# Patient Record
Sex: FEMALE | Race: WHITE | Employment: FULL TIME | URBAN - METROPOLITAN AREA
[De-identification: names, ages, dates, MRNs, and addresses within clinical notes are randomized per-mention and may not be internally consistent; named-entity substitution may affect disease eponyms.]

---

## 2017-01-19 RX ORDER — PROPRANOLOL HYDROCHLORIDE 40 MG/1
TABLET ORAL
Qty: 180 TAB | Refills: 1 | Status: SHIPPED | OUTPATIENT
Start: 2017-01-19 | End: 2017-02-06 | Stop reason: SDUPTHER

## 2017-02-06 RX ORDER — PROPRANOLOL HYDROCHLORIDE 40 MG/1
TABLET ORAL
Qty: 180 TAB | Refills: 1 | Status: SHIPPED | OUTPATIENT
Start: 2017-02-06 | End: 2017-07-18 | Stop reason: SDUPTHER

## 2017-02-13 DIAGNOSIS — E78.5 HYPERLIPIDEMIA, UNSPECIFIED HYPERLIPIDEMIA TYPE: ICD-10-CM

## 2017-02-13 DIAGNOSIS — I11.9 HYPERTENSIVE HEART DISEASE WITHOUT HEART FAILURE: ICD-10-CM

## 2017-02-13 DIAGNOSIS — G43.009 MIGRAINE WITHOUT AURA AND WITHOUT STATUS MIGRAINOSUS, NOT INTRACTABLE: ICD-10-CM

## 2017-02-13 DIAGNOSIS — M54.16 BILATERAL LUMBAR RADICULOPATHY: ICD-10-CM

## 2017-02-13 DIAGNOSIS — E11.9 TYPE 2 DIABETES MELLITUS WITHOUT COMPLICATION, WITHOUT LONG-TERM CURRENT USE OF INSULIN (HCC): ICD-10-CM

## 2017-02-13 DIAGNOSIS — Z11.59 NEED FOR HEPATITIS C SCREENING TEST: ICD-10-CM

## 2017-03-15 ENCOUNTER — OFFICE VISIT (OUTPATIENT)
Dept: INTERNAL MEDICINE CLINIC | Age: 62
End: 2017-03-15

## 2017-03-15 VITALS
TEMPERATURE: 98 F | OXYGEN SATURATION: 97 % | SYSTOLIC BLOOD PRESSURE: 152 MMHG | WEIGHT: 138 LBS | DIASTOLIC BLOOD PRESSURE: 84 MMHG | HEART RATE: 77 BPM | HEIGHT: 59 IN | BODY MASS INDEX: 27.82 KG/M2

## 2017-03-15 DIAGNOSIS — R39.15 URINARY URGENCY: ICD-10-CM

## 2017-03-15 DIAGNOSIS — R82.90 ABNORMAL URINE ODOR: ICD-10-CM

## 2017-03-15 DIAGNOSIS — R35.0 FREQUENCY OF URINATION: ICD-10-CM

## 2017-03-15 DIAGNOSIS — R30.0 DYSURIA: Primary | ICD-10-CM

## 2017-03-15 LAB
BILIRUB UR QL STRIP: NEGATIVE
GLUCOSE UR-MCNC: NEGATIVE MG/DL
KETONES P FAST UR STRIP-MCNC: NEGATIVE MG/DL
PH UR STRIP: 7.5 [PH] (ref 4.6–8)
PROT UR QL STRIP: NEGATIVE MG/DL
SP GR UR STRIP: 1.02 (ref 1–1.03)
UA UROBILINOGEN AMB POC: NORMAL (ref 0.2–1)
URINALYSIS CLARITY POC: NORMAL
URINALYSIS COLOR POC: YELLOW
URINE BLOOD POC: NORMAL
URINE LEUKOCYTES POC: NORMAL
URINE NITRITES POC: NEGATIVE

## 2017-03-15 RX ORDER — CIPROFLOXACIN 500 MG/1
500 TABLET ORAL 2 TIMES DAILY
Qty: 6 TAB | Refills: 0 | Status: SHIPPED | OUTPATIENT
Start: 2017-03-15 | End: 2017-03-18

## 2017-03-15 RX ORDER — OLANZAPINE 2.5 MG/1
TABLET ORAL
COMMUNITY
End: 2017-05-11 | Stop reason: ALTCHOICE

## 2017-03-15 NOTE — MR AVS SNAPSHOT
Visit Information Date & Time Provider Department Dept. Phone Encounter #  
 3/15/2017  4:30 PM Sharon Loyola [facility] Internist of [address] [place] [address] [phone] Your Appointments 5/4/2017  9:45 AM  
LAB with [facility] NURSE VISIT Internist of [facility] ([facility] Appt Note: labs 6mos. sarris [address], [address] [place] [id number] [place] [address] [address]  
  
   
 [address], [address]  
  
    
 5/11/2017 11:00 AM  
Office Visit with Timbo Locke MD  
Internist of [facility]) Appt Note: ov  
 [address], [address] [place] [address] [address]  
  
   
 [address], [address] Upcoming Health Maintenance Date Due Hepatitis C Screening 1955 MICROALBUMIN Q1 7/17/1967 Pneumococcal 19-64 Medium Risk (1 of 1 - PPSV23) 7/17/1976 LIPID PANEL Q1 6/11/2016 EYE EXAM RETINAL OR DILATED Q1 5/2/2017 HEMOGLOBIN A1C Q6M 5/7/2017 FOOT EXAM Q1 11/10/2017 PAP AKA CERVICAL CYTOLOGY 11/17/2017 BREAST CANCER SCRN MAMMOGRAM 4/5/2018 COLONOSCOPY 10/24/2024 DTaP/Tdap/Td series (2 - Td) 11/10/2026 Allergies as of 3/15/2017  Review Complete On: 3/15/2017 By: Clara Perez LPN Severity Noted Reaction Type Reactions Pcn [Penicillins]  10/25/2011    Rash  
 Sulfa (Sulfonamide Antibiotics)  10/25/2011    Rash Current Immunizations  Reviewed on 11/10/2016 Name Date Influenza Vaccine PF 10/13/2014 12:46 PM, 2/3/2014  3:30 PM  
 Influenza Vaccine Split 11/14/2011 Influenza Vaccine Whole 10/5/2010 TD Vaccine 1/23/2007 Tdap 11/10/2016  1:28 PM  
  
 Not reviewed this visit You Were Diagnosed With   
  
 Codes Comments Dysuria    -  Primary ICD-10-CM: R30.0 ICD-9-CM: 788.1 Frequency of urination     ICD-10-CM: R35.0 ICD-9-CM: 788.41 Urinary urgency     ICD-10-CM: R39.15 ICD-9-CM: 591.58   
  
 Vitals BP Pulse Temp Height(growth percentile) Weight(growth percentile) SpO2  
 152/84 77 98 °F (36.7 °C) (Oral) 4' 11\" (1.499 m) 138 lb (62.6 kg) 97% BMI OB Status Smoking Status 27.87 kg/m2 Postmenopausal Former Smoker Vitals History BMI and BSA Data Body Mass Index Body Surface Area  
 27.87 kg/m 2 1.61 m 2 Preferred Pharmacy Pharmacy Name Phone [address], [address] [place] [address] [phone] [facility] [phone] Your Updated Medication List  
  
   
This list is accurate as of: 3/15/17  4:49 PM.  Always use your most recent med list.  
  
  
  
  
 aspirin 81 mg tablet Take 81 mg by mouth. betamethasone valerate 0.1 % topical cream  
Commonly known as:  Mardetara Susana Apply  to affected area two (2) times daily as needed for Skin Irritation. ciprofloxacin HCl 500 mg tablet Commonly known as:  CIPRO Take 1 Tab by mouth two (2) times a day for 3 days. dextroamphetamine-amphetamine 10 mg tablet Commonly known as:  ADDERALL Take 1 Tab by mouth daily. Max Daily Amount: 10 mg. ELESTRIN 0.87 gram/actuation Glp  
Generic drug:  Estradiol  
by TransDERmal route. FISH OIL PO Take  by mouth. HYDROcodone-acetaminophen 5-325 mg per tablet Commonly known as:  Iven Hansen Take 1 Tab by mouth every four (4) hours as needed for Pain. Max Daily Amount: 6 Tabs. lisinopril 5 mg tablet Commonly known as:  PRINIVIL, ZESTRIL  
TAKE 1 TABLET BY MOUTH DAILY  
  
 metFORMIN 500 mg tablet Commonly known as:  GLUCOPHAGE Take 1 Tab by mouth two (2) times daily (with meals). NEXIUM PO Take 22.3 mg by mouth every other day. predniSONE 20 mg tablet Commonly known as:  Orlean Aas Take 60mg by mouth daily x 3 days, then 40mg daily x 3 days, then 20mg daily x 3 days. progesterone 200 mg capsule Commonly known as:  PROMETRIUM Take 200 mg by mouth daily. propranolol 40 mg tablet Commonly known as:  INDERAL  
TAKE 1 TABLET BY MOUTH TWICE DAILY  
  
 rosuvastatin 20 mg tablet Commonly known as:  CRESTOR  
TAKE 1 TABLET BY MOUTH DAILY  
  
 SUMAtriptan 50 mg tablet Commonly known as:  IMITREX  
TAKE 1 TABLET BY MOUTH EVERY DAY AS NEEDED  
  
 temazepam 30 mg capsule Commonly known as:  RESTORIL Take 1 Cap by mouth nightly as needed for Sleep. Max Daily Amount: 30 mg.  
  
 vortioxetine 10 mg tablet Commonly known as:  TRINTELLIX Take 1 and 1/2 tablets by mouth daily. Prescriptions Sent to Pharmacy Refills  
 ciprofloxacin HCl (CIPRO) 500 mg tablet 0 Sig: Take 1 Tab by mouth two (2) times a day for 3 days. Class: Normal  
 Pharmacy: [address] [place] [address] #: [phone] Route: Oral  
  
We Performed the Following AMB POC URINALYSIS DIP STICK MANUAL W/O MICRO [32364 CPT(R)] CULTURE, URINE [id number] CPT(R)] Introducing [facility]! Shana Ann introduces [employer] patient portal. Now you can access parts of your medical record, email your doctor's office, and request medication refills online. 1. In your internet browser, go to [email or web address] 2. Click on the First Time User? Click Here link in the Sign In box. You will see the New Member Sign Up page. 3. Enter your [employer] Access Code exactly as it appears below. You will not need to use this code after youve completed the sign-up process. If you do not sign up before the expiration date, you must request a new code. · [employer] Access Code: [email or web address] Expires: 6/13/2017  4:49 PM 
 
4. Enter the last four digits of your Social Security Number (xxxx) and Date of Birth (mm/dd/yyyy) as indicated and click Submit. You will be taken to the next sign-up page. 5. Create a [employer] ID.  This will be your [employer] login ID and cannot be changed, so think of one that is secure and easy to remember. 6. Create a [employer] password. You can change your password at any time. 7. Enter your Password Reset Question and Answer. This can be used at a later time if you forget your password. 8. Enter your e-mail address. You will receive e-mail notification when new information is available in [address] 9. Click Sign Up. You can now view and download portions of your medical record. 10. Click the Download Summary menu link to download a portable copy of your medical information. If you have questions, please visit the Frequently Asked Questions section of the [employer] website. Remember, [employer] is NOT to be used for urgent needs. For medical emergencies, dial 911. Now available from your iPhone and Android! Please provide this summary of care documentation to your next provider. Your primary care clinician is listed as Mohan Farias. If you have any questions after today's visit, please call [phone].

## 2017-03-15 NOTE — PROGRESS NOTES
HPI/History  Deneen Fisher is a 63 y.o.  female who presents for evaluation. Pt reports urinary sxs for about 2 wks. Positive for frequency, urgency, malodorous urine, bladder pressure, and dysuria not described as burning. No hematuria, CVAt, fevers, or vaginal sxs. No other sxs or complaints. Patient Active Problem List   Diagnosis Code    HCD (hypertensive cardiovascular disease) I11.9    Hyperlipidemia E78.5    Otosclerosis H80.90    Migraine G43.909    Depression F32.9    Bilateral lumbar radiculopathy M54.16    Type 2 diabetes mellitus without complication ([facility] [address] N10.2     Past Medical History:   Diagnosis Date    Anxiety     Bilateral lumbar radiculopathy     Carotid bruit     right    Depression     Diabetes mellitus ([facility] [address]     HCD (hypertensive cardiovascular disease)     Hyperlipidemia     Migraine headache     Plantar fasciitis      Past Surgical History:   Procedure Laterality Date    HX SEPTOPLASTY  6/2002    HX TONSILLECTOMY      HX TUBAL LIGATION       Social History     Social History    Marital status:      Spouse name: N/A    Number of children: N/A    Years of education: N/A     Occupational History    Not on file.      Social History Main Topics    Smoking status: Former Smoker     Quit date: 1/1/1989    Smokeless tobacco: Never Used    Alcohol use 1.0 oz/week     2 Glasses of wine per week      Comment: infrequently    Drug use: Not on file    Sexual activity: Not on file     Other Topics Concern    Not on file     Social History Narrative     Family History   Problem Relation Age of Onset    Hypertension Brother      x2    Elevated Lipids Brother     Stroke Mother     Heart Surgery Mother      CABG    Cancer Father      cancer of the mouth    Hypertension Father     Hypertension Brother     Elevated Lipids Brother      Current Outpatient Prescriptions   Medication Sig    ciprofloxacin HCl (CIPRO) 500 mg tablet Take 1 Tab by mouth two (2) times a day for 3 days.  OLANZapine (ZYPREXA) 2.5 mg tablet Take  by mouth nightly.  propranolol (INDERAL) 40 mg tablet TAKE 1 TABLET BY MOUTH TWICE DAILY    vortioxetine (TRINTELLIX) 10 mg tablet Take 1 and 1/2 tablets by mouth daily.  SUMAtriptan (IMITREX) 50 mg tablet TAKE 1 TABLET BY MOUTH EVERY DAY AS NEEDED    HYDROcodone-acetaminophen (NORCO) 5-325 mg per tablet Take 1 Tab by mouth every four (4) hours as needed for Pain. Max Daily Amount: 6 Tabs.  dextroamphetamine-amphetamine (ADDERALL) 10 mg tablet Take 1 Tab by mouth daily. Max Daily Amount: 10 mg.    metFORMIN (GLUCOPHAGE) 500 mg tablet Take 1 Tab by mouth two (2) times daily (with meals).  lisinopril (PRINIVIL, ZESTRIL) 5 mg tablet TAKE 1 TABLET BY MOUTH DAILY    rosuvastatin (CRESTOR) 20 mg tablet TAKE 1 TABLET BY MOUTH DAILY    temazepam (RESTORIL) 30 mg capsule Take 1 Cap by mouth nightly as needed for Sleep. Max Daily Amount: 30 mg.  progesterone (PROMETRIUM) 200 mg capsule Take 200 mg by mouth daily.  ESOMEPRAZOLE MAGNESIUM (NEXIUM PO) Take 22.3 mg by mouth every other day.  betamethasone valerate (VALISONE) 0.1 % topical cream Apply  to affected area two (2) times daily as needed for Skin Irritation.  Estradiol (ELESTRIN) 0.87 gram/Actuation GlPm by TransDERmal route.  DOCOSAHEXANOIC ACID/EPA (FISH OIL PO) Take  by mouth.  aspirin 81 mg tablet Take 81 mg by mouth. No current facility-administered medications for this visit. Allergies   Allergen Reactions    Pcn [Penicillins] Rash    Sulfa (Sulfonamide Antibiotics) Rash       Review of Systems  Rest of ROS negative. Significant findings included in HPI.     Physical Examination  Visit Vitals    /84    Pulse 77    Temp 98 °F (36.7 °C) (Oral)    Ht 4' 11\" (1.499 m)    Wt 138 lb (62.6 kg)    SpO2 97%    BMI 27.87 kg/m2       Results for orders placed or performed in visit on 03/15/17   AMB POC URINALYSIS DIP STICK MANUAL W/O MICRO Result Value Ref Range    Color (UA POC) Yellow     Clarity (UA POC) Cloudy     Glucose (UA POC) Negative Negative    Bilirubin (UA POC) Negative Negative    Ketones (UA POC) Negative Negative    Specific gravity (UA POC) 1.025 1.001 - 1.035    Blood (UA POC) Trace Negative    pH (UA POC) 7.5 4.6 - 8.0    Protein (UA POC) Negative Negative mg/dL    Urobilinogen (UA POC) 0.2 mg/dL 0.2 - 1    Nitrites (UA POC) Negative Negative    Leukocyte esterase (UA POC) 1+ Negative     General - Alert and in no acute distress. Pt appears well, comfortable, and in good spirits. Very pleasant and engaging. Nontoxic. Not anxious, non-diaphoretic. Mental status - Appropriate mood, behavior, speech content, dress, and thought processes. Pulm - No tachypnea, retractions, or cyanosis. Good respiratory effort. Cardiovascular - Normal rate. No CVA tenderness bilat. Assessment and Plan  1. Urinary frequency, urgency, odor, and dysuria for about 2 wks. UA noted above and likely UTI. Will treat with cipro and send for cx. Increase fluids. Further planning pending results and progression. Pt happily agrees with plan. PLEASE NOTE:   This document has been produced using voice recognition software. Unrecognized errors in transcription may be present.     [employer] of [address] [place] [address]  [phone]  3/15/2017

## 2017-03-17 LAB — CULTURE RESULT, SENTARA: ABNORMAL

## 2017-03-20 ENCOUNTER — TELEPHONE (OUTPATIENT)
Dept: INTERNAL MEDICINE CLINIC | Age: 62
End: 2017-03-20

## 2017-04-04 RX ORDER — SUMATRIPTAN 50 MG/1
TABLET, FILM COATED ORAL
Qty: 9 TAB | Refills: 1 | Status: SHIPPED | OUTPATIENT
Start: 2017-04-04 | End: 2017-07-07 | Stop reason: SDUPTHER

## 2017-04-19 ENCOUNTER — OFFICE VISIT (OUTPATIENT)
Dept: INTERNAL MEDICINE CLINIC | Age: 62
End: 2017-04-19

## 2017-04-19 VITALS
DIASTOLIC BLOOD PRESSURE: 72 MMHG | BODY MASS INDEX: 29.03 KG/M2 | SYSTOLIC BLOOD PRESSURE: 116 MMHG | HEART RATE: 60 BPM | OXYGEN SATURATION: 99 % | WEIGHT: 144 LBS | HEIGHT: 59 IN | TEMPERATURE: 98.3 F

## 2017-04-19 DIAGNOSIS — I10 ESSENTIAL HYPERTENSION: ICD-10-CM

## 2017-04-19 DIAGNOSIS — M54.16 BILATERAL LUMBAR RADICULOPATHY: ICD-10-CM

## 2017-04-19 DIAGNOSIS — R60.0 BILATERAL EDEMA OF LOWER EXTREMITY: ICD-10-CM

## 2017-04-19 DIAGNOSIS — G43.009 MIGRAINE WITHOUT AURA AND WITHOUT STATUS MIGRAINOSUS, NOT INTRACTABLE: ICD-10-CM

## 2017-04-19 DIAGNOSIS — E78.5 HYPERLIPIDEMIA, UNSPECIFIED HYPERLIPIDEMIA TYPE: ICD-10-CM

## 2017-04-19 DIAGNOSIS — F33.1 MODERATE EPISODE OF RECURRENT MAJOR DEPRESSIVE DISORDER (HCC): ICD-10-CM

## 2017-04-19 DIAGNOSIS — E11.9 TYPE 2 DIABETES MELLITUS WITHOUT COMPLICATION, WITHOUT LONG-TERM CURRENT USE OF INSULIN (HCC): Primary | ICD-10-CM

## 2017-04-19 DIAGNOSIS — E11.9 CONTROLLED TYPE 2 DIABETES MELLITUS WITHOUT COMPLICATION, WITHOUT LONG-TERM CURRENT USE OF INSULIN (HCC): ICD-10-CM

## 2017-04-19 NOTE — PROGRESS NOTES
1. Have you been to the ER, urgent care clinic or hospitalized since your last visit? NO.     2. Have you seen or consulted any other health care providers outside of the [address] [place] [address] since your last visit (Include any pap smears or colon screening)? NO      Pt complains of feet swelling. Onset Sunday April 16th. Pt states that a new medication that she is taking called olanzapine may be the cause of the sweling in her feet. Pt also complains of her appetite,  vision and that she can't focus.

## 2017-04-19 NOTE — MR AVS SNAPSHOT
Visit Information Date & Time Provider Department Dept. Phone Encounter #  
 4/19/2017 12:00 PM Siobhan Randall MD Internist of [address] [place] [address] [phone] Follow-up Instructions Return if symptoms worsen or fail to improve. Your Appointments 5/4/2017  9:45 AM  
LAB with C NURSE VISIT [facility] [address] [place] [address] Road) Appt Note: labs 6mos. sarris [address], [address] [place] [address] [place]  
  
   
 [address], [place]  
  
    
 5/11/2017 11:00 AM  
Office Visit with Siobhan Randall MD  
Internist of [address]) Appt Note: ov  
 [address], [address] [place] [address] [place]  
  
   
 [address], [facility] Upcoming Health Maintenance Date Due Hepatitis C Screening 1955 MICROALBUMIN Q1 7/17/1967 Pneumococcal 19-64 Medium Risk (1 of 1 - PPSV23) 7/17/1976 LIPID PANEL Q1 6/11/2016 EYE EXAM RETINAL OR DILATED Q1 5/2/2017 HEMOGLOBIN A1C Q6M 5/7/2017 FOOT EXAM Q1 11/10/2017 PAP AKA CERVICAL CYTOLOGY 11/17/2017 BREAST CANCER SCRN MAMMOGRAM 4/5/2018 COLONOSCOPY 10/24/2024 DTaP/Tdap/Td series (2 - Td) 11/10/2026 Allergies as of 4/19/2017  Review Complete On: 4/19/2017 By: Tiffany Vaca Severity Noted Reaction Type Reactions Pcn [Penicillins]  10/25/2011    Rash  
 Sulfa (Sulfonamide Antibiotics)  10/25/2011    Rash Current Immunizations  Reviewed on 11/10/2016 Name Date Influenza Vaccine PF 10/13/2014 12:46 PM, 2/3/2014  3:30 PM  
 Influenza Vaccine Split 11/14/2011 Influenza Vaccine Whole 10/5/2010 TD Vaccine 1/23/2007 Tdap 11/10/2016  1:28 PM  
  
 Not reviewed this visit You Were Diagnosed With   
  
 Codes Comments  Controlled type 2 diabetes mellitus without complication, without long-term current use of insulin ([facility].)    -  Primary ICD-10-CM: E11.9 ICD-9-CM: 250.00 Vitals BP Pulse Temp Height(growth percentile) Weight(growth percentile) SpO2  
 116/72 60 98.3 °F (36.8 °C) (Oral) 4' 11\" (1.499 m) 144 lb (65.3 kg) 99% BMI OB Status Smoking Status 29.08 kg/m2 Postmenopausal Former Smoker Vitals History BMI and BSA Data Body Mass Index Body Surface Area 29.08 kg/m 2 1.65 m 2 Preferred Pharmacy Pharmacy Name Phone [address], [place] [facility] 1700 [facility] [phone] Your Updated Medication List  
  
   
This list is accurate as of: 4/19/17  1:14 PM.  Always use your most recent med list.  
  
  
  
  
 aspirin 81 mg tablet Take 81 mg by mouth. betamethasone valerate 0.1 % topical cream  
Commonly known as:  [place] Apply  to affected area two (2) times daily as needed for Skin Irritation. dextroamphetamine-amphetamine 10 mg tablet Commonly known as:  ADDERALL Take 1 Tab by mouth daily. Max Daily Amount: 10 mg. ELESTRIN 0.87 gram/actuation [facility]  
Generic drug:  Estradiol  
by TransDERmal route. FISH OIL PO Take  by mouth. HYDROcodone-acetaminophen 5-325 mg per tablet Commonly known as:  Nga Leonardo Take 1 Tab by mouth every four (4) hours as needed for Pain. Max Daily Amount: 6 Tabs. lisinopril 5 mg tablet Commonly known as:  PRINIVIL, ZESTRIL  
TAKE 1 TABLET BY MOUTH DAILY  
  
 metFORMIN 500 mg tablet Commonly known as:  GLUCOPHAGE Take 1 Tab by mouth two (2) times daily (with meals). NEXIUM PO Take 22.3 mg by mouth every other day. OLANZapine 2.5 mg tablet Commonly known as:  ZyPREXA Take  by mouth nightly. progesterone 200 mg capsule Commonly known as:  PROMETRIUM Take 200 mg by mouth daily. propranolol 40 mg tablet Commonly known as:  INDERAL  
TAKE 1 TABLET BY MOUTH TWICE DAILY rosuvastatin 20 mg tablet Commonly known as:  CRESTOR  
TAKE 1 TABLET BY MOUTH DAILY  
  
 SUMAtriptan 50 mg tablet Commonly known as:  IMITREX  
TAKE 1 TABLET BY MOUTH EVERY DAY AS NEEDED  
  
 temazepam 30 mg capsule Commonly known as:  RESTORIL Take 1 Cap by mouth nightly as needed for Sleep. Max Daily Amount: 30 mg.  
  
 vortioxetine 10 mg tablet Commonly known as:  TRINTELLIX Take 1 and 1/2 tablets by mouth daily. Follow-up Instructions Return if symptoms worsen or fail to improve. To-Do List   
 04/19/2017 Lab:  HEMOGLOBIN A1C WITH EAG   
  
 04/19/2017 Lab:  METABOLIC PANEL, BASIC Patient Instructions Learning About Diabetes Food Guidelines Your Care Instructions Meal planning is important to manage diabetes. It helps keep your blood sugar at a target level (which you set with your doctor). You don't have to eat special foods. You can eat what your family eats, including sweets once in a while. But you do have to pay attention to how often you eat and how much you eat of certain foods. You may want to work with a dietitian or a certified diabetes educator (CDE) to help you plan meals and snacks. A dietitian or CDE can also help you lose weight if that is one of your goals. What should you know about eating carbs? Managing the amount of carbohydrate (carbs) you eat is an important part of healthy meals when you have diabetes. Carbohydrate is found in many foods. · Learn which foods have carbs. And learn the amounts of carbs in different foods. ¨ Bread, cereal, pasta, and rice have about 15 grams of carbs in a serving. A serving is 1 slice of bread (1 ounce), ½ cup of cooked cereal, or 1/3 cup of cooked pasta or rice. ¨ Fruits have 15 grams of carbs in a serving.  A serving is 1 small fresh fruit, such as an apple or orange; ½ of a banana; ½ cup of cooked or canned fruit; ½ cup of fruit juice; 1 cup of melon or raspberries; or 2 tablespoons of dried fruit. ¨ Milk and no-sugar-added yogurt have 15 grams of carbs in a serving. A serving is 1 cup of milk or 2/3 cup of no-sugar-added yogurt. ¨ Starchy vegetables have 15 grams of carbs in a serving. A serving is ½ cup of mashed potatoes or sweet potato; 1 cup winter squash; ½ of a small baked potato; ½ cup of cooked beans; or ½ cup cooked corn or green peas. · Learn how much carbs to eat each day and at each meal. A dietitian or CDE can teach you how to keep track of the amount of carbs you eat. This is called carbohydrate counting. · If you are not sure how to count carbohydrate grams, use the Plate Method to plan meals. It is a good, quick way to make sure that you have a balanced meal. It also helps you spread carbs throughout the day. ¨ Divide your plate by types of foods. Put non-starchy vegetables on half the plate, meat or other protein food on one-quarter of the plate, and a grain or starchy vegetable in the final quarter of the plate. To this you can add a small piece of fruit and 1 cup of milk or yogurt, depending on how many carbs you are supposed to eat at a meal. 
· Try to eat about the same amount of carbs at each meal. Do not \"save up\" your daily allowance of carbs to eat at one meal. 
· Proteins have very little or no carbs per serving. Examples of proteins are beef, chicken, turkey, fish, eggs, tofu, cheese, cottage cheese, and peanut butter. A serving size of meat is 3 ounces, which is about the size of a deck of cards. Examples of meat substitute serving sizes (equal to 1 ounce of meat) are 1/4 cup of cottage cheese, 1 egg, 1 tablespoon of peanut butter, and ½ cup of tofu. How can you eat out and still eat healthy? · Learn to estimate the serving sizes of foods that have carbohydrate. If you measure food at home, it will be easier to estimate the amount in a serving of restaurant food.  
· If the meal you order has too much carbohydrate (such as potatoes, corn, or baked beans), ask to have a low-carbohydrate food instead. Ask for a salad or green vegetables. · If you use insulin, check your blood sugar before and after eating out to help you plan how much to eat in the future. · If you eat more carbohydrate at a meal than you had planned, take a walk or do other exercise. This will help lower your blood sugar. What else should you know? · Limit saturated fat, such as the fat from meat and dairy products. This is a healthy choice because people who have diabetes are at higher risk of heart disease. So choose lean cuts of meat and nonfat or low-fat dairy products. Use olive or canola oil instead of butter or shortening when cooking. · Don't skip meals. Your blood sugar may drop too low if you skip meals and take insulin or certain medicines for diabetes. · Check with your doctor before you drink alcohol. Alcohol can cause your blood sugar to drop too low. Alcohol can also cause a bad reaction if you take certain diabetes medicines. Follow-up care is a key part of your treatment and safety. Be sure to make and go to all appointments, and call your doctor if you are having problems. It's also a good idea to know your test results and keep a list of the medicines you take. Where can you learn more? Go to http://jennifer-pavel.info/. Enter C221 in the search box to learn more about \"Learning About Diabetes Food Guidelines. \" Current as of: May 23, 2016 Content Version: 11.2 © 2290-4763 Lightspeed. Care instructions adapted under license by Hoverink (which disclaims liability or warranty for this information). If you have questions about a medical condition or this instruction, always ask your healthcare professional. [place] [address] any warranty or liability for your use of this information. Leg and Ankle Edema: Care Instructions Your Care Instructions Swelling in the legs, ankles, and feet is called edema. It is common after you sit or stand for a while. Long plane flights or car rides often cause swelling in the legs and feet. You may also have swelling if you have to stand for long periods of time at your job. Problems with the veins in the legs (varicose veins) and changes in hormones can also cause swelling. Sometimes the swelling in the ankles and feet is caused by a more serious problem, such as heart failure, infection, blood clots, or liver or kidney disease. Follow-up care is a key part of your treatment and safety. Be sure to make and go to all appointments, and call your doctor if you are having problems. Its also a good idea to know your test results and keep a list of the medicines you take. How can you care for yourself at home? · If your doctor gave you medicine, take it as prescribed. Call your doctor if you think you are having a problem with your medicine. · Whenever you are resting, raise your legs up. Try to keep the swollen area higher than the level of your heart. · Take breaks from standing or sitting in one position. ¨ Walk around to increase the blood flow in your lower legs. ¨ Move your feet and ankles often while you stand, or tighten and relax your leg muscles. · Wear support stockings. Put them on in the morning, before swelling gets worse. · Eat a balanced diet. Lose weight if you need to. · Limit the amount of salt (sodium) in your diet. Salt holds fluid in the body and may increase swelling. When should you call for help? Call 911 anytime you think you may need emergency care. For example, call if: 
· You have symptoms of a blood clot in your lung (called a pulmonary embolism). These may include: 
¨ Sudden chest pain. ¨ Trouble breathing. ¨ Coughing up blood. Call your doctor now or seek immediate medical care if: 
· You have signs of a blood clot, such as: 
¨ Pain in your calf, back of the knee, thigh, or groin. ¨ Redness and swelling in your leg or groin. · You have symptoms of infection, such as: 
¨ Increased pain, swelling, warmth, or redness. ¨ Red streaks or pus. ¨ A fever. Watch closely for changes in your health, and be sure to contact your doctor if: 
· Your swelling is getting worse. · You have new or worsening pain in your legs. · You do not get better as expected. Where can you learn more? Go to http://jennifer-pavel.info/. Enter N362 in the search box to learn more about \"Leg and Ankle Edema: Care Instructions. \" Current as of: May 27, 2016 Content Version: 11.2 © 2327-6799 DoApp. Care instructions adapted under license by Omnikles (which disclaims liability or warranty for this information). If you have questions about a medical condition or this instruction, always ask your healthcare professional. [place] [address] any warranty or liability for your use of this information. DASH Diet: Care Instructions Your Care Instructions The DASH diet is an eating plan that can help lower your blood pressure. DASH stands for Dietary Approaches to Stop Hypertension. Hypertension is high blood pressure. The DASH diet focuses on eating foods that are high in calcium, potassium, and magnesium. These nutrients can lower blood pressure. The foods that are highest in these nutrients are fruits, vegetables, low-fat dairy products, nuts, seeds, and legumes. But taking calcium, potassium, and magnesium supplements instead of eating foods that are high in those nutrients does not have the same effect. The DASH diet also includes whole grains, fish, and poultry. The DASH diet is one of several lifestyle changes your doctor may recommend to lower your high blood pressure. Your doctor may also want you to decrease the amount of sodium in your diet.  Lowering sodium while following the DASH diet can lower blood pressure even further than just the DASH diet alone. Follow-up care is a key part of your treatment and safety. Be sure to make and go to all appointments, and call your doctor if you are having problems. It's also a good idea to know your test results and keep a list of the medicines you take. How can you care for yourself at home? Following the DASH diet · Eat 4 to 5 servings of fruit each day. A serving is 1 medium-sized piece of fruit, ½ cup chopped or canned fruit, 1/4 cup dried fruit, or 4 ounces (½ cup) of fruit juice. Choose fruit more often than fruit juice. · Eat 4 to 5 servings of vegetables each day. A serving is 1 cup of lettuce or raw leafy vegetables, ½ cup of chopped or cooked vegetables, or 4 ounces (½ cup) of vegetable juice. Choose vegetables more often than vegetable juice. · Get 2 to 3 servings of low-fat and fat-free dairy each day. A serving is 8 ounces of milk, 1 cup of yogurt, or 1 ½ ounces of cheese. · Eat 6 to 8 servings of grains each day. A serving is 1 slice of bread, 1 ounce of dry cereal, or ½ cup of cooked rice, pasta, or cooked cereal. Try to choose whole-grain products as much as possible. · Limit lean meat, poultry, and fish to 2 servings each day. A serving is 3 ounces, about the size of a deck of cards. · Eat 4 to 5 servings of nuts, seeds, and legumes (cooked dried beans, lentils, and split peas) each week. A serving is 1/3 cup of nuts, 2 tablespoons of seeds, or ½ cup of cooked beans or peas. · Limit fats and oils to 2 to 3 servings each day. A serving is 1 teaspoon of vegetable oil or 2 tablespoons of salad dressing. · Limit sweets and added sugars to 5 servings or less a week. A serving is 1 tablespoon jelly or jam, ½ cup sorbet, or 1 cup of lemonade. · Eat less than 2,300 milligrams (mg) of sodium a day. If you limit your sodium to 1,500 mg a day, you can lower your blood pressure even more. Tips for success · Start small.  Do not try to make dramatic changes to your diet all at once. You might feel that you are missing out on your favorite foods and then be more likely to not follow the plan. Make small changes, and stick with them. Once those changes become habit, add a few more changes. · Try some of the following: ¨ Make it a goal to eat a fruit or vegetable at every meal and at snacks. This will make it easy to get the recommended amount of fruits and vegetables each day. ¨ Try yogurt topped with fruit and nuts for a snack or healthy dessert. ¨ Add lettuce, tomato, cucumber, and onion to sandwiches. ¨ Combine a ready-made pizza crust with low-fat mozzarella cheese and lots of vegetable toppings. Try using tomatoes, squash, spinach, broccoli, carrots, cauliflower, and onions. ¨ Have a variety of cut-up vegetables with a low-fat dip as an appetizer instead of chips and dip. ¨ Sprinkle sunflower seeds or chopped almonds over salads. Or try adding chopped walnuts or almonds to cooked vegetables. ¨ Try some vegetarian meals using beans and peas. Add garbanzo or kidney beans to salads. Make burritos and tacos with mashed nieto beans or black beans. Where can you learn more? Go to http://jennifer-pavel.info/. Enter V416 in the search box to learn more about \"DASH Diet: Care Instructions. \" Current as of: March 23, 2016 Content Version: 11.2 © 9792-9414 Fooooo. Care instructions adapted under license by SportsPursuit (which disclaims liability or warranty for this information). If you have questions about a medical condition or this instruction, always ask your healthcare professional. [place] [address] any warranty or liability for your use of this information. Introducing [facility]! Nadege Toribio introduces [employer] patient portal. Now you can access parts of your medical record, email your doctor's office, and request medication refills online.    
 
1. In your internet browser, go to [email or web address] 2. Click on the First Time User? Click Here link in the Sign In box. You will see the New Member Sign Up page. 3. Enter your [employer] Access Code exactly as it appears below. You will not need to use this code after youve completed the sign-up process. If you do not sign up before the expiration date, you must request a new code. · [employer] Access Code: [email or web address] Expires: 6/13/2017  4:49 PM 
 
4. Enter the last four digits of your Social Security Number (xxxx) and Date of Birth (mm/dd/yyyy) as indicated and click Submit. You will be taken to the next sign-up page. 5. Create a Open Home Prot ID. This will be your [employer] login ID and cannot be changed, so think of one that is secure and easy to remember. 6. Create a [employer] password. You can change your password at any time. 7. Enter your Password Reset Question and Answer. This can be used at a later time if you forget your password. 8. Enter your e-mail address. You will receive e-mail notification when new information is available in [address] 9. Click Sign Up. You can now view and download portions of your medical record. 10. Click the Download Summary menu link to download a portable copy of your medical information. If you have questions, please visit the Frequently Asked Questions section of the [employer] website. Remember, [employer] is NOT to be used for urgent needs. For medical emergencies, dial 911. Now available from your iPhone and Android! Please provide this summary of care documentation to your next provider. Your primary care clinician is listed as Darwin Singh. If you have any questions after today's visit, please call [phone].

## 2017-04-19 NOTE — PATIENT INSTRUCTIONS
Learning About Diabetes Food Guidelines  Your Care Instructions  Meal planning is important to manage diabetes. It helps keep your blood sugar at a target level (which you set with your doctor). You don't have to eat special foods. You can eat what your family eats, including sweets once in a while. But you do have to pay attention to how often you eat and how much you eat of certain foods. You may want to work with a dietitian or a certified diabetes educator (CDE) to help you plan meals and snacks. A dietitian or CDE can also help you lose weight if that is one of your goals. What should you know about eating carbs? Managing the amount of carbohydrate (carbs) you eat is an important part of healthy meals when you have diabetes. Carbohydrate is found in many foods. · Learn which foods have carbs. And learn the amounts of carbs in different foods. ¨ Bread, cereal, pasta, and rice have about 15 grams of carbs in a serving. A serving is 1 slice of bread (1 ounce), ½ cup of cooked cereal, or 1/3 cup of cooked pasta or rice. ¨ Fruits have 15 grams of carbs in a serving. A serving is 1 small fresh fruit, such as an apple or orange; ½ of a banana; ½ cup of cooked or canned fruit; ½ cup of fruit juice; 1 cup of melon or raspberries; or 2 tablespoons of dried fruit. ¨ Milk and no-sugar-added yogurt have 15 grams of carbs in a serving. A serving is 1 cup of milk or 2/3 cup of no-sugar-added yogurt. ¨ Starchy vegetables have 15 grams of carbs in a serving. A serving is ½ cup of mashed potatoes or sweet potato; 1 cup winter squash; ½ of a small baked potato; ½ cup of cooked beans; or ½ cup cooked corn or green peas. · Learn how much carbs to eat each day and at each meal. A dietitian or CDE can teach you how to keep track of the amount of carbs you eat. This is called carbohydrate counting. · If you are not sure how to count carbohydrate grams, use the Plate Method to plan meals.  It is a good, quick way to make sure that you have a balanced meal. It also helps you spread carbs throughout the day. ¨ Divide your plate by types of foods. Put non-starchy vegetables on half the plate, meat or other protein food on one-quarter of the plate, and a grain or starchy vegetable in the final quarter of the plate. To this you can add a small piece of fruit and 1 cup of milk or yogurt, depending on how many carbs you are supposed to eat at a meal.  · Try to eat about the same amount of carbs at each meal. Do not \"save up\" your daily allowance of carbs to eat at one meal.  · Proteins have very little or no carbs per serving. Examples of proteins are beef, chicken, turkey, fish, eggs, tofu, cheese, cottage cheese, and peanut butter. A serving size of meat is 3 ounces, which is about the size of a deck of cards. Examples of meat substitute serving sizes (equal to 1 ounce of meat) are 1/4 cup of cottage cheese, 1 egg, 1 tablespoon of peanut butter, and ½ cup of tofu. How can you eat out and still eat healthy? · Learn to estimate the serving sizes of foods that have carbohydrate. If you measure food at home, it will be easier to estimate the amount in a serving of restaurant food. · If the meal you order has too much carbohydrate (such as potatoes, corn, or baked beans), ask to have a low-carbohydrate food instead. Ask for a salad or green vegetables. · If you use insulin, check your blood sugar before and after eating out to help you plan how much to eat in the future. · If you eat more carbohydrate at a meal than you had planned, take a walk or do other exercise. This will help lower your blood sugar. What else should you know? · Limit saturated fat, such as the fat from meat and dairy products. This is a healthy choice because people who have diabetes are at higher risk of heart disease. So choose lean cuts of meat and nonfat or low-fat dairy products. Use olive or canola oil instead of butter or shortening when cooking.   · Don't skip meals. Your blood sugar may drop too low if you skip meals and take insulin or certain medicines for diabetes. · Check with your doctor before you drink alcohol. Alcohol can cause your blood sugar to drop too low. Alcohol can also cause a bad reaction if you take certain diabetes medicines. Follow-up care is a key part of your treatment and safety. Be sure to make and go to all appointments, and call your doctor if you are having problems. It's also a good idea to know your test results and keep a list of the medicines you take. Where can you learn more? Go to http://jenniferedopavel.info/. Enter Q716 in the search box to learn more about \"Learning About Diabetes Food Guidelines. \"  Current as of: May 23, 2016  Content Version: 11.2  © 4242-0782 [employer]. Care instructions adapted under license by [employer] (which disclaims liability or warranty for this information). If you have questions about a medical condition or this instruction, always ask your healthcare professional. [place] [address] any warranty or liability for your use of this information. Leg and Ankle Edema: Care Instructions  Your Care Instructions  Swelling in the legs, ankles, and feet is called edema. It is common after you sit or stand for a while. Long plane flights or car rides often cause swelling in the legs and feet. You may also have swelling if you have to stand for long periods of time at your job. Problems with the veins in the legs (varicose veins) and changes in hormones can also cause swelling. Sometimes the swelling in the ankles and feet is caused by a more serious problem, such as heart failure, infection, blood clots, or liver or kidney disease. Follow-up care is a key part of your treatment and safety. Be sure to make and go to all appointments, and call your doctor if you are having problems.  Its also a good idea to know your test results and keep a list of the medicines you take. How can you care for yourself at home? · If your doctor gave you medicine, take it as prescribed. Call your doctor if you think you are having a problem with your medicine. · Whenever you are resting, raise your legs up. Try to keep the swollen area higher than the level of your heart. · Take breaks from standing or sitting in one position. ¨ Walk around to increase the blood flow in your lower legs. ¨ Move your feet and ankles often while you stand, or tighten and relax your leg muscles. · Wear support stockings. Put them on in the morning, before swelling gets worse. · Eat a balanced diet. Lose weight if you need to. · Limit the amount of salt (sodium) in your diet. Salt holds fluid in the body and may increase swelling. When should you call for help? Call 911 anytime you think you may need emergency care. For example, call if:  · You have symptoms of a blood clot in your lung (called a pulmonary embolism). These may include:  ¨ Sudden chest pain. ¨ Trouble breathing. ¨ Coughing up blood. Call your doctor now or seek immediate medical care if:  · You have signs of a blood clot, such as:  ¨ Pain in your calf, back of the knee, thigh, or groin. ¨ Redness and swelling in your leg or groin. · You have symptoms of infection, such as:  ¨ Increased pain, swelling, warmth, or redness. ¨ Red streaks or pus. ¨ A fever. Watch closely for changes in your health, and be sure to contact your doctor if:  · Your swelling is getting worse. · You have new or worsening pain in your legs. · You do not get better as expected. Where can you learn more? Go to http://jennifer-pavel.info/. Enter I004 in the search box to learn more about \"Leg and Ankle Edema: Care Instructions. \"  Current as of: May 27, 2016  Content Version: 11.2  © 9454-0996 [employer].  Care instructions adapted under license by [employer] (which disclaims liability or warranty for this information). If you have questions about a medical condition or this instruction, always ask your healthcare professional. [address] any warranty or liability for your use of this information. DASH Diet: Care Instructions  Your Care Instructions  The DASH diet is an eating plan that can help lower your blood pressure. DASH stands for Dietary Approaches to Stop Hypertension. Hypertension is high blood pressure. The DASH diet focuses on eating foods that are high in calcium, potassium, and magnesium. These nutrients can lower blood pressure. The foods that are highest in these nutrients are fruits, vegetables, low-fat dairy products, nuts, seeds, and legumes. But taking calcium, potassium, and magnesium supplements instead of eating foods that are high in those nutrients does not have the same effect. The DASH diet also includes whole grains, fish, and poultry. The DASH diet is one of several lifestyle changes your doctor may recommend to lower your high blood pressure. Your doctor may also want you to decrease the amount of sodium in your diet. Lowering sodium while following the DASH diet can lower blood pressure even further than just the DASH diet alone. Follow-up care is a key part of your treatment and safety. Be sure to make and go to all appointments, and call your doctor if you are having problems. It's also a good idea to know your test results and keep a list of the medicines you take. How can you care for yourself at home? Following the DASH diet  · Eat 4 to 5 servings of fruit each day. A serving is 1 medium-sized piece of fruit, ½ cup chopped or canned fruit, 1/4 cup dried fruit, or 4 ounces (½ cup) of fruit juice. Choose fruit more often than fruit juice. · Eat 4 to 5 servings of vegetables each day. A serving is 1 cup of lettuce or raw leafy vegetables, ½ cup of chopped or cooked vegetables, or 4 ounces (½ cup) of vegetable juice.  Choose vegetables more often than vegetable juice. · Get 2 to 3 servings of low-fat and fat-free dairy each day. A serving is 8 ounces of milk, 1 cup of yogurt, or 1 ½ ounces of cheese. · Eat 6 to 8 servings of grains each day. A serving is 1 slice of bread, 1 ounce of dry cereal, or ½ cup of cooked rice, pasta, or cooked cereal. Try to choose whole-grain products as much as possible. · Limit lean meat, poultry, and fish to 2 servings each day. A serving is 3 ounces, about the size of a deck of cards. · Eat 4 to 5 servings of nuts, seeds, and legumes (cooked dried beans, lentils, and split peas) each week. A serving is 1/3 cup of nuts, 2 tablespoons of seeds, or ½ cup of cooked beans or peas. · Limit fats and oils to 2 to 3 servings each day. A serving is 1 teaspoon of vegetable oil or 2 tablespoons of salad dressing. · Limit sweets and added sugars to 5 servings or less a week. A serving is 1 tablespoon jelly or jam, ½ cup sorbet, or 1 cup of lemonade. · Eat less than 2,300 milligrams (mg) of sodium a day. If you limit your sodium to 1,500 mg a day, you can lower your blood pressure even more. Tips for success  · Start small. Do not try to make dramatic changes to your diet all at once. You might feel that you are missing out on your favorite foods and then be more likely to not follow the plan. Make small changes, and stick with them. Once those changes become habit, add a few more changes. · Try some of the following:  ¨ Make it a goal to eat a fruit or vegetable at every meal and at snacks. This will make it easy to get the recommended amount of fruits and vegetables each day. ¨ Try yogurt topped with fruit and nuts for a snack or healthy dessert. ¨ Add lettuce, tomato, cucumber, and onion to sandwiches. ¨ Combine a ready-made pizza crust with low-fat mozzarella cheese and lots of vegetable toppings. Try using tomatoes, squash, spinach, broccoli, carrots, cauliflower, and onions.   ¨ Have a variety of cut-up vegetables with a low-fat dip as an appetizer instead of chips and dip. ¨ Sprinkle sunflower seeds or chopped almonds over salads. Or try adding chopped walnuts or almonds to cooked vegetables. ¨ Try some vegetarian meals using beans and peas. Add garbanzo or kidney beans to salads. Make burritos and tacos with mashed nieto beans or black beans. Where can you learn more? Go to http://jennifer-pavel.info/. Enter I345 in the search box to learn more about \"DASH Diet: Care Instructions. \"  Current as of: March 23, 2016  Content Version: 11.2  © 4545-9746 [employer]. Care instructions adapted under license by [employer] (which disclaims liability or warranty for this information). If you have questions about a medical condition or this instruction, always ask your healthcare professional. [address] any warranty or liability for your use of this information.

## 2017-04-20 LAB
ANION GAP SERPL CALC-SCNC: 18 MMOL/L
AVG GLU, 10930: 179 MG/DL (ref 91–123)
BUN SERPL-MCNC: 16 MG/DL (ref 6–22)
CALCIUM SERPL-MCNC: 9.2 MG/DL (ref 8.4–10.5)
CHLORIDE SERPL-SCNC: 101 MMOL/L (ref 98–110)
CO2 SERPL-SCNC: 25 MMOL/L (ref 20–32)
CREAT SERPL-MCNC: 0.5 MG/DL (ref 0.5–1.2)
GFRAA, 66117: >60
GFRNA, 66118: >60
GLUCOSE SERPL-MCNC: 140 MG/DL (ref 65–99)
HBA1C MFR BLD HPLC: 7.9 % (ref 4.8–5.9)
POTASSIUM SERPL-SCNC: 4.6 MMOL/L (ref 3.5–5.5)
SODIUM SERPL-SCNC: 144 MMOL/L (ref 133–145)

## 2017-04-22 NOTE — PROGRESS NOTES
HPI:   Hood Garcia is a 63y.o. year old female who presents today for evaluation of lower extremity edema and eyelid swelling. She has a history of hypertension, hyperlipidemia, diabetes mellitus, migraine headaches, depression, anxiety, ADHD, and lumbar radiculopathy. She reports that she continues to have difficulty with depression, and is under the care of a therapist at [address] [place] [address]. She had been maintained on vortioxetine 15 mg, and one month ago, Zyprexa 2.5 mg was added. She states that since she began taking the Zyprexa, she has been experiencing the sensation of being \"numb\" without the ability to experience any emotion. She states that she does not have any suicidal ideas or plans. She states that she has become extremely hungry and cannot seem to stop eating since starting Zyprexa, and she has gained six pounds over the last month. Two days ago, she also noted the onset of mild lower extremity edema, which seems to worsen throughout the day and improve overnight. She also has noted some swelling of her eyelids, particularly when awakening in the morning. She denies any shortness of breath, chest pain, difficulty swallowing, lightheadedness, or palpitations. She has noted some increasing nasal congestion and sneezing. She states that she tried calling her therapist, but he did not return her call. Her daughter felt that she should be evaluated given symptoms. She is otherwise without complaints. She has a history of hypertension, treated with propranolol. She does not check her blood pressure at home, and she does not exercise regularly. She denies any chest pain, shortness of breath at rest or with exertion, palpitations, or lightheadedness. She also has a history of hyperlipidemia, treated with high intensity dose rosuvastatin. She has a history of diabetes mellitus, and was started on metformin in 2/2015 for a HbA1c of 7.5. She does not monitor her blood sugars at home.  She denies any polyuria, polydipsia, nocturia, or blurry vision, and has no history of retinopathy, neuropathy, or nephropathy. She has regular eye exams with Dr. Jose Rafael Anderson. She also has a history of migraines without aura, which usually respond to Imitrex. She has a history of lumbar radiculopathy (R>L), and was evaluated by Dr. César Mackay in 5/2015. Lumbar x-rays showed degenerative changes in L4-L5 and L5-S1. She was treated conservatively with physical therapy and ibuprofen and reports significant improvement. She was evaluated by MOJGAN Butler in 12/2016 for exacerbation of low back pain with right sciatica. She was treated with steroids and Norco with improvement. She had a screening colonoscopy in 10/2014 by Dr. Edelmira Choi which was normal. Follow-up due in 10 years. She denies any abdominal pain, nausea, vomiting, melena, hematochezia, or change in bowel movements. Past Medical History:   Diagnosis Date    Anxiety     Bilateral lumbar radiculopathy     Carotid bruit     right    Depression     Diabetes mellitus (HCC)     HCD (hypertensive cardiovascular disease)     Hyperlipidemia     Migraine headache     Plantar fasciitis      Past Surgical History:   Procedure Laterality Date    HX SEPTOPLASTY  6/2002    HX TONSILLECTOMY      HX TUBAL LIGATION       Current Outpatient Prescriptions   Medication Sig    SUMAtriptan (IMITREX) 50 mg tablet TAKE 1 TABLET BY MOUTH EVERY DAY AS NEEDED    OLANZapine (ZYPREXA) 2.5 mg tablet Take  by mouth nightly.  propranolol (INDERAL) 40 mg tablet TAKE 1 TABLET BY MOUTH TWICE DAILY    vortioxetine (TRINTELLIX) 10 mg tablet Take 1 and 1/2 tablets by mouth daily.  dextroamphetamine-amphetamine (ADDERALL) 10 mg tablet Take 1 Tab by mouth daily. Max Daily Amount: 10 mg.    metFORMIN (GLUCOPHAGE) 500 mg tablet Take 1 Tab by mouth two (2) times daily (with meals).     lisinopril (PRINIVIL, ZESTRIL) 5 mg tablet TAKE 1 TABLET BY MOUTH DAILY    rosuvastatin (CRESTOR) 20 mg tablet TAKE 1 TABLET BY MOUTH DAILY    progesterone (PROMETRIUM) 200 mg capsule Take 200 mg by mouth daily.  ESOMEPRAZOLE MAGNESIUM (NEXIUM PO) Take 22.3 mg by mouth every other day.  Estradiol (ELESTRIN) 0.87 gram/Actuation GlPm by TransDERmal route.  aspirin 81 mg tablet Take 81 mg by mouth.  HYDROcodone-acetaminophen (NORCO) 5-325 mg per tablet Take 1 Tab by mouth every four (4) hours as needed for Pain. Max Daily Amount: 6 Tabs.  temazepam (RESTORIL) 30 mg capsule Take 1 Cap by mouth nightly as needed for Sleep. Max Daily Amount: 30 mg.    betamethasone valerate (VALISONE) 0.1 % topical cream Apply  to affected area two (2) times daily as needed for Skin Irritation.  DOCOSAHEXANOIC ACID/EPA (FISH OIL PO) Take  by mouth. No current facility-administered medications for this visit. Allergies and Intolerances: Allergies   Allergen Reactions    Pcn [Penicillins] Rash    Sulfa (Sulfonamide Antibiotics) Rash     Family History: She has no family history of colon or breast cancer. Her mother  from a CVA. Her father  from throat cancer and cirrhosis. Family History   Problem Relation Age of Onset    Hypertension Brother      x2    Elevated Lipids Brother     Stroke Mother     Heart Surgery Mother      CABG    Cancer Father      cancer of the mouth    Hypertension Father     Hypertension Brother     Elevated Lipids Brother      Social History:   She  reports that she quit smoking about 28 years ago. She has never used smokeless tobacco. She smoked approximately 0.25 ppd for 2 years, stopping in . She is a  and has one daughter. She was employed as a . 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 She was previously working as the [employer] for her [employer].   History   Alcohol Use    1.0 oz/week    2 Glasses of wine per week     Comment: infrequently     Immunization History:  Immunization History   Administered Date(s) Administered    Influenza Vaccine PF 02/03/2014, 10/13/2014    Influenza Vaccine Split 11/14/2011    Influenza Vaccine Whole 10/05/2010    TD Vaccine 01/23/2007    Tdap 11/10/2016       Review of Systems:   As above included in HPI. Otherwise 11 point review of systems negative including constitutional, skin, HENT, eyes, respiratory, cardiovascular, gastrointestinal, genitourinary, musculoskeletal, endocrine, hematologic, allergy, and neurologic. Physical:   Vitals:   BP: 116/72  HR: 60  WT: 144 lb (65.3 kg)  BMI:  29.08 kg/m2    Exam:   Pt appears well; alert and oriented x 3; appropriate affect. HEENT: PERRLA, anicteric, oropharynx clear, mild swelling of upper eyelids and below eyes bilaterally; no JVD, adenopathy or thyromegaly. No tongue swelling. Normal gag reflex. No carotid bruits or radiated murmur. No cervical adenopathy. Lungs: clear to auscultation, no wheezes, rhonchi, or rales. Heart: regular rate and rhythm. No murmur, rubs, gallops  Abdomen: soft, nontender, nondistended, normal bowel sounds, no hepatosplenomegaly or masses. Extremities: trace bilateral edema. Pulses 1-2+ bilaterally. Review of Data:  Labs:  11/7/2016 Creatinine 0.6/ eGFR >60    HbA1c 7.4    3/15/2017 Urine dip: negative protein      Health Maintenance:  Screening:    Mammogram: negative (4/2016)   PAP smear: well women exams by Dr. Sushil King. Next appointment 1/2017. Colorectal: colonoscopy (10/2014) normal. Dr. Negra Rivers. Due 2024. Depression: on Trintellix, Zyprexa, Restoril, and Adderall.    DM (HbA1c/FPG): HbA1c 7.4 (11/2016)   Hepatitis C: unknown   Falls: none   DEXA: N/A   Glaucoma: regular eye exams with Dr. Soy Abrams (last 5/2016)   Smoking: none   Vitamin D: 33.2 (6/2015)   Medicare Wellness: N/A        Impression:  Patient Active Problem List   Diagnosis Code    HCD (hypertensive cardiovascular disease) I11.9    Hyperlipidemia E78.5    Otosclerosis H80.90    Migraine G43.909    Depression F32.9    Bilateral lumbar radiculopathy M54.16    Type 2 diabetes mellitus without complication ([facility]) F91.9       Plan:  1. Lower extremity swelling. No evidence of decompensated heart failure on exam. Urine dip negative for protein in 3/2017. Appears to be venous stasis related given improvement overnight. Most likely exacerbated by significant weight gain over last month, related to increase appetite due to Zyprexa. Will check BMP to confirm normal renal function. Discussed attempting to increase activity, monitor salt and caloric intake, and elevate feet. Will attempt to contact [address] [place] [address] psychiatry again regarding Zyprexa and whether she should continue. 2. Facial swelling. Appears allergic in origin, and patient reports increasing seasonal allergy symptoms. Discussed antihistamine use to help manage. 3. Hypertension. Blood pressure appears well controlled on lisinopril and propranolol. Lisinopril 5 mg added last visit for its colin-protective effect in the setting of diabetes mellitus. Renal function normal with creatinine 0.7/ eGFR >60. Will recheck today. Continue to follow. 2. Hyperlipidemia. On high intensity rosuvastatin with LDL 89 (6/2015). Will reassess at next visit. Emphasized importance of lifestyle modifications, including diet, exercise, and weight loss. 3. Diabetes mellitus. Was not well controlled with HbA1c of 7.4 at last visit. Metformin dose was increased, but patient did not follow-up with lab work in 3 months to reassess. Concern regarding control today given significant weight gain over last month with Zyprexa. Will recheck HbA1c today. No evidence of microvascular complications. On statin and began lisinopril for its colin-protective effect. Continue follow-up with Dr. Marcell Trent for annual eye exams. Foot exam normal in 11/2016. Will obtain urine microalbumin/ creatinine ratio at next visit. 4. Depression/ADHD.  Appears to be having significant difficulty with side effects since Zyprexa added one month ago. Instructed to contact [address] [place] [address] psychiatry to address. Continue current therapy for now. 5. Migraines. Relatively infrequent and responds to Imitrex. Follow. 6. Lumbar radiculopathy. Exacerbation in 12/2016 resolved with conservative measures. Follow. 7. Health maintenance. Already received influenza vaccine. Will give Tdap script at next visit. Other immunizations up to date. Will address well women exams and mammograms at next visit. Colonoscopy due 2024. Continue eye exams with Dr. Jennifer Shelby. Will assess Hepatitis C status at next visit. Vitamin D level normal in 6/2015. Will reassess at next visit. Patient understands recommendations and agrees with plan. Follow-up already scheduled for 5/2017.

## 2017-04-23 ENCOUNTER — TELEPHONE (OUTPATIENT)
Dept: INTERNAL MEDICINE CLINIC | Age: 62
End: 2017-04-23

## 2017-04-23 PROBLEM — I10 ESSENTIAL HYPERTENSION: Status: ACTIVE | Noted: 2017-04-23

## 2017-04-23 PROBLEM — R60.0 BILATERAL EDEMA OF LOWER EXTREMITY: Status: ACTIVE | Noted: 2017-04-23

## 2017-04-23 NOTE — TELEPHONE ENCOUNTER
Please let the patient know that the blood drawn at her visit showed that her HbA1c has increased significantly to 7.9. This is most likely related to her recent weight gain due to Zyprexa. Please stress importance of lifestyle modifications, including diet and weight loss. Will readdress at next visit in May.     Renal function and electrolytes were normal.

## 2017-04-27 ENCOUNTER — HOSPITAL ENCOUNTER (OUTPATIENT)
Dept: LAB | Age: 62
Discharge: HOME | End: 2017-04-27

## 2017-04-27 LAB — SENTARA SPECIMEN COL,SENBCF: NORMAL

## 2017-04-27 PROCEDURE — 99001 SPECIMEN HANDLING PT-LAB: CPT | Performed by: PHYSICIAN ASSISTANT

## 2017-05-04 ENCOUNTER — LAB ONLY (OUTPATIENT)
Dept: INTERNAL MEDICINE CLINIC | Age: 62
End: 2017-05-04

## 2017-05-04 DIAGNOSIS — E11.9 TYPE 2 DIABETES MELLITUS WITHOUT COMPLICATION, WITHOUT LONG-TERM CURRENT USE OF INSULIN (HCC): ICD-10-CM

## 2017-05-04 DIAGNOSIS — Z11.59 NEED FOR HEPATITIS C SCREENING TEST: ICD-10-CM

## 2017-05-04 DIAGNOSIS — M54.16 BILATERAL LUMBAR RADICULOPATHY: ICD-10-CM

## 2017-05-04 DIAGNOSIS — I11.9 HYPERTENSIVE HEART DISEASE WITHOUT HEART FAILURE: Primary | ICD-10-CM

## 2017-05-04 DIAGNOSIS — G43.009 MIGRAINE WITHOUT AURA AND WITHOUT STATUS MIGRAINOSUS, NOT INTRACTABLE: ICD-10-CM

## 2017-05-04 DIAGNOSIS — E78.5 HYPERLIPIDEMIA, UNSPECIFIED HYPERLIPIDEMIA TYPE: ICD-10-CM

## 2017-05-04 DIAGNOSIS — I11.9 HYPERTENSIVE HEART DISEASE WITHOUT HEART FAILURE: ICD-10-CM

## 2017-05-05 LAB
25(OH)D3 SERPL-MCNC: 15.7 NG/ML (ref 32–100)
A-G RATIO,AGRAT: 2.4 RATIO (ref 1.1–2.6)
ABSOLUTE LYMPHOCYTE COUNT, 10803: 2.7 K/UL (ref 1–4.8)
ALBUMIN SERPL-MCNC: 4.6 G/DL (ref 3.5–5)
ALP SERPL-CCNC: 90 U/L (ref 25–115)
ALT SERPL-CCNC: 23 U/L (ref 5–40)
ANION GAP SERPL CALC-SCNC: 18 MMOL/L
AST SERPL W P-5'-P-CCNC: 18 U/L (ref 10–37)
AVG GLU, 10930: 178 MG/DL (ref 91–123)
BACTERIA,BACTU: PRESENT
BASOPHILS # BLD: 0 K/UL (ref 0–0.2)
BASOPHILS NFR BLD: 1 % (ref 0–2)
BILIRUB SERPL-MCNC: 0.6 MG/DL (ref 0.2–1.2)
BILIRUB UR QL: NEGATIVE
BUN SERPL-MCNC: 17 MG/DL (ref 6–22)
CALCIUM SERPL-MCNC: 9.7 MG/DL (ref 8.4–10.5)
CHLORIDE SERPL-SCNC: 103 MMOL/L (ref 98–110)
CHOLEST SERPL-MCNC: 155 MG/DL (ref 110–200)
CO2 SERPL-SCNC: 25 MMOL/L (ref 20–32)
CREAT SERPL-MCNC: 0.6 MG/DL (ref 0.5–1.2)
CREATININE, URINE: 140 MG/DL
EOSINOPHIL # BLD: 0.2 K/UL (ref 0–0.5)
EOSINOPHIL NFR BLD: 3 % (ref 0–6)
EPITHELIAL,EPSU: ABNORMAL /HPF (ref 0–2)
ERYTHROCYTE [DISTWIDTH] IN BLOOD BY AUTOMATED COUNT: 13.7 % (ref 10–16)
GFRAA, 66117: >60
GFRNA, 66118: >60
GLOBULIN,GLOB: 1.9 G/DL (ref 2–4)
GLUCOSE SERPL-MCNC: 152 MG/DL (ref 65–99)
GLUCOSE UR QL: NEGATIVE MG/DL
GRANULOCYTES,GRANS: 51 % (ref 40–75)
HBA1C MFR BLD HPLC: 7.8 % (ref 4.8–5.9)
HCT VFR BLD AUTO: 42.9 % (ref 35.1–48)
HCV AB SER IA-ACNC: NORMAL
HDLC SERPL-MCNC: 44 MG/DL (ref 40–59)
HGB BLD-MCNC: 13.6 G/DL (ref 11.7–16)
HGB UR QL STRIP: NEGATIVE
KETONES UR QL STRIP.AUTO: NEGATIVE MG/DL
LDLC SERPL CALC-MCNC: 84 MG/DL (ref 50–99)
LEUKOCYTE ESTERASE: ABNORMAL
LYMPHOCYTES, LYMLT: 41 % (ref 27–45)
MCH RBC QN AUTO: 30 PG (ref 26–34)
MCHC RBC AUTO-ENTMCNC: 32 G/DL (ref 32–36)
MCV RBC AUTO: 93 FL (ref 80–95)
MICROALB/CREAT RATIO, 140286: NORMAL MCG/MG OF CREATININE (ref 0–30)
MICROALBUMIN,URINE RANDOM 140054: <12 UG/ML (ref 0.1–17)
MONOCYTES # BLD: 0.3 K/UL (ref 0.1–0.9)
MONOCYTES NFR BLD: 5 % (ref 3–9)
NEUTROPHILS # BLD AUTO: 3.3 K/UL (ref 1.8–7.7)
NITRITE UR QL STRIP.AUTO: NEGATIVE
PH UR STRIP: 5 PH (ref 5–8)
PLATELET # BLD AUTO: 302 K/UL (ref 140–440)
PMV BLD AUTO: 11.1 FL (ref 6–10.8)
POTASSIUM SERPL-SCNC: 5.2 MMOL/L (ref 3.5–5.5)
PROT SERPL-MCNC: 6.5 G/DL (ref 6.4–8.3)
PROT UR QL STRIP: NEGATIVE MG/DL
RBC # BLD AUTO: 4.6 M/UL (ref 3.8–5.2)
RBC #/AREA URNS HPF: ABNORMAL /HPF
SODIUM SERPL-SCNC: 146 MMOL/L (ref 133–145)
SP GR UR: 1.02 (ref 1–1.03)
TRIGL SERPL-MCNC: 139 MG/DL (ref 40–149)
TSH SERPL DL<=0.005 MIU/L-ACNC: 1.23 MCU/ML (ref 0.27–4.2)
UROBILINOGEN UR STRIP-MCNC: <2 MG/DL
VLDLC SERPL CALC-MCNC: 28 MG/DL (ref 8–30)
WBC # BLD AUTO: 6.5 K/UL (ref 4–11)
WBC URNS QL MICRO: ABNORMAL /HPF (ref 0–2)

## 2017-05-11 ENCOUNTER — OFFICE VISIT (OUTPATIENT)
Dept: INTERNAL MEDICINE CLINIC | Age: 62
End: 2017-05-11

## 2017-05-11 VITALS
TEMPERATURE: 98.4 F | OXYGEN SATURATION: 99 % | HEART RATE: 59 BPM | SYSTOLIC BLOOD PRESSURE: 120 MMHG | WEIGHT: 139 LBS | HEIGHT: 59 IN | DIASTOLIC BLOOD PRESSURE: 62 MMHG | BODY MASS INDEX: 28.02 KG/M2

## 2017-05-11 DIAGNOSIS — F33.1 MODERATE EPISODE OF RECURRENT MAJOR DEPRESSIVE DISORDER (HCC): ICD-10-CM

## 2017-05-11 DIAGNOSIS — Z23 ENCOUNTER FOR IMMUNIZATION: ICD-10-CM

## 2017-05-11 DIAGNOSIS — E55.9 VITAMIN D DEFICIENCY: ICD-10-CM

## 2017-05-11 DIAGNOSIS — Z12.31 SCREENING MAMMOGRAM, ENCOUNTER FOR: ICD-10-CM

## 2017-05-11 DIAGNOSIS — Z00.01 ENCOUNTER FOR ROUTINE ADULT PHYSICAL EXAM WITH ABNORMAL FINDINGS: Primary | ICD-10-CM

## 2017-05-11 DIAGNOSIS — R82.90 ABNORMAL URINALYSIS: ICD-10-CM

## 2017-05-11 DIAGNOSIS — G43.009 MIGRAINE WITHOUT AURA AND WITHOUT STATUS MIGRAINOSUS, NOT INTRACTABLE: ICD-10-CM

## 2017-05-11 DIAGNOSIS — E78.5 HYPERLIPIDEMIA, UNSPECIFIED HYPERLIPIDEMIA TYPE: ICD-10-CM

## 2017-05-11 DIAGNOSIS — M54.16 BILATERAL LUMBAR RADICULOPATHY: ICD-10-CM

## 2017-05-11 DIAGNOSIS — I10 ESSENTIAL HYPERTENSION: ICD-10-CM

## 2017-05-11 DIAGNOSIS — E11.9 TYPE 2 DIABETES MELLITUS WITHOUT COMPLICATION, WITHOUT LONG-TERM CURRENT USE OF INSULIN (HCC): ICD-10-CM

## 2017-05-11 RX ORDER — ERGOCALCIFEROL 1.25 MG/1
50000 CAPSULE ORAL
Qty: 12 CAP | Refills: 0 | Status: SHIPPED | OUTPATIENT
Start: 2017-05-11 | End: 2018-04-17

## 2017-05-11 RX ORDER — METFORMIN HYDROCHLORIDE 1000 MG/1
1000 TABLET ORAL 2 TIMES DAILY WITH MEALS
Qty: 60 TAB | Refills: 5 | Status: SHIPPED | OUTPATIENT
Start: 2017-05-11 | End: 2017-05-11 | Stop reason: SDUPTHER

## 2017-05-11 RX ORDER — METFORMIN HYDROCHLORIDE 1000 MG/1
TABLET ORAL
Qty: 180 TAB | Refills: 5 | Status: SHIPPED | OUTPATIENT
Start: 2017-05-11 | End: 2017-12-29 | Stop reason: SDUPTHER

## 2017-05-11 NOTE — MR AVS SNAPSHOT
Visit Information Date & Time Provider Department Dept. Phone Encounter #  
 5/11/2017 11:00 AM Brian Mackenzie MD [phone] Follow-up Instructions Return in about 3 months (around 8/11/2017), or if symptoms worsen or fail to improve. Your Appointments 8/10/2017  8:00 AM  
LAB with IOC NURSE VISIT Internist of [facility] ([address]) Appt Note: lab  
 [address], [address] [place] [address] [place]  
  
   
 [address], [phone] [phone]  
  
    
 8/17/2017  1:30 PM  
Office Visit with Brian Mackenzie MD  
[facility] [address]) Appt Note: 3 month follow up  
 [address], [address] [place] [address] [place]  
  
   
 [address], [address] Upcoming Health Maintenance Date Due Pneumococcal 19-64 Medium Risk (1 of 1 - PPSV23) 7/17/1974 EYE EXAM RETINAL OR DILATED Q1 5/2/2017 INFLUENZA AGE 9 TO ADULT 8/1/2017 HEMOGLOBIN A1C Q6M 11/4/2017 FOOT EXAM Q1 11/10/2017 PAP AKA CERVICAL CYTOLOGY 11/17/2017 BREAST CANCER SCRN MAMMOGRAM 4/5/2018 MICROALBUMIN Q1 5/4/2018 LIPID PANEL Q1 5/4/2018 COLONOSCOPY 10/24/2024 DTaP/Tdap/Td series (2 - Td) 11/10/2026 Allergies as of 5/11/2017  Review Complete On: 5/11/2017 By: Gloria Cope Severity Noted Reaction Type Reactions Pcn [Penicillins]  10/25/2011    Rash  
 Sulfa (Sulfonamide Antibiotics)  10/25/2011    Rash Current Immunizations  Reviewed on 5/11/2017 Name Date Influenza Vaccine PF 10/13/2014 12:46 PM, 2/3/2014  3:30 PM  
 Influenza Vaccine Split 11/14/2011 Influenza Vaccine Whole 10/5/2010 Pneumococcal Polysaccharide (PPSV-23) 5/11/2017 11:41 AM  
 TD Vaccine 1/23/2007  Tdap 11/10/2016  1:28 PM  
  
 Reviewed by Gloria Cope on 5/11/2017 at 11:45 AM  
 Reviewed by Star Turner on 5/11/2017 at 11:45 AM  
You Were Diagnosed With   
  
 Codes Comments Encounter for immunization    -  Primary ICD-10-CM: U33 ICD-9-CM: V03.89 Abnormal urinalysis     ICD-10-CM: R82.90 ICD-9-CM: 791.9 Screening mammogram, encounter for     ICD-10-CM: Z12.31 
ICD-9-CM: V76.12 Vitals BP Pulse Temp Height(growth percentile) Weight(growth percentile) SpO2  
 120/62 (!) 59 98.4 °F (36.9 °C) (Oral) 4' 11\" (1.499 m) 139 lb (63 kg) 99% BMI OB Status Smoking Status 28.07 kg/m2 Postmenopausal Former Smoker Vitals History BMI and BSA Data Body Mass Index Body Surface Area 28.07 kg/m 2 1.62 m 2 Preferred Pharmacy Pharmacy Name Phone [address], [address] [facility] 22 1700 [facility] [phone] Your Updated Medication List  
  
   
This list is accurate as of: 5/11/17 11:50 AM.  Always use your most recent med list.  
  
  
  
  
 aspirin 81 mg tablet Take 81 mg by mouth. betamethasone valerate 0.1 % topical cream  
Commonly known as:  Honey [place] Apply  to affected area two (2) times daily as needed for Skin Irritation. dextroamphetamine-amphetamine 10 mg tablet Commonly known as:  ADDERALL Take 1 Tab by mouth daily. Max Daily Amount: 10 mg. ELESTRIN 0.87 gram/actuation [facility]  
Generic drug:  Estradiol  
by TransDERmal route.  
  
 ergocalciferol 50,000 unit capsule Commonly known as:  ERGOCALCIFEROL Take 1 Cap by mouth every seven (7) days. HYDROcodone-acetaminophen 5-325 mg per tablet Commonly known as:  Joyice Breeze Take 1 Tab by mouth every four (4) hours as needed for Pain. Max Daily Amount: 6 Tabs. lisinopril 5 mg tablet Commonly known as:  PRINIVIL, ZESTRIL  
TAKE 1 TABLET BY MOUTH DAILY  
  
 metFORMIN 1,000 mg tablet Commonly known as:  GLUCOPHAGE Take 1 Tab by mouth two (2) times daily (with meals).   
  
 NEXIUM PO  
 Take 22.3 mg by mouth every other day. progesterone 200 mg capsule Commonly known as:  PROMETRIUM Take 200 mg by mouth daily. propranolol 40 mg tablet Commonly known as:  INDERAL  
TAKE 1 TABLET BY MOUTH TWICE DAILY  
  
 rosuvastatin 20 mg tablet Commonly known as:  CRESTOR  
TAKE 1 TABLET BY MOUTH DAILY  
  
 SUMAtriptan 50 mg tablet Commonly known as:  IMITREX  
TAKE 1 TABLET BY MOUTH EVERY DAY AS NEEDED  
  
 vortioxetine 10 mg tablet Commonly known as:  TRINTELLIX Take 1 and 1/2 tablets by mouth daily. Prescriptions Sent to Pharmacy Refills  
 metFORMIN (GLUCOPHAGE) 1,000 mg tablet 5 Sig: Take 1 Tab by mouth two (2) times daily (with meals). Class: Normal  
 Pharmacy: [address] [place] [address] Ph #: [phone] Route: Oral  
 ergocalciferol (ERGOCALCIFEROL) 50,000 unit capsule 0 Sig: Take 1 Cap by mouth every seven (7) days. Class: Normal  
 Pharmacy: [address] [place] [address] Ph #: [phone] Route: Oral  
  
We Performed the Following PNEUMOCOCCAL POLYSACCHARIDE VACCINE, 23-VALENT, ADULT OR IMMUNOSUPPRESSED PT DOSE, [43171 CPT(R)] Follow-up Instructions Return in about 3 months (around 8/11/2017), or if symptoms worsen or fail to improve. To-Do List   
 05/11/2017 Imaging:  [facility]MO BI SCREENING INCL CAD   
  
 05/11/2017 Lab:  URINALYSIS W/MICROSCOPIC Patient Instructions Learning About Diabetes Food Guidelines Your Care Instructions Meal planning is important to manage diabetes. It helps keep your blood sugar at a target level (which you set with your doctor). You don't have to eat special foods. You can eat what your family eats, including sweets once in a while. But you do have to pay attention to how often you eat and how much you eat of certain foods. You may want to work with a dietitian or a certified diabetes educator (CDE) to help you plan meals and snacks. A dietitian or CDE can also help you lose weight if that is one of your goals. What should you know about eating carbs? Managing the amount of carbohydrate (carbs) you eat is an important part of healthy meals when you have diabetes. Carbohydrate is found in many foods. · Learn which foods have carbs. And learn the amounts of carbs in different foods. ¨ Bread, cereal, pasta, and rice have about 15 grams of carbs in a serving. A serving is 1 slice of bread (1 ounce), ½ cup of cooked cereal, or 1/3 cup of cooked pasta or rice. ¨ Fruits have 15 grams of carbs in a serving. A serving is 1 small fresh fruit, such as an apple or orange; ½ of a banana; ½ cup of cooked or canned fruit; ½ cup of fruit juice; 1 cup of melon or raspberries; or 2 tablespoons of dried fruit. ¨ Milk and no-sugar-added yogurt have 15 grams of carbs in a serving. A serving is 1 cup of milk or 2/3 cup of no-sugar-added yogurt. ¨ Starchy vegetables have 15 grams of carbs in a serving. A serving is ½ cup of mashed potatoes or sweet potato; 1 cup winter squash; ½ of a small baked potato; ½ cup of cooked beans; or ½ cup cooked corn or green peas. · Learn how much carbs to eat each day and at each meal. A dietitian or CDE can teach you how to keep track of the amount of carbs you eat. This is called carbohydrate counting. · If you are not sure how to count carbohydrate grams, use the Plate Method to plan meals. It is a good, quick way to make sure that you have a balanced meal. It also helps you spread carbs throughout the day. ¨ Divide your plate by types of foods. Put non-starchy vegetables on half the plate, meat or other protein food on one-quarter of the plate, and a grain or starchy vegetable in the final quarter of the plate.  To this you can add a small piece of fruit and 1 cup of milk or yogurt, depending on how many carbs you are supposed to eat at a meal. 
· Try to eat about the same amount of carbs at each meal. Do not \"save up\" your daily allowance of carbs to eat at one meal. 
· Proteins have very little or no carbs per serving. Examples of proteins are beef, chicken, turkey, fish, eggs, tofu, cheese, cottage cheese, and peanut butter. A serving size of meat is 3 ounces, which is about the size of a deck of cards. Examples of meat substitute serving sizes (equal to 1 ounce of meat) are 1/4 cup of cottage cheese, 1 egg, 1 tablespoon of peanut butter, and ½ cup of tofu. How can you eat out and still eat healthy? · Learn to estimate the serving sizes of foods that have carbohydrate. If you measure food at home, it will be easier to estimate the amount in a serving of restaurant food. · If the meal you order has too much carbohydrate (such as potatoes, corn, or baked beans), ask to have a low-carbohydrate food instead. Ask for a salad or green vegetables. · If you use insulin, check your blood sugar before and after eating out to help you plan how much to eat in the future. · If you eat more carbohydrate at a meal than you had planned, take a walk or do other exercise. This will help lower your blood sugar. What else should you know? · Limit saturated fat, such as the fat from meat and dairy products. This is a healthy choice because people who have diabetes are at higher risk of heart disease. So choose lean cuts of meat and nonfat or low-fat dairy products. Use olive or canola oil instead of butter or shortening when cooking. · Don't skip meals. Your blood sugar may drop too low if you skip meals and take insulin or certain medicines for diabetes. · Check with your doctor before you drink alcohol. Alcohol can cause your blood sugar to drop too low. Alcohol can also cause a bad reaction if you take certain diabetes medicines. Follow-up care is a key part of your treatment and safety.  Be sure to make and go to all appointments, and call your doctor if you are having problems. It's also a good idea to know your test results and keep a list of the medicines you take. Where can you learn more? Go to http://jennifer-pavel.info/. Enter X414 in the search box to learn more about \"Learning About Diabetes Food Guidelines. \" Current as of: May 23, 2016 Content Version: 11.2 © 5447-3026 [employer]. Care instructions adapted under license by [employer] (which disclaims liability or warranty for this information). If you have questions about a medical condition or this instruction, always ask your healthcare professional. [address] any warranty or liability for your use of this information. Introducing [facility]! [place] introduces [employer] patient portal. Now you can access parts of your medical record, email your doctor's office, and request medication refills online. 1. In your internet browser, go to [email or web address] to u. [email or web address] to u 2. Click on the First Time User? Click Here link in the Sign In box. You will see the New Member Sign Up page. 3. Enter your [employer] Access Code exactly as it appears below. You will not need to use this code after youve completed the sign-up process. If you do not sign up before the expiration date, you must request a new code. · [employer] Access Code: [email or web address] Expires: 6/13/2017  4:49 PM 
 
4. Enter the last four digits of your Social Security Number (xxxx) and Date of Birth (mm/dd/yyyy) as indicated and click Submit. You will be taken to the next sign-up page. 5. Create a [employer] ID. This will be your [employer] login ID and cannot be changed, so think of one that is secure and easy to remember. 6. Create a [employer] password. You can change your password at any time. 7. Enter your Password Reset Question and Answer.  This can be used at a later time if you forget your password. 8. Enter your e-mail address. You will receive e-mail notification when new information is available in [address] 9. Click Sign Up. You can now view and download portions of your medical record. 10. Click the Download Summary menu link to download a portable copy of your medical information. If you have questions, please visit the Frequently Asked Questions section of the [employer] website. Remember, [employer] is NOT to be used for urgent needs. For medical emergencies, dial 911. Now available from your iPhone and Android! Please provide this summary of care documentation to your next provider. Your primary care clinician is listed as Tasneem Sprague. If you have any questions after today's visit, please call [phone].

## 2017-05-11 NOTE — PATIENT INSTRUCTIONS

## 2017-05-11 NOTE — PROGRESS NOTES
1. Have you been to the ER, urgent care clinic or hospitalized since your last visit? NO.     2. Have you seen or consulted any other health care providers outside of the [address] [place] [address] since your last visit (Include any pap smears or colon screening)? NO    Do you have an Advanced Directive? YES  Pt will bring it in at her next visit. Would you like information on Advanced Directives?  NO

## 2017-05-12 ENCOUNTER — TELEPHONE (OUTPATIENT)
Dept: INTERNAL MEDICINE CLINIC | Age: 62
End: 2017-05-12

## 2017-05-12 LAB
BACTERIA,BACTU: PRESENT
BILIRUB UR QL: NEGATIVE
EPITHELIAL,EPSU: ABNORMAL /HPF (ref 0–2)
GLUCOSE UR QL: NEGATIVE MG/DL
HGB UR QL STRIP: NEGATIVE
KETONES UR QL STRIP.AUTO: NEGATIVE MG/DL
LEUKOCYTE ESTERASE: ABNORMAL
NITRITE UR QL STRIP.AUTO: NEGATIVE
PH UR STRIP: 5 PH (ref 5–8)
PROT UR QL STRIP: NEGATIVE MG/DL
RBC #/AREA URNS HPF: ABNORMAL /HPF
SP GR UR: 1.02 (ref 1–1.03)
UROBILINOGEN UR STRIP-MCNC: <2 MG/DL
WBC URNS QL MICRO: ABNORMAL /HPF (ref 0–2)

## 2017-05-12 NOTE — TELEPHONE ENCOUNTER
Cierra U/A (collected yesterday) result faxed in, hasn't interfaced in CC yet, but abnormal results show Moderate Leukocyte Esterase, RBC 3-5, WBC 10-20, Bacteria Present, and Squamous Epithelial Cells 5-10.   All other components negative/normal.

## 2017-05-14 PROBLEM — E55.9 VITAMIN D DEFICIENCY: Status: ACTIVE | Noted: 2017-05-14

## 2017-05-14 NOTE — PROGRESS NOTES
HPI:   Gregory Chaidez is a 63y.o. year old female who presents today for a physical exam. She has a history of hypertension, hyperlipidemia, diabetes mellitus, migraine headaches, depression, anxiety, ADHD, and lumbar radiculopathy. She was last seen on 4/19/2017 with complaints of weight gain and lower extremity edema after starting Zyprexa for treatment of her depression as prescribed by her psychiatrist. She had been maintained on vortioxetine 15 mg, and  Zyprexa 2.5 mg was added in 3/2017. She reported experiencing the sensation of being \"numb\" without the ability to experience any emotion since starting the medication. She also stated that she had increased appetite and her eating had increased since starting Zyprexa with an associated six pound weight gain. Two days ago, she also noted the onset of mild lower extremity edema, which seems to worsen throughout the day and improve overnight. She had attempted to contact her psychiatrist, and since that visit, had decided to discontinue the medication on her own. She reports that since stopping it, her symptoms have resolved. She also does not notice a worsening of her depressive symptoms, and states that these appear stable. She is otherwise without complaints. She has a history of hypertension, treated with propranolol and lisinopril. She does not check her blood pressure at home, and she does not exercise regularly. She denies any chest pain, shortness of breath at rest or with exertion, palpitations, or lightheadedness. She also has a history of hyperlipidemia, treated with high intensity dose rosuvastatin. She has a history of diabetes mellitus, and was started on metformin in 2/2015 for a HbA1c of 7.5. She does not monitor her blood sugars at home. She denies any polyuria, polydipsia, nocturia, or blurry vision, and has no history of retinopathy, neuropathy, or nephropathy. She has regular eye exams with Dr. Michelle Andrews.  She also has a history of migraines without aura, which usually respond to Imitrex. She has a history of lumbar radiculopathy (R>L), and was evaluated by Dr. Lilian Larsen in 5/2015. Lumbar x-rays showed degenerative changes in L4-L5 and L5-S1. She was treated conservatively with physical therapy and ibuprofen and reports significant improvement. She was evaluated by MOJGAN Liu in 12/2016 for exacerbation of low back pain with right sciatica. She was treated with steroids and Norco with improvement. She had a screening colonoscopy in 10/2014 by Dr. Robb Foley which was normal. Follow-up due in 10 years. She denies any abdominal pain, nausea, vomiting, melena, hematochezia, or change in bowel movements. Past Medical History:   Diagnosis Date    Anxiety     Bilateral lumbar radiculopathy     Carotid bruit     right    Depression     Diabetes mellitus (HCC)     HCD (hypertensive cardiovascular disease)     Hyperlipidemia     Migraine headache     Plantar fasciitis      Past Surgical History:   Procedure Laterality Date    HX SEPTOPLASTY  6/2002    HX TONSILLECTOMY      HX TUBAL LIGATION       Current Outpatient Prescriptions   Medication Sig    ergocalciferol (ERGOCALCIFEROL) 50,000 unit capsule Take 1 Cap by mouth every seven (7) days.  SUMAtriptan (IMITREX) 50 mg tablet TAKE 1 TABLET BY MOUTH EVERY DAY AS NEEDED    propranolol (INDERAL) 40 mg tablet TAKE 1 TABLET BY MOUTH TWICE DAILY    vortioxetine (TRINTELLIX) 10 mg tablet Take 1 and 1/2 tablets by mouth daily.  dextroamphetamine-amphetamine (ADDERALL) 10 mg tablet Take 1 Tab by mouth daily. Max Daily Amount: 10 mg.    lisinopril (PRINIVIL, ZESTRIL) 5 mg tablet TAKE 1 TABLET BY MOUTH DAILY    rosuvastatin (CRESTOR) 20 mg tablet TAKE 1 TABLET BY MOUTH DAILY    progesterone (PROMETRIUM) 200 mg capsule Take 200 mg by mouth daily.  ESOMEPRAZOLE MAGNESIUM (NEXIUM PO) Take 22.3 mg by mouth every other day.     betamethasone valerate (VALISONE) 0.1 % topical cream Apply  to affected area two (2) times daily as needed for Skin Irritation.  Estradiol (ELESTRIN) 0.87 gram/Actuation GlPm by TransDERmal route.  aspirin 81 mg tablet Take 81 mg by mouth.  metFORMIN (GLUCOPHAGE) 1,000 mg tablet TAKE 1 TABLET BY MOUTH TWICE DAILY WITH MEALS    HYDROcodone-acetaminophen (NORCO) 5-325 mg per tablet Take 1 Tab by mouth every four (4) hours as needed for Pain. Max Daily Amount: 6 Tabs. No current facility-administered medications for this visit. Allergies and Intolerances: Allergies   Allergen Reactions    Pcn [Penicillins] Rash    Sulfa (Sulfonamide Antibiotics) Rash     Family History: She has no family history of colon or breast cancer. Her mother  from a CVA. Her father  from throat cancer and cirrhosis. Family History   Problem Relation Age of Onset    Hypertension Brother      x2    Elevated Lipids Brother     Stroke Mother     Heart Surgery Mother      CABG    Cancer Father      cancer of the mouth    Hypertension Father     Hypertension Brother     Elevated Lipids Brother      Social History:   She  reports that she quit smoking about 28 years ago. She has never used smokeless tobacco. She smoked approximately 0.25 ppd for 2 years, stopping in . She is a  and has one daughter. She was employed as a . 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 She was previously working as the [employer] for her [employer].   History   Alcohol Use    1.0 oz/week    2 Glasses of wine per week     Comment: infrequently     Immunization History:  Immunization History   Administered Date(s) Administered    Influenza Vaccine PF 2014, 10/13/2014    Influenza Vaccine Split 2011    Influenza Vaccine Whole 10/05/2010    Pneumococcal Polysaccharide (PPSV-23) 2017    TD Vaccine 2007    Tdap 11/10/2016       Review of Systems:   As above included in HPI. Otherwise 11 point review of systems negative including constitutional, skin, HENT, eyes, respiratory, cardiovascular, gastrointestinal, genitourinary, musculoskeletal, endocrine, hematologic, allergy, and neurologic. Physical:   Vitals:   BP: 120/62  HR: (!) 59  WT: 139 lb (63 kg)  BMI:  28.07 kg/m2    Exam:   Patient appears in no apparent distress. Affect is appropriate. HEENT --Anicteric sclerae, tympanic membranes normal,  ear canals normal.  PERRL, EOMI, conjunctiva and lids normal.   Sinuses were nontender, turbinates normal, hearing normal.  Oropharynx without  erythema, normal tongue, oral mucosa and tonsils. No cervical lymphadenopathy. No thyromegaly, JVD, or bruits. Carotid pulses 2+ with normal upstroke. Lungs --Clear to auscultation. No wheezing or rales. Heart --Regular rate and rhythm, no murmurs, rubs, gallops, or clicks. Breasts -- as per Dr. Verena Gibson  Chest wall --Nontender to palpation. PMI normal.  Abdomen -- Soft and nontender, no hepatosplenomegaly or masses. Pelvis -- as per Dr. Sarahy Grande -- Without cyanosis, clubbing, edema. 2+ pulses equally and bilaterally.   Normal looking digits, ROM intact  Derm - no obvious abnormalities noted, no rash    Review of Data:  Labs:  Orders Only on 05/11/2017   Component Date Value Ref Range Status    Specific Gravity 05/11/2017 1.021  1.005 - 1.03 Final    pH (UA) 05/11/2017 5.0  5.0 - 8.0 pH Final    Protein 05/11/2017 Negative  Negative, mg/dL Final    Glucose 05/11/2017 Negative  Negative mg/dL Final    Ketone 05/11/2017 Negative  Negative mg/dL Final    Bilirubin 05/11/2017 Negative  Negative Final    Blood 05/11/2017 Negative  Negative Final    Nitrites 05/11/2017 Negative  Negative Final    Leukocyte Esterase 05/11/2017 Moderate* Negative Final    Urobilinogen 05/11/2017 <2.0  <2.0 mg/dL Final    WBC 05/11/2017 10-20* 0 - 2 /hpf Final    RBC 05/11/2017 3-5* Negative, /hpf Final    Bacteria 05/11/2017 Present* Negative Final    Epithelial cells 05/11/2017 5-10* 0 - 2 /hpf Final   Orders Only on 05/04/2017   Component Date Value Ref Range Status    Triglyceride 05/04/2017 139  40 - 149 mg/dL Final    HDL Cholesterol 05/04/2017 44  40 - 59 mg/dL Final    Cholesterol, total 05/04/2017 155  110 - 200 mg/dL Final    LDL, calculated 05/04/2017 84  50 - 99 mg/dL Final    VLDL, calculated 05/04/2017 28  8 - 30 mg/dL Final    Glucose 05/04/2017 152* 65 - 99 mg/dL Final    BUN 05/04/2017 17  6 - 22 mg/dL Final    Creatinine 05/04/2017 0.6  0.5 - 1.2 mg/dL Final    Sodium 05/04/2017 146* 133 - 145 mmol/L Final    Potassium 05/04/2017 5.2  3.5 - 5.5 mmol/L Final    Chloride 05/04/2017 103  98 - 110 mmol/L Final    CO2 05/04/2017 25  20 - 32 mmol/L Final    AST (SGOT) 05/04/2017 18  10 - 37 U/L Final    ALT (SGPT) 05/04/2017 23  5 - 40 U/L Final    Alk.  phosphatase 05/04/2017 90  25 - 115 U/L Final    Bilirubin, total 05/04/2017 0.6  0.2 - 1.2 mg/dL Final    Calcium 05/04/2017 9.7  8.4 - 10.5 mg/dL Final    Protein, total 05/04/2017 6.5  6.4 - 8.3 g/dL Final    Albumin 05/04/2017 4.6  3.5 - 5.0 g/dL Final    A-G Ratio 05/04/2017 2.4  1.1 - 2.6 ratio Final    Globulin 05/04/2017 1.9* 2.0 - 4.0 g/dL Final    Anion gap 05/04/2017 18.0  mmol/L Final    GFRAA 05/04/2017 >60.0  >60.0 Final    GFRNA 05/04/2017 >60.0  >60.0 Final    Hep C Virus Ab 05/04/2017 None Detected  None Detec Final    VITAMIN D, 25-HYDROXY 05/04/2017 15.7* 32.0 - 100.0 ng/mL Final    TSH 05/04/2017 1.23  0.27 - 4.20 mcU/mL Final    WBC 05/04/2017 6.5  4.0 - 11.0 K/uL Final    RBC 05/04/2017 4.60  3.80 - 5.20 M/uL Final    HGB 05/04/2017 13.6  11.7 - 16.0 g/dL Final    HCT 05/04/2017 42.9  35.1 - 48.0 % Final    MCV 05/04/2017 93  80 - 95 fL Final    MCH 05/04/2017 30  26 - 34 pg Final    MCHC 05/04/2017 32  32 - 36 g/dL Final    RDW 05/04/2017 13.7  10.0 - 16.0 % Final    PLATELET 23/95/4181 979  140 - 440 K/uL Final    MPV 05/04/2017 11.1* 6.0 - 10.8 fL Final    NEUTROPHILS 05/04/2017 51  40 - 75 % Final    Lymphocytes 05/04/2017 41  27 - 45 % Final    MONOCYTES 05/04/2017 5  3 - 9 % Final    EOSINOPHILS 05/04/2017 3  0 - 6 % Final    BASOPHILS 05/04/2017 1  0 - 2 % Final    ABS. NEUTROPHILS 05/04/2017 3.3  1.8 - 7.7 K/uL Final    ABSOLUTE LYMPHOCYTE COUNT 05/04/2017 2.7  1.0 - 4.8 K/uL Final    ABS. MONOCYTES 05/04/2017 0.3  0.1 - 0.9 K/uL Final    ABS. EOSINOPHILS 05/04/2017 0.2  0.0 - 0.5 K/uL Final    ABS.  BASOPHILS 05/04/2017 0.0  0.0 - 0.2 K/uL Final    Hemoglobin A1c 05/04/2017 7.8* 4.8 - 5.9 % Final    AVG GLU 05/04/2017 178* 91 - 123 mg/dL Final    Specific Gravity 05/04/2017 1.019  1.005 - 1.03 Final    pH (UA) 05/04/2017 5.0  5.0 - 8.0 pH Final    Protein 05/04/2017 Negative  Negative, mg/dL Final    Glucose 05/04/2017 Negative  Negative mg/dL Final    Ketone 05/04/2017 Negative  Negative mg/dL Final    Bilirubin 05/04/2017 Negative  Negative Final    Blood 05/04/2017 Negative  Negative Final    Nitrites 05/04/2017 Negative  Negative Final    Leukocyte Esterase 05/04/2017 SMALL* Negative Final    Urobilinogen 05/04/2017 <2.0  <2.0 mg/dL Final    WBC 05/04/2017 5-10* 0 - 2 /hpf Final    RBC 05/04/2017 3-5* Negative, /hpf Final    Bacteria 05/04/2017 Present* Negative Final    Epithelial cells 05/04/2017 10-20* 0 - 2 /hpf Final    Creatinine, urine 05/04/2017 140  mg/dL Final    Microalbumin, urine 05/04/2017 <12.0  0.1 - 17.0 ug/mL Final    Microalb/Creat ratio (ug/mg creat.) 05/04/2017   0.0 - 30.0 mcg/mg of Creatinine Final   Hospital Outpatient Visit on 04/27/2017   Component Date Value Ref Range Status    [facility] SPECIM[facility] COL 04/27/2017 Specimens collected/[facility] to [facility]    Final   Orders Only on 04/19/2017   Component Date Value Ref Range Status    Glucose 04/19/2017 140* 65 - 99 mg/dL Final    BUN 04/19/2017 16  6 - 22 mg/dL Final    Creatinine 04/19/2017 0.5  0.5 - 1.2 mg/dL Final  Sodium 04/19/2017 144  133 - 145 mmol/L Final    Potassium 04/19/2017 4.6  3.5 - 5.5 mmol/L Final    Chloride 04/19/2017 101  98 - 110 mmol/L Final    CO2 04/19/2017 25  20 - 32 mmol/L Final    Calcium 04/19/2017 9.2  8.4 - 10.5 mg/dL Final    Anion gap 04/19/2017 18.0  mmol/L Final    GFRAA 04/19/2017 >60.0  >60.0 Final    GFRNA 04/19/2017 >60.0  >60.0 Final    Hemoglobin A1c 04/19/2017 7.9* 4.8 - 5.9 % Final    AVG GLU 04/19/2017 179* 91 - 123 mg/dL Final       11/7/2016 Creatinine 0.6/ eGFR >60    HbA1c 7.4    3/15/2017 Urine dip: negative protein      Health Maintenance:  Screening:    Mammogram: negative (4/2016)   PAP smear: well women exams by Dr. Myla Betancourt. (last 1/2017)   Colorectal: colonoscopy (10/2014) normal. Dr. Robb Foley. Due 2024. Depression: on Trintellix, Restoril, and Adderall. DM (HbA1c/FPG): HbA1c 7.8 (5/2017)   Hepatitis C: negative (5/2017)   Falls: none   DEXA: N/A   Glaucoma: regular eye exams with Dr. Jola Boas (scheduled 6/2017)   Smoking: none   Vitamin D: 15.7 (5/2017)   Medicare Wellness: N/A    Impression:  Patient Active Problem List   Diagnosis Code    HCD (hypertensive cardiovascular disease) I11.9    Hyperlipidemia E78.5    Otosclerosis H80.90    Migraine G43.909    Depression F32.9    Bilateral lumbar radiculopathy M54.16    Type 2 diabetes mellitus without complication ([facility] [address] E81.2    Essential hypertension I10    Bilateral edema of lower extremity R60.0    Vitamin D deficiency E55.9       Plan:  1. Diabetes mellitus. Not well controlled with HbA1c increased to 7.8. On metformin 500 mg bid. Instructed to increase to 1000 mg bid. Not clear if has completed diabetes education. Will address at next visit with training on blood sugar monitoring. No evidence of microvascular complications. On statin and lisinopril for its colin-protective effect. Continue follow-up with Dr. Jola Boas for annual eye exams. Foot exam normal in 11/2016.  Urine microalbumin/ creatinine ratio without evidence of microalbuminuria. Emphasized importance of lifestyle modifications, including diet, exercise, and weight loss. 2. Hypertension. Blood pressure appears well controlled on lisinopril and propranolol. Lisinopril 5 mg added last visit for its colin-protective effect in the setting of diabetes mellitus. Renal function normal with creatinine 0.6/ eGFR >60. Continue to follow. 3. Hyperlipidemia. On high intensity rosuvastatin with LDL 84, indicative of good control. Emphasized importance of lifestyle modifications, including diet, exercise, and weight loss. 4. Lower extremity swelling. Improved since stopping Zyprexa and losing five pounds. Only occasional now developing over course of the day consistent with venous insufficiency. Conservative measures discussed. Follow. 5. Depression/ADHD. Stable despite discontinuation of Zyprexa. Has follow-up appointment in 6/2017 at [facility]. Follow. 5. Migraines. Relatively infrequent and responds to Imitrex. Follow. 6. Lumbar radiculopathy. Exacerbation in 12/2016 resolved with conservative measures. Follow. 7. Abnormal urinalysis. Will repeat with culture. No symptoms reported. 8. Health maintenance. Will give Pneumovax today. Will give Tdap script at next visit. Other immunizations up to date. Well women exams up to date with Dr. Javy Ralph. Due for mammogram. Will order. Colonoscopy due 2024. Continue eye exams with Dr. Naveen Maxwell. Vitamin D level very low. Will begin ergocalciferol 50,000 U weekly for 12 weeks and reassess. Patient understands recommendations and agrees with plan. Follow-up in 3 months.

## 2017-07-07 RX ORDER — SUMATRIPTAN 50 MG/1
TABLET, FILM COATED ORAL
Qty: 9 TAB | Refills: 2 | Status: SHIPPED | OUTPATIENT
Start: 2017-07-07 | End: 2018-01-12 | Stop reason: SDUPTHER

## 2017-07-18 RX ORDER — PROPRANOLOL HYDROCHLORIDE 40 MG/1
TABLET ORAL
Qty: 180 TAB | Refills: 2 | Status: SHIPPED | OUTPATIENT
Start: 2017-07-18 | End: 2018-01-12 | Stop reason: SDUPTHER

## 2017-08-10 DIAGNOSIS — I10 ESSENTIAL HYPERTENSION: ICD-10-CM

## 2017-08-10 DIAGNOSIS — E55.9 VITAMIN D DEFICIENCY: ICD-10-CM

## 2017-08-10 DIAGNOSIS — E11.9 TYPE 2 DIABETES MELLITUS WITHOUT COMPLICATION, WITHOUT LONG-TERM CURRENT USE OF INSULIN (HCC): ICD-10-CM

## 2017-11-15 RX ORDER — ROSUVASTATIN CALCIUM 20 MG/1
20 TABLET, COATED ORAL DAILY
Qty: 30 TAB | Refills: 11 | Status: SHIPPED | OUTPATIENT
Start: 2017-11-15 | End: 2018-11-06 | Stop reason: SDUPTHER

## 2017-11-15 NOTE — TELEPHONE ENCOUNTER
Last Visit: 05/11/2017 with MD Rafiq Ba    Next Appointment: noted to f/u in 3 months; Pt canceled 08/11 & 08/17  Previous Refill Encounters: 09/27/2016 per MD [place] #[id number] with 11 refills     Requested Prescriptions     Pending Prescriptions Disp Refills    rosuvastatin (CRESTOR) 20 mg tablet 30 Tab 11     Sig: Take 1 Tab by mouth daily.

## 2017-12-27 ENCOUNTER — HOSPITAL ENCOUNTER (OUTPATIENT)
Dept: MAMMOGRAPHY | Age: 62
Discharge: HOME | End: 2017-12-27
Attending: INTERNAL MEDICINE
Payer: COMMERCIAL

## 2017-12-27 DIAGNOSIS — Z12.31 VISIT FOR SCREENING MAMMOGRAM: ICD-10-CM

## 2017-12-27 PROCEDURE — 77067 SCR MAMMO BI INCL CAD: CPT

## 2017-12-29 RX ORDER — METFORMIN HYDROCHLORIDE 1000 MG/1
TABLET ORAL
Qty: 60 TAB | Refills: 5 | Status: ON HOLD | OUTPATIENT
Start: 2017-12-29 | End: 2018-08-06 | Stop reason: SDUPTHER

## 2018-01-12 ENCOUNTER — OFFICE VISIT (OUTPATIENT)
Dept: INTERNAL MEDICINE CLINIC | Age: 63
End: 2018-01-12

## 2018-01-12 VITALS
TEMPERATURE: 98.7 F | RESPIRATION RATE: 14 BRPM | HEIGHT: 59 IN | HEART RATE: 63 BPM | SYSTOLIC BLOOD PRESSURE: 116 MMHG | DIASTOLIC BLOOD PRESSURE: 78 MMHG | BODY MASS INDEX: 30.08 KG/M2 | OXYGEN SATURATION: 98 % | WEIGHT: 149.2 LBS

## 2018-01-12 DIAGNOSIS — R30.0 DYSURIA: Primary | ICD-10-CM

## 2018-01-12 LAB
BILIRUB UR QL STRIP: NEGATIVE
GLUCOSE UR-MCNC: NEGATIVE MG/DL
KETONES P FAST UR STRIP-MCNC: NEGATIVE MG/DL
PH UR STRIP: 7 [PH] (ref 4.6–8)
PROT UR QL STRIP: NEGATIVE
SP GR UR STRIP: 1.01 (ref 1–1.03)
UA UROBILINOGEN AMB POC: NORMAL (ref 0.2–1)
URINALYSIS CLARITY POC: NORMAL
URINALYSIS COLOR POC: YELLOW
URINE BLOOD POC: NORMAL
URINE LEUKOCYTES POC: NORMAL
URINE NITRITES POC: NEGATIVE

## 2018-01-12 RX ORDER — SUMATRIPTAN 50 MG/1
TABLET, FILM COATED ORAL
Qty: 9 TAB | Refills: 2 | Status: SHIPPED | OUTPATIENT
Start: 2018-01-12 | End: 2018-08-15 | Stop reason: SDUPTHER

## 2018-01-12 RX ORDER — PROPRANOLOL HYDROCHLORIDE 40 MG/1
TABLET ORAL
Qty: 180 TAB | Refills: 2 | Status: SHIPPED | OUTPATIENT
Start: 2018-01-12 | End: 2018-08-15 | Stop reason: SDUPTHER

## 2018-01-12 RX ORDER — PHENAZOPYRIDINE HYDROCHLORIDE 100 MG/1
100 TABLET, FILM COATED ORAL
Qty: 9 TAB | Refills: 0 | Status: SHIPPED | OUTPATIENT
Start: 2018-01-12 | End: 2018-01-15

## 2018-01-12 RX ORDER — NITROFURANTOIN 25; 75 MG/1; MG/1
100 CAPSULE ORAL 2 TIMES DAILY
Qty: 10 CAP | Refills: 0 | Status: SHIPPED | OUTPATIENT
Start: 2018-01-12 | End: 2018-01-15 | Stop reason: ALTCHOICE

## 2018-01-12 NOTE — PROGRESS NOTES
1. Have you been to the ER, urgent care clinic or hospitalized since your last visit? NO.     2. Have you seen or consulted any other health care providers outside of the [address] [place] [address] since your last visit (Include any pap smears or colon screening)? NO      Do you have an Advanced Directive? NO    Would you like information on Advanced Directives?  NO

## 2018-01-12 NOTE — MR AVS SNAPSHOT
Visit Information Date & Time Provider Department Dept. Phone Encounter #  
 1/12/2018  9:00 AM Felecia Kaplan NP [facility] [phone] [id number] Upcoming Health Maintenance Date Due  
 EYE EXAM RETINAL OR DILATED Q1 5/2/2017 ZOSTER VACCINE AGE 60> 5/17/2017 Influenza Age 5 to Adult 8/1/2017 HEMOGLOBIN A1C Q6M 11/4/2017 FOOT EXAM Q1 11/10/2017 PAP AKA CERVICAL CYTOLOGY 11/17/2017 MICROALBUMIN Q1 5/4/2018 LIPID PANEL Q1 5/4/2018 BREAST CANCER SCRN MAMMOGRAM 12/27/2019 COLONOSCOPY 10/24/2024 DTaP/Tdap/Td series (2 - Td) 11/10/2026 Allergies as of 1/12/2018  Review Complete On: 1/12/2018 By: Felecia Kaplan NP Severity Noted Reaction Type Reactions Pcn [Penicillins]  10/25/2011    Rash  
 Sulfa (Sulfonamide Antibiotics)  10/25/2011    Rash Current Immunizations  Reviewed on 5/11/2017 Name Date Influenza Vaccine PF 10/13/2014 12:46 PM, 2/3/2014  3:30 PM  
 Influenza Vaccine Split 11/14/2011 Influenza Vaccine Whole 10/5/2010 Pneumococcal Polysaccharide (PPSV-23) 5/11/2017 11:41 AM  
 TD Vaccine 1/23/2007 Tdap 11/10/2016  1:28 PM  
  
 Not reviewed this visit You Were Diagnosed With   
  
 Codes Comments Dysuria    -  Primary ICD-10-CM: R30.0 ICD-9-CM: 853. 1 Vitals BP Pulse Temp Resp Height(growth percentile) Weight(growth percentile) 116/78 (BP 1 Location: Left arm, BP Patient Position: Sitting) 63 98.7 °F (37.1 °C) (Oral) 14 4' 11\" (1.499 m) 149 lb 3.2 oz (67.7 kg) SpO2 BMI OB Status Smoking Status 98% 30.13 kg/m2 Postmenopausal Former Smoker Vitals History BMI and BSA Data Body Mass Index Body Surface Area  
 30.13 kg/m 2 1.68 m 2 Preferred Pharmacy Pharmacy Name Phone [address] [facility] [address] [facility] [phone] Your Updated Medication List  
  
   
 This list is accurate as of: 1/12/18  9:28 AM.  Always use your most recent med list.  
  
  
  
  
 aspirin 81 mg tablet Take 81 mg by mouth. betamethasone valerate 0.1 % topical cream  
Commonly known as:  Rolinda Stalker Apply  to affected area two (2) times daily as needed for Skin Irritation. dextroamphetamine-amphetamine 10 mg tablet Commonly known as:  ADDERALL Take 1 Tab by mouth daily. Max Daily Amount: 10 mg. ELESTRIN 0.87 gram/actuation [facility]  
Generic drug:  Estradiol  
by TransDERmal route.  
  
 ergocalciferol 50,000 unit capsule Commonly known as:  ERGOCALCIFEROL Take 1 Cap by mouth every seven (7) days. HYDROcodone-acetaminophen 5-325 mg per tablet Commonly known as:  Aimee Rummage Take 1 Tab by mouth every four (4) hours as needed for Pain. Max Daily Amount: 6 Tabs. lisinopril 5 mg tablet Commonly known as:  PRINIVIL, ZESTRIL  
TAKE 1 TABLET BY MOUTH DAILY  
  
 metFORMIN 1,000 mg tablet Commonly known as:  GLUCOPHAGE  
TAKE 1 TABLET BY MOUTH TWICE DAILY WITH MEALS  
  
 NEXIUM PO Take 22.3 mg by mouth every other day. progesterone 200 mg capsule Commonly known as:  PROMETRIUM Take 200 mg by mouth daily. propranolol 40 mg tablet Commonly known as:  INDERAL  
TAKE 1 TABLET BY MOUTH TWICE DAILY  
  
 rosuvastatin 20 mg tablet Commonly known as:  CRESTOR Take 1 Tab by mouth daily. SUMAtriptan 50 mg tablet Commonly known as:  IMITREX  
TAKE 1 TABLET BY MOUTH EVERY DAY AS NEEDED  
  
 vortioxetine 10 mg tablet Commonly known as:  TRINTELLIX Take 1 and 1/2 tablets by mouth daily. We Performed the Following AMB POC URINALYSIS DIP STICK AUTO W/O MICRO [58763 CPT(R)] Introducing [facility]! Gene Martel introduces [employer] patient portal. Now you can access parts of your medical record, email your doctor's office, and request medication refills online.    
 
1. In your internet browser, go to [email or web address] 2. Click on the First Time User? Click Here link in the Sign In box. You will see the New Member Sign Up page. 3. Enter your [employer] Access Code exactly as it appears below. You will not need to use this code after youve completed the sign-up process. If you do not sign up before the expiration date, you must request a new code. · [employer] Access Code: [email or web address] Expires: 3/21/2018  9:55 AM 
 
4. Enter the last four digits of your Social Security Number (xxxx) and Date of Birth (mm/dd/yyyy) as indicated and click Submit. You will be taken to the next sign-up page. 5. Create a [employer] ID. This will be your [employer] login ID and cannot be changed, so think of one that is secure and easy to remember. 6. Create a [employer] password. You can change your password at any time. 7. Enter your Password Reset Question and Answer. This can be used at a later time if you forget your password. 8. Enter your e-mail address. You will receive e-mail notification when new information is available in [address] 9. Click Sign Up. You can now view and download portions of your medical record. 10. Click the Download Summary menu link to download a portable copy of your medical information. If you have questions, please visit the Frequently Asked Questions section of the [employer] website. Remember, [employer] is NOT to be used for urgent needs. For medical emergencies, dial 911. Now available from your iPhone and Android! Please provide this summary of care documentation to your next provider. Your primary care clinician is listed as Sangita Ritter. If you have any questions after today's visit, please call [phone].

## 2018-01-12 NOTE — PROGRESS NOTES
Jesus Gonzalez is a 63 y.o.  female and presents with    Chief Complaint   Patient presents with    Urinary Frequency     Patient here for urinary frequency, unable to completly void, pressure. Patient stated that she has a slight burn which prevents the full void. x 6 days        Subjective:  HPI   Mrs. Salina Bowles presents today with complaints of dysuria, frequency, urgency, and inability to completely empty the bladder with pain/pressure/burning of 2/10. These symptoms have been present x 6 days. She reports taking PO cranberry supplements without change in symptoms. She has been with increased life stressors and did start working out. She has a history of DM and bilateral lumbar radiculopathy. Denies fever, chills, n/v/d/c, flank pain, loss of bowel or bladder. Additional Concerns: Requesting refills for Propranolol and Imitrex. ROS   Review of Systems   Constitutional: Negative. HENT: Negative. Eyes: Negative. Respiratory: Negative. Cardiovascular: Negative. Gastrointestinal: Negative. Genitourinary: Positive for dysuria, frequency and urgency. Negative for flank pain and hematuria. Musculoskeletal: Negative. Skin: Negative. Neurological: Negative. Allergies   Allergen Reactions    Pcn [Penicillins] Rash    Sulfa (Sulfonamide Antibiotics) Rash       Current Outpatient Prescriptions   Medication Sig Dispense Refill    propranolol (INDERAL) 40 mg tablet TAKE 1 TABLET BY MOUTH TWICE DAILY 180 Tab 2    SUMAtriptan (IMITREX) 50 mg tablet TAKE 1 TABLET BY MOUTH EVERY DAY AS NEEDED 9 Tab 2    nitrofurantoin, macrocrystal-monohydrate, (MACROBID) 100 mg capsule Take 1 Cap by mouth two (2) times a day. 10 Cap 0    phenazopyridine (PYRIDIUM) 100 mg tablet Take 1 Tab by mouth three (3) times daily (after meals) for 3 days.  9 Tab 0    metFORMIN (GLUCOPHAGE) 1,000 mg tablet TAKE 1 TABLET BY MOUTH TWICE DAILY WITH MEALS 60 Tab 5    lisinopril (PRINIVIL, ZESTRIL) 5 mg tablet TAKE 1 TABLET BY MOUTH DAILY 90 Tab 2    rosuvastatin (CRESTOR) 20 mg tablet Take 1 Tab by mouth daily. 30 Tab 11    vortioxetine (TRINTELLIX) 10 mg tablet Take 1 and 1/2 tablets by mouth daily. (Patient taking differently: 20 mg. Take 1 and 1/2 tablets by mouth daily.) 60 Tab 2    dextroamphetamine-amphetamine (ADDERALL) 10 mg tablet Take 1 Tab by mouth daily. Max Daily Amount: 10 mg. 30 Tab 0    progesterone (PROMETRIUM) 200 mg capsule Take 200 mg by mouth daily.  ESOMEPRAZOLE MAGNESIUM (NEXIUM PO) Take 22.3 mg by mouth every other day.  betamethasone valerate (VALISONE) 0.1 % topical cream Apply  to affected area two (2) times daily as needed for Skin Irritation. 15 g 0    Estradiol (ELESTRIN) 0.87 gram/Actuation GlPm by TransDERmal route.  aspirin 81 mg tablet Take 81 mg by mouth.  ergocalciferol (ERGOCALCIFEROL) 50,000 unit capsule Take 1 Cap by mouth every seven (7) days. 12 Cap 0    HYDROcodone-acetaminophen (NORCO) 5-325 mg per tablet Take 1 Tab by mouth every four (4) hours as needed for Pain. Max Daily Amount: 6 Tabs. 27 Tab 0       Social History     Social History    Marital status:      Spouse name: N/A    Number of children: N/A    Years of education: N/A     Occupational History    Not on file.      Social History Main Topics    Smoking status: Former Smoker     Quit date: 1/1/1989    Smokeless tobacco: Never Used    Alcohol use 1.0 oz/week     2 Glasses of wine per week      Comment: infrequently    Drug use: Not on file    Sexual activity: Not on file     Other Topics Concern    Not on file     Social History Narrative       Past Medical History:   Diagnosis Date    Anxiety     Bilateral lumbar radiculopathy     Carotid bruit     right    Depression     Diabetes mellitus ([facility] [address]     HCD (hypertensive cardiovascular disease)     Hyperlipidemia     Migraine headache     Plantar fasciitis        Past Surgical History:   Procedure Laterality Date  HX SEPTOPLASTY  6/2002    HX TONSILLECTOMY      HX TUBAL LIGATION         Family History   Problem Relation Age of Onset    Hypertension Brother      x2    Elevated Lipids Brother     Stroke Mother     Heart Surgery Mother      CABG    Cancer Father      cancer of the mouth    Hypertension Father     Hypertension Brother     Elevated Lipids Brother        Objective:  Vitals:    01/12/18 0858   BP: 116/78   Pulse: 63   Resp: 14   Temp: 98.7 °F (37.1 °C)   TempSrc: Oral   SpO2: 98%   Weight: 149 lb 3.2 oz (67.7 kg)   Height: 4' 11\" (1.499 m)   PainSc:   2   PainLoc: Pelvic       LABS   Results for orders placed or performed in visit on 01/12/18   AMB POC URINALYSIS DIP STICK AUTO W/O MICRO   Result Value Ref Range    Color (UA POC) Yellow     Clarity (UA POC) Slightly Cloudy     Glucose (UA POC) Negative Negative    Bilirubin (UA POC) Negative Negative    Ketones (UA POC) Negative Negative    Specific gravity (UA POC) 1.010 1.001 - 1.035    Blood (UA POC) Trace Negative    pH (UA POC) 7.0 4.6 - 8.0    Protein (UA POC) Negative Negative    Urobilinogen (UA POC) 0.2 mg/dL 0.2 - 1    Nitrites (UA POC) Negative Negative    Leukocyte esterase (UA POC) 2+ Negative       TESTS  none    PE  Physical Exam   Constitutional: She is oriented to person, place, and time. She appears well-developed and well-nourished. No distress. HENT:   Head: Normocephalic and atraumatic. Neck: Normal range of motion. Cardiovascular: Regular rhythm. Pulmonary/Chest: Effort normal and breath sounds normal.   Abdominal: Soft. She exhibits no distension. There is no tenderness. Musculoskeletal: Normal range of motion. Neurological: She is alert and oriented to person, place, and time. Skin: Skin is warm and dry. She is not diaphoretic. Psychiatric: She has a normal mood and affect. Her behavior is normal. Judgment and thought content normal.   Vitals reviewed. Assessment/Plan:    1.  Urinary tract infection with symptoms- UA 2+ leukocytes and trace blood, no nitrites. Urine culture pending. Macrobid x 5 days 9 (Creatinine 0.6 on 5/4/17). Pyridium for symptom relief. Hydration. She is working on stress relief. Do not stay in damp/wet undergarments for extended time frames. Follow up if symptoms without resolution or worsening. Provided requested refills of Propranolol and Imitrex. Lab review: orders written for new lab studies as appropriate; see orders    Today's Visit:   Diagnoses and all orders for this visit:    1. Dysuria  -     AMB POC URINALYSIS DIP STICK AUTO W/O MICRO  -     CULTURE, URINE; Future    Other orders  -     propranolol (INDERAL) 40 mg tablet; TAKE 1 TABLET BY MOUTH TWICE DAILY  -     SUMAtriptan (IMITREX) 50 mg tablet; TAKE 1 TABLET BY MOUTH EVERY DAY AS NEEDED  -     nitrofurantoin, macrocrystal-monohydrate, (MACROBID) 100 mg capsule; Take 1 Cap by mouth two (2) times a day. -     phenazopyridine (PYRIDIUM) 100 mg tablet; Take 1 Tab by mouth three (3) times daily (after meals) for 3 days. Health Maintenance: Deferred to PCP. I have discussed the diagnosis with the patient and the intended plan as seen in the above orders. The patient has received an after-visit summary and questions were answered concerning future plans. I have discussed medication side effects and warnings with the patient as well. I have reviewed the plan of care with the patient, accepted their input and they are in agreement with the treatment goals. Follow-up Disposition: Not on File   More than 1/2 of this 15 minute visit was spent in counseling and coordination of care, as described above.     JOSE Mac-C  Internist of [address], [facility] [address]  Phone: [phone]  Fax: [phone]

## 2018-01-15 ENCOUNTER — TELEPHONE (OUTPATIENT)
Dept: INTERNAL MEDICINE CLINIC | Age: 63
End: 2018-01-15

## 2018-01-15 LAB — CULTURE RESULT, SENTARA: ABNORMAL

## 2018-01-15 RX ORDER — CIPROFLOXACIN 250 MG/1
250 TABLET, FILM COATED ORAL EVERY 12 HOURS
Qty: 6 TAB | Refills: 0 | Status: SHIPPED | OUTPATIENT
Start: 2018-01-15 | End: 2018-01-18

## 2018-01-15 NOTE — TELEPHONE ENCOUNTER
Reviewed urine culture from visit with NP Aurora Simons on 1/12/2018. Results show 40,000 colonies of Klebsiella pneumoniae. Sensitivity panel shows only intermediate sensitivity to nitrofurantoin. Sensitive to Cipro and Bactrim. Allergic to sulfa. Please let patient know that her urine culture grew an organism with only intermediate sensitivity to Macrodantin. Would recommend that she discontinue Macrodantin and change to Cipro 250 mg BID for 3 days to ensure complete resolution of infection. New script sent to [facility].

## 2018-01-16 ENCOUNTER — TELEPHONE (OUTPATIENT)
Dept: INTERNAL MEDICINE CLINIC | Age: 63
End: 2018-01-16

## 2018-04-17 ENCOUNTER — OFFICE VISIT (OUTPATIENT)
Dept: INTERNAL MEDICINE CLINIC | Age: 63
End: 2018-04-17

## 2018-04-17 VITALS
OXYGEN SATURATION: 97 % | DIASTOLIC BLOOD PRESSURE: 90 MMHG | HEIGHT: 59 IN | BODY MASS INDEX: 28.02 KG/M2 | SYSTOLIC BLOOD PRESSURE: 162 MMHG | WEIGHT: 139 LBS | TEMPERATURE: 98 F | RESPIRATION RATE: 14 BRPM | HEART RATE: 60 BPM

## 2018-04-17 DIAGNOSIS — M54.16 BILATERAL LUMBAR RADICULOPATHY: ICD-10-CM

## 2018-04-17 DIAGNOSIS — M54.41 CHRONIC LOW BACK PAIN WITH RIGHT-SIDED SCIATICA, UNSPECIFIED BACK PAIN LATERALITY: Primary | ICD-10-CM

## 2018-04-17 DIAGNOSIS — G89.29 CHRONIC LOW BACK PAIN WITH RIGHT-SIDED SCIATICA, UNSPECIFIED BACK PAIN LATERALITY: Primary | ICD-10-CM

## 2018-04-17 RX ORDER — PREDNISONE 20 MG/1
TABLET ORAL
Qty: 18 TAB | Refills: 0 | Status: SHIPPED | OUTPATIENT
Start: 2018-04-17 | End: 2018-05-06 | Stop reason: ALTCHOICE

## 2018-04-17 RX ORDER — ACETAMINOPHEN 500 MG
TABLET ORAL 2 TIMES DAILY
COMMUNITY
End: 2018-09-18 | Stop reason: ALTCHOICE

## 2018-04-17 NOTE — PROGRESS NOTES
HPI/History  Woody Rene is a 63 y.o.  female who presents for evaluation. Pt with hx of bilat lumbar radiculopathy and sciatica. Appears she has seen Dr. Jolley for these issues at some point in the past. Reports right sciatica fairly consistent/frequent since about Norma. Typical sxs without bowel/urinary incontinence or saddle paresthesias. Mild relief with ibuprofen and often using up to 3200mg a day. She has been to PT in the past but does not believe it provided much relief, not interested in going again. She has not contacted ortho. No other sxs or complaints. Patient Active Problem List   Diagnosis Code    HCD (hypertensive cardiovascular disease) I11.9    Hyperlipidemia E78.5    Otosclerosis H80.90    Migraine G43.909    Depression F32.9    Bilateral lumbar radiculopathy M54.16    [facility] A99.7    Essential hypertension I10    Bilateral edema of lower extremity R60.0    Vitamin D deficiency E55.9     Past Medical History:   Diagnosis Date    Anxiety     Bilateral lumbar radiculopathy     Carotid bruit     right    Depression     Diabetes mellitus (HCC)     HCD (hypertensive cardiovascular disease)     Hyperlipidemia     Migraine headache     Plantar fasciitis      Past Surgical History:   Procedure Laterality Date    HX SEPTOPLASTY  6/2002    HX TONSILLECTOMY      HX TUBAL LIGATION       Social History     Social History    Marital status:      Spouse name: N/A    Number of children: N/A    Years of education: N/A     Occupational History    Not on file.      Social History Main Topics    Smoking status: Former Smoker     Quit date: 1/1/1989    Smokeless tobacco: Never Used    Alcohol use 1.0 oz/week     2 Glasses of wine per week      Comment: infrequently    Drug use: Not on file    Sexual activity: Not on file     Other Topics Concern    Not on file     Social History Narrative     Family History Problem Relation Age of Onset    Hypertension Brother      x2    Elevated Lipids Brother     Stroke Mother     Heart Surgery Mother      CABG    Cancer Father      cancer of the mouth    Hypertension Father     Hypertension Brother     Elevated Lipids Brother      Current Outpatient Prescriptions   Medication Sig    Cholecalciferol, Vitamin D3, (VITAMIN D3) 2,000 unit cap capsule Take  by mouth two (2) times a day.  predniSONE (DELTASONE) 20 mg tablet Take 3 tabs by mouth daily x 3 days, then 2 tabs daily x 3 days, then 1 tab daily x 3 days.  propranolol (INDERAL) 40 mg tablet TAKE 1 TABLET BY MOUTH TWICE DAILY    SUMAtriptan (IMITREX) 50 mg tablet TAKE 1 TABLET BY MOUTH EVERY DAY AS NEEDED    metFORMIN (GLUCOPHAGE) 1,000 mg tablet TAKE 1 TABLET BY MOUTH TWICE DAILY WITH MEALS    lisinopril (PRINIVIL, ZESTRIL) 5 mg tablet TAKE 1 TABLET BY MOUTH DAILY    rosuvastatin (CRESTOR) 20 mg tablet Take 1 Tab by mouth daily.  vortioxetine (TRINTELLIX) 10 mg tablet Take 1 and 1/2 tablets by mouth daily. (Patient taking differently: 20 mg. Take 1 and 1/2 tablets by mouth daily.)    dextroamphetamine-amphetamine (ADDERALL) 10 mg tablet Take 1 Tab by mouth daily. Max Daily Amount: 10 mg.  progesterone (PROMETRIUM) 200 mg capsule Take 200 mg by mouth daily.  ESOMEPRAZOLE MAGNESIUM (NEXIUM PO) Take 22.3 mg by mouth every other day.  betamethasone valerate (VALISONE) 0.1 % topical cream Apply  to affected area two (2) times daily as needed for Skin Irritation.  Estradiol (ELESTRIN) 0.87 gram/Actuation GlPm by TransDERmal route.  aspirin 81 mg tablet Take 81 mg by mouth. No current facility-administered medications for this visit. Allergies   Allergen Reactions    Pcn [Penicillins] Rash    Sulfa (Sulfonamide Antibiotics) Rash       Review of Systems  Aside from those included in HPI, remainder of complete ROS negative.     Physical Examination  Visit Vitals    /90 (BP 1 Location: Left arm, BP Patient Position: Sitting)    Pulse 60    Temp 98 °F (36.7 °C) (Oral)    Resp 14    Ht 4' 11\" (1.499 m)    Wt 139 lb (63 kg)    SpO2 97%    BMI 28.07 kg/m2       General - Alert and in no acute distress. Pt appears well, comfortable, and in good spirits. Pleasant, engaging. Nontoxic. Not anxious, non-diaphoretic. Mental status - Appropriate mood, behavior, speech content, dress, and thought processes. Pulm - No tachypnea, retractions, or cyanosis. Good respiratory effort. Cardiovascular - Normal rate. Good back and RLE ROM today. No other significant findings. Assessment and Plan  1. Chronic/recurrent right sciatica with hx of bilat lumbar radiculopathy - Stop ibuprofen and start steroid taper along with other conservative measures. Pt refuses PT. She will contact ortho for further eval and tx options. Return/call here if needed. Further planning as warranted. Pt happily agrees with plan. PLEASE NOTE:   This document has been produced using voice recognition software. Unrecognized errors in transcription may be present.     Elmer Headley [employer]  Gregro[facility] Hale[facility]  [phone]  4/17/2018

## 2018-04-17 NOTE — MR AVS SNAPSHOT
Karyle Olp 
 
 
 [address], [address] [facility] 
[phone] Patient: Audi Trujillo MRN: [id number] Visit Information Date & Time Provider Department Dept. Phone Encounter #  
 4/17/2018 12:30 PM Ines [facility]ed [facility] [phone] [id number] Your Appointments 4/17/2018 12:30 PM  
Office Visit with MOJGAN Syed [facility] ([address] [place] [address]) Appt Note: Sciatica pain [address], Suite [facility] [address] [place]  
  
   
 [address], [address] [place] [address] Avenue  
  
    
 5/2/2018  9:30 AM  
PAP/PELVIC with Chris Ortiz MD  
[facility] [address] Appt Note: pap  
 [address] [place]  
  
   
 [address], [address] [place] [address] Upcoming Health Maintenance Date Due  
 EYE EXAM RETINAL OR DILATED Q1 5/2/2017 ZOSTER VACCINE AGE 60> 5/17/2017 Influenza Age 5 to Adult 8/1/2017 HEMOGLOBIN A1C Q6M 11/4/2017 FOOT EXAM Q1 11/10/2017 PAP AKA CERVICAL CYTOLOGY 11/17/2017 MICROALBUMIN Q1 5/4/2018 LIPID PANEL Q1 5/4/2018 BREAST CANCER SCRN MAMMOGRAM 12/27/2019 COLONOSCOPY 10/24/2024 DTaP/Tdap/Td series (2 - Td) 11/10/2026 Allergies as of 4/17/2018  Review Complete On: 4/17/2018 By: Kathy Purvis LPN Severity Noted Reaction Type Reactions Pcn [Penicillins]  10/25/2011    Rash  
 Sulfa (Sulfonamide Antibiotics)  10/25/2011    Rash Current Immunizations  Reviewed on 5/11/2017 Name Date Influenza Vaccine PF 10/13/2014 12:46 PM, 2/3/2014  3:30 PM  
 Influenza Vaccine Split 11/14/2011 Influenza Vaccine Whole 10/5/2010 Pneumococcal Polysaccharide (PPSV-23) 5/11/2017 11:41 AM  
 TD Vaccine 1/23/2007 Tdap 11/10/2016  1:28 PM  
  
 Not reviewed this visit Vitals BP Pulse Temp Resp Height(growth percentile) Weight(growth percentile) 162/90 (BP 1 Location: Left arm, BP Patient Position: Sitting) 60 98 °F (36.7 °C) (Oral) 14 4' 11\" (1.499 m) 139 lb (63 kg) SpO2 BMI OB Status Smoking Status 97% 28.07 kg/m2 Postmenopausal Former Smoker Vitals History BMI and BSA Data Body Mass Index Body Surface Area 28.07 kg/m 2 1.62 m 2 Preferred Pharmacy Pharmacy Name Phone [address], [address] [facility] [phone] Your Updated Medication List  
  
   
This list is accurate as of 4/17/18 12:19 PM.  Always use your most recent med list.  
  
  
  
  
 aspirin 81 mg tablet Take 81 mg by mouth. betamethasone valerate 0.1 % topical cream  
Commonly known as:  Charolet Parrot Apply  to affected area two (2) times daily as needed for Skin Irritation. dextroamphetamine-amphetamine 10 mg tablet Commonly known as:  ADDERALL Take 1 Tab by mouth daily. Max Daily Amount: 10 mg. ELESTRIN 0.87 gram/actuation [facility]  
Generic drug:  Estradiol  
by TransDERmal route.  
  
 ergocalciferol 50,000 unit capsule Commonly known as:  ERGOCALCIFEROL Take 1 Cap by mouth every seven (7) days. HYDROcodone-acetaminophen 5-325 mg per tablet Commonly known as:  Gleda Ridgel Take 1 Tab by mouth every four (4) hours as needed for Pain. Max Daily Amount: 6 Tabs. lisinopril 5 mg tablet Commonly known as:  PRINIVIL, ZESTRIL  
TAKE 1 TABLET BY MOUTH DAILY  
  
 metFORMIN 1,000 mg tablet Commonly known as:  GLUCOPHAGE  
TAKE 1 TABLET BY MOUTH TWICE DAILY WITH MEALS  
  
 NEXIUM PO Take 22.3 mg by mouth every other day. progesterone 200 mg capsule Commonly known as:  PROMETRIUM Take 200 mg by mouth daily. propranolol 40 mg tablet Commonly known as:  INDERAL  
TAKE 1 TABLET BY MOUTH TWICE DAILY  
  
 rosuvastatin 20 mg tablet Commonly known as:  CRESTOR  
 Take 1 Tab by mouth daily. SUMAtriptan 50 mg tablet Commonly known as:  IMITREX  
TAKE 1 TABLET BY MOUTH EVERY DAY AS NEEDED  
  
 VITAMIN D3 2,000 unit Cap capsule Generic drug:  Cholecalciferol (Vitamin D3) Take  by mouth two (2) times a day. vortioxetine 10 mg tablet Commonly known as:  TRINTELLIX Take 1 and 1/2 tablets by mouth daily. Introducing [facility]! [facility] introduces [employer] patient portal. Now you can access parts of your medical record, email your doctor's office, and request medication refills online. 1. In your internet browser, go to [email or web address] 2. Click on the First Time User? Click Here link in the Sign In box. You will see the New Member Sign Up page. 3. Enter your [employer] Access Code exactly as it appears below. You will not need to use this code after youve completed the sign-up process. If you do not sign up before the expiration date, you must request a new code. · [employer] Access Code: [email or web address] Expires: 7/16/2018 11:41 AM 
 
4. Enter the last four digits of your Social Security Number (xxxx) and Date of Birth (mm/dd/yyyy) as indicated and click Submit. You will be taken to the next sign-up page. 5. Create a [employer] ID. This will be your [employer] login ID and cannot be changed, so think of one that is secure and easy to remember. 6. Create a [employer] password. You can change your password at any time. 7. Enter your Password Reset Question and Answer. This can be used at a later time if you forget your password. 8. Enter your e-mail address. You will receive e-mail notification when new information is available in [address] 9. Click Sign Up. You can now view and download portions of your medical record. 10. Click the Download Summary menu link to download a portable copy of your medical information.  
 
If you have questions, please visit the Frequently Asked Questions section of the [employer]. Remember, [employer] is NOT to be used for urgent needs. For medical emergencies, dial 911. Now available from your iPhone and Android! Please provide this summary of care documentation to your next provider. Your primary care clinician is listed as Vinnie Gonzalez. If you have any questions after today's visit, please call [phone].

## 2018-04-17 NOTE — PROGRESS NOTES
1. Have you been to the ER, urgent care clinic or hospitalized since your last visit? NO.     2. Have you seen or consulted any other health care providers outside of the [address] [place] [address] since your last visit (Include any pap smears or colon screening)? NO      Do you have an Advanced Directive?  YES

## 2018-04-19 ENCOUNTER — TELEPHONE (OUTPATIENT)
Dept: INTERNAL MEDICINE CLINIC | Age: 63
End: 2018-04-19

## 2018-04-19 DIAGNOSIS — E55.9 VITAMIN D DEFICIENCY: ICD-10-CM

## 2018-04-19 DIAGNOSIS — I10 ESSENTIAL HYPERTENSION: Primary | ICD-10-CM

## 2018-04-19 DIAGNOSIS — E78.5 HYPERLIPIDEMIA, UNSPECIFIED HYPERLIPIDEMIA TYPE: ICD-10-CM

## 2018-04-19 DIAGNOSIS — E11.9 TYPE 2 DIABETES MELLITUS WITHOUT COMPLICATION, WITHOUT LONG-TERM CURRENT USE OF INSULIN (HCC): ICD-10-CM

## 2018-04-26 ENCOUNTER — APPOINTMENT (OUTPATIENT)
Dept: INTERNAL MEDICINE CLINIC | Age: 63
End: 2018-04-26

## 2018-04-27 LAB
25(OH)D3 SERPL-MCNC: 28.5 NG/ML (ref 32–100)
A-G RATIO,AGRAT: 2.3 RATIO (ref 1.1–2.6)
ALBUMIN SERPL-MCNC: 4.3 G/DL (ref 3.5–5)
ALP SERPL-CCNC: 49 U/L (ref 40–120)
ALT SERPL-CCNC: 15 U/L (ref 5–40)
ANION GAP SERPL CALC-SCNC: 15 MMOL/L
AST SERPL W P-5'-P-CCNC: 11 U/L (ref 10–37)
AVG GLU, 10930: 158 MG/DL (ref 91–123)
BILIRUB SERPL-MCNC: 0.5 MG/DL (ref 0.2–1.2)
BILIRUB UR QL: NEGATIVE
BUN SERPL-MCNC: 27 MG/DL (ref 6–22)
CALCIUM SERPL-MCNC: 9.5 MG/DL (ref 8.4–10.5)
CHLORIDE SERPL-SCNC: 100 MMOL/L (ref 98–110)
CHOLEST SERPL-MCNC: 158 MG/DL (ref 110–200)
CO2 SERPL-SCNC: 28 MMOL/L (ref 20–32)
CREAT SERPL-MCNC: 0.8 MG/DL (ref 0.8–1.4)
CREATININE, URINE: 162 MG/DL
EOS ABS-DIF,2069: 0.3 K/UL (ref 0–0.5)
EOSINOPHILS C MAN (DIFF), 1067: 3 % (ref 0–6)
EPITHELIAL,EPSU: ABNORMAL /HPF (ref 0–2)
ERYTHROCYTE [DISTWIDTH] IN BLOOD BY AUTOMATED COUNT: 13.6 % (ref 10–15.5)
GFRAA, 66117: >60
GFRNA, 66118: >60
GLOBULIN,GLOB: 1.9 G/DL (ref 2–4)
GLUCOSE SERPL-MCNC: 146 MG/DL (ref 70–99)
GLUCOSE UR QL: NEGATIVE MG/DL
HBA1C MFR BLD HPLC: 7.1 % (ref 4.8–5.9)
HCT VFR BLD AUTO: 40.8 % (ref 35.1–48)
HDLC SERPL-MCNC: 3.2 MG/DL (ref 0–5)
HDLC SERPL-MCNC: 49 MG/DL (ref 40–59)
HGB BLD-MCNC: 13.2 G/DL (ref 11.7–16)
HGB UR QL STRIP: NEGATIVE
HYLINE CAST, 6014: ABNORMAL /LPF
KETONES UR QL STRIP.AUTO: NEGATIVE MG/DL
LDLC SERPL CALC-MCNC: 61 MG/DL (ref 50–99)
LEUKOCYTE ESTERASE: ABNORMAL
LYMPHOCYTES C MAN (DIFF), 1065: 61 % (ref 20–45)
LYMPHS ABS-DIF,2105: 6 K/UL (ref 1–4.8)
Lab: ABNORMAL PER 100 WBCS
MCH RBC QN AUTO: 30 PG (ref 26–34)
MCHC RBC AUTO-ENTMCNC: 32 G/DL (ref 31–36)
MCV RBC AUTO: 93 FL (ref 80–95)
MICROALB/CREAT RATIO, 140286: 18.4 MCG/MG OF CREATININE (ref 0–30)
MICROALBUMIN,URINE RANDOM 140054: 29.8 UG/ML (ref 0.1–17)
MONOCYTES ABS-DIF,2141: 0.2 K/UL (ref 0.1–1)
MONOCYTES C MAN (DIFF), 1066: 2 % (ref 3–12)
NEUTROPHILS ABS,2156: 3.3 K/UL (ref 1.8–7.7)
NEUTROPHILS C MAN (DIFF), 1064: 34 % (ref 40–75)
NITRITE UR QL STRIP.AUTO: NEGATIVE
NORMOCHROMIC CELLAVISION, 1078: ABNORMAL
NORMOCYTIC CELLAVISION, 1079: ABNORMAL
PATHOLOGY REVIEW CELLAVISION, 1082: ABNORMAL
PH UR STRIP: 5 PH (ref 5–8)
PLATELET # BLD AUTO: 310 K/UL (ref 140–440)
PMV BLD AUTO: 11 FL (ref 9–13)
POTASSIUM SERPL-SCNC: 4.3 MMOL/L (ref 3.5–5.5)
PROT SERPL-MCNC: 6.2 G/DL (ref 6.2–8.1)
PROT UR QL STRIP: ABNORMAL MG/DL
RBC # BLD AUTO: 4.41 M/UL (ref 3.8–5.2)
RBC #/AREA URNS HPF: ABNORMAL /HPF
SMEAR EVAL, 1131: ABNORMAL
SODIUM SERPL-SCNC: 143 MMOL/L (ref 133–145)
SP GR UR: 1.02 (ref 1–1.03)
TRIGL SERPL-MCNC: 240 MG/DL (ref 40–149)
TSH SERPL DL<=0.005 MIU/L-ACNC: 4.11 MCU/ML (ref 0.27–4.2)
UROBILINOGEN UR STRIP-MCNC: <2 MG/DL
VLDLC SERPL CALC-MCNC: 48 MG/DL (ref 8–30)
WBC # BLD AUTO: 10 K/UL (ref 4–11)
WBC URNS QL MICRO: ABNORMAL /HPF (ref 0–2)

## 2018-05-01 LAB — PATH REVIEW OF SMEAR, 12050: NORMAL

## 2018-05-02 ENCOUNTER — OFFICE VISIT (OUTPATIENT)
Dept: INTERNAL MEDICINE CLINIC | Age: 63
End: 2018-05-02

## 2018-05-02 VITALS
TEMPERATURE: 98.6 F | HEIGHT: 59 IN | SYSTOLIC BLOOD PRESSURE: 124 MMHG | BODY MASS INDEX: 27.9 KG/M2 | HEART RATE: 63 BPM | WEIGHT: 138.4 LBS | RESPIRATION RATE: 18 BRPM | DIASTOLIC BLOOD PRESSURE: 84 MMHG | OXYGEN SATURATION: 97 %

## 2018-05-02 DIAGNOSIS — Z00.01 ENCOUNTER FOR ROUTINE ADULT PHYSICAL EXAM WITH ABNORMAL FINDINGS: Primary | ICD-10-CM

## 2018-05-02 DIAGNOSIS — E66.3 OVERWEIGHT (BMI 25.0-29.9): ICD-10-CM

## 2018-05-02 DIAGNOSIS — F33.9 RECURRENT DEPRESSION (HCC): ICD-10-CM

## 2018-05-02 DIAGNOSIS — G43.009 MIGRAINE WITHOUT AURA AND WITHOUT STATUS MIGRAINOSUS, NOT INTRACTABLE: ICD-10-CM

## 2018-05-02 DIAGNOSIS — Z01.419 WELL WOMAN EXAM WITH ROUTINE GYNECOLOGICAL EXAM: ICD-10-CM

## 2018-05-02 DIAGNOSIS — E55.9 VITAMIN D DEFICIENCY: ICD-10-CM

## 2018-05-02 DIAGNOSIS — D72.820 ATYPICAL LYMPHOCYTOSIS: ICD-10-CM

## 2018-05-02 DIAGNOSIS — E78.5 HYPERLIPIDEMIA, UNSPECIFIED HYPERLIPIDEMIA TYPE: ICD-10-CM

## 2018-05-02 DIAGNOSIS — M54.16 BILATERAL LUMBAR RADICULOPATHY: ICD-10-CM

## 2018-05-02 DIAGNOSIS — E11.9 TYPE 2 DIABETES MELLITUS WITHOUT COMPLICATION, WITHOUT LONG-TERM CURRENT USE OF INSULIN (HCC): ICD-10-CM

## 2018-05-02 DIAGNOSIS — I10 ESSENTIAL HYPERTENSION: ICD-10-CM

## 2018-05-02 DIAGNOSIS — G47.00 INSOMNIA, UNSPECIFIED TYPE: ICD-10-CM

## 2018-05-02 RX ORDER — PROGESTERONE 200 MG/1
200 CAPSULE ORAL DAILY
Qty: 30 CAP | Refills: 1 | Status: SHIPPED | OUTPATIENT
Start: 2018-05-02 | End: 2020-02-26 | Stop reason: SDUPTHER

## 2018-05-02 RX ORDER — ESTRADIOL 0.05 MG/D
1 FILM, EXTENDED RELEASE TRANSDERMAL 2 TIMES WEEKLY
Qty: 8 PATCH | Refills: 1 | Status: SHIPPED | OUTPATIENT
Start: 2018-05-04 | End: 2019-05-18

## 2018-05-02 NOTE — MR AVS SNAPSHOT
[facility], [place], operated by [facility] 
 
 
 [address], [address] [facility] 
[phone] Patient: Edita Young MRN: [id number] Visit Information Date & Time Provider Department Dept. Phone Encounter #  
 5/2/2018  9:30 AM Linda Springer MD [facility] [phone] [id number] Follow-up Instructions Return in about 6 months (around 11/2/2018), or if symptoms worsen or fail to improve. Your Appointments 11/1/2018  9:25 AM  
LAB with [facility] NURSE VISIT [facility] ([facility]) Appt Note: labs 6mos. sarris [address], [id number] [place] [address] [address]  
  
   
 [address], [address]  
  
    
 11/8/2018 10:30 AM  
Office Visit with Linda Springer MD  
[facility] Appt Note: ov 6mos sarris [address], [facility] [id number] [place] [address] [address]  
  
   
 [address], [address] Upcoming Health Maintenance Date Due  
 EYE EXAM RETINAL OR DILATED Q1 5/2/2017 ZOSTER VACCINE AGE 60> 5/17/2017 FOOT EXAM Q1 11/10/2017 PAP AKA CERVICAL CYTOLOGY 11/17/2017 Influenza Age 5 to Adult 8/1/2018 HEMOGLOBIN A1C Q6M 10/26/2018 MICROALBUMIN Q1 4/26/2019 LIPID PANEL Q1 4/26/2019 BREAST CANCER SCRN MAMMOGRAM 12/27/2019 COLONOSCOPY 10/24/2024 DTaP/Tdap/Td series (2 - Td) 11/10/2026 Allergies as of 5/2/2018  Review Complete On: 5/2/2018 By: Janak Machado Severity Noted Reaction Type Reactions Pcn [Penicillins]  10/25/2011    Rash  
 Sulfa (Sulfonamide Antibiotics)  10/25/2011    Rash Current Immunizations  Reviewed on 5/11/2017 Name Date Influenza Vaccine PF 10/13/2014 12:46 PM, 2/3/2014  3:30 PM  
 Influenza Vaccine Split 11/14/2011 Influenza Vaccine Whole 10/5/2010 Pneumococcal Polysaccharide (PPSV-23) 5/11/2017 11:41 AM  
 TD Vaccine 1/23/2007 Tdap 11/10/2016  1:28 PM  
  
 Not reviewed this visit Vitals BP Pulse Temp Resp Height(growth percentile) Weight(growth percentile) 124/84 (BP 1 Location: Left arm, BP Patient Position: Sitting) 63 98.6 °F (37 °C) (Oral) 18 4' 11\" (1.499 m) 138 lb 6.4 oz (62.8 kg) SpO2 BMI OB Status Smoking Status 97% 27.95 kg/m2 Postmenopausal Former Smoker Vitals History BMI and BSA Data Body Mass Index Body Surface Area  
 27.95 kg/m 2 1.62 m 2 Preferred Pharmacy Pharmacy Name Phone [address], [place] [address] [facility] 2435 [facility] [phone] Your Updated Medication List  
  
   
This list is accurate as of 5/2/18 10:31 AM.  Always use your most recent med list.  
  
  
  
  
 aspirin 81 mg tablet Take 81 mg by mouth. betamethasone valerate 0.1 % topical cream  
Commonly known as:  Rashid Papa Apply  to affected area two (2) times daily as needed for Skin Irritation. dextroamphetamine-amphetamine 10 mg tablet Commonly known as:  ADDERALL Take 1 Tab by mouth daily. Max Daily Amount: 10 mg.  
  
 estradiol 0.05 mg/24 hr  
Commonly known as:  VIVELLE  
1 Patch by TransDERmal route two (2) times a week. Start taking on:  5/4/2018  
  
 lisinopril 5 mg tablet Commonly known as:  PRINIVIL, ZESTRIL  
TAKE 1 TABLET BY MOUTH DAILY  
  
 metFORMIN 1,000 mg tablet Commonly known as:  GLUCOPHAGE  
TAKE 1 TABLET BY MOUTH TWICE DAILY WITH MEALS  
  
 NEXIUM PO Take 22.3 mg by mouth every other day. predniSONE 20 mg tablet Commonly known as:  Cecillia [place] Take 3 tabs by mouth daily x 3 days, then 2 tabs daily x 3 days, then 1 tab daily x 3 days. progesterone 200 mg capsule Commonly known as:  PROMETRIUM Take 1 Cap by mouth daily. propranolol 40 mg tablet Commonly known as:  INDERAL  
 TAKE 1 TABLET BY MOUTH TWICE DAILY  
  
 rosuvastatin 20 mg tablet Commonly known as:  CRESTOR Take 1 Tab by mouth daily. SUMAtriptan 50 mg tablet Commonly known as:  IMITREX  
TAKE 1 TABLET BY MOUTH EVERY DAY AS NEEDED  
  
 VITAMIN D3 2,000 unit Cap capsule Generic drug:  Cholecalciferol (Vitamin D3) Take  by mouth two (2) times a day. vortioxetine 10 mg tablet Commonly known as:  TRINTELLIX Take 1 and 1/2 tablets by mouth daily. Prescriptions Printed Refills  
 progesterone (PROMETRIUM) 200 mg capsule 1 Sig: Take 1 Cap by mouth daily. Class: Print Route: Oral  
  
Prescriptions Sent to Pharmacy Refills  
 estradiol (VIVELLE) 0.05 mg/24 hr 1 Starting on: 2018 Si Patch by TransDERmal route two (2) times a week. Class: Normal  
 Pharmacy: [address] [place] [address] #: [phone] Route: TransDERmal  
  
Follow-up Instructions Return in about 6 months (around 2018), or if symptoms worsen or fail to improve. Patient Instructions Learning About Diabetes Food Guidelines Your Care Instructions Meal planning is important to manage diabetes. It helps keep your blood sugar at a target level (which you set with your doctor). You don't have to eat special foods. You can eat what your family eats, including sweets once in a while. But you do have to pay attention to how often you eat and how much you eat of certain foods. You may want to work with a dietitian or a certified diabetes educator (CDE) to help you plan meals and snacks. A dietitian or CDE can also help you lose weight if that is one of your goals. What should you know about eating carbs? Managing the amount of carbohydrate (carbs) you eat is an important part of healthy meals when you have diabetes. Carbohydrate is found in many foods. · Learn which foods have carbs. And learn the amounts of carbs in different foods. ¨ Bread, cereal, pasta, and rice have about 15 grams of carbs in a serving. A serving is 1 slice of bread (1 ounce), ½ cup of cooked cereal, or 1/3 cup of cooked pasta or rice. ¨ Fruits have 15 grams of carbs in a serving. A serving is 1 small fresh fruit, such as an apple or orange; ½ of a banana; ½ cup of cooked or canned fruit; ½ cup of fruit juice; 1 cup of melon or raspberries; or 2 tablespoons of dried fruit. ¨ Milk and no-sugar-added yogurt have 15 grams of carbs in a serving. A serving is 1 cup of milk or 2/3 cup of no-sugar-added yogurt. ¨ Starchy vegetables have 15 grams of carbs in a serving. A serving is ½ cup of mashed potatoes or sweet potato; 1 cup winter squash; ½ of a small baked potato; ½ cup of cooked beans; or ½ cup cooked corn or green peas. · Learn how much carbs to eat each day and at each meal. A dietitian or CDE can teach you how to keep track of the amount of carbs you eat. This is called carbohydrate counting. · If you are not sure how to count carbohydrate grams, use the Plate Method to plan meals. It is a good, quick way to make sure that you have a balanced meal. It also helps you spread carbs throughout the day. ¨ Divide your plate by types of foods. Put non-starchy vegetables on half the plate, meat or other protein food on one-quarter of the plate, and a grain or starchy vegetable in the final quarter of the plate. To this you can add a small piece of fruit and 1 cup of milk or yogurt, depending on how many carbs you are supposed to eat at a meal. 
· Try to eat about the same amount of carbs at each meal. Do not \"save up\" your daily allowance of carbs to eat at one meal. 
· Proteins have very little or no carbs per serving. Examples of proteins are beef, chicken, turkey, fish, eggs, tofu, cheese, cottage cheese, and peanut butter.  A serving size of meat is 3 ounces, which is about the size of a deck of cards. Examples of meat substitute serving sizes (equal to 1 ounce of meat) are 1/4 cup of cottage cheese, 1 egg, 1 tablespoon of peanut butter, and ½ cup of tofu. How can you eat out and still eat healthy? · Learn to estimate the serving sizes of foods that have carbohydrate. If you measure food at home, it will be easier to estimate the amount in a serving of restaurant food. · If the meal you order has too much carbohydrate (such as potatoes, corn, or baked beans), ask to have a low-carbohydrate food instead. Ask for a salad or green vegetables. · If you use insulin, check your blood sugar before and after eating out to help you plan how much to eat in the future. · If you eat more carbohydrate at a meal than you had planned, take a walk or do other exercise. This will help lower your blood sugar. What else should you know? · Limit saturated fat, such as the fat from meat and dairy products. This is a healthy choice because people who have diabetes are at higher risk of heart disease. So choose lean cuts of meat and nonfat or low-fat dairy products. Use olive or canola oil instead of butter or shortening when cooking. · Don't skip meals. Your blood sugar may drop too low if you skip meals and take insulin or certain medicines for diabetes. · Check with your doctor before you drink alcohol. Alcohol can cause your blood sugar to drop too low. Alcohol can also cause a bad reaction if you take certain diabetes medicines. Follow-up care is a key part of your treatment and safety. Be sure to make and go to all appointments, and call your doctor if you are having problems. It's also a good idea to know your test results and keep a list of the medicines you take. Where can you learn more? Go to http://jennifer-paevl.info/. Enter E192 in the search box to learn more about \"Learning About Diabetes Food Guidelines. \" Current as of: March 13, 2017 Content Version: 11.4 © 5801-9667 [employer]wise[employer]. Care instructions adapted under license by [facility] (which disclaims liability or warranty for this information). If you have questions about a medical condition or this instruction, always ask your healthcare professional. [address] any warranty or liability for your use of this information. Learning About Meal Planning for Diabetes Why plan your meals? Meal planning can be a key part of managing diabetes. Planning meals and snacks with the right balance of carbohydrate, protein, and fat can help you keep your blood sugar at the target level you set with your doctor. You don't have to eat special foods. You can eat what your family eats, including sweets once in a while. But you do have to pay attention to how often you eat and how much you eat of certain foods. You may want to work with a dietitian or a certified diabetes educator. He or she can give you tips and meal ideas and can answer your questions about meal planning. This health professional can also help you reach a healthy weight if that is one of your goals. What plan is right for you? Your dietitian or diabetes educator may suggest that you start with the plate format or carbohydrate counting. The plate format The plate format is a simple way to help you manage how you eat. You plan meals by learning how much space each food should take on a plate. Using the plate format helps you spread carbohydrate throughout the day. It can make it easier to keep your blood sugar level within your target range. It also helps you see if you're eating healthy portion sizes. To use the plate format, you put non-starchy vegetables on half your plate. Add meat or meat substitutes on one-quarter of the plate. Put a grain or starchy vegetable (such as brown rice or a potato) on the final quarter of the plate.  You can add a small piece of fruit and some low-fat or fat-free milk or yogurt, depending on your carbohydrate goal for each meal. 
Here are some tips for using the plate format: · Make sure that you are not using an oversized plate. A 9-inch plate is best. Many restaurants use larger plates. · Get used to using the plate format at home. Then you can use it when you eat out. · Write down your questions about using the plate format. Talk to your doctor, a dietitian, or a diabetes educator about your concerns. Carbohydrate counting With carbohydrate counting, you plan meals based on the amount of carbohydrate in each food. Carbohydrate raises blood sugar higher and more quickly than any other nutrient. It is found in desserts, breads and cereals, and fruit. It's also found in starchy vegetables such as potatoes and corn, grains such as rice and pasta, and milk and yogurt. Spreading carbohydrate throughout the day helps keep your blood sugar levels within your target range. Your daily amount depends on several things, including your weight, how active you are, which diabetes medicines you take, and what your goals are for your blood sugar levels. A registered dietitian or diabetes educator can help you plan how much carbohydrate to include in each meal and snack. A guideline for your daily amount of carbohydrate is: · 45 to 60 grams at each meal. That's about the same as 3 to 4 carbohydrate servings. · 15 to 20 grams at each snack. That's about the same as 1 carbohydrate serving. The Nutrition Facts label on packaged foods tells you how much carbohydrate is in a serving of the food. First, look at the serving size on the food label. Is that the amount you eat in a serving? All of the nutrition information on a food label is based on that serving size. So if you eat more or less than that, you'll need to adjust the other numbers. Total carbohydrate is the next thing you need to look for on the label.  If you count carbohydrate servings, one serving of carbohydrate is 15 grams. For foods that don't come with labels, such as fresh fruits and vegetables, you'll need a guide that lists carbohydrate in these foods. Ask your doctor, dietitian, or diabetes educator about books or other nutrition guides you can use. If you take insulin, you need to know how many grams of carbohydrate are in a meal. This lets you know how much rapid-acting insulin to take before you eat. If you use an insulin pump, you get a constant rate of insulin during the day. So the pump must be programmed at meals to give you extra insulin to cover the rise in blood sugar after meals. When you know how much carbohydrate you will eat, you can take the right amount of insulin. Or, if you always use the same amount of insulin, you need to make sure that you eat the same amount of carbohydrate at meals. If you need more help to understand carbohydrate counting and food labels, ask your doctor, dietitian, or diabetes educator. How do you get started with meal planning? Here are some tips to get started: 
· Plan your meals a week at a time. Don't forget to include snacks too. · Use cookbooks or online recipes to plan several main meals. Plan some quick meals for busy nights. You also can double some recipes that freeze well. Then you can save half for other busy nights when you don't have time to cook. · Make sure you have the ingredients you need for your recipes. If you're running low on basic items, put these items on your shopping list too. · List foods that you use to make breakfasts, lunches, and snacks. List plenty of fruits and vegetables. · Post this list on the refrigerator. Add to it as you think of more things you need. · Take the list to the store to do your weekly shopping. Follow-up care is a key part of your treatment and safety.  Be sure to make and go to all appointments, and call your doctor if you are having problems. It's also a good idea to know your test results and keep a list of the medicines you take. Where can you learn more? Go to http://jennifer-pavel.info/. Zeyad Odilon in the search box to learn more about \"Learning About Meal Planning for Diabetes. \" Current as of: March 13, 2017 Content Version: 11.4 © 5053-4103 [employer]. Care instructions adapted under license by [employer] (which disclaims liability or warranty for this information). If you have questions about a medical condition or this instruction, always ask your healthcare professional. [place] [address] any warranty or liability for your use of this information. Introducing [facility]! Baljinder Garcia introduces [employer] patient portal. Now you can access parts of your medical record, email your doctor's office, and request medication refills online. 1. In your internet browser, go to [email or web address] 2. Click on the First Time User? Click Here link in the Sign In box. You will see the New Member Sign Up page. 3. Enter your [employer] Access Code exactly as it appears below. You will not need to use this code after youve completed the sign-up process. If you do not sign up before the expiration date, you must request a new code. · [employer] Access Code: [email or web address] Expires: 7/16/2018 11:41 AM 
 
4. Enter the last four digits of your Social Security Number (xxxx) and Date of Birth (mm/dd/yyyy) as indicated and click Submit. You will be taken to the next sign-up page. 5. Create a Big Framet ID. This will be your [employer] login ID and cannot be changed, so think of one that is secure and easy to remember. 6. Create a [employer] password. You can change your password at any time. 7. Enter your Password Reset Question and Answer. This can be used at a later time if you forget your password. 8. Enter your e-mail address. You will receive e-mail notification when new information is available in [address] 9. Click Sign Up. You can now view and download portions of your medical record. 10. Click the Download Summary menu link to download a portable copy of your medical information. If you have questions, please visit the Frequently Asked Questions section of the "[employer]" website. Remember, "[employer]" is NOT to be used for urgent needs. For medical emergencies, dial 911. Now available from your iPhone and Android! Please provide this summary of care documentation to your next provider. Your primary care clinician is listed as Christiano Cui. If you have any questions after today's visit, please call [phone].

## 2018-05-02 NOTE — PATIENT INSTRUCTIONS
Learning About Diabetes Food Guidelines  Your Care Instructions    Meal planning is important to manage diabetes. It helps keep your blood sugar at a target level (which you set with your doctor). You don't have to eat special foods. You can eat what your family eats, including sweets once in a while. But you do have to pay attention to how often you eat and how much you eat of certain foods. You may want to work with a dietitian or a certified diabetes educator (CDE) to help you plan meals and snacks. A dietitian or CDE can also help you lose weight if that is one of your goals. What should you know about eating carbs? Managing the amount of carbohydrate (carbs) you eat is an important part of healthy meals when you have diabetes. Carbohydrate is found in many foods. · Learn which foods have carbs. And learn the amounts of carbs in different foods. ¨ Bread, cereal, pasta, and rice have about 15 grams of carbs in a serving. A serving is 1 slice of bread (1 ounce), ½ cup of cooked cereal, or 1/3 cup of cooked pasta or rice. ¨ Fruits have 15 grams of carbs in a serving. A serving is 1 small fresh fruit, such as an apple or orange; ½ of a banana; ½ cup of cooked or canned fruit; ½ cup of fruit juice; 1 cup of melon or raspberries; or 2 tablespoons of dried fruit. ¨ Milk and no-sugar-added yogurt have 15 grams of carbs in a serving. A serving is 1 cup of milk or 2/3 cup of no-sugar-added yogurt. ¨ Starchy vegetables have 15 grams of carbs in a serving. A serving is ½ cup of mashed potatoes or sweet potato; 1 cup winter squash; ½ of a small baked potato; ½ cup of cooked beans; or ½ cup cooked corn or green peas. · Learn how much carbs to eat each day and at each meal. A dietitian or CDE can teach you how to keep track of the amount of carbs you eat. This is called carbohydrate counting. · If you are not sure how to count carbohydrate grams, use the Plate Method to plan meals.  It is a good, quick way to make sure that you have a balanced meal. It also helps you spread carbs throughout the day. ¨ Divide your plate by types of foods. Put non-starchy vegetables on half the plate, meat or other protein food on one-quarter of the plate, and a grain or starchy vegetable in the final quarter of the plate. To this you can add a small piece of fruit and 1 cup of milk or yogurt, depending on how many carbs you are supposed to eat at a meal.  · Try to eat about the same amount of carbs at each meal. Do not \"save up\" your daily allowance of carbs to eat at one meal.  · Proteins have very little or no carbs per serving. Examples of proteins are beef, chicken, turkey, fish, eggs, tofu, cheese, cottage cheese, and peanut butter. A serving size of meat is 3 ounces, which is about the size of a deck of cards. Examples of meat substitute serving sizes (equal to 1 ounce of meat) are 1/4 cup of cottage cheese, 1 egg, 1 tablespoon of peanut butter, and ½ cup of tofu. How can you eat out and still eat healthy? · Learn to estimate the serving sizes of foods that have carbohydrate. If you measure food at home, it will be easier to estimate the amount in a serving of restaurant food. · If the meal you order has too much carbohydrate (such as potatoes, corn, or baked beans), ask to have a low-carbohydrate food instead. Ask for a salad or green vegetables. · If you use insulin, check your blood sugar before and after eating out to help you plan how much to eat in the future. · If you eat more carbohydrate at a meal than you had planned, take a walk or do other exercise. This will help lower your blood sugar. What else should you know? · Limit saturated fat, such as the fat from meat and dairy products. This is a healthy choice because people who have diabetes are at higher risk of heart disease. So choose lean cuts of meat and nonfat or low-fat dairy products.  Use olive or canola oil instead of butter or shortening when cooking. · Don't skip meals. Your blood sugar may drop too low if you skip meals and take insulin or certain medicines for diabetes. · Check with your doctor before you drink alcohol. Alcohol can cause your blood sugar to drop too low. Alcohol can also cause a bad reaction if you take certain diabetes medicines. Follow-up care is a key part of your treatment and safety. Be sure to make and go to all appointments, and call your doctor if you are having problems. It's also a good idea to know your test results and keep a list of the medicines you take. Where can you learn more? Go to http://jennifer-pavel.info/. Enter V659 in the search box to learn more about \"Learning About Diabetes Food Guidelines. \"  Current as of: March 13, 2017  Content Version: 11.4  © 8868-6755 [employer]. Care instructions adapted under license by [employer] (which disclaims liability or warranty for this information). If you have questions about a medical condition or this instruction, always ask your healthcare professional. [address] any warranty or liability for your use of this information. Learning About Meal Planning for Diabetes  Why plan your meals? Meal planning can be a key part of managing diabetes. Planning meals and snacks with the right balance of carbohydrate, protein, and fat can help you keep your blood sugar at the target level you set with your doctor. You don't have to eat special foods. You can eat what your family eats, including sweets once in a while. But you do have to pay attention to how often you eat and how much you eat of certain foods. You may want to work with a dietitian or a certified diabetes educator. He or she can give you tips and meal ideas and can answer your questions about meal planning. This health professional can also help you reach a healthy weight if that is one of your goals. What plan is right for you?   Your dietitian or diabetes educator may suggest that you start with the plate format or carbohydrate counting. The plate format  The plate format is a simple way to help you manage how you eat. You plan meals by learning how much space each food should take on a plate. Using the plate format helps you spread carbohydrate throughout the day. It can make it easier to keep your blood sugar level within your target range. It also helps you see if you're eating healthy portion sizes. To use the plate format, you put non-starchy vegetables on half your plate. Add meat or meat substitutes on one-quarter of the plate. Put a grain or starchy vegetable (such as brown rice or a potato) on the final quarter of the plate. You can add a small piece of fruit and some low-fat or fat-free milk or yogurt, depending on your carbohydrate goal for each meal.  Here are some tips for using the plate format:  · Make sure that you are not using an oversized plate. A 9-inch plate is best. Many restaurants use larger plates. · Get used to using the plate format at home. Then you can use it when you eat out. · Write down your questions about using the plate format. Talk to your doctor, a dietitian, or a diabetes educator about your concerns. Carbohydrate counting  With carbohydrate counting, you plan meals based on the amount of carbohydrate in each food. Carbohydrate raises blood sugar higher and more quickly than any other nutrient. It is found in desserts, breads and cereals, and fruit. It's also found in starchy vegetables such as potatoes and corn, grains such as rice and pasta, and milk and yogurt. Spreading carbohydrate throughout the day helps keep your blood sugar levels within your target range. Your daily amount depends on several things, including your weight, how active you are, which diabetes medicines you take, and what your goals are for your blood sugar levels.  A registered dietitian or diabetes educator can help you plan how much carbohydrate to include in each meal and snack. A guideline for your daily amount of carbohydrate is:  · 45 to 60 grams at each meal. That's about the same as 3 to 4 carbohydrate servings. · 15 to 20 grams at each snack. That's about the same as 1 carbohydrate serving. The Nutrition Facts label on packaged foods tells you how much carbohydrate is in a serving of the food. First, look at the serving size on the food label. Is that the amount you eat in a serving? All of the nutrition information on a food label is based on that serving size. So if you eat more or less than that, you'll need to adjust the other numbers. Total carbohydrate is the next thing you need to look for on the label. If you count carbohydrate servings, one serving of carbohydrate is 15 grams. For foods that don't come with labels, such as fresh fruits and vegetables, you'll need a guide that lists carbohydrate in these foods. Ask your doctor, dietitian, or diabetes educator about books or other nutrition guides you can use. If you take insulin, you need to know how many grams of carbohydrate are in a meal. This lets you know how much rapid-acting insulin to take before you eat. If you use an insulin pump, you get a constant rate of insulin during the day. So the pump must be programmed at meals to give you extra insulin to cover the rise in blood sugar after meals. When you know how much carbohydrate you will eat, you can take the right amount of insulin. Or, if you always use the same amount of insulin, you need to make sure that you eat the same amount of carbohydrate at meals. If you need more help to understand carbohydrate counting and food labels, ask your doctor, dietitian, or diabetes educator. How do you get started with meal planning? Here are some tips to get started:  · Plan your meals a week at a time. Don't forget to include snacks too. · Use cookbooks or online recipes to plan several main meals.  Plan some quick meals for busy nights. You also can double some recipes that freeze well. Then you can save half for other busy nights when you don't have time to cook. · Make sure you have the ingredients you need for your recipes. If you're running low on basic items, put these items on your shopping list too. · List foods that you use to make breakfasts, lunches, and snacks. List plenty of fruits and vegetables. · Post this list on the refrigerator. Add to it as you think of more things you need. · Take the list to the store to do your weekly shopping. Follow-up care is a key part of your treatment and safety. Be sure to make and go to all appointments, and call your doctor if you are having problems. It's also a good idea to know your test results and keep a list of the medicines you take. Where can you learn more? Go to http://jennifer-pavel.info/. Karely Varma in the search box to learn more about \"Learning About Meal Planning for Diabetes. \"  Current as of: March 13, 2017  Content Version: 11.4  © 9342-5369 [employer]. Care instructions adapted under license by [employer] (which disclaims liability or warranty for this information). If you have questions about a medical condition or this instruction, always ask your healthcare professional. [address] any warranty or liability for your use of this information.

## 2018-05-02 NOTE — PROGRESS NOTES
Chief Complaint   Patient presents with    Well Woman     PAP exam with lab review. Health Maintenance Due   Topic Date Due    EYE EXAM RETINAL OR DILATED Q1  05/02/2017    ZOSTER VACCINE AGE 60>  05/17/2017    FOOT EXAM Q1  11/10/2017    PAP AKA CERVICAL CYTOLOGY  11/17/2017     1. Have you been to the ER, urgent care clinic or hospitalized since your last visit? NO.     2. Have you seen or consulted any other health care providers outside of the [address] [place] [address] since your last visit (Include any pap smears or colon screening)?  NO

## 2018-05-06 PROBLEM — R60.0 BILATERAL EDEMA OF LOWER EXTREMITY: Status: RESOLVED | Noted: 2017-04-23 | Resolved: 2018-05-06

## 2018-05-06 PROBLEM — E66.3 OVERWEIGHT (BMI 25.0-29.9): Status: ACTIVE | Noted: 2018-05-06

## 2018-05-06 RX ORDER — OLANZAPINE 5 MG/1
5 TABLET ORAL
Qty: 1 TAB | Refills: 0
Start: 2018-05-06 | End: 2019-07-15 | Stop reason: ALTCHOICE

## 2018-05-06 RX ORDER — LORAZEPAM 1 MG/1
1 TABLET ORAL
Qty: 1 TAB | Refills: 0
Start: 2018-05-06 | End: 2021-04-05 | Stop reason: ALTCHOICE

## 2018-05-06 NOTE — PROGRESS NOTES
HPI:   Christina Gallardo is a 63y.o. year old female who presents today for a physical exam. She has a history of hypertension, hyperlipidemia, diabetes mellitus, migraine headaches, depression, anxiety, ADHD, and lumbar radiculopathy. She was seen by MOJGAN Harrell on 4/17/2018 for increasing low back pain with bilateral sciatica. She was treated with a prednisone taper, but declined referral to physical therapy. She reports today that she has noted significant improvement following the steroids, and she has been doing exercises at home which have been helpful. denies any fevers, chills, weight change, saddle paresthesia, neurogenic bowel or bladder symptoms, or recent trauma. She is otherwise without complaints. She has a history of hypertension, treated with propranolol and lisinopril. She does not check her blood pressure at home, and she does not exercise regularly. She denies any chest pain, shortness of breath at rest or with exertion, palpitations, or lightheadedness. She also has a history of hyperlipidemia, treated with high intensity dose rosuvastatin. She has a history of diabetes mellitus, and was started on metformin in 2/2015 for a HbA1c of 7.5. She does not monitor her blood sugars at home. She denies any polyuria, polydipsia, nocturia, or blurry vision, and has no history of retinopathy, neuropathy, or nephropathy. She has regular eye exams with Dr. Primo Moulton. She also has a history of migraines without aura, which usually respond to Imitrex. She has a history of lumbar radiculopathy (R>L), and was evaluated by Dr. Oneida Mesa in 5/2015. Lumbar x-rays showed degenerative changes in L4-L5 and L5-S1. She was treated conservatively with physical therapy and ibuprofen and reports significant improvement. She was evaluated by MOJGAN Harrell in 12/2016 for exacerbation of low back pain with right sciatica. She was treated with steroids and Norco with improvement.      She had a screening colonoscopy in 10/2014 by Dr. Adwoa Ch which was normal. Follow-up due in 10 years. She denies any abdominal pain, nausea, vomiting, melena, hematochezia, or change in bowel movements. Past Medical History:   Diagnosis Date    Anxiety     Bilateral lumbar radiculopathy     Carotid bruit     right    Depression     Diabetes mellitus (HCC)     HCD (hypertensive cardiovascular disease)     Hyperlipidemia     Migraine headache     Plantar fasciitis      Past Surgical History:   Procedure Laterality Date    HX SEPTOPLASTY  6/2002    HX TONSILLECTOMY      HX TUBAL LIGATION       Current Outpatient Prescriptions   Medication Sig    OLANZapine (ZYPREXA) 5 mg tablet Take 1 Tab by mouth nightly.  LORazepam (ATIVAN) 1 mg tablet Take 1 Tab by mouth nightly as needed for Anxiety. Max Daily Amount: 1 mg.  progesterone (PROMETRIUM) 200 mg capsule Take 1 Cap by mouth daily.  estradiol (VIVELLE) 0.05 mg/24 hr 1 Patch by TransDERmal route two (2) times a week.  Cholecalciferol, Vitamin D3, (VITAMIN D3) 2,000 unit cap capsule Take  by mouth two (2) times a day.  propranolol (INDERAL) 40 mg tablet TAKE 1 TABLET BY MOUTH TWICE DAILY    SUMAtriptan (IMITREX) 50 mg tablet TAKE 1 TABLET BY MOUTH EVERY DAY AS NEEDED    metFORMIN (GLUCOPHAGE) 1,000 mg tablet TAKE 1 TABLET BY MOUTH TWICE DAILY WITH MEALS    lisinopril (PRINIVIL, ZESTRIL) 5 mg tablet TAKE 1 TABLET BY MOUTH DAILY    rosuvastatin (CRESTOR) 20 mg tablet Take 1 Tab by mouth daily.  vortioxetine (TRINTELLIX) 10 mg tablet Take 1 and 1/2 tablets by mouth daily. (Patient taking differently: 20 mg. Take 1 and 1/2 tablets by mouth daily.)    dextroamphetamine-amphetamine (ADDERALL) 10 mg tablet Take 1 Tab by mouth daily. Max Daily Amount: 10 mg.    ESOMEPRAZOLE MAGNESIUM (NEXIUM PO) Take 22.3 mg by mouth every other day.  betamethasone valerate (VALISONE) 0.1 % topical cream Apply  to affected area two (2) times daily as needed for Skin Irritation.     aspirin 81 mg tablet Take 81 mg by mouth. No current facility-administered medications for this visit. Allergies and Intolerances: Allergies   Allergen Reactions    Pcn [Penicillins] Rash    Sulfa (Sulfonamide Antibiotics) Rash     Family History: She has no family history of colon or breast cancer. Her mother  from a CVA. Her father  from throat cancer and cirrhosis. Family History   Problem Relation Age of Onset    Hypertension Brother      x2    Elevated Lipids Brother     Stroke Mother     Heart Surgery Mother      CABG    Cancer Father      cancer of the mouth    Hypertension Father     Hypertension Brother     Elevated Lipids Brother      Social History:   She  reports that she quit smoking about 29 years ago. She has never used smokeless tobacco. She smoked approximately 0.25 ppd for 2 years, stopping in . She is a  and has one daughter. She was employed as a . 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 She was previously working as the [employer] for his business. History   Alcohol Use    1.0 oz/week    2 Glasses of wine per week     Comment: infrequently     Immunization History:  Immunization History   Administered Date(s) Administered    Influenza Vaccine PF 2014, 10/13/2014    Influenza Vaccine Split 2011    Influenza Vaccine Whole 10/05/2010    Pneumococcal Polysaccharide (PPSV-23) 2017    TD Vaccine 2007    Tdap 11/10/2016       Review of Systems:   As above included in HPI. Otherwise 11 point review of systems negative including constitutional, skin, HENT, eyes, respiratory, cardiovascular, gastrointestinal, genitourinary, musculoskeletal, endocrine, hematologic, allergy, and neurologic. Physical:   Vitals:   BP: 124/84  HR: 63  WT: 138 lb 6.4 oz (62.8 kg)  BMI:  27.95 kg/m2    Exam:   Patient appears in no apparent distress.  Affect is appropriate. HEENT --Anicteric sclerae, tympanic membranes normal,  ear canals normal.  PERRL, EOMI, conjunctiva and lids normal.   Sinuses were nontender, turbinates normal, hearing normal.  Oropharynx without  erythema, normal tongue, oral mucosa and tonsils. No cervical lymphadenopathy. No thyromegaly, JVD, or bruits. Carotid pulses 2+ with normal upstroke. Lungs --Clear to auscultation. No wheezing or rales. Heart --Regular rate and rhythm, no murmurs, rubs, gallops, or clicks. Breasts -- no masses, skin dimpling, asymmetry, nipple discharge, nodules, axillary lymphadenopathy. Chest wall --Nontender to palpation. PMI normal.  Abdomen -- Soft and nontender, no hepatosplenomegaly or masses. Extremities -- Without cyanosis, clubbing, edema. 2+ pulses equally and bilaterally, no inguinal adenopathy  Normal looking digits, ROM intact  Neuro -- CN 2-12 intact, strength 5/5 with intact soft touch in all extremities  Derm - no obvious abnormalities noted, no rash    Review of Data:  Labs:  Orders Only on 04/26/2018   Component Date Value Ref Range Status    Triglyceride 04/26/2018 240* 40 - 149 mg/dL Final    HDL Cholesterol 04/26/2018 49  40 - 59 mg/dL Final    Cholesterol, total 04/26/2018 158  110 - 200 mg/dL Final    CHOLESTEROL/HDL 04/26/2018 3.2  0.0 - 5.0 Final    LDL, calculated 04/26/2018 61  50 - 99 mg/dL Final    VLDL, calculated 04/26/2018 48* 8 - 30 mg/dL Final    Glucose 04/26/2018 146* 70 - 99 mg/dL Final    BUN 04/26/2018 27* 6 - 22 mg/dL Final    Creatinine 04/26/2018 0.8  0.8 - 1.4 mg/dL Final    Sodium 04/26/2018 143  133 - 145 mmol/L Final    Potassium 04/26/2018 4.3  3.5 - 5.5 mmol/L Final    Chloride 04/26/2018 100  98 - 110 mmol/L Final    CO2 04/26/2018 28  20 - 32 mmol/L Final    AST (SGOT) 04/26/2018 11  10 - 37 U/L Final    ALT (SGPT) 04/26/2018 15  5 - 40 U/L Final    Alk.  phosphatase 04/26/2018 49  40 - 120 U/L Final    Bilirubin, total 04/26/2018 0.5  0.2 - 1.2 mg/dL Final    Calcium 04/26/2018 9.5  8.4 - 10.5 mg/dL Final    Protein, total 04/26/2018 6.2  6.2 - 8.1 g/dL Final    Albumin 04/26/2018 4.3  3.5 - 5.0 g/dL Final    A-G Ratio 04/26/2018 2.3  1.1 - 2.6 ratio Final    Globulin 04/26/2018 1.9* 2.0 - 4.0 g/dL Final    Anion gap 04/26/2018 15.0  mmol/L Final    GFRAA 04/26/2018 >60.0  >60.0 Final    GFRNA 04/26/2018 >60.0  >60.0 Final    VITAMIN D, 25-HYDROXY 04/26/2018 28.5* 32.0 - 100.0 ng/mL Final    TSH 04/26/2018 4.11  0.27 - 4.20 mcU/mL Final    WBC 04/26/2018 10.0  4.0 - 11.0 K/uL Final    RBC 04/26/2018 4.41  3.80 - 5.20 M/uL Final    HGB 04/26/2018 13.2  11.7 - 16.0 g/dL Final    HCT 04/26/2018 40.8  35.1 - 48.0 % Final    MCV 04/26/2018 93  80 - 95 fL Final    MCH 04/26/2018 30  26 - 34 pg Final    MCHC 04/26/2018 32  31 - 36 g/dL Final    RDW 04/26/2018 13.6  10.0 - 15.5 % Final    PLATELET 91/83/8522 806  140 - 440 K/uL Final    MPV 04/26/2018 11.0  9.0 - 13.0 fL Final    Hemoglobin A1c 04/26/2018 7.1* 4.8 - 5.9 % Final    AVG GLU 04/26/2018 158* 91 - 123 mg/dL Final    Specific Gravity 04/26/2018 1.024  1.005 - 1.03 Final    pH (UA) 04/26/2018 5.0  5.0 - 8.0 pH Final    Protein 04/26/2018 30** Negative, mg/dL Final    Glucose 04/26/2018 Negative  Negative mg/dL Final    Ketone 04/26/2018 Negative  Negative mg/dL Final    Bilirubin 04/26/2018 Negative  Negative Final    Blood 04/26/2018 Negative  Negative Final    Nitrites 04/26/2018 Negative  Negative Final    Leukocyte Esterase 04/26/2018 Moderate* Negative Final    Urobilinogen 04/26/2018 <2.0  <2.0 mg/dL Final    WBC 04/26/2018 5-10* 0 - 2 /hpf Final    RBC 04/26/2018 0-2  Negative, /hpf Final    HYLINE CAST 04/26/2018 0-2* Negative /lpf Final    Epithelial cells 04/26/2018 5-10* 0 - 2 /hpf Final    Creatinine, urine 04/26/2018 162  mg/dL Final    Microalbumin, urine 04/26/2018 29.8* 0.1 - 17.0 ug/mL Final    Microalb/Creat ratio (ug/mg creat.) 04/26/2018 18.4 0.0 - 30.0 mcg/mg of Creatinine Final    Smudge cells 04/26/2018 1+* (none) per 100 WBCs Final    NEUTROPHILS C MAN (DIFF) 04/26/2018 34* 40 - 75 % Final    LYMPHOCYTES C MAN (DIFF) 04/26/2018 61* 20 - 45 % Final    MONOCYTES C MAN (DIFF) 04/26/2018 2* 3 - 12 % Final    EOSINOPHILS C MAN (DIFF) 04/26/2018 3  0 - 6 % Final    Neutrophils abs 04/26/2018 3.3  1.8 - 7.7 K/uL Final    Lymphs abs-DIF 04/26/2018 6.0* 1.0 - 4.8 K/uL Final    Monocytes abs-DIF 04/26/2018 0.2  0.1 - 1.0 K/uL Final    Eos abs-DIF 04/26/2018 0.3  0.0 - 0.5 K/uL Final    NORMOCHROMIC CELLAVISION 04/26/2018 Normochromic   Final    NORMOCYTIC CELLAVISION 04/26/2018 Normocytic   Final    PATHOLOGY REVIEW CELLAVISION 04/26/2018 Needs Review   Final    SMEAR EVAL 04/26/2018 Platelet morphology appears normal.   Final    PATH REVIEW OF SMEAR 04/26/2018 See Comment   Final     Health Maintenance:  Screening:    Mammogram: negative (12/2017)   PAP smear: well women exams by Dr. Amy Moore. (last 1/2017). No longer accepting her insurance. Colorectal: colonoscopy (10/2014) normal. Dr. Latosha Meza. Due 2024. Depression: under care of [address] [place] [address] Psychiatry. DM (HbA1c/FPG): HbA1c 7.1 (4/2018)   Hepatitis C: negative (5/2017)   Falls: none   DEXA: N/A   Glaucoma: regular eye exams with Dr. Chaitanya Burciaga (last 6/2017). Scheduled 6/2018. Smoking: none   Vitamin D: 28.5 (4/2018)   Medicare Wellness: N/A    Impression:  Patient Active Problem List   Diagnosis Code    HCD (hypertensive cardiovascular disease) I11.9    Hyperlipidemia E78.5    Otosclerosis H80.90    Migraine G43.909    Bilateral lumbar radiculopathy M54.16    Type 2 diabetes mellitus without complication ([facility] X69.9    Essential hypertension I10    Vitamin D deficiency E55.9    Recurrent depression ([address] F33.9    Overweight (BMI 25.0-29. 9) E66.3       Plan:  1. Diabetes mellitus. Improved control with HbA1c dcreased from 7.8 to 7.1 on increased dose of metformin of 1000 mg bid.  Not clear if has completed diabetes education. Will address at next visit with training on blood sugar monitoring. No evidence of microvascular complications. On statin and lisinopril for its colin-protective effect. Continue follow-up with Dr. Nesha Steiner for annual eye exams. Foot exam normal in 11/2016. Will repeat next visit. Urine microalbumin/ creatinine ratio without evidence of microalbuminuria. Emphasized importance of lifestyle modifications, including diet, exercise, and weight loss. Will continue to monitor and consider addition of DPP4 inhibitor if not further improved at next visit. 2. Hypertension. Blood pressure appears well controlled on current regimen of lisinopril and propranolol. Renal function remains normal with creatinine 0.8/ eGFR >60. Continue to follow. 3. Hyperlipidemia. On high intensity rosuvastatin with LDL improved to 61, indicative of excellent control. Emphasized importance of lifestyle modifications, including diet, exercise, and weight loss. Continue to follow. 4. Lymphocytosis. Routine CBC with normal WBC count of 10.0, but differential with predominance of lymphocytes (61%) and ALC elevated at 6000 K/ul. Smear reviewed by pathologist and 1+ smudge cells noted as well as absolute lymphocytosis of an overall monomorphous population of lymphocytes, possibly due to a reactive process versus a lymphoproliferative process. Flow cytometry analysis recommended for definitive diagnosis. No lymphadenopathy noted on exam and denies any systemic symptoms. Will refer to Dr. Maira Hutchins for evaluation. 5. Lumbar radiculopathy with bilateral sciatica. Recent exacerbation improved with prednisone taper and home exercises. Will give printout of back stretches and exercises to help with routine. Continue conservative measures. Follow. 6. Depression/ADHD. Stable on current regimen of vortioxetine, Zyprexa, Adderall, and lorazepam at bedtime. Being followed by [facility]. 7. Migraines. Relatively infrequent and responds to Imitrex. Follow. 8. Overweight. Emphasized importance of lifestyle modifications, including diet, exercise, and weight loss. Importance stressed to help with diabetes control. Will readdress next visit. 9. Health maintenance. Received Pneumovax. Also received Tdap. Will discuss Shingrix at next visit. Other immunizations up to date. No longer able to see Dr. Tamera García for well women exams due to insurance. Will refer to Dr. Gifty Mahajan as receiving hormonal therapy for menopausal symptoms. Mammogram up to date. Colonoscopy due 2024. Continue eye exams with Dr. Primo Moulton. Will need to request note. Vitamin D level improved after ergocalciferol 50,000 U weekly for 12 weeks. Discussed beginning maintenance dose therapy. Patient understands recommendations and agrees with plan. Follow-up in 6 months.

## 2018-05-25 ENCOUNTER — TELEPHONE (OUTPATIENT)
Dept: INTERNAL MEDICINE CLINIC | Age: 63
End: 2018-05-25

## 2018-05-25 RX ORDER — METHYLPREDNISOLONE 4 MG/1
4 TABLET ORAL
Qty: 1 DOSE PACK | Refills: 0 | Status: SHIPPED | OUTPATIENT
Start: 2018-05-25 | End: 2018-07-16 | Stop reason: ALTCHOICE

## 2018-05-25 NOTE — TELEPHONE ENCOUNTER
Called and spoke with patient. Having severe recurrence of back pain and bilateral sciatica. No alarm symptoms. Stating taking 800 mg of ibuprofen every 8 hours with only some relief. Has appointment with Dr. Gita Robertson, but not until 6/14/18. Performing stretches and back exercises as discussed at visit. Reports that did have relief following steroid taper as prescribed by MOJGAN Maza. Discussed possible referral to physical therapy, but patient states that they require a 30 dollar copay with each visit which she can not afford. Will treat with Medrol dose pack. Stressed importance of continued exercises and stretching. Discussed that muscle relaxant not likely to help given that sciatica is her predominant symptom. Will call back if no improvement.      Script sent to [facility].

## 2018-05-25 NOTE — TELEPHONE ENCOUNTER
Pt calling asking to speak to [facility]. Says she saw him for back pain. Has been using ibuprofen but it is not covering the pain any more. Cant see Dr. Luisa Bedoya til 6/14/2018. First appt they had available. Wants to know if [facility] can give her a muscle relaxer to help until she can see Dr. Luisa Bedoya?

## 2018-05-25 NOTE — TELEPHONE ENCOUNTER
That was 4/17 and she saw Dr. Maren Birmingham afterward on 5/2. I gave her steroids at our visit and she had reported benefit. If same issue then steroids probably more beneficial and less risk of adverse effects than relaxers. Please get her input.

## 2018-06-14 ENCOUNTER — OFFICE VISIT (OUTPATIENT)
Dept: ORTHOPEDIC SURGERY | Age: 63
End: 2018-06-14

## 2018-06-14 VITALS
SYSTOLIC BLOOD PRESSURE: 158 MMHG | TEMPERATURE: 98.1 F | OXYGEN SATURATION: 97 % | RESPIRATION RATE: 16 BRPM | HEIGHT: 59 IN | DIASTOLIC BLOOD PRESSURE: 72 MMHG | WEIGHT: 136 LBS | BODY MASS INDEX: 27.42 KG/M2 | HEART RATE: 62 BPM

## 2018-06-14 DIAGNOSIS — M54.16 RIGHT LUMBAR RADICULOPATHY: Primary | ICD-10-CM

## 2018-06-14 DIAGNOSIS — M51.36 DDD (DEGENERATIVE DISC DISEASE), LUMBAR: ICD-10-CM

## 2018-06-14 RX ORDER — TOPIRAMATE 25 MG/1
TABLET ORAL
Qty: 60 TAB | Refills: 1 | Status: SHIPPED | OUTPATIENT
Start: 2018-06-14 | End: 2018-06-20 | Stop reason: SDUPTHER

## 2018-06-14 RX ORDER — KETOROLAC TROMETHAMINE 15 MG/ML
60 INJECTION, SOLUTION INTRAMUSCULAR; INTRAVENOUS ONCE
Qty: 1 VIAL | Refills: 0
Start: 2018-06-14 | End: 2018-06-14

## 2018-06-14 NOTE — MR AVS SNAPSHOT
[address] [place] [address] 139 [address] [place] [address] 
[phone] Patient: Elana Mcgowan MRN: [id number] Visit Information Date & Time Provider Department Dept. Phone Encounter #  
 6/14/2018  1:00 PM Bernadine Panda MD [address] [place] [address], [address] [facility] [phone] [id number] Follow-up Instructions Return for MRI/CT f/u, may be with Dr. Husam Thomas or Dr. Will Guy. Your Appointments 11/1/2018  9:25 AM  
LAB with [facility] NURSE VISIT [facility] ([address] [place] [address]) Appt Note: labs 6mos. sarris [address], [address]  
  
   
 [address], [address]  
  
    
 11/8/2018 10:30 AM  
Office Visit with Keri Weinberg MD  
[facility] [address] Appt Note: ov 6mos sarris [address], [address]  
  
   
 [address], [address] Upcoming Health Maintenance Date Due Pneumococcal 19-64 Highest Risk (2 of 3 - PCV13) 5/11/2018 PAP AKA CERVICAL CYTOLOGY 6/29/2018* EYE EXAM RETINAL OR DILATED Q1 6/29/2018* FOOT EXAM Q1 10/2/2018* ZOSTER VACCINE AGE 60> 11/30/2018* Influenza Age 5 to Adult 8/1/2018 HEMOGLOBIN A1C Q6M 10/26/2018 MICROALBUMIN Q1 4/26/2019 LIPID PANEL Q1 4/26/2019 BREAST CANCER SCRN MAMMOGRAM 12/27/2019 COLONOSCOPY 10/24/2024 DTaP/Tdap/Td series (2 - Td) 11/10/2026 *Topic was postponed. The date shown is not the original due date. Allergies as of 6/14/2018  Review Complete On: 6/14/2018 By: Jet Lopez Severity Noted Reaction Type Reactions Pcn [Penicillins]  10/25/2011    Rash  
 Sulfa (Sulfonamide Antibiotics)  10/25/2011    Rash Current Immunizations  Reviewed on 5/11/2017 Name Date  Influenza Vaccine PF 10/13/2014 12:46 PM, 2/3/2014  3:30 PM  
 Influenza Vaccine Split 11/14/2011 Influenza Vaccine Whole 10/5/2010 Pneumococcal Polysaccharide (PPSV-23) 5/11/2017 11:41 AM  
 TD Vaccine 1/23/2007 Tdap 11/10/2016  1:28 PM  
  
 Not reviewed this visit You Were Diagnosed With   
  
 Codes Comments Right lumbar radiculopathy    -  Primary ICD-10-CM: M54.16 
ICD-9-CM: 724.4 DDD (degenerative disc disease), lumbar     ICD-10-CM: M51.36 
ICD-9-CM: 722.52 Vitals BP Pulse Temp Resp Height(growth percentile) Weight(growth percentile) 158/72 (BP 1 Location: Right arm, BP Patient Position: Sitting) 62 98.1 °F (36.7 °C) (Oral) 16 4' 11\" (1.499 m) 136 lb (61.7 kg) SpO2 BMI OB Status Smoking Status 97% 27.47 kg/m2 Postmenopausal Former Smoker BMI and BSA Data Body Mass Index Body Surface Area  
 27.47 kg/m 2 1.6 m 2 Preferred Pharmacy Pharmacy Name Phone [address], [place] [address] [place] [address] [phone] [facility] [phone] Your Updated Medication List  
  
   
This list is accurate as of 6/14/18  2:05 PM.  Always use your most recent med list.  
  
  
  
  
 aspirin 81 mg tablet Take 81 mg by mouth. betamethasone valerate 0.1 % topical cream  
Commonly known as:  Maxene Redder Apply  to affected area two (2) times daily as needed for Skin Irritation. dextroamphetamine-amphetamine 10 mg tablet Commonly known as:  ADDERALL Take 1 Tab by mouth daily. Max Daily Amount: 10 mg.  
  
 estradiol 0.05 mg/24 hr  
Commonly known as:  VIVELLE  
1 Patch by TransDERmal route two (2) times a week.  
  
 ketorolac 15 mg/mL Soln injection Commonly known as:  TORADOL  
4 mL by IntraMUSCular route once for 1 dose. lisinopril 5 mg tablet Commonly known as:  PRINIVIL, ZESTRIL  
TAKE 1 TABLET BY MOUTH DAILY LORazepam 1 mg tablet Commonly known as:  ATIVAN Take 1 Tab by mouth nightly as needed for Anxiety. Max Daily Amount: 1 mg. metFORMIN 1,000 mg tablet Commonly known as:  GLUCOPHAGE  
TAKE 1 TABLET BY MOUTH TWICE DAILY WITH MEALS  
  
 methylPREDNISolone 4 mg tablet Commonly known as:  Limmie Gilford Take 1 Tab by mouth Specific Days and Specific Times. NEXIUM PO Take 22.3 mg by mouth every other day. OLANZapine 5 mg tablet Commonly known as:  ZyPREXA Take 1 Tab by mouth nightly. progesterone 200 mg capsule Commonly known as:  PROMETRIUM Take 1 Cap by mouth daily. propranolol 40 mg tablet Commonly known as:  INDERAL  
TAKE 1 TABLET BY MOUTH TWICE DAILY  
  
 rosuvastatin 20 mg tablet Commonly known as:  CRESTOR Take 1 Tab by mouth daily. SUMAtriptan 50 mg tablet Commonly known as:  IMITREX  
TAKE 1 TABLET BY MOUTH EVERY DAY AS NEEDED  
  
 topiramate 25 mg tablet Commonly known as:  TOPAMAX  
1 tab PO QHS for one week then increase to 2 tabs PO QHS thereafter VITAMIN D3 2,000 unit Cap capsule Generic drug:  Cholecalciferol (Vitamin D3) Take  by mouth two (2) times a day. vortioxetine 10 mg tablet Commonly known as:  TRINTELLIX Take 1 and 1/2 tablets by mouth daily. Prescriptions Sent to Pharmacy Refills  
 topiramate (TOPAMAX) 25 mg tablet 1 Si tab PO QHS for one week then increase to 2 tabs PO QHS thereafter Class: Normal  
 Pharmacy: [address] [place] [address] #: [phone] We Performed the Following AMB POC XRAY, SPINE, LUMBOSACRAL; 4+ [id number] CPT(R)] KETOROLAC TROMETHAMINE INJ [ [facility]] AL THER/PROPH/DIAG INJECTION, SUBCUT/IM [id number] CPT(R)] Follow-up Instructions Return for MRI/CT f/u, may be with Dr. Ayo Wright or Dr. Mona Gan. To-Do List   
 2018 Imaging:  MRI LUMB SPINE WO CONT   
  
 2018 10:00 AM  
  Appointment with [facility] MRI RM 2 at [facility] ([phone]) GENERAL INSTRUCTIONS  Bring information (ID card) if you have any medically implanted devices. You will be required to lie still while the procedure is being performed. Remove any jewelry (including body piercing, hairpins) prior to MRI. If you have had a creatinine level drawn within the past 30 days, please bring most recent results to your appt. Bring any films, CD's, and reports related to your study with you on the day of your exam.  This only includes studies done outside of [address] [place] [address], [place] [address], [place], and [facility]. Bring a complete list of all medications you are currently taking to include prescriptions, over-the-counter meds, herbals, vitamins & any dietary supplements. If you were given medications for claustrophobia or anxiety, please arrange to have someone drive you to your appointment. QUESTIONS  Notify the MRI Department if you have any questions concerning your study. [place] - [phone] [address] [phone] Referral Information Referral ID Referred By Referred To  
  
 [id number] Kristen Coyle Not Available Visits Status Start Date End Date 1 New Request 6/14/18 6/14/19 If your referral has a status of pending review or denied, additional information will be sent to support the outcome of this decision. Patient Instructions Back Stretches: Exercises Your Care Instructions Here are some examples of exercises for stretching your back. Start each exercise slowly. Ease off the exercise if you start to have pain. Your doctor or physical therapist will tell you when you can start these exercises and which ones will work best for you. How to do the exercises Overhead stretch 1. Stand comfortably with your feet shoulder-width apart. 2. Looking straight ahead, raise both arms over your head and reach toward the ceiling. Do not allow your head to tilt back. 3. Hold for 15 to 30 seconds, then lower your arms to your sides. 4. Repeat 2 to 4 times. Side stretch 1. Stand comfortably with your feet shoulder-width apart. 2. Raise one arm over your head, and then lean to the other side. 3. Slide your hand down your leg as you let the weight of your arm gently stretch your side muscles. Hold for 15 to 30 seconds. 4. Repeat 2 to 4 times on each side. Press-up 1. Lie on your stomach, supporting your body with your forearms. 2. Press your elbows down into the floor to raise your upper back. As you do this, relax your stomach muscles and allow your back to arch without using your back muscles. As your press up, do not let your hips or pelvis come off the floor. 3. Hold for 15 to 30 seconds, then relax. 4. Repeat 2 to 4 times. Relax and rest 
 
1. Lie on your back with a rolled towel under your neck and a pillow under your knees. Extend your arms comfortably to your sides. 2. Relax and breathe normally. 3. Remain in this position for about 10 minutes. 4. If you can, do this 2 or 3 times each day. Follow-up care is a key part of your treatment and safety. Be sure to make and go to all appointments, and call your doctor if you are having problems. It's also a good idea to know your test results and keep a list of the medicines you take. Where can you learn more? Go to http://jennifer-pavel.info/. Enter B663 in the search box to learn more about \"Back Stretches: Exercises. \" Current as of: March 21, 2017 Content Version: 11.4 © 6208-2088 Healthwise, Incorporated. Care instructions adapted under license by [employer] (which disclaims liability or warranty for this information). If you have questions about a medical condition or this instruction, always ask your healthcare professional. [address] any warranty or liability for your use of this information. Low Back Pain: Exercises Your Care Instructions Here are some examples of typical rehabilitation exercises for your condition. Start each exercise slowly. Ease off the exercise if you start to have pain. Your doctor or physical therapist will tell you when you can start these exercises and which ones will work best for you. How to do the exercises Press-up 5. Lie on your stomach, supporting your body with your forearms. 6. Press your elbows down into the floor to raise your upper back. As you do this, relax your stomach muscles and allow your back to arch without using your back muscles. As your press up, do not let your hips or pelvis come off the floor. 7. Hold for 15 to 30 seconds, then relax. 8. Repeat 2 to 4 times. Alternate arm and leg (bird dog) exercise Do this exercise slowly. Try to keep your body straight at all times, and do not let one hip drop lower than the other. 5. Start on the floor, on your hands and knees. 6. Tighten your belly muscles. 7. Raise one leg off the floor, and hold it straight out behind you. Be careful not to let your hip drop down, because that will twist your trunk. 8. Hold for about 6 seconds, then lower your leg and switch to the other leg. 9. Repeat 8 to 12 times on each leg. 10. Over time, work up to holding for 10 to 30 seconds each time. 11. If you feel stable and secure with your leg raised, try raising the opposite arm straight out in front of you at the same time. Knee-to-chest exercise 5. Lie on your back with your knees bent and your feet flat on the floor. 6. Bring one knee to your chest, keeping the other foot flat on the floor (or keeping the other leg straight, whichever feels better on your lower back). 7. Keep your lower back pressed to the floor. Hold for at least 15 to 30 seconds. 8. Relax, and lower the knee to the starting position. 9. Repeat with the other leg. Repeat 2 to 4 times with each leg. 10. To get more stretch, put your other leg flat on the floor while pulling your knee to your chest. 
Curl-ups 5. Lie on the floor on your back with your knees bent at a 90-degree angle. Your feet should be flat on the floor, about 12 inches from your buttocks. 6. Cross your arms over your chest. If this bothers your neck, try putting your hands behind your neck (not your head), with your elbows spread apart. 7. Slowly tighten your belly muscles and raise your shoulder blades off the floor. 8. Keep your head in line with your body, and do not press your chin to your chest. 
9. Hold this position for 1 or 2 seconds, then slowly lower yourself back down to the floor. 10. Repeat 8 to 12 times. Pelvic tilt exercise 1. Lie on your back with your knees bent. 2. \"Brace\" your stomach. This means to tighten your muscles by pulling in and imagining your belly button moving toward your spine. You should feel like your back is pressing to the floor and your hips and pelvis are rocking back. 3. Hold for about 6 seconds while you breathe smoothly. 4. Repeat 8 to 12 times. Heel dig bridging 1. Lie on your back with both knees bent and your ankles bent so that only your heels are digging into the floor. Your knees should be bent about 90 degrees. 2. Then push your heels into the floor, squeeze your buttocks, and lift your hips off the floor until your shoulders, hips, and knees are all in a straight line. 3. Hold for about 6 seconds as you continue to breathe normally, and then slowly lower your hips back down to the floor and rest for up to 10 seconds. 4. Do 8 to 12 repetitions. Hamstring stretch in doorway 1. Lie on your back in a doorway, with one leg through the open door. 2. Slide your leg up the wall to straighten your knee. You should feel a gentle stretch down the back of your leg. 3. Hold the stretch for at least 15 to 30 seconds.  Do not arch your back, point your toes, or bend either knee. Keep one heel touching the floor and the other heel touching the wall. 4. Repeat with your other leg. 5. Do 2 to 4 times for each leg. Hip flexor stretch 1. Kneel on the floor with one knee bent and one leg behind you. Place your forward knee over your foot. Keep your other knee touching the floor. 2. Slowly push your hips forward until you feel a stretch in the upper thigh of your rear leg. 3. Hold the stretch for at least 15 to 30 seconds. Repeat with your other leg. 4. Do 2 to 4 times on each side. Wall sit 1. Stand with your back 10 to 12 inches away from a wall. 2. Lean into the wall until your back is flat against it. 3. Slowly slide down until your knees are slightly bent, pressing your lower back into the wall. 4. Hold for about 6 seconds, then slide back up the wall. 5. Repeat 8 to 12 times. Follow-up care is a key part of your treatment and safety. Be sure to make and go to all appointments, and call your doctor if you are having problems. It's also a good idea to know your test results and keep a list of the medicines you take. Where can you learn more? Go to http://jennifer-pavel.info/. Enter B077 in the search box to learn more about \"Low Back Pain: Exercises. \" Current as of: March 21, 2017 Content Version: 11.4 © 1318-7299 [employer]. Care instructions adapted under license by [employer] (which disclaims liability or warranty for this information). If you have questions about a medical condition or this instruction, always ask your healthcare professional. [address] any warranty or liability for your use of this information. Introducing [facility]! Ralph Sanders introduces [employer] patient portal. Now you can access parts of your medical record, email your doctor's office, and request medication refills online.    
 
1. In your internet browser, go to [email or web address] 2. Click on the First Time User? Click Here link in the Sign In box. You will see the New Member Sign Up page. 3. Enter your [facility] Access Code exactly as it appears below. You will not need to use this code after youve completed the sign-up process. If you do not sign up before the expiration date, you must request a new code. · [facility] Access Code: [email or web address] Expires: 7/16/2018 11:41 AM 
 
4. Enter the last four digits of your Social Security Number (xxxx) and Date of Birth (mm/dd/yyyy) as indicated and click Submit. You will be taken to the next sign-up page. 5. Create a [employer] ID. This will be your [employer] login ID and cannot be changed, so think of one that is secure and easy to remember. 6. Create a [facility] [employer] password. You can change your password at any time. 7. Enter your Password Reset Question and Answer. This can be used at a later time if you forget your password. 8. Enter your e-mail address. You will receive e-mail notification when new information is available in [address] 9. Click Sign Up. You can now view and download portions of your medical record. 10. Click the Download Summary menu link to download a portable copy of your medical information. If you have questions, please visit the Frequently Asked Questions section of the [employer] website. Remember, [facility] is NOT to be used for urgent needs. For medical emergencies, dial 911. Now available from your iPhone and Android! Please provide this summary of care documentation to your next provider. Your primary care clinician is listed as [place]. If you have any questions after today's visit, please call [phone].

## 2018-06-14 NOTE — PROGRESS NOTES
[address]  [address], [address]  [place]  Phone: [phone]  Fax: [phone]        Stu Alves  : 1955  PCP: Charanjit Marcelo MD      NEW PATIENT      ASSESSMENT AND PLAN     Diagnoses and all orders for this visit:    1. Right lumbar radiculopathy  -     [09597] LS Spine 4V  -     MRI LUMB SPINE WO CONT; Future  -     topiramate (TOPAMAX) 25 mg tablet; 1 tab PO QHS for one week then increase to 2 tabs PO QHS thereafter  -     KETOROLAC TROMETHAMINE INJ  -     ketorolac (TORADOL) 15 mg/mL soln injection; 4 mL by IntraMUSCular route once for 1 dose. -     THER/PROPH/DIAG INJECTION, SUBCUT/IM    2. DDD (degenerative disc disease), lumbar    1. Advised to stay active as tolerated. Avoid any repetitive BLT. 2. Lumbar spine MRI. Has failed home exercises (from previous PT) and prednisone. 3. Trial of Topamax. 4. Toradol injection in the office today. 5. Given home exercises. Follow-up Disposition:  Return for MRI/CT f/u, may be with Dr. Latrell Bowden or Dr. Janna Taylor. [address] [facility] is seen today in consultation at the request of Dr. Fabio Urias for complaints of R sciatica. HISTORY OF PRESENT ILLNESS  Kashif Marrero is a 63 y.o. female. She has seen Dr. Janna Taylor 2015 and had benefit with Ibuprofen and course of PT. Today pt c/o sciatica pain of 6 months duration (2018). Pt denies any specific incident or injury that caused their pain. Pt was given prednisone in 2018 which gave her relief but then her pain returned. She then had prednisone again in 2018 but this did not help her. She has had physical therapy in  when she was seeing Dr. Janna Taylor which helped her pain. She notes that she has been doing the exercises she learned in physical therapy without relief. Pt reports pain does radiate into RLE, S1 distribution.  She states that her pain will increase at night causing her to have difficulty sleeping. She feels a \"wringing\" pain in her calf and stabbing pain into her R toes. Pt has some weakness in RLE. Pt denies saddle paresthesias. Pt states she has used Prednisone with relief. She is taking Advil 800 mg w/some benefit, no GI issues. Pt has tried: PT-Not recently,  Non-opioid medications Yes, and spinal injections No, spinal surgery- No. Denies persistent fevers, chills, weight changes, neurogenic bowel or bladder symptoms. Pt denies recent ED visits or hospitalizations. Pt denies any recent GI ulcers, bleeds or renal dysfucntion.  reviewed. PMHx of DM, migraines. Hx of depression. Pain Assessment  6/14/2018   Location of Pain Back;Leg   Location Modifiers Right   Severity of Pain 6   Quality of Pain Other (Comment); Throbbing; Sharp;Dull;Aching;Burning   Quality of Pain Comment Numbness and tingling radiading down the right leg   Duration of Pain Persistent   Frequency of Pain Constant   Aggravating Factors Bending;Stretching;Straightening;Squatting;Standing;Walking   Limiting Behavior Yes   Relieving Factors NSAID   Result of Injury No         PAST MEDICAL HISTORY   Past Medical History:   Diagnosis Date    Anxiety     Bilateral lumbar radiculopathy     Carotid bruit     right    Depression     Diabetes mellitus (HCC)     HCD (hypertensive cardiovascular disease)     Hyperlipidemia     Migraine headache     Plantar fasciitis        Past Surgical History:   Procedure Laterality Date    HX SEPTOPLASTY  6/2002    HX TONSILLECTOMY      HX TUBAL LIGATION         MEDICATIONS      Current Outpatient Prescriptions   Medication Sig Dispense Refill    topiramate (TOPAMAX) 25 mg tablet 1 tab PO QHS for one week then increase to 2 tabs PO QHS thereafter 60 Tab 1    ketorolac (TORADOL) 15 mg/mL soln injection 4 mL by IntraMUSCular route once for 1 dose. 1 Vial 0    OLANZapine (ZYPREXA) 5 mg tablet Take 1 Tab by mouth nightly.  1 Tab 0    LORazepam (ATIVAN) 1 mg tablet Take 1 Tab by mouth nightly as needed for Anxiety. Max Daily Amount: 1 mg. 1 Tab 0    progesterone (PROMETRIUM) 200 mg capsule Take 1 Cap by mouth daily. 30 Cap 1    estradiol (VIVELLE) 0.05 mg/24 hr 1 Patch by TransDERmal route two (2) times a week. 8 Patch 1    Cholecalciferol, Vitamin D3, (VITAMIN D3) 2,000 unit cap capsule Take  by mouth two (2) times a day.  propranolol (INDERAL) 40 mg tablet TAKE 1 TABLET BY MOUTH TWICE DAILY 180 Tab 2    SUMAtriptan (IMITREX) 50 mg tablet TAKE 1 TABLET BY MOUTH EVERY DAY AS NEEDED 9 Tab 2    metFORMIN (GLUCOPHAGE) 1,000 mg tablet TAKE 1 TABLET BY MOUTH TWICE DAILY WITH MEALS 60 Tab 5    lisinopril (PRINIVIL, ZESTRIL) 5 mg tablet TAKE 1 TABLET BY MOUTH DAILY 90 Tab 2    rosuvastatin (CRESTOR) 20 mg tablet Take 1 Tab by mouth daily. 30 Tab 11    vortioxetine (TRINTELLIX) 10 mg tablet Take 1 and 1/2 tablets by mouth daily. (Patient taking differently: 20 mg. Take 1 and 1/2 tablets by mouth daily.) 60 Tab 2    dextroamphetamine-amphetamine (ADDERALL) 10 mg tablet Take 1 Tab by mouth daily. Max Daily Amount: 10 mg. 30 Tab 0    ESOMEPRAZOLE MAGNESIUM (NEXIUM PO) Take 22.3 mg by mouth every other day.  betamethasone valerate (VALISONE) 0.1 % topical cream Apply  to affected area two (2) times daily as needed for Skin Irritation. 15 g 0    aspirin 81 mg tablet Take 81 mg by mouth.  methylPREDNISolone (MEDROL DOSEPACK) 4 mg tablet Take 1 Tab by mouth Specific Days and Specific Times. 1 Dose Pack 0       ALLERGIES    Allergies   Allergen Reactions    Pcn [Penicillins] Rash    Sulfa (Sulfonamide Antibiotics) Rash          SOCIAL HISTORY    Social History     Social History    Marital status:      Spouse name: N/A    Number of children: N/A    Years of education: N/A     Occupational History    Not on file.      Social History Main Topics    Smoking status: Former Smoker     Quit date: 1/1/1989    Smokeless tobacco: Never Used    Alcohol use 1.0 oz/week     2 Glasses of wine per week      Comment: infrequently    Drug use: No    Sexual activity: No     Other Topics Concern    Not on file     Social History Narrative       FAMILY HISTORY    Family History   Problem Relation Age of Onset    Hypertension Brother      x2    Elevated Lipids Brother     Stroke Mother     Heart Surgery Mother      CABG    Cancer Father      cancer of the mouth    Hypertension Father     Hypertension Brother     Elevated Lipids Brother          REVIEW OF SYSTEMS  Review of Systems   Constitutional: Negative for chills, fever and weight loss. Respiratory: Negative for shortness of breath. Cardiovascular: Negative for chest pain. Gastrointestinal: Negative for constipation. Negative for fecal incontinence   Genitourinary: Negative for dysuria. Negative for urinary incontinence   Musculoskeletal:        Per HPI   Skin: Negative for rash. Neurological: Positive for tingling and focal weakness. Negative for dizziness, tremors and headaches. Endo/Heme/Allergies: Does not bruise/bleed easily. Psychiatric/Behavioral: The patient does not have insomnia. PHYSICAL EXAMINATION  Visit Vitals    /72 (BP 1 Location: Right arm, BP Patient Position: Sitting)    Pulse 62    Temp 98.1 °F (36.7 °C) (Oral)    Resp 16    Ht 4' 11\" (1.499 m)    Wt 136 lb (61.7 kg)    SpO2 97%    BMI 27.47 kg/m2          Accompanied by self. Constitutional:  Well developed, well nourished, in no acute distress. Psychiatric: Affect and mood are appropriate. Integumentary: No rashes or abrasions noted on exposed areas. Cardiovascular/Peripheral Vascular: Intact l pulses. No peripheral edema is noted. Lymphatic:  No evidence of lymphedema. No cervical lymphadenopathy. SPINE/MUSCULOSKELETAL EXAM      Lumbar spine:  No rash, ecchymosis, or gross obliquity. No fasciculations. No focal atrophy is noted. Tenderness to palpation L5-S1.  No tenderness to palpation at the sciatic notch. SI joints non-tender. Trochanters non tender. Sensation grossly intact to light touch. MOTOR:       Hip Flex  Quads Hamstrings Ankle DF EHL Ankle PF   Right +4/5 +4/5 +4/5 +4/5 4/5 +4/5   Left +4/5 +4/5 +4/5 +4/5 +4/5 +4/5     DTRs are 2+  patella    Straight Leg raise + R at 90 degrees. Contralateral straight leg raise + at 90 degrees. No difficulty with tandem gait. Heel rise elicits radiating RLE pain. Toe rise intact    Ambulation without assistive device. FWB. RADIOGRAPHS  Lumbar spine xray films reviewed:  1) Disc space collapse at L5-S1  2) No instability      Written by Mery Albright, as dictated by Boyd Wood MD.    I, Dr. Boyd Wood MD, confirm that all documentation is accurate. Ms. Franklin Crowe may have a reminder for a \"due or due soon\" health maintenance. I have asked that she contact her primary care provider for follow-up on this health maintenance.

## 2018-06-14 NOTE — PROGRESS NOTES
Verbal order entered per Dr. Adella Spurling as documented on blue sheet:  -toradol 60 mg IM today  -MRI l-spine, six months right lower extremity numbness/tingling/weakness, failed self-directed PT and NSAID's  -topamax 25 mg, 1 tab PO QHS for one week then  Increase to 2 tabs PO QHS therafter, disp 60, 1 RF  -toradol 60 mg IM today

## 2018-06-14 NOTE — PATIENT INSTRUCTIONS
Back Stretches: Exercises  Your Care Instructions  Here are some examples of exercises for stretching your back. Start each exercise slowly. Ease off the exercise if you start to have pain. Your doctor or physical therapist will tell you when you can start these exercises and which ones will work best for you. How to do the exercises  Overhead stretch    1. Stand comfortably with your feet shoulder-width apart. 2. Looking straight ahead, raise both arms over your head and reach toward the ceiling. Do not allow your head to tilt back. 3. Hold for 15 to 30 seconds, then lower your arms to your sides. 4. Repeat 2 to 4 times. Side stretch    1. Stand comfortably with your feet shoulder-width apart. 2. Raise one arm over your head, and then lean to the other side. 3. Slide your hand down your leg as you let the weight of your arm gently stretch your side muscles. Hold for 15 to 30 seconds. 4. Repeat 2 to 4 times on each side. Press-up    1. Lie on your stomach, supporting your body with your forearms. 2. Press your elbows down into the floor to raise your upper back. As you do this, relax your stomach muscles and allow your back to arch without using your back muscles. As your press up, do not let your hips or pelvis come off the floor. 3. Hold for 15 to 30 seconds, then relax. 4. Repeat 2 to 4 times. Relax and rest    1. Lie on your back with a rolled towel under your neck and a pillow under your knees. Extend your arms comfortably to your sides. 2. Relax and breathe normally. 3. Remain in this position for about 10 minutes. 4. If you can, do this 2 or 3 times each day. Follow-up care is a key part of your treatment and safety. Be sure to make and go to all appointments, and call your doctor if you are having problems. It's also a good idea to know your test results and keep a list of the medicines you take. Where can you learn more? Go to http://jennifer-pavel.info/.   Enter S538 in the search box to learn more about \"Back Stretches: Exercises. \"  Current as of: March 21, 2017  Content Version: 11.4  © 0050-3821 [employer]. Care instructions adapted under license by [employer] (which disclaims liability or warranty for this information). If you have questions about a medical condition or this instruction, always ask your healthcare professional. [address] any warranty or liability for your use of this information. Low Back Pain: Exercises  Your Care Instructions  Here are some examples of typical rehabilitation exercises for your condition. Start each exercise slowly. Ease off the exercise if you start to have pain. Your doctor or physical therapist will tell you when you can start these exercises and which ones will work best for you. How to do the exercises  Press-up    5. Lie on your stomach, supporting your body with your forearms. 6. Press your elbows down into the floor to raise your upper back. As you do this, relax your stomach muscles and allow your back to arch without using your back muscles. As your press up, do not let your hips or pelvis come off the floor. 7. Hold for 15 to 30 seconds, then relax. 8. Repeat 2 to 4 times. Alternate arm and leg (bird dog) exercise    Do this exercise slowly. Try to keep your body straight at all times, and do not let one hip drop lower than the other. 5. Start on the floor, on your hands and knees. 6. Tighten your belly muscles. 7. Raise one leg off the floor, and hold it straight out behind you. Be careful not to let your hip drop down, because that will twist your trunk. 8. Hold for about 6 seconds, then lower your leg and switch to the other leg. 9. Repeat 8 to 12 times on each leg. 10. Over time, work up to holding for 10 to 30 seconds each time.   11. If you feel stable and secure with your leg raised, try raising the opposite arm straight out in front of you at the same time.  Knee-to-chest exercise    5. Lie on your back with your knees bent and your feet flat on the floor. 6. Bring one knee to your chest, keeping the other foot flat on the floor (or keeping the other leg straight, whichever feels better on your lower back). 7. Keep your lower back pressed to the floor. Hold for at least 15 to 30 seconds. 8. Relax, and lower the knee to the starting position. 9. Repeat with the other leg. Repeat 2 to 4 times with each leg. 10. To get more stretch, put your other leg flat on the floor while pulling your knee to your chest.  Curl-ups    5. Lie on the floor on your back with your knees bent at a 90-degree angle. Your feet should be flat on the floor, about 12 inches from your buttocks. 6. Cross your arms over your chest. If this bothers your neck, try putting your hands behind your neck (not your head), with your elbows spread apart. 7. Slowly tighten your belly muscles and raise your shoulder blades off the floor. 8. Keep your head in line with your body, and do not press your chin to your chest.  9. Hold this position for 1 or 2 seconds, then slowly lower yourself back down to the floor. 10. Repeat 8 to 12 times. Pelvic tilt exercise    1. Lie on your back with your knees bent. 2. \"Brace\" your stomach. This means to tighten your muscles by pulling in and imagining your belly button moving toward your spine. You should feel like your back is pressing to the floor and your hips and pelvis are rocking back. 3. Hold for about 6 seconds while you breathe smoothly. 4. Repeat 8 to 12 times. Heel dig bridging    1. Lie on your back with both knees bent and your ankles bent so that only your heels are digging into the floor. Your knees should be bent about 90 degrees. 2. Then push your heels into the floor, squeeze your buttocks, and lift your hips off the floor until your shoulders, hips, and knees are all in a straight line.   3. Hold for about 6 seconds as you continue to breathe normally, and then slowly lower your hips back down to the floor and rest for up to 10 seconds. 4. Do 8 to 12 repetitions. Hamstring stretch in doorway    1. Lie on your back in a doorway, with one leg through the open door. 2. Slide your leg up the wall to straighten your knee. You should feel a gentle stretch down the back of your leg. 3. Hold the stretch for at least 15 to 30 seconds. Do not arch your back, point your toes, or bend either knee. Keep one heel touching the floor and the other heel touching the wall. 4. Repeat with your other leg. 5. Do 2 to 4 times for each leg. Hip flexor stretch    1. Kneel on the floor with one knee bent and one leg behind you. Place your forward knee over your foot. Keep your other knee touching the floor. 2. Slowly push your hips forward until you feel a stretch in the upper thigh of your rear leg. 3. Hold the stretch for at least 15 to 30 seconds. Repeat with your other leg. 4. Do 2 to 4 times on each side. Wall sit    1. Stand with your back 10 to 12 inches away from a wall. 2. Lean into the wall until your back is flat against it. 3. Slowly slide down until your knees are slightly bent, pressing your lower back into the wall. 4. Hold for about 6 seconds, then slide back up the wall. 5. Repeat 8 to 12 times. Follow-up care is a key part of your treatment and safety. Be sure to make and go to all appointments, and call your doctor if you are having problems. It's also a good idea to know your test results and keep a list of the medicines you take. Where can you learn more? Go to http://jennifer-pavel.info/. Enter U155 in the search box to learn more about \"Low Back Pain: Exercises. \"  Current as of: March 21, 2017  Content Version: 11.4  © 0272-9665 [employer]. Care instructions adapted under license by [employer] (which disclaims liability or warranty for this information).  If you have questions about a medical condition or this instruction, always ask your healthcare professional. [place] [address] any warranty or liability for your use of this information.

## 2018-06-20 ENCOUNTER — TELEPHONE (OUTPATIENT)
Dept: ORTHOPEDIC SURGERY | Age: 63
End: 2018-06-20

## 2018-06-20 DIAGNOSIS — M54.16 RIGHT LUMBAR RADICULOPATHY: ICD-10-CM

## 2018-06-20 RX ORDER — TOPIRAMATE 25 MG/1
TABLET ORAL
Qty: 60 TAB | Refills: 0 | Status: SHIPPED | OUTPATIENT
Start: 2018-06-20 | End: 2018-07-16 | Stop reason: SDUPTHER

## 2018-06-20 NOTE — TELEPHONE ENCOUNTER
Patient called and said the Ibuprofen medication that  prescribed is not working. Patient is requesting a stronger medication. [facility]. [phone]. If new medication can not be called into the pharmacy , patient said she can  at [facility]. Patient tel. [phone].

## 2018-06-20 NOTE — TELEPHONE ENCOUNTER
Spoke with patient, verified two patient identifiers. Patient informed of NP Bandera message below. Patient stated understanding and that she would increase.    Verbal order entered per NP Bandera as documented in message below: Topamax 25mg take 2 tabs po BID

## 2018-06-20 NOTE — TELEPHONE ENCOUNTER
Verified blue sheet Dr. Keerthi Hood only ordered Topamax, Toradol, and patient has been scheduled for MRI on 6/24/18. Confirmed with patient that she was referring to the Topamax not being strong enough. Please advise.

## 2018-06-24 ENCOUNTER — HOSPITAL ENCOUNTER (OUTPATIENT)
Age: 63
Discharge: HOME | End: 2018-06-24
Attending: PHYSICAL MEDICINE & REHABILITATION
Payer: COMMERCIAL

## 2018-06-24 DIAGNOSIS — M54.16 RIGHT LUMBAR RADICULOPATHY: ICD-10-CM

## 2018-06-24 PROCEDURE — 72148 MRI LUMBAR SPINE W/O DYE: CPT

## 2018-07-16 ENCOUNTER — OFFICE VISIT (OUTPATIENT)
Dept: ORTHOPEDIC SURGERY | Age: 63
End: 2018-07-16

## 2018-07-16 VITALS
OXYGEN SATURATION: 97 % | WEIGHT: 134 LBS | RESPIRATION RATE: 18 BRPM | TEMPERATURE: 98.1 F | HEART RATE: 60 BPM | BODY MASS INDEX: 27.01 KG/M2 | SYSTOLIC BLOOD PRESSURE: 157 MMHG | DIASTOLIC BLOOD PRESSURE: 73 MMHG | HEIGHT: 59 IN

## 2018-07-16 DIAGNOSIS — M54.16 LUMBAR RADICULOPATHY: ICD-10-CM

## 2018-07-16 DIAGNOSIS — M54.16 RIGHT LUMBAR RADICULOPATHY: ICD-10-CM

## 2018-07-16 DIAGNOSIS — M51.26 HNP (HERNIATED NUCLEUS PULPOSUS), LUMBAR: Primary | ICD-10-CM

## 2018-07-16 DIAGNOSIS — R29.898 RIGHT LEG WEAKNESS: ICD-10-CM

## 2018-07-16 RX ORDER — IBUPROFEN 800 MG/1
800 TABLET ORAL 4 TIMES DAILY
COMMUNITY
End: 2019-05-14 | Stop reason: ALTCHOICE

## 2018-07-16 RX ORDER — TOPIRAMATE 25 MG/1
TABLET ORAL
Qty: 180 TAB | Refills: 2 | Status: SHIPPED | OUTPATIENT
Start: 2018-07-16 | End: 2018-09-18 | Stop reason: ALTCHOICE

## 2018-07-16 RX ORDER — ACETAMINOPHEN AND CODEINE PHOSPHATE 300; 30 MG/1; MG/1
TABLET ORAL
Qty: 21 TAB | Refills: 0 | Status: SHIPPED | OUTPATIENT
Start: 2018-07-16 | End: 2018-07-27 | Stop reason: SDUPTHER

## 2018-07-16 NOTE — PATIENT INSTRUCTIONS
Herniated Disc: Exercises  Your Care Instructions  Here are some examples of typical rehabilitation exercises for your condition. Start each exercise slowly. Ease off the exercise if you start to have pain. Your doctor or physical therapist will tell you when you can start these exercises and which ones will work best for you. How to stay safe  These exercises can help you move easier and feel better. But when you first start doing them, you may have more pain in your back. This is normal. But it is important to pay close attention to your pain during and after each exercise. · Keep doing these exercises if your pain stays the same or moves from your leg and buttock more toward the middle of your spine. Pain moving out of your leg and buttock is a good sign. · Stop doing these exercises if your pain gets worse in your leg and buttock. Stop if you start to have pain in your leg and buttock that you didn't have before. Be sure to do these exercises in the order they appear. Note how your pain changes before you move to the next one. If your pain is much worse right after exercise and stays worse the next day, do not do any of these exercises. How to do the exercises  1. Rest on belly    1. Lie on your stomach, face down, with your head turned to the side. Place your arms beside your body. If this bothers your neck, place your hands, one on top of the other, underneath your forehead. This will help support your head and neck. 2. Try to relax your lower back muscles as much as you can. 3. Continue to lie on your stomach for 2 minutes. 4. If your pain spreads down your leg or increases down your leg, stop this exercise and do not do the next exercises. 2. Press-up    1. Lie on your stomach, face down. Keep your elbows tucked into your sides and under your shoulders. 2. Press your elbows down into the floor to raise your upper back. As you do this, relax your stomach muscles.  Allow your back to arch without using your back muscles. Let your low back relax completely as you arch up. 3. Hold this position for 2 minutes. 4. Repeat 2 to 4 times. 5. If your pain spreads down your leg or increases down your leg, stop this exercise and do not do the next exercises. 3. Full press-up    1. Lie on your stomach, face down. Keep your elbows tucked into your sides and under your shoulders. 2. Straighten your elbows, and push your upper body up as far as you can. Allow your lower back to sag. Keep your hips, pelvis, and legs relaxed. 3. Hold this position for 5 seconds, and then relax. 4. Repeat 10 times. Each time, try to raise your upper body a little higher and hold your arms a bit straighter. 5. If your pain spreads down your leg or gets worse down your leg, stop this exercise and do not move to the next exercise. 6. If you can't do this exercise, you may instead try the backward bend exercise that follows. 4. Backward bend    1. Stand with your feet hip-width apart. Your toes should point forward. Do not lock your knees. 2. Place your hands in the small of your back. 3. Bend backward as far as you can, keeping your knees straight. Hold this position for 2 to 3 seconds. Then return to your starting position. 4. Repeat 2 to 4 times. Each time, try to bend backward a little farther, until you bend backward as far as you can. 5. If your pain spreads down your leg or increases down your leg, stop this exercise. Follow-up care is a key part of your treatment and safety. Be sure to make and go to all appointments, and call your doctor if you are having problems. It's also a good idea to know your test results and keep a list of the medicines you take. Where can you learn more? Go to http://jennifer-pavel.info/. Enter 48342 [id number] in the search box to learn more about \"Herniated Disc: Exercises. \"  Current as of: November 29, 2017  Content Version: 11.7  © 5721-4929 [employer], [facility].  Care instructions adapted under license by [employer] (which disclaims liability or warranty for this information). If you have questions about a medical condition or this instruction, always ask your healthcare professional. [address] any warranty or liability for your use of this information.

## 2018-07-16 NOTE — MR AVS SNAPSHOT
[address] [place] [address] 139 [facility] [address] [id number] 
[phone] Patient: Liz Manzanares MRN: [id number] Visit Information Date & Time Provider Department Dept. Phone Encounter #  
 7/16/2018  2:00 PM Taty Hutchison MD [facility] [phone] [id number] Your Appointments 11/1/2018  9:25 AM  
LAB with IOC NURSE VISIT [facility] ([facility]) Appt Note: labs 6mos. sarris [address], [address] [place] [address]  
  
   
 [address], [address]  
  
    
 11/8/2018 10:30 AM  
Office Visit with Kenneth Rahman MD  
[facility] [address] Appt Note: ov 6mos sarris [address], [address] [place] [address]  
  
   
 [address], [address] Upcoming Health Maintenance Date Due  
 EYE EXAM RETINAL OR DILATED Q1 5/2/2017 PAP AKA CERVICAL CYTOLOGY 11/17/2017 Pneumococcal 19-64 Highest Risk (2 of 3 - PCV13) 5/11/2018 FOOT EXAM Q1 10/2/2018* ZOSTER VACCINE AGE 60> 11/30/2018* Influenza Age 5 to Adult 8/1/2018 HEMOGLOBIN A1C Q6M 10/26/2018 MICROALBUMIN Q1 4/26/2019 LIPID PANEL Q1 4/26/2019 BREAST CANCER SCRN MAMMOGRAM 12/27/2019 COLONOSCOPY 10/24/2024 DTaP/Tdap/Td series (2 - Td) 11/10/2026 *Topic was postponed. The date shown is not the original due date. Allergies as of 7/16/2018  Review Complete On: 7/16/2018 By: Taty Hutchison MD  
  
 Severity Noted Reaction Type Reactions Pcn [Penicillins]  10/25/2011    Rash  
 Sulfa (Sulfonamide Antibiotics)  10/25/2011    Rash Current Immunizations  Reviewed on 5/11/2017 Name Date Influenza Vaccine PF 10/13/2014 12:46 PM, 2/3/2014  3:30 PM  
 Influenza Vaccine Split 11/14/2011 Influenza Vaccine Whole 10/5/2010 Pneumococcal Polysaccharide (PPSV-23) 5/11/2017 11:41 AM  
 TD Vaccine 1/23/2007 Tdap 11/10/2016  1:28 PM  
  
 Not reviewed this visit You Were Diagnosed With   
  
 Codes Comments HNP (herniated nucleus pulposus), lumbar    -  Primary ICD-10-CM: M51.26 
ICD-9-CM: 722.10 Right leg weakness     ICD-10-CM: R29.898 ICD-9-CM: 729.89 Lumbar radiculopathy     ICD-10-CM: M54.16 
ICD-9-CM: 724.4 Right lumbar radiculopathy     ICD-10-CM: M54.16 
ICD-9-CM: 724.4 Vitals BP Pulse Temp Resp Height(growth percentile) Weight(growth percentile) 157/73 60 98.1 °F (36.7 °C) (Oral) 18 4' 11\" (1.499 m) 134 lb (60.8 kg) SpO2 BMI OB Status Smoking Status 97% 27.06 kg/m2 Postmenopausal Former Smoker Vitals History BMI and BSA Data Body Mass Index Body Surface Area  
 27.06 kg/m 2 1.59 m 2 Preferred Pharmacy Pharmacy Name Phone [address], [place] [address] [place] [address] 22 [phone] [facility] [phone] Your Updated Medication List  
  
   
This list is accurate as of 7/16/18  3:46 PM.  Always use your most recent med list.  
  
  
  
  
 acetaminophen-codeine 300-30 mg per tablet Commonly known as:  TYLENOL #3  
1 po q 8 hours as needed  
  
 aspirin 81 mg tablet Take 81 mg by mouth. dextroamphetamine-amphetamine 10 mg tablet Commonly known as:  ADDERALL Take 1 Tab by mouth daily. Max Daily Amount: 10 mg.  
  
 estradiol 0.05 mg/24 hr  
Commonly known as:  VIVELLE  
1 Patch by TransDERmal route two (2) times a week. ibuprofen 800 mg tablet Commonly known as:  MOTRIN Take 800 mg by mouth four (4) times daily. lisinopril 5 mg tablet Commonly known as:  PRINIVIL, ZESTRIL  
TAKE 1 TABLET BY MOUTH DAILY LORazepam 1 mg tablet Commonly known as:  ATIVAN Take 1 Tab by mouth nightly as needed for Anxiety. Max Daily Amount: 1 mg.  
  
 metFORMIN 1,000 mg tablet Commonly known as:  GLUCOPHAGE  
 TAKE 1 TABLET BY MOUTH TWICE DAILY WITH MEALS  
  
 OLANZapine 5 mg tablet Commonly known as:  ZyPREXA Take 1 Tab by mouth nightly. progesterone 200 mg capsule Commonly known as:  PROMETRIUM Take 1 Cap by mouth daily. propranolol 40 mg tablet Commonly known as:  INDERAL  
TAKE 1 TABLET BY MOUTH TWICE DAILY  
  
 rosuvastatin 20 mg tablet Commonly known as:  CRESTOR Take 1 Tab by mouth daily. SUMAtriptan 50 mg tablet Commonly known as:  IMITREX  
TAKE 1 TABLET BY MOUTH EVERY DAY AS NEEDED  
  
 topiramate 25 mg tablet Commonly known as:  TOPAMAX Take 3 tabs po BID  
  
 VITAMIN D3 2,000 unit Cap capsule Generic drug:  Cholecalciferol (Vitamin D3) Take  by mouth two (2) times a day. vortioxetine 10 mg tablet Commonly known as:  TRINTELLIX Take 1 and 1/2 tablets by mouth daily. Prescriptions Printed Refills  
 acetaminophen-codeine (TYLENOL #3) 300-30 mg per tablet 0 Si po q 8 hours as needed Class: Print Prescriptions Sent to Pharmacy Refills  
 topiramate (TOPAMAX) 25 mg tablet 2 Sig: Take 3 tabs po BID Class: Normal  
 Pharmacy: [address] [place] [address] #: [phone] We Performed the Following SCHEDULE SURGERY [[id number] Custom] Patient Instructions Herniated Disc: Exercises Your Care Instructions Here are some examples of typical rehabilitation exercises for your condition. Start each exercise slowly. Ease off the exercise if you start to have pain. Your doctor or physical therapist will tell you when you can start these exercises and which ones will work best for you. How to stay safe These exercises can help you move easier and feel better. But when you first start doing them, you may have more pain in your back. This is normal. But it is important to pay close attention to your pain during and after each exercise. · Keep doing these exercises if your pain stays the same or moves from your leg and buttock more toward the middle of your spine. Pain moving out of your leg and buttock is a good sign. · Stop doing these exercises if your pain gets worse in your leg and buttock. Stop if you start to have pain in your leg and buttock that you didn't have before. Be sure to do these exercises in the order they appear. Note how your pain changes before you move to the next one. If your pain is much worse right after exercise and stays worse the next day, do not do any of these exercises. How to do the exercises 1. Rest on belly 1. Lie on your stomach, face down, with your head turned to the side. Place your arms beside your body. If this bothers your neck, place your hands, one on top of the other, underneath your forehead. This will help support your head and neck. 2. Try to relax your lower back muscles as much as you can. 3. Continue to lie on your stomach for 2 minutes. 4. If your pain spreads down your leg or increases down your leg, stop this exercise and do not do the next exercises. 2. Press-up 1. Lie on your stomach, face down. Keep your elbows tucked into your sides and under your shoulders. 2. Press your elbows down into the floor to raise your upper back. As you do this, relax your stomach muscles. Allow your back to arch without using your back muscles. Let your low back relax completely as you arch up. 3. Hold this position for 2 minutes. 4. Repeat 2 to 4 times. 5. If your pain spreads down your leg or increases down your leg, stop this exercise and do not do the next exercises. 3. Full press-up 1. Lie on your stomach, face down. Keep your elbows tucked into your sides and under your shoulders. 2. Straighten your elbows, and push your upper body up as far as you can. Allow your lower back to sag. Keep your hips, pelvis, and legs relaxed. 3. Hold this position for 5 seconds, and then relax. 4. Repeat 10 times. Each time, try to raise your upper body a little higher and hold your arms a bit straighter. 5. If your pain spreads down your leg or gets worse down your leg, stop this exercise and do not move to the next exercise. 6. If you can't do this exercise, you may instead try the backward bend exercise that follows. 4. Backward bend 1. Stand with your feet hip-width apart. Your toes should point forward. Do not lock your knees. 2. Place your hands in the small of your back. 3. Bend backward as far as you can, keeping your knees straight. Hold this position for 2 to 3 seconds. Then return to your starting position. 4. Repeat 2 to 4 times. Each time, try to bend backward a little farther, until you bend backward as far as you can. 5. If your pain spreads down your leg or increases down your leg, stop this exercise. Follow-up care is a key part of your treatment and safety. Be sure to make and go to all appointments, and call your doctor if you are having problems. It's also a good idea to know your test results and keep a list of the medicines you take. Where can you learn more? Go to http://jennifer-pavel.info/. Enter [phone] in the search box to learn more about \"Herniated Disc: Exercises. \" Current as of: November 29, 2017 Content Version: 11.7 © 7322-8546 [employer]. Care instructions adapted under license by [employer] (which disclaims liability or warranty for this information). If you have questions about a medical condition or this instruction, always ask your healthcare professional. [address] any warranty or liability for your use of this information. Introducing [facility]! Marylu Davsi introduces [employer] patient portal. Now you can access parts of your medical record, email your doctor's office, and request medication refills online.    
 
1. In your internet browser, go to [email or web address] 2. Click on the First Time User? Click Here link in the Sign In box. You will see the New Member Sign Up page. 3. Enter your Hactus Access Code exactly as it appears below. You will not need to use this code after youve completed the sign-up process. If you do not sign up before the expiration date, you must request a new code. · Hactus Access Code: [email or web address] Expires: 10/14/2018  1:46 PM 
 
4. Enter the last four digits of your Social Security Number (xxxx) and Date of Birth (mm/dd/yyyy) as indicated and click Submit. You will be taken to the next sign-up page. 5. Create a [employer] ID. This will be your Hactus login ID and cannot be changed, so think of one that is secure and easy to remember. 6. Create a Hactus password. You can change your password at any time. 7. Enter your Password Reset Question and Answer. This can be used at a later time if you forget your password. 8. Enter your e-mail address. You will receive e-mail notification when new information is available in [address] 9. Click Sign Up. You can now view and download portions of your medical record. 10. Click the Download Summary menu link to download a portable copy of your medical information. If you have questions, please visit the Frequently Asked Questions section of the Hactus website. Remember, Hactus is NOT to be used for urgent needs. For medical emergencies, dial 911. Now available from your iPhone and Android! Please provide this summary of care documentation to your next provider. Your primary care clinician is listed as Tonya Luther. If you have any questions after today's visit, please call [phone].

## 2018-07-16 NOTE — PROGRESS NOTES
[facility]  [address], [facility] [address]  Phone: [phone]  Fax: [phone]    Pt's YOB: 1955    ASSESSMENT   Diagnoses and all orders for this visit:    1. HNP (herniated nucleus pulposus), lumbar  -     SCHEDULE SURGERY  -     acetaminophen-codeine (TYLENOL #3) 300-30 mg per tablet; 1 po q 8 hours as needed    2. Right leg weakness  -     SCHEDULE SURGERY    3. Lumbar radiculopathy  -     SCHEDULE SURGERY    4. Right lumbar radiculopathy  -     topiramate (TOPAMAX) 25 mg tablet; Take 3 tabs po BID      IMPRESSION AND PLAN:  Meenakshi Cuevas is a 63 y.o. female with history of lumbar pain with right radicular symptoms x 6 months. She experienced the onset of progressive lower back pain with pain radiating down the right leg since 01/2018. She reports minimal relief with Topamax 25 mg 2 tabs QAM and 2 tabs QHS and ibuprofen 800 mg.      1) Pt was given information on herniated disc exercises. 2) Discussed treatment options with the Pt, including steroid injections and surgical intervention,   3) She was scheduled for a right L5-S1 SNRB. 4) Pt will increase to Topamax 25 mg 3 tabs BID, tapering up as directed. 5) She received a refill of Tylenol #3 1 tab Q8 hours prn severe pain. 6) Pt was instructed to not to take more than ibuprofen 800 mg TID. 7) Ms. Estuardo Encarnacion has a reminder for a \"due or due soon\" health maintenance. I have asked that she contact her primary care provider, Adiel Cui MD, for follow-up on this health maintenance. 8)  demonstrated consistency with prescribing. 9) Pt will follow-up with Dr. Aracely Romano in 1 month. HISTORY OF PRESENT ILLNESS:  Meenakshi Cuevas is a 66 y.o. female with history of lumbar pain with right radicular symptoms x 6 months. Pt presents to the office today for lumbar MRI follow up. I last saw the patient in 2015 and she was followed by Dr. Anali Garcia on 06/14/2018.  She experienced the onset of progressive lower back pain with pain radiating down the right leg since 01/2018. Pt notes that she has noticed she is more off balanced and more clumsy than she was in the past. Pt experiences pain in the posterior aspect of the right leg when bending backwards and if she bends further back she experiences numbness in the entire right leg. Pt complains of shooting pain in the right leg that extends into the 5th toe on the right. She notes that she was using a walking exercise video, started to lose weight, and notes that it gradually became harder and harder to do due to the pain. Pt has been prescribed Topamax 25 mg and takes 2 tabs QAM and 2 tabs QHS. She also takes ibuprofen 800 mg 4 x daily but continues to experience severe pain. Pt tried a Toradol injection at her last office visit with no improvement. She reports minimal relief with Ultram and denies ever trying Tylenol #3. Pt denies any prior lumbar surgery. Pt at this time desires to proceed with medication evaluation, steroid injections, and surgical evaluation. 30 minutes of face to face contact was spent with the patient during today's office visit extensively discussing her symptoms and treatment plan. Pain Scale: 7/10    PCP: Tonya Luther MD     Past Medical History:   Diagnosis Date    Anxiety     Bilateral lumbar radiculopathy     Carotid bruit     right    Depression     Diabetes mellitus ([address]     HCD (hypertensive cardiovascular disease)     Hyperlipidemia     Migraine headache     Plantar fasciitis         Social History     Social History    Marital status:      Spouse name: N/A    Number of children: N/A    Years of education: N/A     Occupational History    Not on file.      Social History Main Topics    Smoking status: Former Smoker     Quit date: 1/1/1989    Smokeless tobacco: Never Used    Alcohol use 1.0 oz/week     2 Glasses of wine per week      Comment: infrequently    Drug use: No    Sexual activity: No     Other Topics Concern    Not on file     Social History Narrative       Current Outpatient Prescriptions   Medication Sig Dispense Refill    ibuprofen (MOTRIN) 800 mg tablet Take 800 mg by mouth four (4) times daily.  acetaminophen-codeine (TYLENOL #3) 300-30 mg per tablet 1 po q 8 hours as needed 21 Tab 0    topiramate (TOPAMAX) 25 mg tablet Take 3 tabs po  Tab 2    OLANZapine (ZYPREXA) 5 mg tablet Take 1 Tab by mouth nightly. 1 Tab 0    LORazepam (ATIVAN) 1 mg tablet Take 1 Tab by mouth nightly as needed for Anxiety. Max Daily Amount: 1 mg. 1 Tab 0    progesterone (PROMETRIUM) 200 mg capsule Take 1 Cap by mouth daily. 30 Cap 1    estradiol (VIVELLE) 0.05 mg/24 hr 1 Patch by TransDERmal route two (2) times a week. (Patient taking differently: 1 Patch by TransDERmal route Every Saturday.) 8 Patch 1    Cholecalciferol, Vitamin D3, (VITAMIN D3) 2,000 unit cap capsule Take  by mouth two (2) times a day.  propranolol (INDERAL) 40 mg tablet TAKE 1 TABLET BY MOUTH TWICE DAILY 180 Tab 2    SUMAtriptan (IMITREX) 50 mg tablet TAKE 1 TABLET BY MOUTH EVERY DAY AS NEEDED 9 Tab 2    metFORMIN (GLUCOPHAGE) 1,000 mg tablet TAKE 1 TABLET BY MOUTH TWICE DAILY WITH MEALS 60 Tab 5    lisinopril (PRINIVIL, ZESTRIL) 5 mg tablet TAKE 1 TABLET BY MOUTH DAILY 90 Tab 2    rosuvastatin (CRESTOR) 20 mg tablet Take 1 Tab by mouth daily. 30 Tab 11    vortioxetine (TRINTELLIX) 10 mg tablet Take 1 and 1/2 tablets by mouth daily. (Patient taking differently: 20 mg. Take 1 and 1/2 tablets by mouth daily.) 60 Tab 2    dextroamphetamine-amphetamine (ADDERALL) 10 mg tablet Take 1 Tab by mouth daily. Max Daily Amount: 10 mg. 30 Tab 0    aspirin 81 mg tablet Take 81 mg by mouth. Allergies   Allergen Reactions    Pcn [Penicillins] Rash    Sulfa (Sulfonamide Antibiotics) Rash         REVIEW OF SYSTEMS    Constitutional: Negative for fever, chills, or weight change.    Respiratory: Negative for cough or shortness of breath. Cardiovascular: Negative for chest pain or palpitations. Gastrointestinal: Negative for acid reflux, change in bowel habits, or constipation. Genitourinary: Negative for dysuria and flank pain. Musculoskeletal: Positive for lumbar pain and right leg weakness. Skin: Negative for rash. Neurological: Negative for headaches, dizziness, or numbness. Endo/Heme/Allergies: Negative for increased bruising. Psychiatric/Behavioral: Negative for difficulty with sleep. PHYSICAL EXAMINATION  Visit Vitals    /73    Pulse 60    Temp 98.1 °F (36.7 °C) (Oral)    Resp 18    Ht 4' 11\" (1.499 m)    Wt 134 lb (60.8 kg)    SpO2 97%    BMI 27.06 kg/m2       Constitutional: Awake, alert, and in no acute distress. Neurological: 1+ symmetrical DTRs in the upper extremities. 1+ symmetrical DTRs in the lower extremities. Sensation to light touch is intact. Negative Anton's sign bilaterally. Skin: warm, dry, and intact. Musculoskeletal: Tenderness to palpation in the lower lumbar region. Pain and limited range of motion with extension, axial loading, or forward flexion. No pain with internal or external rotation of her hips. Positive straight leg raise on the right. Biceps  Triceps Deltoids Wrist Ext Wrist Flex Hand Intrin   Right +4/5 +4/5 +4/5 +4/5 +4/5 +4/5   Left +4/5 +4/5 +4/5 +4/5 +4/5 +4/5      Hip Flex  Quads Hamstrings Ankle DF EHL Ankle PF   Right +4/5 +4/5 +4/5 +4/5 +4/5 +4/5   Left +4/5 +4/5 +4/5 +4/5 4/5 +4/5     IMAGING:    Lumbar spine MRI from 06/24/2018 was personally reviewed with the patient and demonstrated:    Results from [place] encounter on 06/24/18   MRI LUMB SPINE WO CONT   Narrative EXAM: Lumbar MRI without contrast    CLINICAL INDICATION: Low back pain and right leg pain    TECHNIQUE: MRI of the lumbar spine without contrast obtained. Multiplanar  multisequence MR images of the lumbar spine obtained.     IV Contrast: None    COMPARISON: None    FINDINGS: All numbering assumes 5 lumbar type vertebrae. The alignment  demonstrates anterolisthesis of L4 on L5 by 4 mm. . The marrow signal is normal.  The cord terminates at L1. No cord signal abnormalities appreciated. The abdominal aorta is without evidence of aneurysm. No paraaortic adenopathy  appreciated. The visualized kidneys are unremarkable. L1-L2 level: Mild degenerative changes. No significant canal or neural foraminal  stenosis. L2-L3: Facet arthropathy, ligament flavum buckling and mild disc bulge are  noted. Mild canal narrowing and mild bilateral neural foraminal narrowing is  noted. L3-L4: Facet arthropathy and ligament flavum buckling are present. Mild disc  bulge is noted. No significant canal or neural foraminal stenosis. L4-L5: Severe facet arthropathy is noted. Disc bulge is noted. Mild canal  narrowing and mild to moderate bilateral neural foraminal narrowing. L5-S1: A right paracentral disc extrusion is noted which compresses the  descending right lateral recess and causes mild to moderate canal narrowing. Moderate right neural foraminal narrowing is noted. Mild left neural foraminal  narrowing. Impression Impression:  1. Large right disc extrusion at L5-S1 which compresses the descending right S1  nerve root. Written by Xiomara Lisa, as dictated by Nahed Vann MD.  I, Dr. Nahed Vann confirm that all documentation is accurate.

## 2018-07-26 ENCOUNTER — APPOINTMENT (OUTPATIENT)
Dept: GENERAL RADIOLOGY | Age: 63
End: 2018-07-26
Attending: PHYSICAL MEDICINE & REHABILITATION
Payer: COMMERCIAL

## 2018-07-26 ENCOUNTER — HOSPITAL ENCOUNTER (OUTPATIENT)
Age: 63
Setting detail: OUTPATIENT SURGERY
Discharge: HOME | End: 2018-07-26
Attending: PHYSICAL MEDICINE & REHABILITATION | Admitting: PHYSICAL MEDICINE & REHABILITATION
Payer: COMMERCIAL

## 2018-07-26 VITALS
WEIGHT: 134 LBS | DIASTOLIC BLOOD PRESSURE: 83 MMHG | TEMPERATURE: 98.9 F | SYSTOLIC BLOOD PRESSURE: 135 MMHG | HEART RATE: 61 BPM | HEIGHT: 59 IN | OXYGEN SATURATION: 97 % | BODY MASS INDEX: 27.01 KG/M2 | RESPIRATION RATE: 14 BRPM

## 2018-07-26 LAB — GLUCOSE BLD STRIP.AUTO-MCNC: 146 MG/DL (ref 70–110)

## 2018-07-26 PROCEDURE — 74011000250 HC RX REV CODE- 250

## 2018-07-26 PROCEDURE — 74011250636 HC RX REV CODE- 250/636

## 2018-07-26 PROCEDURE — 74011636320 HC RX REV CODE- 636/320

## 2018-07-26 PROCEDURE — 76010000009 HC PAIN MGT 0 TO 30 MIN PROC: Performed by: PHYSICAL MEDICINE & REHABILITATION

## 2018-07-26 PROCEDURE — 77030003669 HC NDL SPN COOK -B: Performed by: PHYSICAL MEDICINE & REHABILITATION

## 2018-07-26 PROCEDURE — 82962 GLUCOSE BLOOD TEST: CPT

## 2018-07-26 PROCEDURE — 77030003676 HC NDL SPN MPRI -A: Performed by: PHYSICAL MEDICINE & REHABILITATION

## 2018-07-26 PROCEDURE — 77030039433 HC TY MYLEOGRAM BD -B: Performed by: PHYSICAL MEDICINE & REHABILITATION

## 2018-07-26 PROCEDURE — 74011250637 HC RX REV CODE- 250/637: Performed by: PHYSICAL MEDICINE & REHABILITATION

## 2018-07-26 RX ORDER — DIAZEPAM 5 MG/1
5-20 TABLET ORAL ONCE
Status: COMPLETED | OUTPATIENT
Start: 2018-07-26 | End: 2018-07-26

## 2018-07-26 RX ADMIN — DIAZEPAM 5 MG: 5 TABLET ORAL at 12:37

## 2018-07-26 NOTE — DISCHARGE INSTRUCTIONS
[facility]   (AIL[facility])  Dr. Melva Umanzor, Dr. Jorge De La Rosa, Dr. Daniel Brennan not drive a car, operate heavy machinery or dangerous equipment for 24 hours. * Activity as tolerated; rest for the remainder of the day. * Resume pre-block medications including those for your family doctor. * Do not drink alcoholic beverages for 24 hours. Alcohol and the medications you have received may interact and cause an adverse reaction. * You may feel better this evening and worse tomorrow, as the numbing medications wears off and the steroid has yet to begin to work. After 48 hrs the steroid should begin to release bringing you relief. * You may shower this evening and remove any bandages. * Avoid hot tubs and heating pads for 24 hours. You may use cold packs on the procedure site as tolerated for the first 24 hours. * If a headache develops, drink plenty of fluids and rest.  Take over the counter medications for headache if needed. If the headache continues longer than 24 hours, call MD at the [address] [place] [address]. [phone]    * Continue taking pain medications as needed. * You may resume your regular diet if tolerated. Otherwise, start with sips of water and advance slowly. * If Diabetic: check your blood sugar three times a day for the next 3 days. If your sugar is greater than 300 call your family doctor. If your sugar is greater than 400, have someone transport you to the nearest Emergency Room. * If you experience any of the following problems, Please Call the [address] [place] [address] at [phone].         * Shortness of Breath    * Fever of 101 or higher    * Nausea / Vomiting    * Severe Headache    * Weakness or numbness in arms or legs that is not      resolving    * Prolonged increase in pain greater than 4 days      DISCHARGE SUMMARY from Nurse      PATIENT INSTRUCTIONS:    After oral sedation, for 24 hours or while taking prescription Narcotics:  · Limit your activities  · Do not drive and operate hazardous machinery  · Do not make important personal or business decisions  · Do  not drink alcoholic beverages  · If you have not urinated within 8 hours after discharge, please contact your surgeon on call. Report the following to your surgeon:  · Excessive pain, swelling, redness or odor of or around the surgical area  · Temperature over 101  · Nausea and vomiting lasting longer than 4 hours or if unable to take medications  · Any signs of decreased circulation or nerve impairment to extremity: change in color, persistent  numbness, tingling, coldness or increase pain  · Any questions            What to do at Home:  Recommended activity: Activity as tolerated, NO DRIVING FOR 12 Hours post injection          *  Please give a list of your current medications to your Primary Care Provider. *  Please update this list whenever your medications are discontinued, doses are      changed, or new medications (including over-the-counter products) are added. *  Please carry medication information at all times in case of emergency situations. These are general instructions for a healthy lifestyle:    No smoking/ No tobacco products/ Avoid exposure to second hand smoke    Surgeon General's Warning:  Quitting smoking now greatly reduces serious risk to your health. Obesity, smoking, and sedentary lifestyle greatly increases your risk for illness    A healthy diet, regular physical exercise & weight monitoring are important for maintaining a healthy lifestyle    You may be retaining fluid if you have a history of heart failure or if you experience any of the following symptoms:  Weight gain of 3 pounds or more overnight or 5 pounds in a week, increased swelling in our hands or feet or shortness of breath while lying flat in bed.   Please call your doctor as soon as you notice any of these symptoms; do not wait until your next office visit. Recognize signs and symptoms of STROKE:    F-face looks uneven    A-arms unable to move or move unevenly    S-speech slurred or non-existent    T-time-call 911 as soon as signs and symptoms begin-DO NOT go       Back to bed or wait to see if you get better-TIME IS BRAIN.

## 2018-07-26 NOTE — PROCEDURES
PROCEDURE NOTE      Patient Name: Stewart Lemon    Date of Procedure: July 26, 2018    Preoperative Diagnosis:  DX    Post Operative Diagnosis:  DX    Procedure :    right S1 Selective Nerve Root Block  right L5 Selective Nerve Root Block      Consent:  Informed consent was obtained prior to the procedure. The patient was given the opportunity to ask questions regarding the procedure and its associated risks. In addition to the potential risks associated with the procedure itself, the patient was informed both verbally and in writing of the potential side effects of the use of glucocorticoid. The patient appeared to comprehend the informed consent and desired to have the procedure performed. Procedure: The patient was placed in the prone position on the fluoroscopy table and the back was prepped and draped in the usual sterile manner. The exact spinal level was  identified using fluoroscopy, and Lidocaine 1 % was injected locally, a # 22 gauge spinal needle was passed to the transverse process. The depth was noted and the needle redirected to pass inferior and approximately one cm anterior to the transverse process. NO  1 cc of Isovue M-200 was used to verify positioning in the epidural and paravertebral space and outlined the course of the spinal nerve into the epidural space. The same procedure was repeated at each spinal level indicated above. A total of 30 mg of preservative free Dexamethasone and 4 cc of Lidocaine was slowly injected. The patient tolerated the procedure well. The injection area was cleaned and bandaids applied. Not excessive bleeding was noted. Patient dressed and discharged to home with instructions. Discussion: The patient tolerated the procedure well.                                               Raghavendra Du MD  July 26, 2018

## 2018-07-27 DIAGNOSIS — M51.26 HNP (HERNIATED NUCLEUS PULPOSUS), LUMBAR: ICD-10-CM

## 2018-07-27 RX ORDER — ACETAMINOPHEN AND CODEINE PHOSPHATE 300; 30 MG/1; MG/1
1 TABLET ORAL
Qty: 21 TAB | Refills: 0 | OUTPATIENT
Start: 2018-07-27 | End: 2018-07-31 | Stop reason: ALTCHOICE

## 2018-07-27 NOTE — TELEPHONE ENCOUNTER
PHONE IN RX    Last Visit: 07/16/2018 with MD Mona Gonzalez   Date of Block: 07/26/2018 L5/S1 SN[facility]     Next Appointment: 08/03/2018 with MD Sky Owens   Previous Refill Encounters: 07/16/2018 per MD Mona Gonzalez #[id number]     Requested Prescriptions     Pending Prescriptions Disp Refills    acetaminophen-codeine (TYLENOL #3) 300-30 mg per tablet 21 Tab 0     Sig: Take 1 Tab by mouth every eight (8) hours as needed. Max Daily Amount: 3 Tabs.

## 2018-07-27 NOTE — TELEPHONE ENCOUNTER
Please phone in. One week supply for acute pain. This will be the last refill prior to her visit with Dr. Justin Murillo.

## 2018-07-30 ENCOUNTER — TELEPHONE (OUTPATIENT)
Dept: ORTHOPEDIC SURGERY | Age: 63
End: 2018-07-30

## 2018-07-31 ENCOUNTER — OFFICE VISIT (OUTPATIENT)
Dept: ORTHOPEDIC SURGERY | Age: 63
End: 2018-07-31

## 2018-07-31 VITALS
RESPIRATION RATE: 15 BRPM | TEMPERATURE: 98.2 F | DIASTOLIC BLOOD PRESSURE: 69 MMHG | SYSTOLIC BLOOD PRESSURE: 132 MMHG | HEIGHT: 59 IN | WEIGHT: 129.6 LBS | HEART RATE: 73 BPM | OXYGEN SATURATION: 97 % | BODY MASS INDEX: 26.13 KG/M2

## 2018-07-31 DIAGNOSIS — Z79.891 USE OF OPIATES FOR THERAPEUTIC PURPOSES: ICD-10-CM

## 2018-07-31 DIAGNOSIS — M51.26 HNP (HERNIATED NUCLEUS PULPOSUS), LUMBAR: ICD-10-CM

## 2018-07-31 DIAGNOSIS — M51.26 HNP (HERNIATED NUCLEUS PULPOSUS), LUMBAR: Primary | ICD-10-CM

## 2018-07-31 RX ORDER — GABAPENTIN 300 MG/1
CAPSULE ORAL
Qty: 90 CAP | Refills: 1 | Status: SHIPPED | OUTPATIENT
Start: 2018-07-31 | End: 2018-09-18 | Stop reason: ALTCHOICE

## 2018-07-31 RX ORDER — NALOXONE HYDROCHLORIDE 4 MG/.1ML
SPRAY NASAL
Qty: 1 EACH | Refills: 0 | Status: SHIPPED | OUTPATIENT
Start: 2018-07-31 | End: 2022-09-15 | Stop reason: ALTCHOICE

## 2018-07-31 RX ORDER — HYDROCODONE BITARTRATE AND ACETAMINOPHEN 7.5; 325 MG/1; MG/1
TABLET ORAL
Qty: 28 TAB | Refills: 0 | Status: SHIPPED | OUTPATIENT
Start: 2018-07-31 | End: 2018-09-18 | Stop reason: ALTCHOICE

## 2018-07-31 NOTE — PATIENT INSTRUCTIONS
Herniated Disc: Care Instructions  Your Care Instructions    The bones that form the spine in your back are cushioned by small discs. If a disc is damaged, it may bulge or break open (herniate). A herniated disc can result from normal wear and tear as we age or from an injury or disease. If a herniated disc presses on a nerve, it can cause pain and numbness in your leg (sciatica) and/or back pain. You may be able to heal your herniated disc with a few weeks or months of rest, medicine, and exercises. In some cases, you may need surgery. Follow-up care is a key part of your treatment and safety. Be sure to make and go to all appointments, and call your doctor if you are having problems. It's also a good idea to know your test results and keep a list of the medicines you take. How can you care for yourself at home? · Take your medicines exactly as prescribed. Call your doctor if you think you are having a problem with your medicine. · Ask your doctor if you can take an over-the-counter pain medicine, such as acetaminophen (Tylenol), ibuprofen (Advil, Motrin), or naproxen (Aleve). Read and follow all instructions on the label. · Do not take two or more pain medicines at the same time unless the doctor told you to. Many pain medicines have acetaminophen, which is Tylenol. Too much acetaminophen (Tylenol) can be harmful. · Rest your back if your pain is severe. · Avoid movements and positions that increase your pain or numbness. · Try taking short walks and doing light activities that do not cause pain. Even if you are feeling some pain, it is important to keep your muscles active and strong. · Use heat or ice to relieve pain. ¨ To apply heat, put a warm water bottle, heating pad set on low, or warm cloth on your back. Do not go to sleep with a heating pad on your skin. ¨ To use ice, put ice or a cold pack on the area for 10 to 20 minutes at a time. Put a thin cloth between the ice and your skin.   · Your doctor may recommend a physical therapy program, where you learn exercises to do at home. These exercises strengthen the muscles that support your lower back and prevent reinjury. · Stay at a healthy weight. This may reduce the load on your back. · Quit smoking if you smoke. If you need help quitting, talk to your doctor about stop-smoking programs and medicines. These can increase your chances of quitting for good. · To avoid hurting your back when lifting:  ¨ Lift with your legs, not your back, by squatting and bending your knees. Avoid bending forward at the waist when lifting. ¨ Rise slowly. ¨ Keep the load as close to your body as possible, at the level of your navel. ¨ Avoid turning or twisting your body while holding a heavy object. ¨ Get help if you need to lift a heavy object. Never lift a heavy object above shoulder level. When should you call for help? Call 911 anytime you think you may need emergency care. For example, call if:    · You are unable to move a leg at all.   [facility] your doctor now or seek immediate medical care if:    · You have new or worse symptoms in your arms, legs, chest, belly, or buttocks. Symptoms may include:  ¨ Numbness or tingling. ¨ Weakness. ¨ Pain.     · You lose bladder or bowel control.    Watch closely for changes in your health, and be sure to contact your doctor if:    · You are not getting better as expected. Where can you learn more? Go to http://jennifer-pavel.info/. Enter F534 in the search box to learn more about \"Herniated Disc: Care Instructions. \"  Current as of: November 29, 2017  Content Version: 11.7  © 3004-3715 [employer]. Care instructions adapted under license by [employer] (which disclaims liability or warranty for this information).  If you have questions about a medical condition or this instruction, always ask your healthcare professional. Shane Weiner disclaims any warranty or liability for your use of this information.

## 2018-07-31 NOTE — PROGRESS NOTES
[facility] [address] [facility]   Pre-Surgical Worksheet    Patient: Kar Burris                         MRN: [id number]     Age:  66 y.o.,      Sex: female    YOB: 1955           BEATRICE: July 31, 2018  PCP: Fareed Morales MD    Allergies   Allergen Reactions    Pcn [Penicillins] Rash    Sulfa (Sulfonamide Antibiotics) Rash         ICD-10-CM ICD-9-CM    1. HNP (herniated nucleus pulposus), lumbar M51.26 722.10 gabapentin (NEURONTIN) 300 mg capsule      HYDROcodone-acetaminophen (NORCO) 7.5-325 mg per tablet      naloxone (NARCAN) 4 mg/actuation nasal spray      CBC W/O DIFF      EKG, 12 LEAD, INITIAL      METABOLIC PANEL, COMPREHENSIVE      SCHEDULE SURGERY      DRUG SCREEN UR - W/ CONFIRM   2. Use of opiates for therapeutic purposes Z79.891 V58.69 DRUG SCREEN UR - W/ CONFIRM       Surgery: right C5 Lami. Pain Assessment   Pain Assessment  7/31/2018   Location of Pain Back;Leg   Location Modifiers Right   Severity of Pain 10   Quality of Pain Throbbing; Sharp;Dull;Aching;Burning   Quality of Pain Comment -   Duration of Pain Persistent   Frequency of Pain Constant   Aggravating Factors -   Limiting Behavior -   Relieving Factors Nothing   Result of Injury No       Visit Vitals    /69    Pulse 73    Temp 98.2 °F (36.8 °C)    Resp 15    Ht 4' 11\" (1.499 m)    Wt 129 lb 9.6 oz (58.8 kg)    SpO2 97%    BMI 26.18 kg/m2       ADL Limits: Bathing and Dressing    Spine Surgery?: No:     Spinal Injections?: Yes  When 7/2018. [facility]     Physical Therapy?: No:     NSAID's?: No:     Pain Medications?: No:     In Pain Management: NO    Current Outpatient Prescriptions   Medication Sig    gabapentin (NEURONTIN) 300 mg capsule Take 1 Tab QHS x1 week, then BID x1 week, then TID  Indications: NEUROPATHIC PAIN    HYDROcodone-acetaminophen (NORCO) 7.5-325 mg per tablet Take 1 Tab Q 6-8 hrs PRN Pain    naloxone (NARCAN) 4 mg/actuation nasal spray Use 1 spray intranasally, then discard. Repeat with new spray every 2 min as needed for opioid overdose symptoms, alternating nostrils.  ibuprofen (MOTRIN) 800 mg tablet Take 800 mg by mouth four (4) times daily.  topiramate (TOPAMAX) 25 mg tablet Take 3 tabs po BID    OLANZapine (ZYPREXA) 5 mg tablet Take 1 Tab by mouth nightly.  LORazepam (ATIVAN) 1 mg tablet Take 1 Tab by mouth nightly as needed for Anxiety. Max Daily Amount: 1 mg.  progesterone (PROMETRIUM) 200 mg capsule Take 1 Cap by mouth daily.  estradiol (VIVELLE) 0.05 mg/24 hr 1 Patch by TransDERmal route two (2) times a week. (Patient taking differently: 1 Patch by TransDERmal route Every Saturday.)    Cholecalciferol, Vitamin D3, (VITAMIN D3) 2,000 unit cap capsule Take  by mouth two (2) times a day.  propranolol (INDERAL) 40 mg tablet TAKE 1 TABLET BY MOUTH TWICE DAILY    SUMAtriptan (IMITREX) 50 mg tablet TAKE 1 TABLET BY MOUTH EVERY DAY AS NEEDED    metFORMIN (GLUCOPHAGE) 1,000 mg tablet TAKE 1 TABLET BY MOUTH TWICE DAILY WITH MEALS    lisinopril (PRINIVIL, ZESTRIL) 5 mg tablet TAKE 1 TABLET BY MOUTH DAILY    rosuvastatin (CRESTOR) 20 mg tablet Take 1 Tab by mouth daily.  vortioxetine (TRINTELLIX) 10 mg tablet Take 1 and 1/2 tablets by mouth daily. (Patient taking differently: 20 mg. Take 1 and 1/2 tablets by mouth daily.)    dextroamphetamine-amphetamine (ADDERALL) 10 mg tablet Take 1 Tab by mouth daily. Max Daily Amount: 10 mg.    aspirin 81 mg tablet Take 81 mg by mouth. No current facility-administered medications for this visit.         Past Medical History:   Diagnosis Date    Anxiety     Bilateral lumbar radiculopathy     Carotid bruit     right    Depression     Diabetes mellitus (HCC)     HCD (hypertensive cardiovascular disease)     Hyperlipidemia     Migraine headache     Plantar fasciitis        Past Surgical History:   Procedure Laterality Date    HX SEPTOPLASTY  6/2002    HX TONSILLECTOMY      HX TUBAL LIGATION Social History     Social History    Marital status:      Spouse name: N/A    Number of children: N/A    Years of education: N/A     Social History Main Topics    Smoking status: Former Smoker     Quit date: 1/1/1989    Smokeless tobacco: Never Used    Alcohol use 1.0 oz/week     2 Glasses of wine per week      Comment: infrequently    Drug use: No    Sexual activity: No     Other Topics Concern    None     Social History Narrative

## 2018-07-31 NOTE — PROGRESS NOTES
[address]  [address], [address]  [place], [address] [place] [address] Street  Phone: [phone]  Fax: [phone]  INITIAL CONSULTATION  Patient: Desi Lowe                MRN: [id number]       SSN: [id number]  YOB: 1955        AGE: 66 y.o. SEX: female  Body mass index is 26.18 kg/(m^2). PCP: Ivonne Barnes MD  07/31/18    Chief Complaint   Patient presents with    Back Pain     Surgery Consult          HISTORY OF PRESENT ILLNESS, RADIOGRAPHS, and PLAN:         HISTORY OF PRESENT ILLNESS:  Ms. Sacha House is seen today at the request of Dr. Trevor Fitzgerald. Ms. Sacha House is a pleasant 51-year-old female, hypertensive, generally healthy, has had seven months of pain, severe for three months. Studies have demonstrated a massive extruded disc herniation at L5-S1 on the right. She has severe sciatica with weakness of her EHL on the right with a positive straight leg raise. She has amazingly been working through this despite the severe pain. She is quite stoic but the pain is beyond what she can tolerate, now it is 10/10. Medications give her diarrhea, fogginess and antineuritics. She does not want narcotics. Denies bowel or bladder dysfunction, fever or chills or night sweats. Again the MRI demonstrates massive extruded sequestered disc herniation L5-S1 on the right obliterating the right side of the spinal canal.       PHYSICAL EXAMINATION:  Positive straight leg raise, weakness of her EHL. ASSESSMENT/PLAN:  I discussed the matter at length with her. Surgery will be a right-sided L5 laminotomy, discectomy. Discussed the risks, benefits, complications, alternatives. The patient consents. We will proceed with surgery once the appropriate approvals and clearances take place. cc: William Mosquera.  Trevor Fitzgerald MD             Past Medical History:   Diagnosis Date    Anxiety     Bilateral lumbar radiculopathy     Carotid bruit     right    Depression     Diabetes mellitus ([facility] [address]     HCD (hypertensive cardiovascular disease)     Hyperlipidemia     Migraine headache     Plantar fasciitis        Family History   Problem Relation Age of Onset    Hypertension Brother      x2    Elevated Lipids Brother     Stroke Mother     Heart Surgery Mother      CABG    Cancer Father      cancer of the mouth    Hypertension Father     Hypertension Brother     Elevated Lipids Brother        Current Outpatient Prescriptions   Medication Sig Dispense Refill    ibuprofen (MOTRIN) 800 mg tablet Take 800 mg by mouth four (4) times daily.  topiramate (TOPAMAX) 25 mg tablet Take 3 tabs po  Tab 2    OLANZapine (ZYPREXA) 5 mg tablet Take 1 Tab by mouth nightly. 1 Tab 0    LORazepam (ATIVAN) 1 mg tablet Take 1 Tab by mouth nightly as needed for Anxiety. Max Daily Amount: 1 mg. 1 Tab 0    progesterone (PROMETRIUM) 200 mg capsule Take 1 Cap by mouth daily. 30 Cap 1    estradiol (VIVELLE) 0.05 mg/24 hr 1 Patch by TransDERmal route two (2) times a week. (Patient taking differently: 1 Patch by TransDERmal route Every Saturday.) 8 Patch 1    Cholecalciferol, Vitamin D3, (VITAMIN D3) 2,000 unit cap capsule Take  by mouth two (2) times a day.  propranolol (INDERAL) 40 mg tablet TAKE 1 TABLET BY MOUTH TWICE DAILY 180 Tab 2    SUMAtriptan (IMITREX) 50 mg tablet TAKE 1 TABLET BY MOUTH EVERY DAY AS NEEDED 9 Tab 2    metFORMIN (GLUCOPHAGE) 1,000 mg tablet TAKE 1 TABLET BY MOUTH TWICE DAILY WITH MEALS 60 Tab 5    lisinopril (PRINIVIL, ZESTRIL) 5 mg tablet TAKE 1 TABLET BY MOUTH DAILY 90 Tab 2    rosuvastatin (CRESTOR) 20 mg tablet Take 1 Tab by mouth daily. 30 Tab 11    vortioxetine (TRINTELLIX) 10 mg tablet Take 1 and 1/2 tablets by mouth daily. (Patient taking differently: 20 mg. Take 1 and 1/2 tablets by mouth daily.) 60 Tab 2    dextroamphetamine-amphetamine (ADDERALL) 10 mg tablet Take 1 Tab by mouth daily.  Max Daily Amount: 10 mg. 30 Tab 0    aspirin 81 mg tablet Take 81 mg by mouth.  acetaminophen-codeine (TYLENOL #3) 300-30 mg per tablet Take 1 Tab by mouth every eight (8) hours as needed. Max Daily Amount: 3 Tabs. 21 Tab 0       Allergies   Allergen Reactions    Pcn [Penicillins] Rash    Sulfa (Sulfonamide Antibiotics) Rash       Past Surgical History:   Procedure Laterality Date    HX SEPTOPLASTY  6/2002    HX TONSILLECTOMY      HX TUBAL LIGATION         Past Medical History:   Diagnosis Date    Anxiety     Bilateral lumbar radiculopathy     Carotid bruit     right    Depression     Diabetes mellitus ([address]     HCD (hypertensive cardiovascular disease)     Hyperlipidemia     Migraine headache     Plantar fasciitis        Social History     Social History    Marital status:      Spouse name: N/A    Number of children: N/A    Years of education: N/A     Occupational History    Not on file. Social History Main Topics    Smoking status: Former Smoker     Quit date: 1/1/1989    Smokeless tobacco: Never Used    Alcohol use 1.0 oz/week     2 Glasses of wine per week      Comment: infrequently    Drug use: No    Sexual activity: No     Other Topics Concern    Not on file     Social History Narrative           REVIEW OF SYSTEMS:   CONSTITUTIONAL SYMPTOMS:  Negative. EYES:  Negative. EARS, NOSE, THROAT AND MOUTH:  Negative. CARDIOVASCULAR:  Negative. RESPIRATORY:  Negative. GENITOURINARY: Per HPI. GASTROINTESTINAL:  Per HPI. INTEGUMENTARY (SKIN AND/OR BREAST):  Negative. MUSCULOSKELETAL: Per HPI.   ENDOCRINE/RHEUMATOLOGIC:  Negative. NEUROLOGICAL:  Per HPI. HEMATOLOGIC/LYMPHATIC:  Negative. ALLERGIC/IMMUNOLOGIC:  Negative. PSYCHIATRIC:  Negative.     PHYSICAL EXAMINATION:   Visit Vitals    /69    Pulse 73    Temp 98.2 °F (36.8 °C)    Resp 15    Ht 4' 11\" (1.499 m)    Wt 129 lb 9.6 oz (58.8 kg)    SpO2 97%    BMI 26.18 kg/m2    PAIN SCALE: 10 - Worst pain ever/10    CONSTITUTIONAL: The patient is in no apparent distress and is alert and oriented x 3. HEENT: Normocephalic. Hearing grossly intact. NECK: Supple and symmetric. no tenderness, or masses were felt. RESPIRATORY: No labored breathing. CARDIOVASCULAR: The carotid pulses were normal. Peripheral pulses were 2+. CHEST: Normal AP diameter and normal contour without any kyphoscoliosis. LYMPHATIC: No lymphadenopathy was appreciated in the neck, axillae or groin. SKIN:  Negative for scars, rashes, lesions, or ulcers on the right upper, right lower, left upper, left lower and trunk. NEUROLOGICAL: Alert and oriented x 3. Presents in wheelchair. EXTREMITIES:  See musculoskeletal.  MUSCULOSKELETAL:   Head and Neck:  Negative for misalignment, asymmetry, crepitation, defects, tenderness masses or effusions.  Left Upper Extremity: Inspection, percussion and palpation preformed. Deys sign is negative.  Right Upper Extremity: Inspection, percussion and palpation preformed. Deys sign is negative.  Spine, Ribs and Pelvis: Pain radiating into BLE. Inspection, percussion and palpation preformed. Negative for misalignment, asymmetry, crepitation, defects, tenderness masses or effusions.  Left Lower Extremity: Pain. Inspection, percussion and palpation preformed. Negative straight leg raise.  Right Lower Extremity: Pain. Inspection, percussion and palpation preformed. Negative straight leg raise. SPINE EXAM:       Lumbar spine: No rash, ecchymosis, or gross obliquity. No focal atrophy is noted. ASSESSMENT    ICD-10-CM ICD-9-CM    1. HNP (herniated nucleus pulposus), lumbar M51.26 722.10        Written by Ck Zepeda, as dictated by Eamon Hayes MD.    I, Dr. Eamon Hayes MD, confirm that all documentation is accurate.

## 2018-08-01 ENCOUNTER — HOSPITAL ENCOUNTER (OUTPATIENT)
Dept: PREADMISSION TESTING | Age: 63
Discharge: HOME | End: 2018-08-01

## 2018-08-01 ENCOUNTER — HOSPITAL ENCOUNTER (OUTPATIENT)
Dept: LAB | Age: 63
Discharge: HOME | End: 2018-08-01
Payer: COMMERCIAL

## 2018-08-01 ENCOUNTER — HOSPITAL ENCOUNTER (OUTPATIENT)
Dept: LAB | Age: 63
Discharge: HOME | End: 2018-08-01

## 2018-08-01 DIAGNOSIS — Z01.818 PRE-OP TESTING: ICD-10-CM

## 2018-08-01 LAB
ATRIAL RATE: 57 BPM
CALCULATED P AXIS, ECG09: 0 DEGREES
CALCULATED R AXIS, ECG10: 39 DEGREES
CALCULATED T AXIS, ECG11: 55 DEGREES
DIAGNOSIS, 93000: NORMAL
P-R INTERVAL, ECG05: 132 MS
Q-T INTERVAL, ECG07: 450 MS
QRS DURATION, ECG06: 76 MS
QTC CALCULATION (BEZET), ECG08: 438 MS
SENTARA SPECIMEN COL,SENBCF: NORMAL
VENTRICULAR RATE, ECG03: 57 BPM

## 2018-08-01 PROCEDURE — 99001 SPECIMEN HANDLING PT-LAB: CPT | Performed by: ORTHOPAEDIC SURGERY

## 2018-08-01 PROCEDURE — 93005 ELECTROCARDIOGRAM TRACING: CPT

## 2018-08-02 ENCOUNTER — TELEPHONE (OUTPATIENT)
Dept: INTERNAL MEDICINE CLINIC | Age: 63
End: 2018-08-02

## 2018-08-02 ENCOUNTER — OFFICE VISIT (OUTPATIENT)
Dept: INTERNAL MEDICINE CLINIC | Age: 63
End: 2018-08-02

## 2018-08-02 VITALS
BODY MASS INDEX: 26.73 KG/M2 | HEIGHT: 59 IN | WEIGHT: 132.6 LBS | OXYGEN SATURATION: 98 % | HEART RATE: 59 BPM | DIASTOLIC BLOOD PRESSURE: 68 MMHG | SYSTOLIC BLOOD PRESSURE: 108 MMHG | TEMPERATURE: 98.6 F | RESPIRATION RATE: 14 BRPM

## 2018-08-02 DIAGNOSIS — Z01.818 PREOPERATIVE CLEARANCE: Primary | ICD-10-CM

## 2018-08-02 RX ORDER — ZOSTER VACCINE RECOMBINANT, ADJUVANTED 50 MCG/0.5
KIT INTRAMUSCULAR
COMMUNITY
Start: 2018-05-20 | End: 2019-05-14

## 2018-08-02 NOTE — PROGRESS NOTES
1. Have you been to the ER, urgent care clinic or hospitalized since your last visit? NO.     2. Have you seen or consulted any other health care providers outside of the [facility] since your last visit (Include any pap smears or colon screening)? NO      Do you have an Advanced Directive? NO    Would you like information on Advanced Directives?  NO

## 2018-08-02 NOTE — PROGRESS NOTES
Preoperative Evaluation    Date of Exam: 08/02/18    MRN: [id number]    Cassie Hanks  Is a 66 y.o.  female  who presents for preoperative evaluation and management of low back pain    Chief Complaint   Patient presents with    Surgical Clearance     Patient here for surgical clearance for back on 08/08/2018 with Dr. Shankar Stephenson. Subjective:   General Information:  Procedure/Surgery: right-sided L5 laminotomy, discectomy  Date of Procedure/Surgery: 8/8/18  Surgeon: Dr. Noguera Amel: [facility]  Primary Physician: Dr. Manuel Romero  Surgery status: Elective  Surgery risk: Intermediate (head/neck, intraperitoneal, intrathoracic, orthopedic, and prostate    Cardiovascular Risk Factors:  1. Coronary revascularization within 5 years: no  2. Recurrent chest pain: no  3. Shortness of breath:  no  4. Recent coronary evaluation/stress test/angiogram:  no  5. Recent MI (less than 1 month ago):  no  6. Prior MI (by way of history or pathological waves):  no  7. Compensated CHF or h/o CHF:  no  8. Severe valvular disease:  no  9. Decompensated CHF:  no  10. High-grade atrioventricular block:  no  11. Arrhythmia:  no    Other Risk Factors:  1. Diabetes hx:  yes, controlled 4/26/18 A1c 7.1  2. H/o CVA:  no  3. Uncontrolled hypertension:  no  4, Advanced age:  no  5. Low functional capacity:  no  6. Recent use of: ASA and NSAIDs, patient reports stopped ASA 2 weeks ago, patient reports stopped taking Ibuprofen 2 days ago  7. Tetanus up to date: last tetanus booster within 10 years  6. Anesthesia Complications: None  9. History of abnormal bleeding : None  10. History of Blood Transfusions: no  11. Health Care Directive or Living Will: yes, patient reports has at home on file and will bring us a copy  12.  Latex Allergy: no      Problem List:     Patient Active Problem List    Diagnosis Date Noted    HNP (herniated nucleus pulposus), lumbar 07/31/2018    Right lumbar radiculopathy 06/14/2018    DDD (degenerative disc disease), lumbar 06/14/2018    Overweight (BMI 25.0-29.9) 05/06/2018    Recurrent depression ([facility] 05/02/2018    Vitamin D deficiency 05/14/2017    Essential hypertension 04/23/2017    Type 2 diabetes mellitus without complication ([facility] 55/70/6253    Bilateral lumbar radiculopathy 02/18/2015    Migraine 06/03/2014    Otosclerosis 09/11/2012    HCD (hypertensive cardiovascular disease)     Hyperlipidemia      Medical History:     Past Medical History:   Diagnosis Date    Anxiety     Bilateral lumbar radiculopathy     Carotid bruit     right    Depression     Diabetes mellitus ([facility]     HCD (hypertensive cardiovascular disease)     Hyperlipidemia     Migraine headache     Plantar fasciitis      Allergies: Allergies   Allergen Reactions    Pcn [Penicillins] Rash    Sulfa (Sulfonamide Antibiotics) Rash      Medications:     Current Outpatient Prescriptions   Medication Sig    gabapentin (NEURONTIN) 300 mg capsule Take 1 Tab QHS x1 week, then BID x1 week, then TID  Indications: NEUROPATHIC PAIN    HYDROcodone-acetaminophen (NORCO) 7.5-325 mg per tablet Take 1 Tab Q 6-8 hrs PRN Pain    naloxone (NARCAN) 4 mg/actuation nasal spray Use 1 spray intranasally, then discard. Repeat with new spray every 2 min as needed for opioid overdose symptoms, alternating nostrils.  topiramate (TOPAMAX) 25 mg tablet Take 3 tabs po BID    OLANZapine (ZYPREXA) 5 mg tablet Take 1 Tab by mouth nightly.  LORazepam (ATIVAN) 1 mg tablet Take 1 Tab by mouth nightly as needed for Anxiety. Max Daily Amount: 1 mg.  progesterone (PROMETRIUM) 200 mg capsule Take 1 Cap by mouth daily.  estradiol (VIVELLE) 0.05 mg/24 hr 1 Patch by TransDERmal route two (2) times a week. (Patient taking differently: 1 Patch by TransDERmal route Every Saturday.)    Cholecalciferol, Vitamin D3, (VITAMIN D3) 2,000 unit cap capsule Take  by mouth two (2) times a day.     propranolol (INDERAL) 40 mg tablet TAKE 1 TABLET BY MOUTH TWICE DAILY    SUMAtriptan (IMITREX) 50 mg tablet TAKE 1 TABLET BY MOUTH EVERY DAY AS NEEDED    metFORMIN (GLUCOPHAGE) 1,000 mg tablet TAKE 1 TABLET BY MOUTH TWICE DAILY WITH MEALS    lisinopril (PRINIVIL, ZESTRIL) 5 mg tablet TAKE 1 TABLET BY MOUTH DAILY    rosuvastatin (CRESTOR) 20 mg tablet Take 1 Tab by mouth daily.  vortioxetine (TRINTELLIX) 10 mg tablet Take 1 and 1/2 tablets by mouth daily. (Patient taking differently: 20 mg. Take 1 and 1/2 tablets by mouth daily.)    dextroamphetamine-amphetamine (ADDERALL) 10 mg tablet Take 1 Tab by mouth daily. Max Daily Amount: 10 mg.    SHINGRIX, PF, 50 mcg/0.5 mL susr injection     ibuprofen (MOTRIN) 800 mg tablet Take 800 mg by mouth four (4) times daily.  aspirin 81 mg tablet Take 81 mg by mouth. No current facility-administered medications for this visit. Surgical History:     Past Surgical History:   Procedure Laterality Date    HX SEPTOPLASTY  6/2002    HX TONSILLECTOMY      HX TUBAL LIGATION       Social History:     Social History     Social History    Marital status:      Spouse name: N/A    Number of children: N/A    Years of education: N/A     Social History Main Topics    Smoking status: Former Smoker     Quit date: 1/1/1989    Smokeless tobacco: Never Used    Alcohol use 1.0 oz/week     2 Glasses of wine per week      Comment: infrequently    Drug use: No    Sexual activity: No     Other Topics Concern    None     Social History Narrative       REVIEW OF SYSTEMS:  Review of Systems   Constitutional: Negative. HENT: Negative. Eyes: Negative. Respiratory: Negative. Cardiovascular: Negative. Gastrointestinal: Negative. Genitourinary: Negative. Musculoskeletal: Positive for back pain. Skin: Negative. Neurological: Negative. Endo/Heme/Allergies: Negative. Psychiatric/Behavioral: Negative.         Objective:     Vitals:    08/02/18 1327   BP: 108/68   Pulse: (!) 59   Resp: 14 Temp: 98.6 °F (37 °C)   TempSrc: Oral   SpO2: 98%   Weight: 132 lb 9.6 oz (60.1 kg)   Height: 4' 11\" (1.499 m)   PainSc:   9   PainLoc: Back       Physical Exam   Constitutional: She is oriented to person, place, and time and well-developed, well-nourished, and in no distress. No distress. HENT:   Head: Normocephalic and atraumatic. Eyes: Conjunctivae and EOM are normal. Pupils are equal, round, and reactive to light. Neck: Normal range of motion. Cardiovascular: Normal rate, regular rhythm and intact distal pulses. Murmur heard. Pulmonary/Chest: Effort normal and breath sounds normal. No respiratory distress. She has no wheezes. She has no rales. She exhibits no tenderness. Abdominal: Soft. Bowel sounds are normal. She exhibits no distension. There is no tenderness. Lymphadenopathy:     She has no cervical adenopathy. Neurological: She is alert and oriented to person, place, and time. Skin: Skin is warm and dry. She is not diaphoretic. Psychiatric: Mood, memory, affect and judgment normal.   Vitals reviewed. DIAGNOSTICS:   1. EKG: EKG FINDINGS - sinus bradycardia, otherwise normal EKG ordered by Dr. Melita Nyhan completed 8/1/18  2. CXR: n/a  3. Labs: ordered by Dr. Melita Nyhan, completed yesterday, reviewed labs and they are unremarkable for pathology. Assessment/Plan:   There are no diagnoses linked to this encounter. Preoperative Assessment: No contraindications to planned surgery   Orders/studies that need to be obtained prior to surgical clearance: medical clearance has been obtained    Pt is to undergo a moderate risk procedure with a low cardiac risk based on current history. Labs and imaging within acceptable range. No contraindications to planned surgery. Discontinue NSAIDS 1 week prior to surgical procedure. Follow up as scheduled post operatively. I have discussed the plan of care with the patient.  The patient has received an after-visit summary and questions were answered concerning future plans. I have discussed medication side effects and warnings with the patient as well. All questions were answered. The patient understands the plan of care. Handouts provided today with the above information. Pt instructed if symptoms worsen to call the office or report to the ED for continued care. Greater than 50% of the visit time was spent in counseling and/or coordination of care.       STONE Humphrey  Internist of [place] [address] [facility].  Phone: [phone]  Fax: [phone]

## 2018-08-02 NOTE — TELEPHONE ENCOUNTER
Called and spoke to pt in regards to the need for Tylenol. She has Dre Songster and gabapentin and will take that until her surgery. She states she is not in need of Tylenol at this time.

## 2018-08-03 ENCOUNTER — ANESTHESIA EVENT (OUTPATIENT)
Dept: SURGERY | Age: 63
End: 2018-08-03
Payer: COMMERCIAL

## 2018-08-03 NOTE — H&P
Pre-Admission History and Physical    Patient: Hunt Peabody   MRN: [id number]   SSN: [id number]   YOB: 1955   Age: 66 y.o. Sex: female     Patient scheduled for: Right L5 Lami. Date of surgery: 08/06/18. Location of surgery: [facility]. Surgeon: Beti Paulino MD    HPI:  Hunt Peabody is a 66 y.o. female with hypertensive, generally healthy, has had seven months of pain, severe for three months. Studies have demonstrated a massive extruded disc herniation at L5-S1 on the right. She has severe sciatica with weakness of her EHL on the right with a positive straight leg raise. She has amazingly been working through this despite the severe pain. She is quite stoic but the pain is beyond what she can tolerate, now it is 10/10. Medications give her diarrhea, fogginess and antineuritics. She does not want narcotics. Denies bowel or bladder dysfunction, fever or chills or night sweats. This patient has failed the presurgical conservative treatments  including medications. Pain has impacted the patient's functional ability to dress, bathe, and ambulate and she is being admitted for surgical intervention.          Past Medical History:   Diagnosis Date    ADHD     Anxiety     Bilateral lumbar radiculopathy     Carotid bruit     right    Depression     Diabetes mellitus (HCC)     HCD (hypertensive cardiovascular disease)     Hyperlipidemia     Hypertension     Migraine headache     Plantar fasciitis      Social History     Social History    Marital status:      Spouse name: N/A    Number of children: N/A    Years of education: N/A     Social History Main Topics    Smoking status: Former Smoker     Quit date: 1/1/1989    Smokeless tobacco: Never Used    Alcohol use No    Drug use: No    Sexual activity: No     Other Topics Concern    None     Social History Narrative     Past Surgical History:   Procedure Laterality Date    HX SEPTOPLASTY  6/2002    HX TONSILLECTOMY      HX TUBAL LIGATION       Family History   Problem Relation Age of Onset    Hypertension Brother      x2    Elevated Lipids Brother     Stroke Mother     Heart Surgery Mother      CABG    Cancer Father      cancer of the mouth    Hypertension Father     Hypertension Brother     Elevated Lipids Brother      Allergies   Allergen Reactions    Pcn [Penicillins] Rash    Sulfa (Sulfonamide Antibiotics) Rash     Current Outpatient Prescriptions   Medication Sig Dispense Refill    gabapentin (NEURONTIN) 300 mg capsule Take 1 Tab QHS x1 week, then BID x1 week, then TID  Indications: NEUROPATHIC PAIN 90 Cap 1    HYDROcodone-acetaminophen (NORCO) 7.5-325 mg per tablet Take 1 Tab Q 6-8 hrs PRN Pain 28 Tab 0    naloxone (NARCAN) 4 mg/actuation nasal spray Use 1 spray intranasally, then discard. Repeat with new spray every 2 min as needed for opioid overdose symptoms, alternating nostrils. 1 Each 0    topiramate (TOPAMAX) 25 mg tablet Take 3 tabs po  Tab 2    OLANZapine (ZYPREXA) 5 mg tablet Take 1 Tab by mouth nightly. 1 Tab 0    LORazepam (ATIVAN) 1 mg tablet Take 1 Tab by mouth nightly as needed for Anxiety. Max Daily Amount: 1 mg. 1 Tab 0    progesterone (PROMETRIUM) 200 mg capsule Take 1 Cap by mouth daily. 30 Cap 1    estradiol (VIVELLE) 0.05 mg/24 hr 1 Patch by TransDERmal route two (2) times a week. (Patient taking differently: 1 Patch by TransDERmal route Every Saturday.) 8 Patch 1    propranolol (INDERAL) 40 mg tablet TAKE 1 TABLET BY MOUTH TWICE DAILY 180 Tab 2    SUMAtriptan (IMITREX) 50 mg tablet TAKE 1 TABLET BY MOUTH EVERY DAY AS NEEDED 9 Tab 2    metFORMIN (GLUCOPHAGE) 1,000 mg tablet TAKE 1 TABLET BY MOUTH TWICE DAILY WITH MEALS 60 Tab 5    lisinopril (PRINIVIL, ZESTRIL) 5 mg tablet TAKE 1 TABLET BY MOUTH DAILY 90 Tab 2    vortioxetine (TRINTELLIX) 10 mg tablet Take 1 and 1/2 tablets by mouth daily. (Patient taking differently: 20 mg.  Take 1 and 1/2 tablets by mouth daily.) 60 Tab 2    SHINGRIX, PF, 50 mcg/0.5 mL susr injection       ibuprofen (MOTRIN) 800 mg tablet Take 800 mg by mouth four (4) times daily.  Cholecalciferol, Vitamin D3, (VITAMIN D3) 2,000 unit cap capsule Take  by mouth two (2) times a day.  rosuvastatin (CRESTOR) 20 mg tablet Take 1 Tab by mouth daily. 30 Tab 11    dextroamphetamine-amphetamine (ADDERALL) 10 mg tablet Take 1 Tab by mouth daily. Max Daily Amount: 10 mg. 30 Tab 0    aspirin 81 mg tablet Take 81 mg by mouth. ROS:  Denies chills, fever,night sweats,  bowel or bladder dysfunction, unexplained weight loss/weight gain, chest pain, sob or anxiety. Physical Examination    Gen: Well developed, well nourished 66 y.o. female   Visit Vitals    /69    Pulse 73    Temp 98.2 °F (36.8 °C)    Resp 15    Ht 4' 11\" (1.499 m)    Wt 129 lb 9.6 oz (58.8 kg)    SpO2 97%    BMI 26.18 kg/m2    PAIN SCALE: 10 - Worst pain ever/10     CONSTITUTIONAL: The patient is in no apparent distress and is alert and oriented x 3. HEENT: Normocephalic. Hearing grossly intact. NECK: Supple and symmetric. no tenderness, or masses were felt. RESPIRATORY: No labored breathing. CARDIOVASCULAR: The carotid pulses were normal. Peripheral pulses were 2+. CHEST: Normal AP diameter and normal contour without any kyphoscoliosis. LYMPHATIC: No lymphadenopathy was appreciated in the neck, axillae or groin. SKIN:  Negative for scars, rashes, lesions, or ulcers on the right upper, right lower, left upper, left lower and trunk. NEUROLOGICAL: Alert and oriented x 3. Presents in wheelchair. EXTREMITIES:  See musculoskeletal.  MUSCULOSKELETAL:  · Head and Neck:  Negative for misalignment, asymmetry, crepitation, defects, tenderness masses or effusions. · Left Upper Extremity: Inspection, percussion and palpation preformed. Deys sign is negative. · Right Upper Extremity: Inspection, percussion and palpation preformed.    Deys sign is negative. · Spine, Ribs and Pelvis: Pain radiating into BLE. Inspection, percussion and palpation preformed. Negative for misalignment, asymmetry, crepitation, defects, tenderness masses or effusions. · Left Lower Extremity: Pain. Inspection, percussion and palpation preformed. Negative straight leg raise. · Right Lower Extremity: Pain. Inspection, percussion and palpation preformed. Negative straight leg raise.        SPINE EXAM:         Lumbar spine: No rash, ecchymosis, or gross obliquity. No focal atrophy is noted. Assessment and Plan    Due to the pt's persistent symptoms unrelieved by conservative measure Jocelin Ruby is being admitted to [facility] to undergo surgical intervention. The post-operative plan of care consists of physical therapy, home health and a 2 week f/u office visit. We are pending medical clearance by Dr. Viviana Montoya. The risks, benefits, complications and alternatives to surgery have been discussed in detail with the patient. The patient understands and agrees to proceed.      Perlita Santos NP-BC dictating for Dre Monteiro MD

## 2018-08-04 NOTE — ANESTHESIA PREPROCEDURE EVALUATION
Anesthetic History   No history of anesthetic complications            Review of Systems / Medical History  Patient summary reviewed and pertinent labs reviewed    Pulmonary  Within defined limits                 Neuro/Psych   Within defined limits           Cardiovascular    Hypertension: well controlled              Exercise tolerance: >4 METS     GI/Hepatic/Renal                Endo/Other    Diabetes: type 2    Morbid obesity and arthritis     Other Findings   Comments: Documentation of current medication  Current medications obtained, documented and obtained? YES      Risk Factors for Postoperative nausea/vomiting:       History of postoperative nausea/vomiting? NO       Female? YES       Motion sickness? NO       Intended opioid administration for postoperative analgesia? YES      Smoking Abstinence:  Current Smoker? NO  Elective Surgery? YES  Seen preoperatively by anesthesiologist or proxy prior to day of surgery? YES  Pt abstained from smoking 24 hours prior to anesthesia?  N/A    Preventive care/screening for High Blood Pressure:  Aged 18 years and older: YES  Screened for high blood pressure: YES  Patients with high blood pressure referred to primary care provider   for BP management: YES                 Physical Exam    Airway  Mallampati: II  TM Distance: 4 - 6 cm  Neck ROM: normal range of motion   Mouth opening: Normal     Cardiovascular  Regular rate and rhythm,  S1 and S2 normal,  no murmur, click, rub, or gallop             Dental  No notable dental hx       Pulmonary  Breath sounds clear to auscultation               Abdominal  GI exam deferred       Other Findings            Anesthetic Plan    ASA: 3  Anesthesia type: general          Induction: Intravenous  Anesthetic plan and risks discussed with: Patient

## 2018-08-06 ENCOUNTER — APPOINTMENT (OUTPATIENT)
Dept: GENERAL RADIOLOGY | Age: 63
End: 2018-08-06
Attending: ORTHOPAEDIC SURGERY
Payer: COMMERCIAL

## 2018-08-06 ENCOUNTER — HOSPITAL ENCOUNTER (OUTPATIENT)
Age: 63
Setting detail: OBSERVATION
Discharge: HOME | End: 2018-08-07
Attending: ORTHOPAEDIC SURGERY | Admitting: ORTHOPAEDIC SURGERY
Payer: COMMERCIAL

## 2018-08-06 ENCOUNTER — ANESTHESIA (OUTPATIENT)
Dept: SURGERY | Age: 63
End: 2018-08-06
Payer: COMMERCIAL

## 2018-08-06 DIAGNOSIS — M51.26 HNP (HERNIATED NUCLEUS PULPOSUS), LUMBAR: Primary | ICD-10-CM

## 2018-08-06 LAB
GLUCOSE BLD STRIP.AUTO-MCNC: 116 MG/DL (ref 70–110)
GLUCOSE BLD STRIP.AUTO-MCNC: 161 MG/DL (ref 70–110)
GLUCOSE BLD STRIP.AUTO-MCNC: 240 MG/DL (ref 70–110)

## 2018-08-06 PROCEDURE — 77030032490 HC SLV COMPR SCD KNE COVD -B: Performed by: ORTHOPAEDIC SURGERY

## 2018-08-06 PROCEDURE — 76210000006 HC OR PH I REC 0.5 TO 1 HR: Performed by: ORTHOPAEDIC SURGERY

## 2018-08-06 PROCEDURE — 74011250637 HC RX REV CODE- 250/637: Performed by: NURSE ANESTHETIST, CERTIFIED REGISTERED

## 2018-08-06 PROCEDURE — 74011250636 HC RX REV CODE- 250/636

## 2018-08-06 PROCEDURE — 74011250636 HC RX REV CODE- 250/636: Performed by: ORTHOPAEDIC SURGERY

## 2018-08-06 PROCEDURE — 77030019908 HC STETH ESOPH SIMS -A: Performed by: ANESTHESIOLOGY

## 2018-08-06 PROCEDURE — 74011636637 HC RX REV CODE- 636/637: Performed by: NURSE ANESTHETIST, CERTIFIED REGISTERED

## 2018-08-06 PROCEDURE — 74011000250 HC RX REV CODE- 250: Performed by: ORTHOPAEDIC SURGERY

## 2018-08-06 PROCEDURE — 77030020782 HC GWN BAIR PAWS FLX 3M -B: Performed by: ORTHOPAEDIC SURGERY

## 2018-08-06 PROCEDURE — 74011000272 HC RX REV CODE- 272: Performed by: ORTHOPAEDIC SURGERY

## 2018-08-06 PROCEDURE — 77030030163 HC BN WAX J&J -A: Performed by: ORTHOPAEDIC SURGERY

## 2018-08-06 PROCEDURE — 99218 HC RM OBSERVATION: CPT

## 2018-08-06 PROCEDURE — 74011000250 HC RX REV CODE- 250

## 2018-08-06 PROCEDURE — 77030002933 HC SUT MCRYL J&J -A: Performed by: ORTHOPAEDIC SURGERY

## 2018-08-06 PROCEDURE — 77030003029 HC SUT VCRL J&J -B: Performed by: ORTHOPAEDIC SURGERY

## 2018-08-06 PROCEDURE — 76010000149 HC OR TIME 1 TO 1.5 HR: Performed by: ORTHOPAEDIC SURGERY

## 2018-08-06 PROCEDURE — 77030037134 HC WRAP COMPR BACK THER SOLM -B: Performed by: ORTHOPAEDIC SURGERY

## 2018-08-06 PROCEDURE — 74011636637 HC RX REV CODE- 636/637: Performed by: ORTHOPAEDIC SURGERY

## 2018-08-06 PROCEDURE — 74011250636 HC RX REV CODE- 250/636: Performed by: NURSE PRACTITIONER

## 2018-08-06 PROCEDURE — 77030012890: Performed by: ORTHOPAEDIC SURGERY

## 2018-08-06 PROCEDURE — 77030008683 HC TU ET CUF COVD -A: Performed by: ANESTHESIOLOGY

## 2018-08-06 PROCEDURE — 74011250637 HC RX REV CODE- 250/637: Performed by: ORTHOPAEDIC SURGERY

## 2018-08-06 PROCEDURE — 82962 GLUCOSE BLOOD TEST: CPT

## 2018-08-06 PROCEDURE — 76060000033 HC ANESTHESIA 1 TO 1.5 HR: Performed by: ORTHOPAEDIC SURGERY

## 2018-08-06 PROCEDURE — 77030004402 HC BUR NEUR STRY -C: Performed by: ORTHOPAEDIC SURGERY

## 2018-08-06 PROCEDURE — 74011250636 HC RX REV CODE- 250/636: Performed by: NURSE ANESTHETIST, CERTIFIED REGISTERED

## 2018-08-06 PROCEDURE — 77030018836 HC SOL IRR NACL ICUM -A: Performed by: ORTHOPAEDIC SURGERY

## 2018-08-06 RX ORDER — DIAZEPAM 5 MG/1
5 TABLET ORAL
Status: DISCONTINUED | OUTPATIENT
Start: 2018-08-06 | End: 2018-08-07 | Stop reason: HOSPADM

## 2018-08-06 RX ORDER — DIPHENHYDRAMINE HYDROCHLORIDE 50 MG/ML
12.5 INJECTION, SOLUTION INTRAMUSCULAR; INTRAVENOUS
Status: DISCONTINUED | OUTPATIENT
Start: 2018-08-06 | End: 2018-08-07 | Stop reason: HOSPADM

## 2018-08-06 RX ORDER — MIDAZOLAM HYDROCHLORIDE 1 MG/ML
INJECTION, SOLUTION INTRAMUSCULAR; INTRAVENOUS AS NEEDED
Status: DISCONTINUED | OUTPATIENT
Start: 2018-08-06 | End: 2018-08-06 | Stop reason: HOSPADM

## 2018-08-06 RX ORDER — FENTANYL CITRATE 50 UG/ML
INJECTION, SOLUTION INTRAMUSCULAR; INTRAVENOUS AS NEEDED
Status: DISCONTINUED | OUTPATIENT
Start: 2018-08-06 | End: 2018-08-06 | Stop reason: HOSPADM

## 2018-08-06 RX ORDER — INSULIN LISPRO 100 [IU]/ML
INJECTION, SOLUTION INTRAVENOUS; SUBCUTANEOUS
Status: DISCONTINUED | OUTPATIENT
Start: 2018-08-06 | End: 2018-08-06

## 2018-08-06 RX ORDER — ONDANSETRON 2 MG/ML
4 INJECTION INTRAMUSCULAR; INTRAVENOUS
Status: DISCONTINUED | OUTPATIENT
Start: 2018-08-06 | End: 2018-08-07 | Stop reason: HOSPADM

## 2018-08-06 RX ORDER — HYDROMORPHONE HYDROCHLORIDE 2 MG/ML
INJECTION, SOLUTION INTRAMUSCULAR; INTRAVENOUS; SUBCUTANEOUS
Status: DISPENSED
Start: 2018-08-06 | End: 2018-08-07

## 2018-08-06 RX ORDER — DEXTROSE 50 % IN WATER (D50W) INTRAVENOUS SYRINGE
25-50 AS NEEDED
Status: DISCONTINUED | OUTPATIENT
Start: 2018-08-06 | End: 2018-08-06 | Stop reason: HOSPADM

## 2018-08-06 RX ORDER — BUPIVACAINE HYDROCHLORIDE 5 MG/ML
INJECTION, SOLUTION EPIDURAL; INTRACAUDAL AS NEEDED
Status: DISCONTINUED | OUTPATIENT
Start: 2018-08-06 | End: 2018-08-06 | Stop reason: HOSPADM

## 2018-08-06 RX ORDER — SODIUM CHLORIDE, SODIUM LACTATE, POTASSIUM CHLORIDE, CALCIUM CHLORIDE 600; 310; 30; 20 MG/100ML; MG/100ML; MG/100ML; MG/100ML
INJECTION, SOLUTION INTRAVENOUS
Status: DISCONTINUED | OUTPATIENT
Start: 2018-08-06 | End: 2018-08-06 | Stop reason: HOSPADM

## 2018-08-06 RX ORDER — METFORMIN HYDROCHLORIDE 500 MG/1
1000 TABLET ORAL 2 TIMES DAILY WITH MEALS
Status: DISCONTINUED | OUTPATIENT
Start: 2018-08-07 | End: 2018-08-07 | Stop reason: HOSPADM

## 2018-08-06 RX ORDER — ONDANSETRON 2 MG/ML
INJECTION INTRAMUSCULAR; INTRAVENOUS AS NEEDED
Status: DISCONTINUED | OUTPATIENT
Start: 2018-08-06 | End: 2018-08-06 | Stop reason: HOSPADM

## 2018-08-06 RX ORDER — MAGNESIUM SULFATE 100 %
4 CRYSTALS MISCELLANEOUS AS NEEDED
Status: DISCONTINUED | OUTPATIENT
Start: 2018-08-06 | End: 2018-08-06 | Stop reason: HOSPADM

## 2018-08-06 RX ORDER — SODIUM CHLORIDE 0.9 % (FLUSH) 0.9 %
5-10 SYRINGE (ML) INJECTION AS NEEDED
Status: DISCONTINUED | OUTPATIENT
Start: 2018-08-06 | End: 2018-08-06 | Stop reason: SDUPTHER

## 2018-08-06 RX ORDER — DIPHENHYDRAMINE HYDROCHLORIDE 50 MG/ML
12.5 INJECTION, SOLUTION INTRAMUSCULAR; INTRAVENOUS
Status: DISCONTINUED | OUTPATIENT
Start: 2018-08-06 | End: 2018-08-06 | Stop reason: HOSPADM

## 2018-08-06 RX ORDER — INSULIN LISPRO 100 [IU]/ML
INJECTION, SOLUTION INTRAVENOUS; SUBCUTANEOUS ONCE
Status: COMPLETED | OUTPATIENT
Start: 2018-08-06 | End: 2018-08-06

## 2018-08-06 RX ORDER — SODIUM CHLORIDE, SODIUM LACTATE, POTASSIUM CHLORIDE, CALCIUM CHLORIDE 600; 310; 30; 20 MG/100ML; MG/100ML; MG/100ML; MG/100ML
25 INJECTION, SOLUTION INTRAVENOUS CONTINUOUS
Status: DISCONTINUED | OUTPATIENT
Start: 2018-08-06 | End: 2018-08-06 | Stop reason: HOSPADM

## 2018-08-06 RX ORDER — FAMOTIDINE 20 MG/1
20 TABLET, FILM COATED ORAL ONCE
Status: DISCONTINUED | OUTPATIENT
Start: 2018-08-06 | End: 2018-08-06 | Stop reason: SDUPTHER

## 2018-08-06 RX ORDER — PROPRANOLOL HYDROCHLORIDE 40 MG/1
40 TABLET ORAL 2 TIMES DAILY
Status: DISCONTINUED | OUTPATIENT
Start: 2018-08-06 | End: 2018-08-06

## 2018-08-06 RX ORDER — PROPRANOLOL HYDROCHLORIDE 40 MG/1
40 TABLET ORAL EVERY 12 HOURS
Status: DISCONTINUED | OUTPATIENT
Start: 2018-08-06 | End: 2018-08-07 | Stop reason: HOSPADM

## 2018-08-06 RX ORDER — LISINOPRIL 5 MG/1
5 TABLET ORAL DAILY
Status: DISCONTINUED | OUTPATIENT
Start: 2018-08-07 | End: 2018-08-07 | Stop reason: HOSPADM

## 2018-08-06 RX ORDER — DEXTROSE 50 % IN WATER (D50W) INTRAVENOUS SYRINGE
25-50 AS NEEDED
Status: DISCONTINUED | OUTPATIENT
Start: 2018-08-06 | End: 2018-08-06

## 2018-08-06 RX ORDER — INSULIN LISPRO 100 [IU]/ML
INJECTION, SOLUTION INTRAVENOUS; SUBCUTANEOUS EVERY 6 HOURS
Status: DISCONTINUED | OUTPATIENT
Start: 2018-08-07 | End: 2018-08-07 | Stop reason: HOSPADM

## 2018-08-06 RX ORDER — MAGNESIUM SULFATE 100 %
4 CRYSTALS MISCELLANEOUS AS NEEDED
Status: DISCONTINUED | OUTPATIENT
Start: 2018-08-06 | End: 2018-08-07 | Stop reason: HOSPADM

## 2018-08-06 RX ORDER — SODIUM CHLORIDE 0.9 % (FLUSH) 0.9 %
5-10 SYRINGE (ML) INJECTION AS NEEDED
Status: DISCONTINUED | OUTPATIENT
Start: 2018-08-06 | End: 2018-08-06 | Stop reason: HOSPADM

## 2018-08-06 RX ORDER — SODIUM CHLORIDE 0.9 % (FLUSH) 0.9 %
5-10 SYRINGE (ML) INJECTION EVERY 8 HOURS
Status: DISCONTINUED | OUTPATIENT
Start: 2018-08-06 | End: 2018-08-07 | Stop reason: HOSPADM

## 2018-08-06 RX ORDER — TOPIRAMATE 25 MG/1
25 TABLET ORAL 2 TIMES DAILY WITH MEALS
Status: DISCONTINUED | OUTPATIENT
Start: 2018-08-07 | End: 2018-08-07 | Stop reason: HOSPADM

## 2018-08-06 RX ORDER — ONDANSETRON 2 MG/ML
4 INJECTION INTRAMUSCULAR; INTRAVENOUS ONCE
Status: DISCONTINUED | OUTPATIENT
Start: 2018-08-06 | End: 2018-08-06 | Stop reason: HOSPADM

## 2018-08-06 RX ORDER — DEXAMETHASONE SODIUM PHOSPHATE 4 MG/ML
INJECTION, SOLUTION INTRA-ARTICULAR; INTRALESIONAL; INTRAMUSCULAR; INTRAVENOUS; SOFT TISSUE AS NEEDED
Status: DISCONTINUED | OUTPATIENT
Start: 2018-08-06 | End: 2018-08-06 | Stop reason: HOSPADM

## 2018-08-06 RX ORDER — GABAPENTIN 300 MG/1
300 CAPSULE ORAL 3 TIMES DAILY
Status: DISCONTINUED | OUTPATIENT
Start: 2018-08-06 | End: 2018-08-07 | Stop reason: HOSPADM

## 2018-08-06 RX ORDER — SODIUM CHLORIDE 0.9 % (FLUSH) 0.9 %
5-10 SYRINGE (ML) INJECTION EVERY 8 HOURS
Status: DISCONTINUED | OUTPATIENT
Start: 2018-08-06 | End: 2018-08-06 | Stop reason: HOSPADM

## 2018-08-06 RX ORDER — ACETAMINOPHEN 500 MG
1000 TABLET ORAL EVERY 6 HOURS
Status: DISCONTINUED | OUTPATIENT
Start: 2018-08-06 | End: 2018-08-07 | Stop reason: HOSPADM

## 2018-08-06 RX ORDER — LIDOCAINE HYDROCHLORIDE 20 MG/ML
INJECTION, SOLUTION EPIDURAL; INFILTRATION; INTRACAUDAL; PERINEURAL AS NEEDED
Status: DISCONTINUED | OUTPATIENT
Start: 2018-08-06 | End: 2018-08-06 | Stop reason: HOSPADM

## 2018-08-06 RX ORDER — OXYCODONE HYDROCHLORIDE 5 MG/1
5-10 TABLET ORAL
Status: DISCONTINUED | OUTPATIENT
Start: 2018-08-06 | End: 2018-08-07 | Stop reason: HOSPADM

## 2018-08-06 RX ORDER — MAGNESIUM SULFATE 100 %
4 CRYSTALS MISCELLANEOUS AS NEEDED
Status: DISCONTINUED | OUTPATIENT
Start: 2018-08-06 | End: 2018-08-06

## 2018-08-06 RX ORDER — INSULIN LISPRO 100 [IU]/ML
INJECTION, SOLUTION INTRAVENOUS; SUBCUTANEOUS ONCE
Status: DISCONTINUED | OUTPATIENT
Start: 2018-08-06 | End: 2018-08-06 | Stop reason: HOSPADM

## 2018-08-06 RX ORDER — VANCOMYCIN HYDROCHLORIDE 1 G/20ML
INJECTION, POWDER, LYOPHILIZED, FOR SOLUTION INTRAVENOUS AS NEEDED
Status: DISCONTINUED | OUTPATIENT
Start: 2018-08-06 | End: 2018-08-06 | Stop reason: HOSPADM

## 2018-08-06 RX ORDER — SODIUM CHLORIDE, SODIUM LACTATE, POTASSIUM CHLORIDE, CALCIUM CHLORIDE 600; 310; 30; 20 MG/100ML; MG/100ML; MG/100ML; MG/100ML
75 INJECTION, SOLUTION INTRAVENOUS CONTINUOUS
Status: DISCONTINUED | OUTPATIENT
Start: 2018-08-06 | End: 2018-08-06 | Stop reason: HOSPADM

## 2018-08-06 RX ORDER — PROGESTERONE 100 MG/1
200 CAPSULE ORAL DAILY
Status: DISCONTINUED | OUTPATIENT
Start: 2018-08-07 | End: 2018-08-07 | Stop reason: HOSPADM

## 2018-08-06 RX ORDER — NALOXONE HYDROCHLORIDE 0.4 MG/ML
0.4 INJECTION, SOLUTION INTRAMUSCULAR; INTRAVENOUS; SUBCUTANEOUS AS NEEDED
Status: DISCONTINUED | OUTPATIENT
Start: 2018-08-06 | End: 2018-08-07 | Stop reason: HOSPADM

## 2018-08-06 RX ORDER — SODIUM CHLORIDE 0.9 % (FLUSH) 0.9 %
5-10 SYRINGE (ML) INJECTION AS NEEDED
Status: DISCONTINUED | OUTPATIENT
Start: 2018-08-06 | End: 2018-08-07 | Stop reason: HOSPADM

## 2018-08-06 RX ORDER — SUCCINYLCHOLINE CHLORIDE 20 MG/ML
INJECTION INTRAMUSCULAR; INTRAVENOUS AS NEEDED
Status: DISCONTINUED | OUTPATIENT
Start: 2018-08-06 | End: 2018-08-06 | Stop reason: HOSPADM

## 2018-08-06 RX ORDER — LORAZEPAM 2 MG/ML
1 INJECTION INTRAMUSCULAR
Status: DISCONTINUED | OUTPATIENT
Start: 2018-08-06 | End: 2018-08-07 | Stop reason: HOSPADM

## 2018-08-06 RX ORDER — SODIUM CHLORIDE 0.9 % (FLUSH) 0.9 %
5-10 SYRINGE (ML) INJECTION EVERY 8 HOURS
Status: DISCONTINUED | OUTPATIENT
Start: 2018-08-06 | End: 2018-08-06 | Stop reason: SDUPTHER

## 2018-08-06 RX ORDER — DEXTROSE 50 % IN WATER (D50W) INTRAVENOUS SYRINGE
25-50 AS NEEDED
Status: DISCONTINUED | OUTPATIENT
Start: 2018-08-06 | End: 2018-08-07 | Stop reason: HOSPADM

## 2018-08-06 RX ORDER — OLANZAPINE 2.5 MG/1
5 TABLET ORAL
Status: DISCONTINUED | OUTPATIENT
Start: 2018-08-06 | End: 2018-08-07 | Stop reason: HOSPADM

## 2018-08-06 RX ORDER — DEXTROAMPHETAMINE SACCHARATE, AMPHETAMINE ASPARTATE, DEXTROAMPHETAMINE SULFATE AND AMPHETAMINE SULFATE 2.5; 2.5; 2.5; 2.5 MG/1; MG/1; MG/1; MG/1
10 TABLET ORAL DAILY
Status: DISCONTINUED | OUTPATIENT
Start: 2018-08-07 | End: 2018-08-07 | Stop reason: HOSPADM

## 2018-08-06 RX ORDER — ROSUVASTATIN CALCIUM 10 MG/1
20 TABLET, COATED ORAL DAILY
Status: DISCONTINUED | OUTPATIENT
Start: 2018-08-07 | End: 2018-08-07 | Stop reason: HOSPADM

## 2018-08-06 RX ORDER — DIPHENHYDRAMINE HCL 25 MG
25 CAPSULE ORAL
Status: DISCONTINUED | OUTPATIENT
Start: 2018-08-06 | End: 2018-08-07 | Stop reason: HOSPADM

## 2018-08-06 RX ORDER — MORPHINE SULFATE 2 MG/ML
1 INJECTION, SOLUTION INTRAMUSCULAR; INTRAVENOUS
Status: DISCONTINUED | OUTPATIENT
Start: 2018-08-06 | End: 2018-08-07 | Stop reason: HOSPADM

## 2018-08-06 RX ORDER — GLYCOPYRROLATE 0.2 MG/ML
INJECTION INTRAMUSCULAR; INTRAVENOUS AS NEEDED
Status: DISCONTINUED | OUTPATIENT
Start: 2018-08-06 | End: 2018-08-06 | Stop reason: HOSPADM

## 2018-08-06 RX ORDER — HYDROMORPHONE HYDROCHLORIDE 2 MG/ML
0.5 INJECTION, SOLUTION INTRAMUSCULAR; INTRAVENOUS; SUBCUTANEOUS
Status: COMPLETED | OUTPATIENT
Start: 2018-08-06 | End: 2018-08-06

## 2018-08-06 RX ORDER — PROPOFOL 10 MG/ML
INJECTION, EMULSION INTRAVENOUS AS NEEDED
Status: DISCONTINUED | OUTPATIENT
Start: 2018-08-06 | End: 2018-08-06 | Stop reason: HOSPADM

## 2018-08-06 RX ORDER — FAMOTIDINE 20 MG/1
20 TABLET, FILM COATED ORAL ONCE
Status: COMPLETED | OUTPATIENT
Start: 2018-08-06 | End: 2018-08-06

## 2018-08-06 RX ADMIN — ONDANSETRON 4 MG: 2 INJECTION INTRAMUSCULAR; INTRAVENOUS at 17:22

## 2018-08-06 RX ADMIN — FENTANYL CITRATE 50 MCG: 50 INJECTION, SOLUTION INTRAMUSCULAR; INTRAVENOUS at 16:56

## 2018-08-06 RX ADMIN — OXYCODONE HYDROCHLORIDE 10 MG: 5 TABLET ORAL at 21:57

## 2018-08-06 RX ADMIN — Medication 5 ML: at 22:00

## 2018-08-06 RX ADMIN — HYDROMORPHONE HYDROCHLORIDE 0.5 MG: 2 INJECTION, SOLUTION INTRAMUSCULAR; INTRAVENOUS; SUBCUTANEOUS at 18:05

## 2018-08-06 RX ADMIN — SODIUM CHLORIDE 1000 MG: 900 INJECTION, SOLUTION INTRAVENOUS at 23:25

## 2018-08-06 RX ADMIN — SODIUM CHLORIDE 1000 MG: 900 INJECTION, SOLUTION INTRAVENOUS at 15:49

## 2018-08-06 RX ADMIN — INSULIN LISPRO 4 UNITS: 100 INJECTION, SOLUTION INTRAVENOUS; SUBCUTANEOUS at 23:27

## 2018-08-06 RX ADMIN — SODIUM CHLORIDE, SODIUM LACTATE, POTASSIUM CHLORIDE, CALCIUM CHLORIDE: 600; 310; 30; 20 INJECTION, SOLUTION INTRAVENOUS at 16:31

## 2018-08-06 RX ADMIN — LIDOCAINE HYDROCHLORIDE 60 MG: 20 INJECTION, SOLUTION EPIDURAL; INFILTRATION; INTRACAUDAL; PERINEURAL at 16:38

## 2018-08-06 RX ADMIN — PROPOFOL 150 MG: 10 INJECTION, EMULSION INTRAVENOUS at 16:38

## 2018-08-06 RX ADMIN — GABAPENTIN 300 MG: 300 CAPSULE ORAL at 23:27

## 2018-08-06 RX ADMIN — FENTANYL CITRATE 50 MCG: 50 INJECTION, SOLUTION INTRAMUSCULAR; INTRAVENOUS at 17:51

## 2018-08-06 RX ADMIN — FENTANYL CITRATE 100 MCG: 50 INJECTION, SOLUTION INTRAMUSCULAR; INTRAVENOUS at 16:38

## 2018-08-06 RX ADMIN — SUCCINYLCHOLINE CHLORIDE 120 MG: 20 INJECTION INTRAMUSCULAR; INTRAVENOUS at 16:38

## 2018-08-06 RX ADMIN — ACETAMINOPHEN 1000 MG: 500 TABLET, FILM COATED ORAL at 21:58

## 2018-08-06 RX ADMIN — SODIUM CHLORIDE, SODIUM LACTATE, POTASSIUM CHLORIDE, AND CALCIUM CHLORIDE 75 ML/HR: 600; 310; 30; 20 INJECTION, SOLUTION INTRAVENOUS at 15:23

## 2018-08-06 RX ADMIN — OLANZAPINE 5 MG: 2.5 TABLET, FILM COATED ORAL at 23:26

## 2018-08-06 RX ADMIN — FENTANYL CITRATE 50 MCG: 50 INJECTION, SOLUTION INTRAMUSCULAR; INTRAVENOUS at 17:05

## 2018-08-06 RX ADMIN — GLYCOPYRROLATE 0.2 MG: 0.2 INJECTION INTRAMUSCULAR; INTRAVENOUS at 16:34

## 2018-08-06 RX ADMIN — MIDAZOLAM HYDROCHLORIDE 2 MG: 1 INJECTION, SOLUTION INTRAMUSCULAR; INTRAVENOUS at 16:31

## 2018-08-06 RX ADMIN — FAMOTIDINE 20 MG: 20 TABLET ORAL at 15:22

## 2018-08-06 RX ADMIN — DEXAMETHASONE SODIUM PHOSPHATE 8 MG: 4 INJECTION, SOLUTION INTRA-ARTICULAR; INTRALESIONAL; INTRAMUSCULAR; INTRAVENOUS; SOFT TISSUE at 16:38

## 2018-08-06 RX ADMIN — PROPRANOLOL HYDROCHLORIDE 40 MG: 40 TABLET ORAL at 23:26

## 2018-08-06 RX ADMIN — HYDROMORPHONE HYDROCHLORIDE 0.5 MG: 2 INJECTION, SOLUTION INTRAMUSCULAR; INTRAVENOUS; SUBCUTANEOUS at 18:15

## 2018-08-06 RX ADMIN — INSULIN LISPRO 3 UNITS: 100 INJECTION, SOLUTION INTRAVENOUS; SUBCUTANEOUS at 15:25

## 2018-08-06 NOTE — IP AVS SNAPSHOT
[address] [id number] [address] Patient: Heidi Moss MRN: [id number] About your hospitalization You were admitted on:  August 6, 2018 You last received care in the:  [facility] 1978 You were discharged on:  August 7, 2018 Why you were hospitalized Your primary diagnosis was:  Not on File Your diagnoses also included:  Hnp (Herniated Nucleus Pulposus), Lumbar Follow-up Information Follow up With Details Comments Contact Info Luz Elena Fine MD On 8/13/2018 Appointment at 1:15pm with Katia Ho NP [address] [address] [address] [facility] [address] 
[phone] Shahid Juarez MD On 8/21/2018 Appointment at 11:30am with Jeninfer Sue NP [address] [facility] [address] 
[phone] Your Scheduled Appointments Monday August 13, 2018  1:30 PM EDT TRANSITIONAL CARE MANAGEMENT with Keysha Tsang NP [facility] ([address] [place] [address]) [address] [facility] [address] [facility] [address]  
[phone] Tuesday August 21, 2018 11:30 AM EDT  
POST OP with Karan Ortega NP  
[facility] ([address] [place] [address]) [address] [facility] [address]  
[phone] Friday September 21, 2018  2:15 PM EDT  
POST OP with Shahid Juarez MD  
[address] [place] [address], [address] [facility] [address]) [address] [facility] [address]  
[phone] Discharge Orders None A check macrina indicates which time of day the medication should be taken. My Medications START taking these medications Instructions Each Dose to Equal  
 Morning Noon Evening Bedtime  
 oxyCODONE-acetaminophen 5-325 mg per tablet Commonly known as:  PERCOCET Your last dose was: Your next dose is: Take 1 Tab by mouth every six (6) hours as needed for Pain. Max Daily Amount: 4 Tabs. 1 Tab CHANGE how you take these medications Instructions Each Dose to Equal  
 Morning Noon Evening Bedtime  
 estradiol 0.05 mg/24 hr  
Commonly known as:  Glen Jiang What changed:  when to take this Your last dose was: Your next dose is:    
   
   
 1 Patch by TransDERmal route two (2) times a week. 1 Patch  
    
   
   
   
  
 vortioxetine 10 mg tablet Commonly known as:  TRINTELLIX What changed:   
- how much to take 
- additional instructions Your last dose was: Your next dose is: Take 1 and 1/2 tablets by mouth daily. CONTINUE taking these medications Instructions Each Dose to Equal  
 Morning Noon Evening Bedtime  
 aspirin 81 mg tablet Your last dose was: Your next dose is: Take 81 mg by mouth. 81 mg  
    
   
   
   
  
 dextroamphetamine-amphetamine 10 mg tablet Commonly known as:  ADDERALL Your last dose was: Your next dose is: Take 1 Tab by mouth daily. Max Daily Amount: 10 mg.  
 10 mg  
    
   
   
   
  
 gabapentin 300 mg capsule Commonly known as:  NEURONTIN Your last dose was: Your next dose is: Take 1 Tab QHS x1 week, then BID x1 week, then TID  Indications: NEUROPATHIC PAIN  
     
   
   
   
  
 HYDROcodone-acetaminophen 7.5-325 mg per tablet Commonly known as:  Sandi Bey Your last dose was: Your next dose is: Take 1 Tab Q 6-8 hrs PRN Pain  
     
   
   
   
  
 ibuprofen 800 mg tablet Commonly known as:  MOTRIN Your last dose was: Your next dose is: Take 800 mg by mouth four (4) times daily. 800 mg  
    
   
   
   
  
 lisinopril 5 mg tablet Commonly known as:  Lily Rodas Your last dose was: Your next dose is: TAKE 1 TABLET BY MOUTH DAILY LORazepam 1 mg tablet Commonly known as:  ATIVAN Your last dose was: Your next dose is: Take 1 Tab by mouth nightly as needed for Anxiety. Max Daily Amount: 1 mg.  
 1 mg  
    
   
   
   
  
 metFORMIN 1,000 mg tablet Commonly known as:  GLUCOPHAGE Your last dose was: Your next dose is: TAKE 1 TABLET BY MOUTH TWICE DAILY WITH MEALS  
     
   
   
   
  
 naloxone 4 mg/actuation nasal spray Commonly known as:  [employer] Your last dose was: Your next dose is:    
   
   
 Use 1 spray intranasally, then discard. Repeat with new spray every 2 min as needed for opioid overdose symptoms, alternating nostrils. OLANZapine 5 mg tablet Commonly known as:  ZyPREXA Your last dose was: Your next dose is: Take 1 Tab by mouth nightly. 5 mg  
    
   
   
   
  
 progesterone 200 mg capsule Commonly known as:  PROMETRIUM Your last dose was: Your next dose is: Take 1 Cap by mouth daily. 200 mg  
    
   
   
   
  
 propranolol 40 mg tablet Commonly known as:  INDERAL Your last dose was: Your next dose is: TAKE 1 TABLET BY MOUTH TWICE DAILY  
     
   
   
   
  
 rosuvastatin 20 mg tablet Commonly known as:  CRESTOR Your last dose was: Your next dose is: Take 1 Tab by mouth daily. 20 mg  
    
   
   
   
  
 SHINGRIX (PF) 50 mcg/0.5 mL Susr injection Generic drug:  varicella-zoster recombinant (PF) Your last dose was: Your next dose is: SUMAtriptan 50 mg tablet Commonly known as:  IMITREX Your last dose was: Your next dose is: TAKE 1 TABLET BY MOUTH EVERY DAY AS NEEDED  
     
   
   
   
  
 topiramate 25 mg tablet Commonly known as:  TOPAMAX Your last dose was: Your next dose is: Take 3 tabs po BID  
     
   
   
   
  
 VITAMIN D3 2,000 unit Cap capsule Generic drug:  Cholecalciferol (Vitamin D3) Your last dose was: Your next dose is: Take  by mouth two (2) times a day. Where to Get Your Medications These medications were sent to [facility] [address], [address] [place] [address] Phone:  [phone]  
  lisinopril 5 mg tablet  
 metFORMIN 1,000 mg tablet Information on where to get these meds will be given to you by the nurse or doctor. ! Ask your nurse or doctor about these medications  
  oxyCODONE-acetaminophen 5-325 mg per tablet Opioid Education Prescription Opioids: What You Need to Know: 
 
 
After general anesthesia or intravenous sedation, for 24 hours or while taking prescription Narcotics: · Limit your activities · Do not drive and operate hazardous machinery · Do not make important personal or business decisions · Do  not drink alcoholic beverages · If you have not urinated within 8 hours after discharge, please contact your surgeon on call. Report the following to your surgeon: 
· Excessive pain, swelling, redness or odor of or around the surgical area · Temperature over 100.5 · Nausea and vomiting lasting longer than 4 hours or if unable to take medications · Any signs of decreased circulation or nerve impairment to extremity: change in color, persistent  numbness, tingling, coldness or increase pain · Any questions What to do at Home: *  Please give a list of your current medications to your Primary Care Provider. *  Please update this list whenever your medications are discontinued, doses are 
    changed, or new medications (including over-the-counter products) are added. *  Please carry medication information at all times in case of emergency situations. These are general instructions for a healthy lifestyle: No smoking/ No tobacco products/ Avoid exposure to second hand smoke Surgeon General's Warning:  Quitting smoking now greatly reduces serious risk to your health. Obesity, smoking, and sedentary lifestyle greatly increases your risk for illness A healthy diet, regular physical exercise & weight monitoring are important for maintaining a healthy lifestyle You may be retaining fluid if you have a history of heart failure or if you experience any of the following symptoms:  Weight gain of 3 pounds or more overnight or 5 pounds in a week, increased swelling in our hands or feet or shortness of breath while lying flat in bed. Please call your doctor as soon as you notice any of these symptoms; do not wait until your next office visit. Recognize signs and symptoms of STROKE: 
 
F-face looks uneven A-arms unable to move or move unevenly S-speech slurred or non-existent T-time-call 911 as soon as signs and symptoms begin-DO NOT go Back to bed or wait to see if you get better-TIME IS BRAIN. Warning Signs of HEART ATTACK Call 911 if you have these symptoms: 
? Chest discomfort. Most heart attacks involve discomfort in the center of the chest that lasts more than a few minutes, or that goes away and comes back. It can feel like uncomfortable pressure, squeezing, fullness, or pain. ? Discomfort in other areas of the upper body. Symptoms can include pain or discomfort in one or both arms, the back, neck, jaw, or stomach. ? Shortness of breath with or without chest discomfort. ? Other signs may include breaking out in a cold sweat, nausea, or lightheadedness. Don't wait more than five minutes to call [address]! Fast action can save your life. Calling 911 is almost always the fastest way to get lifesaving treatment. Emergency Medical Services staff can begin treatment when they arrive  up to an hour sooner than if someone gets to the hospital by car. The discharge information has been reviewed with the patient. The patient verbalized understanding. Discharge medications reviewed with the patient and appropriate educational materials and side effects teaching were provided. ___________________________________________________________________________________________________________________________________ Post-Operative Discharge Instructions after Spine Surgery [address] [facility] [facility] 
[phone] At your 2-week post-operative visit we will remove any skin staples or sutures, if present. (Appointment was made at the time you scheduled your surgery) Call office or physician on-call for any of the following: · Fever greater than 100.5 · Uncontrollable chills · Drainage from incision · Pain not controlled by pain medication · New pain · New weakness or progressive weakness · Food sticking in throat or excessive coughing when swallowing Stop all anti-inflammatories (arthritis medication) such as Ibuprofen, Motrin, Aleve, Voltaren, Relafen, Naproxen, Arthrotec, etc. for 12 weeks ONLY if you had a neck or back Fusion. You may take Tylenol or acetaminophen over the counter. May remove AJAY stockings when discharged from the hospital. 
 
May shower on the third day after surgery. Leave dressing on while you shower; the dressing is waterproof as long as the edges are sealed. Change dressing after 1 week, or sooner if saturated with drainage. Replace with a gauze dressing. Abstain from driving until your first post-operative visit. If you must drive, do not take pain medications that may alter your abilities. Lumbar braces and neck collars are for comfort only. Walking is the best therapy after back surgery. Avoid extremes of bending, twisting, or lifting. All post-operative surgical patients should function within the range of the pain. \"If it hurts - do not do it. \" Oxycodone/Acetaminophen (By mouth) Acetaminophen (r-roze-o-MIN-oh-fen), Oxycodone Hydrochloride (fb-w-EMZ-done ira-droe-KLOR-candie) Treats moderate to moderately severe pain. This medicine is a narcotic pain reliever. Brand Name(s): Endocet, Percocet, Primlev, Xartemis XR There may be other brand names for this medicine. When This Medicine Should Not Be Used: This medicine is not right for everyone. Do not use it if you had an allergic reaction to acetaminophen or oxycodone, or if you have serious breathing problems or paralytic ileus. How to Use This Medicine:  
Capsule, Liquid, Tablet, Long Acting Tablet · Your doctor will tell you how much medicine to use. Do not use more than directed. · An overdose can be dangerous. Follow directions carefully so you do not get too much medicine at one time. · Oral liquid: Measure the oral liquid medicine with a marked measuring spoon, oral syringe, or medicine cup. · Swallow the extended-release tablet whole. Do not crush, break, or chew it. Do not lick or wet the tablet before placing it in your mouth. Do not give this medicine through a feeding tube. · This medicine should come with a Medication Guide. Ask your pharmacist for a copy if you do not have one. · Missed dose: If you miss a dose of this medicine, skip the missed dose and go back to your regular dosing schedule. Do not double doses. · Store the medicine in a closed container at room temperature, away from heat, moisture, and direct light.  Ask your pharmacist about the best way to dispose of medicine you do not use. Drugs and Foods to Avoid: Ask your doctor or pharmacist before using any other medicine, including over-the-counter medicines, vitamins, and herbal products. · Do not use Xartemis XR if you are using or have used an MAO inhibitor in the past 14 days. · Some medicines can affect how this medicine works. Tell your doctor if you are using any of the following: ¨ Carbamazepine, erythromycin, ketoconazole, lamotrigine, mirtazapine, naltrexone, phenytoin, propranolol, rifampin, ritonavir, tramadol, trazodone, or zidovudine ¨ Birth control pills ¨ Diuretic (water pill) ¨ Medicine to treat depression ¨ Phenothiazine medicine ¨ Triptan medicine to treat migraine headaches · Do not drink alcohol while you are using this medicine. Acetaminophen can damage your liver, and alcohol can increase this risk. Do not take acetaminophen without asking your doctor if you have 3 or more drinks of alcohol every day. · Tell your doctor if you use anything else that makes you sleepy. Some examples are allergy medicine, narcotic pain medicine, and alcohol. Tell your doctor if you are using buprenorphine, butorphanol, nalbuphine, pentazocine, a benzodiazepine, or a muscle relaxer. Warnings While Using This Medicine: · Tell your doctor if you are pregnant or breastfeeding, or if you have kidney disease, liver disease, heart disease, low blood pressure, breathing problems or lung disease (such as asthma, COPD), thyroid problems, Beto disease, pancreas or gallbladder problems, prostate problems, trouble urinating, or a stomach problems, or a history of head injury or brain damage, seizures, or alcohol or drug abuse. Tell your doctor if you are allergic to codeine. · This medicine may cause the following problems: 
¨ High risk of overdose, which can lead to death ¨ Respiratory depression (serious breathing problem that can be life-threatening) ¨ Liver problems ¨ Serious skin reactions ¨ Serotonin syndrome (when used with certain medicines) · This medicine may make you dizzy or drowsy. Do not drive or do anything that could be dangerous until you know how this medicine affects you. Sit or lie down if you feel dizzy. Stand up carefully. · This medicine contains acetaminophen. Read the labels of all other medicines you are using to see if they also contain acetaminophen, or ask your doctor or pharmacist. Crystal Schlatter not use more than 4 grams (4,000 milligrams) total of acetaminophen in one day. · This medicine can be habit-forming. Do not use more than your prescribed dose. Call your doctor if you think your medicine is not working. · Do not stop using this medicine suddenly. Your doctor will need to slowly decrease your dose before you stop it completely. · This medicine could cause infertility. Talk with your doctor before using this medicine if you plan to have children. · This medicine may cause constipation, especially with long-term use. Ask your doctor if you should use a laxative to prevent and treat constipation. · Keep all medicine out of the reach of children. Never share your medicine with anyone. Possible Side Effects While Using This Medicine:  
Call your doctor right away if you notice any of these side effects: · Allergic reaction: Itching or hives, swelling in your face or hands, swelling or tingling in your mouth or throat, chest tightness, trouble breathing · Anxiety, restlessness, fast heartbeat, fever, muscle spasms, twitching, diarrhea, seeing or hearing things that are not there · Blistering, peeling, red skin rash · Blue lips, fingernails, or skin · Dark urine or pale stools, loss of appetite, stomach pain, yellow skin or eyes · Extreme weakness, shallow breathing, uneven heartbeat, seizures, sweating, or cold or clammy skin · Severe confusion, lightheadedness, dizziness, or fainting · Severe constipation, nausea, or vomiting · Trouble breathing or slow breathing If you notice these less serious side effects, talk with your doctor:  
· Headache · Mild constipation, nausea, or vomiting · Mild sleepiness or drowsiness If you notice other side effects that you think are caused by this medicine, tell your doctor. Call your doctor for medical advice about side effects. You may report side effects to FDA at 3-382-ZYU-5696 © 2017 [facility] Information is for End User's use only and may not be sold, redistributed or otherwise used for commercial purposes. The above information is an  only. It is not intended as medical advice for individual conditions or treatments. Talk to your doctor, nurse or pharmacist before following any medical regimen to see if it is safe and effective for you. Woldme Announcement We are excited to announce that we are making your provider's discharge notes available to you in Woldme. You will see these notes when they are completed and signed by the physician that discharged you from your recent hospital stay. If you have any questions or concerns about any information you see in Woldme, please call the Health Information Department where you were seen or reach out to your Primary Care Provider for more information about your plan of care. Introducing [facility]! [employer] introduces Woldme patient portal. Now you can access parts of your medical record, email your doctor's office, and request medication refills online. 1. In your internet browser, go to [email or web address] 2. Click on the First Time User? Click Here link in the Sign In box. You will see the New Member Sign Up page. 3. Enter your Woldme Access Code exactly as it appears below. You will not need to use this code after youve completed the sign-up process. If you do not sign up before the expiration date, you must request a new code. · PandaBed Access Code: [email or web address] Expires: 10/14/2018  1:46 PM 
 
4. Enter the last four digits of your Social Security Number (xxxx) and Date of Birth (mm/dd/yyyy) as indicated and click Submit. You will be taken to the next sign-up page. 5. Create a PandaBed ID. This will be your PandaBed login ID and cannot be changed, so think of one that is secure and easy to remember. 6. Create a PandaBed password. You can change your password at any time. 7. Enter your Password Reset Question and Answer. This can be used at a later time if you forget your password. 8. Enter your e-mail address. You will receive e-mail notification when new information is available in [address] 9. Click Sign Up. You can now view and download portions of your medical record. 10. Click the Download Summary menu link to download a portable copy of your medical information. If you have questions, please visit the Frequently Asked Questions section of the PandaBed website. Remember, PandaBed is NOT to be used for urgent needs. For medical emergencies, dial 911. Now available from your iPhone and Android! Introducing Mario Appiah As a [facility] patient, I wanted to make you aware of our electronic visit tool called Mario Collinstoney. [facility] 24/7 allows you to connect within minutes with a medical provider 24 hours a day, seven days a week via a mobile device or tablet or logging into a secure website from your computer. You can access Mario Collintigrefin from anywhere in the [place]. A virtual visit might be right for you when you have a simple condition and feel like you just dont want to get out of bed, or cant get away from work for an appointment, when your regular Alice Hankser provider is not available (evenings, weekends or holidays), or when youre out of town and need minor care.   Electronic visits cost only $49 and if the [facility] 24/7 provider determines a prescription is needed to treat your condition, one can be electronically transmitted to a nearby pharmacy*. Please take a moment to enroll today if you have not already done so. The enrollment process is free and takes just a few minutes. To enroll, please download the imageloop 24/7 breanna to your tablet or phone, or visit [email or web address] to enroll on your computer. And, as an [address] [place] [address] patient with a [employer] account, the results of your visits will be scanned into your electronic medical record and your primary care provider will be able to view the scanned results. We urge you to continue to see your regular imageloop provider for your ongoing medical care. And while your primary care provider may not be the one available when you seek a [employer] Mobile[employer]in virtual visit, the peace of mind you get from getting a real diagnosis real time can be priceless. For more information on [employer], view our Frequently Asked Questions (FAQs) at [email or web address]. Sincerely, 
 
Antoinette Casas MD 
Chief Medical Officer [employer] *:  certain medications cannot be prescribed via [employer] Unresulted Labs-Please follow up with your PCP about these lab tests Order Current Status HEMOGLOBIN A1C WITH EAG In process NC XR TECHNOLOGIST SERVICE In process Providers Seen During Your Hospitalization Provider Specialty Primary office phone Mesha Tuttle MD Orthopedic Surgery [phone] Your Primary Care Physician (PCP) Primary Care Physician Office Phone Office Fax Hu Martinez [phone] You are allergic to the following Allergen Reactions Pcn (Penicillins) Rash  
    
 Sulfa (Sulfonamide Antibiotics) Rash Recent Documentation Height Weight Breastfeeding? BMI OB Status Smoking Status 1.499 m 59.2 kg No 26.38 kg/m2 Postmenopausal Former Smoker Emergency Contacts Name Discharge Info Relation Home Work Mobile Monet Tejeda DISCHARGE CAREGIVER [3] Daughter [21] [phone] Monet Tejeda  Child [2] [phone] Patient Belongings The following personal items are in your possession at time of discharge: 
  Dental Appliances: None  Visual Aid: Glasses      Home Medications: None   Jewelry: None  Clothing: Pants, Shirt, Undergarments, Footwear    Other Valuables: Eyeglasses Please provide this summary of care documentation to your next provider. Signatures-by signing, you are acknowledging that this After Visit Summary has been reviewed with you and you have received a copy. Patient Signature:  ____________________________________________________________ Date:  ____________________________________________________________  
  
Josph Perfect Provider Signature:  ____________________________________________________________ Date:  ____________________________________________________________

## 2018-08-06 NOTE — ANESTHESIA POSTPROCEDURE EVALUATION
Post-Anesthesia Evaluation and Assessment    Patient: Jermaine Luque MRN: [id number]  SSN: [id number]    YOB: 1955  Age: 66 y.o. Sex: female       Cardiovascular Function/Vital Signs  Visit Vitals    /79    Pulse 74    Temp 36.6 °C (97.9 °F)    Resp 14    Ht 4' 11\" (1.499 m)    Wt 59.2 kg (130 lb 9.6 oz)    SpO2 96%    BMI 26.38 kg/m2       Patient is status post general anesthesia for Procedure(s):  RIGHT L5 LAMINECTOMY/C-ARM. Nausea/Vomiting: None    Postoperative hydration reviewed and adequate. Pain:  Pain Scale 1: Numeric (0 - 10) (08/06/18 1444)  Pain Intensity 1: 9 (08/06/18 1444)   Managed    Neurological Status: At baseline    Mental Status and Level of Consciousness: Arousable    Pulmonary Status:   O2 Device: Room air (08/06/18 1752)   Adequate oxygenation and airway patent    Complications related to anesthesia: None    Post-anesthesia assessment completed.  No concerns    Signed By: Nitish Jernigan CRNA     August 6, 2018

## 2018-08-06 NOTE — INTERVAL H&P NOTE
H&P Update:  Geovany Bonilla was seen and examined. History and physical has been reviewed. The patient has been examined.  There have been no significant clinical changes since the completion of the originally dated History and Physical.    Signed By: Larry Chaves MD     August 6, 2018 3:35 PM

## 2018-08-06 NOTE — PERIOP NOTES
TRANSFER - OUT REPORT:    Verbal report given to Sandy RN(name) on Jermaine Luque  being transferred to [address] [place] [address](unit) for routine post - op       Report consisted of patients Situation, Background, Assessment and   Recommendations(SBAR). Information from the following report(s) SBAR, Kardex, OR Summary, Procedure Summary, Intake/Output, MAR, Recent Results and Med Rec Status was reviewed with the receiving nurse. Lines:   Peripheral IV 08/06/18 Left Hand (Active)   Site Assessment Clean, dry, & intact 8/6/2018  5:49 PM   Phlebitis Assessment 0 8/6/2018  5:49 PM   Infiltration Assessment 0 8/6/2018  5:49 PM   Dressing Status Clean, dry, & intact 8/6/2018  5:49 PM   Dressing Type Tape;Transparent 8/6/2018  5:49 PM   Hub Color/Line Status Pink; Infusing 8/6/2018  5:49 PM        Opportunity for questions and clarification was provided.       Patient transported with:   O2 @ 2 liters  Registered Nurse  [facility]

## 2018-08-06 NOTE — BRIEF OP NOTE
BRIEF OPERATIVE NOTE    Date of Procedure: 8/6/2018   Preoperative Diagnosis: HNP (herniated nucleus pulposus), lumbar [M51.26]  Postoperative Diagnosis: HNP (herniated nucleus pulposus), lumbar [M51.26]    Procedure(s):  RIGHT L5 LAMINECTOMY/C-ARM  Surgeon(s) and Role:     * Joana Mansfield MD - Primary         Surgical Assistant:     Surgical Staff:  Circ-1: Peggy Sahfer RN  Circ-2: Kristina Carr  Radiology Technician: Riley Lunsford  Scrub Tech-1: Luke Nathan  Surg Asst-1: Phil Martini  Event Time In   Incision Start 1657   Incision Close      Anesthesia: General   Estimated Blood Loss: 5  Specimens: * No specimens in log *   Findings: hnp   Complications: 0  Implants: * No implants in log *

## 2018-08-07 VITALS
DIASTOLIC BLOOD PRESSURE: 87 MMHG | HEART RATE: 61 BPM | OXYGEN SATURATION: 98 % | WEIGHT: 130.6 LBS | RESPIRATION RATE: 18 BRPM | HEIGHT: 59 IN | TEMPERATURE: 97.9 F | SYSTOLIC BLOOD PRESSURE: 128 MMHG | BODY MASS INDEX: 26.33 KG/M2

## 2018-08-07 LAB
EST. AVERAGE GLUCOSE BLD GHB EST-MCNC: 146 MG/DL
GLUCOSE BLD STRIP.AUTO-MCNC: 131 MG/DL (ref 70–110)
HBA1C MFR BLD: 6.7 % (ref 4.2–5.6)

## 2018-08-07 PROCEDURE — 99218 HC RM OBSERVATION: CPT

## 2018-08-07 PROCEDURE — 97535 SELF CARE MNGMENT TRAINING: CPT

## 2018-08-07 PROCEDURE — 82962 GLUCOSE BLOOD TEST: CPT

## 2018-08-07 PROCEDURE — 97165 OT EVAL LOW COMPLEX 30 MIN: CPT

## 2018-08-07 PROCEDURE — 36415 COLL VENOUS BLD VENIPUNCTURE: CPT | Performed by: ORTHOPAEDIC SURGERY

## 2018-08-07 PROCEDURE — 97116 GAIT TRAINING THERAPY: CPT

## 2018-08-07 PROCEDURE — 74011250637 HC RX REV CODE- 250/637: Performed by: ORTHOPAEDIC SURGERY

## 2018-08-07 PROCEDURE — 97161 PT EVAL LOW COMPLEX 20 MIN: CPT

## 2018-08-07 PROCEDURE — 83036 HEMOGLOBIN GLYCOSYLATED A1C: CPT | Performed by: ORTHOPAEDIC SURGERY

## 2018-08-07 PROCEDURE — 77030027138 HC INCENT SPIROMETER -A

## 2018-08-07 RX ORDER — OXYCODONE AND ACETAMINOPHEN 5; 325 MG/1; MG/1
1 TABLET ORAL
Qty: 20 TAB | Refills: 0 | Status: SHIPPED | OUTPATIENT
Start: 2018-08-07 | End: 2018-09-18 | Stop reason: ALTCHOICE

## 2018-08-07 RX ADMIN — OXYCODONE HYDROCHLORIDE 10 MG: 5 TABLET ORAL at 02:08

## 2018-08-07 RX ADMIN — PROPRANOLOL HYDROCHLORIDE 40 MG: 40 TABLET ORAL at 08:42

## 2018-08-07 RX ADMIN — TOPIRAMATE 25 MG: 25 TABLET, FILM COATED ORAL at 08:41

## 2018-08-07 RX ADMIN — ACETAMINOPHEN 1000 MG: 500 TABLET, FILM COATED ORAL at 05:39

## 2018-08-07 RX ADMIN — Medication 10 ML: at 05:40

## 2018-08-07 RX ADMIN — LISINOPRIL 5 MG: 5 TABLET ORAL at 08:42

## 2018-08-07 RX ADMIN — OXYCODONE HYDROCHLORIDE 10 MG: 5 TABLET ORAL at 08:41

## 2018-08-07 RX ADMIN — OXYCODONE HYDROCHLORIDE 10 MG: 5 TABLET ORAL at 05:38

## 2018-08-07 RX ADMIN — ROSUVASTATIN CALCIUM 20 MG: 10 TABLET, FILM COATED ORAL at 08:42

## 2018-08-07 RX ADMIN — GABAPENTIN 300 MG: 300 CAPSULE ORAL at 08:42

## 2018-08-07 RX ADMIN — METFORMIN HYDROCHLORIDE 1000 MG: 500 TABLET, FILM COATED ORAL at 08:40

## 2018-08-07 RX ADMIN — DEXTROAMPHETAMINE SACCHARATE, AMPHETAMINE ASPARTATE, DEXTROAMPHETAMINE SULFATE AND AMPHETAMINE SULFATE 10 MG: 2.5; 2.5; 2.5; 2.5 TABLET ORAL at 08:41

## 2018-08-07 NOTE — DIABETES MGMT
Glycemic Control Plan of Care    T2DM with pending A1c report (ordered 8/07/2018). Home diabetes med: Metformin 1000 mg BID with food. POC BG range on 8/06/2018: 116-240 mg/dL. POC BG report on 8/07/2018 at time of review: 131 mg/dL. Patient for discharge to home today, 8/07/2018. Recommendation(s):  1.) Encouraged patient to follow diabetes treatment plan to help achieve recommended A1c of <7%. She accepted diabetes class schedule and stated that she will attend classes once she has recovered from surgery. Assessment:  Patient is 66year old with past medical history including type 2 diabetes mellitus, hypertension, depression, hypercholesterolemia, and hypertensive cardiovascular disease - was admitted on 8/6/2018 for elective surgery. Noted:  Herniate nucleus pulposus, L5, on right. Status post Right L5-S1 hemilaminectomy, diskectomy on 8/06/2018. Most recent blood glucose values:    Results for Lennox Guzman (MRN [id number]) as of 8/7/2018 10:05   Ref. Range 8/6/2018 15:16 8/6/2018 17:51 8/6/2018 22:51   GLUCOSE,FAST - POC Latest Ref Range: 70 - 110 mg/dL 161 (H) 116 (H) 240 (H)      Results for Lennox Guzman (MRN [id number]) as of 8/7/2018 10:05   Ref. Range 8/7/2018 06:14   GLUCOSE,FAST - POC Latest Ref Range: 70 - 110 mg/dL 131 (H)     Current A1C: Last A1c report of 7.1% (4/26/2018). Pending result of A1c ordered on 8/07/2018. Current hospital diabetes medications:  Correctional lispro insulin ACHS. Normal sensitivity dose. Total daily dose insulin requirement previous day: 08/06/2018  Lispro: 7 units    Home diabetes medications: Patient reported on 8/07/2018:  Metformin 1000 mg twice daily with food. Diet: Clear liquid and adv as tolerated. Seen pt this morning and she's preparing for d/c to home today. Goals:  Blood glucose will be within target range of  mg/dL by 8/10/2018.      Education:  ___  Refer to Diabetes Education Record             _X__  Education not indicated at this time: Patient accepted diabetes class sched and agreed to attend classes once she's recovered from her surgery.     Ric Johnson RN [facility]  Pager: [phone]

## 2018-08-07 NOTE — OP NOTES
[address] [place] [address] Dr  OPERATIVE REPORT    Mary West  MR#: [id number]  : 1955  ACCOUNT #: [de-identified]   DATE OF SERVICE: 2018    SURGEON:  Roz Sánchez MD      ASSISTANT:        PREOPERATIVE DIAGNOSIS:  Herniated nucleus pulposus, L5, on right. POSTOPERATIVE Herniated nucleus pulposus, L5, on right. PROCEDURES PERFORMED:  Right L5-S1 hemilaminotomy, diskectomy. FINDINGS:  The patient had a large extruded and sequestered disk herniation at L5-S1 on the right. OPERATION:  Following induction of endotracheal anesthesia, the patient positioned in prone position on spinal frame. The patient was prepped and draped in the usual fashion. Midline incision was made. A paramedian incision was made in the lumbodorsal fascia and subperiosteal dissection down to level of L5-S1 on the right. C-arm image verified our surgical level. Hemilaminotomy was done with resection of interval ligamentum flavum. The thecal sac was retracted towards the midline with the S1 nerve root and all the large discal mass evident. A thin layer of the ligament was incised, and the extruded sequestered discal mass expressed itself in multiple large extruded fragments. It was debrided until the disk space was flattened. There was no further unstable disk material or material that appeared to be compressing the neural elements. I could palpate medially, laterally, inferiorly, and superiorly without any evidence of continued nerve root compression. The wound was copiously irrigated. The roots were free and mobile. A pledget of Gelfoam and thrombin placed on the laminotomy site. The lumbodorsal fascia was closed with #1 Vicryl, subcutaneous tissues with 2-0 Vicryl, the skin closed with 4-0 Monocryl subcuticular suture and Dermabond and sterile dressing placed upon the wound. All counts correct. ESTIMATED BLOOD LOSS:  5 mL. COMPLICATIONS:  No complications.     SPECIMENS REMOVED:  No specimens. IMPLANTS:  No implants.       ANESTHESIA:  General endotracheal.      Trena Truong MD       [address] /   D: 08/06/2018 17:30     T: 08/07/2018 07:28  JOB #: [id number]

## 2018-08-07 NOTE — DISCHARGE INSTRUCTIONS
DISCHARGE SUMMARY from Nurse    PATIENT INSTRUCTIONS:    After general anesthesia or intravenous sedation, for 24 hours or while taking prescription Narcotics:  · Limit your activities  · Do not drive and operate hazardous machinery  · Do not make important personal or business decisions  · Do  not drink alcoholic beverages  · If you have not urinated within 8 hours after discharge, please contact your surgeon on call. Report the following to your surgeon:  · Excessive pain, swelling, redness or odor of or around the surgical area  · Temperature over 100.5  · Nausea and vomiting lasting longer than 4 hours or if unable to take medications  · Any signs of decreased circulation or nerve impairment to extremity: change in color, persistent  numbness, tingling, coldness or increase pain  · Any questions    What to do at Home:    *  Please give a list of your current medications to your Primary Care Provider. *  Please update this list whenever your medications are discontinued, doses are      changed, or new medications (including over-the-counter products) are added. *  Please carry medication information at all times in case of emergency situations. These are general instructions for a healthy lifestyle:    No smoking/ No tobacco products/ Avoid exposure to second hand smoke  Surgeon General's Warning:  Quitting smoking now greatly reduces serious risk to your health. Obesity, smoking, and sedentary lifestyle greatly increases your risk for illness    A healthy diet, regular physical exercise & weight monitoring are important for maintaining a healthy lifestyle    You may be retaining fluid if you have a history of heart failure or if you experience any of the following symptoms:  Weight gain of 3 pounds or more overnight or 5 pounds in a week, increased swelling in our hands or feet or shortness of breath while lying flat in bed.   Please call your doctor as soon as you notice any of these symptoms; do not wait until your next office visit. Recognize signs and symptoms of STROKE:    F-face looks uneven    A-arms unable to move or move unevenly    S-speech slurred or non-existent    T-time-call 911 as soon as signs and symptoms begin-DO NOT go       Back to bed or wait to see if you get better-TIME IS BRAIN. Warning Signs of HEART ATTACK     Call 911 if you have these symptoms:   Chest discomfort. Most heart attacks involve discomfort in the center of the chest that lasts more than a few minutes, or that goes away and comes back. It can feel like uncomfortable pressure, squeezing, fullness, or pain.  Discomfort in other areas of the upper body. Symptoms can include pain or discomfort in one or both arms, the back, neck, jaw, or stomach.  Shortness of breath with or without chest discomfort.  Other signs may include breaking out in a cold sweat, nausea, or lightheadedness. Don't wait more than five minutes to call 911 - MINUTES MATTER! Fast action can save your life. Calling 911 is almost always the fastest way to get lifesaving treatment. Emergency Medical Services staff can begin treatment when they arrive -- up to an hour sooner than if someone gets to the hospital by car. The discharge information has been reviewed with the patient. The patient verbalized understanding. Discharge medications reviewed with the patient and appropriate educational materials and side effects teaching were provided. ___________________________________________________________________________________________________________________________________      Post-Operative Discharge Instructions after [address] [place] [address] [facility]  [phone]      At your 2-week post-operative visit we will remove any skin staples or sutures, if present.   (Appointment was made at the time you scheduled your surgery)    Call office or physician on-call for any of the following:  · Fever greater than 100.5  · Uncontrollable chills  · Drainage from incision  · Pain not controlled by pain medication  · New pain  · New weakness or progressive weakness  · Food sticking in throat or excessive coughing when swallowing    Stop all anti-inflammatories (arthritis medication) such as Ibuprofen, Motrin, Aleve, Voltaren, Relafen, Naproxen, Arthrotec, etc. for 12 weeks ONLY if you had a neck or back Fusion. You may take Tylenol or acetaminophen over the counter. May remove AJAY stockings when discharged from the hospital.    May shower on the third day after surgery. Leave dressing on while you shower; the dressing is waterproof as long as the edges are sealed. Change dressing after 1 week, or sooner if saturated with drainage. Replace with a gauze dressing. Abstain from driving until your first post-operative visit. If you must drive, do not take pain medications that may alter your abilities. Lumbar braces and neck collars are for comfort only. Walking is the best therapy after back surgery. Avoid extremes of bending, twisting, or lifting. All post-operative surgical patients should function within the range of the pain. \"If it hurts - do not do it. \"      Oxycodone/Acetaminophen (By mouth)   Acetaminophen (l-laet-j-MIN-oh-fen), Oxycodone Hydrochloride (sq-i-GPT-done ira-droe-KLOR-candie)  Treats moderate to moderately severe pain. This medicine is a narcotic pain reliever. Brand Name(s): Endocet, Percocet, Primlev, Xartemis XR   There may be other brand names for this medicine. When This Medicine Should Not Be Used: This medicine is not right for everyone. Do not use it if you had an allergic reaction to acetaminophen or oxycodone, or if you have serious breathing problems or paralytic ileus. How to Use This Medicine:   Capsule, Liquid, Tablet, Long Acting Tablet  · Your doctor will tell you how much medicine to use. Do not use more than directed. · An overdose can be dangerous.  Follow directions carefully so you do not get too much medicine at one time. · Oral liquid: Measure the oral liquid medicine with a marked measuring spoon, oral syringe, or medicine cup. · Swallow the extended-release tablet whole. Do not crush, break, or chew it. Do not lick or wet the tablet before placing it in your mouth. Do not give this medicine through a feeding tube. · This medicine should come with a Medication Guide. Ask your pharmacist for a copy if you do not have one. · Missed dose: If you miss a dose of this medicine, skip the missed dose and go back to your regular dosing schedule. Do not double doses. · Store the medicine in a closed container at room temperature, away from heat, moisture, and direct light. Ask your pharmacist about the best way to dispose of medicine you do not use. Drugs and Foods to Avoid:   Ask your doctor or pharmacist before using any other medicine, including over-the-counter medicines, vitamins, and herbal products. · Do not use Xartemis XR if you are using or have used an MAO inhibitor in the past 14 days. · Some medicines can affect how this medicine works. Tell your doctor if you are using any of the following:   ¨ Carbamazepine, erythromycin, ketoconazole, lamotrigine, mirtazapine, naltrexone, phenytoin, propranolol, rifampin, ritonavir, tramadol, trazodone, or zidovudine  ¨ Birth control pills  ¨ Diuretic (water pill)  ¨ Medicine to treat depression  ¨ Phenothiazine medicine  ¨ Triptan medicine to treat migraine headaches  · Do not drink alcohol while you are using this medicine. Acetaminophen can damage your liver, and alcohol can increase this risk. Do not take acetaminophen without asking your doctor if you have 3 or more drinks of alcohol every day. · Tell your doctor if you use anything else that makes you sleepy. Some examples are allergy medicine, narcotic pain medicine, and alcohol.  Tell your doctor if you are using buprenorphine, butorphanol, nalbuphine, pentazocine, a benzodiazepine, or a muscle relaxer. Warnings While Using This Medicine:   · Tell your doctor if you are pregnant or breastfeeding, or if you have kidney disease, liver disease, heart disease, low blood pressure, breathing problems or lung disease (such as asthma, COPD), thyroid problems, [place] disease, pancreas or gallbladder problems, prostate problems, trouble urinating, or a stomach problems, or a history of head injury or brain damage, seizures, or alcohol or drug abuse. Tell your doctor if you are allergic to codeine. · This medicine may cause the following problems:  ¨ High risk of overdose, which can lead to death  ¨ Respiratory depression (serious breathing problem that can be life-threatening)  ¨ Liver problems  ¨ Serious skin reactions  ¨ Serotonin syndrome (when used with certain medicines)  · This medicine may make you dizzy or drowsy. Do not drive or do anything that could be dangerous until you know how this medicine affects you. Sit or lie down if you feel dizzy. Stand up carefully. · This medicine contains acetaminophen. Read the labels of all other medicines you are using to see if they also contain acetaminophen, or ask your doctor or pharmacist. Aria Swanson not use more than 4 grams (4,000 milligrams) total of acetaminophen in one day. · This medicine can be habit-forming. Do not use more than your prescribed dose. Call your doctor if you think your medicine is not working. · Do not stop using this medicine suddenly. Your doctor will need to slowly decrease your dose before you stop it completely. · This medicine could cause infertility. Talk with your doctor before using this medicine if you plan to have children. · This medicine may cause constipation, especially with long-term use. Ask your doctor if you should use a laxative to prevent and treat constipation. · Keep all medicine out of the reach of children. Never share your medicine with anyone.   Possible Side Effects While Using This Medicine:   Call your doctor right away if you notice any of these side effects:  · Allergic reaction: Itching or hives, swelling in your face or hands, swelling or tingling in your mouth or throat, chest tightness, trouble breathing  · Anxiety, restlessness, fast heartbeat, fever, muscle spasms, twitching, diarrhea, seeing or hearing things that are not there  · Blistering, peeling, red skin rash  · Blue lips, fingernails, or skin  · Dark urine or pale stools, loss of appetite, stomach pain, yellow skin or eyes  · Extreme weakness, shallow breathing, uneven heartbeat, seizures, sweating, or cold or clammy skin  · Severe confusion, lightheadedness, dizziness, or fainting  · Severe constipation, nausea, or vomiting  · Trouble breathing or slow breathing  If you notice these less serious side effects, talk with your doctor:   · Headache  · Mild constipation, nausea, or vomiting  · Mild sleepiness or drowsiness  If you notice other side effects that you think are caused by this medicine, tell your doctor. Call your doctor for medical advice about side effects. You may report side effects to FDA at 6-677-FDA-3671  © 2017 [address]  Information is for End User's use only and may not be sold, redistributed or otherwise used for commercial purposes. The above information is an  only. It is not intended as medical advice for individual conditions or treatments. Talk to your doctor, nurse or pharmacist before following any medical regimen to see if it is safe and effective for you.

## 2018-08-07 NOTE — PROGRESS NOTES
Problem: Mobility Impaired (Adult and Pediatric)  Goal: *Acute Goals and Plan of Care (Insert Text)  Outcome: Resolved/Met Date Met: 08/07/18  physical Therapy EVALUATION & Discharge    Patient: Carmel Pate (20 y.o. female)  Date: 8/7/2018  Primary Diagnosis: HNP (herniated nucleus pulposus), lumbar [M51.26]  Procedure(s) (LRB):  RIGHT L5 LAMINECTOMY/C-ARM (Right) 1 Day Post-Op   Precautions: Spinal preacutions    ASSESSMENT AND RECOMMENDATIONS:  Patient is 67 yo F admitted to hospital for Right L5 laminectomy and presents today alert and agreeable to therapy. Patient was educated on spinal precautions and performed log roll OOB with supervision and performed seated objective assessment. Patient was given demo with instruction on sit <> stand transfer and gait training and transferred to standing with Modified independence and ambulated 275ft total and negotiated 6 steps without HR for carryover of technique. Required VC's for first two trials and on third trial of 2 steps, patient repeated cues out loud for herself stating, \"up with the good\" when ascendign each step and \"down with the bad\" when descending each step. Patient demonstrated good compliance with precautions throughout session. At conclusion of session patient transferred to sitting in recliner and was left resting with call bell by the side and SCDs donned. Patient instructed to call for assistance if they needed to get up for any reason and denied need for further assistance. Patient demonstrates supervision/modified independence with mobility and skilled physical therapy is not indicated at this time. Discharge Recommendations: Home Health  Further Equipment Recommendations for Discharge: transfer bench      G-:CODES     Mobility  Current  CI= 1-19%   Goal  CI= 1-19%  D/C  CI= 1-19%. The severity rating is based on the Level of Assistance required for Functional Mobility and ADLs.       Evaluation Complexity     Eval Complexity: History: MEDIUM  Complexity : 1-2 comorbidities / personal factors will impact the outcome/ POC Exam:LOW Complexity : 1-2 Standardized tests and measures addressing body structure, function, activity limitation and / or participation in recreation  Presentation: LOW Complexity : Stable, uncomplicated  Clinical Decision Making:Low Complexity   Overall Complexity:LOW     SUBJECTIVE:   Patient stated I can't believe how well I'm walking. I was much slower before.     OBJECTIVE DATA SUMMARY:     Past Medical History:   Diagnosis Date    ADHD     Anxiety     Bilateral lumbar radiculopathy     Carotid bruit     right    Depression     Diabetes mellitus ([address]     HCD (hypertensive cardiovascular disease)     Hyperlipidemia     Hypertension     Migraine headache     Plantar fasciitis      Past Surgical History:   Procedure Laterality Date    HX SEPTOPLASTY  6/2002    HX TONSILLECTOMY      NEUROLOGICAL PROCEDURE UNLISTED  08/06/2018    laminectomy      Barriers to Learning/Limitations: None  Compensate with: N/A  Prior Level of Function/Home Situation: Patient lives with daughter and was independent with mobility and I/ADL's PTA. Patient has grab bars in her step over shower and has RW and lives in 1 story home with 2STE with no HR. Home Situation  Home Environment: Private residence  # Steps to Enter: 4  One/Two Story Residence: One story  Living Alone: No  Support Systems: Child(martine), Religion / noemy community, Family member(s), Friends \ neighbors  Patient Expects to be Discharged to[de-identified] Private residence  Current DME Used/Available at Home: None  Critical Behavior:   A&Ox4   Strength:    Strength:  Within functional limits (BLE; R 4+/5, L 5/5)    Tone & Sensation:   Tone: Normal (BLE)    Sensation: Intact (BLE)   Range Of Motion:  AROM: Within functional limits (BLE; within spinal precautions)   Functional Mobility:  Bed Mobility:  Rolling: Supervision  Supine to Sit: Supervision   Scooting: Modified independent  Transfers:  Sit to Stand: Modified independent  Stand to Sit: Modified independent   Balance:   Sitting: Intact  Standing: Intact  Ambulation/Gait Training:  Distance (ft): 275 Feet (ft)  Assistive Device:  (None)  Ambulation - Level of Assistance: Supervision   Speed/Nessa: Slow  Interventions: Verbal cues; Visual/Demos  Stairs:  Number of Stairs Trained: 6  Stairs - Level of Assistance: Supervision  Rail Use: None  Pain:  Pt reports 0/10 pain or discomfort prior to treatment.    Pt reports 0/10 pain or discomfort post treatment. Activity Tolerance:   Patient tolerated activity well and denied dizziness, chest pain, and SOB. Please refer to the flowsheet for vital signs taken during this treatment. After treatment:   [x]         Patient left in no apparent distress sitting up in chair  []         Patient left in no apparent distress in bed  [x]         Call bell left within reach  [x]         Nursing notified Melisa Cardoso)  []         Caregiver present  []         Bed alarm activated  []         SCDs applied to B LE    COMMUNICATION/EDUCATION:   [x]         Fall prevention education was provided and the patient/caregiver indicated understanding. [x]         Patient/family have participated as able in goal setting and plan of care. [x]         Patient/family agree to work toward stated goals and plan of care. []         Patient understands intent and goals of therapy, but is neutral about his/her participation. []         Patient is unable to participate in goal setting and plan of care.     Thank you for this referral.  Dorena Hamman, PT   Time Calculation: 25 mins

## 2018-08-07 NOTE — DISCHARGE SUMMARY
Discharge  Summary     Patient: Kar Burris MRN: [id number]  SSN: [id number]    YOB: 1955  Age: 66 y.o. Sex: female       Admit Date: 8/6/2018    Discharge Date: 8/7/2018      Admission Diagnoses: HNP (herniated nucleus pulposus), lumbar [M51.26]    Discharge Diagnoses:   Problem List as of 8/7/2018  Date Reviewed: 7/31/2018          Codes Class Noted - Resolved    HNP (herniated nucleus pulposus), lumbar ICD-10-CM: M51.26  ICD-9-CM: 722.10  7/31/2018 - Present        Right lumbar radiculopathy ICD-10-CM: M54.16  ICD-9-CM: 724.4  6/14/2018 - Present        DDD (degenerative disc disease), lumbar ICD-10-CM: M51.36  ICD-9-CM: 722.52  6/14/2018 - Present        Overweight (BMI 25.0-29. 9) ICD-10-CM: E66.3  ICD-9-CM: 278.02  5/6/2018 - Present        Recurrent depression ([facility] ICD-10-CM: F33.9  ICD-9-CM: 296.30  5/2/2018 - Present        Vitamin D deficiency ICD-10-CM: E55.9  ICD-9-CM: 268.9  5/14/2017 - Present        Essential hypertension ICD-10-CM: I10  ICD-9-CM: 401.9  4/23/2017 - Present        Type 2 diabetes mellitus without complication ([facility] OKA-19-CQ: E11.9  ICD-9-CM: 250.00  6/17/2015 - Present        Bilateral lumbar radiculopathy ICD-10-CM: M54.16  ICD-9-CM: 724.4  2/18/2015 - Present        Migraine ICD-10-CM: E09.310  ICD-9-CM: 346.90  6/3/2014 - Present        Otosclerosis ICD-10-CM: H80.90  ICD-9-CM: 387.9  9/11/2012 - Present        HCD (hypertensive cardiovascular disease) ICD-10-CM: I11.9  ICD-9-CM: 402.90  Unknown - Present        Hyperlipidemia ICD-10-CM: E78.5  ICD-9-CM: 272.4  Unknown - Present        RESOLVED: Bilateral edema of lower extremity ICD-10-CM: R60.0  ICD-9-CM: 782.3  4/23/2017 - 5/6/2018        RESOLVED: Microhematuria ICD-10-CM: R31.29  ICD-9-CM: 599.72  6/3/2014 - 10/13/2014        RESOLVED: Vitamin D insufficiency ICD-10-CM: E55.9  ICD-9-CM: 268.9  5/3/2013 - 6/17/2015               Discharge Condition: Good    Procedure: Right L5-S1 hemilaminotomy, diskectomy. Hospital Course: Normal hospital course for this procedure. Tolerated surgical intervention well. VSS Throughout. Neuro intact. Incision dry and intact, tolerating PO intake, voiding adequately. Ambulatory. Disposition: home    Discharge Medications:   Current Discharge Medication List      START taking these medications    Details   oxyCODONE-acetaminophen (PERCOCET) 5-325 mg per tablet Take 1 Tab by mouth every six (6) hours as needed for Pain. Max Daily Amount: 4 Tabs. Qty: 20 Tab, Refills: 0    Associated Diagnoses: HNP (herniated nucleus pulposus), lumbar         CONTINUE these medications which have NOT CHANGED    Details   gabapentin (NEURONTIN) 300 mg capsule Take 1 Tab QHS x1 week, then BID x1 week, then TID  Indications: NEUROPATHIC PAIN  Qty: 90 Cap, Refills: 1    Associated Diagnoses: HNP (herniated nucleus pulposus), lumbar      HYDROcodone-acetaminophen (NORCO) 7.5-325 mg per tablet Take 1 Tab Q 6-8 hrs PRN Pain  Qty: 28 Tab, Refills: 0    Associated Diagnoses: HNP (herniated nucleus pulposus), lumbar      topiramate (TOPAMAX) 25 mg tablet Take 3 tabs po BID  Qty: 180 Tab, Refills: 2    Associated Diagnoses: Right lumbar radiculopathy      OLANZapine (ZYPREXA) 5 mg tablet Take 1 Tab by mouth nightly. Qty: 1 Tab, Refills: 0      LORazepam (ATIVAN) 1 mg tablet Take 1 Tab by mouth nightly as needed for Anxiety. Max Daily Amount: 1 mg. Qty: 1 Tab, Refills: 0    Associated Diagnoses: Insomnia, unspecified type      progesterone (PROMETRIUM) 200 mg capsule Take 1 Cap by mouth daily. Qty: 30 Cap, Refills: 1      estradiol (VIVELLE) 0.05 mg/24 hr 1 Patch by TransDERmal route two (2) times a week. Qty: 8 Patch, Refills: 1      Cholecalciferol, Vitamin D3, (VITAMIN D3) 2,000 unit cap capsule Take  by mouth two (2) times a day.       propranolol (INDERAL) 40 mg tablet TAKE 1 TABLET BY MOUTH TWICE DAILY  Qty: 180 Tab, Refills: 2      SUMAtriptan (IMITREX) 50 mg tablet TAKE 1 TABLET BY MOUTH EVERY DAY AS NEEDED  Qty: 9 Tab, Refills: 2      rosuvastatin (CRESTOR) 20 mg tablet Take 1 Tab by mouth daily. Qty: 30 Tab, Refills: 11      vortioxetine (TRINTELLIX) 10 mg tablet Take 1 and 1/2 tablets by mouth daily. Qty: 60 Tab, Refills: 2      metFORMIN (GLUCOPHAGE) 1,000 mg tablet TAKE 1 TABLET BY MOUTH TWICE DAILY WITH MEALS  Qty: 60 Tab, Refills: 6      lisinopril (PRINIVIL, ZESTRIL) 5 mg tablet TAKE 1 TABLET BY MOUTH DAILY  Qty: 90 Tab, Refills: 2      SHINGRIX, PF, 50 mcg/0.5 mL susr injection       naloxone (NARCAN) 4 mg/actuation nasal spray Use 1 spray intranasally, then discard. Repeat with new spray every 2 min as needed for opioid overdose symptoms, alternating nostrils. Qty: 1 Each, Refills: 0    Associated Diagnoses: HNP (herniated nucleus pulposus), lumbar      ibuprofen (MOTRIN) 800 mg tablet Take 800 mg by mouth four (4) times daily. dextroamphetamine-amphetamine (ADDERALL) 10 mg tablet Take 1 Tab by mouth daily. Max Daily Amount: 10 mg.  Qty: 30 Tab, Refills: 0      aspirin 81 mg tablet Take 81 mg by mouth. Follow-up Appointments   Procedures    FOLLOW UP VISIT Appointment in: Two Weeks     Standing Status:   Standing     Number of Occurrences:   1     Order Specific Question:   Appointment in     Answer:    Two Weeks       Signed By: Esthela Hammer NP     August 7, 2018

## 2018-08-07 NOTE — PROGRESS NOTES
Problem: Self Care Deficits Care Plan (Adult)  Goal: *Acute Goals and Plan of Care (Insert Text)  Outcome: Resolved/Met Date Met: 08/07/18  Occupational Therapy EVALUATION/discharge    Patient: Carmel Pate (32 y.o. female)  Date: 8/7/2018  Primary Diagnosis: HNP (herniated nucleus pulposus), lumbar [M51.26]  Procedure(s) (LRB):  RIGHT L5 LAMINECTOMY/C-ARM (Right) 1 Day Post-Op   Precautions:   Back    ASSESSMENT AND RECOMMENDATIONS:  Based on the objective data described below, the patient is able to perform basic self care tasks without assistance practice/VCs for safety. Back precautions reviewed and patient verbalized/demonstrated understanding. She has a supportive daughter at home to assist her prn and all needed DME (grab bar) for bathroom safety. Skilled occupational therapy is not indicated at this time. Discharge Recommendations: None  Further Equipment Recommendations for Discharge: N/A      Barriers to Learning/Limitations: None  Compensate with: visual, verbal, tactile, kinesthetic cues/model     COMPLEXITY     Eval Complexity: History: LOW Complexity : Brief history review ; Examination: LOW Complexity : 1-3 performance deficits relating to physical, cognitive , or psychosocial skils that result in activity limitations and / or participation restrictions ; Decision Making:LOW Complexity : No comorbidities that affect functional and no verbal or physical assistance needed to complete eval tasks  Assessment: Low Complexity        G-CODES:     Self Care  Current  CI= 1-19%   Goal  CI= 1-19%   D/C  CI= 1-19%. The severity rating is based on the Level of Assistance required for Functional Mobility and ADLs. SUBJECTIVE:   Patient stated My daughter can help me.     OBJECTIVE DATA SUMMARY:     Past Medical History:   Diagnosis Date    ADHD     Anxiety     Bilateral lumbar radiculopathy     Carotid bruit     right    Depression     Diabetes mellitus (HCC)     HCD (hypertensive cardiovascular disease)     Hyperlipidemia     Hypertension     Migraine headache     Plantar fasciitis      Past Surgical History:   Procedure Laterality Date    HX SEPTOPLASTY  6/2002    HX TONSILLECTOMY      NEUROLOGICAL PROCEDURE UNLISTED  08/06/2018    laminectomy      Prior Level of Function/Home Situation: Pt was independent with basic self care tasks and functional mobility PTA. Home Situation  Home Environment: Private residence  # Steps to Enter: 4  One/Two Story Residence: One story  Living Alone: No  Support Systems: Child(martine), Jain / noemy community, Family member(s), Friends \ neighbors  Patient Expects to be Discharged to[de-identified] Private residence  Current DME Used/Available at Home: None  Tub or Shower Type: Tub/Shower combination (with grab bar)  [x]     Right hand dominant   []     Left hand dominant  Cognitive/Behavioral Status:  Neurologic State: Alert  Orientation Level: Oriented X4  Cognition: Appropriate decision making; Follows commands  Safety/Judgement: Awareness of environment; Fall prevention    Skin: Intact on UEs    Edema: None noted in UEs    Vision/Perceptual:    Acuity: Within Defined Limits    Corrective Lenses: Glasses    Coordination:  Coordination: Within functional limits (BLE)  Fine Motor Skills-Upper: Left Intact; Right Intact    Gross Motor Skills-Upper: Left Intact; Right Intact    Balance:  Sitting: Intact  Standing: Intact    Strength:  Strength:  Within functional limits (UEs)    Tone & Sensation:  Tone: Normal (UEs)  Sensation: Intact (UEs)    Range of Motion:  AROM: Within functional limits (UEs)    Functional Mobility and Transfers for ADLs:  Bed Mobility:  Rolling: Supervision  Supine to Sit: Supervision  Scooting: Modified independent  Transfers:  Sit to Stand: Modified independent   Toilet Transfer : Modified independent     ADL Assessment:  Feeding: Independent    Oral Facial Hygiene/Grooming: Independent    Bathing: Modified independent    Upper Body Dressing: Independent    Lower Body Dressing: Modified independent    Toileting: Modified independent    ADL Intervention:  Patient practiced UB/LB dressing while seated in chair and in standing. No assist needed after initial VCs for safety and no LOB noted in standing. Cognitive Retraining  Safety/Judgement: Awareness of environment; Fall prevention    Pain:  Pt reports 0/10 pain or discomfort prior to treatment.    Pt reports 0/10 pain or discomfort post treatment. Activity Tolerance:   Good    Please refer to the flowsheet for vital signs taken during this treatment. After treatment:   [x]  Patient left in no apparent distress sitting up in chair  []  Patient left in no apparent distress in bed  [x]  Call bell left within reach  []  Nursing notified  [x]  Caregiver (daughter) present  []  Bed alarm activated    COMMUNICATION/EDUCATION:   Communication/Collaboration:  [x]      Home safety education was provided and the patient/caregiver indicated understanding. [x]      Patient/family have participated as able and agree with findings and recommendations. []      Patient is unable to participate in plan of care at this time.     Georgie Hein MS OTR/L  Time Calculation: 25 mins

## 2018-08-07 NOTE — ROUTINE PROCESS
Patient is alert and oriented times three upon arrival to floor she got up and walked to the bathroom with no issues. Dressing is clean dry and intact and neuro intact for this patient.  She is eating and drinking well with no nausea or vomiting

## 2018-08-07 NOTE — ROUTINE PROCESS
Bedside and Verbal shift change report given to blake gutiérrez (oncoming nurse) by Leatha Segura RN (offgoing nurse). Report included the following information SBAR, Kardex, MAR and Recent Results.     SITUATION:    Code Status: Prior   Reason for Admission: HNP (herniated nucleus pulposus), lumbar [address] day: 0   Problem List:       Hospital Problems  Date Reviewed: 7/31/2018          Codes Class Noted POA    HNP (herniated nucleus pulposus), lumbar ICD-10-CM: M51.26  ICD-9-CM: 722.10  7/31/2018 Unknown              BACKGROUND:    Past Medical History:   Past Medical History:   Diagnosis Date    ADHD     Anxiety     Bilateral lumbar radiculopathy     Carotid bruit     right    Depression     Diabetes mellitus ([address]     HCD (hypertensive cardiovascular disease)     Hyperlipidemia     Hypertension     Migraine headache     Plantar fasciitis          Patient taking anticoagulants yes     ASSESSMENT:    Changes in Assessment Throughout Shift: none     Patient has Central Line: no Reasons if yes: none   Patient has Thorpe Cath: no Reasons if yes: none      Last Vitals:     Vitals:    08/06/18 1919 08/06/18 1955 08/07/18 0118 08/07/18 0341   BP: 128/64 160/89 99/57 106/60   Pulse: (!) 51 (!) 59 72 70   Resp: 12 15 17 16   Temp: 97.8 °F (36.6 °C) 97.9 °F (36.6 °C) 98.6 °F (37 °C) 99.2 °F (37.3 °C)   SpO2: 100% 99% 95% 97%   Weight:       Height:            IV and DRAINS (will only show if present)   Peripheral IV 08/06/18 Left Hand-Site Assessment: Clean, dry, & intact     WOUND (if present)   Wound Type:  No i   Dressing present Dressing Present : No   Wound Concerns/Notes:  none     PAIN    Pain Assessment    Pain Intensity 1: 0 (08/07/18 0231)    Pain Location 1: Incisional, Back    Pain Intervention(s) 1: Medication (see MAR), Ice    Patient Stated Pain Goal: 0  o Interventions for Pain:  none, medication  o Intervention effective: yes  o Time of last intervention: 0600   o Reassessment Completed: yes      Last 3 Weights:  Last 3 Recorded Weights in this Encounter    08/03/18 0939 08/06/18 1444   Weight: 59.9 kg (132 lb) 59.2 kg (130 lb 9.6 oz)     Weight change:      INTAKE/OUPUT    Current Shift: 08/06 1901 - 08/07 0700  In: 250 [I.V.:250]  Out: 2100 [Urine:2100]    Last three shifts: 08/05 0701 - 08/06 1900  In: 500 [I.V.:500]  Out: 10      LAB RESULTS   No results for input(s): WBC, HGB, HCT, PLT, HGBEXT, HCTEXT, PLTEXT in the last 72 hours. No results for input(s): NA, K, GLU, BUN, CREA, CA, MG, INR in the last 72 hours. No lab exists for component: PT, PTT, INREXT    RECOMMENDATIONS AND DISCHARGE PLANNING     1. Pending tests/procedures/ Plan of Care or Other Needs: none     2. Discharge plan for patient and Needs/Barriers: noine    3. Estimated Discharge Date: 8/7/18 Posted on Whiteboard in [address] [place] [address] Room: yes      4. The patient's care plan was reviewed with the oncoming nurse. \"HEALS\" SAFETY CHECK      Fall Risk    Total Score: 1    Safety Measures: Safety Measures: Bed/Chair-Wheels locked, Bed in low position, Call light within reach, Caregiver at bedside, Gripper socks, Side rails X 3    A safety check occurred in the patient's room between off going nurse and oncoming nurse listed above.     The safety check included the below items  Area Items   H  High Alert Medications - Verify all high alert medication drips (heparin, PCA, etc.)   E  Equipment - Suction is set up for ALL patients (with yanker)  - Red plugs utilized for all equipment (IV pumps, etc.)  - WOWs wiped down at end of shift.  - Room stocked with oxygen, suction, and other unit-specific supplies   A  Alarms - Bed alarm is set for fall risk patients  - Ensure chair alarm is in place and activated if patient is up in a chair   L  Lines - Check IV for any infiltration  - Thorpe bag is empty if patient has a Thorpe   - Tubing and IV bags are labeled   S  Safety   - Room is clean, patient is clean, and equipment is clean.  - Hallways are clear from equipment besides carts. - Fall bracelet on for fall risk patients  - Ensure room is clear and free of clutter  - Suction is set up for ALL patients (with ivis)  - Hallways are clear from equipment besides carts.    - Isolation precautions followed, supplies available outside room, sign posted     Ayana Louis RN

## 2018-08-07 NOTE — PROGRESS NOTES
Patient is safe for home mobility and would recommend Home Health PT. Full note to follow. Thank you for this referral. Matheus Claire, PT, DPT.

## 2018-08-07 NOTE — PROGRESS NOTES
Reason for Admission:   HNP                   RRAT Score:       8              Plan for utilizing home health:  ORDERED                        Likelihood of Readmission: LOW                          Transition of Care Plan:   [address] Management Interventions  PCP Verified by CM: Yes (DR. HERNANDEZ)  Palliative Care Criteria Met (RRAT>21 & CHF Dx)?: No  Mode of Transport at Discharge: Other (see comment) (FAMILY WILL TRANSPORT)  Transition of Care Consult (CM Consult): Home Health, Discharge Planning  Physical Therapy Consult: Yes  Occupational Therapy Consult: Yes  Current Support Network: Own Home, Lives Alone, Family Lives Nearby  Confirm Follow Up Transport: Family  Plan discussed with Pt/Family/Caregiver: Yes (PT PLAN IS TO [address] [place] [address])  [place] of Choice Offered: Yes  [address] Provided?: No  Discharge Location  Discharge Placement: Home with home health   [address] provided for this pt who chose [facility] . Pt concerned regarding accepting home health. Pt reports family support and pt's family will transport this pt home. This writer requested that this pt's insurance be checked prior to acceptance of home health. Pt placed in referral que for acceptance.

## 2018-08-07 NOTE — PROGRESS NOTES
Rounded on patient post spine surgery. Activity: Reinforced importance of getting OOB for all meals, going to bathroom to help prevent blood clots. VTE prophylaxis: Instructed patient to use their SCD's when not up and walking. To use while in bed and in the chair. Educated re: ankle pumps to assist with circulation when in hospital and at home. Medications: Reviewed pain medications patient is taking and the importance of keeping pain under control to help with getting OOB and therapy. Reminded the patient to always eat a snack with their pain medication to help to prevent nausea. Encouraged patient to monitor for constipation and to take a stool softner/laxative while recovering and on pain medication. Instructed not to go over 3 days without a bowel movement. Patient educated to stay on stool softener and or mild laxative while on narcotics along with drinking 8 glasses of water a day (unless on a fluid restriction). Incentive Spirometry: Reinforced use of incentive spirometer with return demonstration by patient. Wound Care: Dressing to surgical site dry and intact opsite. Patient instructed not to take dressing off at home. Patient given CHG wash to use in hospital and at home. Stressed importance of using a clean towel and washcloth daily. Reminded to put on clean clothes and night clothes daily. Instructed to call nursing staff while in the hospital if dressing coming loose or feels wet. Instructed to call Home health agency or Dr Raymund Schilder office for concerns about dressing at home. Ice Protocol: Ice not on at present. Patient Safety: Call light  and personal belongings in reach. . Patient and daughter reminded to call for help toget OOB or when leaving bathroom for safety. Phone and other items also within reach per patient's request. Reviewed back safety:no bending, lifting, twisting and no lifting anything greater than 1/2 gallon of milk. Patient reminded to log roll to get OOB.    Diet: Educated patient on the importance of eating three well balanced meals a day with protein to promote bone/muscle healing. Reminded patient to drink lots of fluids to protect kidneys from all the medications being taken currently with recovery. Patient given post op educational material to remind them to do all the things discussed to prevent post op complications and have a successful recovery. Patient and daughter verbalized understand of all information and education discussed. Also given spine education book since did not attend class. Patient and daughter given the opportunity for asking questions.

## 2018-08-15 ENCOUNTER — OFFICE VISIT (OUTPATIENT)
Dept: INTERNAL MEDICINE CLINIC | Age: 63
End: 2018-08-15

## 2018-08-15 VITALS
RESPIRATION RATE: 14 BRPM | WEIGHT: 136 LBS | TEMPERATURE: 98.2 F | OXYGEN SATURATION: 98 % | BODY MASS INDEX: 27.42 KG/M2 | SYSTOLIC BLOOD PRESSURE: 122 MMHG | DIASTOLIC BLOOD PRESSURE: 78 MMHG | HEIGHT: 59 IN | HEART RATE: 61 BPM

## 2018-08-15 DIAGNOSIS — Z98.890 STATUS POST LUMBAR SPINE OPERATION: Primary | ICD-10-CM

## 2018-08-15 DIAGNOSIS — G43.009 MIGRAINE WITHOUT AURA AND WITHOUT STATUS MIGRAINOSUS, NOT INTRACTABLE: ICD-10-CM

## 2018-08-15 DIAGNOSIS — M51.26 HNP (HERNIATED NUCLEUS PULPOSUS), LUMBAR: ICD-10-CM

## 2018-08-15 DIAGNOSIS — E11.9 TYPE 2 DIABETES MELLITUS WITHOUT COMPLICATION, WITHOUT LONG-TERM CURRENT USE OF INSULIN (HCC): ICD-10-CM

## 2018-08-15 RX ORDER — PROPRANOLOL HYDROCHLORIDE 40 MG/1
TABLET ORAL
Qty: 180 TAB | Refills: 2 | Status: SHIPPED | OUTPATIENT
Start: 2018-08-15 | End: 2019-04-19 | Stop reason: SDUPTHER

## 2018-08-15 RX ORDER — SUMATRIPTAN 50 MG/1
TABLET, FILM COATED ORAL
Qty: 9 TAB | Refills: 2 | Status: SHIPPED | OUTPATIENT
Start: 2018-08-15 | End: 2019-01-20 | Stop reason: SDUPTHER

## 2018-08-15 NOTE — PROGRESS NOTES
1. Have you been to the ER, urgent care clinic or hospitalized since your last visit? YES.     2. Have you seen or consulted any other health care providers outside of the [address] [place] [address] since your last visit (Include any pap smears or colon screening)? NO      Do you have an Advanced Directive?  YES

## 2018-08-15 NOTE — MR AVS SNAPSHOT
[facility] 
 
 
 [address], [address] [place] [address] [facility] 
[phone] Patient: Deneen Fisher MRN: [id number] FUH:[id number] Visit Information Date & Time Provider Department Dept. Phone Encounter #  
 8/15/2018  9:30 AM Suman Pressley NP [facility] [phone] [id number] Your Appointments 8/21/2018 11:30 AM  
POST OP with Azra Azul NP  
[facility] ([address] [place] [address]) Appt Note: sx 8-8  
 [address] [facility] 200 [facility] [address]  
[phone]  
  
   
 [address] [address] 9/18/2018  8:15 AM  
Follow Up with Val Cotton MD  
[facility] [address]) Appt Note: 6wk fu sx 8-8; 6wk fu sx 8-8  
 [address] [facility] [address]  
[phone]  
  
   
 [address]01 [facility] [address] [place] [address]  
  
    
 11/1/2018  9:25 AM  
LAB with [facility] NURSE VISIT [facility] ([address] [place] [address]) Appt Note: labs 6mos. sarris [address], [place] [facility] [address] [facility]  
  
   
 [address], [address]  
  
    
 11/8/2018 10:30 AM  
Office Visit with India Galarza MD  
[facility] [address] Appt Note: ov 6mos sarris [address], [address] [place] [address] [facility]  
  
   
 [address], [address] Upcoming Health Maintenance Date Due  
 EYE EXAM RETINAL OR DILATED Q1 5/2/2017 PAP AKA CERVICAL CYTOLOGY 11/17/2017 Pneumococcal 19-64 Highest Risk (2 of 3 - PCV13) 5/11/2018 Influenza Age 5 to Adult 8/1/2018 FOOT EXAM Q1 10/2/2018* ZOSTER VACCINE AGE 60> 11/30/2018* HEMOGLOBIN A1C Q6M 2/7/2019 MICROALBUMIN Q1 4/26/2019 LIPID PANEL Q1 4/26/2019 BREAST CANCER SCRN MAMMOGRAM 12/27/2019 COLONOSCOPY 10/24/2024 DTaP/Tdap/Td series (2 - Td) 11/10/2026 *Topic was postponed. The date shown is not the original due date. Allergies as of 8/15/2018  Review Complete On: 8/15/2018 By: Sandi Paige Severity Noted Reaction Type Reactions Pcn [Penicillins]  10/25/2011    Rash  
 Sulfa (Sulfonamide Antibiotics)  10/25/2011    Rash Current Immunizations  Reviewed on 5/11/2017 Name Date Influenza Vaccine PF 10/13/2014 12:46 PM, 2/3/2014  3:30 PM  
 Influenza Vaccine Split 11/14/2011 Influenza Vaccine Whole 10/5/2010 Pneumococcal Polysaccharide (PPSV-23) 5/11/2017 11:41 AM  
 TD Vaccine 1/23/2007 Tdap 11/10/2016  1:28 PM  
  
 Not reviewed this visit Vitals BP Pulse Temp Resp Height(growth percentile) Weight(growth percentile) 122/78 (BP 1 Location: Left arm, BP Patient Position: Sitting) 61 98.2 °F (36.8 °C) (Oral) 14 4' 11\" (1.499 m) 136 lb (61.7 kg) SpO2 BMI OB Status Smoking Status 98% 27.47 kg/m2 Postmenopausal Former Smoker Vitals History BMI and BSA Data Body Mass Index Body Surface Area  
 27.47 kg/m 2 1.6 m 2 Preferred Pharmacy Pharmacy Name Phone [address], [address] [place] [address] [phone] [facility] [phone] Your Updated Medication List  
  
   
This list is accurate as of 8/15/18  9:58 AM.  Always use your most recent med list.  
  
  
  
  
 aspirin 81 mg tablet Take 81 mg by mouth. dextroamphetamine-amphetamine 10 mg tablet Commonly known as:  ADDERALL Take 1 Tab by mouth daily. Max Daily Amount: 10 mg.  
  
 estradiol 0.05 mg/24 hr  
Commonly known as:  VIVELLE  
1 Patch by TransDERmal route two (2) times a week.  
  
 gabapentin 300 mg capsule Commonly known as:  NEURONTIN Take 1 Tab QHS x1 week, then BID x1 week, then TID  Indications: NEUROPATHIC PAIN  
  
 HYDROcodone-acetaminophen 7.5-325 mg per tablet Commonly known as:  Octavio Mccarthy  
 Take 1 Tab Q 6-8 hrs PRN Pain  
  
 ibuprofen 800 mg tablet Commonly known as:  MOTRIN Take 800 mg by mouth four (4) times daily. lisinopril 5 mg tablet Commonly known as:  PRINIVIL, ZESTRIL  
TAKE 1 TABLET BY MOUTH DAILY LORazepam 1 mg tablet Commonly known as:  ATIVAN Take 1 Tab by mouth nightly as needed for Anxiety. Max Daily Amount: 1 mg.  
  
 metFORMIN 1,000 mg tablet Commonly known as:  GLUCOPHAGE  
TAKE 1 TABLET BY MOUTH TWICE DAILY WITH MEALS  
  
 naloxone 4 mg/actuation nasal spray Commonly known as:  [employer] Use 1 spray intranasally, then discard. Repeat with new spray every 2 min as needed for opioid overdose symptoms, alternating nostrils. OLANZapine 5 mg tablet Commonly known as:  ZyPREXA Take 1 Tab by mouth nightly. oxyCODONE-acetaminophen 5-325 mg per tablet Commonly known as:  PERCOCET Take 1 Tab by mouth every six (6) hours as needed for Pain. Max Daily Amount: 4 Tabs. progesterone 200 mg capsule Commonly known as:  PROMETRIUM Take 1 Cap by mouth daily. propranolol 40 mg tablet Commonly known as:  INDERAL  
TAKE 1 TABLET BY MOUTH TWICE DAILY  
  
 rosuvastatin 20 mg tablet Commonly known as:  CRESTOR Take 1 Tab by mouth daily. SHINGRIX (PF) 50 mcg/0.5 mL Susr injection Generic drug:  varicella-zoster recombinant (PF)  
  
 SUMAtriptan 50 mg tablet Commonly known as:  IMITREX  
TAKE 1 TABLET BY MOUTH EVERY DAY AS NEEDED  
  
 topiramate 25 mg tablet Commonly known as:  TOPAMAX Take 3 tabs po BID  
  
 VITAMIN D3 2,000 unit Cap capsule Generic drug:  Cholecalciferol (Vitamin D3) Take  by mouth two (2) times a day. vortioxetine 10 mg tablet Commonly known as:  TRINTELLIX Take 1 and 1/2 tablets by mouth daily. Introducing [facility]!    
 [facility] introduces [employer] patient portal. Now you can access parts of your medical record, email your doctor's office, and request medication refills online. 1. In your internet browser, go to [email or web address] 2. Click on the First Time User? Click Here link in the Sign In box. You will see the New Member Sign Up page. 3. Enter your [employer] Access Code exactly as it appears below. You will not need to use this code after youve completed the sign-up process. If you do not sign up before the expiration date, you must request a new code. · [employer] Access Code: [email or web address] Expires: 10/14/2018  1:46 PM 
 
4. Enter the last four digits of your Social Security Number (xxxx) and Date of Birth (mm/dd/yyyy) as indicated and click Submit. You will be taken to the next sign-up page. 5. Create a [employer] ID. This will be your [employer] login ID and cannot be changed, so think of one that is secure and easy to remember. 6. Create a [employer] password. You can change your password at any time. 7. Enter your Password Reset Question and Answer. This can be used at a later time if you forget your password. 8. Enter your e-mail address. You will receive e-mail notification when new information is available in [address] 9. Click Sign Up. You can now view and download portions of your medical record. 10. Click the Download Summary menu link to download a portable copy of your medical information. If you have questions, please visit the Frequently Asked Questions section of the [employer] website. Remember, [employer] is NOT to be used for urgent needs. For medical emergencies, dial 911. Now available from your iPhone and Android! Please provide this summary of care documentation to your next provider. Your primary care clinician is listed as India Galarza. If you have any questions after today's visit, please call [phone].

## 2018-08-15 NOTE — PROGRESS NOTES
TRANSITIONS OF CARE FACE-TO-FACE VISIT  [facility][note 3]:  8/15/2018, MRN: [id number]  Chief Complaint   Patient presents with    Transitions Of Care     Patient here for NAZIA CASTELLON for back surgery. Patient stated that pants rubs where the incision is and bothers a it but patient has follow up with surgion next week on 08/21/18         Ricarda Olivas is a 66 y.o. female and presents to clinic after recent hospitalization. Subjective:   Discharged from: 8/7/18    Summary of hospitalization problems/diagnoses: Mrs. Stacey Gong presents today for follow up to post operative Right L5-S1 hemilaminotomy, diskectomy performed by Dr. Viviana Painter on 8/6/18 and was discharged on 8/7/18. Surgery was without complications. Patient reports today with 0/10 pain, reports not using Percocet or Vicodin as prescribed due to side effects. She denies fever, chills, gi symptoms, cp, dyspnea, palpitations. She has a scheduled surgical follow up on 8/21/18. She is requesting refills of Imitrex and Propanolol today. Current Outpatient Prescriptions on File Prior to Visit   Medication Sig Dispense Refill    metFORMIN (GLUCOPHAGE) 1,000 mg tablet TAKE 1 TABLET BY MOUTH TWICE DAILY WITH MEALS 60 Tab 6    lisinopril (PRINIVIL, ZESTRIL) 5 mg tablet TAKE 1 TABLET BY MOUTH DAILY 90 Tab 2    gabapentin (NEURONTIN) 300 mg capsule Take 1 Tab QHS x1 week, then BID x1 week, then TID  Indications: NEUROPATHIC PAIN 90 Cap 1    naloxone (NARCAN) 4 mg/actuation nasal spray Use 1 spray intranasally, then discard. Repeat with new spray every 2 min as needed for opioid overdose symptoms, alternating nostrils. 1 Each 0    ibuprofen (MOTRIN) 800 mg tablet Take 800 mg by mouth four (4) times daily.  OLANZapine (ZYPREXA) 5 mg tablet Take 1 Tab by mouth nightly. 1 Tab 0    LORazepam (ATIVAN) 1 mg tablet Take 1 Tab by mouth nightly as needed for Anxiety.  Max Daily Amount: 1 mg. 1 Tab 0    progesterone (PROMETRIUM) 200 mg capsule Take 1 Cap by mouth daily. 30 Cap 1    estradiol (VIVELLE) 0.05 mg/24 hr 1 Patch by TransDERmal route two (2) times a week. (Patient taking differently: 1 Patch by TransDERmal route Every Saturday.) 8 Patch 1    Cholecalciferol, Vitamin D3, (VITAMIN D3) 2,000 unit cap capsule Take  by mouth two (2) times a day.  rosuvastatin (CRESTOR) 20 mg tablet Take 1 Tab by mouth daily. 30 Tab 11    vortioxetine (TRINTELLIX) 10 mg tablet Take 1 and 1/2 tablets by mouth daily. (Patient taking differently: 20 mg. Take 1 and 1/2 tablets by mouth daily.) 60 Tab 2    dextroamphetamine-amphetamine (ADDERALL) 10 mg tablet Take 1 Tab by mouth daily. Max Daily Amount: 10 mg. 30 Tab 0    aspirin 81 mg tablet Take 81 mg by mouth.  oxyCODONE-acetaminophen (PERCOCET) 5-325 mg per tablet Take 1 Tab by mouth every six (6) hours as needed for Pain. Max Daily Amount: 4 Tabs. 20 Tab 0    SHINGRIX, PF, 50 mcg/0.5 mL susr injection       HYDROcodone-acetaminophen (NORCO) 7.5-325 mg per tablet Take 1 Tab Q 6-8 hrs PRN Pain 28 Tab 0    topiramate (TOPAMAX) 25 mg tablet Take 3 tabs po  Tab 2     No current facility-administered medications on file prior to visit.         Medication changes: no  Medication list updated:  yes  Needs referral or labs:  no  Treatment Barriers: no      Patient Active Problem List    Diagnosis Date Noted    HNP (herniated nucleus pulposus), lumbar 07/31/2018    Right lumbar radiculopathy 06/14/2018    DDD (degenerative disc disease), lumbar 06/14/2018    Overweight (BMI 25.0-29.9) 05/06/2018    Recurrent depression ([facility] 05/02/2018    Vitamin D deficiency 05/14/2017    Essential hypertension 04/23/2017    Type 2 diabetes mellitus without complication ([facility] [facility] 12/22/0129    Bilateral lumbar radiculopathy 02/18/2015    Migraine 06/03/2014    Otosclerosis 09/11/2012    HCD (hypertensive cardiovascular disease)     Hyperlipidemia        Current Outpatient Prescriptions   Medication Sig Dispense Refill    propranolol (INDERAL) 40 mg tablet TAKE 1 TABLET BY MOUTH TWICE DAILY 180 Tab 2    SUMAtriptan (IMITREX) 50 mg tablet TAKE 1 TABLET BY MOUTH EVERY DAY AS NEEDED 9 Tab 2    metFORMIN (GLUCOPHAGE) 1,000 mg tablet TAKE 1 TABLET BY MOUTH TWICE DAILY WITH MEALS 60 Tab 6    lisinopril (PRINIVIL, ZESTRIL) 5 mg tablet TAKE 1 TABLET BY MOUTH DAILY 90 Tab 2    gabapentin (NEURONTIN) 300 mg capsule Take 1 Tab QHS x1 week, then BID x1 week, then TID  Indications: NEUROPATHIC PAIN 90 Cap 1    naloxone (NARCAN) 4 mg/actuation nasal spray Use 1 spray intranasally, then discard. Repeat with new spray every 2 min as needed for opioid overdose symptoms, alternating nostrils. 1 Each 0    ibuprofen (MOTRIN) 800 mg tablet Take 800 mg by mouth four (4) times daily.  OLANZapine (ZYPREXA) 5 mg tablet Take 1 Tab by mouth nightly. 1 Tab 0    LORazepam (ATIVAN) 1 mg tablet Take 1 Tab by mouth nightly as needed for Anxiety. Max Daily Amount: 1 mg. 1 Tab 0    progesterone (PROMETRIUM) 200 mg capsule Take 1 Cap by mouth daily. 30 Cap 1    estradiol (VIVELLE) 0.05 mg/24 hr 1 Patch by TransDERmal route two (2) times a week. (Patient taking differently: 1 Patch by TransDERmal route Every Saturday.) 8 Patch 1    Cholecalciferol, Vitamin D3, (VITAMIN D3) 2,000 unit cap capsule Take  by mouth two (2) times a day.  rosuvastatin (CRESTOR) 20 mg tablet Take 1 Tab by mouth daily. 30 Tab 11    vortioxetine (TRINTELLIX) 10 mg tablet Take 1 and 1/2 tablets by mouth daily. (Patient taking differently: 20 mg. Take 1 and 1/2 tablets by mouth daily.) 60 Tab 2    dextroamphetamine-amphetamine (ADDERALL) 10 mg tablet Take 1 Tab by mouth daily. Max Daily Amount: 10 mg. 30 Tab 0    aspirin 81 mg tablet Take 81 mg by mouth.  oxyCODONE-acetaminophen (PERCOCET) 5-325 mg per tablet Take 1 Tab by mouth every six (6) hours as needed for Pain. Max Daily Amount: 4 Tabs.  20 Tab 0    SHINGRIX, PF, 50 mcg/0.5 mL susr injection       HYDROcodone-acetaminophen (NORCO) 7.5-325 mg per tablet Take 1 Tab Q 6-8 hrs PRN Pain 28 Tab 0    topiramate (TOPAMAX) 25 mg tablet Take 3 tabs po  Tab 2       Allergies   Allergen Reactions    Pcn [Penicillins] Rash    Sulfa (Sulfonamide Antibiotics) Rash       Past Medical History:   Diagnosis Date    ADHD     Anxiety     Bilateral lumbar radiculopathy     Carotid bruit     right    Depression     Diabetes mellitus ([address]     HCD (hypertensive cardiovascular disease)     Hyperlipidemia     Hypertension     Migraine headache     Plantar fasciitis        Past Surgical History:   Procedure Laterality Date    HX SEPTOPLASTY  6/2002    HX TONSILLECTOMY      NEUROLOGICAL PROCEDURE UNLISTED  08/06/2018    laminectomy        Family History   Problem Relation Age of Onset    Hypertension Brother      x2    Elevated Lipids Brother     Stroke Mother     Heart Surgery Mother      CABG    Cancer Father      cancer of the mouth    Hypertension Father     Hypertension Brother     Elevated Lipids Brother        Social History   Substance Use Topics    Smoking status: Former Smoker     Quit date: 1/1/1989    Smokeless tobacco: Never Used    Alcohol use No       ROS   Review of Systems   Constitutional: Negative. Respiratory: Negative. Cardiovascular: Negative. Gastrointestinal: Negative. Genitourinary: Negative. Musculoskeletal: Negative. Skin: Negative. Neurological: Negative. Objective     Vitals:    08/15/18 0928   BP: 122/78   Pulse: 61   Resp: 14   Temp: 98.2 °F (36.8 °C)   TempSrc: Oral   SpO2: 98%   Weight: 136 lb (61.7 kg)   Height: 4' 11\" (1.499 m)   PainSc:   0 - No pain       Physical Exam   Constitutional: She is oriented to person, place, and time and well-developed, well-nourished, and in no distress. No distress. HENT:   Head: Normocephalic and atraumatic. Cardiovascular: Normal rate, regular rhythm, normal heart sounds and intact distal pulses. Pulmonary/Chest: Effort normal and breath sounds normal. No respiratory distress. She has no wheezes. She has no rales. She exhibits no tenderness. Abdominal: Soft. Bowel sounds are normal.   Musculoskeletal: Normal range of motion. She exhibits no edema. Neurological: She is alert and oriented to person, place, and time. No cranial nerve deficit. Coordination normal.   Skin: Skin is warm and dry. No rash noted. She is not diaphoretic. No erythema. Midline low back approximated incision, covered with dressing. No erythema, edema, pain on palpation around the site, heat, or drainage noted. Psychiatric: Mood, memory, affect and judgment normal.   Vitals reviewed.       LABS   Data Review:   Lab Results   Component Value Date/Time    WBC 10.0 04/26/2018 08:04 AM    HGB 13.2 04/26/2018 08:04 AM    HCT 40.8 04/26/2018 08:04 AM    PLATELET 070 14/89/2716 08:04 AM    MCV 93 04/26/2018 08:04 AM       Lab Results   Component Value Date/Time    Sodium 143 04/26/2018 08:04 AM    Potassium 4.3 04/26/2018 08:04 AM    Chloride 100 04/26/2018 08:04 AM    CO2 28 04/26/2018 08:04 AM    Anion gap 15.0 04/26/2018 08:04 AM    Glucose 146 (H) 04/26/2018 08:04 AM    BUN 27 (H) 04/26/2018 08:04 AM    Creatinine 0.8 04/26/2018 08:04 AM    BUN/Creatinine ratio 29 (H) 03/16/2010 10:37 AM    GFR est AA >60 03/16/2010 10:37 AM    GFR est non-AA >60 03/16/2010 10:37 AM    Calcium 9.5 04/26/2018 08:04 AM       Lab Results   Component Value Date/Time    Cholesterol, total 158 04/26/2018 08:04 AM    HDL Cholesterol 49 04/26/2018 08:04 AM    LDL, calculated 61 04/26/2018 08:04 AM    VLDL, calculated 48 (H) 04/26/2018 08:04 AM    Triglyceride 240 (H) 04/26/2018 08:04 AM    CHOL/HDL Ratio 5.3 (H) 09/28/2010 10:53 AM       Lab Results   Component Value Date/Time    Hemoglobin A1c 6.7 (H) 08/07/2018 09:00 AM    Hemoglobin A1c, External 7.4 11/07/2016       Assessment/Plan:   Lab review: no lab studies available for review at time of visit  Referrals: not needed  Complexity: Moderate    Community resources identified for patient: none needed  Durable Medical Equipment: none needed    There are no diagnoses linked to this encounter. A. Key points we discussed today:  1. Pt instructed to call our office if symptoms worsen or if the pt/caregiver has any questions. 2. Handout given postoperative infection and postoperative pain management. 3. Discussed to limit high carb, sweets to keep blood sugars controlled, last HgbA1c 6.7.   4. Refilled Imitrex and Propanolol. Orders Placed This Encounter    propranolol (INDERAL) 40 mg tablet     Sig: TAKE 1 TABLET BY MOUTH TWICE DAILY     Dispense:  180 Tab     Refill:  2    SUMAtriptan (IMITREX) 50 mg tablet     Sig: TAKE 1 TABLET BY MOUTH EVERY DAY AS NEEDED     Dispense:  9 Tab     Refill:  2       B. New labs/medications ordered today:   1. A new medication list was given to the patient/family/caregiver. The medication list has been updated. A new medication list was given today to the patient. I have discussed the diagnosis with the patient and the intended plan as seen in the above orders. The patient has received an after-visit summary and questions were answered concerning future plans. I have discussed medication side effects and warnings with the patient as well. I have reviewed the plan of care with the patient, accepted their input and they are in agreement with the treatment goals. All questions were answered. The patient understands the plan of care. Handouts provided today with above information. Pt instructed if symptoms worsen to call the office or report to the ED for continued care. Greater than 50% of the visit time was spent in counseling and/or coordination of care. Follow-up Disposition:  Return if symptoms worsen or fail to improve.       STONE Freeman  [facility] [address], [address] [place] [address].  Phone: [phone]  Fax: [phone]

## 2018-08-21 ENCOUNTER — OFFICE VISIT (OUTPATIENT)
Dept: ORTHOPEDIC SURGERY | Age: 63
End: 2018-08-21

## 2018-08-21 VITALS
BODY MASS INDEX: 26.21 KG/M2 | WEIGHT: 130 LBS | HEART RATE: 64 BPM | TEMPERATURE: 97.9 F | RESPIRATION RATE: 16 BRPM | DIASTOLIC BLOOD PRESSURE: 73 MMHG | HEIGHT: 59 IN | SYSTOLIC BLOOD PRESSURE: 145 MMHG | OXYGEN SATURATION: 96 %

## 2018-08-21 DIAGNOSIS — Z98.890 S/P LUMBAR LAMINECTOMY: ICD-10-CM

## 2018-08-21 DIAGNOSIS — M51.26 HNP (HERNIATED NUCLEUS PULPOSUS), LUMBAR: Primary | ICD-10-CM

## 2018-08-21 DIAGNOSIS — M54.16 RIGHT LUMBAR RADICULOPATHY: ICD-10-CM

## 2018-08-21 NOTE — PATIENT INSTRUCTIONS
Lumbar Laminectomy: What to Expect at [address] [place] [address] Drive can expect your back to feel stiff or sore after surgery. This should improve in the weeks after surgery. You may have trouble sitting or standing in one position for very long and may need pain medicine in the weeks after your surgery. Your doctor may advise you to work with a physical therapist to strengthen the muscles around your spine and trunk. You will need to learn how to lift, twist, and bend so that you do not put too much strain on your back. This care sheet gives you a general idea about how long it will take for you to recover. But each person recovers at a different pace. Follow the steps below to get better as quickly as possible. How can you care for yourself at home? Activity    · Rest when you feel tired. Getting enough sleep will help you recover.     · Try to walk each day. Start by walking a little more than you did the day before. Bit by bit, increase the amount you walk. Walking boosts blood flow and helps prevent pneumonia and constipation. Walking may also decrease your muscle soreness after surgery.     · If advised by your doctor, you may need to avoid lifting anything that would cause excessive strain on your back. This may include a child, heavy grocery bags and milk containers, a heavy briefcase or backpack, cat litter or dog food bags, or a vacuum .     · Avoid strenuous activities, such as bicycle riding, jogging, weight lifting, or aerobic exercise, until your doctor says it is okay.     · Do not drive for 2 to 4 weeks after your surgery or until your doctor says it is okay.     · Avoid riding in a car for more than 30 minutes at a time for 2 to 4 weeks after surgery. If you must ride in a car for a longer distance, stop often to walk and stretch your legs.     · Try to change your position about every 30 minutes while sitting or standing.  This will help decrease your back pain while you are healing.     · You will probably need to take 4 to 6 weeks off from work. It depends on the type of work you do and how you feel.     · You may have sex as soon as you feel able, but avoid positions that put stress on your back or cause pain. Diet    · You can eat your normal diet. If your stomach is upset, try bland, low-fat foods like plain rice, broiled chicken, toast, and yogurt.     · Drink plenty of fluids (unless your doctor tells you not to).     · You may notice that your bowel movements are not regular right after your surgery. This is common. Try to avoid constipation and straining with bowel movements. You may want to take a fiber supplement every day. If you have not had a bowel movement after a couple of days, ask your doctor about taking a mild laxative. Medicines    · Your doctor will tell you if and when you can restart your medicines. He or she will also give you instructions about taking any new medicines.     · If you take blood thinners, such as warfarin (Coumadin), clopidogrel (Plavix), or aspirin, be sure to talk to your doctor. He or she will tell you if and when to start taking those medicines again. Make sure that you understand exactly what your doctor wants you to do.     · Take pain medicines exactly as directed. ¨ If the doctor gave you a prescription medicine for pain, take it as prescribed. ¨ If you are not taking a prescription pain medicine, ask your doctor if you can take an over-the-counter medicine.     · If your doctor prescribed antibiotics, take them as directed. Do not stop taking them just because you feel better. You need to take the full course of antibiotics.     · If you think your pain medicine is making you sick to your stomach:  ¨ Take your medicine after meals (unless your doctor has told you not to). ¨ Ask your doctor for a different pain medicine.    Incision care    · If you have strips of tape on the cut (incision) the doctor made, leave the tape on for a week or until it falls off.     · Wash the area daily with warm, soapy water and pat it dry.     · Keep the area clean and dry. You may cover it with a gauze bandage if it weeps or rubs against clothing. Change the bandage every day. Exercise    · Do back exercises as instructed by your doctor.     · Your doctor may advise you to work with a physical therapist to improve the strength and flexibility of your back. Other instructions    · To reduce stiffness and help sore muscles, use a warm water bottle, a heating pad set on low, or a warm cloth on your back. Do not put heat right over the incision. Do not go to sleep with a heating pad on your skin. Follow-up care is a key part of your treatment and safety. Be sure to make and go to all appointments, and call your doctor if you are having problems. It's also a good idea to know your test results and keep a list of the medicines you take. When should you call for help? Call 911 anytime you think you may need emergency care. For example, call if:    · You passed out (lost consciousness).     · You have sudden chest pain and shortness of breath, or you cough up blood.     · You are unable to move a leg at all.   [facility] your doctor now or seek immediate medical care if:    · You have new or worse symptoms in your legs or buttocks. Symptoms may include:  ¨ Numbness or tingling. ¨ Weakness. ¨ Pain.     · You lose bladder or bowel control.     · You have loose stitches, or your incision comes open.     · You have blood or fluid draining from the incision.     · You have signs of infection, such as:  ¨ Increased pain, swelling, warmth, or redness. ¨ Pus draining from the incision. ¨ A fever. ¨ Red streaks leading from the incision.    Watch closely for changes in your health, and be sure to contact your doctor if:    · You do not have a bowel movement after taking a laxative.     · You are not getting better as expected. Where can you learn more?   Go to http://jennifer-pavel.info/. Enter K983 in the search box to learn more about \"Lumbar Laminectomy: What to Expect at Home. \"  Current as of: November 29, 2017  Content Version: 11.7  © 3973-2049 [employer]. Care instructions adapted under license by [employer] (which disclaims liability or warranty for this information). If you have questions about a medical condition or this instruction, always ask your healthcare professional. [address] any warranty or liability for your use of this information.

## 2018-08-21 NOTE — PROGRESS NOTES
[address]  [address], [address]  [place]  Phone: [phone]  Fax: [phone]    Spine Post-Op Office Visit Note    Patient: Ben Valentin   MRN: [id number]     Age:  66 y.o.,      Sex: female    YOB: 1955     PCP: Anson Pollack MD    HISTORY OF PRESENT ILLNESS:  Chief Complaint   Patient presents with    Surgical Follow-up     Ben Valentin is a 66 y.o.  female with history of lumabr pain. Patient had Right L5-S1 hemilaminotomy, diskectomy. surgery 2 weeks ago. rior to surgery, she had  severe sciatica with weakness of her EHL on the right with a positive straight leg raise. Today, she is doing great. She is off pain medication. She states the leg pain has resolved. She has no pain. Her incision looks good. She would like to stop the Gabapentin. She is back to work. Her strength has returned. Patient denies any bladder/bowel dysfunction, new onset weakness, or other neurological deficits. ASSESSMENT   Diagnoses and all orders for this visit:    1. HNP (herniated nucleus pulposus), lumbar    2. Right lumbar radiculopathy    3. S/P lumbar laminectomy          IMPRESSION AND PLAN   1) Pt was given information on s/p lami. 2) Taper Gabapentin. 3) Wound care and activity level reviewed. 4) Ms. Baljinder Montoya has a reminder for a \"due or due soon\" health maintenance. I have asked that she contact her primary care provider, Anson Pollack MD, for follow-up on this health maintenance. 5) We have informed the patient to notify us for immediate appointment if he has any worsening neurogical symptoms or if an emergency situation presents, then call 911  6)  demonstrated consistency with prescribing. 7) Pt will follow-up in as scheduled.              SUBJECTIVE    Pain Scale: 0 - No pain/10    Pain Assessment  8/21/2018   Location of Pain -   Location Modifiers -   Severity of Pain 0   Quality of Pain -   Quality of Pain Comment -   Duration of Pain -   Frequency of Pain -   Aggravating Factors -   Limiting Behavior -   Relieving Factors -   Result of Injury -     Reports symptoms have significantly improved since surgery. Denies numbness or tingling to the leg as prior to surgery. Reports no fever. Review of systems  Constitutional: Negative for fever, chills, or weight change. Respiratory: Negative for cough or shortness of breath. Cardiovascular: Negative for chest pain or palpitations. Gastrointestinal: Negative for acid reflux, change in bowel habits, or constipation. Genitourinary: Negative for dysuria and flank pain. Musculoskeletal: Negative for  pain. Skin: Negative for rash. Neurological: Negative for headaches, dizziness, or numbness. Endo/Heme/Allergies: Negative for increased bruising. Psychiatric/Behavioral: Negative for difficulty with sleep.            Past Medical History:   Diagnosis Date    ADHD     Anxiety     Bilateral lumbar radiculopathy     Carotid bruit     right    Depression     Diabetes mellitus (HCC)     HCD (hypertensive cardiovascular disease)     Hyperlipidemia     Hypertension     Migraine headache     Plantar fasciitis        Past Surgical History:   Procedure Laterality Date    HX SEPTOPLASTY  6/2002    HX TONSILLECTOMY      NEUROLOGICAL PROCEDURE UNLISTED  08/06/2018    laminectomy        Current Outpatient Prescriptions   Medication Sig Dispense Refill    propranolol (INDERAL) 40 mg tablet TAKE 1 TABLET BY MOUTH TWICE DAILY 180 Tab 2    SUMAtriptan (IMITREX) 50 mg tablet TAKE 1 TABLET BY MOUTH EVERY DAY AS NEEDED 9 Tab 2    metFORMIN (GLUCOPHAGE) 1,000 mg tablet TAKE 1 TABLET BY MOUTH TWICE DAILY WITH MEALS 60 Tab 6    lisinopril (PRINIVIL, ZESTRIL) 5 mg tablet TAKE 1 TABLET BY MOUTH DAILY 90 Tab 2    SHINGRIX, PF, 50 mcg/0.5 mL susr injection       gabapentin (NEURONTIN) 300 mg capsule Take 1 Tab QHS x1 week, then BID x1 week, then TID  Indications: NEUROPATHIC PAIN 90 Cap 1    naloxone (NARCAN) 4 mg/actuation nasal spray Use 1 spray intranasally, then discard. Repeat with new spray every 2 min as needed for opioid overdose symptoms, alternating nostrils. 1 Each 0    ibuprofen (MOTRIN) 800 mg tablet Take 800 mg by mouth four (4) times daily.  OLANZapine (ZYPREXA) 5 mg tablet Take 1 Tab by mouth nightly. 1 Tab 0    LORazepam (ATIVAN) 1 mg tablet Take 1 Tab by mouth nightly as needed for Anxiety. Max Daily Amount: 1 mg. 1 Tab 0    progesterone (PROMETRIUM) 200 mg capsule Take 1 Cap by mouth daily. 30 Cap 1    estradiol (VIVELLE) 0.05 mg/24 hr 1 Patch by TransDERmal route two (2) times a week. (Patient taking differently: 1 Patch by TransDERmal route Every Saturday.) 8 Patch 1    Cholecalciferol, Vitamin D3, (VITAMIN D3) 2,000 unit cap capsule Take  by mouth two (2) times a day.  rosuvastatin (CRESTOR) 20 mg tablet Take 1 Tab by mouth daily. 30 Tab 11    vortioxetine (TRINTELLIX) 10 mg tablet Take 1 and 1/2 tablets by mouth daily. (Patient taking differently: 20 mg. Take 1 and 1/2 tablets by mouth daily.) 60 Tab 2    dextroamphetamine-amphetamine (ADDERALL) 10 mg tablet Take 1 Tab by mouth daily. Max Daily Amount: 10 mg. 30 Tab 0    aspirin 81 mg tablet Take 81 mg by mouth.  oxyCODONE-acetaminophen (PERCOCET) 5-325 mg per tablet Take 1 Tab by mouth every six (6) hours as needed for Pain. Max Daily Amount: 4 Tabs.  20 Tab 0    HYDROcodone-acetaminophen (NORCO) 7.5-325 mg per tablet Take 1 Tab Q 6-8 hrs PRN Pain 28 Tab 0    topiramate (TOPAMAX) 25 mg tablet Take 3 tabs po  Tab 2       Allergies   Allergen Reactions    Pcn [Penicillins] Rash    Sulfa (Sulfonamide Antibiotics) Rash            OBJECTIVE    Vitals:    08/21/18 1130   BP: 145/73   Pulse: 64   Resp: 16   Temp: 97.9 °F (36.6 °C)   TempSrc: Oral   SpO2: 96%   Weight: 130 lb (59 kg)   Height: 4' 11\" (1.499 m)   PainSc:   0 - No pain       Physical Exam:  General: alert, cooperative, no distress, appears stated age  Constitutional:  Well developed, well nourished, in no acute distress. Psychiatric: Affect and mood are appropriate. Integumentary: No rashes or abrasions noted on exposed areas. Wound: Incision healing well, has well approximated edges, no erythema, warmth, drainage or signs of infection. Some glue remains in place. Cardiovascular/Peripheral Vascular: No peripheral edema is noted. Lymphatic:  No evidence of lymphedema. No cervical lymphadenopathy. MOTOR     Hip Flex  Quads Hamstrings Ankle DF EHL Ankle PF   Right +4/5 +4/5 +4/5 +4/5 +4/5 +4/5   Left +4/5 +4/5 +4/5 +4/5 +4/5 +4/5       Straight Leg raise negative bilaterally. normal gait and station      Ambulation without assistive device. full weight bearing, non-antalgic gait. Accompanied by self.       Margaret Palafox NP  August 21, 2018  11:47 AM

## 2018-08-21 NOTE — MR AVS SNAPSHOT
[facility] 
 
 
 [address] [facility] [address] 
[phone] Patient: Akbar Scott MRN: [id number] Visit Information Date & Time Provider Department Dept. Phone Encounter #  
 8/21/2018 11:30 AM Rito Guzman NP [facility] [phone] [id number] Follow-up Instructions Return for as matias. Follow-up and Disposition History Your Appointments 9/18/2018  8:15 AM  
Follow Up with Guillermina Natarajan MD  
[facility] [address]) Appt Note: 6wk fu sx 8-8; 6wk fu sx 8-8  
 [address] [facility] [address] [phone]  
  
   
 [address] [facility] [address] [place] [address]  
  
    
 11/1/2018  9:25 AM  
LAB with [facility] NURSE VISIT [facility] [address] [place] [address] ([address] [place] [address]) Appt Note: labs 6mos. sarris [address], [address] [place] Fredia Romeo [address]  
  
   
 [address], [address]  
  
    
 11/8/2018 10:30 AM  
Office Visit with Asher Gallagher MD  
[facility] [address] Appt Note: ov 6mos sarris [address], [address] [place] Fredia Romeo [address]  
  
   
 [address], [address] Upcoming Health Maintenance Date Due  
 EYE EXAM RETINAL OR DILATED Q1 5/2/2017 PAP AKA CERVICAL CYTOLOGY 11/17/2017 Pneumococcal 19-64 Highest Risk (2 of 3 - PCV13) 5/11/2018 Influenza Age 5 to Adult 8/1/2018 FOOT EXAM Q1 10/2/2018* ZOSTER VACCINE AGE 60> 11/30/2018* HEMOGLOBIN A1C Q6M 2/7/2019 MICROALBUMIN Q1 4/26/2019 LIPID PANEL Q1 4/26/2019 BREAST CANCER SCRN MAMMOGRAM 12/27/2019 COLONOSCOPY 10/24/2024 DTaP/Tdap/Td series (2 - Td) 11/10/2026 *Topic was postponed. The date shown is not the original due date. Allergies as of 8/21/2018  Review Complete On: 8/21/2018 By: Brian Garza LPN Severity Noted Reaction Type Reactions Pcn [Penicillins]  10/25/2011    Rash  
 Sulfa (Sulfonamide Antibiotics)  10/25/2011    Rash Current Immunizations  Reviewed on 5/11/2017 Name Date Influenza Vaccine PF 10/13/2014 12:46 PM, 2/3/2014  3:30 PM  
 Influenza Vaccine Split 11/14/2011 Influenza Vaccine Whole 10/5/2010 Pneumococcal Polysaccharide (PPSV-23) 5/11/2017 11:41 AM  
 TD Vaccine 1/23/2007 Tdap 11/10/2016  1:28 PM  
  
 Not reviewed this visit You Were Diagnosed With   
  
 Codes Comments HNP (herniated nucleus pulposus), lumbar    -  Primary ICD-10-CM: M51.26 
ICD-9-CM: 722.10 Right lumbar radiculopathy     ICD-10-CM: M54.16 
ICD-9-CM: 724.4 S/P lumbar laminectomy     ICD-10-CM: R03.497 ICD-9-CM: V45.89 Vitals BP Pulse Temp Resp Height(growth percentile) Weight(growth percentile) 145/73 (BP 1 Location: Left arm, BP Patient Position: Sitting) 64 97.9 °F (36.6 °C) (Oral) 16 4' 11\" (1.499 m) 130 lb (59 kg) SpO2 BMI OB Status Smoking Status 96% 26.26 kg/m2 Postmenopausal Former Smoker BMI and BSA Data Body Mass Index Body Surface Area  
 26.26 kg/m 2 1.57 m 2 Preferred Pharmacy Pharmacy Name Phone [address] , [place] [address] [address] [facility] [phone] Your Updated Medication List  
  
   
This list is accurate as of 8/21/18 12:02 PM.  Always use your most recent med list.  
  
  
  
  
 aspirin 81 mg tablet Take 81 mg by mouth. dextroamphetamine-amphetamine 10 mg tablet Commonly known as:  ADDERALL Take 1 Tab by mouth daily. Max Daily Amount: 10 mg.  
  
 estradiol 0.05 mg/24 hr  
Commonly known as:  VIVELLE  
1 Patch by TransDERmal route two (2) times a week.  
  
 gabapentin 300 mg capsule Commonly known as:  NEURONTIN  
 Take 1 Tab QHS x1 week, then BID x1 week, then TID  Indications: NEUROPATHIC PAIN  
  
 HYDROcodone-acetaminophen 7.5-325 mg per tablet Commonly known as:  Airam De La Torre Take 1 Tab Q 6-8 hrs PRN Pain  
  
 ibuprofen 800 mg tablet Commonly known as:  MOTRIN Take 800 mg by mouth four (4) times daily. lisinopril 5 mg tablet Commonly known as:  PRINIVIL, ZESTRIL  
TAKE 1 TABLET BY MOUTH DAILY LORazepam 1 mg tablet Commonly known as:  ATIVAN Take 1 Tab by mouth nightly as needed for Anxiety. Max Daily Amount: 1 mg.  
  
 metFORMIN 1,000 mg tablet Commonly known as:  GLUCOPHAGE  
TAKE 1 TABLET BY MOUTH TWICE DAILY WITH MEALS  
  
 naloxone 4 mg/actuation nasal spray Commonly known as:  [employer] Use 1 spray intranasally, then discard. Repeat with new spray every 2 min as needed for opioid overdose symptoms, alternating nostrils. OLANZapine 5 mg tablet Commonly known as:  ZyPREXA Take 1 Tab by mouth nightly. oxyCODONE-acetaminophen 5-325 mg per tablet Commonly known as:  PERCOCET Take 1 Tab by mouth every six (6) hours as needed for Pain. Max Daily Amount: 4 Tabs. progesterone 200 mg capsule Commonly known as:  PROMETRIUM Take 1 Cap by mouth daily. propranolol 40 mg tablet Commonly known as:  INDERAL  
TAKE 1 TABLET BY MOUTH TWICE DAILY  
  
 rosuvastatin 20 mg tablet Commonly known as:  CRESTOR Take 1 Tab by mouth daily. SHINGRIX (PF) 50 mcg/0.5 mL Susr injection Generic drug:  varicella-zoster recombinant (PF)  
  
 SUMAtriptan 50 mg tablet Commonly known as:  IMITREX  
TAKE 1 TABLET BY MOUTH EVERY DAY AS NEEDED  
  
 topiramate 25 mg tablet Commonly known as:  TOPAMAX Take 3 tabs po BID  
  
 VITAMIN D3 2,000 unit Cap capsule Generic drug:  Cholecalciferol (Vitamin D3) Take  by mouth two (2) times a day. vortioxetine 10 mg tablet Commonly known as:  TRINTELLIX Take 1 and 1/2 tablets by mouth daily. Follow-up Instructions Return for as matias. Patient Instructions Lumbar Laminectomy: What to Expect at [facility] Your Recovery You can expect your back to feel stiff or sore after surgery. This should improve in the weeks after surgery. You may have trouble sitting or standing in one position for very long and may need pain medicine in the weeks after your surgery. Your doctor may advise you to work with a physical therapist to strengthen the muscles around your spine and trunk. You will need to learn how to lift, twist, and bend so that you do not put too much strain on your back. This care sheet gives you a general idea about how long it will take for you to recover. But each person recovers at a different pace. Follow the steps below to get better as quickly as possible. How can you care for yourself at home? Activity 
  · Rest when you feel tired. Getting enough sleep will help you recover.  
  · Try to walk each day. Start by walking a little more than you did the day before. Bit by bit, increase the amount you walk. Walking boosts blood flow and helps prevent pneumonia and constipation. Walking may also decrease your muscle soreness after surgery.  
  · If advised by your doctor, you may need to avoid lifting anything that would cause excessive strain on your back. This may include a child, heavy grocery bags and milk containers, a heavy briefcase or backpack, cat litter or dog food bags, or a vacuum .  
  · Avoid strenuous activities, such as bicycle riding, jogging, weight lifting, or aerobic exercise, until your doctor says it is okay.  
  · Do not drive for 2 to 4 weeks after your surgery or until your doctor says it is okay.  
  · Avoid riding in a car for more than 30 minutes at a time for 2 to 4 weeks after surgery.  If you must ride in a car for a longer distance, stop often to walk and stretch your legs.  
  · Try to change your position about every 30 minutes while sitting or standing. This will help decrease your back pain while you are healing.  
  · You will probably need to take 4 to 6 weeks off from work. It depends on the type of work you do and how you feel.  
  · You may have sex as soon as you feel able, but avoid positions that put stress on your back or cause pain. Diet 
  · You can eat your normal diet. If your stomach is upset, try bland, low-fat foods like plain rice, broiled chicken, toast, and yogurt.  
  · Drink plenty of fluids (unless your doctor tells you not to).  
  · You may notice that your bowel movements are not regular right after your surgery. This is common. Try to avoid constipation and straining with bowel movements. You may want to take a fiber supplement every day. If you have not had a bowel movement after a couple of days, ask your doctor about taking a mild laxative. Medicines 
  · Your doctor will tell you if and when you can restart your medicines. He or she will also give you instructions about taking any new medicines.  
  · If you take blood thinners, such as warfarin (Coumadin), clopidogrel (Plavix), or aspirin, be sure to talk to your doctor. He or she will tell you if and when to start taking those medicines again. Make sure that you understand exactly what your doctor wants you to do.  
  · Take pain medicines exactly as directed. ¨ If the doctor gave you a prescription medicine for pain, take it as prescribed. ¨ If you are not taking a prescription pain medicine, ask your doctor if you can take an over-the-counter medicine.  
  · If your doctor prescribed antibiotics, take them as directed. Do not stop taking them just because you feel better. You need to take the full course of antibiotics.  
  · If you think your pain medicine is making you sick to your stomach: 
¨ Take your medicine after meals (unless your doctor has told you not to). ¨ Ask your doctor for a different pain medicine. Incision care   · If you have strips of tape on the cut (incision) the doctor made, leave the tape on for a week or until it falls off.  
  · Wash the area daily with warm, soapy water and pat it dry.  
  · Keep the area clean and dry. You may cover it with a gauze bandage if it weeps or rubs against clothing. Change the bandage every day. Exercise 
  · Do back exercises as instructed by your doctor.  
  · Your doctor may advise you to work with a physical therapist to improve the strength and flexibility of your back. Other instructions 
  · To reduce stiffness and help sore muscles, use a warm water bottle, a heating pad set on low, or a warm cloth on your back. Do not put heat right over the incision. Do not go to sleep with a heating pad on your skin. Follow-up care is a key part of your treatment and safety. Be sure to make and go to all appointments, and call your doctor if you are having problems. It's also a good idea to know your test results and keep a list of the medicines you take. When should you call for help? Call 911 anytime you think you may need emergency care. For example, call if: 
  · You passed out (lost consciousness).  
  · You have sudden chest pain and shortness of breath, or you cough up blood.  
  · You are unable to move a leg at all.  
[facility] your doctor now or seek immediate medical care if: 
  · You have new or worse symptoms in your legs or buttocks. Symptoms may include: ¨ Numbness or tingling. ¨ Weakness. ¨ Pain.  
  · You lose bladder or bowel control.  
  · You have loose stitches, or your incision comes open.  
  · You have blood or fluid draining from the incision.  
  · You have signs of infection, such as: 
¨ Increased pain, swelling, warmth, or redness. ¨ Pus draining from the incision. ¨ A fever. ¨ Red streaks leading from the incision.  
 Watch closely for changes in your health, and be sure to contact your doctor if:   · You do not have a bowel movement after taking a laxative.  
  · You are not getting better as expected. Where can you learn more? Go to http://jennifer-pavel.info/. Enter W318 in the search box to learn more about \"Lumbar Laminectomy: What to Expect at Home. \" Current as of: November 29, 2017 Content Version: 11.7 © 2073-8278 [employer]. Care instructions adapted under license by [employer] (which disclaims liability or warranty for this information). If you have questions about a medical condition or this instruction, always ask your healthcare professional. [place] [address] any warranty or liability for your use of this information. Introducing [facility]! [employer] introduces griddig patient portal. Now you can access parts of your medical record, email your doctor's office, and request medication refills online. 1. In your internet browser, go to [email or web address] 2. Click on the First Time User? Click Here link in the Sign In box. You will see the New Member Sign Up page. 3. Enter your griddig Access Code exactly as it appears below. You will not need to use this code after youve completed the sign-up process. If you do not sign up before the expiration date, you must request a new code. · griddig Access Code: [email or web address] Expires: 10/14/2018  1:46 PM 
 
4. Enter the last four digits of your Social Security Number (xxxx) and Date of Birth (mm/dd/yyyy) as indicated and click Submit. You will be taken to the next sign-up page. 5. Create a griddig ID. This will be your griddig login ID and cannot be changed, so think of one that is secure and easy to remember. 6. Create a griddig password. You can change your password at any time. 7. Enter your Password Reset Question and Answer. This can be used at a later time if you forget your password. 8. Enter your e-mail address. You will receive e-mail notification when new information is available in [address] 9. Click Sign Up. You can now view and download portions of your medical record. 10. Click the Download Summary menu link to download a portable copy of your medical information. If you have questions, please visit the Frequently Asked Questions section of the [employer] website. Remember, [employer] is NOT to be used for urgent needs. For medical emergencies, dial 911. Now available from your iPhone and Android! Please provide this summary of care documentation to your next provider. Your primary care clinician is listed as Miguel Barrera. If you have any questions after today's visit, please call [phone].

## 2018-09-18 ENCOUNTER — OFFICE VISIT (OUTPATIENT)
Dept: ORTHOPEDIC SURGERY | Age: 63
End: 2018-09-18

## 2018-09-18 VITALS
OXYGEN SATURATION: 99 % | DIASTOLIC BLOOD PRESSURE: 84 MMHG | BODY MASS INDEX: 26.38 KG/M2 | HEART RATE: 61 BPM | WEIGHT: 130.6 LBS | TEMPERATURE: 96.3 F | SYSTOLIC BLOOD PRESSURE: 174 MMHG

## 2018-09-18 DIAGNOSIS — M51.26 HNP (HERNIATED NUCLEUS PULPOSUS), LUMBAR: ICD-10-CM

## 2018-09-18 DIAGNOSIS — Z98.890 S/P LAMINECTOMY: Primary | ICD-10-CM

## 2018-09-18 NOTE — MR AVS SNAPSHOT
[address] [facility] [address] [facility] [address] 
[phone] Patient: True Links MRN: [id number] DNV:[id number]/35/[id number] Visit Information Date & Time Provider Department Dept. Phone Encounter #  
 9/18/2018  8:15 AM Opal Lopez MD [facility], [address] [facility] [phone] [id number] Follow-up Instructions Return if symptoms worsen or fail to improve. Follow-up and Disposition History Your Appointments 11/1/2018  9:25 AM  
LAB with [facility] NURSE VISIT [facility] [address] [place] [address] ([address]) Appt Note: labs 6mos. sarris [address], [address] [facility] [address] [place]  
  
   
 [address], [address]  
  
    
 11/8/2018 10:30 AM  
Office Visit with Tani Torres MD  
[facility] [address] Appt Note: ov 6mos sarris [address], [address] [facility] [address] [place]  
  
   
 [address], [address] [facility] Upcoming Health Maintenance Date Due  
 EYE EXAM RETINAL OR DILATED Q1 5/2/2017 PAP AKA CERVICAL CYTOLOGY 11/17/2017 Pneumococcal 19-64 Highest Risk (2 of 3 - PCV13) 5/11/2018 Influenza Age 5 to Adult 8/1/2018 FOOT EXAM Q1 10/2/2018* ZOSTER VACCINE AGE 60> 11/30/2018* HEMOGLOBIN A1C Q6M 2/7/2019 MICROALBUMIN Q1 4/26/2019 LIPID PANEL Q1 4/26/2019 BREAST CANCER SCRN MAMMOGRAM 12/27/2019 COLONOSCOPY 10/24/2024 DTaP/Tdap/Td series (2 - Td) 11/10/2026 *Topic was postponed. The date shown is not the original due date. Allergies as of 9/18/2018  Review Complete On: 9/18/2018 By: Opal Lopez MD  
  
 Severity Noted Reaction Type Reactions Pcn [Penicillins]  10/25/2011    Rash  
 Sulfa (Sulfonamide Antibiotics)  10/25/2011    Rash Current Immunizations  Reviewed on 5/11/2017 Name Date Influenza Vaccine PF 10/13/2014 12:46 PM, 2/3/2014  3:30 PM  
 Influenza Vaccine Split 11/14/2011 Influenza Vaccine Whole 10/5/2010 Pneumococcal Polysaccharide (PPSV-23) 5/11/2017 11:41 AM  
 TD Vaccine 1/23/2007 Tdap 11/10/2016  1:28 PM  
  
 Not reviewed this visit You Were Diagnosed With   
  
 Codes Comments S/P laminectomy    -  Primary ICD-10-CM: M35.169 ICD-9-CM: V45.89 HNP (herniated nucleus pulposus), lumbar     ICD-10-CM: M51.26 
ICD-9-CM: 722.10 Vitals BP Pulse Temp Weight(growth percentile) SpO2 BMI  
 174/84 61 96.3 °F (35.7 °C) (Oral) 130 lb 9.6 oz (59.2 kg) 99% 26.38 kg/m2 OB Status Smoking Status Postmenopausal Former Smoker BMI and BSA Data Body Mass Index Body Surface Area  
 26.38 kg/m 2 1.57 m 2 Preferred Pharmacy Pharmacy Name Phone [address] [place] [address] [address] [facility] [phone] Your Updated Medication List  
  
   
This list is accurate as of 9/18/18  8:38 AM.  Always use your most recent med list.  
  
  
  
  
 aspirin 81 mg tablet Take 81 mg by mouth. dextroamphetamine-amphetamine 10 mg tablet Commonly known as:  ADDERALL Take 1 Tab by mouth daily. Max Daily Amount: 10 mg.  
  
 estradiol 0.05 mg/24 hr  
Commonly known as:  VIVELLE  
1 Patch by TransDERmal route two (2) times a week. ibuprofen 800 mg tablet Commonly known as:  MOTRIN Take 800 mg by mouth four (4) times daily. lisinopril 5 mg tablet Commonly known as:  PRINIVIL, ZESTRIL  
TAKE 1 TABLET BY MOUTH DAILY LORazepam 1 mg tablet Commonly known as:  ATIVAN Take 1 Tab by mouth nightly as needed for Anxiety. Max Daily Amount: 1 mg.  
  
 metFORMIN 1,000 mg tablet Commonly known as:  GLUCOPHAGE  
TAKE 1 TABLET BY MOUTH TWICE DAILY WITH MEALS  
  
 naloxone 4 mg/actuation nasal spray Commonly known as:  [employer]  
 Use 1 spray intranasally, then discard. Repeat with new spray every 2 min as needed for opioid overdose symptoms, alternating nostrils. OLANZapine 5 mg tablet Commonly known as:  ZyPREXA Take 1 Tab by mouth nightly. progesterone 200 mg capsule Commonly known as:  PROMETRIUM Take 1 Cap by mouth daily. propranolol 40 mg tablet Commonly known as:  INDERAL  
TAKE 1 TABLET BY MOUTH TWICE DAILY  
  
 rosuvastatin 20 mg tablet Commonly known as:  CRESTOR Take 1 Tab by mouth daily. SHINGRIX (PF) 50 mcg/0.5 mL Susr injection Generic drug:  varicella-zoster recombinant (PF)  
  
 SUMAtriptan 50 mg tablet Commonly known as:  IMITREX  
TAKE 1 TABLET BY MOUTH EVERY DAY AS NEEDED  
  
 vortioxetine 10 mg tablet Commonly known as:  TRINTELLIX Take 1 and 1/2 tablets by mouth daily. Follow-up Instructions Return if symptoms worsen or fail to improve. Patient Instructions Back Stretches: Exercises Your Care Instructions Here are some examples of exercises for stretching your back. Start each exercise slowly. Ease off the exercise if you start to have pain. Your doctor or physical therapist will tell you when you can start these exercises and which ones will work best for you. How to do the exercises Overhead stretch 1. Stand comfortably with your feet shoulder-width apart. 2. Looking straight ahead, raise both arms over your head and reach toward the ceiling. Do not allow your head to tilt back. 3. Hold for 15 to 30 seconds, then lower your arms to your sides. 4. Repeat 2 to 4 times. Side stretch 1. Stand comfortably with your feet shoulder-width apart. 2. Raise one arm over your head, and then lean to the other side. 3. Slide your hand down your leg as you let the weight of your arm gently stretch your side muscles. Hold for 15 to 30 seconds. 4. Repeat 2 to 4 times on each side. Press-up 1. Lie on your stomach, supporting your body with your forearms. 2. Press your elbows down into the floor to raise your upper back. As you do this, relax your stomach muscles and allow your back to arch without using your back muscles. As your press up, do not let your hips or pelvis come off the floor. 3. Hold for 15 to 30 seconds, then relax. 4. Repeat 2 to 4 times. Relax and rest 
 
1. Lie on your back with a rolled towel under your neck and a pillow under your knees. Extend your arms comfortably to your sides. 2. Relax and breathe normally. 3. Remain in this position for about 10 minutes. 4. If you can, do this 2 or 3 times each day. Follow-up care is a key part of your treatment and safety. Be sure to make and go to all appointments, and call your doctor if you are having problems. It's also a good idea to know your test results and keep a list of the medicines you take. Where can you learn more? Go to http://jennifer-pavel.info/. Enter R227 in the search box to learn more about \"Back Stretches: Exercises. \" Current as of: November 29, 2017 Content Version: 11.7 © 8130-6075 [employer]. Care instructions adapted under license by [employer] (which disclaims liability or warranty for this information). If you have questions about a medical condition or this instruction, always ask your healthcare professional. [place] [address] any warranty or liability for your use of this information. Ankle: Exercises Your Care Instructions Here are some examples of exercises for your ankle. Start each exercise slowly. Ease off the exercise if you start to have pain. Your doctor or physical therapist will tell you when you can start these exercises and which ones will work best for you. How to do the exercises \"Alphabet\" exercise 5. Trace the alphabet with your toe. This helps your ankle move in all directions. Side-to-side knee swing exercise 5. Sit in a chair with your foot flat on the floor. 6. Slowly move your knee from side to side while keeping your foot pressed flat. 7. Continue this exercise for 2 to 3 minutes. Towel curl 5. While sitting, place your foot on a towel on the floor and scrunch the towel toward you with your toes. 6. Then use your toes to push the towel away from you. 7. Make this exercise more challenging by placing a weighted object, such as a soup can, on the other end of the towel. Towel stretch 5. Sit with your legs extended and knees straight. 6. Place a towel around your foot just under the toes. 7. Hold each end of the towel in each hand, with your hands above your knees. 8. Pull back with the towel so that your foot stretches toward you. 9. Hold the position for at least 15 to 30 seconds. 10. Repeat 2 to 4 times a session, up to 5 sessions a day. Ankle eversion exercise 1. Start by sitting with your foot flat on the floor and pushing it outward against an immovable object such as the wall or heavy furniture. Hold for about 6 seconds, then relax. Repeat 8 to 12 times. 2. After you feel comfortable with this, try using rubber tubing looped around the outside of your feet for resistance. Push your foot out to the side against the tubing, and then count to 10 as you slowly bring your foot back to the middle. Repeat 8 to 12 times. Isometric opposition exercises 1. While sitting, put your feet together flat on the floor. 2. Press your injured foot inward against your other foot. Hold for about 6 seconds, and relax. Repeat 8 to 12 times. 3. Then place the heel of your other foot on top of the injured one. Push down with the top heel while trying to push up with your injured foot. Hold for about 6 seconds, and relax. Repeat 8 to 12 times. Follow-up care is a key part of your treatment and safety.  Be sure to make and go to all appointments, and call your doctor if you are having problems. It's also a good idea to know your test results and keep a list of the medicines you take. Where can you learn more? Go to http://jennifer-pavel.info/. Enter T712 in the search box to learn more about \"Ankle: Exercises. \" Current as of: November 29, 2017 Content Version: 11.7 © 9823-2692 [employer]. Care instructions adapted under license by [employer] (which disclaims liability or warranty for this information). If you have questions about a medical condition or this instruction, always ask your healthcare professional. [address] any warranty or liability for your use of this information. Introducing [facility]! [facility] introduces [employer] patient portal. Now you can access parts of your medical record, email your doctor's office, and request medication refills online. 1. In your internet browser, go to [email or web address] 2. Click on the First Time User? Click Here link in the Sign In box. You will see the New Member Sign Up page. 3. Enter your [employer] Access Code exactly as it appears below. You will not need to use this code after youve completed the sign-up process. If you do not sign up before the expiration date, you must request a new code. · [employer] Access Code: [email or web address] Expires: 10/14/2018  1:46 PM 
 
4. Enter the last four digits of your Social Security Number (xxxx) and Date of Birth (mm/dd/yyyy) as indicated and click Submit. You will be taken to the next sign-up page. 5. Create a eSightt ID. This will be your [employer] login ID and cannot be changed, so think of one that is secure and easy to remember. 6. Create a eSightt password. You can change your password at any time. 7. Enter your Password Reset Question and Answer. This can be used at a later time if you forget your password. 8. Enter your e-mail address. You will receive e-mail notification when new information is available in [address] 9. Click Sign Up. You can now view and download portions of your medical record. 10. Click the Download Summary menu link to download a portable copy of your medical information. If you have questions, please visit the Frequently Asked Questions section of the [employer] website. Remember, [employer] is NOT to be used for urgent needs. For medical emergencies, dial 911. Now available from your iPhone and Android! Please provide this summary of care documentation to your next provider. Your primary care clinician is listed as India Galarza. If you have any questions after today's visit, please call [phone].

## 2018-09-18 NOTE — PROGRESS NOTES
[facility] [address]  [address], [address]  [place] [address]  Phone: [phone]  Fax: [phone]  PROGRESS NOTE  Patient: Stewart Lemon                MRN: [id number]       SSN: [id number]  YOB: 1955        AGE: 66 y.o. SEX: female  Body mass index is 26.38 kg/(m^2). PCP: Tani Torres MD  09/18/18    Chief Complaint   Patient presents with    Back Pain       HISTORY OF PRESENT ILLNESS, RADIOGRAPHS, and PLAN:     HISTORY OF PRESENT ILLNESS:  Ms. Caroline Balbuena returns today. She is 6 weeks out from her L5-S1 laminectomy, discectomy. PHYSICAL EXAMINATION:  She is asymptomatic. Her foot drop has resolved. She is neurologically intact without nerve tension signs. She is thrilled. ASSESSMENT/PLAN:  We will see her back again as needed.      cc:   Dr. Reuben Arreola          Past Medical History:   Diagnosis Date    ADHD     Anxiety     Bilateral lumbar radiculopathy     Carotid bruit     right    Depression     Diabetes mellitus ([address]     HCD (hypertensive cardiovascular disease)     Hyperlipidemia     Hypertension     Migraine headache     Plantar fasciitis        Family History   Problem Relation Age of Onset    Hypertension Brother      x2    Elevated Lipids Brother     Stroke Mother     Heart Surgery Mother      CABG    Cancer Father      cancer of the mouth    Hypertension Father     Hypertension Brother     Elevated Lipids Brother        Current Outpatient Prescriptions   Medication Sig Dispense Refill    propranolol (INDERAL) 40 mg tablet TAKE 1 TABLET BY MOUTH TWICE DAILY 180 Tab 2    SUMAtriptan (IMITREX) 50 mg tablet TAKE 1 TABLET BY MOUTH EVERY DAY AS NEEDED 9 Tab 2    metFORMIN (GLUCOPHAGE) 1,000 mg tablet TAKE 1 TABLET BY MOUTH TWICE DAILY WITH MEALS 60 Tab 6    lisinopril (PRINIVIL, ZESTRIL) 5 mg tablet TAKE 1 TABLET BY MOUTH DAILY 90 Tab 2    SHINGRIX, PF, 50 mcg/0.5 mL susr injection       ibuprofen (MOTRIN) 800 mg tablet Take 800 mg by mouth four (4) times daily.  OLANZapine (ZYPREXA) 5 mg tablet Take 1 Tab by mouth nightly. 1 Tab 0    LORazepam (ATIVAN) 1 mg tablet Take 1 Tab by mouth nightly as needed for Anxiety. Max Daily Amount: 1 mg. 1 Tab 0    progesterone (PROMETRIUM) 200 mg capsule Take 1 Cap by mouth daily. 30 Cap 1    estradiol (VIVELLE) 0.05 mg/24 hr 1 Patch by TransDERmal route two (2) times a week. (Patient taking differently: 1 Patch by TransDERmal route Every Saturday.) 8 Patch 1    rosuvastatin (CRESTOR) 20 mg tablet Take 1 Tab by mouth daily. 30 Tab 11    vortioxetine (TRINTELLIX) 10 mg tablet Take 1 and 1/2 tablets by mouth daily. (Patient taking differently: 20 mg. Take 1 and 1/2 tablets by mouth daily.) 60 Tab 2    dextroamphetamine-amphetamine (ADDERALL) 10 mg tablet Take 1 Tab by mouth daily. Max Daily Amount: 10 mg. 30 Tab 0    aspirin 81 mg tablet Take 81 mg by mouth.  naloxone (NARCAN) 4 mg/actuation nasal spray Use 1 spray intranasally, then discard. Repeat with new spray every 2 min as needed for opioid overdose symptoms, alternating nostrils. 1 Each 0       Allergies   Allergen Reactions    Pcn [Penicillins] Rash    Sulfa (Sulfonamide Antibiotics) Rash       Past Surgical History:   Procedure Laterality Date    HX SEPTOPLASTY  6/2002    HX TONSILLECTOMY      NEUROLOGICAL PROCEDURE UNLISTED  08/06/2018    laminectomy        Past Medical History:   Diagnosis Date    ADHD     Anxiety     Bilateral lumbar radiculopathy     Carotid bruit     right    Depression     Diabetes mellitus ([address]     HCD (hypertensive cardiovascular disease)     Hyperlipidemia     Hypertension     Migraine headache     Plantar fasciitis        Social History     Social History    Marital status:      Spouse name: N/A    Number of children: N/A    Years of education: N/A     Occupational History    Not on file.      Social History Main Topics    Smoking status: Former Smoker Quit date: 1/1/1989    Smokeless tobacco: Never Used    Alcohol use No    Drug use: No    Sexual activity: No     Other Topics Concern    Not on file     Social History Narrative         REVIEW OF SYSTEMS:   CONSTITUTIONAL SYMPTOMS:  Negative. EYES:  Negative. EARS, NOSE, THROAT AND MOUTH:  Negative. CARDIOVASCULAR:  Negative. RESPIRATORY:  Negative. GENITOURINARY: Per HPI. GASTROINTESTINAL:  Per HPI. INTEGUMENTARY (SKIN AND/OR BREAST):  Negative. MUSCULOSKELETAL: Per HPI.   ENDOCRINE/RHEUMATOLOGIC:  Negative. NEUROLOGICAL:  Per HPI. HEMATOLOGIC/LYMPHATIC:  Negative. ALLERGIC/IMMUNOLOGIC:  Negative. PSYCHIATRIC:  Negative. PHYSICAL EXAMINATION:   Visit Vitals    /84    Pulse 61    Temp 96.3 °F (35.7 °C) (Oral)    Wt 130 lb 9.6 oz (59.2 kg)    SpO2 99%    BMI 26.38 kg/m2    PAIN SCALE: 0 - No pain/10    CONSTITUTIONAL: The patient is in no apparent distress and is alert and oriented x 3. HEENT: Normocephalic. Hearing grossly intact. NECK: Supple and symmetric. no tenderness, or masses were felt. RESPIRATORY: No labored breathing. CARDIOVASCULAR: The carotid pulses were normal. Peripheral pulses were 2+. CHEST: Normal AP diameter and normal contour without any kyphoscoliosis. LYMPHATIC: No lymphadenopathy was appreciated in the neck, axillae or groin. SKIN:  Incision healing well, no drainage, no erythema, no hernia, no seroma, no swelling, no dehiscence, incision well approximated. Negative for scars, rashes, lesions, or ulcers on the right upper, right lower, left upper, left lower and trunk. NEUROLOGICAL: Alert and oriented x 3. Ambulation without assistive device. FWB. EXTREMITIES: See musculoskeletal.  MUSCULOSKELETAL:   Head and Neck:  Negative for misalignment, asymmetry, crepitation, defects, tenderness masses or effusions.  Left Upper Extremity: Inspection, percussion and palpation performed. Deys sign is negative.    Right Upper Extremity: Inspection, percussion and palpation performed. Deys sign is negative.  Spine, Ribs and Pelvis: Inspection, percussion and palpation performed. Negative for misalignment, asymmetry, crepitation, defects, tenderness masses or effusions.  Left Lower Extremity: Inspection, percussion and palpation performed. Negative straight leg raise.  Right Lower Extremity: Inspection, percussion and palpation performed. Negative straight leg raise. SPINE EXAM:   Lumbar spine: No rash, ecchymosis, or gross obliquity. No focal atrophy is noted. ASSESSMENT    ICD-10-CM ICD-9-CM    1. S/P laminectomy Z98.890 V45.89    2. HNP (herniated nucleus pulposus), lumbar M51.26 722.10        Written by Cathy Andrea, as dictated by Carlos Carrington MD.    I, Dr. Carlos Carrington MD, confirm that all documentation is accurate.

## 2018-09-18 NOTE — PATIENT INSTRUCTIONS
Back Stretches: Exercises  Your Care Instructions  Here are some examples of exercises for stretching your back. Start each exercise slowly. Ease off the exercise if you start to have pain. Your doctor or physical therapist will tell you when you can start these exercises and which ones will work best for you. How to do the exercises  Overhead stretch    1. Stand comfortably with your feet shoulder-width apart. 2. Looking straight ahead, raise both arms over your head and reach toward the ceiling. Do not allow your head to tilt back. 3. Hold for 15 to 30 seconds, then lower your arms to your sides. 4. Repeat 2 to 4 times. Side stretch    1. Stand comfortably with your feet shoulder-width apart. 2. Raise one arm over your head, and then lean to the other side. 3. Slide your hand down your leg as you let the weight of your arm gently stretch your side muscles. Hold for 15 to 30 seconds. 4. Repeat 2 to 4 times on each side. Press-up    1. Lie on your stomach, supporting your body with your forearms. 2. Press your elbows down into the floor to raise your upper back. As you do this, relax your stomach muscles and allow your back to arch without using your back muscles. As your press up, do not let your hips or pelvis come off the floor. 3. Hold for 15 to 30 seconds, then relax. 4. Repeat 2 to 4 times. Relax and rest    1. Lie on your back with a rolled towel under your neck and a pillow under your knees. Extend your arms comfortably to your sides. 2. Relax and breathe normally. 3. Remain in this position for about 10 minutes. 4. If you can, do this 2 or 3 times each day. Follow-up care is a key part of your treatment and safety. Be sure to make and go to all appointments, and call your doctor if you are having problems. It's also a good idea to know your test results and keep a list of the medicines you take. Where can you learn more? Go to http://jennifer-pavel.info/.   Enter S474 in the search box to learn more about \"Back Stretches: Exercises. \"  Current as of: November 29, 2017  Content Version: 11.7  © 5171-1229 [employer]. Care instructions adapted under license by [employer] (which disclaims liability or warranty for this information). If you have questions about a medical condition or this instruction, always ask your healthcare professional. [address] any warranty or liability for your use of this information. Ankle: Exercises  Your Care Instructions  Here are some examples of exercises for your ankle. Start each exercise slowly. Ease off the exercise if you start to have pain. Your doctor or physical therapist will tell you when you can start these exercises and which ones will work best for you. How to do the exercises  \"Alphabet\" exercise    5. Trace the alphabet with your toe. This helps your ankle move in all directions. Side-to-side knee swing exercise    5. Sit in a chair with your foot flat on the floor. 6. Slowly move your knee from side to side while keeping your foot pressed flat. 7. Continue this exercise for 2 to 3 minutes. Towel curl    5. While sitting, place your foot on a towel on the floor and scrunch the towel toward you with your toes. 6. Then use your toes to push the towel away from you. 7. Make this exercise more challenging by placing a weighted object, such as a soup can, on the other end of the towel. Towel stretch    5. Sit with your legs extended and knees straight. 6. Place a towel around your foot just under the toes. 7. Hold each end of the towel in each hand, with your hands above your knees. 8. Pull back with the towel so that your foot stretches toward you. 9. Hold the position for at least 15 to 30 seconds. 10. Repeat 2 to 4 times a session, up to 5 sessions a day. Ankle eversion exercise    1.  Start by sitting with your foot flat on the floor and pushing it outward against an immovable object such as the wall or heavy furniture. Hold for about 6 seconds, then relax. Repeat 8 to 12 times. 2. After you feel comfortable with this, try using rubber tubing looped around the outside of your feet for resistance. Push your foot out to the side against the tubing, and then count to 10 as you slowly bring your foot back to the middle. Repeat 8 to 12 times. Isometric opposition exercises    1. While sitting, put your feet together flat on the floor. 2. Press your injured foot inward against your other foot. Hold for about 6 seconds, and relax. Repeat 8 to 12 times. 3. Then place the heel of your other foot on top of the injured one. Push down with the top heel while trying to push up with your injured foot. Hold for about 6 seconds, and relax. Repeat 8 to 12 times. Follow-up care is a key part of your treatment and safety. Be sure to make and go to all appointments, and call your doctor if you are having problems. It's also a good idea to know your test results and keep a list of the medicines you take. Where can you learn more? Go to http://jennifer-pavel.info/. Enter A393 in the search box to learn more about \"Ankle: Exercises. \"  Current as of: November 29, 2017  Content Version: 11.7  © 0029-1779 [employer]. Care instructions adapted under license by [employer] (which disclaims liability or warranty for this information). If you have questions about a medical condition or this instruction, always ask your healthcare professional. [place] [address] any warranty or liability for your use of this information.

## 2018-11-01 DIAGNOSIS — I10 ESSENTIAL HYPERTENSION: ICD-10-CM

## 2018-11-01 DIAGNOSIS — E55.9 VITAMIN D DEFICIENCY: ICD-10-CM

## 2018-11-01 DIAGNOSIS — D72.820 ATYPICAL LYMPHOCYTOSIS: ICD-10-CM

## 2018-11-01 DIAGNOSIS — E78.5 HYPERLIPIDEMIA, UNSPECIFIED HYPERLIPIDEMIA TYPE: ICD-10-CM

## 2018-11-01 DIAGNOSIS — E11.9 TYPE 2 DIABETES MELLITUS WITHOUT COMPLICATION, WITHOUT LONG-TERM CURRENT USE OF INSULIN (HCC): ICD-10-CM

## 2018-11-02 LAB
25(OH)D3 SERPL-MCNC: 41 NG/ML (ref 32–100)
ABSOLUTE LYMPHOCYTE COUNT, 10803: 2.7 K/UL (ref 1–4.8)
ANION GAP SERPL CALC-SCNC: 18 MMOL/L
AVG GLU, 10930: 134 MG/DL (ref 91–123)
BASOPHILS # BLD: 0 K/UL (ref 0–0.2)
BASOPHILS NFR BLD: 0 % (ref 0–2)
BUN SERPL-MCNC: 16 MG/DL (ref 6–22)
CALCIUM SERPL-MCNC: 9.5 MG/DL (ref 8.4–10.5)
CHLORIDE SERPL-SCNC: 102 MMOL/L (ref 98–110)
CHOLEST SERPL-MCNC: 125 MG/DL (ref 110–200)
CO2 SERPL-SCNC: 24 MMOL/L (ref 20–32)
CREAT SERPL-MCNC: 0.6 MG/DL (ref 0.8–1.4)
EOSINOPHIL # BLD: 0.2 K/UL (ref 0–0.5)
EOSINOPHIL NFR BLD: 3 % (ref 0–6)
ERYTHROCYTE [DISTWIDTH] IN BLOOD BY AUTOMATED COUNT: 13.5 % (ref 10–15.5)
GFRAA, 66117: >60
GFRNA, 66118: >60
GLUCOSE SERPL-MCNC: 143 MG/DL (ref 70–99)
GRANULOCYTES,GRANS: 55 % (ref 40–75)
HBA1C MFR BLD HPLC: 6.3 % (ref 4.8–5.9)
HCT VFR BLD AUTO: 39.5 % (ref 35.1–48)
HDLC SERPL-MCNC: 3.1 MG/DL (ref 0–5)
HDLC SERPL-MCNC: 40 MG/DL (ref 40–59)
HGB BLD-MCNC: 12.4 G/DL (ref 11.7–16)
LDLC SERPL CALC-MCNC: 60 MG/DL (ref 50–99)
LYMPHOCYTES, LYMLT: 36 % (ref 20–45)
MCH RBC QN AUTO: 30 PG (ref 26–34)
MCHC RBC AUTO-ENTMCNC: 31 G/DL (ref 31–36)
MCV RBC AUTO: 94 FL (ref 80–95)
MONOCYTES # BLD: 0.4 K/UL (ref 0.1–1)
MONOCYTES NFR BLD: 5 % (ref 3–12)
NEUTROPHILS # BLD AUTO: 4.2 K/UL (ref 1.8–7.7)
PLATELET # BLD AUTO: 287 K/UL (ref 140–440)
PMV BLD AUTO: 11.4 FL (ref 9–13)
POTASSIUM SERPL-SCNC: 4.3 MMOL/L (ref 3.5–5.5)
RBC # BLD AUTO: 4.21 M/UL (ref 3.8–5.2)
SODIUM SERPL-SCNC: 144 MMOL/L (ref 133–145)
TRIGL SERPL-MCNC: 126 MG/DL (ref 40–149)
VLDLC SERPL CALC-MCNC: 25 MG/DL (ref 8–30)
WBC # BLD AUTO: 7.5 K/UL (ref 4–11)

## 2018-11-08 ENCOUNTER — OFFICE VISIT (OUTPATIENT)
Dept: INTERNAL MEDICINE CLINIC | Age: 63
End: 2018-11-08

## 2018-11-08 VITALS
BODY MASS INDEX: 26.37 KG/M2 | OXYGEN SATURATION: 98 % | TEMPERATURE: 98.8 F | HEIGHT: 59 IN | SYSTOLIC BLOOD PRESSURE: 144 MMHG | HEART RATE: 61 BPM | DIASTOLIC BLOOD PRESSURE: 80 MMHG | WEIGHT: 130.8 LBS | RESPIRATION RATE: 14 BRPM

## 2018-11-08 DIAGNOSIS — Z12.31 SCREENING MAMMOGRAM, ENCOUNTER FOR: ICD-10-CM

## 2018-11-08 DIAGNOSIS — E55.9 VITAMIN D DEFICIENCY: ICD-10-CM

## 2018-11-08 DIAGNOSIS — G43.009 MIGRAINE WITHOUT AURA AND WITHOUT STATUS MIGRAINOSUS, NOT INTRACTABLE: ICD-10-CM

## 2018-11-08 DIAGNOSIS — M51.36 DDD (DEGENERATIVE DISC DISEASE), LUMBAR: ICD-10-CM

## 2018-11-08 DIAGNOSIS — E78.5 HYPERLIPIDEMIA, UNSPECIFIED HYPERLIPIDEMIA TYPE: ICD-10-CM

## 2018-11-08 DIAGNOSIS — M51.26 HNP (HERNIATED NUCLEUS PULPOSUS), LUMBAR: ICD-10-CM

## 2018-11-08 DIAGNOSIS — F33.9 RECURRENT DEPRESSION (HCC): ICD-10-CM

## 2018-11-08 DIAGNOSIS — E11.9 TYPE 2 DIABETES MELLITUS WITHOUT COMPLICATION, WITHOUT LONG-TERM CURRENT USE OF INSULIN (HCC): ICD-10-CM

## 2018-11-08 DIAGNOSIS — I10 ESSENTIAL HYPERTENSION: Primary | ICD-10-CM

## 2018-11-08 DIAGNOSIS — E66.3 OVERWEIGHT (BMI 25.0-29.9): ICD-10-CM

## 2018-11-08 DIAGNOSIS — Z23 ENCOUNTER FOR IMMUNIZATION: ICD-10-CM

## 2018-11-08 NOTE — PATIENT INSTRUCTIONS
Please monitor and record your blood pressure every morning for the next 2 weeks two hours after taking your medications. Please deliver record of readings to our office for review. DASH Diet: Care Instructions  Your Care Instructions    The DASH diet is an eating plan that can help lower your blood pressure. DASH stands for Dietary Approaches to Stop Hypertension. Hypertension is high blood pressure. The DASH diet focuses on eating foods that are high in calcium, potassium, and magnesium. These nutrients can lower blood pressure. The foods that are highest in these nutrients are fruits, vegetables, low-fat dairy products, nuts, seeds, and legumes. But taking calcium, potassium, and magnesium supplements instead of eating foods that are high in those nutrients does not have the same effect. The DASH diet also includes whole grains, fish, and poultry. The DASH diet is one of several lifestyle changes your doctor may recommend to lower your high blood pressure. Your doctor may also want you to decrease the amount of sodium in your diet. Lowering sodium while following the DASH diet can lower blood pressure even further than just the DASH diet alone. Follow-up care is a key part of your treatment and safety. Be sure to make and go to all appointments, and call your doctor if you are having problems. It's also a good idea to know your test results and keep a list of the medicines you take. How can you care for yourself at home? Following the DASH diet  · Eat 4 to 5 servings of fruit each day. A serving is 1 medium-sized piece of fruit, ½ cup chopped or canned fruit, 1/4 cup dried fruit, or 4 ounces (½ cup) of fruit juice. Choose fruit more often than fruit juice. · Eat 4 to 5 servings of vegetables each day. A serving is 1 cup of lettuce or raw leafy vegetables, ½ cup of chopped or cooked vegetables, or 4 ounces (½ cup) of vegetable juice. Choose vegetables more often than vegetable juice.   · Get 2 to 3 servings of low-fat and fat-free dairy each day. A serving is 8 ounces of milk, 1 cup of yogurt, or 1 ½ ounces of cheese. · Eat 6 to 8 servings of grains each day. A serving is 1 slice of bread, 1 ounce of dry cereal, or ½ cup of cooked rice, pasta, or cooked cereal. Try to choose whole-grain products as much as possible. · Limit lean meat, poultry, and fish to 2 servings each day. A serving is 3 ounces, about the size of a deck of cards. · Eat 4 to 5 servings of nuts, seeds, and legumes (cooked dried beans, lentils, and split peas) each week. A serving is 1/3 cup of nuts, 2 tablespoons of seeds, or ½ cup of cooked beans or peas. · Limit fats and oils to 2 to 3 servings each day. A serving is 1 teaspoon of vegetable oil or 2 tablespoons of salad dressing. · Limit sweets and added sugars to 5 servings or less a week. A serving is 1 tablespoon jelly or jam, ½ cup sorbet, or 1 cup of lemonade. · Eat less than 2,300 milligrams (mg) of sodium a day. If you limit your sodium to 1,500 mg a day, you can lower your blood pressure even more. Tips for success  · Start small. Do not try to make dramatic changes to your diet all at once. You might feel that you are missing out on your favorite foods and then be more likely to not follow the plan. Make small changes, and stick with them. Once those changes become habit, add a few more changes. · Try some of the following:  ? Make it a goal to eat a fruit or vegetable at every meal and at snacks. This will make it easy to get the recommended amount of fruits and vegetables each day. ? Try yogurt topped with fruit and nuts for a snack or healthy dessert. ? Add lettuce, tomato, cucumber, and onion to sandwiches. ? Combine a ready-made pizza crust with low-fat mozzarella cheese and lots of vegetable toppings. Try using tomatoes, squash, spinach, broccoli, carrots, cauliflower, and onions. ?  Have a variety of cut-up vegetables with a low-fat dip as an appetizer instead of chips and dip. ? Sprinkle sunflower seeds or chopped almonds over salads. Or try adding chopped walnuts or almonds to cooked vegetables. ? Try some vegetarian meals using beans and peas. Add garbanzo or kidney beans to salads. Make burritos and tacos with mashed nieto beans or black beans. Where can you learn more? Go to http://jennifer-pavel.info/. Enter Y064 in the search box to learn more about \"DASH Diet: Care Instructions. \"  Current as of: December 6, 2017  Content Version: 11.8  © 8697-8609 [employer]. Care instructions adapted under license by [employer] (which disclaims liability or warranty for this information). If you have questions about a medical condition or this instruction, always ask your healthcare professional. [address] any warranty or liability for your use of this information. Learning About Diabetes Food Guidelines  Your Care Instructions    Meal planning is important to manage diabetes. It helps keep your blood sugar at a target level (which you set with your doctor). You don't have to eat special foods. You can eat what your family eats, including sweets once in a while. But you do have to pay attention to how often you eat and how much you eat of certain foods. You may want to work with a dietitian or a certified diabetes educator (CDE) to help you plan meals and snacks. A dietitian or CDE can also help you lose weight if that is one of your goals. What should you know about eating carbs? Managing the amount of carbohydrate (carbs) you eat is an important part of healthy meals when you have diabetes. Carbohydrate is found in many foods. · Learn which foods have carbs. And learn the amounts of carbs in different foods. ? Bread, cereal, pasta, and rice have about 15 grams of carbs in a serving. A serving is 1 slice of bread (1 ounce), ½ cup of cooked cereal, or 1/3 cup of cooked pasta or rice. ?  Fruits have 15 grams of carbs in a serving. A serving is 1 small fresh fruit, such as an apple or orange; ½ of a banana; ½ cup of cooked or canned fruit; ½ cup of fruit juice; 1 cup of melon or raspberries; or 2 tablespoons of dried fruit. ? Milk and no-sugar-added yogurt have 15 grams of carbs in a serving. A serving is 1 cup of milk or 2/3 cup of no-sugar-added yogurt. ? Starchy vegetables have 15 grams of carbs in a serving. A serving is ½ cup of mashed potatoes or sweet potato; 1 cup winter squash; ½ of a small baked potato; ½ cup of cooked beans; or ½ cup cooked corn or green peas. · Learn how much carbs to eat each day and at each meal. A dietitian or CDE can teach you how to keep track of the amount of carbs you eat. This is called carbohydrate counting. · If you are not sure how to count carbohydrate grams, use the Plate Method to plan meals. It is a good, quick way to make sure that you have a balanced meal. It also helps you spread carbs throughout the day. ? Divide your plate by types of foods. Put non-starchy vegetables on half the plate, meat or other protein food on one-quarter of the plate, and a grain or starchy vegetable in the final quarter of the plate. To this you can add a small piece of fruit and 1 cup of milk or yogurt, depending on how many carbs you are supposed to eat at a meal.  · Try to eat about the same amount of carbs at each meal. Do not \"save up\" your daily allowance of carbs to eat at one meal.  · Proteins have very little or no carbs per serving. Examples of proteins are beef, chicken, turkey, fish, eggs, tofu, cheese, cottage cheese, and peanut butter. A serving size of meat is 3 ounces, which is about the size of a deck of cards. Examples of meat substitute serving sizes (equal to 1 ounce of meat) are 1/4 cup of cottage cheese, 1 egg, 1 tablespoon of peanut butter, and ½ cup of tofu. How can you eat out and still eat healthy?   · Learn to estimate the serving sizes of foods that have carbohydrate. If you measure food at home, it will be easier to estimate the amount in a serving of restaurant food. · If the meal you order has too much carbohydrate (such as potatoes, corn, or baked beans), ask to have a low-carbohydrate food instead. Ask for a salad or green vegetables. · If you use insulin, check your blood sugar before and after eating out to help you plan how much to eat in the future. · If you eat more carbohydrate at a meal than you had planned, take a walk or do other exercise. This will help lower your blood sugar. What else should you know? · Limit saturated fat, such as the fat from meat and dairy products. This is a healthy choice because people who have diabetes are at higher risk of heart disease. So choose lean cuts of meat and nonfat or low-fat dairy products. Use olive or canola oil instead of butter or shortening when cooking. · Don't skip meals. Your blood sugar may drop too low if you skip meals and take insulin or certain medicines for diabetes. · Check with your doctor before you drink alcohol. Alcohol can cause your blood sugar to drop too low. Alcohol can also cause a bad reaction if you take certain diabetes medicines. Follow-up care is a key part of your treatment and safety. Be sure to make and go to all appointments, and call your doctor if you are having problems. It's also a good idea to know your test results and keep a list of the medicines you take. Where can you learn more? Go to http://jennifer-pavel.info/. Enter E899 in the search box to learn more about \"Learning About Diabetes Food Guidelines. \"  Current as of: December 7, 2017  Content Version: 11.8  © 9803-1223 [employer]. Care instructions adapted under license by [employer] (which disclaims liability or warranty for this information).  If you have questions about a medical condition or this instruction, always ask your healthcare professional. [employer] disclaims any warranty or liability for your use of this information.

## 2018-11-08 NOTE — PROGRESS NOTES
Chief Complaint   Patient presents with    Hypertension     6 month follow up with lab results. Health Maintenance Due   Topic Date Due    Shingrix Vaccine Age 49> (1 of 2) 07/17/2007    EYE EXAM RETINAL OR DILATED Q1  05/02/2017    FOOT EXAM Q1  11/10/2017    PAP AKA CERVICAL CYTOLOGY  11/17/2017    Pneumococcal 19-64 Highest Risk (2 of 3 - PCV13) 05/11/2018    Influenza Age 5 to Adult  08/01/2018     Patient states she has an eye appt. Nov. 16th. Patient given influenza vaccine, FLUARIX Quadrivalent, in left deltoid, per verbal order from Dr. Dat Galvez. Instructed patient to sit and wait 10-20 minutes before leaving the premises so that we can watch for any complications or adverse reactions. Patient given vaccine information statement handout before vaccine was given. Patient tolerated well without adverse reactions or complications. 1. Have you been to the ER, urgent care clinic or hospitalized since your last visit? NO.     2. Have you seen or consulted any other health care providers outside of the [address] [place] [address] since your last visit (Include any pap smears or colon screening)?  NO

## 2018-11-12 NOTE — PROGRESS NOTES
HPI:   Iman Marin is a 66y.o. year old female who presents today for evaluation of hypertension, hyperlipidemia, diabetes mellitus, migraine headaches, depression, anxiety, ADHD, and lumbar radiculopathy. She was seen by MOJGAN Batista on 4/17/2018 for increasing low back pain with bilateral sciatica. She was treated with a prednisone taper with initial improvement. However, her symptoms worsened and she was referred to Dr. Dileep Panda for evaluation. She gave her a Toradol injection, and ordered a lumbar MRI (6/24/2018) which showed a large right disc extrusion at L5-S1 which compressed the descending right S1 nerve root. She was referred to pain management and underwent a selective L5 and S1 selective nerve root blocks by  [facility] on 7/26/2018. However, due to persistent symptoms, she was referred to Dr. Natalie Pal and subsequently underwent a right L5-S1 laminectomy and diskectomy on 9/9/1146 without complications. She states that she did well and continues to do her back stretches and exercises. She states that she only has occasional discomfort which responds well to ibuprofen. She is otherwise without complaints. She has a history of hypertension, treated with propranolol and lisinopril. She does not check her blood pressure at home. She has begun walking regularly for exercise. She denies any chest pain, shortness of breath at rest or with exertion, palpitations, or lightheadedness. She also has a history of hyperlipidemia, treated with high intensity dose rosuvastatin. She has a history of diabetes mellitus, and was started on metformin in 2/2015 for a HbA1c of 7.5. She does not monitor her blood sugars at home. She denies any polyuria, polydipsia, nocturia, or blurry vision, and has no history of retinopathy, neuropathy, or nephropathy. She has regular eye exams with Dr. Goran Anne. She also has a history of migraines without aura, which usually respond to Imitrex.      She has a history of lumbar radiculopathy (R>L), and was evaluated by Dr. Bull Hanson in 5/2015. Lumbar x-rays showed degenerative changes in L4-L5 and L5-S1. She was treated conservatively with physical therapy and ibuprofen and reports significant improvement. She was evaluated by MOJGAN De Leon in 12/2016 for exacerbation of low back pain with right sciatica. She was treated with steroids and Norco with improvement. She had a screening colonoscopy in 10/2014 by Dr. Grant Foote which was normal. Follow-up due in 10 years. She denies any abdominal pain, nausea, vomiting, melena, hematochezia, or change in bowel movements. Past Medical History:   Diagnosis Date    ADHD     Anxiety     Bilateral lumbar radiculopathy     Carotid bruit     right    Depression     Diabetes mellitus (HCC)     HCD (hypertensive cardiovascular disease)     Hyperlipidemia     Hypertension     Migraine headache     Plantar fasciitis      Past Surgical History:   Procedure Laterality Date    HX SEPTOPLASTY  6/2002    HX TONSILLECTOMY      NEUROLOGICAL PROCEDURE UNLISTED  08/06/2018    laminectomy      Current Outpatient Medications   Medication Sig    rosuvastatin (CRESTOR) 20 mg tablet TAKE 1 TABLET BY MOUTH DAILY    propranolol (INDERAL) 40 mg tablet TAKE 1 TABLET BY MOUTH TWICE DAILY    SUMAtriptan (IMITREX) 50 mg tablet TAKE 1 TABLET BY MOUTH EVERY DAY AS NEEDED    metFORMIN (GLUCOPHAGE) 1,000 mg tablet TAKE 1 TABLET BY MOUTH TWICE DAILY WITH MEALS    lisinopril (PRINIVIL, ZESTRIL) 5 mg tablet TAKE 1 TABLET BY MOUTH DAILY    ibuprofen (MOTRIN) 800 mg tablet Take 800 mg by mouth four (4) times daily.  OLANZapine (ZYPREXA) 5 mg tablet Take 1 Tab by mouth nightly.  LORazepam (ATIVAN) 1 mg tablet Take 1 Tab by mouth nightly as needed for Anxiety. Max Daily Amount: 1 mg.  progesterone (PROMETRIUM) 200 mg capsule Take 1 Cap by mouth daily.  estradiol (VIVELLE) 0.05 mg/24 hr 1 Patch by TransDERmal route two (2) times a week.  (Patient taking differently: 1 Patch by TransDERmal route Every Saturday.)    vortioxetine (TRINTELLIX) 10 mg tablet Take 1 and 1/2 tablets by mouth daily. (Patient taking differently: 20 mg. Take 1 and 1/2 tablets by mouth daily.)    dextroamphetamine-amphetamine (ADDERALL) 10 mg tablet Take 1 Tab by mouth daily. Max Daily Amount: 10 mg.    aspirin 81 mg tablet Take 81 mg by mouth.  SHINGRIX, PF, 50 mcg/0.5 mL susr injection     naloxone (NARCAN) 4 mg/actuation nasal spray Use 1 spray intranasally, then discard. Repeat with new spray every 2 min as needed for opioid overdose symptoms, alternating nostrils. No current facility-administered medications for this visit. Allergies and Intolerances: Allergies   Allergen Reactions    Pcn [Penicillins] Rash    Sulfa (Sulfonamide Antibiotics) Rash     Family History: She has no family history of colon or breast cancer. Her mother  from a CVA. Her father  from throat cancer and cirrhosis. Family History   Problem Relation Age of Onset    Hypertension Brother         x2    Elevated Lipids Brother     Stroke Mother     Heart Surgery Mother         CABG    Cancer Father         cancer of the mouth    Hypertension Father     Hypertension Brother     Elevated Lipids Brother      Social History:   She  reports that she quit smoking about 29 years ago. she has never used smokeless tobacco. She smoked approximately 0.25 ppd for 2 years, stopping in . She is a  and has one daughter. She was employed as a . 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 She was previously working as the [employer] for his business.   Social History     Substance and Sexual Activity   Alcohol Use No    Alcohol/week: 1.0 oz    Types: 2 Glasses of wine per week     Immunization History:  Immunization History   Administered Date(s) Administered    Influenza Vaccine (Quad) PF 2018    Influenza Vaccine PF 02/03/2014, 10/13/2014    Influenza Vaccine Split 11/14/2011    Influenza Vaccine Whole 10/05/2010    Pneumococcal Polysaccharide (PPSV-23) 05/11/2017    TD Vaccine 01/23/2007    Tdap 11/10/2016    Zoster Recombinant 05/20/2018, 10/01/2018       Review of Systems:   As above included in HPI. Otherwise 11 point review of systems negative including constitutional, skin, HENT, eyes, respiratory, cardiovascular, gastrointestinal, genitourinary, musculoskeletal, endocrine, hematologic, allergy, and neurologic. Physical:   Vitals:   BP: 144/80  HR: 61  WT: 130 lb 12.8 oz (59.3 kg)  BMI:  26.42 kg/m2    Exam:   Patient appears in no apparent distress. Affect is appropriate. HEENT: PERRLA, anicteric, oropharynx clear, no JVD, adenopathy or thyromegaly. No carotid bruits or radiated murmur. Lungs: clear to auscultation, no wheezes, rhonchi, or rales. Heart: regular rate and rhythm. No murmur, rubs, gallops  Abdomen: soft, nontender, nondistended, normal bowel sounds, no hepatosplenomegaly or masses. Extremities: without edema. Pulses 1-2+ bilaterally. Review of Data:  Labs:  Orders Only on 11/01/2018   Component Date Value Ref Range Status    WBC 11/01/2018 7.5  4.0 - 11.0 K/uL Final    RBC 11/01/2018 4.21  3.80 - 5.20 M/uL Final    HGB 11/01/2018 12.4  11.7 - 16.0 g/dL Final    HCT 11/01/2018 39.5  35.1 - 48.0 % Final    MCV 11/01/2018 94  80 - 95 fL Final    MCH 11/01/2018 30  26 - 34 pg Final    MCHC 11/01/2018 31  31 - 36 g/dL Final    RDW 11/01/2018 13.5  10.0 - 15.5 % Final    PLATELET 92/90/2012 283  140 - 440 K/uL Final    MPV 11/01/2018 11.4  9.0 - 13.0 fL Final    NEUTROPHILS 11/01/2018 55  40 - 75 % Final    Lymphocytes 11/01/2018 36  20 - 45 % Final    MONOCYTES 11/01/2018 5  3 - 12 % Final    EOSINOPHILS 11/01/2018 3  0 - 6 % Final    BASOPHILS 11/01/2018 0  0 - 2 % Final    ABS.  NEUTROPHILS 11/01/2018 4.2  1.8 - 7.7 K/uL Final    ABSOLUTE LYMPHOCYTE COUNT 11/01/2018 2.7  1.0 - 4.8 K/uL Final    ABS. MONOCYTES 11/01/2018 0.4  0.1 - 1.0 K/uL Final    ABS. EOSINOPHILS 11/01/2018 0.2  0.0 - 0.5 K/uL Final    ABS. BASOPHILS 11/01/2018 0.0  0.0 - 0.2 K/uL Final    Triglyceride 11/01/2018 126  40 - 149 mg/dL Final    HDL Cholesterol 11/01/2018 40  40 - 59 mg/dL Final    Cholesterol, total 11/01/2018 125  110 - 200 mg/dL Final    CHOLESTEROL/HDL 11/01/2018 3.1  0.0 - 5.0 Final    LDL, calculated 11/01/2018 60  50 - 99 mg/dL Final    VLDL, calculated 11/01/2018 25  8 - 30 mg/dL Final    Glucose 11/01/2018 143* 70 - 99 mg/dL Final    BUN 11/01/2018 16  6 - 22 mg/dL Final    Creatinine 11/01/2018 0.6* 0.8 - 1.4 mg/dL Final    Sodium 11/01/2018 144  133 - 145 mmol/L Final    Potassium 11/01/2018 4.3  3.5 - 5.5 mmol/L Final    Chloride 11/01/2018 102  98 - 110 mmol/L Final    CO2 11/01/2018 24  20 - 32 mmol/L Final    Calcium 11/01/2018 9.5  8.4 - 10.5 mg/dL Final    Anion gap 11/01/2018 18.0  mmol/L Final    GFRAA 11/01/2018 >60.0  >60.0 Final    GFRNA 11/01/2018 >60.0  >60.0 Final    VITAMIN D, 25-HYDROXY 11/01/2018 41.0  32.0 - 100.0 ng/mL Final    Hemoglobin A1c 11/01/2018 6.3* 4.8 - 5.9 % Final    AVG GLU 11/01/2018 134* 91 - 123 mg/dL Final     Health Maintenance:  Screening:    Mammogram: negative (12/2017)   PAP smear: well women exams at [facility] (last 5/2018)   Colorectal: colonoscopy (10/2014) normal. Dr. Leanna Gilmore. Due 2024. Depression: under care of [address] [place] [address]. DM (HbA1c/FPG): HbA1c 6.3 (11/2018)   Hepatitis C: negative (5/2017)   Falls: none   DEXA: N/A   Glaucoma: regular eye exams with Dr. Manoj Olivier (last 6/2017).  Now will be followed at [facility] due to cost.   Smoking: none   Vitamin D: 41 (11/2018)   Medicare Wellness: N/A    Impression:  Patient Active Problem List   Diagnosis Code    HCD (hypertensive cardiovascular disease) I11.9    Hyperlipidemia E78.5    Otosclerosis H80.90    Migraine G43.909  Bilateral lumbar radiculopathy M54.16    Type 2 diabetes mellitus without complication (HCC) T19.4    Essential hypertension I10    Vitamin D deficiency E55.9    Recurrent depression (HCC) F33.9    Overweight (BMI 25.0-29. 9) E66.3    Right lumbar radiculopathy M54.16    DDD (degenerative disc disease), lumbar M51.36    HNP (herniated nucleus pulposus), lumbar M51.26       Plan:  1. Diabetes mellitus. Significantly improved control with HbA1c decreased from 7.1 to 6.3 on metformin 1000 mg bid. She has lost 6 pounds since her last visit. No evidence of microvascular complications. On statin and lisinopril for its colin-protective effect. Continue follow-up for annual eye exams. Foot exam normal today. Urine microalbumin/ creatinine ratio without evidence of microalbuminuria. Emphasized importance of lifestyle modifications, including diet, exercise, and weight loss. 2. Hypertension. Blood pressure appears mildly elevated today on current regimen of lisinopril 5 mg daily and propranolol 40 mg bid. Instructed to monitor and record her blood pressure every morning for the next 2 weeks two hours after taking her medications and bring record of readings to our office for review. Renal function remains normal with creatinine 0.6/ eGFR >60. Continue to follow. 3. Hyperlipidemia. On high intensity rosuvastatin with LDL 60, indicative of excellent control. Emphasized importance of lifestyle modifications, including diet, exercise, and weight loss. Continue to follow. 4. Lymphocytosis. Routine CBC at last visit with normal WBC count of 10.0, but differential with predominance of lymphocytes (61%) and ALC elevated at 6000 K/ul. Smear reviewed by pathologist and 1+ smudge cells noted as well as absolute lymphocytosis of an overall monomorphous population of lymphocytes, possibly due to a reactive process versus a lymphoproliferative process. Flow cytometry analysis recommended for definitive diagnosis.  Referred to Dr. Yayo Hudson for evaluation, but patient states that she was never contacted for an appointment. Repeat CBC today with normal WBC count of 7.5 and normal differential (lymphocytes 36%). Suspect reactive process caused prior elevation. Will continue to monitor. 5. Lumbar herniated disc with right sciatica, s/p right L5-S1 laminectomy and diskectomy. Doing well without significant discomfort. WIll take occasional ibuprofen. Stressed improtance of continuing stretches and exercise. Follow. 6. Depression/ADHD. Stable on current regimen of vortioxetine, Zyprexa, Adderall, and lorazepam at bedtime. Being followed by [facility] psychiatry. 7. Migraines. Relatively infrequent and responds to Imitrex. Follow. 8. Overweight. Emphasized importance of lifestyle modifications, including diet, exercise, and weight loss. Importance stressed to help with diabetes control. Will readdress next visit. 9. Health maintenance. Received Pneumovax. Also received Tdap. Completed 2/2 doses of Shingrix at [place]. Other immunizations up to date. Well women exam performed at [facility]. Attempting to wean hormonal therapy for menopausal symptoms. Mammogram up to date. Colonoscopy due 2024. Continue eye exams with [facility] and requested that she obtain note. Vitamin D level stable. Discussed continuing maintenance dose therapy. Patient understands recommendations and agrees with plan. Follow-up in 6 months.

## 2018-12-09 NOTE — HOME CARE
Asked by RAJIV to check insurance coverage for [facility] - pt does not want [facility] if she has a copay -     I just spoke with [facility], [phone]. She confirmed the pt has home health benefits. Her [employer] plan will cover 100 visits per calendar year. She has not used any visits to date, 100 visits remain. Pt has a deductible of $2,900.00. She has met $2,092.28 to date, leaving $807.72 remaining. She has an [place] amount of $5,850.00. She has met $3,908.80 to date, leaving $1,941.20. Once the pt has met her deductible ($807.72 remaining), the health plan will cover home health services at 80% leaving pt resp for a 20% coins.     Referral cancelled for [facility] - KEY Flores RN Principal Discharge DX:	[facility]

## 2019-02-26 ENCOUNTER — TELEPHONE (OUTPATIENT)
Dept: INTERNAL MEDICINE CLINIC | Age: 64
End: 2019-02-26

## 2019-02-26 NOTE — TELEPHONE ENCOUNTER
Received fax from [employer] that rosuvastatin is not covered under her medication plan. Please see if this is too costly and if she would like to change to atorvastatin. Fax states that atorvastatin would not have a copay for the patient.

## 2019-02-27 RX ORDER — ATORVASTATIN CALCIUM 20 MG/1
20 TABLET, FILM COATED ORAL DAILY
Qty: 90 TAB | Refills: 2 | Status: SHIPPED | OUTPATIENT
Start: 2019-02-27 | End: 2019-07-19 | Stop reason: SDUPTHER

## 2019-02-27 NOTE — TELEPHONE ENCOUNTER
Dr. Irma Duque, I spoke with patient and she would like to change to Lipitor. Please send the Lipitor, 90 day supply, over to [facility] for patient.   Thanks

## 2019-03-01 ENCOUNTER — HOSPITAL ENCOUNTER (OUTPATIENT)
Dept: MAMMOGRAPHY | Age: 64
Discharge: HOME | End: 2019-03-01
Attending: OBSTETRICS & GYNECOLOGY
Payer: COMMERCIAL

## 2019-03-01 DIAGNOSIS — Z12.31 VISIT FOR SCREENING MAMMOGRAM: ICD-10-CM

## 2019-03-01 PROCEDURE — 77063 BREAST TOMOSYNTHESIS BI: CPT

## 2019-05-07 ENCOUNTER — APPOINTMENT (OUTPATIENT)
Dept: INTERNAL MEDICINE CLINIC | Age: 64
End: 2019-05-07

## 2019-05-07 DIAGNOSIS — M51.36 DDD (DEGENERATIVE DISC DISEASE), LUMBAR: ICD-10-CM

## 2019-05-07 DIAGNOSIS — E11.9 TYPE 2 DIABETES MELLITUS WITHOUT COMPLICATION, WITHOUT LONG-TERM CURRENT USE OF INSULIN (HCC): ICD-10-CM

## 2019-05-07 DIAGNOSIS — Z23 ENCOUNTER FOR IMMUNIZATION: ICD-10-CM

## 2019-05-07 DIAGNOSIS — E55.9 VITAMIN D DEFICIENCY: ICD-10-CM

## 2019-05-07 DIAGNOSIS — F33.9 RECURRENT DEPRESSION (HCC): ICD-10-CM

## 2019-05-07 DIAGNOSIS — Z12.31 SCREENING MAMMOGRAM, ENCOUNTER FOR: ICD-10-CM

## 2019-05-07 DIAGNOSIS — E66.3 OVERWEIGHT (BMI 25.0-29.9): ICD-10-CM

## 2019-05-07 DIAGNOSIS — E78.5 HYPERLIPIDEMIA, UNSPECIFIED HYPERLIPIDEMIA TYPE: ICD-10-CM

## 2019-05-07 DIAGNOSIS — M51.26 HNP (HERNIATED NUCLEUS PULPOSUS), LUMBAR: ICD-10-CM

## 2019-05-07 DIAGNOSIS — I10 ESSENTIAL HYPERTENSION: ICD-10-CM

## 2019-05-07 DIAGNOSIS — G43.009 MIGRAINE WITHOUT AURA AND WITHOUT STATUS MIGRAINOSUS, NOT INTRACTABLE: ICD-10-CM

## 2019-05-08 LAB
25(OH)D3+25(OH)D2 SERPL-MCNC: 31 NG/ML (ref 30–100)
25(OH)D3+25(OH)D2 SERPL-MCNC: 35.1 NG/ML (ref 30–100)
ALBUMIN SERPL-MCNC: 4 G/DL (ref 3.6–4.8)
ALBUMIN/GLOB SERPL: 2.4 {RATIO} (ref 1.2–2.2)
ALP SERPL-CCNC: 56 IU/L (ref 39–117)
ALT SERPL-CCNC: 13 IU/L (ref 0–32)
APPEARANCE UR: CLEAR
AST SERPL-CCNC: 10 IU/L (ref 0–40)
BACTERIA #/AREA URNS HPF: ABNORMAL /[HPF]
BASOPHILS # BLD AUTO: 0 X10E3/UL (ref 0–0.2)
BASOPHILS NFR BLD AUTO: 0 %
BILIRUB SERPL-MCNC: 0.3 MG/DL (ref 0–1.2)
BILIRUB UR QL STRIP: NEGATIVE
BUN SERPL-MCNC: 19 MG/DL (ref 8–27)
BUN/CREAT SERPL: 28 (ref 12–28)
CALCIUM SERPL-MCNC: 9.1 MG/DL (ref 8.7–10.3)
CASTS URNS QL MICRO: ABNORMAL /LPF
CHLORIDE SERPL-SCNC: 105 MMOL/L (ref 96–106)
CHOLEST SERPL-MCNC: 154 MG/DL (ref 100–199)
CO2 SERPL-SCNC: 22 MMOL/L (ref 20–29)
COLOR UR: YELLOW
CREAT SERPL-MCNC: 0.69 MG/DL (ref 0.57–1)
CRYSTALS URNS MICRO: ABNORMAL
EOSINOPHIL # BLD AUTO: 0.2 X10E3/UL (ref 0–0.4)
EOSINOPHIL NFR BLD AUTO: 2 %
EPI CELLS #/AREA URNS HPF: ABNORMAL /HPF (ref 0–10)
ERYTHROCYTE [DISTWIDTH] IN BLOOD BY AUTOMATED COUNT: 13.8 % (ref 12.3–15.4)
EST. AVERAGE GLUCOSE BLD GHB EST-MCNC: 143 MG/DL
GLOBULIN SER CALC-MCNC: 1.7 G/DL (ref 1.5–4.5)
GLUCOSE SERPL-MCNC: 166 MG/DL (ref 65–99)
GLUCOSE UR QL: NEGATIVE
HBA1C MFR BLD: 6.6 % (ref 4.8–5.6)
HCT VFR BLD AUTO: 37 % (ref 34–46.6)
HDLC SERPL-MCNC: 38 MG/DL
HGB BLD-MCNC: 12 G/DL (ref 11.1–15.9)
HGB UR QL STRIP: NEGATIVE
IMM GRANULOCYTES # BLD AUTO: 0 X10E3/UL (ref 0–0.1)
IMM GRANULOCYTES NFR BLD AUTO: 0 %
INTERPRETATION, 910389: NORMAL
KETONES UR QL STRIP: ABNORMAL
LDLC SERPL CALC-MCNC: 82 MG/DL (ref 0–99)
LEUKOCYTE ESTERASE UR QL STRIP: ABNORMAL
LYMPHOCYTES # BLD AUTO: 2.4 X10E3/UL (ref 0.7–3.1)
LYMPHOCYTES NFR BLD AUTO: 31 %
Lab: NORMAL
MCH RBC QN AUTO: 29.6 PG (ref 26.6–33)
MCHC RBC AUTO-ENTMCNC: 32.4 G/DL (ref 31.5–35.7)
MCV RBC AUTO: 91 FL (ref 79–97)
MICRO URNS: ABNORMAL
MONOCYTES # BLD AUTO: 0.3 X10E3/UL (ref 0.1–0.9)
MONOCYTES NFR BLD AUTO: 4 %
MUCOUS THREADS URNS QL MICRO: PRESENT
NEUTROPHILS # BLD AUTO: 4.9 X10E3/UL (ref 1.4–7)
NEUTROPHILS NFR BLD AUTO: 63 %
NITRITE UR QL STRIP: NEGATIVE
PH UR STRIP: 5 [PH] (ref 5–7.5)
PLATELET # BLD AUTO: 363 X10E3/UL (ref 150–379)
POTASSIUM SERPL-SCNC: 3.9 MMOL/L (ref 3.5–5.2)
PROT SERPL-MCNC: 5.7 G/DL (ref 6–8.5)
PROT UR QL STRIP: NEGATIVE
RBC # BLD AUTO: 4.05 X10E6/UL (ref 3.77–5.28)
RBC #/AREA URNS HPF: ABNORMAL /HPF (ref 0–2)
SODIUM SERPL-SCNC: 142 MMOL/L (ref 134–144)
SP GR UR: 1.02 (ref 1–1.03)
SPECIMEN STATUS REPORT, ROLRST: NORMAL
SPECIMEN STATUS REPORT, ROLRST: NORMAL
TRIGL SERPL-MCNC: 169 MG/DL (ref 0–149)
TSH SERPL DL<=0.005 MIU/L-ACNC: 2.31 UIU/ML (ref 0.45–4.5)
TSH SERPL DL<=0.005 MIU/L-ACNC: 2.36 UIU/ML (ref 0.45–4.5)
UNIDENT CRYS URNS QL MICRO: PRESENT
UROBILINOGEN UR STRIP-MCNC: 0.2 MG/DL (ref 0.2–1)
VLDLC SERPL CALC-MCNC: 34 MG/DL (ref 5–40)
WBC # BLD AUTO: 7.8 X10E3/UL (ref 3.4–10.8)
WBC #/AREA URNS HPF: ABNORMAL /HPF (ref 0–5)

## 2019-05-14 ENCOUNTER — OFFICE VISIT (OUTPATIENT)
Dept: INTERNAL MEDICINE CLINIC | Age: 64
End: 2019-05-14

## 2019-05-14 ENCOUNTER — TELEPHONE (OUTPATIENT)
Dept: INTERNAL MEDICINE CLINIC | Age: 64
End: 2019-05-14

## 2019-05-14 VITALS
RESPIRATION RATE: 16 BRPM | DIASTOLIC BLOOD PRESSURE: 68 MMHG | HEIGHT: 59 IN | WEIGHT: 127 LBS | BODY MASS INDEX: 25.6 KG/M2 | OXYGEN SATURATION: 99 % | TEMPERATURE: 98.9 F | HEART RATE: 63 BPM | SYSTOLIC BLOOD PRESSURE: 126 MMHG

## 2019-05-14 DIAGNOSIS — G43.009 MIGRAINE WITHOUT AURA AND WITHOUT STATUS MIGRAINOSUS, NOT INTRACTABLE: ICD-10-CM

## 2019-05-14 DIAGNOSIS — M54.16 BILATERAL LUMBAR RADICULOPATHY: ICD-10-CM

## 2019-05-14 DIAGNOSIS — F33.9 RECURRENT DEPRESSION (HCC): ICD-10-CM

## 2019-05-14 DIAGNOSIS — E11.9 TYPE 2 DIABETES MELLITUS WITHOUT COMPLICATION, WITHOUT LONG-TERM CURRENT USE OF INSULIN (HCC): ICD-10-CM

## 2019-05-14 DIAGNOSIS — M51.26 HNP (HERNIATED NUCLEUS PULPOSUS), LUMBAR: ICD-10-CM

## 2019-05-14 DIAGNOSIS — E66.3 OVERWEIGHT (BMI 25.0-29.9): ICD-10-CM

## 2019-05-14 DIAGNOSIS — E78.5 HYPERLIPIDEMIA, UNSPECIFIED HYPERLIPIDEMIA TYPE: ICD-10-CM

## 2019-05-14 DIAGNOSIS — M51.36 DDD (DEGENERATIVE DISC DISEASE), LUMBAR: ICD-10-CM

## 2019-05-14 DIAGNOSIS — I10 ESSENTIAL HYPERTENSION: ICD-10-CM

## 2019-05-14 DIAGNOSIS — Z00.01 ENCOUNTER FOR ROUTINE ADULT PHYSICAL EXAM WITH ABNORMAL FINDINGS: Primary | ICD-10-CM

## 2019-05-14 RX ORDER — FLUOXETINE HYDROCHLORIDE 20 MG/1
10 CAPSULE ORAL DAILY
COMMUNITY

## 2019-05-14 NOTE — PATIENT INSTRUCTIONS
Learning About Diabetes Food Guidelines  Your Care Instructions    Meal planning is important to manage diabetes. It helps keep your blood sugar at a target level (which you set with your doctor). You don't have to eat special foods. You can eat what your family eats, including sweets once in a while. But you do have to pay attention to how often you eat and how much you eat of certain foods. You may want to work with a dietitian or a certified diabetes educator (CDE) to help you plan meals and snacks. A dietitian or CDE can also help you lose weight if that is one of your goals. What should you know about eating carbs? Managing the amount of carbohydrate (carbs) you eat is an important part of healthy meals when you have diabetes. Carbohydrate is found in many foods. · Learn which foods have carbs. And learn the amounts of carbs in different foods. ? Bread, cereal, pasta, and rice have about 15 grams of carbs in a serving. A serving is 1 slice of bread (1 ounce), ½ cup of cooked cereal, or 1/3 cup of cooked pasta or rice. ? Fruits have 15 grams of carbs in a serving. A serving is 1 small fresh fruit, such as an apple or orange; ½ of a banana; ½ cup of cooked or canned fruit; ½ cup of fruit juice; 1 cup of melon or raspberries; or 2 tablespoons of dried fruit. ? Milk and no-sugar-added yogurt have 15 grams of carbs in a serving. A serving is 1 cup of milk or 2/3 cup of no-sugar-added yogurt. ? Starchy vegetables have 15 grams of carbs in a serving. A serving is ½ cup of mashed potatoes or sweet potato; 1 cup winter squash; ½ of a small baked potato; ½ cup of cooked beans; or ½ cup cooked corn or green peas. · Learn how much carbs to eat each day and at each meal. A dietitian or CDE can teach you how to keep track of the amount of carbs you eat. This is called carbohydrate counting. · If you are not sure how to count carbohydrate grams, use the Plate Method to plan meals.  It is a good, quick way to make sure that you have a balanced meal. It also helps you spread carbs throughout the day. ? Divide your plate by types of foods. Put non-starchy vegetables on half the plate, meat or other protein food on one-quarter of the plate, and a grain or starchy vegetable in the final quarter of the plate. To this you can add a small piece of fruit and 1 cup of milk or yogurt, depending on how many carbs you are supposed to eat at a meal.  · Try to eat about the same amount of carbs at each meal. Do not \"save up\" your daily allowance of carbs to eat at one meal.  · Proteins have very little or no carbs per serving. Examples of proteins are beef, chicken, turkey, fish, eggs, tofu, cheese, cottage cheese, and peanut butter. A serving size of meat is 3 ounces, which is about the size of a deck of cards. Examples of meat substitute serving sizes (equal to 1 ounce of meat) are 1/4 cup of cottage cheese, 1 egg, 1 tablespoon of peanut butter, and ½ cup of tofu. How can you eat out and still eat healthy? · Learn to estimate the serving sizes of foods that have carbohydrate. If you measure food at home, it will be easier to estimate the amount in a serving of restaurant food. · If the meal you order has too much carbohydrate (such as potatoes, corn, or baked beans), ask to have a low-carbohydrate food instead. Ask for a salad or green vegetables. · If you use insulin, check your blood sugar before and after eating out to help you plan how much to eat in the future. · If you eat more carbohydrate at a meal than you had planned, take a walk or do other exercise. This will help lower your blood sugar. What else should you know? · Limit saturated fat, such as the fat from meat and dairy products. This is a healthy choice because people who have diabetes are at higher risk of heart disease. So choose lean cuts of meat and nonfat or low-fat dairy products.  Use olive or canola oil instead of butter or shortening when cooking. · Don't skip meals. Your blood sugar may drop too low if you skip meals and take insulin or certain medicines for diabetes. · Check with your doctor before you drink alcohol. Alcohol can cause your blood sugar to drop too low. Alcohol can also cause a bad reaction if you take certain diabetes medicines. Follow-up care is a key part of your treatment and safety. Be sure to make and go to all appointments, and call your doctor if you are having problems. It's also a good idea to know your test results and keep a list of the medicines you take. Where can you learn more? Go to http://jennifer-pavel.info/. Enter U899 in the search box to learn more about \"Learning About Diabetes Food Guidelines. \"  Current as of: July 25, 2018  Content Version: 11.9  © 2720-5328 [employer]. Care instructions adapted under license by [employer] (which disclaims liability or warranty for this information). If you have questions about a medical condition or this instruction, always ask your healthcare professional. [place] [address] any warranty or liability for your use of this information. Learning About Meal Planning for Diabetes  Why plan your meals? Meal planning can be a key part of managing diabetes. Planning meals and snacks with the right balance of carbohydrate, protein, and fat can help you keep your blood sugar at the target level you set with your doctor. You don't have to eat special foods. You can eat what your family eats, including sweets once in a while. But you do have to pay attention to how often you eat and how much you eat of certain foods. You may want to work with a dietitian or a certified diabetes educator. He or she can give you tips and meal ideas and can answer your questions about meal planning. This health professional can also help you reach a healthy weight if that is one of your goals. What plan is right for you?   Your dietitian or diabetes educator may suggest that you start with the plate format or carbohydrate counting. The plate format  The plate format is a simple way to help you manage how you eat. You plan meals by learning how much space each food should take on a plate. Using the plate format helps you spread carbohydrate throughout the day. It can make it easier to keep your blood sugar level within your target range. It also helps you see if you're eating healthy portion sizes. To use the plate format, you put non-starchy vegetables on half your plate. Add meat or meat substitutes on one-quarter of the plate. Put a grain or starchy vegetable (such as brown rice or a potato) on the final quarter of the plate. You can add a small piece of fruit and some low-fat or fat-free milk or yogurt, depending on your carbohydrate goal for each meal.  Here are some tips for using the plate format:  · Make sure that you are not using an oversized plate. A 9-inch plate is best. Many restaurants use larger plates. · Get used to using the plate format at home. Then you can use it when you eat out. · Write down your questions about using the plate format. Talk to your doctor, a dietitian, or a diabetes educator about your concerns. Carbohydrate counting  With carbohydrate counting, you plan meals based on the amount of carbohydrate in each food. Carbohydrate raises blood sugar higher and more quickly than any other nutrient. It is found in desserts, breads and cereals, and fruit. It's also found in starchy vegetables such as potatoes and corn, grains such as rice and pasta, and milk and yogurt. Spreading carbohydrate throughout the day helps keep your blood sugar levels within your target range. Your daily amount depends on several things, including your weight, how active you are, which diabetes medicines you take, and what your goals are for your blood sugar levels.  A registered dietitian or diabetes educator can help you plan how much carbohydrate to include in each meal and snack. A guideline for your daily amount of carbohydrate is:  · 45 to 60 grams at each meal. That's about the same as 3 to 4 carbohydrate servings. · 15 to 20 grams at each snack. That's about the same as 1 carbohydrate serving. The Nutrition Facts label on packaged foods tells you how much carbohydrate is in a serving of the food. First, look at the serving size on the food label. Is that the amount you eat in a serving? All of the nutrition information on a food label is based on that serving size. So if you eat more or less than that, you'll need to adjust the other numbers. Total carbohydrate is the next thing you need to look for on the label. If you count carbohydrate servings, one serving of carbohydrate is 15 grams. For foods that don't come with labels, such as fresh fruits and vegetables, you'll need a guide that lists carbohydrate in these foods. Ask your doctor, dietitian, or diabetes educator about books or other nutrition guides you can use. If you take insulin, you need to know how many grams of carbohydrate are in a meal. This lets you know how much rapid-acting insulin to take before you eat. If you use an insulin pump, you get a constant rate of insulin during the day. So the pump must be programmed at meals to give you extra insulin to cover the rise in blood sugar after meals. When you know how much carbohydrate you will eat, you can take the right amount of insulin. Or, if you always use the same amount of insulin, you need to make sure that you eat the same amount of carbohydrate at meals. If you need more help to understand carbohydrate counting and food labels, ask your doctor, dietitian, or diabetes educator. How do you get started with meal planning? Here are some tips to get started:  · Plan your meals a week at a time. Don't forget to include snacks too. · Use cookbooks or online recipes to plan several main meals.  Plan some quick meals for busy nights. You also can double some recipes that freeze well. Then you can save half for other busy nights when you don't have time to cook. · Make sure you have the ingredients you need for your recipes. If you're running low on basic items, put these items on your shopping list too. · List foods that you use to make breakfasts, lunches, and snacks. List plenty of fruits and vegetables. · Post this list on the refrigerator. Add to it as you think of more things you need. · Take the list to the store to do your weekly shopping. Follow-up care is a key part of your treatment and safety. Be sure to make and go to all appointments, and call your doctor if you are having problems. It's also a good idea to know your test results and keep a list of the medicines you take. Where can you learn more? Go to http://jennifer-pavel.info/. Garland Escobedo in the search box to learn more about \"Learning About Meal Planning for Diabetes. \"  Current as of: July 25, 2018  Content Version: 11.9  © 7137-4343 [employer]. Care instructions adapted under license by [employer] (which disclaims liability or warranty for this information). If you have questions about a medical condition or this instruction, always ask your healthcare professional. [address] any warranty or liability for your use of this information.

## 2019-05-14 NOTE — TELEPHONE ENCOUNTER
Please request recent eye exam from [facility] on [address] . Patient reports being seen in 12/2018 . Please request pap smear from Dr. Aric Hernández. Patient was seen in 2/2019. Thank you.

## 2019-05-14 NOTE — PROGRESS NOTES
Chief Complaint   Patient presents with    Hypertension     6 month follow up with lab review. Health Maintenance Due   Topic Date Due    PAP AKA CERVICAL CYTOLOGY  11/17/2017    EYE EXAM RETINAL OR DILATED  05/02/2018    MICROALBUMIN Q1  04/26/2019     1. Have you been to the ER, urgent care clinic or hospitalized since your last visit? NO.     2. Have you seen or consulted any other health care providers outside of the [address] [place] [address] since your last visit (Include any pap smears or colon screening)?  NO

## 2019-05-18 RX ORDER — ESTRADIOL 0.05 MG/D
1 FILM, EXTENDED RELEASE TRANSDERMAL
Qty: 1 PATCH | Refills: 0
Start: 2019-05-18 | End: 2020-02-26 | Stop reason: SDUPTHER

## 2019-05-18 NOTE — PROGRESS NOTES
HPI:   Linda Knight is a 66y.o. year old female who presents today for a physical exam and for evaluation of hypertension, hyperlipidemia, diabetes mellitus, migraine headaches, depression, anxiety, ADHD, and lumbar radiculopathy. She reports that she is doing relatively well, but has been experiencing increasing symptoms of anxiety and depression. She states that she is under increasing stress at work due to a new computer system. She continues to be under the care of Dr. Jose Lambert and states that her therapy was recently changed from vortioxetine to Prozac. She states that she continues to have some negative thoughts, but denies any suicidal tendencies. She is otherwise without complaints. She has a history of hypertension, treated with propranolol and lisinopril. She does not check her blood pressure at home. She has begun walking regularly for exercise. She denies any chest pain, shortness of breath at rest or with exertion, palpitations, or lightheadedness. She also has a history of hyperlipidemia, treated with high intensity dose rosuvastatin. She has a history of diabetes mellitus, and was started on metformin in 2/2015 for a HbA1c of 7.5. She does not monitor her blood sugars at home. She denies any polyuria, polydipsia, nocturia, or blurry vision, and has no history of retinopathy, neuropathy, or nephropathy. She has regular eye exams with Dr. Nidhi Santos. She also has a history of migraines without aura, which usually respond to Imitrex. She has a history of lumbar radiculopathy (R>L), and was evaluated by Dr. Feliciana Spatz in 5/2015. Lumbar x-rays showed degenerative changes in L4-L5 and L5-S1. She was treated conservatively with physical therapy and ibuprofen and reports significant improvement. She was evaluated by MOJGAN Joe in 12/2016 for exacerbation of low back pain with right sciatica. She was treated with steroids and Norco with improvement.  On 4/17/2018, she presented with increasing low back pain with bilateral sciatica. She was treated with a prednisone taper with initial improvement. However, her symptoms worsened and she was referred to Dr. Kali Paula for evaluation. She gave her a Toradol injection, and ordered a lumbar MRI (6/24/2018) which showed a large right disc extrusion at L5-S1 which compressed the descending right S1 nerve root. She was referred to pain management and underwent a selective L5 and S1 selective nerve root blocks by Dr. Kailee Melendez on 7/26/2018. However, due to persistent symptoms, she was referred to Dr. Abraham Torres and subsequently underwent a right L5-S1 laminectomy and diskectomy on 1/0/3333 without complications. She states that she did well and continues to do her back stretches and exercises. She states that she only has occasional discomfort. She had a screening colonoscopy in 10/2014 by Dr. Elan Chinchilla which was normal. Follow-up due in 10 years. She denies any abdominal pain, nausea, vomiting, melena, hematochezia, or change in bowel movements. Past Medical History:   Diagnosis Date    ADHD     Anxiety     Bilateral lumbar radiculopathy     Carotid bruit     right    Depression     Diabetes mellitus (HCC)     HCD (hypertensive cardiovascular disease)     Hyperlipidemia     Hypertension     Migraine headache     Plantar fasciitis      Past Surgical History:   Procedure Laterality Date    HX SEPTOPLASTY  6/2002    HX TONSILLECTOMY      NEUROLOGICAL PROCEDURE UNLISTED  08/06/2018    laminectomy      Current Outpatient Medications   Medication Sig    FLUoxetine (PROZAC) 20 mg capsule Take  by mouth daily.  lisinopril (PRINIVIL, ZESTRIL) 5 mg tablet TAKE 1 TABLET BY MOUTH DAILY    propranolol (INDERAL) 40 mg tablet TAKE 1 TABLET BY MOUTH TWICE DAILY    atorvastatin (LIPITOR) 20 mg tablet Take 1 Tab by mouth daily.     metFORMIN (GLUCOPHAGE) 1,000 mg tablet TAKE 1 TABLET BY MOUTH TWICE DAILY WITH MEALS    SUMAtriptan (IMITREX) 50 mg tablet TAKE 1 TABLET BY MOUTH EVERY DAY AS NEEDED    OLANZapine (ZYPREXA) 5 mg tablet Take 1 Tab by mouth nightly.  LORazepam (ATIVAN) 1 mg tablet Take 1 Tab by mouth nightly as needed for Anxiety. Max Daily Amount: 1 mg.  progesterone (PROMETRIUM) 200 mg capsule Take 1 Cap by mouth daily.  estradiol (VIVELLE) 0.05 mg/24 hr 1 Patch by TransDERmal route two (2) times a week. (Patient taking differently: 1 Patch by TransDERmal route Every Saturday.)    dextroamphetamine-amphetamine (ADDERALL) 10 mg tablet Take 1 Tab by mouth daily. Max Daily Amount: 10 mg.    aspirin 81 mg tablet Take 81 mg by mouth.  naloxone (NARCAN) 4 mg/actuation nasal spray Use 1 spray intranasally, then discard. Repeat with new spray every 2 min as needed for opioid overdose symptoms, alternating nostrils. No current facility-administered medications for this visit. Allergies and Intolerances: Allergies   Allergen Reactions    Pcn [Penicillins] Rash    Sulfa (Sulfonamide Antibiotics) Rash     Family History: She has no family history of colon or breast cancer. Her mother  from a CVA. Her father  from throat cancer and cirrhosis. Family History   Problem Relation Age of Onset    Hypertension Brother         x2    Elevated Lipids Brother     Stroke Mother     Heart Surgery Mother         CABG    Cancer Father         cancer of the mouth    Hypertension Father     Hypertension Brother     Elevated Lipids Brother      Social History:   She  reports that she quit smoking about 30 years ago. She has never used smokeless tobacco. She smoked approximately 0.25 ppd for 2 years, stopping in . She is a  and has one daughter. She was employed as a . 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 She was previously working as the [employer] for his business.   Social History     Substance and Sexual Activity   Alcohol Use No    Alcohol/week: 1.0 oz  Types: 2 Glasses of wine per week     Immunization History:  Immunization History   Administered Date(s) Administered    Influenza Vaccine (Quad) PF 11/08/2018    Influenza Vaccine PF 02/03/2014, 10/13/2014    Influenza Vaccine Split 11/14/2011    Influenza Vaccine Whole 10/05/2010    Pneumococcal Polysaccharide (PPSV-23) 05/11/2017    TD Vaccine 01/23/2007    Tdap 11/10/2016    Zoster Recombinant 05/20/2018, 10/01/2018       Review of Systems:   As above included in HPI. Otherwise 11 point review of systems negative including constitutional, skin, HENT, eyes, respiratory, cardiovascular, gastrointestinal, genitourinary, musculoskeletal, endocrine, hematologic, allergy, and neurologic. Physical:   Vitals:   BP: 126/68  HR: 63  WT: 127 lb (57.6 kg)  BMI:  25.65 kg/m2    Exam:   Patient appears in no apparent distress. Affect is appropriate. HEENT --Anicteric sclerae, tympanic membranes normal,  ear canals normal.  PERRL, EOMI, conjunctiva and lids normal.   Sinuses were nontender, turbinates normal, hearing normal.  Oropharynx without  erythema, normal tongue, oral mucosa and tonsils. No cervical lymphadenopathy. No thyromegaly, JVD, or bruits. Carotid pulses 2+ with normal upstroke. Lungs --Clear to auscultation. No wheezing or rales. Heart --Regular rate and rhythm, no murmurs, rubs, gallops, or clicks. Chest wall --Nontender to palpation. PMI normal.  Abdomen -- Soft and nontender, no hepatosplenomegaly or masses. Extremities -- Without cyanosis, clubbing, edema. 2+ pulses equally and bilaterally.   Neuro -- CN 2-12 intact, strength 5/5 with intact soft touch in all extremities  Derm - no obvious abnormalities noted, no rash    Review of Data:  Labs:  Orders Only on 05/07/2019   Component Date Value Ref Range Status    WBC 05/07/2019 7.8  3.4 - 10.8 x10E3/uL Final    RBC 05/07/2019 4.05  3.77 - 5.28 x10E6/uL Final    HGB 05/07/2019 12.0  11.1 - 15.9 g/dL Final    HCT 05/07/2019 37.0 34.0 - 46.6 % Final    MCV 05/07/2019 91  79 - 97 fL Final    MCH 05/07/2019 29.6  26.6 - 33.0 pg Final    MCHC 05/07/2019 32.4  31.5 - 35.7 g/dL Final    RDW 05/07/2019 13.8  12.3 - 15.4 % Final    PLATELET 71/72/6055 599  150 - 379 x10E3/uL Final    NEUTROPHILS 05/07/2019 63  Not Estab. % Final    Lymphocytes 05/07/2019 31  Not Estab. % Final    MONOCYTES 05/07/2019 4  Not Estab. % Final    EOSINOPHILS 05/07/2019 2  Not Estab. % Final    BASOPHILS 05/07/2019 0  Not Estab. % Final    ABS. NEUTROPHILS 05/07/2019 4.9  1.4 - 7.0 x10E3/uL Final    Abs Lymphocytes 05/07/2019 2.4  0.7 - 3.1 x10E3/uL Final    ABS. MONOCYTES 05/07/2019 0.3  0.1 - 0.9 x10E3/uL Final    ABS. EOSINOPHILS 05/07/2019 0.2  0.0 - 0.4 x10E3/uL Final    ABS. BASOPHILS 05/07/2019 0.0  0.0 - 0.2 x10E3/uL Final    IMMATURE GRANULOCYTES 05/07/2019 0  Not Estab. % Final    ABS. IMM. GRANS. 05/07/2019 0.0  0.0 - 0.1 x10E3/uL Final    Glucose 05/07/2019 166* 65 - 99 mg/dL Final    BUN 05/07/2019 19  8 - 27 mg/dL Final    Creatinine 05/07/2019 0.69  0.57 - 1.00 mg/dL Final    GFR est non-AA 05/07/2019 94  >59 mL/min/1.73 Final    GFR est AA 05/07/2019 109  >59 mL/min/1.73 Final    BUN/Creatinine ratio 05/07/2019 28  12 - 28 Final    Sodium 05/07/2019 142  134 - 144 mmol/L Final    Potassium 05/07/2019 3.9  3.5 - 5.2 mmol/L Final    Chloride 05/07/2019 105  96 - 106 mmol/L Final    CO2 05/07/2019 22  20 - 29 mmol/L Final    Calcium 05/07/2019 9.1  8.7 - 10.3 mg/dL Final    Protein, total 05/07/2019 5.7* 6.0 - 8.5 g/dL Final    Albumin 05/07/2019 4.0  3.6 - 4.8 g/dL Final    GLOBULIN, TOTAL 05/07/2019 1.7  1.5 - 4.5 g/dL Final    A-G Ratio 05/07/2019 2.4* 1.2 - 2.2 Final    Bilirubin, total 05/07/2019 0.3  0.0 - 1.2 mg/dL Final    Alk.  phosphatase 05/07/2019 56  39 - 117 IU/L Final    AST (SGOT) 05/07/2019 10  0 - 40 IU/L Final    ALT (SGPT) 05/07/2019 13  0 - 32 IU/L Final    Specific Gravity 05/07/2019 1.021  1.005 - 1.030 Final    pH (UA) 05/07/2019 5.0  5.0 - 7.5 Final    Color 05/07/2019 Yellow  Yellow Final    Appearance 05/07/2019 Clear  Clear Final    Leukocyte Esterase 05/07/2019 1+* Negative Final    Protein 05/07/2019 Negative  Negative/Trace Final    Glucose 05/07/2019 Negative  Negative Final    Ketone 05/07/2019 Trace* Negative Final    Blood 05/07/2019 Negative  Negative Final    Bilirubin 05/07/2019 Negative  Negative Final    Urobilinogen 05/07/2019 0.2  0.2 - 1.0 mg/dL Final    Nitrites 05/07/2019 Negative  Negative Final    Microscopic Examination 05/07/2019 See additional order   Final    WBC 05/07/2019 6-10* 0 - 5 /hpf Final    RBC 05/07/2019 0-2  0 - 2 /hpf Final    Epithelial cells 05/07/2019 0-10  0 - 10 /hpf Final    Casts 05/07/2019 None seen  None seen /lpf Final    Crystals 05/07/2019 Present* N/A Final    Crystal type 05/07/2019 Calcium Oxalate  N/A Final    Mucus 05/07/2019 Present  Not Estab.  Final    Bacteria 05/07/2019 Few  None seen/Few Final    Cholesterol, total 05/07/2019 154  100 - 199 mg/dL Final    Triglyceride 05/07/2019 169* 0 - 149 mg/dL Final    HDL Cholesterol 05/07/2019 38* >39 mg/dL Final    VLDL, calculated 05/07/2019 34  5 - 40 mg/dL Final    LDL, calculated 05/07/2019 82  0 - 99 mg/dL Final    INTERPRETATION 05/07/2019 Note   Final    Hemoglobin A1c 05/07/2019 6.6* 4.8 - 5.6 % Final    Estimated average glucose 05/07/2019 143  mg/dL Final    PDF Image 14/86/0446 Not applicable   Final    TSH 05/07/2019 2.310  0.450 - 4.500 uIU/mL Final    VITAMIN D, 25-HYDROXY 05/07/2019 31.0  30.0 - 100.0 ng/mL Final    SPECIMEN STATUS REPORT 05/07/2019 COMMENT   Final    VITAMIN D, 25-HYDROXY 05/07/2019 35.1  30.0 - 100.0 ng/mL Final    TSH 05/07/2019 2.360  0.450 - 4.500 uIU/mL Final    SPECIMEN STATUS REPORT 05/07/2019 COMMENT   Final     Health Maintenance:  Screening:    Mammogram: negative (3/2019)   PAP smear: well women exams by Dr. Pratima Beyer (last 2/2019)   Colorectal: colonoscopy (10/2014) normal. Dr. Izabella Velasquez. Due 2024. Depression: under care of [address] [place] [address] Psychiatry. DM (HbA1c/FPG): HbA1c 6.3 (11/2018)   Hepatitis C: negative (5/2017)   Falls: none   DEXA: osteopenia (2/2019) Dr. Anatoly Edward   Glaucoma: [facility] exams at [facility] (12/2018)   Smoking: none   Vitamin D: 31.0 (5/2019)   Medicare Wellness: N/A    Impression:  Patient Active Problem List   Diagnosis Code    HCD (hypertensive cardiovascular disease) I11.9    Hyperlipidemia E78.5    Otosclerosis H80.90    Migraine G43.909    Bilateral lumbar radiculopathy M54.16    Type 2 diabetes mellitus without complication ([facility] [address] Y73.1    Essential hypertension I10    Vitamin D deficiency E55.9    Recurrent depression ([facility] [address] F33.9    Overweight (BMI 25.0-29. 9) E66.3    Right lumbar radiculopathy M54.16    DDD (degenerative disc disease), lumbar M51.36    HNP (herniated nucleus pulposus), lumbar M51.26       Plan:  1. Diabetes mellitus. Significantly improved control with HbA1c decreased from 7.1 to 6.6 on metformin 1000 mg bid. She has lost another 3 pounds since her last visit. No evidence of microvascular complications. On statin and lisinopril for its colin-protective effect. Continue follow-up for annual eye exams. Foot exam normal (11/2018). Urine microalbumin/ creatinine ratio without evidence of microalbuminuria. Emphasized importance of lifestyle modifications, including diet, exercise, and weight loss. 2. Hypertension. Blood pressure well controlled today on current regimen of lisinopril 5 mg daily and propranolol 40 mg bid. Renal function remains normal with creatinine 0.69/ eGFR 94. Continue to follow. 3. Hyperlipidemia. On high intensity rosuvastatin with LDL 82 and HDL 38, indicative of excellent control. Emphasized importance of lifestyle modifications, including diet, exercise, and weight loss. Continue to follow. 4. Lymphocytosis.  Routine CBC at last visit with normal WBC count of 10.0, but differential with predominance of lymphocytes (61%) and ALC elevated at [address]. Smear reviewed by pathologist and 1+ smudge cells noted as well as absolute lymphocytosis of an overall monomorphous population of lymphocytes, possibly due to a reactive process versus a lymphoproliferative process. Flow cytometry analysis recommended for definitive diagnosis. Referred to Dr. Dominique Mendez for evaluation, but patient states that she was never contacted for an appointment. Repeat CBC returned to normal at last visit, and remains normal today. Suspect reactive process caused prior elevation. Will continue to monitor. 5. Lumbar herniated disc with right sciatica, s/p right L5-S1 laminectomy and diskectomy. Doing well without significant discomfort. Stressed improtance of continuing stretches and exercise. Follow. 6. Depression/ADHD. Worsening symptoms and regimen changed from vortioxetine to Prozac. Continues on Zyprexa, Adderall, and lorazepam at bedtime. Being followed by Dr. Obey Cantrell at [facility]. Discussed close follow-up and ways to deal with stress at work. 7. Migraines. Relatively infrequent and responds to Imitrex. Follow. 8. Overweight. Emphasized importance of continued lifestyle modifications, including diet, exercise, and weight loss. Importance stressed to help with diabetes control. Will readdress next visit. 9. Health maintenance. Received Pneumovax. Also received Tdap. Completed 2/2 doses of Shingrix at [facility] [employer]. Other immunizations up to date. Well women exam performed by Dr. Kya Oliveira. Attempting to wean hormonal therapy for menopausal symptoms and now using estrogen patch once per week. Mammogram up to date. Colonoscopy due 2024. Continue eye exams with [facility] and will request note. Vitamin D level stable. Discussed continuing maintenance dose therapy. Patient understands recommendations and agrees with plan. Follow-up in 6 months.

## 2019-07-09 ENCOUNTER — TELEPHONE (OUTPATIENT)
Dept: INTERNAL MEDICINE CLINIC | Age: 64
End: 2019-07-09

## 2019-07-09 NOTE — TELEPHONE ENCOUNTER
Patient verified full name and date of birth. Prozac is 20 mg now and started on Wellbutrin. Taken both on Sunday, and within an hour had a severe headache. Patient states she took Imitrex and it didn't help the headache. She also states she took Tylenol it dint' help. She also stated that she took Ibuprofen on Monday due to her breaking out in a sweat and feeling feverish with a low grade fever of 99.8. Patient states she called her psychiatrist office this morning and they haven't called back yet. I informed the patient that if she is still feeling bad and nothing is helping her at all, to go straight to the Emergency Room. Patient verbalized understanding without questions.

## 2019-07-09 NOTE — TELEPHONE ENCOUNTER
Pt calling, says psych put her on Wellbutrin and it is giving her bad headaches. Says Sunday she also ran a fever. Wants to know what to do. Says she can call psych but they don't call back until the end of the day. Advised her to call the doctor who gave her the medication and I would forward to Dr. Jessie Donald for any other advice.     She is asking to speak to nurse if not the doctor

## 2019-07-15 ENCOUNTER — OFFICE VISIT (OUTPATIENT)
Dept: INTERNAL MEDICINE CLINIC | Age: 64
End: 2019-07-15

## 2019-07-15 ENCOUNTER — TELEPHONE (OUTPATIENT)
Dept: INTERNAL MEDICINE CLINIC | Age: 64
End: 2019-07-15

## 2019-07-15 VITALS
WEIGHT: 128 LBS | TEMPERATURE: 99 F | BODY MASS INDEX: 25.8 KG/M2 | DIASTOLIC BLOOD PRESSURE: 82 MMHG | HEART RATE: 82 BPM | RESPIRATION RATE: 16 BRPM | HEIGHT: 59 IN | OXYGEN SATURATION: 96 % | SYSTOLIC BLOOD PRESSURE: 153 MMHG

## 2019-07-15 DIAGNOSIS — R51.9 NONINTRACTABLE HEADACHE, UNSPECIFIED CHRONICITY PATTERN, UNSPECIFIED HEADACHE TYPE: ICD-10-CM

## 2019-07-15 DIAGNOSIS — G43.009 MIGRAINE WITHOUT AURA AND WITHOUT STATUS MIGRAINOSUS, NOT INTRACTABLE: Primary | ICD-10-CM

## 2019-07-15 RX ORDER — METHYLPREDNISOLONE 4 MG/1
TABLET ORAL
COMMUNITY
End: 2019-11-19 | Stop reason: ALTCHOICE

## 2019-07-15 NOTE — PROGRESS NOTES
Noni Landers presents today for   Chief Complaint   Patient presents with   [facility] Follow Up     [facility] 7/9-7/11 for Aphasia dx UTI, headaches- reports still having fequent headaches              Depression Screening:  3 most recent PHQ Screens 5/14/2019   Little interest or pleasure in doing things More than half the days   Feeling down, depressed, irritable, or hopeless Several days   Total Score PHQ 2 3   Trouble falling or staying asleep, or sleeping too much Not at all   Feeling tired or having little energy Not at all   Poor appetite, weight loss, or overeating Not at all   Feeling bad about yourself - or that you are a failure or have let yourself or your family down Not at all   Trouble concentrating on things such as school, work, reading, or watching TV More than half the days   Moving or speaking so slowly that other people could have noticed; or the opposite being so fidgety that others notice Not at all   Thoughts of being better off dead, or hurting yourself in some way Not at all   PHQ 9 Score 5   How difficult have these problems made it for you to do your work, take care of your home and get along with others Somewhat difficult       Learning Assessment:  Learning Assessment 5/14/2019   PRIMARY LEARNER Patient   BARRIERS PRIMARY LEARNER NONE   CO-LEARNER CAREGIVER No   PRIMARY LANGUAGE ENGLISH   LEARNER PREFERENCE PRIMARY LISTENING     READING     DEMONSTRATION   ANSWERED BY patient   RELATIONSHIP SELF       Abuse Screening:  Abuse Screening Questionnaire 5/14/2019   Do you ever feel afraid of your partner? N   Are you in a relationship with someone who physically or mentally threatens you? N   Is it safe for you to go home? Y       Fall Risk  Fall Risk Assessment, last 12 mths 5/14/2019   Able to walk? Yes   Fall in past 12 months? No           Coordination of Care:  1. Have you been to the ER, urgent care clinic since your last visit? Hospitalized since your last visit?  Yes- [facility] on file    2. Have you seen or consulted any other health care providers outside of the [address] [place] [address] since your last visit? Include any pap smears or colon screening.  no

## 2019-07-15 NOTE — TELEPHONE ENCOUNTER
Per Neda Barrera call and inform patient to increase lisinopril to 10mg daily. Recent readings have shown a trend of elevated BP. Patient aware. No further questions at this time.     (Per Shelley's office note- I will have her increase the Lisinopril to 10 mg)

## 2019-07-15 NOTE — PROGRESS NOTES
Estee Gaming is a 66 y.o.  female and presents with    Chief Complaint   Patient presents with   [facility] Follow Up     [address] 7/9-7/11 for Aphasia dx UTI, headaches- reports still having fequent headaches       Subjective:  HPI  She reports today for a hospital follow up 7/9-7/11 diagnosed with questionable UTI- UA leukocytes +, urine WBC +, bacteria+, squamous cell and hyaline casts+, urine culture 7/9/19 mixed culture. She visited the psychiatrist 7/6/19, he lowered Prozac 40> 20 mg, and placed on Wellbutrin XR, started the medication on Sunday morning and within an hour she reports headache, took Imitrex without relief. Took Iburprofen without relief. She reports continued to worsen, blurred vision, confused, unable to concentrate. This occurred on Sunday 7/7/19. She reports had to lay down, in bed all day Sunday and most of the day Monday. Tuesday was able to work from home and this is when she presented to the ED at [facility]. Stroke workup completed negative CT, MRI head noted irregularity to bilateral carotids so  MRA neck completed and was negative. The headache resolved on Thursday and so they released her on Friday- she was discharged on Methylprednisolone. She has stopped the Wellbutrin after that one pill. She is off the [address] as well and thought with neurology is that she is rebounding with headaches due to withdrawal. Taking Lorazepam only PRN, last used last night for head pain- states it was \"trying to hurt\", no other symptoms. She stopped the Adderal on 7/8/19 and was taking it for months prior to that. The headache returned on Saturday and was noted on and off. Movement of the head makes it worse. It is described as nagging. It was in the occipital lobe and into the neck with stiffness and then intermittently to the frontal lobe.      She is taking methylprednisolone for her headache that was started in the hospital by the neurologist, Dr. Yonas Turner , she is scheduled to take until July 20th. Additional Concerns: none     ROS   Review of Systems   Neurological: Positive for headaches. Negative for dizziness. Allergies   Allergen Reactions    Pcn [Penicillins] Rash    Sulfa (Sulfonamide Antibiotics) Rash       Current Outpatient Medications   Medication Sig Dispense Refill    methylPREDNISolone (MEDROL, JOSR,) 4 mg tablet Take  by mouth.  estradiol (VIVELLE) 0.05 mg/24 hr 1 Patch by TransDERmal route Every Saturday. 1 Patch 0    FLUoxetine (PROZAC) 20 mg capsule Take  by mouth daily.  lisinopril (PRINIVIL, ZESTRIL) 5 mg tablet TAKE 1 TABLET BY MOUTH DAILY 90 Tab 2    propranolol (INDERAL) 40 mg tablet TAKE 1 TABLET BY MOUTH TWICE DAILY 180 Tab 2    atorvastatin (LIPITOR) 20 mg tablet Take 1 Tab by mouth daily. 90 Tab 2    metFORMIN (GLUCOPHAGE) 1,000 mg tablet TAKE 1 TABLET BY MOUTH TWICE DAILY WITH MEALS 180 Tab 2    SUMAtriptan (IMITREX) 50 mg tablet TAKE 1 TABLET BY MOUTH EVERY DAY AS NEEDED 9 Tab 2    naloxone (NARCAN) 4 mg/actuation nasal spray Use 1 spray intranasally, then discard. Repeat with new spray every 2 min as needed for opioid overdose symptoms, alternating nostrils. 1 Each 0    LORazepam (ATIVAN) 1 mg tablet Take 1 Tab by mouth nightly as needed for Anxiety. Max Daily Amount: 1 mg. 1 Tab 0    progesterone (PROMETRIUM) 200 mg capsule Take 1 Cap by mouth daily. 30 Cap 1    aspirin 81 mg tablet Take 81 mg by mouth.          Social History     Socioeconomic History    Marital status:      Spouse name: Not on file    Number of children: Not on file    Years of education: Not on file    Highest education level: Not on file   Occupational History    Not on file   Social Needs    Financial resource strain: Not on file    Food insecurity:     Worry: Not on file     Inability: Not on file    Transportation needs:     Medical: Not on file     Non-medical: Not on file   Tobacco Use    Smoking status: Former Smoker Last attempt to quit: 1989     Years since quittin.5    Smokeless tobacco: Never Used   Substance and Sexual Activity    Alcohol use: No     Alcohol/week: 1.0 oz     Types: 2 Glasses of wine per week    Drug use: No    Sexual activity: Never   Lifestyle    Physical activity:     Days per week: Not on file     Minutes per session: Not on file    Stress: Not on file   Relationships    Social connections:     Talks on phone: Not on file     Gets together: Not on file     Attends Shinto service: Not on file     Active member of club or organization: Not on file     Attends meetings of clubs or organizations: Not on file     Relationship status: Not on file    Intimate partner violence:     Fear of current or ex partner: Not on file     Emotionally abused: Not on file     Physically abused: Not on file     Forced sexual activity: Not on file   Other Topics Concern    Not on file   Social History Narrative    Not on file       Past Medical History:   Diagnosis Date    ADHD     Anxiety     Bilateral lumbar radiculopathy     Carotid bruit     right    Depression     Diabetes mellitus ([address]     HCD (hypertensive cardiovascular disease)     Hyperlipidemia     Hypertension     Migraine headache     Plantar fasciitis        Past Surgical History:   Procedure Laterality Date    HX SEPTOPLASTY  2002    HX TONSILLECTOMY      NEUROLOGICAL PROCEDURE UNLISTED  2018    laminectomy        Family History   Problem Relation Age of Onset    Hypertension Brother         x2    Elevated Lipids Brother     Stroke Mother     Heart Surgery Mother         CABG    Cancer Father         cancer of the mouth    Hypertension Father     Hypertension Brother     Elevated Lipids Brother        Objective:  Vitals:    07/15/19 1129 07/15/19 1138   BP: 160/80 153/82   Pulse: 81 82   Resp: 16    Temp: 99 °F (37.2 °C)    TempSrc: Oral    SpO2: 96% 96%   Weight: 128 lb (58.1 kg)    Height: 4' 11\" (1.499 m) PainSc:   4    PainLoc: Head        LABS   Results for orders placed or performed in visit on 05/07/19   CBC/DIFF AMBIGUOUS DEFAULT   Result Value Ref Range    WBC 7.8 3.4 - 10.8 x10E3/uL    RBC 4.05 3.77 - 5.28 x10E6/uL    HGB 12.0 11.1 - 15.9 g/dL    HCT 37.0 34.0 - 46.6 %    MCV 91 79 - 97 fL    MCH 29.6 26.6 - 33.0 pg    MCHC 32.4 31.5 - 35.7 g/dL    RDW 13.8 12.3 - 15.4 %    PLATELET 682 765 - 425 x10E3/uL    NEUTROPHILS 63 Not Estab. %    Lymphocytes 31 Not Estab. %    MONOCYTES 4 Not Estab. %    EOSINOPHILS 2 Not Estab. %    BASOPHILS 0 Not Estab. %    ABS. NEUTROPHILS 4.9 1.4 - 7.0 x10E3/uL    Abs Lymphocytes 2.4 0.7 - 3.1 x10E3/uL    ABS. MONOCYTES 0.3 0.1 - 0.9 x10E3/uL    ABS. EOSINOPHILS 0.2 0.0 - 0.4 x10E3/uL    ABS. BASOPHILS 0.0 0.0 - 0.2 x10E3/uL    IMMATURE GRANULOCYTES 0 Not Estab. %    ABS. IMM. GRANS. 0.0 0.0 - 0.1 G57C7/VB   METABOLIC PANEL, COMPREHENSIVE   Result Value Ref Range    Glucose 166 (H) 65 - 99 mg/dL    BUN 19 8 - 27 mg/dL    Creatinine 0.69 0.57 - 1.00 mg/dL    GFR est non-AA 94 >59 mL/min/1.73    GFR est  >59 mL/min/1.73    BUN/Creatinine ratio 28 12 - 28    Sodium 142 134 - 144 mmol/L    Potassium 3.9 3.5 - 5.2 mmol/L    Chloride 105 96 - 106 mmol/L    CO2 22 20 - 29 mmol/L    Calcium 9.1 8.7 - 10.3 mg/dL    Protein, total 5.7 (L) 6.0 - 8.5 g/dL    Albumin 4.0 3.6 - 4.8 g/dL    GLOBULIN, TOTAL 1.7 1.5 - 4.5 g/dL    A-G Ratio 2.4 (H) 1.2 - 2.2    Bilirubin, total 0.3 0.0 - 1.2 mg/dL    Alk.  phosphatase 56 39 - 117 IU/L    AST (SGOT) 10 0 - 40 IU/L    ALT (SGPT) 13 0 - 32 IU/L   URINALYSIS W/ RFLX MICROSCOPIC   Result Value Ref Range    Specific Gravity 1.021 1.005 - 1.030    pH (UA) 5.0 5.0 - 7.5    Color Yellow Yellow    Appearance Clear Clear    Leukocyte Esterase 1+ (A) Negative    Protein Negative Negative/Trace    Glucose Negative Negative    Ketone Trace (A) Negative    Blood Negative Negative    Bilirubin Negative Negative    Urobilinogen 0.2 0.2 - 1.0 mg/dL Nitrites Negative Negative    Microscopic Examination See additional order    MICROSCOPIC EXAMINATION   Result Value Ref Range    WBC 6-10 (A) 0 - 5 /hpf    RBC 0-2 0 - 2 /hpf    Epithelial cells 0-10 0 - 10 /hpf    Casts None seen None seen /lpf    Crystals Present (A) N/A    Crystal type Calcium Oxalate N/A    Mucus Present Not Estab. Bacteria Few None seen/Few   LIPID PANEL   Result Value Ref Range    Cholesterol, total 154 100 - 199 mg/dL    Triglyceride 169 (H) 0 - 149 mg/dL    HDL Cholesterol 38 (L) >39 mg/dL    VLDL, calculated 34 5 - 40 mg/dL    LDL, calculated 82 0 - 99 mg/dL   CVD REPORT   Result Value Ref Range    INTERPRETATION Note    HEMOGLOBIN A1C WITH EAG   Result Value Ref Range    Hemoglobin A1c 6.6 (H) 4.8 - 5.6 %    Estimated average glucose 143 mg/dL   DIABETES PATIENT EDUCATION   Result Value Ref Range    PDF Image Not applicable    TSH 3RD GENERATION   Result Value Ref Range    TSH 2.310 0.450 - 4.500 uIU/mL   VITAMIN D, 25 HYDROXY   Result Value Ref Range    VITAMIN D, 25-HYDROXY 31.0 30.0 - 100.0 ng/mL   SPECIMEN STATUS REPORT   Result Value Ref Range    SPECIMEN STATUS REPORT COMMENT    VITAMIN D, 25 HYDROXY   Result Value Ref Range    VITAMIN D, 25-HYDROXY 35.1 30.0 - 100.0 ng/mL   TSH 3RD GENERATION   Result Value Ref Range    TSH 2.360 0.450 - 4.500 uIU/mL   SPECIMEN STATUS REPORT   Result Value Ref Range    SPECIMEN STATUS REPORT COMMENT        TESTS  See [facility]- CT head, MRA head and neck. PE  Physical Exam   Constitutional: She is oriented to person, place, and time. She appears well-developed and well-nourished. No distress. HENT:   Head: Normocephalic and atraumatic. Eyes: Pupils are equal, round, and reactive to light. Conjunctivae and EOM are normal.   Neck: Normal range of motion. Cardiovascular: Normal rate, regular rhythm, normal heart sounds and intact distal pulses. Pulmonary/Chest: Effort normal and breath sounds normal.   Abdominal: Soft.  Bowel sounds are normal.   Musculoskeletal: Normal range of motion. Lymphadenopathy:     She has no cervical adenopathy. Neurological: She is alert and oriented to person, place, and time. No cranial nerve deficit. Skin: Skin is warm and dry. She is not diaphoretic. Psychiatric: She has a normal mood and affect. Her behavior is normal. Judgment and thought content normal.   Vitals reviewed. Assessment/Plan:    1. Ongoing headache- Continue the steroids, ref neurology. Call psychiatry and inform off Wellbutrin, still on Prozac 20 which was lower dose, call us if changes. Report new/worsening symptoms. Tylenol for pain, only use Imitrex if with migraines. She is with a low grade temp however no other overt symptoms, elevated BP-with headache, on steroids, change in Prozac, stopped Adderall, hormone therapy. I will have her increase the Lisinopril to 10 mg. (current dose Lisinopril 5 mg and also on Propanolol 40 mg daily). Follow up with PCP after seen by neurology and psychiatry. Lab review: no lab studies available for review at time of visit    Today's Visit:   Diagnoses and all orders for this visit:    1. Migraine without aura and without status migrainosus, not intractable  -     REFERRAL TO NEUROLOGY    2. Nonintractable headache, unspecified chronicity pattern, unspecified headache type  -     REFERRAL TO NEUROLOGY          Health Maintenance: Deferred to PCP. I have discussed the diagnosis with the patient and the intended plan as seen in the above orders. The patient has received an after-visit summary and questions were answered concerning future plans. I have discussed medication side effects and warnings with the patient as well. I have reviewed the plan of care with the patient, accepted their input and they are in agreement with the treatment goals. More than 1/2 of this 30 minute visit was spent in counseling and coordination of care, as described above.     Hakan Jackson STONE  Internist of [address] [place] [address], [facility] [address]  Phone: [phone]  Fax: [phone]

## 2019-07-19 RX ORDER — PROPRANOLOL HYDROCHLORIDE 40 MG/1
40 TABLET ORAL 2 TIMES DAILY
Qty: 180 TAB | Refills: 3 | Status: SHIPPED | OUTPATIENT
Start: 2019-07-19 | End: 2020-02-26 | Stop reason: SDUPTHER

## 2019-07-19 RX ORDER — ATORVASTATIN CALCIUM 20 MG/1
20 TABLET, FILM COATED ORAL DAILY
Qty: 90 TAB | Refills: 3 | Status: SHIPPED | OUTPATIENT
Start: 2019-07-19 | End: 2020-02-26 | Stop reason: SDUPTHER

## 2019-07-19 RX ORDER — SUMATRIPTAN 50 MG/1
50 TABLET, FILM COATED ORAL
Qty: 27 TAB | Refills: 1 | Status: SHIPPED | OUTPATIENT
Start: 2019-07-19 | End: 2019-08-14 | Stop reason: SDUPTHER

## 2019-07-19 RX ORDER — METFORMIN HYDROCHLORIDE 1000 MG/1
1000 TABLET ORAL 2 TIMES DAILY WITH MEALS
Qty: 180 TAB | Refills: 3 | Status: SHIPPED | OUTPATIENT
Start: 2019-07-19 | End: 2020-02-26 | Stop reason: SDUPTHER

## 2019-07-19 NOTE — TELEPHONE ENCOUNTER
Nasra Randall is requesting a 90 day supply    Last Visit: 7/15/19 with MARTHA Whipple  Next Appointment: 11/19/19 with MD Corinne Hernandez    Requested Prescriptions     Pending Prescriptions Disp Refills    SUMAtriptan (IMITREX) 50 mg tablet 27 Tab 1     Sig: Take 1 Tab by mouth daily as needed for Migraine.  propranolol (INDERAL) 40 mg tablet 180 Tab 3     Sig: Take 1 Tab by mouth two (2) times a day.  metFORMIN (GLUCOPHAGE) 1,000 mg tablet 180 Tab 3     Sig: Take 1 Tab by mouth two (2) times daily (with meals).  atorvastatin (LIPITOR) 20 mg tablet 90 Tab 3     Sig: Take 1 Tab by mouth daily.

## 2019-07-24 ENCOUNTER — TELEPHONE (OUTPATIENT)
Dept: INTERNAL MEDICINE CLINIC | Age: 64
End: 2019-07-24

## 2019-07-24 ENCOUNTER — HOSPITAL ENCOUNTER (EMERGENCY)
Age: 64
Discharge: HOME | End: 2019-07-24
Attending: EMERGENCY MEDICINE
Payer: COMMERCIAL

## 2019-07-24 VITALS
HEART RATE: 68 BPM | OXYGEN SATURATION: 98 % | RESPIRATION RATE: 16 BRPM | DIASTOLIC BLOOD PRESSURE: 81 MMHG | SYSTOLIC BLOOD PRESSURE: 144 MMHG

## 2019-07-24 DIAGNOSIS — G51.0 BELL'S PALSY: Primary | ICD-10-CM

## 2019-07-24 PROCEDURE — 99281 EMR DPT VST MAYX REQ PHY/QHP: CPT

## 2019-07-24 RX ORDER — PREDNISONE 20 MG/1
60 TABLET ORAL DAILY
Qty: 21 TAB | Refills: 0 | Status: SHIPPED | OUTPATIENT
Start: 2019-07-24 | End: 2019-07-31

## 2019-07-24 RX ORDER — VALACYCLOVIR HYDROCHLORIDE 1 G/1
1000 TABLET, FILM COATED ORAL 3 TIMES DAILY
Qty: 21 TAB | Refills: 0 | Status: SHIPPED | OUTPATIENT
Start: 2019-07-24 | End: 2019-07-31

## 2019-07-24 NOTE — TELEPHONE ENCOUNTER
CT and MRI done- results ok  Yesterday she hold her mouth right- not drooping- can't drink or spit properly

## 2019-07-24 NOTE — TELEPHONE ENCOUNTER
Patient called and stated that she is having facial drooping on her left side x 1 day. Patient denies any chest pain or SOB. Patient advised to go to the ER for further evaluation. Patient states that she will try to get a ride. Patient advised to call 911 if she cannot reach anyone.

## 2019-07-24 NOTE — ED PROVIDER NOTES
EMERGENCY DEPARTMENT HISTORY AND PHYSICAL EXAM      Date: 7/24/2019  Patient Name: Esteban Quintero    History of Presenting Illness     Chief Complaint   Patient presents with    Facial Droop       History (Context): Esteban Quintero is a 60 y.o. gentleman with diabetes, hyperlipidemia, hypertension, migraine headaches, who presents with 1 day of acute onset left-sided facial droop. It is severe constant and localized. No exacerbating/relieving features or other associated symptoms. Last known normal: 1 day ago ago    Patient not on blood thinners. On review of systems, the patient denies chest pain, shortness of breath, fever, chills, rashes, vision changes, falls, head trauma, headache, other numbness/weakness/tingling. PCP: Devera Moritz, MD    Current Outpatient Medications   Medication Sig Dispense Refill    valACYclovir (VALTREX) 1 gram tablet Take 1 Tab by mouth three (3) times daily for 7 days. 21 Tab 0    predniSONE (DELTASONE) 20 mg tablet Take 60 mg by mouth daily for 7 days. With Breakfast 21 Tab 0    white petrolatum-mineral oil (ARTIFICIAL TEARS, VLADISLAV/MIN,) 83-15 % ophthalmic ointment Administer  to left eye nightly. Small amount 3.5 g 2    hydroxypropyl methylcellulose (GENTEAL) 0.2 % ophthalmic solution Administer 2 Drops to left eye every four (4) hours for 30 days. 1 Bottle 1    SUMAtriptan (IMITREX) 50 mg tablet Take 1 Tab by mouth daily as needed for Migraine. 27 Tab 1    propranolol (INDERAL) 40 mg tablet Take 1 Tab by mouth two (2) times a day. 180 Tab 3    metFORMIN (GLUCOPHAGE) 1,000 mg tablet Take 1 Tab by mouth two (2) times daily (with meals). 180 Tab 3    atorvastatin (LIPITOR) 20 mg tablet Take 1 Tab by mouth daily. 90 Tab 3    methylPREDNISolone (MEDROL, JOSR,) 4 mg tablet Take  by mouth.  estradiol (VIVELLE) 0.05 mg/24 hr 1 Patch by TransDERmal route Every Saturday. 1 Patch 0    FLUoxetine (PROZAC) 20 mg capsule Take  by mouth daily.       lisinopril (PRINIVIL, ZESTRIL) 5 mg tablet TAKE 1 TABLET BY MOUTH DAILY 90 Tab 2    naloxone (NARCAN) 4 mg/actuation nasal spray Use 1 spray intranasally, then discard. Repeat with new spray every 2 min as needed for opioid overdose symptoms, alternating nostrils. 1 Each 0    LORazepam (ATIVAN) 1 mg tablet Take 1 Tab by mouth nightly as needed for Anxiety. Max Daily Amount: 1 mg. 1 Tab 0    progesterone (PROMETRIUM) 200 mg capsule Take 1 Cap by mouth daily. 30 Cap 1    aspirin 81 mg tablet Take 81 mg by mouth. Past History     Past Medical History:  Past Medical History:   Diagnosis Date    ADHD     Anxiety     Bilateral lumbar radiculopathy     Carotid bruit     right    Depression     Diabetes mellitus (HCC)     HCD (hypertensive cardiovascular disease)     Hyperlipidemia     Hypertension     Migraine headache     Plantar fasciitis        Past Surgical History:  Past Surgical History:   Procedure Laterality Date    HX SEPTOPLASTY  2002    HX TONSILLECTOMY      NEUROLOGICAL PROCEDURE UNLISTED  2018    laminectomy        Family History:  Family History   Problem Relation Age of Onset    Hypertension Brother         x2    Elevated Lipids Brother     Stroke Mother     Heart Surgery Mother         CABG    Cancer Father         cancer of the mouth    Hypertension Father     Hypertension Brother     Elevated Lipids Brother        Social History:  Social History     Tobacco Use    Smoking status: Former Smoker     Last attempt to quit: 1989     Years since quittin.5    Smokeless tobacco: Never Used   Substance Use Topics    Alcohol use: No     Alcohol/week: 1.7 standard drinks     Types: 2 Glasses of wine per week    Drug use: No       Allergies: Allergies   Allergen Reactions    Pcn [Penicillins] Rash    Sulfa (Sulfonamide Antibiotics) Rash       PMH, PSH, family history, social history, allergies reviewed with the patient with significant items noted above.   Review of Systems   As per HPI, otherwise reviewed and negative. Physical Exam     Vitals:    07/24/19 1728   BP: 144/81   Pulse: 68   Resp: 16   SpO2: 98%       Gen: No acute distress  HEENT: Normocephalic, sclera anicteric  Cardiovascular: Normal rate, regular rhythm, no murmurs, rubs, gallops. Pulses intact and equal distally. Pulmonary: No respiratory distress. No stridor. Clear lungs. ABD: Soft, nontender, nondistended. Neuro: Cranial nerves II through VI are intact. Cranial nerve VII demonstrates a left palsy of the peripheral nerve with facial droop of the left side of the face not sparing the forehead. Cranial nerve VIII through XII normal.  Full strength and sensation throughout otherwise. Normal.  Normal mentation. No dysarthria, or aphasia. Psych: Normal thought content and thought processes. : No CVA tenderness  EXT: Moves all extremities well. No cyanosis or clubbing. Skin: Warm and well-perfused. Diagnostic Study Results     Labs -   No results found for this or any previous visit (from the past 12 hour(s)). Radiologic Studies -   No orders to display     CT Results  (Last 48 hours)    None        CXR Results  (Last 48 hours)    None            Medical Decision Making   I am the first provider for this patient. I reviewed the vital signs, available nursing notes, past medical history, past surgical history, family history and social history. Vital Signs-Reviewed the patient's vital signs. Records Reviewed: Personally, on initial evaluation    MDM:   Patient presents with a Bell's palsy. Patient notably had an MRI of her brain last week that was normal.  House-Brackman grade 3  Plan:   Discharge home with steroids and antivirals. DISCHARGE NOTE:   Pt has been reexamined. Patient has no new complaints, changes, or physical findings. Care plan outlined and precautions discussed. Results were reviewed with the patient.  All medications were reviewed with the patient; will d/c home with valacyclovir, prednisone, artificial tears ointment and drops. All of pt's questions and concerns were addressed. Alarm symptoms and return precautions associated with chief complaint and evaluation were reviewed with the patient in detail. The patient demonstrated adequate understanding. Patient was instructed and agrees to follow up with PCP, as well as to return to the ED upon further deterioration. Patient is ready to go home. The patient is very happy with this plan    Follow-up Information     Follow up With Specialties Details Why Contact Info    Delores Alexis MD Internal Medicine In 1 week As needed, If symptoms worsen [address] [place] [address]  [phone]      [facility] EMERGENCY DEPT Emergency Medicine  As needed, If symptoms worsen [address]  [phone]          Current Discharge Medication List      START taking these medications    Details   valACYclovir (VALTREX) 1 gram tablet Take 1 Tab by mouth three (3) times daily for 7 days. Qty: 21 Tab, Refills: 0      predniSONE (DELTASONE) 20 mg tablet Take 60 mg by mouth daily for 7 days. With Breakfast  Qty: 21 Tab, Refills: 0      white petrolatum-mineral oil (ARTIFICIAL TEARS, VLADISLAV/MIN,) 83-15 % ophthalmic ointment Administer  to left eye nightly. Small amount  Qty: 3.5 g, Refills: 2      hydroxypropyl methylcellulose (GENTEAL) 0.2 % ophthalmic solution Administer 2 Drops to left eye every four (4) hours for 30 days. Qty: 1 Bottle, Refills: 1                 Diagnosis     Clinical Impression:   1. Bell's palsy        Signed,  Adriel Mejia MD  Emergency Physician  [facility]    As a voice dictation software was utilized to dictate this note, minor word transpositions can occur. I apologize for confusing wording and typographic errors. Please feel free to contact me for clarification.

## 2019-07-24 NOTE — DISCHARGE INSTRUCTIONS
Patient Education        Bell's Palsy: Care Instructions  Your Care Instructions    Bell's palsy is paralysis or weakness of the muscles on one side of the face. Often people with Bell's palsy have a droop on one side of the mouth and have trouble completely shutting the eye on the same side. Bell's palsy can also interfere with the sense of taste. These things happen when a nerve in your face becomes inflamed. Bell's palsy is not caused by a stroke. The cause of the nerve inflammation is not known. But some experts think that a virus may cause it. Because of this, doctors sometimes prescribe antiviral medicine to treat it. You also may get medicine to reduce swelling. Bell's palsy usually gets better on its own in a few weeks or months. Follow-up care is a key part of your treatment and safety. Be sure to make and go to all appointments, and call your doctor if you are having problems. It's also a good idea to know your test results and keep a list of the medicines you take. How can you care for yourself at home? · Take your medicines exactly as prescribed. Call your doctor if you think you are having a problem with your medicine. You will get more details on the specific medicines your doctor prescribes. · Use artificial tears or ointment if your eyes are too dry. Bell's palsy can make your lower eyelid droop, causing a dry eye. · If you cannot completely close your eye, consider using an eye patch while you sleep. · Help yourself blink by using your finger to close and open your eyelid. This may help keep your eye moist.  · Wear glasses or goggles to keep dust and dirt out of your eye. · As feeling comes back to your face, massage your forehead, cheeks, and lips. Massage may make the muscles in your face stronger. · Brush and floss your teeth often to help prevent tooth decay. Bell's palsy can dry up the spit on one side of your mouth. This increases the risk of tooth decay.   When should you call for help?  Call 911 anytime you think you may need emergency care. For example, call if:    · You have symptoms of a stroke. These may include:  ? Sudden numbness, tingling, weakness, or loss of movement in your face, arm, or leg, especially on only one side of your body. ? Sudden vision changes. ? Sudden trouble speaking. ? Sudden confusion or trouble understanding simple statements. ? Sudden problems with walking or balance. ? A sudden, severe headache that is different from past headaches.    Call your doctor now or seek immediate medical care if:    · You have numbness or weakness that spreads beyond one side of your face.     · You have a skin rash or eye pain or redness, or light bothers your eyes.     · You have a new or worse headache.    Watch closely for changes in your health, and be sure to contact your doctor if:    · You do not get better as expected. Where can you learn more? Go to http://jennifer-pavel.info/. Enter P168 in the search box to learn more about \"Bell's Palsy: Care Instructions. \"  Current as of: March 28, 2019  Content Version: 12.1  © 0191-2749 [employer]. Care instructions adapted under license by [employer] (which disclaims liability or warranty for this information). If you have questions about a medical condition or this instruction, always ask your healthcare professional. [address] any warranty or liability for your use of this information.

## 2019-07-30 ENCOUNTER — TELEPHONE (OUTPATIENT)
Dept: INTERNAL MEDICINE CLINIC | Age: 64
End: 2019-07-30

## 2019-08-14 ENCOUNTER — OFFICE VISIT (OUTPATIENT)
Dept: NEUROLOGY | Age: 64
End: 2019-08-14

## 2019-08-14 VITALS
HEIGHT: 59 IN | SYSTOLIC BLOOD PRESSURE: 132 MMHG | TEMPERATURE: 98.6 F | WEIGHT: 121 LBS | DIASTOLIC BLOOD PRESSURE: 70 MMHG | HEART RATE: 78 BPM | RESPIRATION RATE: 16 BRPM | OXYGEN SATURATION: 98 % | BODY MASS INDEX: 24.39 KG/M2

## 2019-08-14 DIAGNOSIS — G43.009 MIGRAINE WITHOUT AURA AND WITHOUT STATUS MIGRAINOSUS, NOT INTRACTABLE: Primary | ICD-10-CM

## 2019-08-14 DIAGNOSIS — G51.0 FACIAL PARALYSIS ON LEFT SIDE: ICD-10-CM

## 2019-08-14 RX ORDER — SUMATRIPTAN 50 MG/1
50 TABLET, FILM COATED ORAL
Qty: 27 TAB | Refills: 1 | Status: SHIPPED | OUTPATIENT
Start: 2019-08-14 | End: 2020-06-15 | Stop reason: SDUPTHER

## 2019-08-14 NOTE — PROGRESS NOTES
HPI: 72-year-old female referred by Hakan Jackson NP for evaluation of headaches. She started having headaches when she was 20. Initially they felt like a sinus headaches, but they would get worse and turn into something worse. She struggles by self medicating with OTC analgesics, eventually when she was 39 y/o or so she saw a neurologist. She was found to have HTN, so propranolol was started. It did not control her headaches well, so she was placed on imitrex, which she has been taking for 10 yrs. About a month ago she had a severe headache she went to [facility], then sent to [facility]. Her head was hurting tremendously since Sunday until Tuesday, mostly on the back, bilaterally, different headache. She had light sensitivity, no nausea, and mentions she usually does not get nauseated. She felt confused. She had an MRI of the brain July 10 showing mild WM disease with mild medial bilat occip lobe areas of \"mild localized encephalomalacic changes\", incidentally had slight degenerative changes in the C spine. MRA of head and neck showed no significant findings. She stayed in the hospital 2 nights, headache finally subsided, and she went home. A week later she noticed left facial weakness. She had probs with eye closure and forehead elevation without extremity weakness. Denies changes in speech or hearing. It is getting better. She took steroids and acyclovir for a week. Since 7/10 she has had perhaps two minor headaches, not real bad, no n/v, light sensitivity, and did not need to imitrex. Leading to July 10 headache, she was not getting a lot of headaches, perhaps once every three months she would have a headache of significance requiring imitrex administration. The week leading to July 10 severe headache she was having headaches daily every 4-5 days. She reports she did not have any fevers.   She did not have a lumbar puncture when she stayed in the hospital.  She mentions that her insurance does not want to pay for her admission because according to the patient it was not indicated.     Social History     Socioeconomic History    Marital status:      Spouse name: Not on file    Number of children: Not on file    Years of education: Not on file    Highest education level: Not on file   Occupational History    Not on file   Social Needs    Financial resource strain: Not on file    Food insecurity:     Worry: Not on file     Inability: Not on file    Transportation needs:     Medical: Not on file     Non-medical: Not on file   Tobacco Use    Smoking status: Former Smoker     Last attempt to quit: 1989     Years since quittin.6    Smokeless tobacco: Never Used   Substance and Sexual Activity    Alcohol use: No     Alcohol/week: 1.7 standard drinks     Types: 2 Glasses of wine per week    Drug use: No    Sexual activity: Never   Lifestyle    Physical activity:     Days per week: Not on file     Minutes per session: Not on file    Stress: Not on file   Relationships    Social connections:     Talks on phone: Not on file     Gets together: Not on file     Attends Latter-day service: Not on file     Active member of club or organization: Not on file     Attends meetings of clubs or organizations: Not on file     Relationship status: Not on file    Intimate partner violence:     Fear of current or ex partner: Not on file     Emotionally abused: Not on file     Physically abused: Not on file     Forced sexual activity: Not on file   Other Topics Concern    Not on file   Social History Narrative    Not on file       Family History   Problem Relation Age of Onset    Hypertension Brother         x2    Elevated Lipids Brother     Stroke Mother     Heart Surgery Mother         CABG    Cancer Father         cancer of the mouth    Hypertension Father     Hypertension Brother     Elevated Lipids Brother        Current Outpatient Medications   Medication Sig Dispense Refill    white petrolatum-mineral oil (ARTIFICIAL TEARS, VLADISLAV/MIN,) 83-15 % ophthalmic ointment Administer  to left eye nightly. Small amount 3.5 g 2    SUMAtriptan (IMITREX) 50 mg tablet Take 1 Tab by mouth daily as needed for Migraine. 27 Tab 1    propranolol (INDERAL) 40 mg tablet Take 1 Tab by mouth two (2) times a day. 180 Tab 3    metFORMIN (GLUCOPHAGE) 1,000 mg tablet Take 1 Tab by mouth two (2) times daily (with meals). 180 Tab 3    atorvastatin (LIPITOR) 20 mg tablet Take 1 Tab by mouth daily. 90 Tab 3    estradiol (VIVELLE) 0.05 mg/24 hr 1 Patch by TransDERmal route Every Saturday. 1 Patch 0    FLUoxetine (PROZAC) 20 mg capsule Take  by mouth daily.  lisinopril (PRINIVIL, ZESTRIL) 5 mg tablet TAKE 1 TABLET BY MOUTH DAILY 90 Tab 2    naloxone (NARCAN) 4 mg/actuation nasal spray Use 1 spray intranasally, then discard. Repeat with new spray every 2 min as needed for opioid overdose symptoms, alternating nostrils. 1 Each 0    progesterone (PROMETRIUM) 200 mg capsule Take 1 Cap by mouth daily. 30 Cap 1    aspirin 81 mg tablet Take 81 mg by mouth.  hydroxypropyl methylcellulose (GENTEAL) 0.2 % ophthalmic solution Administer 2 Drops to left eye every four (4) hours for 30 days. 1 Bottle 1    methylPREDNISolone (MEDROL, JOSR,) 4 mg tablet Take  by mouth.  LORazepam (ATIVAN) 1 mg tablet Take 1 Tab by mouth nightly as needed for Anxiety.  Max Daily Amount: 1 mg. 1 Tab 0       Past Medical History:   Diagnosis Date    ADHD     Anxiety     Bilateral lumbar radiculopathy     Carotid bruit     right    Depression     Diabetes mellitus (HCC)     HCD (hypertensive cardiovascular disease)     Hyperlipidemia     Hypertension     Migraine headache     Plantar fasciitis        Past Surgical History:   Procedure Laterality Date    HX SEPTOPLASTY  6/2002    HX TONSILLECTOMY      NEUROLOGICAL PROCEDURE UNLISTED  08/06/2018    laminectomy        Allergies   Allergen Reactions    Pcn [Penicillins] Rash    Sulfa (Sulfonamide Antibiotics) Rash       Patient Active Problem List   Diagnosis Code    HCD (hypertensive cardiovascular disease) I11.9    Hyperlipidemia E78.5    Otosclerosis H80.90    Migraine G43.909    Bilateral lumbar radiculopathy M54.16    Type 2 diabetes mellitus without complication ([facility] [address] C11.4    Essential hypertension I10    Vitamin D deficiency E55.9    Recurrent depression ([facility] [address] F33.9    Overweight (BMI 25.0-29. 9) E66.3    Right lumbar radiculopathy M54.16    DDD (degenerative disc disease), lumbar M51.36    HNP (herniated nucleus pulposus), lumbar M51.26         Review of Systems:   Constitutional: no fever or chills  Skin denies rash or itching  HEENT:  Denies tinnitus, hearing loss, or visual changes  Respiratory: denies shortness of breath  Cardiovascular: denies chest pain, dyspnea on exertion  Gastrointestinal: does not report nausea or vomiting  Genitourinary: does not report dysuria or incontinence  Musculoskeletal: does not report joint pain or swelling  Endocrine: denies weight change  Hematology: denies easy bruising or bleeding   Neurological: as above in HPI      PHYSICAL EXAMINATION:         Vital signs:    Visit Vitals  /70 (BP 1 Location: Left arm, BP Patient Position: Sitting)   Pulse 78   Temp 98.6 °F (37 °C) (Oral)   Resp 16   Ht 4' 11\" (1.499 m)   Wt 54.9 kg (121 lb)   LMP  (LMP Unknown)   SpO2 98%   BMI 24.44 kg/m²         GENERAL:                  Well developed, well nourished, in no apparent distress. HEART:                       RR, no murmurs  EXTREMITIES:           No edema is identified. Pulses are +2. HEAD:                         Normocephalic, atraumatic. NEUROLOGIC EXAMINATION       MENTAL STATUS:     Awake, alert, and oriented x 4. Attention and STM are grossly normal. There is no aphasia. Fund of knowledge is adequate. Mood and affect are appropriate  CRANIAL NERVES:   Visual fields are full to confrontation.   Pupils are reactive to light and accommodation. Fundi are normal.   Extraocular movements are intact and there is no nystagmus. Facial sensation is normal  Decreased forehead wrinkling on the left, decreased but somewhat present eye blink, left lower facial muscles are also weak  Hearing is present. SCM/TPZ 5/5  Tongue protrudes midline, palate elevates symmetrically. MOTOR:          5/5 strength throughout, normal tone. CEREBELLAR:           No tremors or dysmetria     SENSORY:     Normal distal pinprick, proprioception, and vibration. Romberg is negative. 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 At the time of presentation a subarachnoid hemorrhage as well as a CNS infection were in the differential.  A lumbar puncture at the time may had been revealing, but at this point it is not going to change the management. Therefore considering this to options in the differential diagnosis, both which could have been life-threatening, it is obvious to me that these admissions were well justified. Her migraine pattern otherwise for years has not been significant enough to warrant any significant changes on her management and specifically did not require migraine prophylaxis. Plan: 1-Continue sumatriptan 50 mg daily. I advised her that she can go ahead and take a second 1 1 to 2 hours later if the headache persists.   If this strategy fails then she has more headaches that are intractable like the one in July, we can discuss options such as increasing the dose at onset of sumatriptan to 100 mg and even considering injectable options. 2- counseled her about protecting her eye from injury while she is recovering from her left-sided facial weakness. 3-advised her that it may take 6 to 8 weeks for her to improve. Advised her that a low percentage of patients with facial weakness failed to improve. 4-I will see her again for reevaluation in 2 months. This is headache followed a week later by a left facial nerve palsy is keeping some lingering questions about the possibility that this was not a self-limiting meningitic process that may recur and may need further investigations such as a lumbar puncture depending on the clinical situation. PLEASE NOTE:   Portions of this document may have been produced using voice recognition software. Unrecognized errors in transcription may be present. This note will not be viewable in [address]

## 2019-08-14 NOTE — PROGRESS NOTES
Kushal Palma presents today for   Chief Complaint   Patient presents with   [facility]    Migraine       Is someone accompanying this pt? No    Is the patient using any DME equipment during OV? No    Depression Screening:  3 most recent PHQ Screens 5/14/2019   Little interest or pleasure in doing things More than half the days   Feeling down, depressed, irritable, or hopeless Several days   Total Score PHQ 2 3   Trouble falling or staying asleep, or sleeping too much Not at all   Feeling tired or having little energy Not at all   Poor appetite, weight loss, or overeating Not at all   Feeling bad about yourself - or that you are a failure or have let yourself or your family down Not at all   Trouble concentrating on things such as school, work, reading, or watching TV More than half the days   Moving or speaking so slowly that other people could have noticed; or the opposite being so fidgety that others notice Not at all   Thoughts of being better off dead, or hurting yourself in some way Not at all   PHQ 9 Score 5   How difficult have these problems made it for you to do your work, take care of your home and get along with others Somewhat difficult       Learning Assessment:  Learning Assessment 5/14/2019   PRIMARY LEARNER Patient   BARRIERS PRIMARY LEARNER NONE   CO-LEARNER CAREGIVER No   PRIMARY LANGUAGE ENGLISH   LEARNER PREFERENCE PRIMARY LISTENING     READING     DEMONSTRATION   ANSWERED BY patient   RELATIONSHIP SELF       Abuse Screening:  Abuse Screening Questionnaire 5/14/2019   Do you ever feel afraid of your partner? N   Are you in a relationship with someone who physically or mentally threatens you? N   Is it safe for you to go home? Y         Coordination of Care:  1. Have you been to the ER, urgent care clinic since your last visit? Hospitalized since your last visit? No    2.  Have you seen or consulted any other health care providers outside of the [address] [place] [address] since your last visit? Include any pap smears or colon screening.  No

## 2019-08-20 RX ORDER — LISINOPRIL 10 MG/1
10 TABLET ORAL DAILY
Qty: 90 TAB | Refills: 1 | Status: SHIPPED | OUTPATIENT
Start: 2019-08-20 | End: 2020-02-07 | Stop reason: SDUPTHER

## 2019-08-20 NOTE — TELEPHONE ENCOUNTER
Patient requesting new Rx for Lisinopril 10mg. New Rx pended. Notes from 7/15/19 encounter:      Requested Prescriptions     Pending Prescriptions Disp Refills    lisinopril (PRINIVIL, ZESTRIL) 10 mg tablet 90 Tab 1     Sig: Take 1 Tab by mouth daily.      Last visit:  7/15/19 with NP Tilden Harada  Next appt:  11/19/19 with MD Sherlyn Houston

## 2019-11-01 ENCOUNTER — OFFICE VISIT (OUTPATIENT)
Dept: NEUROLOGY | Age: 64
End: 2019-11-01

## 2019-11-01 VITALS
HEART RATE: 64 BPM | TEMPERATURE: 98 F | BODY MASS INDEX: 26.21 KG/M2 | RESPIRATION RATE: 16 BRPM | WEIGHT: 130 LBS | SYSTOLIC BLOOD PRESSURE: 120 MMHG | HEIGHT: 59 IN | OXYGEN SATURATION: 98 % | DIASTOLIC BLOOD PRESSURE: 76 MMHG

## 2019-11-01 DIAGNOSIS — G43.009 MIGRAINE WITHOUT AURA AND WITHOUT STATUS MIGRAINOSUS, NOT INTRACTABLE: Primary | ICD-10-CM

## 2019-11-01 DIAGNOSIS — G51.0 FACIAL PARALYSIS ON LEFT SIDE: ICD-10-CM

## 2019-11-01 NOTE — PROGRESS NOTES
Chief Complaint   Patient presents with    Migraine     follow up       Lata Miller presents today for   Chief Complaint   Patient presents with    Migraine     follow up       Is someone accompanying this pt? no    Is the patient using any DME equipment during [address]? no    Depression Screening:  3 most recent PHQ Screens 5/14/2019   Little interest or pleasure in doing things More than half the days   Feeling down, depressed, irritable, or hopeless Several days   Total Score PHQ 2 3   Trouble falling or staying asleep, or sleeping too much Not at all   Feeling tired or having little energy Not at all   Poor appetite, weight loss, or overeating Not at all   Feeling bad about yourself - or that you are a failure or have let yourself or your family down Not at all   Trouble concentrating on things such as school, work, reading, or watching TV More than half the days   Moving or speaking so slowly that other people could have noticed; or the opposite being so fidgety that others notice Not at all   Thoughts of being better off dead, or hurting yourself in some way Not at all   PHQ 9 Score 5   How difficult have these problems made it for you to do your work, take care of your home and get along with others Somewhat difficult       Learning Assessment:  Learning Assessment 5/14/2019   PRIMARY LEARNER Patient   BARRIERS PRIMARY LEARNER NONE   CO-LEARNER CAREGIVER No   PRIMARY LANGUAGE ENGLISH   LEARNER PREFERENCE PRIMARY LISTENING     READING     DEMONSTRATION   ANSWERED BY patient   RELATIONSHIP SELF       Abuse Screening:  Abuse Screening Questionnaire 5/14/2019   Do you ever feel afraid of your partner? N   Are you in a relationship with someone who physically or mentally threatens you? N   Is it safe for you to go home? Y       Fall Risk  Fall Risk Assessment, last 12 mths 5/14/2019   Able to walk? Yes   Fall in past 12 months? No         Coordination of Care:  1.  Have you been to the ER, urgent care clinic since your last visit? Hospitalized since your last visit? no    2. Have you seen or consulted any other health care providers outside of the [address] [place] [address] since your last visit? Include any pap smears or colon screening. Yes, Psychiatry.

## 2019-11-01 NOTE — PROGRESS NOTES
2019 8:38 AM    SSN: [id number]    Subjective:   60-year-old female who I saw on  for evaluation of a history of headaches. She has had a history of headaches since she was a teenager, but about a month before I saw her she was admitted with a very severe headache which was different to the ones that she had had [facility]), followed up a week later by left sided facial weakness. In hindsight I thought the patient probably had some type of meningoencephalitis or less likely a subdural hemorrhage. She did not get a lumbar puncture on a fold at the time I saw her a month later after this was already resolving it was a moot point, so we opted to observe this, treated with sumatriptan in the meantime, and depending on her recovery and further clinical evolution consider CSF studies down the line  given her history of facial palsy. Her palsy has fully resolved. She has had 6 headaches readily responding to sumatriptan 50mg prn during a period of over 2 months.         Social History     Socioeconomic History    Marital status:      Spouse name: Not on file    Number of children: Not on file    Years of education: Not on file    Highest education level: Not on file   Occupational History    Not on file   Social Needs    Financial resource strain: Not on file    Food insecurity:     Worry: Not on file     Inability: Not on file    Transportation needs:     Medical: Not on file     Non-medical: Not on file   Tobacco Use    Smoking status: Former Smoker     Last attempt to quit: 1989     Years since quittin.8    Smokeless tobacco: Never Used   Substance and Sexual Activity    Alcohol use: No     Alcohol/week: 1.7 standard drinks     Types: 2 Glasses of wine per week    Drug use: No    Sexual activity: Never   Lifestyle    Physical activity:     Days per week: Not on file     Minutes per session: Not on file    Stress: Not on file   Relationships  Social connections:     Talks on phone: Not on file     Gets together: Not on file     Attends Congregation service: Not on file     Active member of club or organization: Not on file     Attends meetings of clubs or organizations: Not on file     Relationship status: Not on file    Intimate partner violence:     Fear of current or ex partner: Not on file     Emotionally abused: Not on file     Physically abused: Not on file     Forced sexual activity: Not on file   Other Topics Concern    Not on file   Social History Narrative    Not on file       Family History   Problem Relation Age of Onset    Hypertension Brother         x2    Elevated Lipids Brother     Stroke Mother     Heart Surgery Mother         CABG    Cancer Father         cancer of the mouth    Hypertension Father     Hypertension Brother     Elevated Lipids Brother        Current Outpatient Medications   Medication Sig Dispense Refill    SUMAtriptan (IMITREX) 50 mg tablet Take 1 Tab by mouth daily as needed for Migraine. 27 Tab 1    white petrolatum-mineral oil (ARTIFICIAL TEARS, VLADISLAV/MIN,) 83-15 % ophthalmic ointment Administer  to left eye nightly. Small amount 3.5 g 2    propranolol (INDERAL) 40 mg tablet Take 1 Tab by mouth two (2) times a day. 180 Tab 3    metFORMIN (GLUCOPHAGE) 1,000 mg tablet Take 1 Tab by mouth two (2) times daily (with meals). 180 Tab 3    atorvastatin (LIPITOR) 20 mg tablet Take 1 Tab by mouth daily. 90 Tab 3    estradiol (VIVELLE) 0.05 mg/24 hr 1 Patch by TransDERmal route Every Saturday. 1 Patch 0    FLUoxetine (PROZAC) 20 mg capsule Take  by mouth daily.  lisinopril (PRINIVIL, ZESTRIL) 5 mg tablet TAKE 1 TABLET BY MOUTH DAILY 90 Tab 2    naloxone (NARCAN) 4 mg/actuation nasal spray Use 1 spray intranasally, then discard. Repeat with new spray every 2 min as needed for opioid overdose symptoms, alternating nostrils.  1 Each 0    LORazepam (ATIVAN) 1 mg tablet Take 1 Tab by mouth nightly as needed for Anxiety. Max Daily Amount: 1 mg. 1 Tab 0    progesterone (PROMETRIUM) 200 mg capsule Take 1 Cap by mouth daily. 30 Cap 1    aspirin 81 mg tablet Take 81 mg by mouth.  lisinopril (PRINIVIL, ZESTRIL) 10 mg tablet Take 1 Tab by mouth daily. 90 Tab 1    methylPREDNISolone (MEDROL, JOSR,) 4 mg tablet Take  by mouth. Past Medical History:   Diagnosis Date    ADHD     Anxiety     Bilateral lumbar radiculopathy     Carotid bruit     right    Depression     Diabetes mellitus (HCC)     HCD (hypertensive cardiovascular disease)     Hyperlipidemia     Hypertension     Migraine headache     Plantar fasciitis        Past Surgical History:   Procedure Laterality Date    HX SEPTOPLASTY  6/2002    HX TONSILLECTOMY      NEUROLOGICAL PROCEDURE UNLISTED  08/06/2018    laminectomy        Allergies   Allergen Reactions    Pcn [Penicillins] Rash    Sulfa (Sulfonamide Antibiotics) Rash       Vital signs:    Visit Vitals  /76 (BP 1 Location: Left arm, BP Patient Position: Sitting)   Pulse 64   Temp 98 °F (36.7 °C)   Resp 16   Ht 4' 11\" (1.499 m)   Wt 59 kg (130 lb)   LMP  (LMP Unknown)   SpO2 98%   BMI 26.26 kg/m²       Review of Systems:   GENERAL: Denies fever or fatigue  CARDIAC: No CP or SOB  PULMONARY: No cough of SOB  MUSCULOSKELETAL: No new joint pain  NEURO: SEE HPI      EXAM: Alert, in NAD. Heart is regular. Oriented x3, EOM's are full, PERRL, no facial asymmetries. Strength and tone are normal. DTR's +2, gait symmetric       Assessment/Plan:  Chronic migraines, this Summer having what I think was a meningoencephalitis likely viral which went undiagnosed, has completely resolved, has good abortive strategy for her migraines. She will continue current tx and I will see her in on f/u in 6 months. PLEASE NOTE:   Portions of this document may have been produced using voice recognition software. Unrecognized errors in transcription may be present.        This note will not be viewable in MyChart.

## 2019-11-12 ENCOUNTER — APPOINTMENT (OUTPATIENT)
Dept: INTERNAL MEDICINE CLINIC | Age: 64
End: 2019-11-12

## 2019-11-12 DIAGNOSIS — M51.36 DDD (DEGENERATIVE DISC DISEASE), LUMBAR: ICD-10-CM

## 2019-11-12 DIAGNOSIS — E78.5 HYPERLIPIDEMIA, UNSPECIFIED HYPERLIPIDEMIA TYPE: ICD-10-CM

## 2019-11-12 DIAGNOSIS — F33.9 RECURRENT DEPRESSION (HCC): ICD-10-CM

## 2019-11-12 DIAGNOSIS — I10 ESSENTIAL HYPERTENSION: ICD-10-CM

## 2019-11-12 DIAGNOSIS — Z00.01 ENCOUNTER FOR ROUTINE ADULT PHYSICAL EXAM WITH ABNORMAL FINDINGS: ICD-10-CM

## 2019-11-12 DIAGNOSIS — M51.26 HNP (HERNIATED NUCLEUS PULPOSUS), LUMBAR: ICD-10-CM

## 2019-11-12 DIAGNOSIS — M54.16 BILATERAL LUMBAR RADICULOPATHY: ICD-10-CM

## 2019-11-12 DIAGNOSIS — E66.3 OVERWEIGHT (BMI 25.0-29.9): ICD-10-CM

## 2019-11-12 DIAGNOSIS — E11.9 TYPE 2 DIABETES MELLITUS WITHOUT COMPLICATION, WITHOUT LONG-TERM CURRENT USE OF INSULIN (HCC): ICD-10-CM

## 2019-11-12 DIAGNOSIS — G43.009 MIGRAINE WITHOUT AURA AND WITHOUT STATUS MIGRAINOSUS, NOT INTRACTABLE: ICD-10-CM

## 2019-11-13 LAB
ALBUMIN/CREAT UR: <4.3 MG/G CREAT (ref 0–30)
BASOPHILS # BLD AUTO: 0.1 X10E3/UL (ref 0–0.2)
BASOPHILS NFR BLD AUTO: 1 %
BUN SERPL-MCNC: 18 MG/DL (ref 8–27)
BUN/CREAT SERPL: 21 (ref 12–28)
CALCIUM SERPL-MCNC: 9.7 MG/DL (ref 8.7–10.3)
CHLORIDE SERPL-SCNC: 103 MMOL/L (ref 96–106)
CHOLEST SERPL-MCNC: 190 MG/DL (ref 100–199)
CO2 SERPL-SCNC: 25 MMOL/L (ref 20–29)
CREAT SERPL-MCNC: 0.86 MG/DL (ref 0.57–1)
CREAT UR-MCNC: 70.5 MG/DL
EOSINOPHIL # BLD AUTO: 0.2 X10E3/UL (ref 0–0.4)
EOSINOPHIL NFR BLD AUTO: 2 %
ERYTHROCYTE [DISTWIDTH] IN BLOOD BY AUTOMATED COUNT: 12.2 % (ref 12.3–15.4)
EST. AVERAGE GLUCOSE BLD GHB EST-MCNC: 134 MG/DL
GLUCOSE SERPL-MCNC: 142 MG/DL (ref 65–99)
HBA1C MFR BLD: 6.3 % (ref 4.8–5.6)
HCT VFR BLD AUTO: 36.4 % (ref 34–46.6)
HDLC SERPL-MCNC: 44 MG/DL
HGB BLD-MCNC: 12.6 G/DL (ref 11.1–15.9)
IMM GRANULOCYTES # BLD AUTO: 0 X10E3/UL (ref 0–0.1)
IMM GRANULOCYTES NFR BLD AUTO: 0 %
INTERPRETATION, 910389: NORMAL
LDLC SERPL CALC-MCNC: 117 MG/DL (ref 0–99)
LYMPHOCYTES # BLD AUTO: 3.1 X10E3/UL (ref 0.7–3.1)
LYMPHOCYTES NFR BLD AUTO: 40 %
Lab: NORMAL
MCH RBC QN AUTO: 30.8 PG (ref 26.6–33)
MCHC RBC AUTO-ENTMCNC: 34.6 G/DL (ref 31.5–35.7)
MCV RBC AUTO: 89 FL (ref 79–97)
MICROALBUMIN UR-MCNC: <3 UG/ML
MONOCYTES # BLD AUTO: 0.4 X10E3/UL (ref 0.1–0.9)
MONOCYTES NFR BLD AUTO: 5 %
NEUTROPHILS # BLD AUTO: 4 X10E3/UL (ref 1.4–7)
NEUTROPHILS NFR BLD AUTO: 52 %
PLATELET # BLD AUTO: 359 X10E3/UL (ref 150–450)
POTASSIUM SERPL-SCNC: 5 MMOL/L (ref 3.5–5.2)
RBC # BLD AUTO: 4.09 X10E6/UL (ref 3.77–5.28)
SODIUM SERPL-SCNC: 142 MMOL/L (ref 134–144)
TRIGL SERPL-MCNC: 146 MG/DL (ref 0–149)
VLDLC SERPL CALC-MCNC: 29 MG/DL (ref 5–40)
WBC # BLD AUTO: 7.7 X10E3/UL (ref 3.4–10.8)

## 2019-11-19 ENCOUNTER — OFFICE VISIT (OUTPATIENT)
Dept: INTERNAL MEDICINE CLINIC | Age: 64
End: 2019-11-19

## 2019-11-19 VITALS
DIASTOLIC BLOOD PRESSURE: 80 MMHG | WEIGHT: 130.6 LBS | TEMPERATURE: 98.7 F | RESPIRATION RATE: 12 BRPM | BODY MASS INDEX: 26.33 KG/M2 | SYSTOLIC BLOOD PRESSURE: 134 MMHG | HEART RATE: 60 BPM | HEIGHT: 59 IN

## 2019-11-19 DIAGNOSIS — E66.3 OVERWEIGHT (BMI 25.0-29.9): ICD-10-CM

## 2019-11-19 DIAGNOSIS — E55.9 VITAMIN D DEFICIENCY: ICD-10-CM

## 2019-11-19 DIAGNOSIS — F33.9 RECURRENT DEPRESSION (HCC): ICD-10-CM

## 2019-11-19 DIAGNOSIS — Z86.69 HISTORY OF BELL'S PALSY: ICD-10-CM

## 2019-11-19 DIAGNOSIS — Z23 ENCOUNTER FOR IMMUNIZATION: ICD-10-CM

## 2019-11-19 DIAGNOSIS — G43.009 MIGRAINE WITHOUT AURA AND WITHOUT STATUS MIGRAINOSUS, NOT INTRACTABLE: ICD-10-CM

## 2019-11-19 DIAGNOSIS — E78.5 HYPERLIPIDEMIA, UNSPECIFIED HYPERLIPIDEMIA TYPE: ICD-10-CM

## 2019-11-19 DIAGNOSIS — E11.9 TYPE 2 DIABETES MELLITUS WITHOUT COMPLICATION, WITHOUT LONG-TERM CURRENT USE OF INSULIN (HCC): ICD-10-CM

## 2019-11-19 DIAGNOSIS — M51.36 DDD (DEGENERATIVE DISC DISEASE), LUMBAR: ICD-10-CM

## 2019-11-19 DIAGNOSIS — I10 ESSENTIAL HYPERTENSION: Primary | ICD-10-CM

## 2019-11-19 RX ORDER — ARIPIPRAZOLE 5 MG/1
TABLET ORAL DAILY
COMMUNITY
End: 2022-08-25

## 2019-11-19 NOTE — PATIENT INSTRUCTIONS
Vaccine Information Statement    Influenza (Flu) Vaccine (Inactivated or Recombinant): What You Need to Know    Many Vaccine Information Statements are available in Syriac and other languages. See www.immunize.org/vis  Hojas de información sobre vacunas están disponibles en español y en muchos otros idiomas. Visite www.immunize.org/vis    1. Why get vaccinated? Influenza vaccine can prevent influenza (flu). Flu is a contagious disease that spreads around the [facility] every year, usually between October and May. Anyone can get the flu, but it is more dangerous for some people. Infants and young children, people 72years of age and older, pregnant women, and people with certain health conditions or a weakened immune system are at greatest risk of flu complications. Pneumonia, bronchitis, sinus infections and ear infections are examples of flu-related complications. If you have a medical condition, such as heart disease, cancer or diabetes, flu can make it worse. Flu can cause fever and chills, sore throat, muscle aches, fatigue, cough, headache, and runny or stuffy nose. Some people may have vomiting and diarrhea, though this is more common in children than adults. Each year thousands of people in the [facility] die from flu, and many more are hospitalized. Flu vaccine prevents millions of illnesses and flu-related visits to the doctor each year. 2. Influenza vaccines     CDC recommends everyone 10months of age and older get vaccinated every flu season. Children 6 months through 6years of age may need 2 doses during a single flu season. Everyone else needs only 1 dose each flu season. It takes about 2 weeks for protection to develop after vaccination. There are many flu viruses, and they are always changing. Each year a new flu vaccine is made to protect against three or four viruses that are likely to cause disease in the upcoming flu season.  Even when the vaccine doesnt exactly match these viruses, it may still provide some protection. Influenza vaccine does not cause flu. Influenza vaccine may be given at the same time as other vaccines. 3. Talk with your health care provider    Tell your vaccine provider if the person getting the vaccine:   Has had an allergic reaction after a previous dose of influenza vaccine, or has any severe, life-threatening allergies.  Has ever had Guillain-Barré Syndrome (also called GBS). In some cases, your health care provider may decide to postpone influenza vaccination to a future visit. People with minor illnesses, such as a cold, may be vaccinated. People who are moderately or severely ill should usually wait until they recover before getting influenza vaccine. Your health care provider can give you more information. 4. Risks of a reaction     Soreness, redness, and swelling where shot is given, fever, muscle aches, and headache can happen after influenza vaccine.  There may be a very small increased risk of Guillain-Barré Syndrome (GBS) after inactivated influenza vaccine (the flu shot). Thaddeus Commander children who get the flu shot along with pneumococcal vaccine (PCV13), and/or DTaP vaccine at the same time might be slightly more likely to have a seizure caused by fever. Tell your health care provider if a child who is getting flu vaccine has ever had a seizure. People sometimes faint after medical procedures, including vaccination. Tell your provider if you feel dizzy or have vision changes or ringing in the ears. As with any medicine, there is a very remote chance of a vaccine causing a severe allergic reaction, other serious injury, or death. 5. What if there is a serious problem? An allergic reaction could occur after the vaccinated person leaves the clinic.  If you see signs of a severe allergic reaction (hives, swelling of the face and throat, difficulty breathing, a fast heartbeat, dizziness, or weakness), call 9-1-1 and get the person to the nearest hospital.    For other signs that concern you, call your health care provider. Adverse reactions should be reported to the Vaccine Adverse Event Reporting System (VAERS). Your health care provider will usually file this report, or you can do it yourself. Visit the VAERS website at www.vaers. [facility].gov or call 5-784.541.6743. VAERS is only for reporting reactions, and VAERS staff do not give medical advice. 6. The National Vaccine Injury Compensation Program    The [facility] Vaccine Injury Compensation Program (VICP) is a federal program that was created to compensate people who may have been injured by certain vaccines. Visit the VICP website at www.[facility].gov/vaccinecompensation or call 7-796.534.7857 to learn about the program and about filing a claim. There is a time limit to file a claim for compensation. 7. How can I learn more?  Ask your health care provider.  Call your local or state health department.  Contact the Centers for Disease Control and Prevention (CDC):  - Call 1-830.519.3224 (9-462-NWV-INFO) or  - Visit CDCs influenza website at www.cdc.gov/flu    Vaccine Information Statement (Interim)  Inactivated Influenza Vaccine   8/15/2019  42 WILLIAN Pichardo [id number]   Department of Health and Human Services  Centers for Disease Control and Prevention    Office Use Only      Please monitor and record your blood pressure every morning and evening for the next 2 weeks at least two hours after taking your medications. Please deliver record of readings to our office for review.

## 2019-11-19 NOTE — PROGRESS NOTES
Chief Complaint   Patient presents with    Hypertension     6 month follow up with labs     Health Maintenance Due   Topic Date Due    PAP AKA CERVICAL CYTOLOGY  11/17/2017    EYE EXAM RETINAL OR DILATED  05/02/2018    Influenza Age 5 to Adult  08/01/2019    FOOT EXAM Q1  11/08/2019     Patient states she got an eye exam within the last 3 months from [facility]. Informed patient to have them fax over the results or her test and patient verbalized understanding. Patient given influenza vaccine, FLUARIX, in left deltoid, per verbal order from Dr. Keri Edge with read back. Instructed patient to sit and wait 10-20 minutes before leaving the premises so that we can watch for any complications or adverse reactions. Patient   given vaccine information statement handout before vaccine was given. Patient tolerated well without adverse reactions or complications. 1. Have you been to the ER, urgent care clinic or hospitalized since your last visit? YES. Julys for 2 days Manuelita Krabbe for headache. 2. Have you seen or consulted any other health care providers outside of the [address] [place] [address] since your last visit (Include any pap smears or colon screening)? NO      Learning Assessment 5/14/2019   PRIMARY LEARNER Patient   BARRIERS PRIMARY LEARNER NONE   CO-LEARNER CAREGIVER No   PRIMARY LANGUAGE ENGLISH   LEARNER PREFERENCE PRIMARY LISTENING     READING     DEMONSTRATION   ANSWERED BY patient   RELATIONSHIP SELF     Abuse Screening Questionnaire 11/19/2019   Do you ever feel afraid of your partner? N   Are you in a relationship with someone who physically or mentally threatens you? N   Is it safe for you to go home?  Y     3 most recent PHQ Screens 11/19/2019   Little interest or pleasure in doing things Not at all   Feeling down, depressed, irritable, or hopeless Not at all   Total Score PHQ 2 0   Trouble falling or staying asleep, or sleeping too much -   Feeling tired or having little energy -   Poor appetite, weight loss, or overeating -   Feeling bad about yourself - or that you are a failure or have let yourself or your family down -   Trouble concentrating on things such as school, work, reading, or watching TV -   Moving or speaking so slowly that other people could have noticed; or the opposite being so fidgety that others notice -   Thoughts of being better off dead, or hurting yourself in some way -   PHQ 9 Score -   How difficult have these problems made it for you to do your work, take care of your home and get along with others -     Fall Risk Assessment, last 12 mths 11/19/2019   Able to walk? Yes   Fall in past 12 months?  No

## 2019-11-23 ENCOUNTER — TELEPHONE (OUTPATIENT)
Dept: INTERNAL MEDICINE CLINIC | Age: 64
End: 2019-11-23

## 2019-11-23 PROBLEM — Z86.69 HISTORY OF BELL'S PALSY: Status: ACTIVE | Noted: 2019-11-23

## 2019-11-23 NOTE — PROGRESS NOTES
HPI:   Soledad Henry is a 60y.o. year old female who presents today for a routine visit. She has a history of hypertension, hyperlipidemia, diabetes mellitus, migraine headaches, depression, anxiety, ADHD, and lumbar radiculopathy. She reports that she is doing relatively well. She was hospitalized from 7/9-7/11/2019 at [facility] after presenting to [facility] ED with a severe posterior headache, unlike her usual migraines, with associated light sensitivity and confusion. Evaluation included WBC 6.0, Hb 11.9/ Hct 35.2, Na 138, Ca 9.6, creatinine 1.0/ eGFR >60, head CT scan no acute intracranial abnormality; MRI brain (7/10/2019) mild nonspecific white matter disease likely representing chronic small vessel changes, and very mild bilateral occipital paramedian encephalomalacic changes, likely from chronic small infarcts; brain MRA (7/10/2019) no high-grade intracranial stenosis; neck MRA (7/10/2019) mild proximal ICA irregularity without hemodynamically significant carotid stenosis. She was evaluated by Dr. Sherry Bar of neurology who felt presentation was consistent with status migrainosus. Attempted treatment with Benadryl and compazine without much improvement, but notable improvement after Medrol dose pack started and subsequently discharged. On 7/24/2019, she noted onset of left facial weakness with difficulty with eye closure and forehead elevation, and presented to [facility] ED and diagnosed with left facial palsy. She was treated with prednisone 60 mg daily x 7 days and Valtrex. She was evaluated by Dr. Brittany Borja on 8/14/2019, and it was his opinion that her symptoms were likely not due to status migrainosus, but rather were secondary to a viral meningoencephalitis given the clear change in the pattern of her headaches and the subsequent development of a cranial nerve palsy.  She reports today complete resolution of her facial palsy, and a return to baseline for her migraine headaches, which respond readily to sumatriptan. She also reports adjustment in her medications by Dr. Carmen Goncalves with good response, and states that she is taking Abilify 5 mg daily and Prozac 10 mg daily. She is otherwise without new complaints and feeling generally well. She has a history of hypertension, treated with propranolol and lisinopril. She does not check her blood pressure at home. She has begun walking regularly for exercise. She denies any chest pain, shortness of breath at rest or with exertion, palpitations, or lightheadedness. She also has a history of hyperlipidemia, treated with high intensity dose rosuvastatin. She has a history of diabetes mellitus, and was started on metformin in 2/2015 for a HbA1c of 7.5. She does not monitor her blood sugars at home. She denies any polyuria, polydipsia, nocturia, or blurry vision, and has no history of retinopathy, neuropathy, or nephropathy. She has regular eye exams with Dr. Cristina Lanier. She also has a history of migraines without aura, which usually respond to Imitrex. She has a history of lumbar radiculopathy (R>L), and was evaluated by Dr. Steph Galarza in 5/2015. Lumbar x-rays showed degenerative changes in L4-L5 and L5-S1. She was treated conservatively with physical therapy and ibuprofen and reports significant improvement. She was evaluated by MOJGAN Davis in 12/2016 for exacerbation of low back pain with right sciatica. She was treated with steroids and Norco with improvement. On 4/17/2018, she presented with increasing low back pain with bilateral sciatica. She was treated with a prednisone taper with initial improvement. However, her symptoms worsened and she was referred to Dr. Daryl Lizarraga for evaluation. She gave her a Toradol injection, and ordered a lumbar MRI (6/24/2018) which showed a large right disc extrusion at L5-S1 which compressed the descending right S1 nerve root.  She was referred to pain management and underwent a selective L5 and S1 selective nerve root blocks by Dr. Melissa Lynn on 7/26/2018. However, due to persistent symptoms, she was referred to Dr. Zohreh Villar and subsequently underwent a right L5-S1 laminectomy and diskectomy on 7/3/0435 without complications. She states that she did well and continues to do her back stretches and exercises. She states that she only has occasional discomfort. She had a screening colonoscopy in 10/2014 by Dr. Rony Schulz which was normal. Follow-up due in 10 years. She denies any abdominal pain, nausea, vomiting, melena, hematochezia, or change in bowel movements. Past Medical History:   Diagnosis Date    ADHD     Anxiety     Bilateral lumbar radiculopathy     Carotid bruit     right    Depression     Diabetes mellitus (HCC)     HCD (hypertensive cardiovascular disease)     Hyperlipidemia     Hypertension     Migraine headache     Plantar fasciitis      Past Surgical History:   Procedure Laterality Date    HX SEPTOPLASTY  6/2002    HX TONSILLECTOMY      NEUROLOGICAL PROCEDURE UNLISTED  08/06/2018    laminectomy      Current Outpatient Medications   Medication Sig    ARIPiprazole (ABILIFY) 5 mg tablet Take  by mouth daily.  lisinopril (PRINIVIL, ZESTRIL) 10 mg tablet Take 1 Tab by mouth daily.  SUMAtriptan (IMITREX) 50 mg tablet Take 1 Tab by mouth daily as needed for Migraine.  white petrolatum-mineral oil (ARTIFICIAL TEARS, VLADISLAV/MIN,) 83-15 % ophthalmic ointment Administer  to left eye nightly. Small amount    propranolol (INDERAL) 40 mg tablet Take 1 Tab by mouth two (2) times a day.  metFORMIN (GLUCOPHAGE) 1,000 mg tablet Take 1 Tab by mouth two (2) times daily (with meals).  atorvastatin (LIPITOR) 20 mg tablet Take 1 Tab by mouth daily.  estradiol (VIVELLE) 0.05 mg/24 hr 1 Patch by TransDERmal route Every Saturday.  FLUoxetine (PROZAC) 20 mg capsule Take 10 mg by mouth daily.  LORazepam (ATIVAN) 1 mg tablet Take 1 Tab by mouth nightly as needed for Anxiety. Max Daily Amount: 1 mg.     progesterone (PROMETRIUM) 200 mg capsule Take 1 Cap by mouth daily.  naloxone (NARCAN) 4 mg/actuation nasal spray Use 1 spray intranasally, then discard. Repeat with new spray every 2 min as needed for opioid overdose symptoms, alternating nostrils. No current facility-administered medications for this visit. Allergies and Intolerances: Allergies   Allergen Reactions    Pcn [Penicillins] Rash    Sulfa (Sulfonamide Antibiotics) Rash     Family History: She has no family history of colon or breast cancer. Her mother  from a CVA. Her father  from throat cancer and cirrhosis. Family History   Problem Relation Age of Onset    Hypertension Brother         x2    Elevated Lipids Brother     Stroke Mother     Heart Surgery Mother         CABG    Cancer Father         cancer of the mouth    Hypertension Father     Hypertension Brother     Elevated Lipids Brother      Social History:   She  reports that she quit smoking about 30 years ago. She has never used smokeless tobacco. She smoked approximately 0.25 ppd for 2 years, stopping in . She is a  and has one daughter. She was employed as a . 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 She was previously working as the [employer] for his business. Social History     Substance and Sexual Activity   Alcohol Use No    Alcohol/week: 1.7 standard drinks    Types: 2 Glasses of wine per week     Immunization History:  Immunization History   Administered Date(s) Administered    Influenza Vaccine (Quad) PF 2018, 2019    Influenza Vaccine PF 2014, 10/13/2014    Influenza Vaccine Split 2011    Influenza Vaccine Whole 10/05/2010    Pneumococcal Polysaccharide (PPSV-23) 2017    TD Vaccine 2007    Tdap 11/10/2016    Zoster Recombinant 2018, 10/01/2018       Review of Systems:   As above included in HPI.   Otherwise 11 point review of systems negative including constitutional, skin, HENT, eyes, respiratory, cardiovascular, gastrointestinal, genitourinary, musculoskeletal, endocrine, hematologic, allergy, and neurologic. Physical:   Vitals:   BP: 134/80  HR: 60  WT: 130 lb 9.6 oz (59.2 kg)  BMI:  25.65 kg/m2    Exam:   Patient appears in no apparent distress. Affect is appropriate. HEENT: PERRLA, anicteric, oropharynx clear, no JVD, adenopathy or thyromegaly. No carotid bruits or radiated murmur. Lungs: clear to auscultation, no wheezes, rhonchi, or rales. Heart: regular rate and rhythm. No murmur, rubs, gallops  Abdomen: soft, nontender, nondistended, normal bowel sounds, no hepatosplenomegaly or masses. Extremities: without edema. Pulses 1-2+ bilaterally.     Diabetic foot exam:     Left Foot:   Visual Exam: normal    Pulse DP: 2+ (normal)   Filament test: normal sensation    Vibratory sensation: normal      Right Foot:   Visual Exam: normal    Pulse DP: 2+ (normal)   Filament test: normal sensation    Vibratory sensation: normal      Review of Data:  Labs:  Orders Only on 11/12/2019   Component Date Value Ref Range Status    Creatinine, urine 11/12/2019 70.5  Not Estab. mg/dL Final    Microalbumin, urine 11/12/2019 <3.0  Not Estab. ug/mL Final    Microalb/Creat ratio (ug/mg creat.) 11/12/2019 <4.3  0.0 - 30.0 mg/g creat Final    WBC 11/12/2019 7.7  3.4 - 10.8 x10E3/uL Final    RBC 11/12/2019 4.09  3.77 - 5.28 x10E6/uL Final    HGB 11/12/2019 12.6  11.1 - 15.9 g/dL Final    HCT 11/12/2019 36.4  34.0 - 46.6 % Final    MCV 11/12/2019 89  79 - 97 fL Final    MCH 11/12/2019 30.8  26.6 - 33.0 pg Final    MCHC 11/12/2019 34.6  31.5 - 35.7 g/dL Final    RDW 11/12/2019 12.2* 12.3 - 15.4 % Final    PLATELET 02/39/3725 462  150 - 450 x10E3/uL Final    NEUTROPHILS 11/12/2019 52  Not Estab. % Final    Lymphocytes 11/12/2019 40  Not Estab. % Final    MONOCYTES 11/12/2019 5  Not Estab. % Final    EOSINOPHILS 11/12/2019 2  Not Estab. % Final    BASOPHILS 11/12/2019 1  Not Estab. % Final    ABS. NEUTROPHILS 11/12/2019 4.0  1.4 - 7.0 x10E3/uL Final    Abs Lymphocytes 11/12/2019 3.1  0.7 - 3.1 x10E3/uL Final    ABS. MONOCYTES 11/12/2019 0.4  0.1 - 0.9 x10E3/uL Final    ABS. EOSINOPHILS 11/12/2019 0.2  0.0 - 0.4 x10E3/uL Final    ABS. BASOPHILS 11/12/2019 0.1  0.0 - 0.2 x10E3/uL Final    IMMATURE GRANULOCYTES 11/12/2019 0  Not Estab. % Final    ABS. IMM. GRANS. 11/12/2019 0.0  0.0 - 0.1 x10E3/uL Final    Hemoglobin A1c 11/12/2019 6.3* 4.8 - 5.6 % Final    Estimated average glucose 11/12/2019 134  mg/dL Final    Cholesterol, total 11/12/2019 190  100 - 199 mg/dL Final    Triglyceride 11/12/2019 146  0 - 149 mg/dL Final    HDL Cholesterol 11/12/2019 44  >39 mg/dL Final    VLDL, calculated 11/12/2019 29  5 - 40 mg/dL Final    LDL, calculated 11/12/2019 117* 0 - 99 mg/dL Final    Glucose 11/12/2019 142* 65 - 99 mg/dL Final    BUN 11/12/2019 18  8 - 27 mg/dL Final    Creatinine 11/12/2019 0.86  0.57 - 1.00 mg/dL Final    GFR est non-AA 11/12/2019 73  >59 mL/min/1.73 Final    GFR est AA 11/12/2019 84  >59 mL/min/1.73 Final    BUN/Creatinine ratio 11/12/2019 21  12 - 28 Final    Sodium 11/12/2019 142  134 - 144 mmol/L Final    Potassium 11/12/2019 5.0  3.5 - 5.2 mmol/L Final    Chloride 11/12/2019 103  96 - 106 mmol/L Final    CO2 11/12/2019 25  20 - 29 mmol/L Final    Calcium 11/12/2019 9.7  8.7 - 10.3 mg/dL Final    INTERPRETATION 11/12/2019 Note   Final    PDF Image 12/95/7557 Not applicable   Final     Health Maintenance:  Screening:    Mammogram: negative (3/2019)   PAP smear: well women exams by Dr. Alejandro Nichols (last 4/2019)   Colorectal: colonoscopy (10/2014) normal. Dr. Chucky Vallecillo. Due 2024. Depression: under care of MOJGAN Winn.    DM (HbA1c/FPG): HbA1c 6.3 (11/2019)   Hepatitis C: negative (5/2017)   Falls: none   DEXA: osteopenia (2/2019) Dr. 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 9) E66.3    Right lumbar radiculopathy M54.16    DDD (degenerative disc disease), lumbar M51.36    HNP (herniated nucleus pulposus), lumbar M51.26    History of Bell's palsy Z86.69       Plan:  1. Hypertension. Blood pressure mildly elevated today on current regimen of lisinopril 10 mg daily and propranolol 40 mg bid. Advised to monitor and record her blood pressure every morning for the next 2 weeks at least two hours after taking her medications and bring record of readings to our office for review. Renal function remains normal with creatinine 0.86/ eGFR 73. Continue to follow. 2. Hyperlipidemia. On high intensity rosuvastatin with  and HDL 44, indicative of poor control. Previously well controlled with LDL 82 in 5/2019. Emphasized importance of lifestyle modifications, including diet, exercise, and weight loss. Patient wishing to reassess in 3 months before changing dose of medication. Continue to follow. 3. Diabetes mellitus. Remains well controlled on metformin 1000 mg bid. No evidence of microvascular complications. On statin and lisinopril. Continue follow-up for annual eye exams. Foot exam normal (11/2019). Urine microalbumin/ creatinine ratio without evidence of microalbuminuria. Emphasized importance of lifestyle modifications, including diet, exercise, and weight loss. 4. Headache/ left facial nerve palsy. Presented with severe headache resulting in hospital admission to [facility].  Evaluation including head CT scan, brain MRI/MRA and neck MRA unrevealing and Dr. Sherry Bar of neurology consulted and felt most consistent with status migrainosus. Prescribed medrol dose pack and discharged. Developed left facial nerve palsy 10 days later and treated with prednisone 60 mg and Valtrex for 7 days. Evaluated by Dr. Brittany Borja who felt that presentation more likely consistent with viral meningoencephalitis given the clear change in the pattern of her headaches and the subsequent development of a cranial nerve palsy. No completely improved with resolution of facial nerve palsy and headaches returning to baseline pattern for migraines. 5. Migraines. Returned to baseline pattern involving pain on side of head radiating to lateral neck. Readily responds to Imitrex. Follow. 6. Lumbar herniated disc with right sciatica, s/p right L5-S1 laminectomy and discectomy. Doing well without significant discomfort. Stressed improtance of continuing stretches and exercise. Follow. 7. Depression/ADHD. Reports good control of symptoms on new regimen of Abilify and Prozac. Continues on lorazepam at bedtime. Being followed by MOJGAN Rodriguez at [facility]. 8. Overweight. Emphasized importance of continued lifestyle modifications, including diet, exercise, and weight loss. Importance stressed to help with diabetes control. Will readdress next visit. 9. Health maintenance. Will give influenza vaccine today. Completed 2/2 doses of Shingrix at [facility]. Other immunizations up to date. Well women exam performed by Dr. Maurice Galloway. Attempting to wean hormonal therapy for menopausal symptoms and now using estrogen patch once per week. Mammogram up to date. Colonoscopy due 2024. Continue eye exams with [facility] and will request note. Vitamin D level stable. Discussed continuing maintenance dose therapy.  Discussed recommendations regarding aspirin use and primary prevention, particularly for age >74, and patient wishing to discontinue. Patient understands recommendations and agrees with plan. Follow-up in 3 months to reassess blood pressure and lipids.

## 2019-11-23 NOTE — TELEPHONE ENCOUNTER
Please request recent eye exam from [facility]. Patient reports being seen one month ago. Please request pap smear results from Dr. Mimi Vo, performed 4/2019. Thank you.

## 2020-02-07 RX ORDER — LISINOPRIL 10 MG/1
10 TABLET ORAL DAILY
Qty: 90 TAB | Refills: 1 | Status: SHIPPED | OUTPATIENT
Start: 2020-02-07 | End: 2020-02-26 | Stop reason: SDUPTHER

## 2020-02-07 NOTE — TELEPHONE ENCOUNTER
Last Visit: 11/19/19 with MD Trevor Fiztgerald  Next Appointment: 2/26/20 with MD Vallejo  Previous Refill Encounter(s): 8/20/19 #90 with 1 refill    Requested Prescriptions     Pending Prescriptions Disp Refills    lisinopril (PRINIVIL, ZESTRIL) 10 mg tablet 90 Tab 1     Sig: Take 1 Tab by mouth daily.

## 2020-02-19 ENCOUNTER — APPOINTMENT (OUTPATIENT)
Dept: INTERNAL MEDICINE CLINIC | Age: 65
End: 2020-02-19

## 2020-02-19 DIAGNOSIS — E66.3 OVERWEIGHT (BMI 25.0-29.9): ICD-10-CM

## 2020-02-19 DIAGNOSIS — G43.009 MIGRAINE WITHOUT AURA AND WITHOUT STATUS MIGRAINOSUS, NOT INTRACTABLE: ICD-10-CM

## 2020-02-19 DIAGNOSIS — E78.5 HYPERLIPIDEMIA, UNSPECIFIED HYPERLIPIDEMIA TYPE: ICD-10-CM

## 2020-02-19 DIAGNOSIS — I10 ESSENTIAL HYPERTENSION: ICD-10-CM

## 2020-02-19 DIAGNOSIS — M51.36 DDD (DEGENERATIVE DISC DISEASE), LUMBAR: ICD-10-CM

## 2020-02-19 DIAGNOSIS — E11.9 TYPE 2 DIABETES MELLITUS WITHOUT COMPLICATION, WITHOUT LONG-TERM CURRENT USE OF INSULIN (HCC): ICD-10-CM

## 2020-02-19 DIAGNOSIS — Z23 ENCOUNTER FOR IMMUNIZATION: ICD-10-CM

## 2020-02-19 DIAGNOSIS — F33.9 RECURRENT DEPRESSION (HCC): ICD-10-CM

## 2020-02-19 DIAGNOSIS — E55.9 VITAMIN D DEFICIENCY: ICD-10-CM

## 2020-02-19 DIAGNOSIS — Z86.69 HISTORY OF BELL'S PALSY: ICD-10-CM

## 2020-02-20 LAB
BUN SERPL-MCNC: 19 MG/DL (ref 8–27)
BUN/CREAT SERPL: 26 (ref 12–28)
CALCIUM SERPL-MCNC: 9.6 MG/DL (ref 8.7–10.3)
CHLORIDE SERPL-SCNC: 106 MMOL/L (ref 96–106)
CHOLEST SERPL-MCNC: 191 MG/DL (ref 100–199)
CO2 SERPL-SCNC: 23 MMOL/L (ref 20–29)
CREAT SERPL-MCNC: 0.73 MG/DL (ref 0.57–1)
EST. AVERAGE GLUCOSE BLD GHB EST-MCNC: 140 MG/DL
GLUCOSE SERPL-MCNC: 134 MG/DL (ref 65–99)
HBA1C MFR BLD: 6.5 % (ref 4.8–5.6)
HDLC SERPL-MCNC: 48 MG/DL
INTERPRETATION, 910389: NORMAL
LDLC SERPL CALC-MCNC: 121 MG/DL (ref 0–99)
Lab: NORMAL
MAGNESIUM SERPL-MCNC: 1.9 MG/DL (ref 1.6–2.3)
POTASSIUM SERPL-SCNC: 4.9 MMOL/L (ref 3.5–5.2)
SODIUM SERPL-SCNC: 146 MMOL/L (ref 134–144)
TRIGL SERPL-MCNC: 111 MG/DL (ref 0–149)
VLDLC SERPL CALC-MCNC: 22 MG/DL (ref 5–40)

## 2020-02-26 ENCOUNTER — OFFICE VISIT (OUTPATIENT)
Dept: INTERNAL MEDICINE CLINIC | Age: 65
End: 2020-02-26

## 2020-02-26 VITALS
DIASTOLIC BLOOD PRESSURE: 76 MMHG | OXYGEN SATURATION: 97 % | RESPIRATION RATE: 16 BRPM | HEART RATE: 56 BPM | WEIGHT: 134 LBS | BODY MASS INDEX: 27.01 KG/M2 | SYSTOLIC BLOOD PRESSURE: 120 MMHG | TEMPERATURE: 98.4 F | HEIGHT: 59 IN

## 2020-02-26 DIAGNOSIS — F33.9 RECURRENT DEPRESSION (HCC): ICD-10-CM

## 2020-02-26 DIAGNOSIS — M51.36 DDD (DEGENERATIVE DISC DISEASE), LUMBAR: ICD-10-CM

## 2020-02-26 DIAGNOSIS — E11.9 TYPE 2 DIABETES MELLITUS WITHOUT COMPLICATION, WITHOUT LONG-TERM CURRENT USE OF INSULIN (HCC): ICD-10-CM

## 2020-02-26 DIAGNOSIS — Z00.00 LABORATORY TESTS ORDERED AS PART OF A COMPLETE PHYSICAL EXAM (CPE): ICD-10-CM

## 2020-02-26 DIAGNOSIS — E66.3 OVERWEIGHT (BMI 25.0-29.9): ICD-10-CM

## 2020-02-26 DIAGNOSIS — G43.009 MIGRAINE WITHOUT AURA AND WITHOUT STATUS MIGRAINOSUS, NOT INTRACTABLE: ICD-10-CM

## 2020-02-26 DIAGNOSIS — I10 ESSENTIAL HYPERTENSION: Primary | ICD-10-CM

## 2020-02-26 DIAGNOSIS — Z12.31 SCREENING MAMMOGRAM, ENCOUNTER FOR: ICD-10-CM

## 2020-02-26 DIAGNOSIS — M54.16 BILATERAL LUMBAR RADICULOPATHY: ICD-10-CM

## 2020-02-26 DIAGNOSIS — E55.9 VITAMIN D DEFICIENCY: ICD-10-CM

## 2020-02-26 DIAGNOSIS — Z86.69 HISTORY OF BELL'S PALSY: ICD-10-CM

## 2020-02-26 DIAGNOSIS — E78.5 HYPERLIPIDEMIA, UNSPECIFIED HYPERLIPIDEMIA TYPE: ICD-10-CM

## 2020-02-26 RX ORDER — PROGESTERONE 200 MG/1
200 CAPSULE ORAL DAILY
Qty: 90 CAP | Refills: 3 | Status: SHIPPED | OUTPATIENT
Start: 2020-02-26 | End: 2021-09-19 | Stop reason: ALTCHOICE

## 2020-02-26 RX ORDER — PROPRANOLOL HYDROCHLORIDE 40 MG/1
40 TABLET ORAL 2 TIMES DAILY
Qty: 180 TAB | Refills: 3 | Status: SHIPPED | OUTPATIENT
Start: 2020-02-26 | End: 2021-04-01 | Stop reason: SDUPTHER

## 2020-02-26 RX ORDER — LISINOPRIL 10 MG/1
10 TABLET ORAL DAILY
Qty: 90 TAB | Refills: 3 | Status: SHIPPED | OUTPATIENT
Start: 2020-02-26 | End: 2021-03-15 | Stop reason: SDUPTHER

## 2020-02-26 RX ORDER — ATORVASTATIN CALCIUM 40 MG/1
40 TABLET, FILM COATED ORAL DAILY
Qty: 90 TAB | Refills: 3 | Status: SHIPPED | OUTPATIENT
Start: 2020-02-26 | End: 2021-04-01 | Stop reason: SDUPTHER

## 2020-02-26 RX ORDER — METFORMIN HYDROCHLORIDE 1000 MG/1
1000 TABLET ORAL 2 TIMES DAILY WITH MEALS
Qty: 180 TAB | Refills: 3 | Status: SHIPPED | OUTPATIENT
Start: 2020-02-26 | End: 2020-10-22 | Stop reason: ALTCHOICE

## 2020-02-26 RX ORDER — ESTRADIOL 0.05 MG/D
1 FILM, EXTENDED RELEASE TRANSDERMAL
Qty: 12 PATCH | Refills: 2 | Status: SHIPPED | OUTPATIENT
Start: 2020-02-27 | End: 2021-09-19 | Stop reason: ALTCHOICE

## 2020-02-26 NOTE — PATIENT INSTRUCTIONS
Increase atorvastatin to 40 mg daily. May take two (2) of the 20 mg tablets until gone. Please attempt to improve lifestyle with increase exercise and diet. Learning About Meal Planning for Diabetes  Why plan your meals? Meal planning can be a key part of managing diabetes. Planning meals and snacks with the right balance of carbohydrate, protein, and fat can help you keep your blood sugar at the target level you set with your doctor. You don't have to eat special foods. You can eat what your family eats, including sweets once in a while. But you do have to pay attention to how often you eat and how much you eat of certain foods. You may want to work with a dietitian or a certified diabetes educator. He or she can give you tips and meal ideas and can answer your questions about meal planning. This health professional can also help you reach a healthy weight if that is one of your goals. What plan is right for you? Your dietitian or diabetes educator may suggest that you start with the plate format or carbohydrate counting. The plate format  The plate format is a simple way to help you manage how you eat. You plan meals by learning how much space each food should take on a plate. Using the plate format helps you spread carbohydrate throughout the day. It can make it easier to keep your blood sugar level within your target range. It also helps you see if you're eating healthy portion sizes. To use the plate format, you put non-starchy vegetables on half your plate. Add meat or meat substitutes on one-quarter of the plate. Put a grain or starchy vegetable (such as brown rice or a potato) on the final quarter of the plate. You can add a small piece of fruit and some low-fat or fat-free milk or yogurt, depending on your carbohydrate goal for each meal.  Here are some tips for using the plate format:  · Make sure that you are not using an oversized plate.  A 9-inch plate is best. Many restaurants use larger plates. · Get used to using the plate format at home. Then you can use it when you eat out. · Write down your questions about using the plate format. Talk to your doctor, a dietitian, or a diabetes educator about your concerns. Carbohydrate counting  With carbohydrate counting, you plan meals based on the amount of carbohydrate in each food. Carbohydrate raises blood sugar higher and more quickly than any other nutrient. It is found in desserts, breads and cereals, and fruit. It's also found in starchy vegetables such as potatoes and corn, grains such as rice and pasta, and milk and yogurt. Spreading carbohydrate throughout the day helps keep your blood sugar levels within your target range. Your daily amount depends on several things, including your weight, how active you are, which diabetes medicines you take, and what your goals are for your blood sugar levels. A registered dietitian or diabetes educator can help you plan how much carbohydrate to include in each meal and snack. A guideline for your daily amount of carbohydrate is:  · 45 to 60 grams at each meal. That's about the same as 3 to 4 carbohydrate servings. · 15 to 20 grams at each snack. That's about the same as 1 carbohydrate serving. The Nutrition Facts label on packaged foods tells you how much carbohydrate is in a serving of the food. First, look at the serving size on the food label. Is that the amount you eat in a serving? All of the nutrition information on a food label is based on that serving size. So if you eat more or less than that, you'll need to adjust the other numbers. Total carbohydrate is the next thing you need to look for on the label. If you count carbohydrate servings, one serving of carbohydrate is 15 grams. For foods that don't come with labels, such as fresh fruits and vegetables, you'll need a guide that lists carbohydrate in these foods.  Ask your doctor, dietitian, or diabetes educator about books or other nutrition guides you can use. If you take insulin, you need to know how many grams of carbohydrate are in a meal. This lets you know how much rapid-acting insulin to take before you eat. If you use an insulin pump, you get a constant rate of insulin during the day. So the pump must be programmed at meals to give you extra insulin to cover the rise in blood sugar after meals. When you know how much carbohydrate you will eat, you can take the right amount of insulin. Or, if you always use the same amount of insulin, you need to make sure that you eat the same amount of carbohydrate at meals. If you need more help to understand carbohydrate counting and food labels, ask your doctor, dietitian, or diabetes educator. How do you get started with meal planning? Here are some tips to get started:  · Plan your meals a week at a time. Don't forget to include snacks too. · Use cookbooks or online recipes to plan several main meals. Plan some quick meals for busy nights. You also can double some recipes that freeze well. Then you can save half for other busy nights when you don't have time to cook. · Make sure you have the ingredients you need for your recipes. If you're running low on basic items, put these items on your shopping list too. · List foods that you use to make breakfasts, lunches, and snacks. List plenty of fruits and vegetables. · Post this list on the refrigerator. Add to it as you think of more things you need. · Take the list to the store to do your weekly shopping. Follow-up care is a key part of your treatment and safety. Be sure to make and go to all appointments, and call your doctor if you are having problems. It's also a good idea to know your test results and keep a list of the medicines you take. Where can you learn more? Go to http://jennifer-pavel.info/. Yulinaa Branch in the search box to learn more about \"Learning About Meal Planning for Diabetes. \"  Current as of: April 16, 2019  Content Version: 12.2  © 2825-1968 [employer], [employer]. Care instructions adapted under license by [employer] (which disclaims liability or warranty for this information). If you have questions about a medical condition or this instruction, always ask your healthcare professional. [address] any warranty or liability for your use of this information. High Cholesterol: Care Instructions  Your Care Instructions    Cholesterol is a type of fat in your blood. It is needed for many body functions, such as making new cells. Cholesterol is made by your body. It also comes from food you eat. High cholesterol means that you have too much of the fat in your blood. This raises your risk of a heart attack and stroke. LDL and HDL are part of your total cholesterol. LDL is the \"bad\" cholesterol. High LDL can raise your risk for heart disease, heart attack, and stroke. HDL is the \"good\" cholesterol. It helps clear bad cholesterol from the body. High HDL is linked with a lower risk of heart disease, heart attack, and stroke. Your cholesterol levels help your doctor find out your risk for having a heart attack or stroke. You and your doctor can talk about whether you need to lower your risk and what treatment is best for you. A heart-healthy lifestyle along with medicines can help lower your cholesterol and your risk. The way you choose to lower your risk will depend on how high your risk is for heart attack and stroke. It will also depend on how you feel about taking medicines. Follow-up care is a key part of your treatment and safety. Be sure to make and go to all appointments, and call your doctor if you are having problems. It's also a good idea to know your test results and keep a list of the medicines you take. How can you care for yourself at home? · Eat a variety of foods every day.  Good choices include fruits, vegetables, whole grains (like oatmeal), dried beans and peas, nuts and seeds, soy products (like tofu), and fat-free or low-fat dairy products. · Replace butter, margarine, and hydrogenated or partially hydrogenated oils with olive and canola oils. (Canola oil margarine without trans fat is fine.)  · Replace red meat with fish, poultry, and soy protein (like tofu). · Limit processed and packaged foods like chips, crackers, and cookies. · Bake, broil, or steam foods. Don't dominguez them. · Be physically active. Get at least 30 minutes of exercise on most days of the week. Walking is a good choice. You also may want to do other activities, such as running, swimming, cycling, or playing tennis or team sports. · Stay at a healthy weight or lose weight by making the changes in eating and physical activity listed above. Losing just a small amount of weight, even 5 to 10 pounds, can reduce your risk for having a heart attack or stroke. · Do not smoke. When should you call for help? Watch closely for changes in your health, and be sure to contact your doctor if:    · You need help making lifestyle changes.     · You have questions about your medicine. Where can you learn more? Go to http://jennifer-pavel.info/. Enter Y002 in the search box to learn more about \"High Cholesterol: Care Instructions. \"  Current as of: April 9, 2019  Content Version: 12.2  © 1405-9832 [employer]. Care instructions adapted under license by [employer] (which disclaims liability or warranty for this information). If you have questions about a medical condition or this instruction, always ask your healthcare professional. [address] any warranty or liability for your use of this information.

## 2020-02-26 NOTE — PROGRESS NOTES
Chief Complaint   Patient presents with    Hypertension     3 month follow up with labs. Health Maintenance Due   Topic Date Due    PAP AKA CERVICAL CYTOLOGY  11/17/2017    Eye Exam Retinal or Dilated  05/02/2018     1. Have you been to the ER, urgent care clinic or hospitalized since your last visit? NO.     2. Have you seen or consulted any other health care providers outside of the [address] [place] [address] since your last visit (Include any pap smears or colon screening)? NO      Learning Assessment 2/26/2020   PRIMARY LEARNER Patient   BARRIERS PRIMARY LEARNER -   CO-LEARNER CAREGIVER -   PRIMARY LANGUAGE ENGLISH   LEARNER PREFERENCE PRIMARY DEMONSTRATION     READING     LISTENING   ANSWERED BY patient   RELATIONSHIP SELF     Abuse Screening Questionnaire 2/26/2020   Do you ever feel afraid of your partner? N   Are you in a relationship with someone who physically or mentally threatens you? N   Is it safe for you to go home?  Y     3 most recent PHQ Screens 2/26/2020   Little interest or pleasure in doing things Not at all   Feeling down, depressed, irritable, or hopeless Not at all   Total Score PHQ 2 0   Trouble falling or staying asleep, or sleeping too much -   Feeling tired or having little energy -   Poor appetite, weight loss, or overeating -   Feeling bad about yourself - or that you are a failure or have let yourself or your family down -   Trouble concentrating on things such as school, work, reading, or watching TV -   Moving or speaking so slowly that other people could have noticed; or the opposite being so fidgety that others notice -   Thoughts of being better off dead, or hurting yourself in some way -   PHQ 9 Score -   How difficult have these problems made it for you to do your work, take care of your home and get along with others -

## 2020-02-28 ENCOUNTER — TELEPHONE (OUTPATIENT)
Dept: INTERNAL MEDICINE CLINIC | Age: 65
End: 2020-02-28

## 2020-02-28 NOTE — TELEPHONE ENCOUNTER
Pharmacy is questioning the prescribed directions for the Vivelle-Dot patch. They say is it usually dosed twice weekly. Please clarify and advise.     Pharmacy phone #[phone]  Reference #[id number]

## 2020-02-28 NOTE — TELEPHONE ENCOUNTER
We sent the patient's new mail order pharmacy, [facility], a request for the medication by mistake. This particular medication is prescribed by patient's GYN. Called pharmacy and cancelled prescription, Shelley, placed by us. Called patient and verified full name and date of birth. Informed patient to call her GYN office to have that medication refilled and sent to her new mail order pharmacy, [facility]. Patient verbalized understanding and states that she will call them to refill.

## 2020-03-03 PROBLEM — M54.16 RIGHT LUMBAR RADICULOPATHY: Status: RESOLVED | Noted: 2018-06-14 | Resolved: 2020-03-03

## 2020-03-03 NOTE — PROGRESS NOTES
HPI:   Severiano Dearth is a 60y.o. year old female who presents today for a routine visit. She has a history of hypertension, hyperlipidemia, diabetes mellitus, migraine headaches, depression, anxiety, ADHD, and lumbar radiculopathy. She reports that she is doing relatively well and is currently without new complaints. She was hospitalized from 7/9-7/11/2019 at [facility] after presenting to [facility] ED with a severe posterior headache, unlike her usual migraines, with associated light sensitivity and confusion. Evaluation included WBC 6.0, Hb 11.9/ Hct 35.2, Na 138, Ca 9.6, creatinine 1.0/ eGFR >60, head CT scan no acute intracranial abnormality; MRI brain (7/10/2019) mild nonspecific white matter disease likely representing chronic small vessel changes, and very mild bilateral occipital paramedian encephalomalacic changes, likely from chronic small infarcts; brain MRA (7/10/2019) no high-grade intracranial stenosis; neck MRA (7/10/2019) mild proximal ICA irregularity without hemodynamically significant carotid stenosis. She was evaluated by Dr. Caryle Pretzel of neurology who felt presentation was consistent with status migrainosus. Attempted treatment with Benadryl and compazine without much improvement, but notable improvement after Medrol dose pack started and subsequently discharged. On 7/24/2019, she noted onset of left facial weakness with difficulty with eye closure and forehead elevation, and presented to [facility] ED and diagnosed with left facial palsy. She was treated with prednisone 60 mg daily x 7 days and Valtrex. She was evaluated by Dr. Viviana Santos on 8/14/2019, and it was his opinion that her symptoms were likely not due to status migrainosus, but rather were secondary to a viral meningoencephalitis given the clear change in the pattern of her headaches and the subsequent development of a cranial nerve palsy.  She has had complete resolution of her facial palsy, and a return to baseline for her migraine headaches, which respond readily to sumatriptan. She has a history of hypertension, treated with propranolol and lisinopril. She does not check her blood pressure at home. She has begun walking regularly for exercise. She denies any chest pain, shortness of breath at rest or with exertion, palpitations, or lightheadedness. She also has a history of hyperlipidemia, treated with high intensity dose rosuvastatin. She has a history of diabetes mellitus, and was started on metformin in 2/2015 for a HbA1c of 7.5. She does not monitor her blood sugars at home. She denies any polyuria, polydipsia, nocturia, or blurry vision, and has no history of retinopathy, neuropathy, or nephropathy. She has regular eye exams with Dr. Tierra Del Cid. She also has a history of migraines without aura, which usually respond to Imitrex. She has a history of lumbar radiculopathy (R>L), and was evaluated by Dr. Cliffton Rubinstein in 5/2015. Lumbar x-rays showed degenerative changes in L4-L5 and L5-S1. She was treated conservatively with physical therapy and ibuprofen and reports significant improvement. She was evaluated by PA Denys Halsted in 12/2016 for exacerbation of low back pain with right sciatica. She was treated with steroids and Norco with improvement. On 4/17/2018, she presented with increasing low back pain with bilateral sciatica. She was treated with a prednisone taper with initial improvement. However, her symptoms worsened and she was referred to Dr. Antonia Pelaez for evaluation. She gave her a Toradol injection, and ordered a lumbar MRI (6/24/2018) which showed a large right disc extrusion at L5-S1 which compressed the descending right S1 nerve root. She was referred to pain management and underwent a selective L5 and S1 selective nerve root blocks by Dr. Janet Mahan on 7/26/2018.  However, due to persistent symptoms, she was referred to Dr. Joey Hopson and subsequently underwent a right L5-S1 laminectomy and diskectomy on 8/6/2018 without complications. She states that she did well and continues to do her back stretches and exercises. She states that she only has occasional discomfort. She had a screening colonoscopy in 10/2014 by Dr. Koki Lamar which was normal. Follow-up due in 10 years. She denies any abdominal pain, nausea, vomiting, melena, hematochezia, or change in bowel movements. She has a history of depression and anxiety, and is followed by Dr. Garrett White. She is currently taking Abilify 5 mg daily and Prozac 10 mg daily, as well as lorazepam at bedtime. Past Medical History:   Diagnosis Date    ADHD     Anxiety     Bilateral lumbar radiculopathy     Carotid bruit     right    Depression     Diabetes mellitus (HCC)     HCD (hypertensive cardiovascular disease)     Hyperlipidemia     Hypertension     Migraine headache     Plantar fasciitis      Past Surgical History:   Procedure Laterality Date    HX SEPTOPLASTY  6/2002    HX TONSILLECTOMY      NEUROLOGICAL PROCEDURE UNLISTED  08/06/2018    laminectomy      Current Outpatient Medications   Medication Sig    atorvastatin (LIPITOR) 40 mg tablet Take 1 Tab by mouth daily.  estradioL (VIVELLE) 0.05 mg/24 hr 1 Patch by TransDERmal route Every Thursday.  lisinopril (PRINIVIL, ZESTRIL) 10 mg tablet Take 1 Tab by mouth daily.  metFORMIN (GLUCOPHAGE) 1,000 mg tablet Take 1 Tab by mouth two (2) times daily (with meals).  progesterone (PROMETRIUM) 200 mg capsule Take 1 Cap by mouth daily.  propranolol (INDERAL) 40 mg tablet Take 1 Tab by mouth two (2) times a day.  ARIPiprazole (ABILIFY) 5 mg tablet Take  by mouth daily.  SUMAtriptan (IMITREX) 50 mg tablet Take 1 Tab by mouth daily as needed for Migraine.  white petrolatum-mineral oil (ARTIFICIAL TEARS, VLADISLAV/MIN,) 83-15 % ophthalmic ointment Administer  to left eye nightly. Small amount    FLUoxetine (PROZAC) 20 mg capsule Take 10 mg by mouth daily.     naloxone (NARCAN) 4 mg/actuation nasal spray Use 1 spray intranasally, then discard. Repeat with new spray every 2 min as needed for opioid overdose symptoms, alternating nostrils.  LORazepam (ATIVAN) 1 mg tablet Take 1 Tab by mouth nightly as needed for Anxiety. Max Daily Amount: 1 mg. No current facility-administered medications for this visit. Allergies and Intolerances: Allergies   Allergen Reactions    Pcn [Penicillins] Rash    Sulfa (Sulfonamide Antibiotics) Rash     Family History: She has no family history of colon or breast cancer. Her mother  from a CVA. Her father  from throat cancer and cirrhosis. Family History   Problem Relation Age of Onset    Hypertension Brother         x2    Elevated Lipids Brother     Stroke Mother     Heart Surgery Mother         CABG    Cancer Father         cancer of the mouth    Hypertension Father     Hypertension Brother     Elevated Lipids Brother      Social History:   She  reports that she quit smoking about 31 years ago. She has a 1.00 pack-year smoking history. She has never used smokeless tobacco. She smoked approximately 0.25 ppd for 2 years, stopping in . She is a  and has one daughter. She was employed as a . 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 She was previously working as the [employer] for his business.   Social History     Substance and Sexual Activity   Alcohol Use No    Alcohol/week: 1.7 standard drinks    Types: 2 Glasses of wine per week     Immunization History:  Immunization History   Administered Date(s) Administered    Influenza Vaccine (Quad) PF 2018, 2019    Influenza Vaccine PF 2014, 10/13/2014    Influenza Vaccine Split 2011    Influenza Vaccine Whole 10/05/2010    Pneumococcal Polysaccharide (PPSV-23) 2017    TD Vaccine 2007    Tdap 11/10/2016    Zoster Recombinant 2018, 10/01/2018       Review of Systems:   As above included in HPI. Otherwise 11 point review of systems negative including constitutional, skin, HENT, eyes, respiratory, cardiovascular, gastrointestinal, genitourinary, musculoskeletal, endocrine, hematologic, allergy, and neurologic. Physical:   Vitals:   BP: 120/76  HR: (!) 56  WT: 134 lb (60.8 kg)  BMI:  27.06 kg/m2    Exam:   Patient appears in no apparent distress. Affect is appropriate. HEENT: PERRLA, anicteric, oropharynx clear, no JVD, adenopathy or thyromegaly. No carotid bruits or radiated murmur. Lungs: clear to auscultation, no wheezes, rhonchi, or rales. Heart: regular rate and rhythm. No murmur, rubs, gallops  Abdomen: soft, nontender, nondistended, normal bowel sounds, no hepatosplenomegaly or masses. Extremities: without edema. Pulses 1-2+ bilaterally.     Review of Data:  Labs:  Orders Only on 02/19/2020   Component Date Value Ref Range Status    Hemoglobin A1c 02/19/2020 6.5* 4.8 - 5.6 % Final    Estimated average glucose 02/19/2020 140  mg/dL Final    Cholesterol, total 02/19/2020 191  100 - 199 mg/dL Final    Triglyceride 02/19/2020 111  0 - 149 mg/dL Final    HDL Cholesterol 02/19/2020 48  >39 mg/dL Final    VLDL, calculated 02/19/2020 22  5 - 40 mg/dL Final    LDL, calculated 02/19/2020 121* 0 - 99 mg/dL Final    Glucose 02/19/2020 134* 65 - 99 mg/dL Final    BUN 02/19/2020 19  8 - 27 mg/dL Final    Creatinine 02/19/2020 0.73  0.57 - 1.00 mg/dL Final    GFR est non-AA 02/19/2020 89  >59 mL/min/1.73 Final    GFR est AA 02/19/2020 102  >59 mL/min/1.73 Final    BUN/Creatinine ratio 02/19/2020 26  12 - 28 Final    Sodium 02/19/2020 146* 134 - 144 mmol/L Final    Potassium 02/19/2020 4.9  3.5 - 5.2 mmol/L Final    Chloride 02/19/2020 106  96 - 106 mmol/L Final    CO2 02/19/2020 23  20 - 29 mmol/L Final    Calcium 02/19/2020 9.6  8.7 - 10.3 mg/dL Final    Magnesium 02/19/2020 1.9  1.6 - 2.3 mg/dL Final    INTERPRETATION 02/19/2020 Note   Final    PDF Image 18/89/6730 Not applicable   Final     Health Maintenance:  Screening:    Mammogram: negative (3/2019)   PAP smear: well women exams by Dr. Chiquita Main (last 2/2019) Overdue   Colorectal: colonoscopy (10/2014) normal. Dr. Colby Samuel. Due 2024. Depression: under care of MOJGAN Winn. DM (HbA1c/FPG): HbA1c 6.5 (2/2020)   Hepatitis C: negative (5/2017)   Falls: none   DEXA: osteopenia (2/2019) Dr. Valeri Page   Glaucoma: regular eye exams at [facility] (10/2019)   Smoking: none   Vitamin D: 31.0 (5/2019)   Medicare Wellness: N/A    Impression:  Patient Active Problem List   Diagnosis Code    HCD (hypertensive cardiovascular disease) I11.9    Hyperlipidemia E78.5    Otosclerosis H80.90    Migraine G43.909    Bilateral lumbar radiculopathy M54.16    Type 2 diabetes mellitus without complication ([facility] [address] D99.1    Essential hypertension I10    Vitamin D deficiency E55.9    Recurrent depression ([facility] [address] F33.9    Overweight (BMI 25.0-29. 9) E66.3    Right lumbar radiculopathy M54.16    DDD (degenerative disc disease), lumbar M51.36    HNP (herniated nucleus pulposus), lumbar M51.26    History of Bell's palsy Z86.69       Plan:  1. Hypertension. Blood pressure well controlled today on current regimen of lisinopril 10 mg daily and propranolol 40 mg bid. Renal function remains normal with creatinine 0.73/ eGFR 89. Continue to follow. 2. Hyperlipidemia. On moderate intensity atorvastatin with  and HDL 48, indicative of poor control. Previously on rosuvastatin, but changed due to formulary change. Will increase dose of atorvastatin to 40 mg daily and repeat lipid panel at next visit. Emphasized importance of lifestyle modifications, including diet, exercise, and weight loss. Patient wishing to reassess in 3 months before changing dose of medication. Continue to follow. 3. Diabetes mellitus. Remains well controlled on metformin 1000 mg bid with HbA1c at 6.5. No evidence of microvascular complications.  On statin and lisinopril. Continue follow-up for annual eye exams. Foot exam normal (11/2019). Urine microalbumin/ creatinine ratio without evidence of microalbuminuria. Emphasized importance of lifestyle modifications, including diet, exercise, and weight loss. 4. Headache/ left facial nerve palsy. Presented with severe headache resulting in hospital admission to [facility]. Evaluation including head CT scan, brain MRI/MRA and neck MRA unrevealing and Dr. Claire Davila of neurology consulted and felt most consistent with status migrainosus. Prescribed medrol dose pack and discharged. Developed left facial nerve palsy 10 days later and treated with prednisone 60 mg and Valtrex for 7 days. Evaluated by Dr. Mal Severin who felt that presentation more likely consistent with viral meningoencephalitis given the clear change in the pattern of her headaches and the subsequent development of a cranial nerve palsy. Now completely improved with resolution of facial nerve palsy and headaches returning to baseline pattern for migraines. 5. Migraines. Returned to baseline pattern involving pain on side of head radiating to lateral neck. Readily responds to Imitrex. Follow. 6. Lumbar herniated disc with right sciatica, s/p right L5-S1 laminectomy and discectomy. Doing well without significant discomfort. Stressed improtance of continuing stretches and exercise. Follow. 7. Depression/ADHD. Reports good control of symptoms on new regimen of Abilify and Prozac. Continues on lorazepam at bedtime. Being followed by MOJGAN Lambert at [facility]. 8. Overweight. Weight increased 13 pounds since 8/2019. Emphasized importance of continued lifestyle modifications, including diet, exercise, and weight loss. Importance stressed to help with diabetes control. Will readdress next visit. 9. Health maintenance. Already received influenza vaccine. Completed 2/2 doses of Shingrix vaccine. Other immunizations up to date.  Well women exam performed by Dr. Philippe Palacio and overdue. Advised to schedule. Attempting to wean hormonal therapy for menopausal symptoms and now using estrogen patch once per week. Mammogram due and will place order. Colonoscopy due 2024. Will refer to Dr. Angelina Thapa for eye exam. Vitamin D level stable. Discussed continuing maintenance dose therapy. Discontinued aspirin given new recommendations for primary prevention. Patient understands recommendations and agrees with plan.   Follow-up in 3 months for physical.

## 2020-06-11 ENCOUNTER — APPOINTMENT (OUTPATIENT)
Dept: INTERNAL MEDICINE CLINIC | Age: 65
End: 2020-06-11

## 2020-06-11 DIAGNOSIS — M51.36 DDD (DEGENERATIVE DISC DISEASE), LUMBAR: ICD-10-CM

## 2020-06-11 DIAGNOSIS — E66.3 OVERWEIGHT (BMI 25.0-29.9): ICD-10-CM

## 2020-06-11 DIAGNOSIS — Z86.69 HISTORY OF BELL'S PALSY: ICD-10-CM

## 2020-06-11 DIAGNOSIS — I10 ESSENTIAL HYPERTENSION: ICD-10-CM

## 2020-06-11 DIAGNOSIS — Z00.00 LABORATORY TESTS ORDERED AS PART OF A COMPLETE PHYSICAL EXAM (CPE): ICD-10-CM

## 2020-06-11 DIAGNOSIS — Z12.31 SCREENING MAMMOGRAM, ENCOUNTER FOR: ICD-10-CM

## 2020-06-11 DIAGNOSIS — E55.9 VITAMIN D DEFICIENCY: ICD-10-CM

## 2020-06-11 DIAGNOSIS — E11.9 TYPE 2 DIABETES MELLITUS WITHOUT COMPLICATION, WITHOUT LONG-TERM CURRENT USE OF INSULIN (HCC): ICD-10-CM

## 2020-06-11 DIAGNOSIS — F33.9 RECURRENT DEPRESSION (HCC): ICD-10-CM

## 2020-06-11 DIAGNOSIS — E78.5 HYPERLIPIDEMIA, UNSPECIFIED HYPERLIPIDEMIA TYPE: ICD-10-CM

## 2020-06-11 DIAGNOSIS — G43.009 MIGRAINE WITHOUT AURA AND WITHOUT STATUS MIGRAINOSUS, NOT INTRACTABLE: ICD-10-CM

## 2020-06-11 DIAGNOSIS — M54.16 BILATERAL LUMBAR RADICULOPATHY: ICD-10-CM

## 2020-06-12 LAB
25(OH)D3+25(OH)D2 SERPL-MCNC: 35.5 NG/ML (ref 30–100)
ALBUMIN SERPL-MCNC: 4.3 G/DL (ref 3.8–4.8)
ALBUMIN/CREAT UR: 3 MG/G CREAT (ref 0–29)
ALBUMIN/GLOB SERPL: 2.7 {RATIO} (ref 1.2–2.2)
ALP SERPL-CCNC: 65 IU/L (ref 39–117)
ALT SERPL-CCNC: 19 IU/L (ref 0–32)
APPEARANCE UR: CLEAR
AST SERPL-CCNC: 14 IU/L (ref 0–40)
BACTERIA #/AREA URNS HPF: ABNORMAL /[HPF]
BASOPHILS # BLD AUTO: 0.1 X10E3/UL (ref 0–0.2)
BASOPHILS NFR BLD AUTO: 1 %
BILIRUB SERPL-MCNC: 0.5 MG/DL (ref 0–1.2)
BILIRUB UR QL STRIP: NEGATIVE
BUN SERPL-MCNC: 19 MG/DL (ref 8–27)
BUN/CREAT SERPL: 23 (ref 12–28)
CALCIUM SERPL-MCNC: 9.2 MG/DL (ref 8.7–10.3)
CASTS URNS QL MICRO: ABNORMAL /LPF
CHLORIDE SERPL-SCNC: 105 MMOL/L (ref 96–106)
CHOLEST SERPL-MCNC: 172 MG/DL (ref 100–199)
CO2 SERPL-SCNC: 22 MMOL/L (ref 20–29)
COLOR UR: YELLOW
CREAT SERPL-MCNC: 0.83 MG/DL (ref 0.57–1)
CREAT UR-MCNC: 134.8 MG/DL
CRYSTALS URNS MICRO: ABNORMAL
EOSINOPHIL # BLD AUTO: 0.2 X10E3/UL (ref 0–0.4)
EOSINOPHIL NFR BLD AUTO: 2 %
EPI CELLS #/AREA URNS HPF: >10 /HPF (ref 0–10)
ERYTHROCYTE [DISTWIDTH] IN BLOOD BY AUTOMATED COUNT: 13.6 % (ref 11.7–15.4)
EST. AVERAGE GLUCOSE BLD GHB EST-MCNC: 143 MG/DL
GLOBULIN SER CALC-MCNC: 1.6 G/DL (ref 1.5–4.5)
GLUCOSE SERPL-MCNC: 112 MG/DL (ref 65–99)
GLUCOSE UR QL: NEGATIVE
HBA1C MFR BLD: 6.6 % (ref 4.8–5.6)
HCT VFR BLD AUTO: 35.6 % (ref 34–46.6)
HDLC SERPL-MCNC: 46 MG/DL
HGB BLD-MCNC: 11.8 G/DL (ref 11.1–15.9)
HGB UR QL STRIP: NEGATIVE
IMM GRANULOCYTES # BLD AUTO: 0 X10E3/UL (ref 0–0.1)
IMM GRANULOCYTES NFR BLD AUTO: 0 %
INTERPRETATION, 910389: NORMAL
KETONES UR QL STRIP: NEGATIVE
LDLC SERPL CALC-MCNC: 95 MG/DL (ref 0–99)
LEUKOCYTE ESTERASE UR QL STRIP: ABNORMAL
LYMPHOCYTES # BLD AUTO: 3.5 X10E3/UL (ref 0.7–3.1)
LYMPHOCYTES NFR BLD AUTO: 42 %
Lab: NORMAL
MAGNESIUM SERPL-MCNC: 1.9 MG/DL (ref 1.6–2.3)
MCH RBC QN AUTO: 29.1 PG (ref 26.6–33)
MCHC RBC AUTO-ENTMCNC: 33.1 G/DL (ref 31.5–35.7)
MCV RBC AUTO: 88 FL (ref 79–97)
MICRO URNS: ABNORMAL
MICROALBUMIN UR-MCNC: 4.4 UG/ML
MONOCYTES # BLD AUTO: 0.4 X10E3/UL (ref 0.1–0.9)
MONOCYTES NFR BLD AUTO: 5 %
MUCOUS THREADS URNS QL MICRO: PRESENT
NEUTROPHILS # BLD AUTO: 4.1 X10E3/UL (ref 1.4–7)
NEUTROPHILS NFR BLD AUTO: 50 %
NITRITE UR QL STRIP: NEGATIVE
PH UR STRIP: 5 [PH] (ref 5–7.5)
PLATELET # BLD AUTO: 326 X10E3/UL (ref 150–450)
POTASSIUM SERPL-SCNC: 4.6 MMOL/L (ref 3.5–5.2)
PROT SERPL-MCNC: 5.9 G/DL (ref 6–8.5)
PROT UR QL STRIP: NEGATIVE
RBC # BLD AUTO: 4.06 X10E6/UL (ref 3.77–5.28)
RBC #/AREA URNS HPF: ABNORMAL /HPF (ref 0–2)
SODIUM SERPL-SCNC: 142 MMOL/L (ref 134–144)
SP GR UR: 1.02 (ref 1–1.03)
TRIGL SERPL-MCNC: 156 MG/DL (ref 0–149)
TSH SERPL DL<=0.005 MIU/L-ACNC: 1.91 UIU/ML (ref 0.45–4.5)
UNIDENT CRYS URNS QL MICRO: PRESENT
UROBILINOGEN UR STRIP-MCNC: 0.2 MG/DL (ref 0.2–1)
VLDLC SERPL CALC-MCNC: 31 MG/DL (ref 5–40)
WBC # BLD AUTO: 8.3 X10E3/UL (ref 3.4–10.8)
WBC #/AREA URNS HPF: ABNORMAL /HPF (ref 0–5)

## 2020-06-15 NOTE — TELEPHONE ENCOUNTER
Requested Prescriptions     Pending Prescriptions Disp Refills    SUMAtriptan (IMITREX) 50 mg tablet 27 Tab 1     Sig: Take 1 Tab by mouth daily as needed for Migraine. Pt states she has one pill left.

## 2020-06-17 RX ORDER — SUMATRIPTAN 50 MG/1
50 TABLET, FILM COATED ORAL
Qty: 27 TAB | Refills: 1 | Status: SHIPPED | OUTPATIENT
Start: 2020-06-17 | End: 2021-04-01 | Stop reason: SDUPTHER

## 2020-06-18 ENCOUNTER — VIRTUAL VISIT (OUTPATIENT)
Dept: INTERNAL MEDICINE CLINIC | Age: 65
End: 2020-06-18

## 2020-06-18 ENCOUNTER — TELEPHONE (OUTPATIENT)
Dept: INTERNAL MEDICINE CLINIC | Age: 65
End: 2020-06-18

## 2020-06-18 DIAGNOSIS — E55.9 VITAMIN D DEFICIENCY: ICD-10-CM

## 2020-06-18 DIAGNOSIS — E11.9 TYPE 2 DIABETES MELLITUS WITHOUT COMPLICATION, WITHOUT LONG-TERM CURRENT USE OF INSULIN (HCC): ICD-10-CM

## 2020-06-18 DIAGNOSIS — Z86.69 HISTORY OF BELL'S PALSY: ICD-10-CM

## 2020-06-18 DIAGNOSIS — F33.9 RECURRENT DEPRESSION (HCC): ICD-10-CM

## 2020-06-18 DIAGNOSIS — E78.5 HYPERLIPIDEMIA, UNSPECIFIED HYPERLIPIDEMIA TYPE: ICD-10-CM

## 2020-06-18 DIAGNOSIS — G43.009 MIGRAINE WITHOUT AURA AND WITHOUT STATUS MIGRAINOSUS, NOT INTRACTABLE: ICD-10-CM

## 2020-06-18 DIAGNOSIS — I10 ESSENTIAL HYPERTENSION: Primary | ICD-10-CM

## 2020-06-18 DIAGNOSIS — M54.16 BILATERAL LUMBAR RADICULOPATHY: ICD-10-CM

## 2020-06-18 DIAGNOSIS — M51.36 DDD (DEGENERATIVE DISC DISEASE), LUMBAR: ICD-10-CM

## 2020-06-18 DIAGNOSIS — E66.3 OVERWEIGHT WITH BODY MASS INDEX (BMI) OF 27 TO 27.9 IN ADULT: ICD-10-CM

## 2020-06-21 NOTE — PROGRESS NOTES
Daiva Kanner is a 60 y.o. female who was seen by synchronous (real-time) audio-video technology on 6/18/2020. Consent: Daiva Kanner, who was seen by synchronous (real-time) audio-video technology, and/or her healthcare decision maker, is aware that this patient-initiated, Telehealth encounter on 6/18/2020 is a billable service, with coverage as determined by her insurance carrier. She is aware that she may receive a bill and has provided verbal consent to proceed: Yes    HPI:   Daiva Kanner is a 60y.o. year old female who has a history of hypertension, hyperlipidemia, diabetes mellitus, migraine headaches, depression, anxiety, ADHD, and lumbar radiculopathy. She reports that she is doing relatively well. She is continuing to work during the pandemic, but states that there is only one other person in her office and they are . She does wear a mask when outside. She has been trying to walk for exercise. She is otherwise without new complaints. She was hospitalized from 7/9-7/11/2019 at [facility] after presenting to [facility] ED with a severe posterior headache, unlike her usual migraines, with associated light sensitivity and confusion. Evaluation included WBC 6.0, Hb 11.9/ Hct 35.2, Na 138, Ca 9.6, creatinine 1.0/ eGFR >60, head CT scan no acute intracranial abnormality; MRI brain (7/10/2019) mild nonspecific white matter disease likely representing chronic small vessel changes, and very mild bilateral occipital paramedian encephalomalacic changes, likely from chronic small infarcts; brain MRA (7/10/2019) no high-grade intracranial stenosis; neck MRA (7/10/2019) mild proximal ICA irregularity without hemodynamically significant carotid stenosis. She was evaluated by Dr. Gumaro Reynolds of neurology who felt presentation was consistent with status migrainosus.  Attempted treatment with Benadryl and compazine without much improvement, but notable improvement after Medrol dose pack started and subsequently discharged. On 7/24/2019, she noted onset of left facial weakness with difficulty with eye closure and forehead elevation, and presented to [address] [place] [address] ED and diagnosed with left facial palsy. She was treated with prednisone 60 mg daily x 7 days and Valtrex. She was evaluated by Dr. Kim Barba on 8/14/2019, and it was his opinion that her symptoms were likely not due to status migrainosus, but rather were secondary to a viral meningoencephalitis given the clear change in the pattern of her headaches and the subsequent development of a cranial nerve palsy. She has had complete resolution of her facial palsy, and a return to baseline for her migraine headaches, which respond readily to sumatriptan. She has a history of hypertension, treated with propranolol and lisinopril. She does not check her blood pressure at home. She has begun walking regularly for exercise. She denies any chest pain, shortness of breath at rest or with exertion, palpitations, or lightheadedness. She also has a history of hyperlipidemia, treated with high intensity dose rosuvastatin. She has a history of diabetes mellitus, and was started on metformin in 2/2015 for a HbA1c of 7.5. She does not monitor her blood sugars at home. She denies any polyuria, polydipsia, nocturia, or blurry vision, and has no history of retinopathy, neuropathy, or nephropathy. She has regular eye exams with Dr. Ben Donald. She also has a history of migraines without aura, which usually respond to Imitrex. She has a history of lumbar radiculopathy (R>L), and was evaluated by Dr. Christianna Osler in 5/2015. Lumbar x-rays showed degenerative changes in L4-L5 and L5-S1. She was treated conservatively with physical therapy and ibuprofen and reports significant improvement. She was evaluated by MOJGAN Alfaro in 12/2016 for exacerbation of low back pain with right sciatica. She was treated with steroids and Norco with improvement.  On 4/17/2018, she presented with increasing low back pain with bilateral sciatica. She was treated with a prednisone taper with initial improvement. However, her symptoms worsened and she was referred to Dr. Erick Andrews for evaluation. She gave her a Toradol injection, and ordered a lumbar MRI (6/24/2018) which showed a large right disc extrusion at L5-S1 which compressed the descending right S1 nerve root. She was referred to pain management and underwent a selective L5 and S1 selective nerve root blocks by Dr. Miriam Holley on 7/26/2018. However, due to persistent symptoms, she was referred to Dr. Michel Tinsley and subsequently underwent a right L5-S1 laminectomy and diskectomy on 5/3/5509 without complications. She states that she did well and continues to do her back stretches and exercises. She states that she only has occasional discomfort. She had a screening colonoscopy in 10/2014 by Dr. Mayank Holland which was normal. Follow-up due in 10 years. She denies any abdominal pain, nausea, vomiting, melena, hematochezia, or change in bowel movements. She has a history of depression and anxiety, and is followed by Dr. Xiomara Valdivia. She is currently taking Abilify 5 mg daily and Prozac 10 mg daily, as well as lorazepam at bedtime. Past Medical History:   Diagnosis Date    ADHD     Anxiety     Bilateral lumbar radiculopathy     Carotid bruit     right    Depression     Diabetes mellitus (HCC)     HCD (hypertensive cardiovascular disease)     Hyperlipidemia     Hypertension     Migraine headache     Plantar fasciitis      Past Surgical History:   Procedure Laterality Date    HX SEPTOPLASTY  6/2002    HX TONSILLECTOMY      NEUROLOGICAL PROCEDURE UNLISTED  08/06/2018    laminectomy      Current Outpatient Medications   Medication Sig    SUMAtriptan (IMITREX) 50 mg tablet Take 1 Tab by mouth daily as needed for Migraine.  atorvastatin (LIPITOR) 40 mg tablet Take 1 Tab by mouth daily.     estradioL (VIVELLE) 0.05 mg/24 hr 1 Patch by TransDERmal route Every Thursday.  lisinopril (PRINIVIL, ZESTRIL) 10 mg tablet Take 1 Tab by mouth daily.  metFORMIN (GLUCOPHAGE) 1,000 mg tablet Take 1 Tab by mouth two (2) times daily (with meals).  progesterone (PROMETRIUM) 200 mg capsule Take 1 Cap by mouth daily.  propranolol (INDERAL) 40 mg tablet Take 1 Tab by mouth two (2) times a day.  ARIPiprazole (ABILIFY) 5 mg tablet Take  by mouth daily.  white petrolatum-mineral oil (ARTIFICIAL TEARS, VLADISLAV/MIN,) 83-15 % ophthalmic ointment Administer  to left eye nightly. Small amount    FLUoxetine (PROZAC) 20 mg capsule Take 10 mg by mouth daily.  naloxone (NARCAN) 4 mg/actuation nasal spray Use 1 spray intranasally, then discard. Repeat with new spray every 2 min as needed for opioid overdose symptoms, alternating nostrils.  LORazepam (ATIVAN) 1 mg tablet Take 1 Tab by mouth nightly as needed for Anxiety. Max Daily Amount: 1 mg. No current facility-administered medications for this visit. Allergies and Intolerances: Allergies   Allergen Reactions    Pcn [Penicillins] Rash    Sulfa (Sulfonamide Antibiotics) Rash     Family History: She has no family history of colon or breast cancer. Her mother  from a CVA. Her father  from throat cancer and cirrhosis. Family History   Problem Relation Age of Onset    Hypertension Brother         x2    Elevated Lipids Brother     Stroke Mother     Heart Surgery Mother         CABG    Cancer Father         cancer of the mouth    Hypertension Father     Hypertension Brother     Elevated Lipids Brother      Social History:   She  reports that she quit smoking about 31 years ago. She has a 1.00 pack-year smoking history. She has never used smokeless tobacco. She smoked approximately 0.25 ppd for 2 years, stopping in . She is a  and has one daughter. She was employed as a .  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 She was previously working as the [employer] for his business. Social History     Substance and Sexual Activity   Alcohol Use No    Alcohol/week: 1.7 standard drinks    Types: 2 Glasses of wine per week     Immunization History:  Immunization History   Administered Date(s) Administered    Influenza Vaccine (Quad) PF 11/08/2018, 11/19/2019    Influenza Vaccine PF 02/03/2014, 10/13/2014    Influenza Vaccine Split 11/14/2011    Influenza Vaccine Whole 10/05/2010    Pneumococcal Polysaccharide (PPSV-23) 05/11/2017    TD Vaccine 01/23/2007    Tdap 11/10/2016    Zoster Recombinant 05/20/2018, 10/01/2018       Review of Systems:   As above included in HPI. Otherwise 11 point review of systems negative including constitutional, skin, HENT, eyes, respiratory, cardiovascular, gastrointestinal, genitourinary, musculoskeletal, endocrine, hematologic, allergy, and neurologic.     Physical:   Vitals:   BP:  118/76  HR:    WT:  138 pounds  BMI:   kg/m2    Exam:   Objective:   Vital Signs: (As obtained by patient/caregiver at home)  Visit Vitals  LMP  (LMP Unknown)      Constitutional: [x] Appears well-developed and well-nourished [x] No apparent distress      [] Abnormal -     Mental status: [x] Alert and awake  [x] Oriented to person/place/time [x] Able to follow commands    [] Abnormal -     Eyes:   EOM    [x]  Normal    [] Abnormal -   Sclera  [x]  Normal    [] Abnormal -          Discharge [x]  None visible   [] Abnormal -     HENT: [x] Normocephalic, atraumatic  [] Abnormal -   [x] Mouth/Throat: Mucous membranes are moist    External Ears [x] Normal  [] Abnormal -    Neck: [x] No visualized mass [] Abnormal -     Pulmonary/Chest: [x] Respiratory effort normal   [x] No visualized signs of difficulty breathing or respiratory distress        [] Abnormal -      Musculoskeletal:   [x] Normal gait with no signs of ataxia         [x] Normal range of motion of neck        [] Abnormal -     Neurological:        [x] No Facial Asymmetry (Cranial nerve 7 motor function) (limited exam due to video visit)          [x] No gaze palsy        [] Abnormal -          Skin:        [x] No significant exanthematous lesions or discoloration noted on facial skin         [] Abnormal -            Psychiatric:       [x] Normal Affect [] Abnormal -        [x] No Hallucinations      Review of Data:  Labs:  Orders Only on 06/11/2020   Component Date Value Ref Range Status    WBC 06/11/2020 8.3  3.4 - 10.8 x10E3/uL Final    RBC 06/11/2020 4.06  3.77 - 5.28 x10E6/uL Final    HGB 06/11/2020 11.8  11.1 - 15.9 g/dL Final    HCT 06/11/2020 35.6  34.0 - 46.6 % Final    MCV 06/11/2020 88  79 - 97 fL Final    MCH 06/11/2020 29.1  26.6 - 33.0 pg Final    MCHC 06/11/2020 33.1  31.5 - 35.7 g/dL Final    RDW 06/11/2020 13.6  11.7 - 15.4 % Final    PLATELET 70/69/6904 814  150 - 450 x10E3/uL Final    NEUTROPHILS 06/11/2020 50  Not Estab. % Final    Lymphocytes 06/11/2020 42  Not Estab. % Final    MONOCYTES 06/11/2020 5  Not Estab. % Final    EOSINOPHILS 06/11/2020 2  Not Estab. % Final    BASOPHILS 06/11/2020 1  Not Estab. % Final    ABS. NEUTROPHILS 06/11/2020 4.1  1.4 - 7.0 x10E3/uL Final    Abs Lymphocytes 06/11/2020 3.5* 0.7 - 3.1 x10E3/uL Final    ABS. MONOCYTES 06/11/2020 0.4  0.1 - 0.9 x10E3/uL Final    ABS. EOSINOPHILS 06/11/2020 0.2  0.0 - 0.4 x10E3/uL Final    ABS. BASOPHILS 06/11/2020 0.1  0.0 - 0.2 x10E3/uL Final    IMMATURE GRANULOCYTES 06/11/2020 0  Not Estab. % Final    ABS. IMM.  GRANS. 06/11/2020 0.0  0.0 - 0.1 x10E3/uL Final    Hemoglobin A1c 06/11/2020 6.6* 4.8 - 5.6 % Final    Estimated average glucose 06/11/2020 143  mg/dL Final    Cholesterol, total 06/11/2020 172  100 - 199 mg/dL Final    Triglyceride 06/11/2020 156* 0 - 149 mg/dL Final    HDL Cholesterol 06/11/2020 46  >39 mg/dL Final    VLDL, calculated 06/11/2020 31  5 - 40 mg/dL Final    LDL, calculated 06/11/2020 95  0 - 99 mg/dL Final    Magnesium 06/11/2020 1.9  1.6 - 2.3 mg/dL Final    Glucose 06/11/2020 112* 65 - 99 mg/dL Final    BUN 06/11/2020 19  8 - 27 mg/dL Final    Creatinine 06/11/2020 0.83  0.57 - 1.00 mg/dL Final    GFR est non-AA 06/11/2020 76  >59 mL/min/1.73 Final    GFR est AA 06/11/2020 87  >59 mL/min/1.73 Final    BUN/Creatinine ratio 06/11/2020 23  12 - 28 Final    Sodium 06/11/2020 142  134 - 144 mmol/L Final    Potassium 06/11/2020 4.6  3.5 - 5.2 mmol/L Final    Chloride 06/11/2020 105  96 - 106 mmol/L Final    CO2 06/11/2020 22  20 - 29 mmol/L Final    Calcium 06/11/2020 9.2  8.7 - 10.3 mg/dL Final    Protein, total 06/11/2020 5.9* 6.0 - 8.5 g/dL Final    Albumin 06/11/2020 4.3  3.8 - 4.8 g/dL Final    GLOBULIN, TOTAL 06/11/2020 1.6  1.5 - 4.5 g/dL Final    A-G Ratio 06/11/2020 2.7* 1.2 - 2.2 Final    Bilirubin, total 06/11/2020 0.5  0.0 - 1.2 mg/dL Final    Alk.  phosphatase 06/11/2020 65  39 - 117 IU/L Final    AST (SGOT) 06/11/2020 14  0 - 40 IU/L Final    ALT (SGPT) 06/11/2020 19  0 - 32 IU/L Final    Creatinine, urine 06/11/2020 134.8  Not Estab. mg/dL Final    Microalbumin, urine 06/11/2020 4.4  Not Estab. ug/mL Final    Microalb/Creat ratio (ug/mg creat.) 06/11/2020 3  0 - 29 mg/g creat Final    TSH 06/11/2020 1.910  0.450 - 4.500 uIU/mL Final    Specific Gravity 06/11/2020 1.018  1.005 - 1.030 Final    pH (UA) 06/11/2020 5.0  5.0 - 7.5 Final    Color 06/11/2020 Yellow  Yellow Final    Appearance 06/11/2020 Clear  Clear Final    Leukocyte Esterase 06/11/2020 2+* Negative Final    Protein 06/11/2020 Negative  Negative/Trace Final    Glucose 06/11/2020 Negative  Negative Final    Ketone 06/11/2020 Negative  Negative Final    Blood 06/11/2020 Negative  Negative Final    Bilirubin 06/11/2020 Negative  Negative Final    Urobilinogen 06/11/2020 0.2  0.2 - 1.0 mg/dL Final    Nitrites 06/11/2020 Negative  Negative Final    Microscopic Examination 06/11/2020 See additional order   Final    VITAMIN D, 25-HYDROXY 06/11/2020 35.5  30.0 - 100.0 ng/mL Final    WBC 06/11/2020 11-30* 0 - 5 /hpf Final    RBC 06/11/2020 0-2  0 - 2 /hpf Final    Epithelial cells 06/11/2020 >10* 0 - 10 /hpf Final    Casts 06/11/2020 None seen  None seen /lpf Final    Crystals 06/11/2020 Present* N/A Final    Crystal type 06/11/2020 Calcium Oxalate  N/A Final    Mucus 06/11/2020 Present  Not Estab. Final    Bacteria 06/11/2020 Many* None seen/Few Final    INTERPRETATION 06/11/2020 Note   Final    PDF Image 08/30/3565 Not applicable   Final     Health Maintenance:  Screening:    Mammogram: negative (3/2019). Scheduled 7/14/2020. PAP smear: well women exams by Dr. Juan Stubbs (last 2/2019) Overdue   Colorectal: colonoscopy (10/2014) normal. Dr. Raad Sun. Due 2024. Depression: under care of [facility]. DM (HbA1c/FPG): HbA1c 6.6 (6/2020)   Hepatitis C: negative (5/2017)   Falls: none   DEXA: osteopenia (2/2019) Dr. Melvin Lira   Glaucoma: regular eye exams with Dr. Sin Silva (6/2020)   Smoking: none   Vitamin D: 35.5 (6/2020)   Medicare Wellness: N/A    Impression:  Patient Active Problem List   Diagnosis Code    HCD (hypertensive cardiovascular disease) I11.9    Hyperlipidemia E78.5    Otosclerosis H80.90    Migraine G43.909    Bilateral lumbar radiculopathy M54.16    Type 2 diabetes mellitus without complication ([facility] [address] D63.6    Essential hypertension I10    Vitamin D deficiency E55.9    Recurrent depression ([facility] [address] F33.9    Overweight (BMI 25.0-29. 9) [id number]    DDD (degenerative disc disease), lumbar M51.36    HNP (herniated nucleus pulposus), lumbar M51.26    History of Bell's palsy Z86.69       Plan:  1. Hypertension. Blood pressure well controlled today on current regimen of lisinopril 10 mg daily and propranolol 40 mg bid. Renal function remains normal with creatinine 0.83/ eGFR 76. Continue to follow. 2. Hyperlipidemia. On high intensity atorvastatin with LDL 95 and HDL 46, indicative of improved control.  Previously on rosuvastatin, but changed due to formulary change. Emphasized importance of lifestyle modifications, including diet, exercise, and weight loss. Continue to follow. 3. Diabetes mellitus. Remains well controlled on metformin 1000 mg bid with HbA1c at 6.6. No evidence of microvascular complications. On statin and lisinopril. Continue follow-up for annual eye exams. Foot exam normal (11/2019). Urine microalbumin/ creatinine ratio without evidence of microalbuminuria (6/2020). Emphasized importance of lifestyle modifications, including diet, exercise, and weight loss. 4. Headache/ left facial nerve palsy. Presented with severe headache resulting in hospital admission to [facility]. Evaluation including head CT scan, brain MRI/MRA and neck MRA unrevealing and Dr. Ulices Duval of neurology consulted and felt most consistent with status migrainosus. Prescribed medrol dose pack and discharged. Developed left facial nerve palsy 10 days later and treated with prednisone 60 mg and Valtrex for 7 days. Evaluated by Dr. Nadeen Mckeon who felt that presentation more likely consistent with viral meningoencephalitis given the clear change in the pattern of her headaches and the subsequent development of a cranial nerve palsy. Now completely improved with resolution of facial nerve palsy and headaches returning to baseline pattern for migraines. 5. Migraines. Returned to baseline pattern involving pain on side of head radiating to lateral neck. Readily responds to Imitrex. Follow. 6. Lumbar herniated disc with right sciatica, s/p right L5-S1 laminectomy and discectomy. Doing well without significant discomfort. Stressed improtance of continuing stretches and exercise. Follow. 7. Depression/ADHD. Reports good control of symptoms on new regimen of Abilify and Prozac. Continues on lorazepam at bedtime. Being followed by MOJGAN Gleason at [facility]. 8. Overweight. Weight increased 13 pounds since 8/2019.   Emphasized importance of continued lifestyle modifications, including diet, exercise, and weight loss. Importance stressed to help with diabetes control. Will readdress next visit. 9. Health maintenance. Already received influenza vaccine. Completed 2/2 doses of Shingrix vaccine. Other immunizations up to date. Well women exam performed by Dr. Ary Quiroz and overdue. Advised to schedule. Counseled to discuss with Dr. Ary Quiroz weaning hormonal therapy for menopausal symptoms given risks with increasing age. Mammogram scheduled. Colonoscopy due 2024. Has appointment scheduled with Dr. Ethel Ch on 6/30/2020 for eye exam. Vitamin D level stable. Advised to continue maintenance dose therapy. Discontinued aspirin given new recommendations for primary prevention. Patient understands recommendations and agrees with plan. Follow-up in 3 months. We discussed the expected course, resolution and complications of the diagnosis(es) in detail. Medication risks, benefits, costs, interactions, and alternatives were discussed as indicated. I advised her to contact the office if her condition worsens, changes or fails to improve as anticipated. She expressed understanding with the diagnosis(es) and plan. Abner Trevino is a 60 y.o. female who was evaluated by a video visit encounter for concerns as above. Patient identification was verified prior to start of the visit. A caregiver was present when appropriate. Due to this being a TeleHealth encounter (During [facility] public health emergency), evaluation of the following organ systems was limited: Vitals/Constitutional/EENT/Resp/CV/GI//MS/Neuro/Skin/Heme-Lymph-Imm.   Pursuant to the emergency declaration under the [facility], 1135 waiver authority and the [employer] Act, this Virtual  Visit was conducted, with patient's (and/or legal guardian's) consent, to reduce the patient's risk of exposure to COVID-19 and provide necessary medical care. Services were provided through a video synchronous discussion virtually to substitute for in-person clinic visit. Patient was at home and provider was located in the office.       Lord Piper MD

## 2020-06-25 ENCOUNTER — OFFICE VISIT (OUTPATIENT)
Dept: NEUROLOGY | Age: 65
End: 2020-06-25

## 2020-06-25 VITALS
DIASTOLIC BLOOD PRESSURE: 74 MMHG | TEMPERATURE: 97.6 F | SYSTOLIC BLOOD PRESSURE: 118 MMHG | RESPIRATION RATE: 18 BRPM | OXYGEN SATURATION: 99 % | WEIGHT: 135.8 LBS | HEART RATE: 71 BPM | HEIGHT: 59 IN | BODY MASS INDEX: 27.38 KG/M2

## 2020-06-25 DIAGNOSIS — G43.009 MIGRAINE WITHOUT AURA AND WITHOUT STATUS MIGRAINOSUS, NOT INTRACTABLE: Primary | ICD-10-CM

## 2020-06-25 DIAGNOSIS — G51.0 FACIAL PARALYSIS ON LEFT SIDE: ICD-10-CM

## 2020-06-25 NOTE — PROGRESS NOTES
Liane Cushing is a 60 y.o. female in office today for follow-up on migraines. Patient report migraine pain this morning, has since subsided with medication. 1. Have you been to the ER, urgent care clinic since your last visit? Hospitalized since your last visit? No    2. Have you seen or consulted any other health care providers outside of the [address] [place] [address] since your last visit? Include any pap smears or colon screening.  No

## 2020-06-25 NOTE — PROGRESS NOTES
2020 8:38 AM    SSN: [id number]    Subjective:   82-year-old female who I saw on  for evaluation of a history of headaches. She has had a history of headaches since she was a teenager, but about a month before I saw her she was admitted with a very severe headache which was different to the ones that she had had [facility]), followed up a week later by left sided facial weakness. In hindsight I thought the patient probably had some type of meningoencephalitis or less likely a subdural hemorrhage. She did not get a lumbar puncture on a fold at the time I saw her a month later after this was already resolving it was a moot point, so we opted to observe this, treated with sumatriptan in the meantime, and depending on her recovery and further clinical evolution consider CSF studies down the line  given her history of facial palsy. Since her last visit in November, she has continued to do great. She has perhaps 2 migraines lasting less than a day which typically respond to sumatriptan. Has not had any more cranial neuropathies. She continues on propranolol 40 mg twice a day for both her hypertension as well as her migraines. Denies any new complaints. Social History     Socioeconomic History    Marital status:      Spouse name: Not on file    Number of children: Not on file    Years of education: Not on file    Highest education level: Not on file   Occupational History    Not on file   Social Needs    Financial resource strain: Not on file    Food insecurity     Worry: Not on file     Inability: Not on file    Transportation needs     Medical: Not on file     Non-medical: Not on file   Tobacco Use    Smoking status: Former Smoker     Packs/day: 0.25     Years: 4.00     Pack years: 1.00     Last attempt to quit: 1989     Years since quittin.5    Smokeless tobacco: Never Used   Substance and Sexual Activity    Alcohol use:  No Alcohol/week: 1.7 standard drinks     Types: 2 Glasses of wine per week    Drug use: No    Sexual activity: Not Currently   Lifestyle    Physical activity     Days per week: Not on file     Minutes per session: Not on file    Stress: Not on file   Relationships    Social connections     Talks on phone: Not on file     Gets together: Not on file     Attends Shinto service: Not on file     Active member of club or organization: Not on file     Attends meetings of clubs or organizations: Not on file     Relationship status: Not on file    Intimate partner violence     Fear of current or ex partner: Not on file     Emotionally abused: Not on file     Physically abused: Not on file     Forced sexual activity: Not on file   Other Topics Concern    Not on file   Social History Narrative    Not on file       Family History   Problem Relation Age of Onset    Hypertension Brother         x2    Elevated Lipids Brother     Stroke Mother     Heart Surgery Mother         CABG    Cancer Father         cancer of the mouth    Hypertension Father     Hypertension Brother     Elevated Lipids Brother        Current Outpatient Medications   Medication Sig Dispense Refill    SUMAtriptan (IMITREX) 50 mg tablet Take 1 Tab by mouth daily as needed for Migraine. 27 Tab 1    atorvastatin (LIPITOR) 40 mg tablet Take 1 Tab by mouth daily. 90 Tab 3    estradioL (VIVELLE) 0.05 mg/24 hr 1 Patch by TransDERmal route Every Thursday. 12 Patch 2    lisinopril (PRINIVIL, ZESTRIL) 10 mg tablet Take 1 Tab by mouth daily. 90 Tab 3    metFORMIN (GLUCOPHAGE) 1,000 mg tablet Take 1 Tab by mouth two (2) times daily (with meals). 180 Tab 3    propranolol (INDERAL) 40 mg tablet Take 1 Tab by mouth two (2) times a day. 180 Tab 3    ARIPiprazole (ABILIFY) 5 mg tablet Take  by mouth daily.  white petrolatum-mineral oil (ARTIFICIAL TEARS, VLADISLAV/MIN,) 83-15 % ophthalmic ointment Administer  to left eye nightly.  Small amount 3.5 g 2    FLUoxetine (PROZAC) 20 mg capsule Take 10 mg by mouth daily.  naloxone (NARCAN) 4 mg/actuation nasal spray Use 1 spray intranasally, then discard. Repeat with new spray every 2 min as needed for opioid overdose symptoms, alternating nostrils. 1 Each 0    progesterone (PROMETRIUM) 200 mg capsule Take 1 Cap by mouth daily. 90 Cap 3    LORazepam (ATIVAN) 1 mg tablet Take 1 Tab by mouth nightly as needed for Anxiety. Max Daily Amount: 1 mg. 1 Tab 0       Past Medical History:   Diagnosis Date    ADHD     Anxiety     Bilateral lumbar radiculopathy     Carotid bruit     right    Depression     Diabetes mellitus (HCC)     HCD (hypertensive cardiovascular disease)     Hyperlipidemia     Hypertension     Migraine headache     Plantar fasciitis        Past Surgical History:   Procedure Laterality Date    HX SEPTOPLASTY  6/2002    HX TONSILLECTOMY      NEUROLOGICAL PROCEDURE UNLISTED  08/06/2018    laminectomy        Allergies   Allergen Reactions    Pcn [Penicillins] Rash    Sulfa (Sulfonamide Antibiotics) Rash       Vital signs:    Visit Vitals  /74 (BP 1 Location: Left arm, BP Patient Position: Sitting)   Pulse 71   Temp 97.6 °F (36.4 °C) (Oral)   Resp 18   Ht 4' 11\" (1.499 m)   Wt 61.6 kg (135 lb 12.8 oz)   LMP  (LMP Unknown)   SpO2 99%   BMI 27.43 kg/m²       Review of Systems:   GENERAL: Denies fever or fatigue  CARDIAC: No CP or SOB  PULMONARY: No cough of SOB  MUSCULOSKELETAL: No new joint pain  NEURO: SEE HPI      EXAM: Alert, in NAD. Heart is regular. Oriented x3, EOM's are full, PERRL, no facial asymmetries. Strength and tone are normal. DTR's +2, gait symmetric       Assessment/Plan:  Chronic migraines, has had great headache control on propranolol 40 mg twice a day and occasional use of sumatriptan.   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 She will continue current treatment, will return to neurology in 6 months or sooner if needed. PLEASE NOTE:   Portions of this document may have been produced using voice recognition software. Unrecognized errors in transcription may be present. This note will not be viewable in [address]

## 2020-07-14 ENCOUNTER — HOSPITAL ENCOUNTER (OUTPATIENT)
Dept: MAMMOGRAPHY | Age: 65
Discharge: HOME | End: 2020-07-14
Attending: INTERNAL MEDICINE
Payer: COMMERCIAL

## 2020-07-14 DIAGNOSIS — Z12.31 SCREENING MAMMOGRAM, ENCOUNTER FOR: ICD-10-CM

## 2020-07-14 PROCEDURE — 77063 BREAST TOMOSYNTHESIS BI: CPT

## 2020-09-23 DIAGNOSIS — G43.009 MIGRAINE WITHOUT AURA AND WITHOUT STATUS MIGRAINOSUS, NOT INTRACTABLE: ICD-10-CM

## 2020-09-23 DIAGNOSIS — M51.36 DDD (DEGENERATIVE DISC DISEASE), LUMBAR: ICD-10-CM

## 2020-09-23 DIAGNOSIS — M54.16 BILATERAL LUMBAR RADICULOPATHY: ICD-10-CM

## 2020-09-23 DIAGNOSIS — E66.3 OVERWEIGHT WITH BODY MASS INDEX (BMI) OF 27 TO 27.9 IN ADULT: ICD-10-CM

## 2020-09-23 DIAGNOSIS — E78.5 HYPERLIPIDEMIA, UNSPECIFIED HYPERLIPIDEMIA TYPE: ICD-10-CM

## 2020-09-23 DIAGNOSIS — Z86.69 HISTORY OF BELL'S PALSY: ICD-10-CM

## 2020-09-23 DIAGNOSIS — I10 ESSENTIAL HYPERTENSION: ICD-10-CM

## 2020-09-23 DIAGNOSIS — E55.9 VITAMIN D DEFICIENCY: ICD-10-CM

## 2020-09-23 DIAGNOSIS — F33.9 RECURRENT DEPRESSION (HCC): ICD-10-CM

## 2020-09-23 DIAGNOSIS — E11.9 TYPE 2 DIABETES MELLITUS WITHOUT COMPLICATION, WITHOUT LONG-TERM CURRENT USE OF INSULIN (HCC): ICD-10-CM

## 2020-10-15 ENCOUNTER — APPOINTMENT (OUTPATIENT)
Dept: INTERNAL MEDICINE CLINIC | Age: 65
End: 2020-10-15

## 2020-10-16 LAB
ALBUMIN SERPL-MCNC: 4.5 G/DL (ref 3.8–4.8)
ALBUMIN/GLOB SERPL: 2.4 {RATIO} (ref 1.2–2.2)
ALP SERPL-CCNC: 82 IU/L (ref 39–117)
ALT SERPL-CCNC: 20 IU/L (ref 0–32)
AST SERPL-CCNC: 21 IU/L (ref 0–40)
BILIRUB SERPL-MCNC: 0.4 MG/DL (ref 0–1.2)
BUN SERPL-MCNC: 14 MG/DL (ref 8–27)
BUN/CREAT SERPL: 19 (ref 12–28)
CALCIUM SERPL-MCNC: 8.9 MG/DL (ref 8.7–10.3)
CHLORIDE SERPL-SCNC: 106 MMOL/L (ref 96–106)
CHOLEST SERPL-MCNC: 166 MG/DL (ref 100–199)
CO2 SERPL-SCNC: 22 MMOL/L (ref 20–29)
CREAT SERPL-MCNC: 0.74 MG/DL (ref 0.57–1)
EST. AVERAGE GLUCOSE BLD GHB EST-MCNC: 140 MG/DL
GLOBULIN SER CALC-MCNC: 1.9 G/DL (ref 1.5–4.5)
GLUCOSE SERPL-MCNC: 128 MG/DL (ref 65–99)
HBA1C MFR BLD: 6.5 % (ref 4.8–5.6)
HDLC SERPL-MCNC: 44 MG/DL
INTERPRETATION, 910389: NORMAL
LDLC SERPL CALC-MCNC: 101 MG/DL (ref 0–99)
Lab: NORMAL
MAGNESIUM SERPL-MCNC: 1.9 MG/DL (ref 1.6–2.3)
POTASSIUM SERPL-SCNC: 4.2 MMOL/L (ref 3.5–5.2)
PROT SERPL-MCNC: 6.4 G/DL (ref 6–8.5)
SODIUM SERPL-SCNC: 144 MMOL/L (ref 134–144)
TRIGL SERPL-MCNC: 116 MG/DL (ref 0–149)
VLDLC SERPL CALC-MCNC: 21 MG/DL (ref 5–40)

## 2020-10-21 NOTE — PROGRESS NOTES
Renetta Sánchez presents today for   Chief Complaint   Patient presents with    Follow-up     3 month f/u    Diabetes    Hypertension    Cholesterol Problem    Labs     completed on 10-15-20             Coordination of Care:  1. Have you been to the ER, urgent care clinic since your last visit? Hospitalized since your last visit? no    2. Have you seen or consulted any other health care providers outside of the [address] [place] [address] since your last visit? Include any pap smears or colon screening. no    Renetta Sánchez 1955 female who presents for routine immunizations. Patient denies any symptoms , reactions or allergies that would exclude them from being immunized today. Risks and adverse reactions were discussed and the VIS was given to them. All questions were addressed. Order placed for flu vaccine,  per Verbal Order from  with read back. Patient was observed for 15 min post injection. There were no reactions observed.     Saira Godoy LPN

## 2020-10-22 ENCOUNTER — OFFICE VISIT (OUTPATIENT)
Dept: INTERNAL MEDICINE CLINIC | Age: 65
End: 2020-10-22
Payer: COMMERCIAL

## 2020-10-22 VITALS
BODY MASS INDEX: 27.42 KG/M2 | DIASTOLIC BLOOD PRESSURE: 68 MMHG | WEIGHT: 136 LBS | RESPIRATION RATE: 12 BRPM | OXYGEN SATURATION: 96 % | TEMPERATURE: 97.5 F | SYSTOLIC BLOOD PRESSURE: 118 MMHG | HEART RATE: 58 BPM | HEIGHT: 59 IN

## 2020-10-22 DIAGNOSIS — F33.9 RECURRENT DEPRESSION (HCC): ICD-10-CM

## 2020-10-22 DIAGNOSIS — E66.3 OVERWEIGHT WITH BODY MASS INDEX (BMI) OF 27 TO 27.9 IN ADULT: ICD-10-CM

## 2020-10-22 DIAGNOSIS — E11.9 TYPE 2 DIABETES MELLITUS WITHOUT COMPLICATION, WITHOUT LONG-TERM CURRENT USE OF INSULIN (HCC): Primary | ICD-10-CM

## 2020-10-22 DIAGNOSIS — Z23 NEEDS FLU SHOT: ICD-10-CM

## 2020-10-22 DIAGNOSIS — G43.009 MIGRAINE WITHOUT AURA AND WITHOUT STATUS MIGRAINOSUS, NOT INTRACTABLE: ICD-10-CM

## 2020-10-22 DIAGNOSIS — E55.9 VITAMIN D DEFICIENCY: ICD-10-CM

## 2020-10-22 DIAGNOSIS — I10 ESSENTIAL HYPERTENSION: ICD-10-CM

## 2020-10-22 DIAGNOSIS — E78.5 HYPERLIPIDEMIA, UNSPECIFIED HYPERLIPIDEMIA TYPE: ICD-10-CM

## 2020-10-22 DIAGNOSIS — Z86.69 HISTORY OF BELL'S PALSY: ICD-10-CM

## 2020-10-22 PROCEDURE — 99214 OFFICE O/P EST MOD 30 MIN: CPT | Performed by: INTERNAL MEDICINE

## 2020-10-22 PROCEDURE — 90471 IMMUNIZATION ADMIN: CPT | Performed by: INTERNAL MEDICINE

## 2020-10-22 PROCEDURE — 90686 IIV4 VACC NO PRSV 0.5 ML IM: CPT | Performed by: INTERNAL MEDICINE

## 2020-10-22 RX ORDER — METFORMIN HYDROCHLORIDE 500 MG/1
1000 TABLET, EXTENDED RELEASE ORAL 2 TIMES DAILY WITH MEALS
Qty: 180 TAB | Refills: 2 | Status: SHIPPED | OUTPATIENT
Start: 2020-10-22 | End: 2021-02-20

## 2020-10-22 NOTE — PATIENT INSTRUCTIONS
Vaccine Information Statement    Influenza (Flu) Vaccine (Inactivated or Recombinant): What You Need to Know    Many Vaccine Information Statements are available in French and other languages. See www.immunize.org/vis  Hojas de información sobre vacunas están disponibles en español y en muchos otros idiomas. Visite www.immunize.org/vis    1. Why get vaccinated? Influenza vaccine can prevent influenza (flu). Flu is a contagious disease that spreads around the [facility] every year, usually between October and May. Anyone can get the flu, but it is more dangerous for some people. Infants and young children, people 72years of age and older, pregnant women, and people with certain health conditions or a weakened immune system are at greatest risk of flu complications. Pneumonia, bronchitis, sinus infections and ear infections are examples of flu-related complications. If you have a medical condition, such as heart disease, cancer or diabetes, flu can make it worse. Flu can cause fever and chills, sore throat, muscle aches, fatigue, cough, headache, and runny or stuffy nose. Some people may have vomiting and diarrhea, though this is more common in children than adults. Each year thousands of people in the [facility] die from flu, and many more are hospitalized. Flu vaccine prevents millions of illnesses and flu-related visits to the doctor each year. 2. Influenza vaccines     CDC recommends everyone 10months of age and older get vaccinated every flu season. Children 6 months through 6years of age may need 2 doses during a single flu season. Everyone else needs only 1 dose each flu season. It takes about 2 weeks for protection to develop after vaccination. There are many flu viruses, and they are always changing. Each year a new flu vaccine is made to protect against three or four viruses that are likely to cause disease in the upcoming flu season.  Even when the vaccine doesnt exactly match these viruses, it may still provide some protection. Influenza vaccine does not cause flu. Influenza vaccine may be given at the same time as other vaccines. 3. Talk with your health care provider    Tell your vaccine provider if the person getting the vaccine:   Has had an allergic reaction after a previous dose of influenza vaccine, or has any severe, life-threatening allergies.  Has ever had Guillain-Barré Syndrome (also called GBS). In some cases, your health care provider may decide to postpone influenza vaccination to a future visit. People with minor illnesses, such as a cold, may be vaccinated. People who are moderately or severely ill should usually wait until they recover before getting influenza vaccine. Your health care provider can give you more information. 4. Risks of a reaction     Soreness, redness, and swelling where shot is given, fever, muscle aches, and headache can happen after influenza vaccine.  There may be a very small increased risk of Guillain-Barré Syndrome (GBS) after inactivated influenza vaccine (the flu shot). [facility] children who get the flu shot along with pneumococcal vaccine (PCV13), and/or DTaP vaccine at the same time might be slightly more likely to have a seizure caused by fever. Tell your health care provider if a child who is getting flu vaccine has ever had a seizure. People sometimes faint after medical procedures, including vaccination. Tell your provider if you feel dizzy or have vision changes or ringing in the ears. As with any medicine, there is a very remote chance of a vaccine causing a severe allergic reaction, other serious injury, or death. 5. What if there is a serious problem? An allergic reaction could occur after the vaccinated person leaves the clinic.  If you see signs of a severe allergic reaction (hives, swelling of the face and throat, difficulty breathing, a fast heartbeat, dizziness, or weakness), call 9-1-1 and get the person to the nearest hospital.    For other signs that concern you, call your health care provider. Adverse reactions should be reported to the Vaccine Adverse Event Reporting System (VAERS). Your health care provider will usually file this report, or you can do it yourself. Visit the VAERS website at www.vaers. [facility].gov or call 9-314.701.2256. VAERS is only for reporting reactions, and VAERS staff do not give medical advice. 6. The National Vaccine Injury Compensation Program    The [facility] Vaccine Injury Compensation Program (VICP) is a federal program that was created to compensate people who may have been injured by certain vaccines. Visit the VICP website at www.[facility].gov/vaccinecompensation or call 6-511.628.1001 to learn about the program and about filing a claim. There is a time limit to file a claim for compensation. 7. How can I learn more?  Ask your health care provider.  Call your local or state health department.  Contact the Centers for Disease Control and Prevention (CDC):  - Call 5-886.727.5267 (1-800-CDC-INFO) or  - Visit CDCs influenza website at www.cdc.gov/flu    Vaccine Information Statement (Interim)  Inactivated Influenza Vaccine   8/15/2019  [address] [id number]   Department of Health and Human Services  Centers for Disease Control and Prevention    Office Use Only         Learning About Meal Planning for Diabetes  Why plan your meals? Meal planning can be a key part of managing diabetes. Planning meals and snacks with the right balance of carbohydrate, protein, and fat can help you keep your blood sugar at the target level you set with your doctor. You don't have to eat special foods. You can eat what your family eats, including sweets once in a while. But you do have to pay attention to how often you eat and how much you eat of certain foods. You may want to work with a dietitian or a certified diabetes educator.  He or she can give you tips and meal ideas and can answer your questions about meal planning. This health professional can also help you reach a healthy weight if that is one of your goals. What plan is right for you? Your dietitian or diabetes educator may suggest that you start with the plate format or carbohydrate counting. The plate format  The plate format is a simple way to help you manage how you eat. You plan meals by learning how much space each food should take on a plate. Using the plate format helps you spread carbohydrate throughout the day. It can make it easier to keep your blood sugar level within your target range. It also helps you see if you're eating healthy portion sizes. To use the plate format, you put non-starchy vegetables on half your plate. Add meat or meat substitutes on one-quarter of the plate. Put a grain or starchy vegetable (such as brown rice or a potato) on the final quarter of the plate. You can add a small piece of fruit and some low-fat or fat-free milk or yogurt, depending on your carbohydrate goal for each meal.  Here are some tips for using the plate format:  · Make sure that you are not using an oversized plate. A 9-inch plate is best. Many restaurants use larger plates. · Get used to using the plate format at home. Then you can use it when you eat out. · Write down your questions about using the plate format. Talk to your doctor, a dietitian, or a diabetes educator about your concerns. Carbohydrate counting  With carbohydrate counting, you plan meals based on the amount of carbohydrate in each food. Carbohydrate raises blood sugar higher and more quickly than any other nutrient. It is found in desserts, breads and cereals, and fruit. It's also found in starchy vegetables such as potatoes and corn, grains such as rice and pasta, and milk and yogurt. Spreading carbohydrate throughout the day helps keep your blood sugar levels within your target range.   Your daily amount depends on several things, including your weight, how active you are, which diabetes medicines you take, and what your goals are for your blood sugar levels. A registered dietitian or diabetes educator can help you plan how much carbohydrate to include in each meal and snack. A guideline for your daily amount of carbohydrate is:  · 45 to 60 grams at each meal. That's about the same as 3 to 4 carbohydrate servings. · 15 to 20 grams at each snack. That's about the same as 1 carbohydrate serving. The Nutrition Facts label on packaged foods tells you how much carbohydrate is in a serving of the food. First, look at the serving size on the food label. Is that the amount you eat in a serving? All of the nutrition information on a food label is based on that serving size. So if you eat more or less than that, you'll need to adjust the other numbers. Total carbohydrate is the next thing you need to look for on the label. If you count carbohydrate servings, one serving of carbohydrate is 15 grams. For foods that don't come with labels, such as fresh fruits and vegetables, you'll need a guide that lists carbohydrate in these foods. Ask your doctor, dietitian, or diabetes educator about books or other nutrition guides you can use. If you take insulin, you need to know how many grams of carbohydrate are in a meal. This lets you know how much rapid-acting insulin to take before you eat. If you use an insulin pump, you get a constant rate of insulin during the day. So the pump must be programmed at meals to give you extra insulin to cover the rise in blood sugar after meals. When you know how much carbohydrate you will eat, you can take the right amount of insulin. Or, if you always use the same amount of insulin, you need to make sure that you eat the same amount of carbohydrate at meals. If you need more help to understand carbohydrate counting and food labels, ask your doctor, dietitian, or diabetes educator.   How do you get started with meal planning? Here are some tips to get started:  · Plan your meals a week at a time. Don't forget to include snacks too. · Use cookbooks or online recipes to plan several main meals. Plan some quick meals for busy nights. You also can double some recipes that freeze well. Then you can save half for other busy nights when you don't have time to cook. · Make sure you have the ingredients you need for your recipes. If you're running low on basic items, put these items on your shopping list too. · List foods that you use to make breakfasts, lunches, and snacks. List plenty of fruits and vegetables. · Post this list on the refrigerator. Add to it as you think of more things you need. · Take the list to the store to do your weekly shopping. Follow-up care is a key part of your treatment and safety. Be sure to make and go to all appointments, and call your doctor if you are having problems. It's also a good idea to know your test results and keep a list of the medicines you take. Where can you learn more? Go to http://www.gray.com/  Enter R525 in the search box to learn more about \"Learning About Meal Planning for Diabetes. \"  Current as of: December 20, 2019               Content Version: 12.6  © 4152-0300 [employer]. Care instructions adapted under license by [employer] (which disclaims liability or warranty for this information). If you have questions about a medical condition or this instruction, always ask your healthcare professional. [address] any warranty or liability for your use of this information.

## 2020-10-26 NOTE — PROGRESS NOTES
HPI:   Joy Torres is a 63y.o. year old female who presents today for a routine visit. She has a history of hypertension, hyperlipidemia, diabetes mellitus, migraine headaches, depression, anxiety, ADHD, and lumbar radiculopathy. She reports that she is doing relatively well. She is continuing to work during the pandemic, but states that there is only one other person in her office and they are . She does wear a mask when outside. She is no longer exercising. She reports that she has been experiencing diarrhea following taking her dose of metformin, and is requesting to change therapy. She denies any abdominal pain, nausea, vomiting, melena, or hematochezia. She is otherwise without new complaints. She was hospitalized from 7/9-7/11/2019 at [facility] after presenting to [facility] ED with a severe posterior headache, unlike her usual migraines, with associated light sensitivity and confusion. Evaluation included WBC 6.0, Hb 11.9/ Hct 35.2, Na 138, Ca 9.6, creatinine 1.0/ eGFR >60, head CT scan no acute intracranial abnormality; MRI brain (7/10/2019) mild nonspecific white matter disease likely representing chronic small vessel changes, and very mild bilateral occipital paramedian encephalomalacic changes, likely from chronic small infarcts; brain MRA (7/10/2019) no high-grade intracranial stenosis; neck MRA (7/10/2019) mild proximal ICA irregularity without hemodynamically significant carotid stenosis. She was evaluated by Dr. Grace Hernandez of neurology who felt presentation was consistent with status migrainosus. Attempted treatment with Benadryl and compazine without much improvement, but notable improvement after Medrol dose pack started and subsequently discharged. On 7/24/2019, she noted onset of left facial weakness with difficulty with eye closure and forehead elevation, and presented to [facility] ED and diagnosed with left facial palsy.  She was treated with prednisone 60 mg daily x 7 days and Valtrex. She was evaluated by Dr. New Duval on 8/14/2019, and it was his opinion that her symptoms were likely not due to status migrainosus, but rather were secondary to a viral meningoencephalitis given the clear change in the pattern of her headaches and the subsequent development of a cranial nerve palsy. She has had complete resolution of her facial palsy, and a return to baseline for her migraine headaches, which respond readily to sumatriptan. She has a history of hypertension, treated with propranolol and lisinopril. She does not check her blood pressure at home. She has begun walking regularly for exercise. She denies any chest pain, shortness of breath at rest or with exertion, palpitations, or lightheadedness. She also has a history of hyperlipidemia, treated with high intensity dose rosuvastatin. She has a history of diabetes mellitus, and was started on metformin in 2/2015 for a HbA1c of 7.5. She does not monitor her blood sugars at home. She denies any polyuria, polydipsia, nocturia, or blurry vision, and has no history of retinopathy, neuropathy, or nephropathy. She has regular eye exams with Dr. Neli Monsivais. She also has a history of migraines without aura, which usually respond to Imitrex. She has a history of lumbar radiculopathy (R>L), and was evaluated by Dr. Toro Chahal in 5/2015. Lumbar x-rays showed degenerative changes in L4-L5 and L5-S1. She was treated conservatively with physical therapy and ibuprofen and reports significant improvement. She was evaluated by MOJGAN Thomason in 12/2016 for exacerbation of low back pain with right sciatica. She was treated with steroids and Norco with improvement. On 4/17/2018, she presented with increasing low back pain with bilateral sciatica. She was treated with a prednisone taper with initial improvement. However, her symptoms worsened and she was referred to Dr. Spencer Fermin for evaluation.  She gave her a Toradol injection, and ordered a lumbar MRI (6/24/2018) which showed a large right disc extrusion at L5-S1 which compressed the descending right S1 nerve root. She was referred to pain management and underwent a selective L5 and S1 selective nerve root blocks by Dr. Jordy Hennessy on 7/26/2018. However, due to persistent symptoms, she was referred to Dr. Janeen Watts and subsequently underwent a right L5-S1 laminectomy and diskectomy on 8/2/9964 without complications. She states that she did well and continues to do her back stretches and exercises. She states that she only has occasional discomfort. She had a screening colonoscopy in 10/2014 by Dr. Marie Tirado which was normal. Follow-up due in 10 years. She denies any abdominal pain, nausea, vomiting, melena, hematochezia, or change in bowel movements. She has a history of depression and anxiety, and is followed by Dr. Tammy Ruiz. She is currently taking Abilify 5 mg daily and Prozac 10 mg daily, as well as lorazepam at bedtime. Past Medical History:   Diagnosis Date    ADHD     Anxiety     Bilateral lumbar radiculopathy     Carotid bruit     right    Depression     Diabetes mellitus (HCC)     HCD (hypertensive cardiovascular disease)     Hyperlipidemia     Hypertension     Migraine headache     Plantar fasciitis      Past Surgical History:   Procedure Laterality Date    HX SEPTOPLASTY  6/2002    HX TONSILLECTOMY      NEUROLOGICAL PROCEDURE UNLISTED  08/06/2018    laminectomy      Current Outpatient Medications   Medication Sig    metFORMIN ER (GLUCOPHAGE XR) 500 mg tablet Take 2 Tabs by mouth two (2) times daily (with meals).  SUMAtriptan (IMITREX) 50 mg tablet Take 1 Tab by mouth daily as needed for Migraine.  atorvastatin (LIPITOR) 40 mg tablet Take 1 Tab by mouth daily.  estradioL (VIVELLE) 0.05 mg/24 hr 1 Patch by TransDERmal route Every Thursday.  lisinopril (PRINIVIL, ZESTRIL) 10 mg tablet Take 1 Tab by mouth daily.     progesterone (PROMETRIUM) 200 mg capsule Take 1 Cap by mouth daily.    propranolol (INDERAL) 40 mg tablet Take 1 Tab by mouth two (2) times a day.  ARIPiprazole (ABILIFY) 5 mg tablet Take  by mouth daily.  white petrolatum-mineral oil (ARTIFICIAL TEARS, VLADISLAV/MIN,) 83-15 % ophthalmic ointment Administer  to left eye nightly. Small amount    FLUoxetine (PROZAC) 20 mg capsule Take 10 mg by mouth daily.  naloxone (NARCAN) 4 mg/actuation nasal spray Use 1 spray intranasally, then discard. Repeat with new spray every 2 min as needed for opioid overdose symptoms, alternating nostrils.  LORazepam (ATIVAN) 1 mg tablet Take 1 Tab by mouth nightly as needed for Anxiety. Max Daily Amount: 1 mg. No current facility-administered medications for this visit. Allergies and Intolerances: Allergies   Allergen Reactions    Pcn [Penicillins] Rash    Sulfa (Sulfonamide Antibiotics) Rash     Family History: She has no family history of colon or breast cancer. Her mother  from a CVA. Her father  from throat cancer and cirrhosis. Family History   Problem Relation Age of Onset    Hypertension Brother         x2    Elevated Lipids Brother     Stroke Mother     Heart Surgery Mother         CABG    Cancer Father         cancer of the mouth    Hypertension Father     Hypertension Brother     Elevated Lipids Brother      Social History:   She  reports that she quit smoking about 31 years ago. She has a 1.00 pack-year smoking history. She has never used smokeless tobacco. She smoked approximately 0.25 ppd for 2 years, stopping in . She is a  and has one daughter. She was employed as a . 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 She was previously working as the [employer] for his business.   Social History     Substance and Sexual Activity   Alcohol Use No    Alcohol/week: 1.7 standard drinks    Types: 2 Glasses of wine per week     Immunization History:  Immunization History   Administered Date(s) Administered    Influenza Vaccine Ipropertyz) PF (>6 Mo Flulaval, Fluarix, and >3 Yrs Afluria, Fluzone 53416) 11/08/2018, 11/19/2019, 10/22/2020    Influenza Vaccine PF 02/03/2014, 10/13/2014    Influenza Vaccine Split 11/14/2011    Influenza Vaccine Whole 10/05/2010    Pneumococcal Polysaccharide (PPSV-23) 05/11/2017    TD Vaccine 01/23/2007    Tdap 11/10/2016    Zoster Recombinant 05/20/2018, 09/18/2018       Review of Systems:   As above included in HPI. Otherwise 11 point review of systems negative including constitutional, skin, HENT, eyes, respiratory, cardiovascular, gastrointestinal, genitourinary, musculoskeletal, endocrine, hematologic, allergy, and neurologic. Physical:   Vitals:   BP: 118/68  HR: (!) 58  WT: 136 lb (61.7 kg)  BMI:  27.47 kg/m2    Exam:   Objective:   Vital Signs: (As obtained by patient/caregiver at home)  Visit Vitals  /68   Pulse (!) 58   Temp 97.5 °F (36.4 °C) (Temporal)   Resp 12   Ht 4' 11\" (1.499 m)   Wt 136 lb (61.7 kg)   LMP  (LMP Unknown)   SpO2 96%   BMI 27.47 kg/m²        Patient appears in no apparent distress. Affect is appropriate. HEENT: PERRLA, anicteric, oropharynx clear, no JVD, adenopathy or thyromegaly. No carotid bruits or radiated murmur. Lungs: clear to auscultation, no wheezes, rhonchi, or rales. Heart: regular rate and rhythm. No murmur, rubs, gallops  Abdomen: soft, nontender, nondistended, normal bowel sounds, no hepatosplenomegaly or masses. Extremities: without edema.      Diabetic foot exam:     Left Foot:   Visual Exam: normal    Pulse DP: 2+ (normal)   Filament test: normal sensation    Vibratory sensation: normal      Right Foot:   Visual Exam: normal    Pulse DP: 2+ (normal)   Filament test: normal sensation    Vibratory sensation: normal          Review of Data:  Labs:  Orders Only on 09/23/2020   Component Date Value Ref Range Status    Glucose 10/15/2020 128* 65 - 99 mg/dL Final    BUN 10/15/2020 14 8 - 27 mg/dL Final    Creatinine 10/15/2020 0.74  0.57 - 1.00 mg/dL Final    GFR est non-AA 10/15/2020 86  >59 mL/min/1.73 Final    GFR est AA 10/15/2020 100  >59 mL/min/1.73 Final    BUN/Creatinine ratio 10/15/2020 19  12 - 28 Final    Sodium 10/15/2020 144  134 - 144 mmol/L Final    Potassium 10/15/2020 4.2  3.5 - 5.2 mmol/L Final    Chloride 10/15/2020 106  96 - 106 mmol/L Final    CO2 10/15/2020 22  20 - 29 mmol/L Final    Calcium 10/15/2020 8.9  8.7 - 10.3 mg/dL Final    Protein, total 10/15/2020 6.4  6.0 - 8.5 g/dL Final    Albumin 10/15/2020 4.5  3.8 - 4.8 g/dL Final    GLOBULIN, TOTAL 10/15/2020 1.9  1.5 - 4.5 g/dL Final    A-G Ratio 10/15/2020 2.4* 1.2 - 2.2 Final    Bilirubin, total 10/15/2020 0.4  0.0 - 1.2 mg/dL Final    Alk. phosphatase 10/15/2020 82  39 - 117 IU/L Final    AST (SGOT) 10/15/2020 21  0 - 40 IU/L Final    ALT (SGPT) 10/15/2020 20  0 - 32 IU/L Final    Cholesterol, total 10/15/2020 166  100 - 199 mg/dL Final    Triglyceride 10/15/2020 116  0 - 149 mg/dL Final    HDL Cholesterol 10/15/2020 44  >39 mg/dL Final    VLDL Cholesterol Dash 10/15/2020 21  5 - 40 mg/dL Final    LDL Chol Calc (NIH) 10/15/2020 101* 0 - 99 mg/dL Final    INTERPRETATION 10/15/2020 Note   Final    Hemoglobin A1c 10/15/2020 6.5* 4.8 - 5.6 % Final    Estimated average glucose 10/15/2020 140  mg/dL Final    PDF Image 32/22/3408 Not applicable   Final    Magnesium 10/15/2020 1.9  1.6 - 2.3 mg/dL Final         Health Maintenance:  Screening:    Mammogram: negative (7/2020)   PAP smear: well women exams by Dr. Angelo Rosas (last 2/2019) Overdue   Colorectal: colonoscopy (10/2014) normal. Dr. Geovanna Rock. Due 10/2024. Depression: under care of [facility].    DM (HbA1c/FPG): HbA1c 6.5 (10/2020)   Hepatitis C: negative (5/2017)   Falls: none   DEXA: osteopenia (2/2019) Dr. Eve Carnes   Glaucoma: regular eye exams with Dr. Tati Yuen (6/2020)   Smoking: none   Vitamin D: 35.5 (6/2020)   Medicare Wellness: N/A      Impression:  Patient Active Problem List   Diagnosis Code    HCD (hypertensive cardiovascular disease) I11.9    Hyperlipidemia E78.5    Otosclerosis H80.90    Migraine G43.909    Bilateral lumbar radiculopathy M54.16    Type 2 diabetes mellitus without complication ([facility] [address] C21.6    Essential hypertension I10    Vitamin D deficiency E55.9    Recurrent depression ([facility] [address] F33.9    Overweight with body mass index (BMI) of 27 to 27.9 in adult E66.3, Z68.27    DDD (degenerative disc disease), lumbar M51.36    HNP (herniated nucleus pulposus), lumbar M51.26    History of Bell's palsy Z86.69       Plan:  1. Hypertension. Blood pressure well controlled today on current regimen of lisinopril 10 mg daily and propranolol 40 mg bid. Renal function remains normal with creatinine 0.74/ eGFR 86. Continue to follow. 2. Hyperlipidemia. On high intensity atorvastatin with  and HDL 44, indicative of fair control. Previously on rosuvastatin, but changed due to formulary change. Emphasized importance of lifestyle modifications, including diet, exercise, and weight loss. Continue to follow. 3. Diabetes mellitus. Remains well controlled on metformin 1000 mg bid with HbA1c at 6.5. However, describing difficulty with diarrhea. Will change to metformin ER 1000 mg bid and reevaluate in 3 months. No evidence of microvascular complications. On statin and lisinopril. Continue follow-up for annual eye exams. Foot exam normal (11/2019). Urine microalbumin/ creatinine ratio without evidence of microalbuminuria (6/2020). Emphasized importance of lifestyle modifications, including diet, exercise, and weight loss. 4. Headache/ left facial nerve palsy. Presented with severe headache resulting in hospital admission to [facility]. Evaluation including head CT scan, brain MRI/MRA and neck MRA unrevealing and Dr. Yuval Cano of neurology consulted and felt most consistent with status migrainosus.  Prescribed medrol dose pack and discharged. Developed left facial nerve palsy 10 days later and treated with prednisone 60 mg and Valtrex for 7 days. Evaluated by Dr. 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 Now completely improved with resolution of facial nerve palsy and headaches returning to baseline pattern for migraines. Last seen by Dr. Candace Rosario in 6/2020.  5. Migraines. Returned to baseline pattern involving pain on side of head radiating to lateral neck. Readily responds to Imitrex. Follow. 6. Lumbar herniated disc with right sciatica, s/p right L5-S1 laminectomy and discectomy. Doing well without significant discomfort. Stressed improtance of continuing stretches and exercise. Follow. 7. Depression/ADHD. Reports good control of symptoms on new regimen of Abilify and Prozac. Continues on lorazepam at bedtime. Being followed by MOJGAN Jennings at [facility]. 8. Overweight. Weight increased 15 pounds since 8/2019. Emphasized importance of continued lifestyle modifications, including diet, exercise, and weight loss. Importance stressed to help with diabetes control. Will readdress next visit. 9. Health maintenance. Will give influenza vaccine today. Completed 2/2 doses of Shingrix vaccine. Other immunizations up to date. Well women exam performed by Dr. Dre Mejai and overdue. Advised to schedule. Counseled to discuss with Dr. Dre Mejia weaning hormonal therapy for menopausal symptoms given risks with increasing age. Mammogram completed. Colonoscopy due 2024. Continue regular eye exams with Dr. Carlita Best. Vitamin D level stable. Advised to continue maintenance dose therapy. Discontinued aspirin given new recommendations for primary prevention. Patient understands recommendations and agrees with plan. Follow-up in 3 months.

## 2021-01-18 DIAGNOSIS — E11.9 TYPE 2 DIABETES MELLITUS WITHOUT COMPLICATION, WITHOUT LONG-TERM CURRENT USE OF INSULIN (HCC): ICD-10-CM

## 2021-01-18 DIAGNOSIS — G43.009 MIGRAINE WITHOUT AURA AND WITHOUT STATUS MIGRAINOSUS, NOT INTRACTABLE: ICD-10-CM

## 2021-01-18 DIAGNOSIS — Z86.69 HISTORY OF BELL'S PALSY: ICD-10-CM

## 2021-01-18 DIAGNOSIS — Z23 NEEDS FLU SHOT: ICD-10-CM

## 2021-01-18 DIAGNOSIS — E66.3 OVERWEIGHT WITH BODY MASS INDEX (BMI) OF 27 TO 27.9 IN ADULT: ICD-10-CM

## 2021-01-18 DIAGNOSIS — E78.5 HYPERLIPIDEMIA, UNSPECIFIED HYPERLIPIDEMIA TYPE: ICD-10-CM

## 2021-01-18 DIAGNOSIS — I10 ESSENTIAL HYPERTENSION: ICD-10-CM

## 2021-01-18 DIAGNOSIS — E55.9 VITAMIN D DEFICIENCY: ICD-10-CM

## 2021-01-18 DIAGNOSIS — F33.9 RECURRENT DEPRESSION (HCC): ICD-10-CM

## 2021-02-20 RX ORDER — METFORMIN HYDROCHLORIDE 500 MG/1
TABLET, EXTENDED RELEASE ORAL
Qty: 120 TAB | Refills: 2 | Status: SHIPPED | OUTPATIENT
Start: 2021-02-20 | End: 2021-04-01 | Stop reason: SDUPTHER

## 2021-03-16 RX ORDER — LISINOPRIL 10 MG/1
10 TABLET ORAL DAILY
Qty: 90 TAB | Refills: 3 | Status: SHIPPED | OUTPATIENT
Start: 2021-03-16 | End: 2022-03-02

## 2021-03-16 NOTE — TELEPHONE ENCOUNTER
Last Visit: 10/22/20 with MD Fritz Jimenez  Next Appointment: 4/1/21 with MD Vallejo  Previous Refill Encounter(s): 2/26/20 #90 with 3 refills    Requested Prescriptions     Pending Prescriptions Disp Refills    lisinopriL (PRINIVIL, ZESTRIL) 10 mg tablet 90 Tab 3     Sig: Take 1 Tab by mouth daily.

## 2021-03-25 ENCOUNTER — APPOINTMENT (OUTPATIENT)
Dept: INTERNAL MEDICINE CLINIC | Age: 66
End: 2021-03-25

## 2021-03-26 LAB
25(OH)D3+25(OH)D2 SERPL-MCNC: 21.1 NG/ML (ref 30–100)
ALBUMIN SERPL-MCNC: 4 G/DL (ref 3.8–4.8)
ALBUMIN/CREAT UR: 6 MG/G CREAT (ref 0–29)
ALBUMIN/GLOB SERPL: 1.9 {RATIO} (ref 1.2–2.2)
ALP SERPL-CCNC: 66 IU/L (ref 39–117)
ALT SERPL-CCNC: 14 IU/L (ref 0–32)
AST SERPL-CCNC: 12 IU/L (ref 0–40)
BILIRUB SERPL-MCNC: 0.3 MG/DL (ref 0–1.2)
BUN SERPL-MCNC: 21 MG/DL (ref 8–27)
BUN/CREAT SERPL: 23 (ref 12–28)
CALCIUM SERPL-MCNC: 9.3 MG/DL (ref 8.7–10.3)
CHLORIDE SERPL-SCNC: 108 MMOL/L (ref 96–106)
CHOLEST SERPL-MCNC: 174 MG/DL (ref 100–199)
CO2 SERPL-SCNC: 25 MMOL/L (ref 20–29)
CREAT SERPL-MCNC: 0.93 MG/DL (ref 0.57–1)
CREAT UR-MCNC: 153.7 MG/DL
EST. AVERAGE GLUCOSE BLD GHB EST-MCNC: 143 MG/DL
GLOBULIN SER CALC-MCNC: 2.1 G/DL (ref 1.5–4.5)
GLUCOSE SERPL-MCNC: 133 MG/DL (ref 65–99)
HBA1C MFR BLD: 6.6 % (ref 4.8–5.6)
HDLC SERPL-MCNC: 44 MG/DL
IMP & REVIEW OF LAB RESULTS: NORMAL
LDLC SERPL CALC-MCNC: 101 MG/DL (ref 0–99)
Lab: NORMAL
MAGNESIUM SERPL-MCNC: 1.5 MG/DL (ref 1.6–2.3)
MICROALBUMIN UR-MCNC: 9.7 UG/ML
POTASSIUM SERPL-SCNC: 4.5 MMOL/L (ref 3.5–5.2)
PROT SERPL-MCNC: 6.1 G/DL (ref 6–8.5)
SODIUM SERPL-SCNC: 145 MMOL/L (ref 134–144)
TRIGL SERPL-MCNC: 164 MG/DL (ref 0–149)
VLDLC SERPL CALC-MCNC: 29 MG/DL (ref 5–40)

## 2021-04-01 ENCOUNTER — OFFICE VISIT (OUTPATIENT)
Dept: INTERNAL MEDICINE CLINIC | Age: 66
End: 2021-04-01
Payer: COMMERCIAL

## 2021-04-01 VITALS
SYSTOLIC BLOOD PRESSURE: 132 MMHG | TEMPERATURE: 97 F | HEIGHT: 59 IN | OXYGEN SATURATION: 99 % | RESPIRATION RATE: 16 BRPM | HEART RATE: 57 BPM | DIASTOLIC BLOOD PRESSURE: 64 MMHG | BODY MASS INDEX: 27.82 KG/M2 | WEIGHT: 138 LBS

## 2021-04-01 DIAGNOSIS — Z00.00 LABORATORY TESTS ORDERED AS PART OF A COMPLETE PHYSICAL EXAM (CPE): ICD-10-CM

## 2021-04-01 DIAGNOSIS — F33.9 RECURRENT DEPRESSION (HCC): ICD-10-CM

## 2021-04-01 DIAGNOSIS — E78.5 HYPERLIPIDEMIA, UNSPECIFIED HYPERLIPIDEMIA TYPE: ICD-10-CM

## 2021-04-01 DIAGNOSIS — E11.9 TYPE 2 DIABETES MELLITUS WITHOUT COMPLICATION, WITHOUT LONG-TERM CURRENT USE OF INSULIN (HCC): Primary | ICD-10-CM

## 2021-04-01 DIAGNOSIS — E66.3 OVERWEIGHT WITH BODY MASS INDEX (BMI) OF 27 TO 27.9 IN ADULT: ICD-10-CM

## 2021-04-01 DIAGNOSIS — I10 ESSENTIAL HYPERTENSION: ICD-10-CM

## 2021-04-01 DIAGNOSIS — G43.009 MIGRAINE WITHOUT AURA AND WITHOUT STATUS MIGRAINOSUS, NOT INTRACTABLE: ICD-10-CM

## 2021-04-01 DIAGNOSIS — E55.9 VITAMIN D DEFICIENCY: ICD-10-CM

## 2021-04-01 PROCEDURE — 99214 OFFICE O/P EST MOD 30 MIN: CPT | Performed by: INTERNAL MEDICINE

## 2021-04-01 RX ORDER — ATORVASTATIN CALCIUM 80 MG/1
80 TABLET, FILM COATED ORAL DAILY
Qty: 90 TAB | Refills: 3 | Status: SHIPPED | OUTPATIENT
Start: 2021-04-01 | End: 2022-03-19

## 2021-04-01 RX ORDER — PROPRANOLOL HYDROCHLORIDE 40 MG/1
40 TABLET ORAL 2 TIMES DAILY
Qty: 180 TAB | Refills: 3 | Status: SHIPPED | OUTPATIENT
Start: 2021-04-01 | End: 2022-03-19

## 2021-04-01 RX ORDER — SUMATRIPTAN 50 MG/1
50 TABLET, FILM COATED ORAL
Qty: 27 TAB | Refills: 1 | Status: SHIPPED | OUTPATIENT
Start: 2021-04-01 | End: 2022-01-05

## 2021-04-01 RX ORDER — METFORMIN HYDROCHLORIDE 500 MG/1
TABLET, EXTENDED RELEASE ORAL
Qty: 360 TAB | Refills: 3 | Status: SHIPPED | OUTPATIENT
Start: 2021-04-01 | End: 2022-03-19

## 2021-04-01 RX ORDER — ERGOCALCIFEROL 1.25 MG/1
50000 CAPSULE ORAL
Qty: 12 CAP | Refills: 1 | Status: SHIPPED | OUTPATIENT
Start: 2021-04-01 | End: 2021-09-14

## 2021-04-01 NOTE — PROGRESS NOTES
1. Have you been to the ER, urgent care clinic or hospitalized since your last visit? NO.     2. Have you seen or consulted any other health care providers outside of the [address] [place] [address] since your last visit (Include any pap smears or colon screening)?  NO

## 2021-04-01 NOTE — PATIENT INSTRUCTIONS
Heart-Healthy Diet: Care Instructions  Your Care Instructions     A heart-healthy diet has lots of vegetables, fruits, nuts, beans, and whole grains, and is low in salt. It limits foods that are high in saturated fat, such as meats, cheeses, and fried foods. It may be hard to change your diet, but even small changes can lower your risk of heart attack and heart disease. Follow-up care is a key part of your treatment and safety. Be sure to make and go to all appointments, and call your doctor if you are having problems. It's also a good idea to know your test results and keep a list of the medicines you take. How can you care for yourself at home? Watch your portions  · Learn what a serving is. A \"serving\" and a \"portion\" are not always the same thing. Make sure that you are not eating larger portions than are recommended. For example, a serving of pasta is ½ cup. A serving size of meat is 2 to 3 ounces. A 3-ounce serving is about the size of a deck of cards. Measure serving sizes until you are good at [place]" them. Keep in mind that restaurants often serve portions that are 2 or 3 times the size of one serving. · To keep your energy level up and keep you from feeling hungry, eat often but in smaller portions. · Eat only the number of calories you need to stay at a healthy weight. If you need to lose weight, eat fewer calories than your body burns (through exercise and other physical activity). Eat more fruits and vegetables  · Eat a variety of fruit and vegetables every day. Dark green, deep orange, red, or yellow fruits and vegetables are especially good for you. Examples include spinach, carrots, peaches, and berries. · Keep carrots, celery, and other veggies handy for snacks. Buy fruit that is in season and store it where you can see it so that you will be tempted to eat it. · Cook dishes that have a lot of veggies in them, such as stir-fries and soups.   Limit saturated and trans fat  · Read food labels, and try to avoid saturated and trans fats. They increase your risk of heart disease. · Use olive or canola oil when you cook. · Bake, broil, grill, or steam foods instead of frying them. · Choose lean meats instead of high-fat meats such as hot dogs and sausages. Cut off all visible fat when you prepare meat. · Eat fish, skinless poultry, and meat alternatives such as soy products instead of high-fat meats. Soy products, such as tofu, may be especially good for your heart. · Choose low-fat or fat-free milk and dairy products. Eat foods high in fiber  · Eat a variety of grain products every day. Include whole-grain foods that have lots of fiber and nutrients. Examples of whole-grain foods include oats, whole wheat bread, and brown rice. · Buy whole-grain breads and cereals, instead of white bread or pastries. Limit salt and sodium  · Limit how much salt and sodium you eat to help lower your blood pressure. · Taste food before you salt it. Add only a little salt when you think you need it. With time, your taste buds will adjust to less salt. · Eat fewer snack items, fast foods, and other high-salt, processed foods. Check food labels for the amount of sodium in packaged foods. · Choose low-sodium versions of canned goods (such as soups, vegetables, and beans). Limit sugar  · Limit drinks and foods with added sugar. These include candy, desserts, and soda pop. Limit alcohol  · Limit alcohol to no more than 2 drinks a day for men and 1 drink a day for women. Too much alcohol can cause health problems. When should you call for help? Watch closely for changes in your health, and be sure to contact your doctor if:    · You would like help planning heart-healthy meals. Where can you learn more? Go to http://www.[employer].com/  Enter V137 in the search box to learn more about \"Heart-Healthy Diet: Care Instructions. \"  Current as of: August 22, 2019               Content Version: 12.6  © 8543-0165 [employer]. Care instructions adapted under license by [employer] (which disclaims liability or warranty for this information). If you have questions about a medical condition or this instruction, always ask your healthcare professional. [address] any warranty or liability for your use of this information. Learning About Diabetes Food Guidelines  Your Care Instructions     Meal planning is important to manage diabetes. It helps keep your blood sugar at a target level (which you set with your doctor). You don't have to eat special foods. You can eat what your family eats, including sweets once in a while. But you do have to pay attention to how often you eat and how much you eat of certain foods. You may want to work with a dietitian or a certified diabetes educator (CDE) to help you plan meals and snacks. A dietitian or CDE can also help you lose weight if that is one of your goals. What should you know about eating carbs? Managing the amount of carbohydrate (carbs) you eat is an important part of healthy meals when you have diabetes. Carbohydrate is found in many foods. · Learn which foods have carbs. And learn the amounts of carbs in different foods. ? Bread, cereal, pasta, and rice have about 15 grams of carbs in a serving. A serving is 1 slice of bread (1 ounce), ½ cup of cooked cereal, or 1/3 cup of cooked pasta or rice. ? Fruits have 15 grams of carbs in a serving. A serving is 1 small fresh fruit, such as an apple or orange; ½ of a banana; ½ cup of cooked or canned fruit; ½ cup of fruit juice; 1 cup of melon or raspberries; or 2 tablespoons of dried fruit. ? Milk and no-sugar-added yogurt have 15 grams of carbs in a serving. A serving is 1 cup of milk or 2/3 cup of no-sugar-added yogurt. ? Starchy vegetables have 15 grams of carbs in a serving.  A serving is ½ cup of mashed potatoes or sweet potato; 1 cup winter squash; ½ of a small baked potato; ½ cup of cooked beans; or ½ cup cooked corn or green peas. · Learn how much carbs to eat each day and at each meal. A dietitian or CDE can teach you how to keep track of the amount of carbs you eat. This is called carbohydrate counting. · If you are not sure how to count carbohydrate grams, use the Plate Method to plan meals. It is a good, quick way to make sure that you have a balanced meal. It also helps you spread carbs throughout the day. ? Divide your plate by types of foods. Put non-starchy vegetables on half the plate, meat or other protein food on one-quarter of the plate, and a grain or starchy vegetable in the final quarter of the plate. To this you can add a small piece of fruit and 1 cup of milk or yogurt, depending on how many carbs you are supposed to eat at a meal.  · Try to eat about the same amount of carbs at each meal. Do not \"save up\" your daily allowance of carbs to eat at one meal.  · Proteins have very little or no carbs per serving. Examples of proteins are beef, chicken, turkey, fish, eggs, tofu, cheese, cottage cheese, and peanut butter. A serving size of meat is 3 ounces, which is about the size of a deck of cards. Examples of meat substitute serving sizes (equal to 1 ounce of meat) are 1/4 cup of cottage cheese, 1 egg, 1 tablespoon of peanut butter, and ½ cup of tofu. How can you eat out and still eat healthy? · Learn to estimate the serving sizes of foods that have carbohydrate. If you measure food at home, it will be easier to estimate the amount in a serving of restaurant food. · If the meal you order has too much carbohydrate (such as potatoes, corn, or baked beans), ask to have a low-carbohydrate food instead. Ask for a salad or green vegetables. · If you use insulin, check your blood sugar before and after eating out to help you plan how much to eat in the future.   · If you eat more carbohydrate at a meal than you had planned, take a walk or do other exercise. This will help lower your blood sugar. What else should you know? · Limit saturated fat, such as the fat from meat and dairy products. This is a healthy choice because people who have diabetes are at higher risk of heart disease. So choose lean cuts of meat and nonfat or low-fat dairy products. Use olive or canola oil instead of butter or shortening when cooking. · Don't skip meals. Your blood sugar may drop too low if you skip meals and take insulin or certain medicines for diabetes. · Check with your doctor before you drink alcohol. Alcohol can cause your blood sugar to drop too low. Alcohol can also cause a bad reaction if you take certain diabetes medicines. Follow-up care is a key part of your treatment and safety. Be sure to make and go to all appointments, and call your doctor if you are having problems. It's also a good idea to know your test results and keep a list of the medicines you take. Where can you learn more? Go to http://www.allan.com/  Enter I147 in the search box to learn more about \"Learning About Diabetes Food Guidelines. \"  Current as of: December 20, 2019               Content Version: 12.6  © 9561-7306 [employer]. Care instructions adapted under license by [employer] (which disclaims liability or warranty for this information). If you have questions about a medical condition or this instruction, always ask your healthcare professional. [address] any warranty or liability for your use of this information. High Cholesterol: Care Instructions  Your Care Instructions     Cholesterol is a type of fat in your blood. It is needed for many body functions, such as making new cells. Cholesterol is made by your body. It also comes from food you eat. High cholesterol means that you have too much of the fat in your blood. This raises your risk of a heart attack and stroke.   LDL and HDL are part of your total cholesterol. LDL is the \"bad\" cholesterol. High LDL can raise your risk for heart disease, heart attack, and stroke. HDL is the \"good\" cholesterol. It helps clear bad cholesterol from the body. High HDL is linked with a lower risk of heart disease, heart attack, and stroke. Your cholesterol levels help your doctor find out your risk for having a heart attack or stroke. You and your doctor can talk about whether you need to lower your risk and what treatment is best for you. A heart-healthy lifestyle along with medicines can help lower your cholesterol and your risk. The way you choose to lower your risk will depend on how high your risk is for heart attack and stroke. It will also depend on how you feel about taking medicines. Follow-up care is a key part of your treatment and safety. Be sure to make and go to all appointments, and call your doctor if you are having problems. It's also a good idea to know your test results and keep a list of the medicines you take. How can you care for yourself at home? · Eat a variety of foods every day. Good choices include fruits, vegetables, whole grains (like oatmeal), dried beans and peas, nuts and seeds, soy products (like tofu), and fat-free or low-fat dairy products. · Replace butter, margarine, and hydrogenated or partially hydrogenated oils with olive and canola oils. (Canola oil margarine without trans fat is fine.)  · Replace red meat with fish, poultry, and soy protein (like tofu). · Limit processed and packaged foods like chips, crackers, and cookies. · Bake, broil, or steam foods. Don't dominguez them. · Be physically active. Get at least 30 minutes of exercise on most days of the week. Walking is a good choice. You also may want to do other activities, such as running, swimming, cycling, or playing tennis or team sports. · Stay at a healthy weight or lose weight by making the changes in eating and physical activity listed above.  Losing just a small amount of weight, even 5 to 10 pounds, can reduce your risk for having a heart attack or stroke. · Do not smoke. When should you call for help? Watch closely for changes in your health, and be sure to contact your doctor if:    · You need help making lifestyle changes. · You have questions about your medicine. Where can you learn more? Go to http://www.gray.com/  Enter F807 in the search box to learn more about \"High Cholesterol: Care Instructions. \"  Current as of: December 16, 2019               Content Version: 12.6  © 7215-4943 [employer]. Care instructions adapted under license by [employer] (which disclaims liability or warranty for this information). If you have questions about a medical condition or this instruction, always ask your healthcare professional. [address] any warranty or liability for your use of this information. Learning About the COVID-19 Vaccine  Overview     The COVID-19 vaccine can help you avoid getting COVID-19, a disease caused by a new type of coronavirus. COVID-19 can cause pneumonia and even death. You may need two doses of the vaccine. And you might need \"booster\" doses later on to help you stay protected. The vaccine prevents most cases of COVID-19. But if you do still catch COVID-19, your symptoms will probably be less severe than if you hadn't gotten the vaccine. You can't get COVID-19 from the vaccine. The risk of serious problems from the vaccine is very low. And you might not have any side effects from the vaccine at all. If you do, they will probably be a lot like the common side effects of other vaccines. They include things like a slight fever, muscle aches, and soreness. These side effects don't last too long, and they can be treated if they bother you. Why is it a good idea to get the COVID-19 vaccine? The COVID-19 vaccine is one of the best ways to help stop the pandemic. Getting vaccinated as soon as you can will help protect you from the virus. It will also help you protect people around you from the virus--people who could really be hurt. The COVID-19 vaccine is safe and effective. In fact, the risk of serious problems from COVID-19 is much higher than the risk of serious problems from the vaccine. So it's safer to get the vaccine than it is to catch COVID-19. Who should get the COVID-19 vaccine? Everyone who is able to get the vaccine should get it as soon as possible. The more people who get vaccinated, the better we'll be able to stop the spread of the virus. The vaccine is extra important for people who are at high risk. This includes people who may be exposed to COVID-19 more often because of their jobs. It also includes people who are at high risk for complications from VNIDA-49 if they catch it. Some examples of people at high risk include those who:  · Work in health care. · Are considered essential workers. · Have certain health conditions. · Are older than age 72. If you've already had COVID-19, you may still be able to catch it again. Getting the vaccine may provide extra protection. Where can you learn more? Go to http://www.gray.com/  Enter C124 in the search box to learn more about \"Learning About the COVID-19 Vaccine. \"  Current as of: December 18, 2020               Content Version: 12.8  © 4686-7257 [employer], [facility]. Care instructions adapted under license by [employer] (which disclaims liability or warranty for this information). If you have questions about a medical condition or this instruction, always ask your healthcare professional. [address] any warranty or liability for your use of this information.

## 2021-04-05 NOTE — PROGRESS NOTES
HPI:   Mariah King is a 63y.o. year old female who presents today for a routine visit. She has a history of hypertension, hyperlipidemia, diabetes mellitus, migraine headaches, depression, anxiety, ADHD, and lumbar radiculopathy. She reports that she is doing relatively well. She is continuing to work during the pandemic, but states that there is only one other person in her office and they are . She has not yet obtained the COVID 19 vaccine. She is otherwise without new complaints. She was hospitalized from 7/9-7/11/2019 at [facility] after presenting to [facility] ED with a severe posterior headache, unlike her usual migraines, with associated light sensitivity and confusion. Evaluation included WBC 6.0, Hb 11.9/ Hct 35.2, Na 138, Ca 9.6, creatinine 1.0/ eGFR >60, head CT scan no acute intracranial abnormality; MRI brain (7/10/2019) mild nonspecific white matter disease likely representing chronic small vessel changes, and very mild bilateral occipital paramedian encephalomalacic changes, likely from chronic small infarcts; brain MRA (7/10/2019) no high-grade intracranial stenosis; neck MRA (7/10/2019) mild proximal ICA irregularity without hemodynamically significant carotid stenosis. She was evaluated by Dr. Ayde García of neurology who felt presentation was consistent with status migrainosus. Attempted treatment with Benadryl and compazine without much improvement, but notable improvement after Medrol dose pack started and subsequently discharged. On 7/24/2019, she noted onset of left facial weakness with difficulty with eye closure and forehead elevation, and presented to [facility] ED and diagnosed with left facial palsy. She was treated with prednisone 60 mg daily x 7 days and Valtrex.  She was evaluated by Dr. Devi Riggs on 8/14/2019, and it was his opinion that her symptoms were likely not due to status migrainosus, but rather were secondary to a viral meningoencephalitis given the clear change in the pattern of her headaches and the subsequent development of a cranial nerve palsy. She has had complete resolution of her facial palsy, and a return to baseline for her migraine headaches, which respond readily to sumatriptan. She has a history of hypertension, treated with propranolol and lisinopril. She does not check her blood pressure at home. She has begun walking regularly for exercise. She denies any chest pain, shortness of breath at rest or with exertion, palpitations, or lightheadedness. She also has a history of hyperlipidemia, treated with high intensity dose rosuvastatin. She has a history of diabetes mellitus, and was started on metformin in 2/2015 for a HbA1c of 7.5. She does not monitor her blood sugars at home. She denies any polyuria, polydipsia, nocturia, or blurry vision, and has no history of retinopathy, neuropathy, or nephropathy. She has regular eye exams with Dr. Kojo Harding. She also has a history of migraines without aura, which usually respond to Imitrex. She has a history of lumbar radiculopathy (R>L), and was evaluated by Dr. Tracy Rivas in 5/2015. Lumbar x-rays showed degenerative changes in L4-L5 and L5-S1. She was treated conservatively with physical therapy and ibuprofen and reports significant improvement. She was evaluated by MOJGAN Glover in 12/2016 for exacerbation of low back pain with right sciatica. She was treated with steroids and Norco with improvement. On 4/17/2018, she presented with increasing low back pain with bilateral sciatica. She was treated with a prednisone taper with initial improvement. However, her symptoms worsened and she was referred to Dr. Shira Vann for evaluation. She gave her a Toradol injection, and ordered a lumbar MRI (6/24/2018) which showed a large right disc extrusion at L5-S1 which compressed the descending right S1 nerve root.  She was referred to pain management and underwent a selective L5 and S1 selective nerve root blocks by Dr. Deandra Mace on 7/26/2018. However, due to persistent symptoms, she was referred to Dr. Horacio Perales and subsequently underwent a right L5-S1 laminectomy and diskectomy on 8/2/2445 without complications. She states that she did well and continues to do her back stretches and exercises. She states that she only has occasional discomfort. She had a screening colonoscopy in 10/2014 by Dr. Roland Lomeli which was normal. Follow-up due in 10 years. She denies any abdominal pain, nausea, vomiting, melena, hematochezia, or change in bowel movements. She has a history of depression and anxiety, and is followed by Dr. Castro Lagunas. She is currently taking Abilify 5 mg daily and Prozac 10 mg daily, as well as lorazepam at bedtime. Past Medical History:   Diagnosis Date    ADHD     Anxiety     Bilateral lumbar radiculopathy     Carotid bruit     right    Depression     Diabetes mellitus (HCC)     HCD (hypertensive cardiovascular disease)     Hyperlipidemia     Hypertension     Migraine headache     Plantar fasciitis      Past Surgical History:   Procedure Laterality Date    HX SEPTOPLASTY  6/2002    HX TONSILLECTOMY      NEUROLOGICAL PROCEDURE UNLISTED  08/06/2018    laminectomy      Current Outpatient Medications   Medication Sig    propranoloL (INDERAL) 40 mg tablet Take 1 Tab by mouth two (2) times a day.  atorvastatin (LIPITOR) 80 mg tablet Take 1 Tab by mouth daily.  metFORMIN ER (GLUCOPHAGE XR) 500 mg tablet TAKE 2 TABLET BY MOUTH TWICE A DAY WITH MEALS    SUMAtriptan (IMITREX) 50 mg tablet Take 1 Tab by mouth daily as needed for Migraine.  ergocalciferol (ERGOCALCIFEROL) 1,250 mcg (50,000 unit) capsule Take 1 Cap by mouth every seven (7) days. Indications: vitamin D deficiency (high dose therapy)    lisinopriL (PRINIVIL, ZESTRIL) 10 mg tablet Take 1 Tab by mouth daily.  estradioL (VIVELLE) 0.05 mg/24 hr 1 Patch by TransDERmal route Every Thursday.     progesterone (PROMETRIUM) 200 mg capsule Take 1 Cap by mouth daily.  ARIPiprazole (ABILIFY) 5 mg tablet Take  by mouth daily.  white petrolatum-mineral oil (ARTIFICIAL TEARS, VLADISLAV/MIN,) 83-15 % ophthalmic ointment Administer  to left eye nightly. Small amount    FLUoxetine (PROZAC) 20 mg capsule Take 10 mg by mouth daily.  naloxone (NARCAN) 4 mg/actuation nasal spray Use 1 spray intranasally, then discard. Repeat with new spray every 2 min as needed for opioid overdose symptoms, alternating nostrils. No current facility-administered medications for this visit. Allergies and Intolerances: Allergies   Allergen Reactions    Pcn [Penicillins] Rash    Sulfa (Sulfonamide Antibiotics) Rash     Family History: She has no family history of colon or breast cancer. Her mother  from a CVA. Her father  from throat cancer and cirrhosis. Family History   Problem Relation Age of Onset    Hypertension Brother         x2    Elevated Lipids Brother     Stroke Mother     Heart Surgery Mother         CABG    Cancer Father         cancer of the mouth    Hypertension Father     Hypertension Brother     Elevated Lipids Brother      Social History:   She  reports that she quit smoking about 32 years ago. She has a 1.00 pack-year smoking history. She has never used smokeless tobacco. She smoked approximately 0.25 ppd for 2 years, stopping in . She is a  and has one daughter. She was employed as a . 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 She was previously working as the [employer] for his business.   Social History     Substance and Sexual Activity   Alcohol Use No    Alcohol/week: 1.7 standard drinks    Types: 2 Glasses of wine per week     Immunization History:  Immunization History   Administered Date(s) Administered    Influenza Vaccine [email or web address]) PF (>6 Mo Flulaval, Fluarix, and >3 Yrs [place], Fluzone 30861) 2018, 2019, 10/22/2020    Influenza Vaccine PF 02/03/2014, 10/13/2014    Influenza Vaccine Split 11/14/2011    Influenza Vaccine Whole 10/05/2010    Pneumococcal Polysaccharide (PPSV-23) 05/11/2017    TD Vaccine 01/23/2007    Tdap 11/10/2016    Zoster Recombinant 05/20/2018, 09/18/2018       Review of Systems:   As above included in HPI. Otherwise 11 point review of systems negative including constitutional, skin, HENT, eyes, respiratory, cardiovascular, gastrointestinal, genitourinary, musculoskeletal, endocrine, hematologic, allergy, and neurologic. Physical:   Vitals:   BP: 132/64  HR: (!) 57  WT: 138 lb (62.6 kg)  BMI:  27.47 kg/m2    Exam:   Objective:   Vital Signs: (As obtained by patient/caregiver at home)  Visit Vitals  /64 (BP 1 Location: Left arm, BP Patient Position: Sitting)   Pulse (!) 57   Temp 97 °F (36.1 °C) (Temporal)   Resp 16   Ht 4' 11\" (1.499 m)   Wt 138 lb (62.6 kg)   LMP  (LMP Unknown)   SpO2 99%   BMI 27.87 kg/m²        Patient appears in no apparent distress. Affect is appropriate. HEENT: PERRLA, anicteric, oropharynx clear, no JVD, adenopathy or thyromegaly. No carotid bruits or radiated murmur. Lungs: clear to auscultation, no wheezes, rhonchi, or rales. Heart: regular rate and rhythm. No murmur, rubs, gallops  Abdomen: soft, nontender, nondistended, normal bowel sounds, no hepatosplenomegaly or masses. Extremities: without edema. Review of Data:  Labs:  No visits with results within 1 Month(s) from this visit.    Latest known visit with results is:   Orders Only on 01/18/2021   Component Date Value Ref Range Status    Hemoglobin A1c 03/25/2021 6.6* 4.8 - 5.6 % Final    Estimated average glucose 03/25/2021 143  mg/dL Final    Cholesterol, total 03/25/2021 174  100 - 199 mg/dL Final    Triglyceride 03/25/2021 164* 0 - 149 mg/dL Final    HDL Cholesterol 03/25/2021 44  >39 mg/dL Final    VLDL, calculated 03/25/2021 29  5 - 40 mg/dL Final    LDL, calculated 03/25/2021 101* 0 - 99 mg/dL Final    Magnesium 03/25/2021 1.5* 1.6 - 2.3 mg/dL Final    Glucose 03/25/2021 133* 65 - 99 mg/dL Final    BUN 03/25/2021 21  8 - 27 mg/dL Final    Creatinine 03/25/2021 0.93  0.57 - 1.00 mg/dL Final    GFR est non-AA 03/25/2021 66  >59 mL/min/1.73 Final    GFR est AA 03/25/2021 76  >59 mL/min/1.73 Final    BUN/Creatinine ratio 03/25/2021 23  12 - 28 Final    Sodium 03/25/2021 145* 134 - 144 mmol/L Final    Potassium 03/25/2021 4.5  3.5 - 5.2 mmol/L Final    Chloride 03/25/2021 108* 96 - 106 mmol/L Final    CO2 03/25/2021 25  20 - 29 mmol/L Final    Calcium 03/25/2021 9.3  8.7 - 10.3 mg/dL Final    Protein, total 03/25/2021 6.1  6.0 - 8.5 g/dL Final    Albumin 03/25/2021 4.0  3.8 - 4.8 g/dL Final    GLOBULIN, TOTAL 03/25/2021 2.1  1.5 - 4.5 g/dL Final    A-G Ratio 03/25/2021 1.9  1.2 - 2.2 Final    Bilirubin, total 03/25/2021 0.3  0.0 - 1.2 mg/dL Final    Alk. phosphatase 03/25/2021 66  39 - 117 IU/L Final    AST (SGOT) 03/25/2021 12  0 - 40 IU/L Final    ALT (SGPT) 03/25/2021 14  0 - 32 IU/L Final    Creatinine, urine 03/25/2021 153.7  Not Estab. mg/dL Final    Microalbumin, urine 03/25/2021 9.7  Not Estab. ug/mL Final    Microalb/Creat ratio (ug/mg creat.) 03/25/2021 6  0 - 29 mg/g creat Final    VITAMIN D, 25-HYDROXY 03/25/2021 21.1* 30.0 - 100.0 ng/mL Final    INTERPRETATION 03/25/2021 Note   Final    PDF Image 96/49/3710 Not applicable   Final         Health Maintenance:  Screening:    Mammogram: negative (7/2020)   PAP smear: well women exams by Dr. Shanika Wright (last 2/2019) Scheduled 4/19/2021. Colorectal: colonoscopy (10/2014) normal. Dr. Tutu Justice. Due 10/2024. Depression: [facility].    DM (HbA1c/FPG): HbA1c 6.6 (3/2021)   Hepatitis C: negative (5/2017)   Falls: none   DEXA: osteopenia (2/2019) Dr. Suresh May   Glaucoma: regular eye exams with Dr. Fabiola Recio (6/2020)   Smoking: none   Vitamin D: 21.1 (3/2021)   Medicare Wellness: N/A      Impression:  Patient Active Problem List   Diagnosis Code    HCD (hypertensive cardiovascular disease) I11.9    Hyperlipidemia E78.5    Otosclerosis H80.90    Migraine G43.909    Bilateral lumbar radiculopathy M54.16    Type 2 diabetes mellitus without complication ([facility] W39.4    Essential hypertension I10    Vitamin D deficiency E55.9    Recurrent depression ([facility] F33.9    Overweight with body mass index (BMI) of 27 to 27.9 in adult E66.3, Z68.27    DDD (degenerative disc disease), lumbar M51.36    HNP (herniated nucleus pulposus), lumbar M51.26    History of Bell's palsy Z86.69       Plan:  1. Hypertension. Blood pressure well controlled today on current regimen of lisinopril 10 mg daily and propranolol 40 mg bid. Renal function remains normal with creatinine 0.93/ eGFR 66. Continue to follow. 2. Hyperlipidemia. On high intensity atorvastatin with  and HDL 44, indicative of only fair control. Will increase dose of atorvastatin to 80 mg daily. Will reassess lipid panel in three months. Emphasized importance of lifestyle modifications, including diet, exercise, and weight loss. Continue to follow. 3. Diabetes mellitus. Remains well controlled on metformin ER 1000 mg bid with HbA1c at 6.6. Reports diarrhea improved since changing to the extended release form of metformin. No evidence of microvascular complications. On statin and lisinopril. Continue follow-up for annual eye exams. Foot exam normal (10/2020). Urine microalbumin/ creatinine ratio without evidence of microalbuminuria (6 mg/g). Emphasized importance of lifestyle modifications, including diet, exercise, and weight loss. 4. Headache/ left facial nerve palsy. Presented with severe headache resulting in hospital admission to [facility]. Evaluation including head CT scan, brain MRI/MRA and neck MRA unrevealing and Dr. Ji Brantley of neurology consulted and felt most consistent with status migrainosus. Prescribed medrol dose pack and discharged.  Developed left facial nerve palsy 10 days later and treated with prednisone 60 mg and Valtrex for 7 days. Evaluated by Dr. 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 Now completely improved with resolution of facial nerve palsy and headaches returning to baseline pattern for migraines. Last seen by Dr. Indio Ward in 6/2020. Urge to re-establish care with new neurologist.  5. Migraines. Returned to baseline pattern involving pain on side of head radiating to lateral neck. Readily responds to Imitrex. Follow. 6. Lumbar herniated disc with right sciatica, s/p right L5-S1 laminectomy and discectomy. Doing well without significant discomfort. Stressed improtance of continuing stretches and exercise. Follow. 7. Depression/ADHD. Reports good control of symptoms on new regimen of Abilify and Prozac. Reports no longer requiring lorazepam at bedtime. Being followed by MOJGAN Castillo at [facility]. 8. Overweight. Weight increased 15 pounds since 8/2019, and increased another 2 pounds today. Emphasized importance of continued lifestyle modifications, including diet, exercise, and weight loss. Importance stressed to help with diabetes control. Will readdress next visit. 9. Health maintenance. Already received influenza vaccine. Completed 2/2 doses of Shingrix vaccine. She has not yet obtained the COVID-19 vaccine. She states she did not realize she was eligible. Urged to schedule. Other immunizations up to date. Well women exam performed by Dr. Arpita Anand and reports now scheduled for 4/19/2021. Counseled to discuss with Dr. Arpita Anand weaning hormonal therapy for menopausal symptoms given risks with increasing age. Mammogram up to date. Colonoscopy due 2024. Continue regular eye exams with Dr. Victorino Nieves. Vitamin D level very low today.  Will treat with ergocalciferol 50,000 U weekly for 12 weeks. Will reassess at next visit. Discontinued aspirin given new recommendations for primary prevention. Counseled patient regarding strict mitigation measures for COVID-19, including wearing a mask, social distancing and diligent hand washing, as recommended by the CDC. Discussed importance of proceeding with COVID 19 vaccine when available. Patient understands recommendations and agrees with plan. Follow-up in 3 months.

## 2021-07-02 DIAGNOSIS — E55.9 VITAMIN D DEFICIENCY: ICD-10-CM

## 2021-07-02 DIAGNOSIS — F33.9 RECURRENT DEPRESSION (HCC): ICD-10-CM

## 2021-07-02 DIAGNOSIS — G43.009 MIGRAINE WITHOUT AURA AND WITHOUT STATUS MIGRAINOSUS, NOT INTRACTABLE: ICD-10-CM

## 2021-07-02 DIAGNOSIS — I10 ESSENTIAL HYPERTENSION: ICD-10-CM

## 2021-07-02 DIAGNOSIS — E78.5 HYPERLIPIDEMIA, UNSPECIFIED HYPERLIPIDEMIA TYPE: ICD-10-CM

## 2021-07-02 DIAGNOSIS — E66.3 OVERWEIGHT WITH BODY MASS INDEX (BMI) OF 27 TO 27.9 IN ADULT: ICD-10-CM

## 2021-07-02 DIAGNOSIS — Z00.00 LABORATORY TESTS ORDERED AS PART OF A COMPLETE PHYSICAL EXAM (CPE): ICD-10-CM

## 2021-07-02 DIAGNOSIS — E11.9 TYPE 2 DIABETES MELLITUS WITHOUT COMPLICATION, WITHOUT LONG-TERM CURRENT USE OF INSULIN (HCC): ICD-10-CM

## 2021-08-31 ENCOUNTER — TRANSCRIBE ORDER (OUTPATIENT)
Dept: SCHEDULING | Age: 66
End: 2021-08-31

## 2021-08-31 DIAGNOSIS — Z12.31 SCREENING MAMMOGRAM, ENCOUNTER FOR: Primary | ICD-10-CM

## 2021-09-07 ENCOUNTER — APPOINTMENT (OUTPATIENT)
Dept: INTERNAL MEDICINE CLINIC | Age: 66
End: 2021-09-07

## 2021-09-08 ENCOUNTER — TELEPHONE (OUTPATIENT)
Dept: INTERNAL MEDICINE CLINIC | Age: 66
End: 2021-09-08

## 2021-09-08 DIAGNOSIS — R31.9 URINARY TRACT INFECTION WITH HEMATURIA, SITE UNSPECIFIED: Primary | ICD-10-CM

## 2021-09-08 DIAGNOSIS — N39.0 URINARY TRACT INFECTION WITH HEMATURIA, SITE UNSPECIFIED: Primary | ICD-10-CM

## 2021-09-08 LAB
25(OH)D3+25(OH)D2 SERPL-MCNC: 89.7 NG/ML (ref 30–100)
ALBUMIN SERPL-MCNC: 4.6 G/DL (ref 3.8–4.8)
ALBUMIN/CREAT UR: 42 MG/G CREAT (ref 0–29)
ALBUMIN/GLOB SERPL: 2.9 {RATIO} (ref 1.2–2.2)
ALP SERPL-CCNC: 79 IU/L (ref 48–121)
ALT SERPL-CCNC: 19 IU/L (ref 0–32)
APPEARANCE UR: ABNORMAL
AST SERPL-CCNC: 17 IU/L (ref 0–40)
BACTERIA #/AREA URNS HPF: ABNORMAL /[HPF]
BASOPHILS # BLD AUTO: 0.1 X10E3/UL (ref 0–0.2)
BASOPHILS NFR BLD AUTO: 1 %
BILIRUB SERPL-MCNC: 0.5 MG/DL (ref 0–1.2)
BILIRUB UR QL STRIP: NEGATIVE
BUN SERPL-MCNC: 21 MG/DL (ref 8–27)
BUN/CREAT SERPL: 22 (ref 12–28)
CALCIUM SERPL-MCNC: 9.6 MG/DL (ref 8.7–10.3)
CASTS URNS QL MICRO: ABNORMAL /LPF
CHLORIDE SERPL-SCNC: 101 MMOL/L (ref 96–106)
CHOLEST SERPL-MCNC: 164 MG/DL (ref 100–199)
CO2 SERPL-SCNC: 25 MMOL/L (ref 20–29)
COLOR UR: YELLOW
CREAT SERPL-MCNC: 0.97 MG/DL (ref 0.57–1)
CREAT UR-MCNC: 155.5 MG/DL
EOSINOPHIL # BLD AUTO: 0.2 X10E3/UL (ref 0–0.4)
EOSINOPHIL NFR BLD AUTO: 2 %
EPI CELLS #/AREA URNS HPF: ABNORMAL /HPF (ref 0–10)
ERYTHROCYTE [DISTWIDTH] IN BLOOD BY AUTOMATED COUNT: 13.1 % (ref 11.7–15.4)
EST. AVERAGE GLUCOSE BLD GHB EST-MCNC: 148 MG/DL
GLOBULIN SER CALC-MCNC: 1.6 G/DL (ref 1.5–4.5)
GLUCOSE SERPL-MCNC: 141 MG/DL (ref 65–99)
GLUCOSE UR QL: NEGATIVE
HBA1C MFR BLD: 6.8 % (ref 4.8–5.6)
HCT VFR BLD AUTO: 38.5 % (ref 34–46.6)
HDLC SERPL-MCNC: 47 MG/DL
HGB BLD-MCNC: 12.4 G/DL (ref 11.1–15.9)
HGB UR QL STRIP: ABNORMAL
IMM GRANULOCYTES # BLD AUTO: 0 X10E3/UL (ref 0–0.1)
IMM GRANULOCYTES NFR BLD AUTO: 0 %
IMP & REVIEW OF LAB RESULTS: NORMAL
KETONES UR QL STRIP: ABNORMAL
LDLC SERPL CALC-MCNC: 92 MG/DL (ref 0–99)
LEUKOCYTE ESTERASE UR QL STRIP: ABNORMAL
LYMPHOCYTES # BLD AUTO: 3.3 X10E3/UL (ref 0.7–3.1)
LYMPHOCYTES NFR BLD AUTO: 38 %
MAGNESIUM SERPL-MCNC: 1.4 MG/DL (ref 1.6–2.3)
MCH RBC QN AUTO: 29.3 PG (ref 26.6–33)
MCHC RBC AUTO-ENTMCNC: 32.2 G/DL (ref 31.5–35.7)
MCV RBC AUTO: 91 FL (ref 79–97)
MICRO URNS: ABNORMAL
MICROALBUMIN UR-MCNC: 65.6 UG/ML
MONOCYTES # BLD AUTO: 0.5 X10E3/UL (ref 0.1–0.9)
MONOCYTES NFR BLD AUTO: 6 %
NEUTROPHILS # BLD AUTO: 4.6 X10E3/UL (ref 1.4–7)
NEUTROPHILS NFR BLD AUTO: 53 %
NITRITE UR QL STRIP: NEGATIVE
PH UR STRIP: 5 [PH] (ref 5–7.5)
PLATELET # BLD AUTO: 329 X10E3/UL (ref 150–450)
POTASSIUM SERPL-SCNC: 4.6 MMOL/L (ref 3.5–5.2)
PROT SERPL-MCNC: 6.2 G/DL (ref 6–8.5)
PROT UR QL STRIP: ABNORMAL
RBC # BLD AUTO: 4.23 X10E6/UL (ref 3.77–5.28)
RBC #/AREA URNS HPF: ABNORMAL /HPF (ref 0–2)
SODIUM SERPL-SCNC: 139 MMOL/L (ref 134–144)
SP GR UR: 1.02 (ref 1–1.03)
TRIGL SERPL-MCNC: 144 MG/DL (ref 0–149)
TSH SERPL DL<=0.005 MIU/L-ACNC: 1.91 UIU/ML (ref 0.45–4.5)
UROBILINOGEN UR STRIP-MCNC: 0.2 MG/DL (ref 0.2–1)
VLDLC SERPL CALC-MCNC: 25 MG/DL (ref 5–40)
WBC # BLD AUTO: 8.7 X10E3/UL (ref 3.4–10.8)
WBC #/AREA URNS HPF: ABNORMAL /HPF (ref 0–5)

## 2021-09-08 NOTE — TELEPHONE ENCOUNTER
Please let the patient know that her urinalysis was abnormal on her pre-visit labs, suggestive of a possible infection. Please find out if she is having any symptoms. If so, please ask her to drop off another specimen for culture. Otherwise, will repeat at her visit. Thanks.

## 2021-09-09 ENCOUNTER — HOSPITAL ENCOUNTER (OUTPATIENT)
Dept: MAMMOGRAPHY | Age: 66
Discharge: HOME | End: 2021-09-09
Attending: INTERNAL MEDICINE

## 2021-09-09 DIAGNOSIS — Z12.31 SCREENING MAMMOGRAM, ENCOUNTER FOR: ICD-10-CM

## 2021-09-09 PROCEDURE — 77067 SCR MAMMO BI INCL CAD: CPT

## 2021-09-10 ENCOUNTER — HOSPITAL ENCOUNTER (OUTPATIENT)
Dept: LAB | Age: 66
Discharge: HOME | End: 2021-09-10

## 2021-09-10 ENCOUNTER — APPOINTMENT (OUTPATIENT)
Dept: INTERNAL MEDICINE CLINIC | Age: 66
End: 2021-09-10

## 2021-09-10 LAB — XX-LABCORP SPECIMEN COL,LCBCF: NORMAL

## 2021-09-10 PROCEDURE — 99001 SPECIMEN HANDLING PT-LAB: CPT

## 2021-09-12 LAB — BACTERIA UR CULT: NORMAL

## 2021-09-14 ENCOUNTER — OFFICE VISIT (OUTPATIENT)
Dept: INTERNAL MEDICINE CLINIC | Age: 66
End: 2021-09-14
Payer: COMMERCIAL

## 2021-09-14 ENCOUNTER — TELEPHONE (OUTPATIENT)
Dept: INTERNAL MEDICINE CLINIC | Age: 66
End: 2021-09-14

## 2021-09-14 VITALS
OXYGEN SATURATION: 98 % | HEART RATE: 60 BPM | DIASTOLIC BLOOD PRESSURE: 66 MMHG | SYSTOLIC BLOOD PRESSURE: 124 MMHG | RESPIRATION RATE: 16 BRPM | BODY MASS INDEX: 27.42 KG/M2 | TEMPERATURE: 97.2 F | WEIGHT: 136 LBS | HEIGHT: 59 IN

## 2021-09-14 DIAGNOSIS — R80.9 MICROALBUMINURIA: ICD-10-CM

## 2021-09-14 DIAGNOSIS — F33.42 RECURRENT MAJOR DEPRESSIVE DISORDER, IN FULL REMISSION (HCC): ICD-10-CM

## 2021-09-14 DIAGNOSIS — E66.3 OVERWEIGHT (BMI 25.0-29.9): ICD-10-CM

## 2021-09-14 DIAGNOSIS — E11.9 TYPE 2 DIABETES MELLITUS WITHOUT COMPLICATION, WITHOUT LONG-TERM CURRENT USE OF INSULIN (HCC): Primary | ICD-10-CM

## 2021-09-14 DIAGNOSIS — G43.009 MIGRAINE WITHOUT AURA AND WITHOUT STATUS MIGRAINOSUS, NOT INTRACTABLE: ICD-10-CM

## 2021-09-14 DIAGNOSIS — E78.5 HYPERLIPIDEMIA, UNSPECIFIED HYPERLIPIDEMIA TYPE: ICD-10-CM

## 2021-09-14 DIAGNOSIS — Z23 NEEDS FLU SHOT: ICD-10-CM

## 2021-09-14 DIAGNOSIS — I10 ESSENTIAL HYPERTENSION: ICD-10-CM

## 2021-09-14 DIAGNOSIS — E55.9 VITAMIN D DEFICIENCY: ICD-10-CM

## 2021-09-14 PROCEDURE — 99213 OFFICE O/P EST LOW 20 MIN: CPT | Performed by: INTERNAL MEDICINE

## 2021-09-14 PROCEDURE — 90686 IIV4 VACC NO PRSV 0.5 ML IM: CPT | Performed by: INTERNAL MEDICINE

## 2021-09-14 PROCEDURE — 90471 IMMUNIZATION ADMIN: CPT | Performed by: INTERNAL MEDICINE

## 2021-09-14 RX ORDER — ERGOCALCIFEROL 1.25 MG/1
50000 CAPSULE ORAL
Qty: 1 CAPSULE | Refills: 0
Start: 2021-09-14 | End: 2021-10-01

## 2021-09-14 RX ORDER — PEN NEEDLE, DIABETIC 30 GX3/16"
NEEDLE, DISPOSABLE MISCELLANEOUS
Qty: 30 EACH | Refills: 2 | Status: SHIPPED | OUTPATIENT
Start: 2021-09-14 | End: 2022-10-17

## 2021-09-14 NOTE — PATIENT INSTRUCTIONS
Vaccine Information Statement    Influenza (Flu) Vaccine (Inactivated or Recombinant): What You Need to Know    Many vaccine information statements are available in Chinese and other languages. See www.immunize.org/vis. Hojas de información sobre vacunas están disponibles en español y en muchos otros idiomas. Visite www.immunize.org/vis. 1. Why get vaccinated? Influenza vaccine can prevent influenza (flu). Flu is a contagious disease that spreads around the [facility] every year, usually between October and May. Anyone can get the flu, but it is more dangerous for some people. Infants and young children, people 72 years and older, pregnant people, and people with certain health conditions or a weakened immune system are at greatest risk of flu complications. Pneumonia, bronchitis, sinus infections, and ear infections are examples of flu-related complications. If you have a medical condition, such as heart disease, cancer, or diabetes, flu can make it worse. Flu can cause fever and chills, sore throat, muscle aches, fatigue, cough, headache, and runny or stuffy nose. Some people may have vomiting and diarrhea, though this is more common in children than adults. In an average year, thousands of people in the [facility] die from flu, and many more are hospitalized. Flu vaccine prevents millions of illnesses and flu-related visits to the doctor each year. 2. Influenza vaccines     CDC recommends everyone 6 months and older get vaccinated every flu season. Children 6 months through 6years of age may need 2 doses during a single flu season. Everyone else needs only 1 dose each flu season. It takes about 2 weeks for protection to develop after vaccination. There are many flu viruses, and they are always changing. Each year a new flu vaccine is made to protect against the influenza viruses believed to be likely to cause disease in the upcoming flu season.  Even when the vaccine doesnt exactly match these viruses, it may still provide some protection. Influenza vaccine does not cause flu. Influenza vaccine may be given at the same time as other vaccines. 3. Talk with your health care provider    Tell your vaccination provider if the person getting the vaccine:   Has had an allergic reaction after a previous dose of influenza vaccine, or has any severe, life-threatening allergies    Has ever had Guillain-Barré Syndrome (also called GBS)    In some cases, your health care provider may decide to postpone influenza vaccination until a future visit. Influenza vaccine can be administered at any time during pregnancy. People who are or will be pregnant during influenza season should receive inactivated influenza vaccine. People with minor illnesses, such as a cold, may be vaccinated. People who are moderately or severely ill should usually wait until they recover before getting influenza vaccine. Your health care provider can give you more information. 4. Risks of a vaccine reaction     Soreness, redness, and swelling where the shot is given, fever, muscle aches, and headache can happen after influenza vaccination.  There may be a very small increased risk of Guillain-Barré Syndrome (GBS) after inactivated influenza vaccine (the flu shot). [facility] children who get the flu shot along with pneumococcal vaccine (PCV13) and/or DTaP vaccine at the same time might be slightly more likely to have a seizure caused by fever. Tell your health care provider if a child who is getting flu vaccine has ever had a seizure. People sometimes faint after medical procedures, including vaccination. Tell your provider if you feel dizzy or have vision changes or ringing in the ears. As with any medicine, there is a very remote chance of a vaccine causing a severe allergic reaction, other serious injury, or death. 5. What if there is a serious problem?     An allergic reaction could occur after the vaccinated person leaves the clinic. If you see signs of a severe allergic reaction (hives, swelling of the face and throat, difficulty breathing, a fast heartbeat, dizziness, or weakness), call 9-1-1 and get the person to the nearest hospital.    For other signs that concern you, call your health care provider. Adverse reactions should be reported to the Vaccine Adverse Event Reporting System (VAERS). Your health care provider will usually file this report, or you can do it yourself. Visit the VAERS website at www.vaers. [facility].gov or call 1-138.394.9695. VAERS is only for reporting reactions, and VAERS staff members do not give medical advice. 6. The National Vaccine Injury Compensation Program    The [facility] Vaccine Injury Compensation Program (VICP) is a federal program that was created to compensate people who may have been injured by certain vaccines. Claims regarding alleged injury or death due to vaccination have a time limit for filing, which may be as short as two years. Visit the VICP website at www.[facility].gov/vaccinecompensation or call 0-339.493.3515 to learn about the program and about filing a claim. 7. How can I learn more?  Ask your health care provider.  Call your local or state health department.  Visit the website of the Food and Drug Administration (FDA) for vaccine package inserts and additional information at www.fda.gov/vaccines-blood-biologics/vaccines.  Contact the Centers for Disease Control and Prevention (CDC):  - Call 7-263.372.4490 (8-258-CHD-INFO) or  - Visit CDCs influenza website at www.cdc.gov/flu. Vaccine Information Statement   Inactivated Influenza Vaccine   8/6/2021  42 WILLIAN Marcelo [id number]   Department of Health and Human Services  Centers for Disease Control and Prevention    Office Use Only         Heart-Healthy Diet: Care Instructions  Your Care Instructions     A heart-healthy diet has lots of vegetables, fruits, nuts, beans, and whole grains, and is low in salt. It limits foods that are high in saturated fat, such as meats, cheeses, and fried foods. It may be hard to change your diet, but even small changes can lower your risk of heart attack and heart disease. Follow-up care is a key part of your treatment and safety. Be sure to make and go to all appointments, and call your doctor if you are having problems. It's also a good idea to know your test results and keep a list of the medicines you take. How can you care for yourself at home? Watch your portions  · Learn what a serving is. A \"serving\" and a \"portion\" are not always the same thing. Make sure that you are not eating larger portions than are recommended. For example, a serving of pasta is ½ cup. A serving size of meat is 2 to 3 ounces. A 3-ounce serving is about the size of a deck of cards. Measure serving sizes until you are good at [place]" them. Keep in mind that restaurants often serve portions that are 2 or 3 times the size of one serving. · To keep your energy level up and keep you from feeling hungry, eat often but in smaller portions. · Eat only the number of calories you need to stay at a healthy weight. If you need to lose weight, eat fewer calories than your body burns (through exercise and other physical activity). Eat more fruits and vegetables  · Eat a variety of fruit and vegetables every day. Dark green, deep orange, red, or yellow fruits and vegetables are especially good for you. Examples include spinach, carrots, peaches, and berries. · Keep carrots, celery, and other veggies handy for snacks. Buy fruit that is in season and store it where you can see it so that you will be tempted to eat it. · Cook dishes that have a lot of veggies in them, such as stir-fries and soups. Limit saturated and trans fat  · Read food labels, and try to avoid saturated and trans fats. They increase your risk of heart disease. · Use olive or canola oil when you cook.   · Bake, broil, grill, or steam foods instead of frying them. · Choose lean meats instead of high-fat meats such as hot dogs and sausages. Cut off all visible fat when you prepare meat. · Eat fish, skinless poultry, and meat alternatives such as soy products instead of high-fat meats. Soy products, such as tofu, may be especially good for your heart. · Choose low-fat or fat-free milk and dairy products. Eat foods high in fiber  · Eat a variety of grain products every day. Include whole-grain foods that have lots of fiber and nutrients. Examples of whole-grain foods include oats, whole wheat bread, and brown rice. · Buy whole-grain breads and cereals, instead of white bread or pastries. Limit salt and sodium  · Limit how much salt and sodium you eat to help lower your blood pressure. · Taste food before you salt it. Add only a little salt when you think you need it. With time, your taste buds will adjust to less salt. · Eat fewer snack items, fast foods, and other high-salt, processed foods. Check food labels for the amount of sodium in packaged foods. · Choose low-sodium versions of canned goods (such as soups, vegetables, and beans). Limit sugar  · Limit drinks and foods with added sugar. These include candy, desserts, and soda pop. Limit alcohol  · Limit alcohol to no more than 2 drinks a day for men and 1 drink a day for women. Too much alcohol can cause health problems. When should you call for help? Watch closely for changes in your health, and be sure to contact your doctor if:    · You would like help planning heart-healthy meals. Where can you learn more? Go to http://www.allan.com/  Enter V137 in the search box to learn more about \"Heart-Healthy Diet: Care Instructions. \"  Current as of: August 22, 2019               Content Version: 12.6  © 9660-0474 [employer].    Care instructions adapted under license by [employer] (which disclaims liability or warranty for this information). If you have questions about a medical condition or this instruction, always ask your healthcare professional. [address] any warranty or liability for your use of this information. Learning About Diabetes Food Guidelines  Your Care Instructions     Meal planning is important to manage diabetes. It helps keep your blood sugar at a target level (which you set with your doctor). You don't have to eat special foods. You can eat what your family eats, including sweets once in a while. But you do have to pay attention to how often you eat and how much you eat of certain foods. You may want to work with a dietitian or a certified diabetes educator (CDE) to help you plan meals and snacks. A dietitian or CDE can also help you lose weight if that is one of your goals. What should you know about eating carbs? Managing the amount of carbohydrate (carbs) you eat is an important part of healthy meals when you have diabetes. Carbohydrate is found in many foods. · Learn which foods have carbs. And learn the amounts of carbs in different foods. ? Bread, cereal, pasta, and rice have about 15 grams of carbs in a serving. A serving is 1 slice of bread (1 ounce), ½ cup of cooked cereal, or 1/3 cup of cooked pasta or rice. ? Fruits have 15 grams of carbs in a serving. A serving is 1 small fresh fruit, such as an apple or orange; ½ of a banana; ½ cup of cooked or canned fruit; ½ cup of fruit juice; 1 cup of melon or raspberries; or 2 tablespoons of dried fruit. ? Milk and no-sugar-added yogurt have 15 grams of carbs in a serving. A serving is 1 cup of milk or 2/3 cup of no-sugar-added yogurt. ? Starchy vegetables have 15 grams of carbs in a serving. A serving is ½ cup of mashed potatoes or sweet potato; 1 cup winter squash; ½ of a small baked potato; ½ cup of cooked beans; or ½ cup cooked corn or green peas.   · Learn how much carbs to eat each day and at each meal. A dietitian or CDE can teach you how to keep track of the amount of carbs you eat. This is called carbohydrate counting. · If you are not sure how to count carbohydrate grams, use the Plate Method to plan meals. It is a good, quick way to make sure that you have a balanced meal. It also helps you spread carbs throughout the day. ? Divide your plate by types of foods. Put non-starchy vegetables on half the plate, meat or other protein food on one-quarter of the plate, and a grain or starchy vegetable in the final quarter of the plate. To this you can add a small piece of fruit and 1 cup of milk or yogurt, depending on how many carbs you are supposed to eat at a meal.  · Try to eat about the same amount of carbs at each meal. Do not \"save up\" your daily allowance of carbs to eat at one meal.  · Proteins have very little or no carbs per serving. Examples of proteins are beef, chicken, turkey, fish, eggs, tofu, cheese, cottage cheese, and peanut butter. A serving size of meat is 3 ounces, which is about the size of a deck of cards. Examples of meat substitute serving sizes (equal to 1 ounce of meat) are 1/4 cup of cottage cheese, 1 egg, 1 tablespoon of peanut butter, and ½ cup of tofu. How can you eat out and still eat healthy? · Learn to estimate the serving sizes of foods that have carbohydrate. If you measure food at home, it will be easier to estimate the amount in a serving of restaurant food. · If the meal you order has too much carbohydrate (such as potatoes, corn, or baked beans), ask to have a low-carbohydrate food instead. Ask for a salad or green vegetables. · If you use insulin, check your blood sugar before and after eating out to help you plan how much to eat in the future. · If you eat more carbohydrate at a meal than you had planned, take a walk or do other exercise. This will help lower your blood sugar. What else should you know?   · Limit saturated fat, such as the fat from meat and dairy products. This is a healthy choice because people who have diabetes are at higher risk of heart disease. So choose lean cuts of meat and nonfat or low-fat dairy products. Use olive or canola oil instead of butter or shortening when cooking. · Don't skip meals. Your blood sugar may drop too low if you skip meals and take insulin or certain medicines for diabetes. · Check with your doctor before you drink alcohol. Alcohol can cause your blood sugar to drop too low. Alcohol can also cause a bad reaction if you take certain diabetes medicines. Follow-up care is a key part of your treatment and safety. Be sure to make and go to all appointments, and call your doctor if you are having problems. It's also a good idea to know your test results and keep a list of the medicines you take. Where can you learn more? Go to http://www.allan.com/  Enter I147 in the search box to learn more about \"Learning About Diabetes Food Guidelines. \"  Current as of: December 20, 2019               Content Version: 12.6  © 0416-2853 [employer]. Care instructions adapted under license by [employer] (which disclaims liability or warranty for this information). If you have questions about a medical condition or this instruction, always ask your healthcare professional. [address] any warranty or liability for your use of this information. Learning About Meal Planning for Diabetes  Why plan your meals? Meal planning can be a key part of managing diabetes. Planning meals and snacks with the right balance of carbohydrate, protein, and fat can help you keep your blood sugar at the target level you set with your doctor. You don't have to eat special foods. You can eat what your family eats, including sweets once in a while. But you do have to pay attention to how often you eat and how much you eat of certain foods.   You may want to work with a dietitian or a certified diabetes educator. He or she can give you tips and meal ideas and can answer your questions about meal planning. This health professional can also help you reach a healthy weight if that is one of your goals. What plan is right for you? Your dietitian or diabetes educator may suggest that you start with the plate format or carbohydrate counting. The plate format  The plate format is a simple way to help you manage how you eat. You plan meals by learning how much space each food should take on a plate. Using the plate format helps you spread carbohydrate throughout the day. It can make it easier to keep your blood sugar level within your target range. It also helps you see if you're eating healthy portion sizes. To use the plate format, you put non-starchy vegetables on half your plate. Add meat or meat substitutes on one-quarter of the plate. Put a grain or starchy vegetable (such as brown rice or a potato) on the final quarter of the plate. You can add a small piece of fruit and some low-fat or fat-free milk or yogurt, depending on your carbohydrate goal for each meal.  Here are some tips for using the plate format:  · Make sure that you are not using an oversized plate. A 9-inch plate is best. Many restaurants use larger plates. · Get used to using the plate format at home. Then you can use it when you eat out. · Write down your questions about using the plate format. Talk to your doctor, a dietitian, or a diabetes educator about your concerns. Carbohydrate counting  With carbohydrate counting, you plan meals based on the amount of carbohydrate in each food. Carbohydrate raises blood sugar higher and more quickly than any other nutrient. It is found in desserts, breads and cereals, and fruit. It's also found in starchy vegetables such as potatoes and corn, grains such as rice and pasta, and milk and yogurt.  Spreading carbohydrate throughout the day helps keep your blood sugar levels within your target range. Your daily amount depends on several things, including your weight, how active you are, which diabetes medicines you take, and what your goals are for your blood sugar levels. A registered dietitian or diabetes educator can help you plan how much carbohydrate to include in each meal and snack. A guideline for your daily amount of carbohydrate is:  · 45 to 60 grams at each meal. That's about the same as 3 to 4 carbohydrate servings. · 15 to 20 grams at each snack. That's about the same as 1 carbohydrate serving. The Nutrition Facts label on packaged foods tells you how much carbohydrate is in a serving of the food. First, look at the serving size on the food label. Is that the amount you eat in a serving? All of the nutrition information on a food label is based on that serving size. So if you eat more or less than that, you'll need to adjust the other numbers. Total carbohydrate is the next thing you need to look for on the label. If you count carbohydrate servings, one serving of carbohydrate is 15 grams. For foods that don't come with labels, such as fresh fruits and vegetables, you'll need a guide that lists carbohydrate in these foods. Ask your doctor, dietitian, or diabetes educator about books or other nutrition guides you can use. If you take insulin, you need to know how many grams of carbohydrate are in a meal. This lets you know how much rapid-acting insulin to take before you eat. If you use an insulin pump, you get a constant rate of insulin during the day. So the pump must be programmed at meals to give you extra insulin to cover the rise in blood sugar after meals. When you know how much carbohydrate you will eat, you can take the right amount of insulin. Or, if you always use the same amount of insulin, you need to make sure that you eat the same amount of carbohydrate at meals.   If you need more help to understand carbohydrate counting and food labels, ask your doctor, dietitian, or diabetes educator. How do you get started with meal planning? Here are some tips to get started:  · Plan your meals a week at a time. Don't forget to include snacks too. · Use cookbooks or online recipes to plan several main meals. Plan some quick meals for busy nights. You also can double some recipes that freeze well. Then you can save half for other busy nights when you don't have time to cook. · Make sure you have the ingredients you need for your recipes. If you're running low on basic items, put these items on your shopping list too. · List foods that you use to make breakfasts, lunches, and snacks. List plenty of fruits and vegetables. · Post this list on the refrigerator. Add to it as you think of more things you need. · Take the list to the store to do your weekly shopping. Follow-up care is a key part of your treatment and safety. Be sure to make and go to all appointments, and call your doctor if you are having problems. It's also a good idea to know your test results and keep a list of the medicines you take. Where can you learn more? Go to http://jennifer-pavel.info/  Enter A792 in the search box to learn more about \"Learning About Meal Planning for Diabetes. \"  Current as of: December 20, 2019               Content Version: 12.6  © 7239-1275 [employer]. Care instructions adapted under license by [employer] (which disclaims liability or warranty for this information). If you have questions about a medical condition or this instruction, always ask your healthcare professional. [place] [address] any warranty or liability for your use of this information.

## 2021-09-14 NOTE — TELEPHONE ENCOUNTER
Please request recent pap smear from Dr. Clayton Tavera . Patient reports being seen in 4/2021 . Thank you.

## 2021-09-14 NOTE — PROGRESS NOTES
1. Have you been to the ER, urgent care clinic or hospitalized since your last visit? NO.     2. Have you seen or consulted any other health care providers outside of the [address] [place] [address] since your last visit (Include any pap smears or colon screening)? NO        Art Rodriguez 1955 female who presents for routine immunizations. Patient denies any symptoms , reactions or allergies that would exclude them from being immunized today. Risks and adverse reactions were discussed and the VIS was given to them. All questions were addressed. Order placed for Regular Influenza,  per Verbal Order from  with read back. Patient was observed for 15 min post injection. There were no reactions observed.     Tong Kerr LPN

## 2021-09-19 PROBLEM — F33.42 RECURRENT MAJOR DEPRESSIVE DISORDER, IN FULL REMISSION (HCC): Status: ACTIVE | Noted: 2018-05-02

## 2021-09-19 NOTE — PROGRESS NOTES
HPI:   Alonso Milner is a 61y.o. year old female who presents today for a physical exam. She has a history of hypertension, hyperlipidemia, diabetes mellitus, migraine headaches, depression, anxiety, ADHD, and lumbar radiculopathy. She has completed the Moderna COVID-19 vaccine series. She reports that she is doing relatively well. She is continuing to work during the pandemic, but states that there is only one other person in her office and they are . She reports that she has begun exercising at the [facility] 4-5 days/week and is disappointed that she is having difficulty losing weight despite her increase in activity. She is otherwise without new complaints and overall feeling well. Summary of prior hospitalizations and medical history:  She was hospitalized from 7/9-7/11/2019 at [facility] after presenting to [facility] ED with a severe posterior headache, unlike her usual migraines, with associated light sensitivity and confusion. Evaluation included WBC 6.0, Hb 11.9/ Hct 35.2, Na 138, Ca 9.6, creatinine 1.0/ eGFR >60, head CT scan no acute intracranial abnormality; MRI brain (7/10/2019) mild nonspecific white matter disease likely representing chronic small vessel changes, and very mild bilateral occipital paramedian encephalomalacic changes, likely from chronic small infarcts; brain MRA (7/10/2019) no high-grade intracranial stenosis; neck MRA (7/10/2019) mild proximal ICA irregularity without hemodynamically significant carotid stenosis. She was evaluated by Dr. Ni Saeed of neurology who felt presentation was consistent with status migrainosus. Attempted treatment with Benadryl and compazine without much improvement, but notable improvement after Medrol dose pack started and subsequently discharged. On 7/24/2019, she noted onset of left facial weakness with difficulty with eye closure and forehead elevation, and presented to [facility] ED and diagnosed with left facial palsy. She was treated with prednisone 60 mg daily x 7 days and Valtrex. She was evaluated by Dr. Alex Lucero on 8/14/2019, and it was his opinion that her symptoms were likely not due to status migrainosus, but rather were secondary to a viral meningoencephalitis given the clear change in the pattern of her headaches and the subsequent development of a cranial nerve palsy. She has had complete resolution of her facial palsy, and a return to baseline for her migraine headaches, which respond readily to sumatriptan. She has a history of hypertension, treated with propranolol and lisinopril. She does not check her blood pressure at home. She has begun walking regularly for exercise. She denies any chest pain, shortness of breath at rest or with exertion, palpitations, or lightheadedness. She also has a history of hyperlipidemia, treated with high intensity dose rosuvastatin. She has a history of diabetes mellitus, and was started on metformin in 2/2015 for a HbA1c of 7.5. She does not monitor her blood sugars at home. She denies any polyuria, polydipsia, nocturia, or blurry vision, and has no history of retinopathy, neuropathy, or nephropathy. She has regular eye exams with Dr. Trey Cota. She also has a history of migraines without aura, which usually respond to Imitrex. Also now on propranolol for preventive therapy with good control. She has a history of lumbar radiculopathy (R>L), and was evaluated by Dr. Jamey Moon in 5/2015. Lumbar x-rays showed degenerative changes in L4-L5 and L5-S1. She was treated conservatively with physical therapy and ibuprofen and reports significant improvement. She was evaluated by MOJGAN Alicea in 12/2016 for exacerbation of low back pain with right sciatica. She was treated with steroids and Norco with improvement. On 4/17/2018, she presented with increasing low back pain with bilateral sciatica. She was treated with a prednisone taper with initial improvement.  However, her symptoms worsened and she was referred to Dr. Carlita Coleman for evaluation. She gave her a Toradol injection, and ordered a lumbar MRI (6/24/2018) which showed a large right disc extrusion at L5-S1 which compressed the descending right S1 nerve root. She was referred to pain management and underwent a selective L5 and S1 selective nerve root blocks by Dr. Madi Louise on 7/26/2018. However, due to persistent symptoms, she was referred to Dr. Ramses Gonzalez and subsequently underwent a right L5-S1 laminectomy and diskectomy on 5/0/7232 without complications. She states that she did well and continues to do her back stretches and exercises. She states that she only has occasional discomfort. She had a screening colonoscopy in 10/2014 by Dr. Brittany Contreras which was normal. Follow-up due in 10 years. She denies any abdominal pain, nausea, vomiting, melena, hematochezia, or change in bowel movements. She has a history of depression and anxiety, and is followed by Dr. Efren Murray. She is currently taking Abilify 5 mg daily and Prozac 10 mg daily. Previously on lorazepam at bedtime, but no longer requiring. Past Medical History:   Diagnosis Date    ADHD     Anxiety     Bilateral lumbar radiculopathy     Carotid bruit     right    Depression     Diabetes mellitus (HCC)     HCD (hypertensive cardiovascular disease)     Hyperlipidemia     Hypertension     Migraine headache     Plantar fasciitis      Past Surgical History:   Procedure Laterality Date    HX SEPTOPLASTY  6/2002    HX TONSILLECTOMY      NEUROLOGICAL PROCEDURE UNLISTED  08/06/2018    laminectomy      Current Outpatient Medications   Medication Sig    semaglutide (OZEMPIC) 0.25 mg or 0.5 mg/dose (2 mg/1.5 ml) subq pen 0.25 mg SC every 7 days for 4 weeks, then increase to 0.5 mg every 7 days and continue at this dose.     Insulin Needles, Disposable, 31 gauge x 5/16\" ndle Use for weekly SC injection of Ozempic    ergocalciferol (ERGOCALCIFEROL) 1,250 mcg (50,000 unit) capsule Take 1 Capsule by mouth every fourteen (14) days. Indications: vitamin D deficiency (high dose therapy)    propranoloL (INDERAL) 40 mg tablet Take 1 Tab by mouth two (2) times a day.  atorvastatin (LIPITOR) 80 mg tablet Take 1 Tab by mouth daily.  metFORMIN ER (GLUCOPHAGE XR) 500 mg tablet TAKE 2 TABLET BY MOUTH TWICE A DAY WITH MEALS    SUMAtriptan (IMITREX) 50 mg tablet Take 1 Tab by mouth daily as needed for Migraine.  lisinopriL (PRINIVIL, ZESTRIL) 10 mg tablet Take 1 Tab by mouth daily.  ARIPiprazole (ABILIFY) 5 mg tablet Take  by mouth daily.  white petrolatum-mineral oil (ARTIFICIAL TEARS, VLADISLAV/MIN,) 83-15 % ophthalmic ointment Administer  to left eye nightly. Small amount    FLUoxetine (PROZAC) 20 mg capsule Take 10 mg by mouth daily.  naloxone (NARCAN) 4 mg/actuation nasal spray Use 1 spray intranasally, then discard. Repeat with new spray every 2 min as needed for opioid overdose symptoms, alternating nostrils. No current facility-administered medications for this visit. Allergies and Intolerances: Allergies   Allergen Reactions    Pcn [Penicillins] Rash    Sulfa (Sulfonamide Antibiotics) Rash     Family History: She has no family history of colon or breast cancer. Her mother  from a CVA. Her father  from throat cancer and cirrhosis. Family History   Problem Relation Age of Onset    Hypertension Brother         x2    Elevated Lipids Brother     Stroke Mother     Heart Surgery Mother         CABG    Cancer Father         cancer of the mouth    Hypertension Father     Hypertension Brother     Elevated Lipids Brother      Social History:   She  reports that she quit smoking about 32 years ago. She has a 1.00 pack-year smoking history. She has never used smokeless tobacco. She smoked approximately 0.25 ppd for 2 years, stopping in . She is a  and has one daughter. She was employed as a .  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 She was previously working as the [employer] for his business. Social History     Substance and Sexual Activity   Alcohol Use No    Alcohol/week: 1.7 standard drinks    Types: 2 Glasses of wine per week     Immunization History:  Immunization History   Administered Date(s) Administered    Covid-19, MODERNA, Mrna, Lnp-s, Pf, 100mcg/0.5mL 04/19/2021, 05/17/2021    Influenza Vaccine (Quad) PF (>6 Mo Flulaval, Fluarix, and >3 Yrs Afluria, Fluzone 45347) 11/08/2018, 11/19/2019, 10/22/2020, 09/14/2021    Influenza Vaccine PF 02/03/2014, 10/13/2014    Influenza Vaccine Split 11/14/2011    Influenza Vaccine Whole 10/05/2010    Pneumococcal Polysaccharide (PPSV-23) 05/11/2017    TD Vaccine 01/23/2007    Tdap 11/10/2016    Zoster Recombinant 05/20/2018, 09/18/2018       Review of Systems:   As above included in HPI. Otherwise 11 point review of systems negative including constitutional, skin, HENT, eyes, respiratory, cardiovascular, gastrointestinal, genitourinary, musculoskeletal, endocrine, hematologic, allergy, and neurologic. Physical:   Visit Vitals  /66 (BP 1 Location: Left arm, BP Patient Position: Sitting)   Pulse 60   Temp 97.2 °F (36.2 °C) (Temporal)   Resp 16   Ht 4' 11\" (1.499 m)   Wt 136 lb (61.7 kg)   SpO2 98%   BMI 27.47 kg/m²       Patient appears in no apparent distress. Affect is appropriate. HEENT --Anicteric sclerae, tympanic membranes normal,  ear canals normal.  PERRL, EOMI, conjunctiva and lids normal.  No cervical lymphadenopathy. No thyromegaly, JVD, or bruits. Carotid pulses 2+ with normal upstroke. Lungs --Clear to auscultation. No wheezing or rales. Heart --Regular rate and rhythm, no murmurs, rubs, gallops, or clicks. Abdomen -- Soft and nontender, no hepatosplenomegaly or masses. Extremities -- Without cyanosis, clubbing, edema.  Normal looking digits, ROM intact  Neuro -- CN 2-12 intact, strength 5/5 with intact soft touch in all extremities  Derm - no obvious abnormalities noted, no rash          Review of Data:  Labs:  Hospital Outpatient Visit on 09/10/2021   Component Date Value Ref Range Status    [facility]. 09/10/2021 Specimens collected/sent to [facility]. Please direct inquiries to ([phone]). Final   Telephone on 09/08/2021   Component Date Value Ref Range Status    Urine Culture, Routine 09/10/2021    Final                    Value:Culture shows less than 10,000 colony forming units of bacteria per  milliliter of urine. This colony count is not generally considered  to be clinically significant. Lab Results   Component Value Date/Time    WBC 8.7 09/07/2021 08:08 AM    HGB 12.4 09/07/2021 08:08 AM    HCT 38.5 09/07/2021 08:08 AM    PLATELET 429 18/52/0394 08:08 AM    MCV 91 09/07/2021 08:08 AM     Lab Results   Component Value Date/Time    Sodium 139 09/07/2021 08:08 AM    Potassium 4.6 09/07/2021 08:08 AM    Chloride 101 09/07/2021 08:08 AM    CO2 25 09/07/2021 08:08 AM    Anion gap 18.0 11/01/2018 09:32 AM    Glucose 141 (H) 09/07/2021 08:08 AM    BUN 21 09/07/2021 08:08 AM    Creatinine 0.97 09/07/2021 08:08 AM    BUN/Creatinine ratio 22 09/07/2021 08:08 AM    GFR est AA 71 09/07/2021 08:08 AM    GFR est non-AA 62 09/07/2021 08:08 AM    Calcium 9.6 09/07/2021 08:08 AM    Bilirubin, total 0.5 09/07/2021 08:08 AM    Alk.  phosphatase 79 09/07/2021 08:08 AM    Protein, total 6.2 09/07/2021 08:08 AM    Albumin 4.6 09/07/2021 08:08 AM    Globulin 1.9 (L) 04/26/2018 08:04 AM    A-G Ratio 2.9 (H) 09/07/2021 08:08 AM    ALT (SGPT) 19 09/07/2021 08:08 AM    AST (SGOT) 17 09/07/2021 08:08 AM     Lab Results   Component Value Date/Time    Cholesterol, total 164 09/07/2021 08:08 AM    HDL Cholesterol 47 09/07/2021 08:08 AM    LDL, calculated 92 09/07/2021 08:08 AM    LDL, calculated 95 06/11/2020 08:21 AM    VLDL, calculated 25 09/07/2021 08:08 AM    VLDL, calculated 31 06/11/2020 08:21 AM    Triglyceride 144 09/07/2021 08:08 AM    CHOL/HDL Ratio 5.3 (H) 09/28/2010 10:53 AM     Lab Results   Component Value Date/Time    Hemoglobin A1c 6.8 (H) 09/07/2021 08:08 AM    Hemoglobin A1c, External 7.4 11/07/2016 12:00 AM     Lab Results   Component Value Date/Time    Microalb/Creat ratio (ug/mg creat.) 42 (H) 09/07/2021 08:08 AM     Lab Results   Component Value Date/Time    TSH 1.910 09/07/2021 08:08 AM     Lab Results   Component Value Date/Time    VITAMIN D, 25-HYDROXY 89.7 09/07/2021 08:08 AM       Urinalysis with 6-10 WBCs, 11-30 RBCs, 1+ leukocyte esterase and negative nitrites. Urine culture negative. Health Maintenance:  Screening:    Mammogram: negative (9/2021)   PAP smear: well women exams by Dr. Bret Blake (last 4/2021)    Colorectal: colonoscopy (10/2014) normal. Dr. Lonnie Braynt. Due 10/2024. Depression: under care of MOJGAN Teran. DM (HbA1c/FPG): HbA1c 6.8 (9/2021)   Hepatitis C: negative (5/2017)   Falls: none   DEXA: osteopenia (2/2019) Dr. Ramu Blackmon   Glaucoma: regular eye exams with Dr. Pamela Fan (6/2020). Scheduled   Smoking: none   Vitamin D: 89.7 (9/2021)   Medicare Wellness: N/A      Impression:  Patient Active Problem List   Diagnosis Code    HCD (hypertensive cardiovascular disease) I11.9    Hyperlipidemia E78.5    Otosclerosis H80.90    Migraine G43.909    Bilateral lumbar radiculopathy M54.16    Type 2 diabetes mellitus without complication ([address] A72.0    Essential hypertension I10    Vitamin D deficiency E55.9    Recurrent depression ([facility] [address] F33.9    Overweight with body mass index (BMI) of 27 to 27.9 in adult E66.3, Z68.27    DDD (degenerative disc disease), lumbar M51.36    HNP (herniated nucleus pulposus), lumbar M51.26    History of Bell's palsy Z86.69       Plan:  1. Hypertension. Blood pressure well controlled today on current regimen of lisinopril 10 mg daily and propranolol 40 mg bid.  Renal function remains normal with creatinine 0.97/ eGFR 66. Continue to follow. 2. Hyperlipidemia. Increased to high intensity 80 mg dose of atorvastatin in 4/2021 with LDL improved to 92 and HDL 47, indicative of reasonable control. Emphasized importance of lifestyle modifications, including diet, exercise, and weight loss. Continue to follow. 3. Diabetes mellitus. Remains on metformin ER 1000 mg bid with HbA1c slightly worsened to 6.8. Given difficulty with weight, will begin GLP-1 agonist treatment for better diabetes control and assistance with weight loss. Will begin Ozempic 0.25 mg weekly for 4 weeks and then increase to 0.5 mg weekly. Patient requesting referral to Jazmine Pinon for instruction regarding subcutaneous injections and diabetes. No evidence of microvascular complications. On statin and lisinopril. Continue follow-up for annual eye exams. Foot exam normal (10/2020). Will repeat next visit. Urine microalbumin/ creatinine ratio with evidence of microalbuminuria (42 mg/g) today, but urinalysis also abnormal.  On lisinopril. Will reassess at next visit. Emphasized importance of lifestyle modifications, including diet, exercise, and weight loss. 4. Migraines. Returned to baseline pattern involving pain on side of head radiating to lateral neck. On propranolol 40 mg twice daily as preventive therapy and readily responds to Imitrex if needed. Follow. 5. History of left facial nerve palsy. Presented with severe headache resulting in hospital admission to [facility]. Evaluation including head CT scan, brain MRI/MRA and neck MRA unrevealing and Dr. Heena Quintanilla of neurology consulted and felt most consistent with status migrainosus. Prescribed medrol dose pack and discharged. Developed left facial nerve palsy 10 days later and treated with prednisone 60 mg and Valtrex for 7 days.  Evaluated by Dr. Vinnie Soriano who felt that presentation more likely consistent with viral meningoencephalitis given the clear change in the pattern of her headaches and the subsequent development of a cranial nerve palsy. Now completely improved with resolution of facial nerve palsy and headaches returning to baseline pattern for migraines. 6. Lumbar herniated disc with right sciatica, s/p right L5-S1 laminectomy and discectomy. Doing well without significant discomfort. Stressed improtance of continuing stretches and exercise. Follow. 7. Depression/ADHD. Reports good control of symptoms on new regimen of Abilify and Prozac. Reports no longer requiring lorazepam at bedtime. Being followed by MOJGAN Zavala at [facility]. 8. Overweight. Weight increased 15 pounds since 8/2019, and has been overall stable despite the patient exercising now 4 to 5 days/week in attempt to lose weight. Emphasized importance of continued lifestyle modifications, including diet, exercise, and weight loss. Will begin GLP-1 agonist as discussed to help with diabetes and weight control. Will readdress next visit. 9. Health maintenance. Will give influenza vaccine today. Completed the Moderna COVID-19 vaccine series. Completed 2/2 doses of Shingrix vaccine. Other immunizations up to date. Well women exam completed by Dr. Liz Kaur in 4/19/2021. Will request report. Mammogram up to date. Colonoscopy due 2024. Continue regular eye exams with Dr. Sarah Perez. Vitamin D level now improved and will decrease dosing of ergocalciferol to every 14 days. Patient understands recommendations and agrees with plan. Follow-up in 3 months.

## 2021-09-29 ENCOUNTER — OFFICE VISIT (OUTPATIENT)
Dept: INTERNAL MEDICINE CLINIC | Age: 66
End: 2021-09-29

## 2021-09-29 DIAGNOSIS — E11.9 TYPE 2 DIABETES MELLITUS WITHOUT COMPLICATION, WITHOUT LONG-TERM CURRENT USE OF INSULIN (HCC): Primary | ICD-10-CM

## 2021-09-29 NOTE — PATIENT INSTRUCTIONS
Your Visit Summary:     Plan:  - Continue metformin  - Start Ozempic 0.25 mg once weekly. After 4 doses, increase to 0.5 mg once weekly   - Please call with any questions or concerns       Call me with any questions or concerns  Jami Mahoney [phone]    Check and document your blood sugar first thing in the morning (fasting), 1-2 hours after a meal, and/or before bedtime. Bring your meter/log to all future visits. Your blood sugar goals:  - Fasting (first thing in the morning)  blood sugar: 80 - 130   - 1 to 2 hours after a meal: less than 180     When you experience symptoms of low blood sugar (example: less than 70):  - Confirm low reading by checking your blood sugar.   - Then treat with 15 grams of carbohydrates (one-half cup of juice or regular soda, or 4-5 glucose tablets). - Wait 15 minutes to recheck blood sugar.   - Then eat a protein containing meal/snack to prevent another low blood sugar episode. (example: peanut butter + crackers)    Nutrition:  - When reviewing a nutrition label, focus on the serving size, total calories, fat (and type of fats), total carbohydrates, sugar (and amount of added sugar), amount of fiber (good for your digestive), and amount of protein. Refer to your nutrition label guide for more information.  - For a meal : max 45 - 60 grams of carbohydrates  - For a snack: max 15 grams of carbohydrates  - Reduce amount of saturated and trans fat. Consider more unsaturated fat options as they are better for your heart health.    - Have at least 1 serving of lean fat protein with each meal.    - Increase fiber intake slowly to prevent constipation.   - Substitute fruit juices for the whole fruit    Low carb snack ideas (15 grams total carb or less):     String cheese or babybel with 6 crackers   4 peanut butter crackers   3 cups of popcorn   1 cup raw vegetables with hummus or ranch dip (just need to watch how much dip you use)   Nuts   2 rice cakes   Celery with peanut butter or cream cheese   String cheese with 1 serving of fruit   Greek yogurt (look at label to make sure < 15 gram carb)   Plain greek yogurt with fresh berries added   Nature valley protein bar   Whisps parmesan cheese crisps   Hard boiled egg   Cottage cheese   Tuna salad lettuce roll-ups   Deli meat roll-ups with slice of cheese   Sugar Free Jello   Glucerna shake (16 grams)    Glucerna hunger smart shake (16 grams)   Ensure protein max shake   Fruit (1 serving/15 grams)   1/2 banana, jen, or grapefruit    1/3 melon (small cantaloupe)   1 slice or 1 cup of honeydew melon   1 slice or 1 and 1/4 cups of watermelon    1 small apple, peach, orange or pear   2 small tangerines   1 cup of raspberries   3/4 cup of blackberries, blueberries or pineapples   1/2 cup of fruit juice, pears, applesauce, or mangos   17 small grapes   12 cherries    Be careful with the glucerna products as they differ in the total carbs depending on the product (some are intended as meal replacements not snacks). Make sure you look at the total carbs on the label as products can differ. Physical Activity:  - Aim for 30 minutes of consistent, moderately intensive, physical activity a day for 5 days or an average of 150 minutes per week. - Start slow, increase as tolerated. For example: Walk every day, working up to 30 minutes of brisk walking, 5 days a week--or split the 30 minutes into two 15-minute or three 10-minute walks. - If you sit for a long time, get up and move/stretch every 90 minutes. Other recommendations:  - Schedule an annual eye exam.  - Check your feet daily for any signs of open wounds, cuts, or sores. - Given your risk factors, the following vaccines are recommended: annual flu shot, age-based pneumococcal vaccines (Pneumovax, Prevnar 13).     In addition to taking your medications as directed, improving your blood sugar involves modifying your nutrition and maximizing the amount of physical activity.

## 2021-10-01 NOTE — PROGRESS NOTES
Pharmacy Progress Note - Diabetes Management    Assessment / Plan:   Diabetes Management:  - Per ADA guidelines, Pt's A1c is at goal of < 7%. - Current SMBG(s)/CGM trend is unable to be assessed today because patient is not checking blood sugars at home  - Reviewed the following regarding Ozempic with patient  - how (med) and each of her DM medications work and proper timing  - benefits of (med) therapy, common and rare/serious adverse effects and adverse effect avoidance  - signs/sx of hypoglycemia and treatment  - storage, expiration date and proper disposal of pen needles  - demonstrated proper injection technique and discussed injection site, site rotation, priming  - dose titration   - Patient was able to demonstrate injection technique correctly and patient administered her first dose in the office without any problems.   - Patient to start with Ozempic 0.25 mg once weekly and progress to 0.5 mg once weekly after 1 month. She expressed understanding.   - Continue metformin  - Patient to continue following with Dr. Fernando Gonzalez for next A1c check in December. Will follow up as needed at that time. Nutrition/Lifestyle Modifications:  - Reviewed with patient utilization of the plate method as a way to encourage healthy eating. Reviewed carbohydrate and non-carbohydrate rich foods, carbohydrate content of various foods, appropriate serving sizes for carbohydrates (15 grams = 1 carb serving), reading the nutrition label focusing on total carbohydrates while being mindful of serving size, recommended serving sizes for carbohydrates for meals and snacks (45-60g with meals and less than or equal to 15g for snacks ), and discussion regarding fruits as a carbohydrate. Patient education materials were provided and reviewed with the patient for their future reference and use. S/O: Ms. Reginald Shin is a 61 y.o. female, referred by Dr. Srinath Serrano MD was seen today for Ozempic injection education. Patient's last A1c was 6.8% in September 2021. Current anti-hyperglycemic regimen include(s):    - Metformin ER 1000 mg twice daily   - Ozempic 0.25 mg once weekly, then increase to 0.5 mg once weekly after 4 weeks - starting today     Patient reports adherence to her medication regimen. ROS:  Today, Pt endorses:  - Symptoms of Hyperglycemia: none  - Symptoms of Hypoglycemia: none    Self Monitoring Blood Glucose (SMBG) or CGM:  - Brought in home glucometer/blood glucose log/CGM reader today:  no  - Patient is not currently monitoring blood sugars at home     Nutrition/Lifestyle Modifications:  - Breakfast: Banana   - Lunch: Yogurt and fruit  - Dinner: Sub, ram or eggs, or something else she may cook. She states that she doesn't cook for herself often    - Physical Activity:   - Going to the gym 5x per week. She states that she has been trying to lose weight without much success       Past Medical History:   Diagnosis Date    ADHD     Anxiety     Bilateral lumbar radiculopathy     Carotid bruit     right    Depression     Diabetes mellitus (HCC)     HCD (hypertensive cardiovascular disease)     Hyperlipidemia     Hypertension     Migraine headache     Plantar fasciitis      Allergies   Allergen Reactions    Pcn [Penicillins] Rash    Sulfa (Sulfonamide Antibiotics) Rash       Current Outpatient Medications   Medication Sig    ergocalciferol (ERGOCALCIFEROL) 1,250 mcg (50,000 unit) capsule Take 1 Capsule by mouth every fourteen (14) days.  semaglutide (OZEMPIC) 0.25 mg or 0.5 mg/dose (2 mg/1.5 ml) subq pen 0.25 mg SC every 7 days for 4 weeks, then increase to 0.5 mg every 7 days and continue at this dose.  Insulin Needles, Disposable, 31 gauge x 5/16\" ndle Use for weekly SC injection of Ozempic    propranoloL (INDERAL) 40 mg tablet Take 1 Tab by mouth two (2) times a day.  atorvastatin (LIPITOR) 80 mg tablet Take 1 Tab by mouth daily.     metFORMIN ER (GLUCOPHAGE XR) 500 mg tablet TAKE 2 TABLET BY MOUTH TWICE A DAY WITH MEALS    SUMAtriptan (IMITREX) 50 mg tablet Take 1 Tab by mouth daily as needed for Migraine.  lisinopriL (PRINIVIL, ZESTRIL) 10 mg tablet Take 1 Tab by mouth daily.  ARIPiprazole (ABILIFY) 5 mg tablet Take  by mouth daily.  white petrolatum-mineral oil (ARTIFICIAL TEARS, VLADISLAV/MIN,) 83-15 % ophthalmic ointment Administer  to left eye nightly. Small amount    FLUoxetine (PROZAC) 20 mg capsule Take 10 mg by mouth daily.  naloxone (NARCAN) 4 mg/actuation nasal spray Use 1 spray intranasally, then discard. Repeat with new spray every 2 min as needed for opioid overdose symptoms, alternating nostrils. No current facility-administered medications for this visit. Lab Results   Component Value Date/Time    Sodium 139 09/07/2021 08:08 AM    Potassium 4.6 09/07/2021 08:08 AM    Chloride 101 09/07/2021 08:08 AM    CO2 25 09/07/2021 08:08 AM    Anion gap 18.0 11/01/2018 09:32 AM    Glucose 141 (H) 09/07/2021 08:08 AM    BUN 21 09/07/2021 08:08 AM    Creatinine 0.97 09/07/2021 08:08 AM    BUN/Creatinine ratio 22 09/07/2021 08:08 AM    GFR est AA 71 09/07/2021 08:08 AM    GFR est non-AA 62 09/07/2021 08:08 AM    Calcium 9.6 09/07/2021 08:08 AM    Bilirubin, total 0.5 09/07/2021 08:08 AM    Alk.  phosphatase 79 09/07/2021 08:08 AM    Protein, total 6.2 09/07/2021 08:08 AM    Albumin 4.6 09/07/2021 08:08 AM    Globulin 1.9 (L) 04/26/2018 08:04 AM    A-G Ratio 2.9 (H) 09/07/2021 08:08 AM    ALT (SGPT) 19 09/07/2021 08:08 AM       Lab Results   Component Value Date/Time    Cholesterol, total 164 09/07/2021 08:08 AM    HDL Cholesterol 47 09/07/2021 08:08 AM    LDL, calculated 92 09/07/2021 08:08 AM    LDL, calculated 95 06/11/2020 08:21 AM    VLDL, calculated 25 09/07/2021 08:08 AM    VLDL, calculated 31 06/11/2020 08:21 AM    Triglyceride 144 09/07/2021 08:08 AM    CHOL/HDL Ratio 5.3 (H) 09/28/2010 10:53 AM       Lab Results   Component Value Date/Time    WBC 8.7 09/07/2021 08:08 AM    HGB 12.4 09/07/2021 08:08 AM    HCT 38.5 09/07/2021 08:08 AM    PLATELET 221 87/37/7004 08:08 AM    MCV 91 09/07/2021 08:08 AM       Lab Results   Component Value Date/Time    Microalb/Creat ratio (ug/mg creat.) 42 (H) 09/07/2021 08:08 AM       HbA1c:  Lab Results   Component Value Date/Time    Hemoglobin A1c 6.8 (H) 09/07/2021 08:08 AM    Hemoglobin A1c, External 7.4 11/07/2016 12:00 AM     No components found for: 2     Last Point of Care HGB A1C  No results found for: [id number]     CrCl cannot be calculated (Unknown ideal weight. ). Medication reconciliation was completed during the visit. There are no discontinued medications. Patient verbalized understanding of the information presented and all of the patients questions were answered. AVS was handed to the patient. Patient advised to call the office with any additional questions or concerns. Thank you,  Krupa Ramirez. DIMPLE Fox          For Pharmacy Admin Tracking Only     CPA in place:  Yes   Recommendation Provided To: Patient/Caregiver: 1 via In person   Intervention Detail: Device Training   Gap Closed?: No   Intervention Accepted By: Patient/Caregiver: 1   Time Spent (min): 30

## 2021-11-09 ENCOUNTER — TELEPHONE (OUTPATIENT)
Dept: INTERNAL MEDICINE CLINIC | Age: 66
End: 2021-11-09

## 2021-11-09 NOTE — TELEPHONE ENCOUNTER
Pt stated she is taking a high dosage of ozempic, and the medication makes her feel nauseous all the time. Pt stated she can hardly eat anything. She wants to know can she go back to taking the low dosage of ozempic. Ms. Billy Camargo can be reached at [phone]. Please advise.  Thank you!!!

## 2021-11-09 NOTE — TELEPHONE ENCOUNTER
Pt returning call. Stated started 0.5 mg 2 weeks ago (2 doses). Said next dose is due tomorrow.     Said she has nausea daily x almost 3 weeks

## 2021-11-09 NOTE — TELEPHONE ENCOUNTER
Please see how long she has been taking the 0.5 mg dose of Ozempic. The 0.25 mg dose was just a starter dose and is not effective is controlling blood sugar. Usually, the nausea will improve after a few doses. If not, could change to a different medication in the same class to see if better.

## 2021-11-10 RX ORDER — ONDANSETRON 4 MG/1
4 TABLET, ORALLY DISINTEGRATING ORAL
Qty: 30 TABLET | Refills: 1 | Status: SHIPPED | OUTPATIENT
Start: 2021-11-10 | End: 2022-09-15 | Stop reason: ALTCHOICE

## 2021-11-10 NOTE — TELEPHONE ENCOUNTER
Per  nausea usually goes away after several weeks of starting the medication. We can send in zofran to help with the nausea. Unable to change to the lower dose as that is the introductory dose and does not really control blood sugars the way the higher dosing does.

## 2021-11-10 NOTE — TELEPHONE ENCOUNTER
Spoke with patient explained in detail the message from [facility]. Patient would like  to send in something for nausea. She will stay on Ozempic for at least a few more weeks and see if the nausea subsides. Patient states she has already spent money on this RX so she doesn't necessarily want to change it if the nausea can subside.  Advised her  will send the RX in shortly to [facility] on [address]

## 2021-12-06 ENCOUNTER — TELEPHONE (OUTPATIENT)
Dept: INTERNAL MEDICINE CLINIC | Age: 66
End: 2021-12-06

## 2021-12-06 NOTE — TELEPHONE ENCOUNTER
Pt aware of message below and verbalized understanding. Patient states her GYN is out of the office until 12/15/21 but that she will go to patient first. No further questions or concerns from pt at this time.

## 2021-12-06 NOTE — TELEPHONE ENCOUNTER
Please advise that she should follow-up with her gynecologist. Not typical for yeast infection which would have a white colored discharge and associated with pruritus.  May be due to bacterial vaginosis or other infection and needs full exam.

## 2021-12-06 NOTE — TELEPHONE ENCOUNTER
Please call and inquire regarding details of symptoms. May be able to send in script without visit. Thanks.

## 2021-12-06 NOTE — TELEPHONE ENCOUNTER
Subject: Appointment Request     Reason for Call: Urgent (Patient Request) No Script     QUESTIONS   Type of Appointment? Established Patient   Reason for appointment request? Available appointments did not meet   patient need   Additional Information for Provider? Pt thinks she has a yeast infection   x1week, pt has tried OTC meds but it hasn't cleared up. pls call to   schedule   ---------------------------------------------------------------------------   --------------   CALL BACK INFO   What is the best way for the office to contact you? OK to leave message on   voicemail   Preferred Call Back Phone Number? [phone]   ---------------------------------------------------------------------------   --------------   SCRIPT ANSWERS   Relationship to Patient? Self   (Is the patient requesting to see the provider for a procedure?)? No   (Is the patient requesting to see the provider urgently - today or   tomorrow. )? Yes   Have you been diagnosed with, awaiting test results for, or told that you   are suspected of having COVID-19 (Coronavirus)? (If patient has tested   negative or was tested as a requirement for work, school, or travel and   not based on symptoms, answer no)? No   Within the past two weeks have you developed any of the following symptoms   (answer no if symptoms have been present longer than 2 weeks or began   more than 2 weeks ago)? Fever or Chills, Cough, Shortness of breath or   difficulty breathing, Loss of taste or smell, Sore throat, Nasal   congestion, Sneezing or runny nose, Fatigue or generalized body aches   (answer no if pain is specific to a body part e.g. back pain), Diarrhea,   Headache? No   Have you had close contact with someone with COVID-19 in the last 14 days? No   (Service Expert - click yes below to proceed with [employer] As Usual   Scheduling)?  Yes

## 2021-12-06 NOTE — TELEPHONE ENCOUNTER
Called and spoke with patient she reports having a discharge x 1 week. She denies any burning or itching. She states the discharge was yellow at first and has now turned brownish in color. She report using monistat on Thursday and that the discharge is not as heavy since but she does continue to have a foul odor. Patient denies any fevers, body aches or chills.

## 2021-12-17 ENCOUNTER — APPOINTMENT (OUTPATIENT)
Dept: INTERNAL MEDICINE CLINIC | Age: 66
End: 2021-12-17

## 2021-12-18 LAB
25(OH)D3+25(OH)D2 SERPL-MCNC: 74.3 NG/ML (ref 30–100)
ALBUMIN SERPL-MCNC: 4.4 G/DL (ref 3.8–4.8)
ALBUMIN/CREAT UR: 19 MG/G CREAT (ref 0–29)
ALBUMIN/GLOB SERPL: 2.4 {RATIO} (ref 1.2–2.2)
ALP SERPL-CCNC: 66 IU/L (ref 44–121)
ALT SERPL-CCNC: 22 IU/L (ref 0–32)
APPEARANCE UR: ABNORMAL
AST SERPL-CCNC: 18 IU/L (ref 0–40)
BACTERIA #/AREA URNS HPF: ABNORMAL /[HPF]
BILIRUB SERPL-MCNC: 0.5 MG/DL (ref 0–1.2)
BILIRUB UR QL STRIP: NEGATIVE
BUN SERPL-MCNC: 14 MG/DL (ref 8–27)
BUN/CREAT SERPL: 18 (ref 12–28)
CALCIUM SERPL-MCNC: 8.9 MG/DL (ref 8.7–10.3)
CASTS URNS QL MICRO: ABNORMAL /LPF
CHLORIDE SERPL-SCNC: 105 MMOL/L (ref 96–106)
CHOLEST SERPL-MCNC: 106 MG/DL (ref 100–199)
CO2 SERPL-SCNC: 24 MMOL/L (ref 20–29)
COLOR UR: YELLOW
CREAT SERPL-MCNC: 0.77 MG/DL (ref 0.57–1)
CREAT UR-MCNC: 186.8 MG/DL
CRYSTALS URNS MICRO: ABNORMAL
EPI CELLS #/AREA URNS HPF: ABNORMAL /HPF (ref 0–10)
EST. AVERAGE GLUCOSE BLD GHB EST-MCNC: 128 MG/DL
GLOBULIN SER CALC-MCNC: 1.8 G/DL (ref 1.5–4.5)
GLUCOSE SERPL-MCNC: 99 MG/DL (ref 65–99)
GLUCOSE UR QL: NEGATIVE
HBA1C MFR BLD: 6.1 % (ref 4.8–5.6)
HDLC SERPL-MCNC: 31 MG/DL
HGB UR QL STRIP: NEGATIVE
IMP & REVIEW OF LAB RESULTS: NORMAL
KETONES UR QL STRIP: ABNORMAL
LDLC SERPL CALC-MCNC: 58 MG/DL (ref 0–99)
LEUKOCYTE ESTERASE UR QL STRIP: ABNORMAL
MAGNESIUM SERPL-MCNC: 1.3 MG/DL (ref 1.6–2.3)
MICRO URNS: ABNORMAL
MICROALBUMIN UR-MCNC: 35.9 UG/ML
NITRITE UR QL STRIP: NEGATIVE
PH UR STRIP: 5.5 [PH] (ref 5–7.5)
POTASSIUM SERPL-SCNC: 4.2 MMOL/L (ref 3.5–5.2)
PROT SERPL-MCNC: 6.2 G/DL (ref 6–8.5)
PROT UR QL STRIP: ABNORMAL
RBC #/AREA URNS HPF: ABNORMAL /HPF (ref 0–2)
SODIUM SERPL-SCNC: 146 MMOL/L (ref 134–144)
SP GR UR: 1.02 (ref 1–1.03)
TRIGL SERPL-MCNC: 82 MG/DL (ref 0–149)
UNIDENT CRYS URNS QL MICRO: PRESENT
UROBILINOGEN UR STRIP-MCNC: 0.2 MG/DL (ref 0.2–1)
VLDLC SERPL CALC-MCNC: 17 MG/DL (ref 5–40)
WBC #/AREA URNS HPF: >30 /HPF (ref 0–5)
YEAST #/AREA URNS HPF: PRESENT /[HPF]

## 2021-12-29 ENCOUNTER — OFFICE VISIT (OUTPATIENT)
Dept: INTERNAL MEDICINE CLINIC | Age: 66
End: 2021-12-29
Payer: COMMERCIAL

## 2021-12-29 VITALS
DIASTOLIC BLOOD PRESSURE: 74 MMHG | TEMPERATURE: 97.7 F | HEIGHT: 59 IN | RESPIRATION RATE: 16 BRPM | OXYGEN SATURATION: 99 % | SYSTOLIC BLOOD PRESSURE: 124 MMHG | WEIGHT: 122 LBS | BODY MASS INDEX: 24.6 KG/M2 | HEART RATE: 74 BPM

## 2021-12-29 DIAGNOSIS — E83.42 HYPOMAGNESEMIA: ICD-10-CM

## 2021-12-29 DIAGNOSIS — I10 ESSENTIAL HYPERTENSION: ICD-10-CM

## 2021-12-29 DIAGNOSIS — E55.9 VITAMIN D DEFICIENCY: ICD-10-CM

## 2021-12-29 DIAGNOSIS — E11.9 TYPE 2 DIABETES MELLITUS WITHOUT COMPLICATION, WITHOUT LONG-TERM CURRENT USE OF INSULIN (HCC): Primary | ICD-10-CM

## 2021-12-29 DIAGNOSIS — G43.009 MIGRAINE WITHOUT AURA AND WITHOUT STATUS MIGRAINOSUS, NOT INTRACTABLE: ICD-10-CM

## 2021-12-29 DIAGNOSIS — F33.9 RECURRENT MAJOR DEPRESSIVE DISORDER, REMISSION STATUS UNSPECIFIED (HCC): ICD-10-CM

## 2021-12-29 DIAGNOSIS — E78.5 HYPERLIPIDEMIA, UNSPECIFIED HYPERLIPIDEMIA TYPE: ICD-10-CM

## 2021-12-29 DIAGNOSIS — Z00.00 LABORATORY TESTS ORDERED AS PART OF A COMPLETE PHYSICAL EXAM (CPE): ICD-10-CM

## 2021-12-29 PROCEDURE — 99214 OFFICE O/P EST MOD 30 MIN: CPT | Performed by: INTERNAL MEDICINE

## 2021-12-29 NOTE — PATIENT INSTRUCTIONS
Heart-Healthy Diet: Care Instructions  Your Care Instructions     A heart-healthy diet has lots of vegetables, fruits, nuts, beans, and whole grains, and is low in salt. It limits foods that are high in saturated fat, such as meats, cheeses, and fried foods. It may be hard to change your diet, but even small changes can lower your risk of heart attack and heart disease. Follow-up care is a key part of your treatment and safety. Be sure to make and go to all appointments, and call your doctor if you are having problems. It's also a good idea to know your test results and keep a list of the medicines you take. How can you care for yourself at home? Watch your portions  · Learn what a serving is. A \"serving\" and a \"portion\" are not always the same thing. Make sure that you are not eating larger portions than are recommended. For example, a serving of pasta is ½ cup. A serving size of meat is 2 to 3 ounces. A 3-ounce serving is about the size of a deck of cards. Measure serving sizes until you are good at [place]" them. Keep in mind that restaurants often serve portions that are 2 or 3 times the size of one serving. · To keep your energy level up and keep you from feeling hungry, eat often but in smaller portions. · Eat only the number of calories you need to stay at a healthy weight. If you need to lose weight, eat fewer calories than your body burns (through exercise and other physical activity). Eat more fruits and vegetables  · Eat a variety of fruit and vegetables every day. Dark green, deep orange, red, or yellow fruits and vegetables are especially good for you. Examples include spinach, carrots, peaches, and berries. · Keep carrots, celery, and other veggies handy for snacks. Buy fruit that is in season and store it where you can see it so that you will be tempted to eat it. · Cook dishes that have a lot of veggies in them, such as stir-fries and soups.   Limit saturated and trans fat  · Read food labels, and try to avoid saturated and trans fats. They increase your risk of heart disease. · Use olive or canola oil when you cook. · Bake, broil, grill, or steam foods instead of frying them. · Choose lean meats instead of high-fat meats such as hot dogs and sausages. Cut off all visible fat when you prepare meat. · Eat fish, skinless poultry, and meat alternatives such as soy products instead of high-fat meats. Soy products, such as tofu, may be especially good for your heart. · Choose low-fat or fat-free milk and dairy products. Eat foods high in fiber  · Eat a variety of grain products every day. Include whole-grain foods that have lots of fiber and nutrients. Examples of whole-grain foods include oats, whole wheat bread, and brown rice. · Buy whole-grain breads and cereals, instead of white bread or pastries. Limit salt and sodium  · Limit how much salt and sodium you eat to help lower your blood pressure. · Taste food before you salt it. Add only a little salt when you think you need it. With time, your taste buds will adjust to less salt. · Eat fewer snack items, fast foods, and other high-salt, processed foods. Check food labels for the amount of sodium in packaged foods. · Choose low-sodium versions of canned goods (such as soups, vegetables, and beans). Limit sugar  · Limit drinks and foods with added sugar. These include candy, desserts, and soda pop. Limit alcohol  · Limit alcohol to no more than 2 drinks a day for men and 1 drink a day for women. Too much alcohol can cause health problems. When should you call for help? Watch closely for changes in your health, and be sure to contact your doctor if:    · You would like help planning heart-healthy meals. Where can you learn more? Go to http://www.[employer].com/  Enter V137 in the search box to learn more about \"Heart-Healthy Diet: Care Instructions. \"  Current as of: August 22, 2019               Content Version: 12.6  © 0334-1970 [employer]. Care instructions adapted under license by [employer] (which disclaims liability or warranty for this information). If you have questions about a medical condition or this instruction, always ask your healthcare professional. [address] any warranty or liability for your use of this information. Learning About Low-Sodium Foods  What foods are low in sodium? The foods you eat contain nutrients, such as vitamins and minerals. Sodium is a nutrient. Your body needs the right amount to stay healthy and work as it should. You can use the list below to help you make choices about which foods to eat. Fruits  · Fresh, frozen, canned, or dried fruit  Vegetables  · Fresh or frozen vegetables, with no added salt  · Canned vegetables, low-sodium or with no added salt  Grains  · Bagels without salted tops  · Cereal with no added salt  · Corn tortillas  · Crackers with no added salt  · Oatmeal, cooked without salt  · Popcorn with no salt  · Pasta and noodles, cooked without salt  · Rice, cooked without salt  · Unsalted pretzels  Dairy and dairy alternatives  · Butter, unsalted  · Cream cheese  · Ice cream  · Milk  · Soy milk  Meats and other protein foods  · Beans and peas, canned with no salt  · Eggs  · Fresh fish (not smoked)  · Fresh meats (not smoked or cured)  · Nuts and nut butter, prepared without salt  · Poultry, not packaged with sodium solution  · Tofu, unseasoned  · Tuna, canned without salt  Seasonings  · Garlic  · Herbs and spices  · Lemon juice  · Mustard  · Olive oil  · Salt-free seasoning mixes  · Vinegar  Work with your doctor to find out how much of this nutrient you need. Depending on your health, you may need more or less of it in your diet. Where can you learn more?   Go to http://www.gray.com/  Enter S460 in the search box to learn more about \"Learning About Low-Sodium Foods.\"  Current as of: August 22, 2019               Content Version: 12.6  © 7460-4926 [employer]. Care instructions adapted under license by [employer] (which disclaims liability or warranty for this information). If you have questions about a medical condition or this instruction, always ask your healthcare professional. [address] any warranty or liability for your use of this information. Low Sodium Diet (2,000 Milligram): Care Instructions  Your Care Instructions     Too much sodium causes your body to hold on to extra water. This can raise your blood pressure and force your heart and kidneys to work harder. In very serious cases, this could cause you to be put in the hospital. It might even be life-threatening. By limiting sodium, you will feel better and lower your risk of serious problems. The most common source of sodium is salt. People get most of the salt in their diet from canned, prepared, and packaged foods. Fast food and restaurant meals also are very high in sodium. Your doctor will probably limit your sodium to less than 2,000 milligrams (mg) a day. This limit counts all the sodium in prepared and packaged foods and any salt you add to your food. Follow-up care is a key part of your treatment and safety. Be sure to make and go to all appointments, and call your doctor if you are having problems. It's also a good idea to know your test results and keep a list of the medicines you take. How can you care for yourself at home? Read food labels  · Read labels on cans and food packages. The labels tell you how much sodium is in each serving. Make sure that you look at the serving size. If you eat more than the serving size, you have eaten more sodium. · Food labels also tell you the Percent Daily Value for sodium. Choose products with low Percent Daily Values for sodium.   · Be aware that sodium can come in forms other than salt, including monosodium glutamate (MSG), sodium citrate, and sodium bicarbonate (baking soda). MSG is often added to Asian food. When you eat out, you can sometimes ask for food without MSG or added salt. Buy low-sodium foods  · Buy foods that are labeled \"unsalted\" (no salt added), \"sodium-free\" (less than 5 mg of sodium per serving), or \"low-sodium\" (less than 140 mg of sodium per serving). Foods labeled \"reduced-sodium\" and \"light sodium\" may still have too much sodium. Be sure to read the label to see how much sodium you are getting. · Buy fresh vegetables, or frozen vegetables without added sauces. Buy low-sodium versions of canned vegetables, soups, and other canned goods. Prepare low-sodium meals  · Cut back on the amount of salt you use in cooking. This will help you adjust to the taste. Do not add salt after cooking. One teaspoon of salt has about 2,300 mg of sodium. · Take the salt shaker off the table. · Flavor your food with garlic, lemon juice, onion, vinegar, herbs, and spices. Do not use soy sauce, lite soy sauce, steak sauce, onion salt, garlic salt, celery salt, mustard, or ketchup on your food. · Use low-sodium salad dressings, sauces, and ketchup. Or make your own salad dressings and sauces without adding salt. · Use less salt (or none) when recipes call for it. You can often use half the salt a recipe calls for without losing flavor. Other foods such as rice, pasta, and grains do not need added salt. · Rinse canned vegetables, and cook them in fresh water. This removes some--but not all--of the salt. · Avoid water that is naturally high in sodium or that has been treated with water softeners, which add sodium. Call your local water company to find out the sodium content of your water supply. If you buy bottled water, read the label and choose a sodium-free brand. Avoid high-sodium foods  · Avoid eating:  ? Smoked, cured, salted, and canned meat, fish, and poultry.   ? Ham, ram, hot dogs, and luncheon meats. ? Regular, hard, and processed cheese and regular peanut butter. ? Crackers with salted tops, and other salted snack foods such as pretzels, chips, and salted popcorn. ? Frozen prepared meals, unless labeled low-sodium. ? Canned and dried soups, broths, and bouillon, unless labeled sodium-free or low-sodium. ? Canned vegetables, unless labeled sodium-free or low-sodium. ? Western [place] fries, pizza, tacos, and other fast foods. ? Pickles, olives, ketchup, and other condiments, especially soy sauce, unless labeled sodium-free or low-sodium. Where can you learn more? Go to http://www.gray.com/  Enter V843 in the search box to learn more about \"Low Sodium Diet (2,000 Milligram): Care Instructions. \"  Current as of: August 22, 2019               Content Version: 12.6  © 8998-7469 [employer]. Care instructions adapted under license by [employer] (which disclaims liability or warranty for this information). If you have questions about a medical condition or this instruction, always ask your healthcare professional. [place] [address] any warranty or liability for your use of this information. Learning About Diabetes Food Guidelines  Your Care Instructions     Meal planning is important to manage diabetes. It helps keep your blood sugar at a target level (which you set with your doctor). You don't have to eat special foods. You can eat what your family eats, including sweets once in a while. But you do have to pay attention to how often you eat and how much you eat of certain foods. You may want to work with a dietitian or a certified diabetes educator (CDE) to help you plan meals and snacks. A dietitian or CDE can also help you lose weight if that is one of your goals. What should you know about eating carbs? Managing the amount of carbohydrate (carbs) you eat is an important part of healthy meals when you have diabetes. Carbohydrate is found in many foods. · Learn which foods have carbs. And learn the amounts of carbs in different foods. ? Bread, cereal, pasta, and rice have about 15 grams of carbs in a serving. A serving is 1 slice of bread (1 ounce), ½ cup of cooked cereal, or 1/3 cup of cooked pasta or rice. ? Fruits have 15 grams of carbs in a serving. A serving is 1 small fresh fruit, such as an apple or orange; ½ of a banana; ½ cup of cooked or canned fruit; ½ cup of fruit juice; 1 cup of melon or raspberries; or 2 tablespoons of dried fruit. ? Milk and no-sugar-added yogurt have 15 grams of carbs in a serving. A serving is 1 cup of milk or 2/3 cup of no-sugar-added yogurt. ? Starchy vegetables have 15 grams of carbs in a serving. A serving is ½ cup of mashed potatoes or sweet potato; 1 cup winter squash; ½ of a small baked potato; ½ cup of cooked beans; or ½ cup cooked corn or green peas. · Learn how much carbs to eat each day and at each meal. A dietitian or CDE can teach you how to keep track of the amount of carbs you eat. This is called carbohydrate counting. · If you are not sure how to count carbohydrate grams, use the Plate Method to plan meals. It is a good, quick way to make sure that you have a balanced meal. It also helps you spread carbs throughout the day. ? Divide your plate by types of foods. Put non-starchy vegetables on half the plate, meat or other protein food on one-quarter of the plate, and a grain or starchy vegetable in the final quarter of the plate. To this you can add a small piece of fruit and 1 cup of milk or yogurt, depending on how many carbs you are supposed to eat at a meal.  · Try to eat about the same amount of carbs at each meal. Do not \"save up\" your daily allowance of carbs to eat at one meal.  · Proteins have very little or no carbs per serving. Examples of proteins are beef, chicken, turkey, fish, eggs, tofu, cheese, cottage cheese, and peanut butter.  A serving size of meat is 3 ounces, which is about the size of a deck of cards. Examples of meat substitute serving sizes (equal to 1 ounce of meat) are 1/4 cup of cottage cheese, 1 egg, 1 tablespoon of peanut butter, and ½ cup of tofu. How can you eat out and still eat healthy? · Learn to estimate the serving sizes of foods that have carbohydrate. If you measure food at home, it will be easier to estimate the amount in a serving of restaurant food. · If the meal you order has too much carbohydrate (such as potatoes, corn, or baked beans), ask to have a low-carbohydrate food instead. Ask for a salad or green vegetables. · If you use insulin, check your blood sugar before and after eating out to help you plan how much to eat in the future. · If you eat more carbohydrate at a meal than you had planned, take a walk or do other exercise. This will help lower your blood sugar. What else should you know? · Limit saturated fat, such as the fat from meat and dairy products. This is a healthy choice because people who have diabetes are at higher risk of heart disease. So choose lean cuts of meat and nonfat or low-fat dairy products. Use olive or canola oil instead of butter or shortening when cooking. · Don't skip meals. Your blood sugar may drop too low if you skip meals and take insulin or certain medicines for diabetes. · Check with your doctor before you drink alcohol. Alcohol can cause your blood sugar to drop too low. Alcohol can also cause a bad reaction if you take certain diabetes medicines. Follow-up care is a key part of your treatment and safety. Be sure to make and go to all appointments, and call your doctor if you are having problems. It's also a good idea to know your test results and keep a list of the medicines you take. Where can you learn more? Go to http://www.[employer].com/  Enter I147 in the search box to learn more about \"Learning About Diabetes Food Guidelines. \"  Current as of: December 20, 2019 Content Version: 12.6  © 8985-2140 [employer] Care instructions adapted under license by [employer] (which disclaims liability or warranty for this information). If you have questions about a medical condition or this instruction, always ask your healthcare professional. [address] any warranty or liability for your use of this information. Learning About Diabetes Food Guidelines  Your Care Instructions     Meal planning is important to manage diabetes. It helps keep your blood sugar at a target level (which you set with your doctor). You don't have to eat special foods. You can eat what your family eats, including sweets once in a while. But you do have to pay attention to how often you eat and how much you eat of certain foods. You may want to work with a dietitian or a certified diabetes educator (CDE) to help you plan meals and snacks. A dietitian or CDE can also help you lose weight if that is one of your goals. What should you know about eating carbs? Managing the amount of carbohydrate (carbs) you eat is an important part of healthy meals when you have diabetes. Carbohydrate is found in many foods. · Learn which foods have carbs. And learn the amounts of carbs in different foods. ? Bread, cereal, pasta, and rice have about 15 grams of carbs in a serving. A serving is 1 slice of bread (1 ounce), ½ cup of cooked cereal, or 1/3 cup of cooked pasta or rice. ? Fruits have 15 grams of carbs in a serving. A serving is 1 small fresh fruit, such as an apple or orange; ½ of a banana; ½ cup of cooked or canned fruit; ½ cup of fruit juice; 1 cup of melon or raspberries; or 2 tablespoons of dried fruit. ? Milk and no-sugar-added yogurt have 15 grams of carbs in a serving. A serving is 1 cup of milk or 2/3 cup of no-sugar-added yogurt. ? Starchy vegetables have 15 grams of carbs in a serving.  A serving is ½ cup of mashed potatoes or sweet potato; 1 cup winter squash; ½ of a small baked potato; ½ cup of cooked beans; or ½ cup cooked corn or green peas. · Learn how much carbs to eat each day and at each meal. A dietitian or CDE can teach you how to keep track of the amount of carbs you eat. This is called carbohydrate counting. · If you are not sure how to count carbohydrate grams, use the Plate Method to plan meals. It is a good, quick way to make sure that you have a balanced meal. It also helps you spread carbs throughout the day. ? Divide your plate by types of foods. Put non-starchy vegetables on half the plate, meat or other protein food on one-quarter of the plate, and a grain or starchy vegetable in the final quarter of the plate. To this you can add a small piece of fruit and 1 cup of milk or yogurt, depending on how many carbs you are supposed to eat at a meal.  · Try to eat about the same amount of carbs at each meal. Do not \"save up\" your daily allowance of carbs to eat at one meal.  · Proteins have very little or no carbs per serving. Examples of proteins are beef, chicken, turkey, fish, eggs, tofu, cheese, cottage cheese, and peanut butter. A serving size of meat is 3 ounces, which is about the size of a deck of cards. Examples of meat substitute serving sizes (equal to 1 ounce of meat) are 1/4 cup of cottage cheese, 1 egg, 1 tablespoon of peanut butter, and ½ cup of tofu. How can you eat out and still eat healthy? · Learn to estimate the serving sizes of foods that have carbohydrate. If you measure food at home, it will be easier to estimate the amount in a serving of restaurant food. · If the meal you order has too much carbohydrate (such as potatoes, corn, or baked beans), ask to have a low-carbohydrate food instead. Ask for a salad or green vegetables. · If you use insulin, check your blood sugar before and after eating out to help you plan how much to eat in the future.   · If you eat more carbohydrate at a meal than you had planned, take a walk or do other exercise. This will help lower your blood sugar. What else should you know? · Limit saturated fat, such as the fat from meat and dairy products. This is a healthy choice because people who have diabetes are at higher risk of heart disease. So choose lean cuts of meat and nonfat or low-fat dairy products. Use olive or canola oil instead of butter or shortening when cooking. · Don't skip meals. Your blood sugar may drop too low if you skip meals and take insulin or certain medicines for diabetes. · Check with your doctor before you drink alcohol. Alcohol can cause your blood sugar to drop too low. Alcohol can also cause a bad reaction if you take certain diabetes medicines. Follow-up care is a key part of your treatment and safety. Be sure to make and go to all appointments, and call your doctor if you are having problems. It's also a good idea to know your test results and keep a list of the medicines you take. Where can you learn more? Go to http://www.allan.com/  Enter I147 in the search box to learn more about \"Learning About Diabetes Food Guidelines. \"  Current as of: December 20, 2019               Content Version: 12.6  © 9391-2789 [employer]. Care instructions adapted under license by [employer] (which disclaims liability or warranty for this information). If you have questions about a medical condition or this instruction, always ask your healthcare professional. [address] any warranty or liability for your use of this information.

## 2021-12-29 NOTE — PROGRESS NOTES
1. \"Have you been to the ER, urgent care clinic since your last visit? [facility] Hospitalized since your last visit? \" No    2. \"Have you seen or consulted any other health care providers outside of the [address] [place] [address] since your last visit? \" No     3. For patients aged 39-70: Has the patient had a colonoscopy / FIT/ Cologuard? Yes, HM satisfied with blue hyperlink     If the patient is female:    4. For patients aged 41-77: Has the patient had a mammogram within the past 2 years? Yes, HM satisfied with blue hyperlink    5. For patients aged 21-65: Has the patient had a pap smear?  Yes, HM satisfied with blue hyperlink

## 2022-01-02 PROBLEM — E83.42 HYPOMAGNESEMIA: Status: ACTIVE | Noted: 2022-01-02

## 2022-01-02 NOTE — PROGRESS NOTES
HPI:   Ekta Bird is a 61y.o. year old female who presents today for a routine visit. She has a history of hypertension, hyperlipidemia, diabetes mellitus, migraine headaches, depression, anxiety, ADHD, and lumbar radiculopathy. She has completed the Moderna COVID-19 vaccine series but has not yet received the Moderna booster dose. She reports that she is doing well. She is continuing to work during the pandemic, but states that there is only one other person in her office and they are . She reports that she is continuing to exercise at the [facility] 4-5 days/week and is extremely pleased with her 14 pound weight loss since her last visit. She was started on Ozempic in 9/2021 and has been tolerating it reasonably well after initially having difficulty with nausea. She states that this has now resolved. She is otherwise without new complaints and overall feeling well. Summary of prior hospitalizations and medical history:  She was hospitalized from 7/9-7/11/2019 at [facility] after presenting to [facility] ED with a severe posterior headache, unlike her usual migraines, with associated light sensitivity and confusion. Evaluation included WBC 6.0, Hb 11.9/ Hct 35.2, Na 138, Ca 9.6, creatinine 1.0/ eGFR >60, head CT scan no acute intracranial abnormality; MRI brain (7/10/2019) mild nonspecific white matter disease likely representing chronic small vessel changes, and very mild bilateral occipital paramedian encephalomalacic changes, likely from chronic small infarcts; brain MRA (7/10/2019) no high-grade intracranial stenosis; neck MRA (7/10/2019) mild proximal ICA irregularity without hemodynamically significant carotid stenosis. She was evaluated by Dr. Shahid Rogers of neurology who felt presentation was consistent with status migrainosus.  Attempted treatment with Benadryl and compazine without much improvement, but notable improvement after Medrol dose pack started and subsequently discharged. On 7/24/2019, she noted onset of left facial weakness with difficulty with eye closure and forehead elevation, and presented to [facility] ED and diagnosed with left facial palsy. She was treated with prednisone 60 mg daily x 7 days and Valtrex. She was evaluated by Dr. Nghia Hanna on 8/14/2019, and it was his opinion that her symptoms were likely not due to status migrainosus, but rather were secondary to a viral meningoencephalitis given the clear change in the pattern of her headaches and the subsequent development of a cranial nerve palsy. She has had complete resolution of her facial palsy, and a return to baseline for her migraine headaches, which respond readily to sumatriptan. She has a history of hypertension, treated with propranolol and lisinopril. She does not check her blood pressure at home. She has begun walking regularly for exercise. She denies any chest pain, shortness of breath at rest or with exertion, palpitations, or lightheadedness. She also has a history of hyperlipidemia, treated with high intensity dose rosuvastatin. She has a history of diabetes mellitus, and was started on metformin in 2/2015 for a HbA1c of 7.5. She does not monitor her blood sugars at home. She denies any polyuria, polydipsia, nocturia, or blurry vision, and has no history of retinopathy, neuropathy, or nephropathy. She has regular eye exams with Dr. Rochelle Simpson. She also has a history of migraines without aura, which usually respond to Imitrex. Also now on propranolol for preventive therapy with good control. She has a history of lumbar radiculopathy (R>L), and was evaluated by Dr. Jolley in 5/2015. Lumbar x-rays showed degenerative changes in L4-L5 and L5-S1. She was treated conservatively with physical therapy and ibuprofen and reports significant improvement. She was evaluated by MOJGAN Slater in 12/2016 for exacerbation of low back pain with right sciatica.  She was treated with steroids and Norco with improvement. On 4/17/2018, she presented with increasing low back pain with bilateral sciatica. She was treated with a prednisone taper with initial improvement. However, her symptoms worsened and she was referred to Dr. Britany Camargo for evaluation. She gave her a Toradol injection, and ordered a lumbar MRI (6/24/2018) which showed a large right disc extrusion at L5-S1 which compressed the descending right S1 nerve root. She was referred to pain management and underwent a selective L5 and S1 selective nerve root blocks by Dr. Dorota Hernández on 7/26/2018. However, due to persistent symptoms, she was referred to Dr. Kelin Begum and subsequently underwent a right L5-S1 laminectomy and diskectomy on 9/6/8333 without complications. She states that she did well and continues to do her back stretches and exercises. She states that she only has occasional discomfort. She had a screening colonoscopy in 10/2014 by Dr. Nii Freire which was normal. Follow-up due in 10 years. She denies any abdominal pain, nausea, vomiting, melena, hematochezia, or change in bowel movements. She has a history of depression and anxiety, and is followed by Dr. Kristie Valentine. She is currently taking Abilify 5 mg daily and Prozac 10 mg daily. Previously on lorazepam at bedtime, but no longer requiring.      Past Medical History:   Diagnosis Date    ADHD     Anxiety     Bilateral lumbar radiculopathy     Carotid bruit     right    Depression     Diabetes mellitus (HCC)     HCD (hypertensive cardiovascular disease)     Hyperlipidemia     Hypertension     Migraine headache     Plantar fasciitis      Past Surgical History:   Procedure Laterality Date    HX SEPTOPLASTY  6/2002    HX TONSILLECTOMY      NEUROLOGICAL PROCEDURE UNLISTED  08/06/2018    laminectomy      Current Outpatient Medications   Medication Sig    ondansetron (ZOFRAN ODT) 4 mg disintegrating tablet Take 1 Tablet by mouth every eight (8) hours as needed for Nausea or Nausea or Vomiting.  ergocalciferol (ERGOCALCIFEROL) 1,250 mcg (50,000 unit) capsule Take 1 Capsule by mouth every fourteen (14) days.  semaglutide (OZEMPIC) 0.25 mg or 0.5 mg/dose (2 mg/1.5 ml) subq pen 0.25 mg SC every 7 days for 4 weeks, then increase to 0.5 mg every 7 days and continue at this dose.  Insulin Needles, Disposable, 31 gauge x 5/16\" ndle Use for weekly SC injection of Ozempic    propranoloL (INDERAL) 40 mg tablet Take 1 Tab by mouth two (2) times a day.  atorvastatin (LIPITOR) 80 mg tablet Take 1 Tab by mouth daily.  metFORMIN ER (GLUCOPHAGE XR) 500 mg tablet TAKE 2 TABLET BY MOUTH TWICE A DAY WITH MEALS    SUMAtriptan (IMITREX) 50 mg tablet Take 1 Tab by mouth daily as needed for Migraine.  lisinopriL (PRINIVIL, ZESTRIL) 10 mg tablet Take 1 Tab by mouth daily.  ARIPiprazole (ABILIFY) 5 mg tablet Take  by mouth daily.  white petrolatum-mineral oil (ARTIFICIAL TEARS, VLADISLAV/MIN,) 83-15 % ophthalmic ointment Administer  to left eye nightly. Small amount    FLUoxetine (PROZAC) 20 mg capsule Take 10 mg by mouth daily.  naloxone (NARCAN) 4 mg/actuation nasal spray Use 1 spray intranasally, then discard. Repeat with new spray every 2 min as needed for opioid overdose symptoms, alternating nostrils. No current facility-administered medications for this visit. Allergies and Intolerances: Allergies   Allergen Reactions    Pcn [Penicillins] Rash    Sulfa (Sulfonamide Antibiotics) Rash     Family History: She has no family history of colon or breast cancer. Her mother  from a CVA. Her father  from throat cancer and cirrhosis.    Family History   Problem Relation Age of Onset    Hypertension Brother         x2    Elevated Lipids Brother     Stroke Mother     Heart Surgery Mother         CABG    Cancer Father         cancer of the mouth    Hypertension Father     Hypertension Brother     Elevated Lipids Brother      Social History:   She  reports that she quit smoking about 33 years ago. She has a 1.00 pack-year smoking history. She has never used smokeless tobacco. She smoked approximately 0.25 ppd for 2 years, stopping in . She is a  and has one daughter. She was employed as a . 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 She was previously working as the [employer] for his business. Social History     Substance and Sexual Activity   Alcohol Use No    Alcohol/week: 1.7 standard drinks    Types: 2 Glasses of wine per week     Immunization History:  Immunization History   Administered Date(s) Administered    COVID-19, Moderna, Primary or Immunocompromised Series, MRNA, PF, 100mcg/0.5mL 2021, 2021    Influenza Vaccine (Quad) PF (>6 Mo Flulaval, Fluarix, and >3 Yrs Afluria, Fluzone 06231) 2018, 2019, 10/22/2020, 2021    Influenza Vaccine PF 2014, 10/13/2014    Influenza Vaccine Split 2011    Influenza Vaccine Whole 10/05/2010    Pneumococcal Polysaccharide (PPSV-23) 2017    TD Vaccine 2007    Tdap 11/10/2016    Zoster Recombinant 2018, 2018       Review of Systems:   As above included in HPI. Otherwise 11 point review of systems negative including constitutional, skin, HENT, eyes, respiratory, cardiovascular, gastrointestinal, genitourinary, musculoskeletal, endocrine, hematologic, allergy, and neurologic. Physical:   Visit Vitals  /74 (BP 1 Location: Left arm, BP Patient Position: Sitting)   Pulse 74   Temp 97.7 °F (36.5 °C) (Temporal)   Resp 16   Ht 4' 11\" (1.499 m)   Wt 122 lb (55.3 kg)   SpO2 99%   BMI 24.64 kg/m²       Patient appears in no apparent distress. Affect is appropriate. HEENT: PERRLA, anicteric, oropharynx clear, no JVD, adenopathy or thyromegaly. No carotid bruits or radiated murmur. Lungs: clear to auscultation, no wheezes, rhonchi, or rales.   Heart: regular rate and rhythm. No murmur, rubs, gallops  Abdomen: soft, nontender, nondistended, normal bowel sounds, no hepatosplenomegaly or masses. Extremities: without edema. Diabetic foot exam:     Left Foot:   Visual Exam: normal    Pulse DP: 2+ (normal)   Filament test: normal sensation    Vibratory sensation: normal      Right Foot:   Visual Exam: normal    Pulse DP: 2+ (normal)   Filament test: normal sensation    Vibratory sensation: normal                  Review of Data:  Labs:  No visits with results within 2 Week(s) from this visit. Latest known visit with results is:   Office Visit on 09/14/2021   Component Date Value Ref Range Status    Glucose 12/17/2021 99  65 - 99 mg/dL Final    BUN 12/17/2021 14  8 - 27 mg/dL Final    Creatinine 12/17/2021 0.77  0.57 - 1.00 mg/dL Final    GFR est non-AA 12/17/2021 82  >59 mL/min/1.73 Final    GFR est AA 12/17/2021 94  >59 mL/min/1.73 Final    BUN/Creatinine ratio 12/17/2021 18  12 - 28 Final    Sodium 12/17/2021 146* 134 - 144 mmol/L Final    Potassium 12/17/2021 4.2  3.5 - 5.2 mmol/L Final    Chloride 12/17/2021 105  96 - 106 mmol/L Final    CO2 12/17/2021 24  20 - 29 mmol/L Final    Calcium 12/17/2021 8.9  8.7 - 10.3 mg/dL Final    Protein, total 12/17/2021 6.2  6.0 - 8.5 g/dL Final    Albumin 12/17/2021 4.4  3.8 - 4.8 g/dL Final    GLOBULIN, TOTAL 12/17/2021 1.8  1.5 - 4.5 g/dL Final    A-G Ratio 12/17/2021 2.4* 1.2 - 2.2 Final    Bilirubin, total 12/17/2021 0.5  0.0 - 1.2 mg/dL Final    Alk.  phosphatase 12/17/2021 66  44 - 121 IU/L Final    AST (SGOT) 12/17/2021 18  0 - 40 IU/L Final    ALT (SGPT) 12/17/2021 22  0 - 32 IU/L Final    Specific Gravity 12/17/2021 1.021  1.005 - 1.030 Final    pH (UA) 12/17/2021 5.5  5.0 - 7.5 Final    Color 12/17/2021 Yellow  Yellow Final    Appearance 12/17/2021 Cloudy* Clear Final    Leukocyte Esterase 12/17/2021 3+* Negative Final    Protein 12/17/2021 Trace  Negative/Trace Final    Glucose 12/17/2021 Negative Negative Final    Ketone 12/17/2021 Trace* Negative Final    Blood 12/17/2021 Negative  Negative Final    Bilirubin 12/17/2021 Negative  Negative Final    Urobilinogen 12/17/2021 0.2  0.2 - 1.0 mg/dL Final    Nitrites 12/17/2021 Negative  Negative Final    Microscopic Examination 12/17/2021 See additional order   Final    WBC 12/17/2021 >30* 0 - 5 /hpf Final    RBC 12/17/2021 None seen  0 - 2 /hpf Final    Epithelial cells 12/17/2021 0-10  0 - 10 /hpf Final    Casts 12/17/2021 None seen  None seen /lpf Final    Crystals 12/17/2021 Present* N/A Final    Crystal type 12/17/2021 Calcium Oxalate  N/A Final    Bacteria 12/17/2021 Many* None seen/Few Final    Yeast 12/17/2021 Present* None seen Final    Cholesterol, total 12/17/2021 106  100 - 199 mg/dL Final    Triglyceride 12/17/2021 82  0 - 149 mg/dL Final    HDL Cholesterol 12/17/2021 31* >39 mg/dL Final    VLDL, calculated 12/17/2021 17  5 - 40 mg/dL Final    LDL, calculated 12/17/2021 58  0 - 99 mg/dL Final    Creatinine, urine 12/17/2021 186.8  Not Estab. mg/dL Final    Microalbumin, urine 12/17/2021 35.9  Not Estab. ug/mL Final    Microalb/Creat ratio (ug/mg creat.) 12/17/2021 19  0 - 29 mg/g creat Final    INTERPRETATION 12/17/2021 Note   Final    Hemoglobin A1c 12/17/2021 6.1* 4.8 - 5.6 % Final    Estimated average glucose 12/17/2021 128  mg/dL Final    VITAMIN D, 25-HYDROXY 12/17/2021 74.3  30.0 - 100.0 ng/mL Final    Magnesium 12/17/2021 1.3* 1.6 - 2.3 mg/dL Final       Health Maintenance:  Screening:    Mammogram: negative (9/2021)   PAP smear: well women exams by Dr. Mikki Driscoll (last 4/2021)    Colorectal: colonoscopy (10/2014) normal. Dr. Elizabeth Lady. Due 10/2024. Depression: under care of MOJGAN Polanco.    DM (HbA1c/FPG): HbA1c 6.1 (12/2021)   Hepatitis C: negative (5/2017)   Falls: none   DEXA: osteopenia (2/2019) Dr. Mai March   Glaucoma: regular eye exams with Dr. Nyla Bansal (11/2021)   Smoking: none   Vitamin D: 74.3 (12/2021)   Medicare Wellness: N/A      Impression:  Patient Active Problem List   Diagnosis Code    HCD (hypertensive cardiovascular disease) I11.9    Hyperlipidemia E78.5    Migraine G43.909    Bilateral lumbar radiculopathy M54.16    Type 2 diabetes mellitus without complication ([facility] [address] X01.9    Essential hypertension I10    Vitamin D deficiency E55.9    Recurrent [facility] depressive disorder ([facility] F33.9    Overweight with body mass index (BMI) of 27 to 27.9 in adult E66.3, Z68.27    DDD (degenerative disc disease), lumbar M51.36    HNP (herniated nucleus pulposus), lumbar M51.26    History of Bell's palsy Z86.69    Hypomagnesemia E83.42       Plan:  1. Hypertension. Blood pressure well controlled today on current regimen of lisinopril 10 mg daily and propranolol 40 mg bid. Renal function remains normal with creatinine 0.77/ eGFR 82. Advised to increase fluid intake given evidence of hypernatremia and urine with ketones. Magnesium low at 1.3 and advised to resume magnesium oxide 400 mg daily. Will reassess at next visit. Continue to follow. 2. Hyperlipidemia. Increased to high intensity 80 mg dose of atorvastatin in 4/2021 with LDL improved to 58 and HDL 31 today, indicative of excellent control and likely related to weight loss. Emphasized importance of continued lifestyle modifications, including diet, exercise, and weight loss. Continue to follow. 3. Diabetes mellitus. Significant improvement today with the addition of Ozempic 0.5 mg weekly to metformin ER 1000 mg bid. Weight decreased 14 pounds and HbA1c improved to 6.1. No evidence of microvascular complications. On statin and lisinopril. Continue follow-up for annual eye exams. Foot exam normal today. Urine microalbumin/ creatinine ratio with resolution of microalbuminuria (19 mg/g) today. On lisinopril. Emphasized importance of continued lifestyle modifications, including diet and exercise, and maintaining weight loss.    4. Migraines. Returned to baseline pattern involving pain on side of head radiating to lateral neck. On propranolol 40 mg twice daily as preventive therapy and readily responds to Imitrex if needed. Reports continued good control of headaches. 5. History of left facial nerve palsy. Presented with severe headache resulting in hospital admission to [facility]. Evaluation including head CT scan, brain MRI/MRA and neck MRA unrevealing and Dr. Julita Bird of neurology consulted and felt most consistent with status migrainosus. Prescribed medrol dose pack and discharged. Developed left facial nerve palsy 10 days later and treated with prednisone 60 mg and Valtrex for 7 days. Evaluated by Dr. 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 Now completely improved with resolution of facial nerve palsy and headaches returning to baseline pattern for migraines. 6. Lumbar herniated disc with right sciatica, s/p right L5-S1 laminectomy and discectomy. Doing well without significant discomfort. Stressed improtance of continuing stretches and exercise. Follow. 7. Depression/ADHD. Reports good control of symptoms on new regimen of Abilify and Prozac. Reports no longer requiring lorazepam at bedtime. Being followed by MOJGAN Owen at [facility]. 8. Overweight. Weight increased 15 pounds since 8/2019, and had been overall stable despite the patient exercising now 4 to 5 days/week in attempt to lose weight. Emphasized importance of continued lifestyle modifications, including diet, exercise, and weight loss. Started on Ozempic in 9/2021 and weight decreased 14 pounds today. Patient very pleased as now BMI <25 and she has returned to her baseline weight. Advised to maintain. 9. Health maintenance.   Completed the Moderna COVID-19 vaccine series and advised to proceed with the Moderna booster dose. Already received the influenza vaccine. Completed 2/2 doses of Shingrix vaccine. Other immunizations up to date. Well women exam completed by Dr. Eugenio Patrick in 4/19/2021. Will request report. Mammogram up to date. Colonoscopy due 2024. Continue regular eye exams with Dr. Alonso West. Vitamin D level now improved on ergocalciferol to every 14 days. Patient understands recommendations and agrees with plan. Follow-up in 6 months.

## 2022-01-05 RX ORDER — SUMATRIPTAN 50 MG/1
50 TABLET, FILM COATED ORAL
Qty: 27 TABLET | Refills: 1 | Status: SHIPPED | OUTPATIENT
Start: 2022-01-05 | End: 2022-07-11

## 2022-02-18 ENCOUNTER — TELEPHONE (OUTPATIENT)
Dept: INTERNAL MEDICINE CLINIC | Age: 67
End: 2022-02-18

## 2022-02-18 NOTE — TELEPHONE ENCOUNTER
Patient was given the message per Dr. Yohana Childress. Patient states she will wait to see if Dr. Cj Paul will call her in something. Patient states her eye is itching, crusty and burns. Patient was advise she might need to go to urgent care to make sure she does not have pick eye. Patient states no I don't have pink eye. Patient aware the message will be sent to Dr. Cj Paul for review.

## 2022-02-18 NOTE — TELEPHONE ENCOUNTER
----- Message from Iman Fuentes sent at 2/18/2022  1:08 PM EST -----  Subject: Appointment Request    Reason for Call: Semi-Routine Eye Problem    QUESTIONS  Type of Appointment? Established Patient  Reason for appointment request? No appointments available during search  Additional Information for Provider?   ---------------------------------------------------------------------------  --------------  CALL BACK INFO  What is the best way for the office to contact you? OK to leave message on   voicemail  Preferred Call Back Phone Number? [phone]  ---------------------------------------------------------------------------  --------------  SCRIPT ANSWERS  Relationship to Patient? Self  Have you had an injury or trauma? No  Have you had sudden loss of vision? No  Are you having eye pain? No  Are you having a headache? No  Have you recently (14 days) seen a provider for this issue? No  Have you been diagnosed with, awaiting test results for, or told that you   are suspected of having COVID-19 (Coronavirus)? (If patient has tested   negative or was tested as a requirement for work, school, or travel and   not based on symptoms, answer no)? No  Within the past 10 days have you developed any of the following symptoms   (answer no if symptoms have been present longer than 10 days or began   more than 10 days ago)? Fever or Chills, Cough, Shortness of breath or   difficulty breathing, Loss of taste or smell, Sore throat, Nasal   congestion, Sneezing or runny nose, Fatigue or generalized body aches   (answer no if pain is specific to a body part e.g. back pain), Diarrhea,   Headache? No  Have you had close contact with someone with COVID-19 in the last 7 days? No  (Service Expert  click yes below to proceed with [employer] As Usual   Scheduling)?  Yes

## 2022-02-18 NOTE — TELEPHONE ENCOUNTER
I spoke with pt re: message below \"evita-routine eye problem\"    Pt said right eye lid, crusty, burns, somewhat peeling since 4 to 5 days ago. Stated Dr Kike Crane had prescribed a cream years ago for same issue that helped.  She didn't remember the name but said he first prescribed an ointment then changed to a cream.    Pharmacy is [facility] [address]    Also sending to provider on call Dr Lakhwinder Padron

## 2022-02-18 NOTE — TELEPHONE ENCOUNTER
Unable to locate any ophthalmic ointment that she has been prescribed in the past although record only goes back as far as 2011. Please advise evaluation in urgent care as unable to prescribe based upon symptoms without evaluation.

## 2022-02-18 NOTE — TELEPHONE ENCOUNTER
She will need to wait until Monday or go to urgent care to be assessed and treated. Unfortunately I am unable to do this without seeing the patient.   Thank you

## 2022-02-18 NOTE — TELEPHONE ENCOUNTER
----- Message from Janna Mays sent at 2/18/2022  1:08 PM EST -----  Subject: Appointment Request    Reason for Call: Semi-Routine Eye Problem    QUESTIONS  Type of Appointment? Established Patient  Reason for appointment request? No appointments available during search  Additional Information for Provider?   ---------------------------------------------------------------------------  --------------  CALL BACK INFO  What is the best way for the office to contact you? OK to leave message on   voicemail  Preferred Call Back Phone Number? [phone]  ---------------------------------------------------------------------------  --------------  SCRIPT ANSWERS  Relationship to Patient? Self  Have you had an injury or trauma? No  Have you had sudden loss of vision? No  Are you having eye pain? No  Are you having a headache? No  Have you recently (14 days) seen a provider for this issue? No  Have you been diagnosed with, awaiting test results for, or told that you   are suspected of having COVID-19 (Coronavirus)? (If patient has tested   negative or was tested as a requirement for work, school, or travel and   not based on symptoms, answer no)? No  Within the past 10 days have you developed any of the following symptoms   (answer no if symptoms have been present longer than 10 days or began   more than 10 days ago)? Fever or Chills, Cough, Shortness of breath or   difficulty breathing, Loss of taste or smell, Sore throat, Nasal   congestion, Sneezing or runny nose, Fatigue or generalized body aches   (answer no if pain is specific to a body part e.g. back pain), Diarrhea,   Headache? No  Have you had close contact with someone with COVID-19 in the last 7 days? No  (Service Expert - click yes below to proceed with [employer] As Usual   Scheduling)?  Yes

## 2022-03-02 RX ORDER — LISINOPRIL 10 MG/1
TABLET ORAL
Qty: 90 TABLET | Refills: 3 | Status: SHIPPED | OUTPATIENT
Start: 2022-03-02

## 2022-03-13 RX ORDER — ERGOCALCIFEROL 1.25 MG/1
50000 CAPSULE ORAL
Qty: 6 CAPSULE | Refills: 1 | Status: SHIPPED | OUTPATIENT
Start: 2022-03-13 | End: 2022-08-03

## 2022-03-16 ENCOUNTER — PATIENT MESSAGE (OUTPATIENT)
Dept: INTERNAL MEDICINE CLINIC | Age: 67
End: 2022-03-16

## 2022-03-19 PROBLEM — I10 ESSENTIAL HYPERTENSION: Status: ACTIVE | Noted: 2017-04-23

## 2022-03-19 PROBLEM — M51.36 DDD (DEGENERATIVE DISC DISEASE), LUMBAR: Status: ACTIVE | Noted: 2018-06-14

## 2022-03-19 PROBLEM — F33.9 RECURRENT MAJOR DEPRESSIVE DISORDER (HCC): Status: ACTIVE | Noted: 2018-05-02

## 2022-03-19 PROBLEM — Z86.69 HISTORY OF BELL'S PALSY: Status: ACTIVE | Noted: 2019-11-23

## 2022-03-19 PROBLEM — E83.42 HYPOMAGNESEMIA: Status: ACTIVE | Noted: 2022-01-02

## 2022-03-19 PROBLEM — M51.369 DDD (DEGENERATIVE DISC DISEASE), LUMBAR: Status: ACTIVE | Noted: 2018-06-14

## 2022-03-19 PROBLEM — M51.26 HNP (HERNIATED NUCLEUS PULPOSUS), LUMBAR: Status: ACTIVE | Noted: 2018-07-31

## 2022-03-19 PROBLEM — E55.9 VITAMIN D DEFICIENCY: Status: ACTIVE | Noted: 2017-05-14

## 2022-03-19 RX ORDER — METFORMIN HYDROCHLORIDE 500 MG/1
TABLET, EXTENDED RELEASE ORAL
Qty: 360 TABLET | Refills: 3 | Status: SHIPPED | OUTPATIENT
Start: 2022-03-19

## 2022-03-19 RX ORDER — PROPRANOLOL HYDROCHLORIDE 40 MG/1
TABLET ORAL
Qty: 180 TABLET | Refills: 3 | Status: SHIPPED | OUTPATIENT
Start: 2022-03-19

## 2022-03-19 RX ORDER — ATORVASTATIN CALCIUM 80 MG/1
TABLET, FILM COATED ORAL
Qty: 90 TABLET | Refills: 3 | Status: SHIPPED | OUTPATIENT
Start: 2022-03-19

## 2022-03-20 PROBLEM — E66.3 OVERWEIGHT WITH BODY MASS INDEX (BMI) OF 27 TO 27.9 IN ADULT: Status: ACTIVE | Noted: 2018-05-06

## 2022-04-13 ENCOUNTER — OFFICE VISIT (OUTPATIENT)
Dept: INTERNAL MEDICINE CLINIC | Age: 67
End: 2022-04-13
Payer: COMMERCIAL

## 2022-04-13 ENCOUNTER — PATIENT MESSAGE (OUTPATIENT)
Dept: INTERNAL MEDICINE CLINIC | Age: 67
End: 2022-04-13

## 2022-04-13 VITALS
DIASTOLIC BLOOD PRESSURE: 60 MMHG | HEART RATE: 69 BPM | TEMPERATURE: 97.5 F | OXYGEN SATURATION: 98 % | BODY MASS INDEX: 23.79 KG/M2 | HEIGHT: 59 IN | WEIGHT: 118 LBS | SYSTOLIC BLOOD PRESSURE: 112 MMHG | RESPIRATION RATE: 16 BRPM

## 2022-04-13 DIAGNOSIS — R30.0 DYSURIA: ICD-10-CM

## 2022-04-13 DIAGNOSIS — E11.9 TYPE 2 DIABETES MELLITUS WITHOUT COMPLICATION, WITHOUT LONG-TERM CURRENT USE OF INSULIN (HCC): ICD-10-CM

## 2022-04-13 DIAGNOSIS — F33.42 RECURRENT MAJOR DEPRESSIVE DISORDER, IN FULL REMISSION (HCC): ICD-10-CM

## 2022-04-13 DIAGNOSIS — N30.00 ACUTE CYSTITIS WITHOUT HEMATURIA: Primary | ICD-10-CM

## 2022-04-13 DIAGNOSIS — I10 ESSENTIAL HYPERTENSION: ICD-10-CM

## 2022-04-13 DIAGNOSIS — N39.41 URGE INCONTINENCE: ICD-10-CM

## 2022-04-13 DIAGNOSIS — E78.5 HYPERLIPIDEMIA, UNSPECIFIED HYPERLIPIDEMIA TYPE: ICD-10-CM

## 2022-04-13 LAB
BILIRUB UR QL STRIP: NEGATIVE
GLUCOSE UR-MCNC: NEGATIVE MG/DL
KETONES P FAST UR STRIP-MCNC: NEGATIVE MG/DL
PH UR STRIP: 5.5 [PH] (ref 4.6–8)
PROT UR QL STRIP: NORMAL
SP GR UR STRIP: 1.02 (ref 1–1.03)
UA UROBILINOGEN AMB POC: NORMAL (ref 0.2–1)
URINALYSIS CLARITY POC: NORMAL
URINALYSIS COLOR POC: YELLOW
URINE BLOOD POC: NORMAL
URINE LEUKOCYTES POC: NORMAL
URINE NITRITES POC: POSITIVE

## 2022-04-13 PROCEDURE — 81003 URINALYSIS AUTO W/O SCOPE: CPT | Performed by: INTERNAL MEDICINE

## 2022-04-13 PROCEDURE — 99214 OFFICE O/P EST MOD 30 MIN: CPT | Performed by: INTERNAL MEDICINE

## 2022-04-13 RX ORDER — CEPHALEXIN 500 MG/1
500 CAPSULE ORAL 2 TIMES DAILY
Qty: 14 CAPSULE | Refills: 0 | Status: SHIPPED | OUTPATIENT
Start: 2022-04-13 | End: 2022-04-17 | Stop reason: ALTCHOICE

## 2022-04-13 NOTE — PROGRESS NOTES
1. \"Have you been to the ER, urgent care clinic since your last visit? Hospitalized since your last visit? \" No    2. \"Have you seen or consulted any other health care providers outside of the [address] [place] [address] since your last visit? \" No     3. For patients aged 39-70: Has the patient had a colonoscopy / FIT/ Cologuard? Yes - no Care Gap present      If the patient is female:    4. For patients aged 41-77: Has the patient had a mammogram within the past 2 years? Yes - no Care Gap present      5. For patients aged 21-65: Has the patient had a pap smear?  Yes - no Care Gap present

## 2022-04-13 NOTE — PATIENT INSTRUCTIONS
Urinary Tract Infection (UTI) in Women: Care Instructions  Overview     A urinary tract infection, or UTI, is a general term for an infection anywhere between the kidneys and the urethra (where urine comes out). Most UTIs are bladder infections. They often cause pain or burning when you urinate. UTIs are caused by bacteria and can be cured with antibiotics. Be sure to complete your treatment so that the infection does not get worse. Follow-up care is a key part of your treatment and safety. Be sure to make and go to all appointments, and call your doctor if you are having problems. It's also a good idea to know your test results and keep a list of the medicines you take. How can you care for yourself at home? · Take your antibiotics as directed. Do not stop taking them just because you feel better. You need to take the full course of antibiotics. · Drink extra water and other fluids for the next day or two. This will help make the urine less concentrated and help wash out the bacteria that are causing the infection. (If you have kidney, heart, or liver disease and have to limit fluids, talk with your doctor before you increase the amount of fluids you drink.)  · Avoid drinks that are carbonated or have caffeine. They can irritate the bladder. · Urinate often. Try to empty your bladder each time. · To relieve pain, take a hot bath or lay a heating pad set on low over your lower belly or genital area. Never go to sleep with a heating pad in place. To prevent UTIs  · Drink plenty of water each day. This helps you urinate often, which clears bacteria from your system. (If you have kidney, heart, or liver disease and have to limit fluids, talk with your doctor before you increase the amount of fluids you drink.)  · Urinate when you need to. · If you are sexually active, urinate right after you have sex. · Change sanitary pads often.   · Avoid douches, bubble baths, feminine hygiene sprays, and other feminine hygiene products that have deodorants. · After going to the bathroom, wipe from front to back. When should you call for help? Call your doctor now or seek immediate medical care if:    · Symptoms such as fever, chills, nausea, or vomiting get worse or appear for the first time.     · You have new pain in your back just below your rib cage. This is called flank pain.     · There is new blood or pus in your urine.     · You have any problems with your antibiotic medicine. Watch closely for changes in your health, and be sure to contact your doctor if:    · You are not getting better after taking an antibiotic for 2 days.     · Your symptoms go away but then come back. Where can you learn more? Go to http://www.gray.com/  Enter D172 in the search box to learn more about \"Urinary Tract Infection (UTI) in Women: Care Instructions. \"  Current as of: October 18, 2021               Content Version: 13.2  © 2006-2022 [employer]. Care instructions adapted under license by [employer] (which disclaims liability or warranty for this information). If you have questions about a medical condition or this instruction, always ask your healthcare professional. [address] any warranty or liability for your use of this information.

## 2022-04-17 ENCOUNTER — TELEPHONE (OUTPATIENT)
Dept: INTERNAL MEDICINE CLINIC | Age: 67
End: 2022-04-17

## 2022-04-17 PROBLEM — E66.3 OVERWEIGHT WITH BODY MASS INDEX (BMI) OF 27 TO 27.9 IN ADULT: Status: RESOLVED | Noted: 2018-05-06 | Resolved: 2022-04-17

## 2022-04-17 LAB — BACTERIA UR CULT: ABNORMAL

## 2022-04-17 RX ORDER — CIPROFLOXACIN 250 MG/1
250 TABLET, FILM COATED ORAL EVERY 12 HOURS
Qty: 10 TABLET | Refills: 0 | Status: SHIPPED | OUTPATIENT
Start: 2022-04-17 | End: 2022-04-22

## 2022-04-17 NOTE — PROGRESS NOTES
HPI:   Linnea Abraham is a 61y.o. year old female who presents today for an acute visit. She has a history of hypertension, hyperlipidemia, diabetes mellitus, migraine headaches, depression, anxiety, ADHD, and lumbar radiculopathy. She has completed the Moderna COVID-19 vaccine series but has not yet received the Moderna booster dose. She reports that over the last 4 days she has been experiencing difficulty with pain and burning with urination and urge incontinence. She denies any fever, chills, abdominal pain, hematuria, and does report some mild lower back discomfort but it is unclear if it is related. She is otherwise without new complaints and overall feeling well. Summary of prior hospitalizations and medical history:  She was hospitalized from 7/9-7/11/2019 at [facility] after presenting to [facility] ED with a severe posterior headache, unlike her usual migraines, with associated light sensitivity and confusion. Evaluation included WBC 6.0, Hb 11.9/ Hct 35.2, Na 138, Ca 9.6, creatinine 1.0/ eGFR >60, head CT scan no acute intracranial abnormality; MRI brain (7/10/2019) mild nonspecific white matter disease likely representing chronic small vessel changes, and very mild bilateral occipital paramedian encephalomalacic changes, likely from chronic small infarcts; brain MRA (7/10/2019) no high-grade intracranial stenosis; neck MRA (7/10/2019) mild proximal ICA irregularity without hemodynamically significant carotid stenosis. She was evaluated by Dr. Shalini Alcaraz of neurology who felt presentation was consistent with status migrainosus. Attempted treatment with Benadryl and compazine without much improvement, but notable improvement after Medrol dose pack started and subsequently discharged. On 7/24/2019, she noted onset of left facial weakness with difficulty with eye closure and forehead elevation, and presented to [facility] ED and diagnosed with left facial palsy.  She was treated with prednisone 60 mg daily x 7 days and Valtrex. She was evaluated by Dr. Bianca Elizabeth on 8/14/2019, and it was his opinion that her symptoms were likely not due to status migrainosus, but rather were secondary to a viral meningoencephalitis given the clear change in the pattern of her headaches and the subsequent development of a cranial nerve palsy. She has had complete resolution of her facial palsy, and a return to baseline for her migraine headaches, which respond readily to sumatriptan. She has a history of hypertension, treated with propranolol and lisinopril. She does not check her blood pressure at home. She has begun walking regularly for exercise. She denies any chest pain, shortness of breath at rest or with exertion, palpitations, or lightheadedness. She also has a history of hyperlipidemia, treated with high intensity dose rosuvastatin. She has a history of diabetes mellitus, and was started on metformin in 2/2015 for a HbA1c of 7.5. She does not monitor her blood sugars at home. She denies any polyuria, polydipsia, nocturia, or blurry vision, and has no history of retinopathy, neuropathy, or nephropathy. She has regular eye exams with Dr. Jose Guadalupe Guzman. She also has a history of migraines without aura, which usually respond to Imitrex. Also now on propranolol for preventive therapy with good control. She has a history of lumbar radiculopathy (R>L), and was evaluated by Dr. Rick Mcdonough in 5/2015. Lumbar x-rays showed degenerative changes in L4-L5 and L5-S1. She was treated conservatively with physical therapy and ibuprofen and reports significant improvement. She was evaluated by MOJGAN Bonds in 12/2016 for exacerbation of low back pain with right sciatica. She was treated with steroids and Norco with improvement. On 4/17/2018, she presented with increasing low back pain with bilateral sciatica. She was treated with a prednisone taper with initial improvement.  However, her symptoms worsened and she was referred to Dr. Nayana Bustos for evaluation. She gave her a Toradol injection, and ordered a lumbar MRI (6/24/2018) which showed a large right disc extrusion at L5-S1 which compressed the descending right S1 nerve root. She was referred to pain management and underwent a selective L5 and S1 selective nerve root blocks by Dr. Baljit Devlin on 7/26/2018. However, due to persistent symptoms, she was referred to Dr. Devyn Rodriguez and subsequently underwent a right L5-S1 laminectomy and diskectomy on 4/5/7966 without complications. She states that she did well and continues to do her back stretches and exercises. She states that she only has occasional discomfort. She had a screening colonoscopy in 10/2014 by Dr. Donovan Norman which was normal. Follow-up due in 10 years. She denies any abdominal pain, nausea, vomiting, melena, hematochezia, or change in bowel movements. She has a history of depression and anxiety, and is followed by Dr. Claudene Robin. She is currently taking Abilify 5 mg daily and Prozac 10 mg daily. Previously on lorazepam at bedtime, but no longer requiring. Past Medical History:   Diagnosis Date    ADHD     Anxiety     Bilateral lumbar radiculopathy     Carotid bruit     right    Depression     Diabetes mellitus (HCC)     HCD (hypertensive cardiovascular disease)     Hyperlipidemia     Hypertension     Migraine headache     Plantar fasciitis      Past Surgical History:   Procedure Laterality Date    HX SEPTOPLASTY  6/2002    HX TONSILLECTOMY      NEUROLOGICAL PROCEDURE UNLISTED  08/06/2018    laminectomy      Current Outpatient Medications   Medication Sig    cephALEXin (KEFLEX) 500 mg capsule Take 1 Capsule by mouth two (2) times a day for 7 days.  Indications: bacterial urinary tract infection    atorvastatin (LIPITOR) 80 mg tablet TAKE 1 TABLET BY MOUTH EVERY DAY    propranoloL (INDERAL) 40 mg tablet TAKE 1 TABLET BY MOUTH TWICE A DAY    metFORMIN ER (GLUCOPHAGE XR) 500 mg tablet TAKE 2 TABLETS BY MOUTH TWICE A DAY WITH MEALS    semaglutide (OZEMPIC) 0.25 mg or 0.5 mg/dose (2 mg/1.5 ml) subq pen 0.5 mg by SubCUTAneous route every seven (7) days.  ergocalciferol (ERGOCALCIFEROL) 1,250 mcg (50,000 unit) capsule TAKE 1 CAPSULE BY MOUTH EVERY FOURTEEN (14) DAYS.  lisinopriL (PRINIVIL, ZESTRIL) 10 mg tablet TAKE 1 TABLET BY MOUTH EVERY DAY    SUMAtriptan (IMITREX) 50 mg tablet TAKE 1 TAB BY MOUTH DAILY AS NEEDED FOR MIGRAINE.  ondansetron (ZOFRAN ODT) 4 mg disintegrating tablet Take 1 Tablet by mouth every eight (8) hours as needed for Nausea or Nausea or Vomiting.  Insulin Needles, Disposable, 31 gauge x 5/16\" ndle Use for weekly SC injection of Ozempic    ARIPiprazole (ABILIFY) 5 mg tablet Take  by mouth daily.  white petrolatum-mineral oil (ARTIFICIAL TEARS, VLADISLAV/MIN,) 83-15 % ophthalmic ointment Administer  to left eye nightly. Small amount    FLUoxetine (PROZAC) 20 mg capsule Take 10 mg by mouth daily.  naloxone (NARCAN) 4 mg/actuation nasal spray Use 1 spray intranasally, then discard. Repeat with new spray every 2 min as needed for opioid overdose symptoms, alternating nostrils. No current facility-administered medications for this visit. Allergies and Intolerances: Allergies   Allergen Reactions    Pcn [Penicillins] Rash    Sulfa (Sulfonamide Antibiotics) Rash     Family History: She has no family history of colon or breast cancer. Her mother  from a CVA. Her father  from throat cancer and cirrhosis. Family History   Problem Relation Age of Onset    Hypertension Brother         x2    Elevated Lipids Brother     Stroke Mother     Heart Surgery Mother         CABG    Cancer Father         cancer of the mouth    Hypertension Father     Hypertension Brother     Elevated Lipids Brother      Social History:   She  reports that she quit smoking about 33 years ago. She has a 1.00 pack-year smoking history.  She has never used smokeless tobacco. She smoked approximately 0.25 ppd for 2 years, stopping in . She is a  and has one daughter. She was employed as a . 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 She was previously working as the [employer] for his business. Social History     Substance and Sexual Activity   Alcohol Use No    Alcohol/week: 1.7 standard drinks    Types: 2 Glasses of wine per week     Immunization History:  Immunization History   Administered Date(s) Administered    COVID-19, Moderna, Primary or Immunocompromised Series, MRNA, PF, 100mcg/0.5mL 2021, 2021    Influenza Vaccine (Quad) PF (>6 Mo Flulaval, Fluarix, and >3 Yrs Afluria, Fluzone 33220) 2018, 2019, 10/22/2020, 2021    Influenza Vaccine PF 2014, 10/13/2014    Influenza Vaccine Split 2011    Influenza Vaccine Whole 10/05/2010    Pneumococcal Polysaccharide (PPSV-23) 2017    TD Vaccine 2007    Tdap 11/10/2016    Zoster Recombinant 2018, 2018       Review of Systems:   As above included in HPI. Otherwise 11 point review of systems negative including constitutional, skin, HENT, eyes, respiratory, cardiovascular, gastrointestinal, genitourinary, musculoskeletal, endocrine, hematologic, allergy, and neurologic. Physical:   Visit Vitals  /60 (BP 1 Location: Left arm, BP Patient Position: Sitting)   Pulse 69   Temp 97.5 °F (36.4 °C) (Temporal)   Resp 16   Ht 4' 11\" (1.499 m)   Wt 118 lb (53.5 kg)   SpO2 98%   BMI 23.83 kg/m²       Patient appears in no apparent distress. Affect is appropriate. HEENT: PERRLA, anicteric, no JVD, adenopathy or thyromegaly. No carotid bruits or radiated murmur. Lungs: clear to auscultation, no wheezes, rhonchi, or rales. Heart: regular rate and rhythm. No murmur, rubs, gallops  Abdomen: soft, nontender, nondistended, normal bowel sounds, no hepatosplenomegaly or masses.    Extremities: without edema. Review of Data:  Labs:  No visits with results within 2 Week(s) from this visit. Latest known visit with results is:   Office Visit on 09/14/2021   Component Date Value Ref Range Status    Glucose 12/17/2021 99  65 - 99 mg/dL Final    BUN 12/17/2021 14  8 - 27 mg/dL Final    Creatinine 12/17/2021 0.77  0.57 - 1.00 mg/dL Final    GFR est non-AA 12/17/2021 82  >59 mL/min/1.73 Final    GFR est AA 12/17/2021 94  >59 mL/min/1.73 Final    BUN/Creatinine ratio 12/17/2021 18  12 - 28 Final    Sodium 12/17/2021 146* 134 - 144 mmol/L Final    Potassium 12/17/2021 4.2  3.5 - 5.2 mmol/L Final    Chloride 12/17/2021 105  96 - 106 mmol/L Final    CO2 12/17/2021 24  20 - 29 mmol/L Final    Calcium 12/17/2021 8.9  8.7 - 10.3 mg/dL Final    Protein, total 12/17/2021 6.2  6.0 - 8.5 g/dL Final    Albumin 12/17/2021 4.4  3.8 - 4.8 g/dL Final    GLOBULIN, TOTAL 12/17/2021 1.8  1.5 - 4.5 g/dL Final    A-G Ratio 12/17/2021 2.4* 1.2 - 2.2 Final    Bilirubin, total 12/17/2021 0.5  0.0 - 1.2 mg/dL Final    Alk.  phosphatase 12/17/2021 66  44 - 121 IU/L Final    AST (SGOT) 12/17/2021 18  0 - 40 IU/L Final    ALT (SGPT) 12/17/2021 22  0 - 32 IU/L Final    Specific Gravity 12/17/2021 1.021  1.005 - 1.030 Final    pH (UA) 12/17/2021 5.5  5.0 - 7.5 Final    Color 12/17/2021 Yellow  Yellow Final    Appearance 12/17/2021 Cloudy* Clear Final    Leukocyte Esterase 12/17/2021 3+* Negative Final    Protein 12/17/2021 Trace  Negative/Trace Final    Glucose 12/17/2021 Negative  Negative Final    Ketone 12/17/2021 Trace* Negative Final    Blood 12/17/2021 Negative  Negative Final    Bilirubin 12/17/2021 Negative  Negative Final    Urobilinogen 12/17/2021 0.2  0.2 - 1.0 mg/dL Final    Nitrites 12/17/2021 Negative  Negative Final    Microscopic Examination 12/17/2021 See additional order   Final    WBC 12/17/2021 >30* 0 - 5 /hpf Final    RBC 12/17/2021 None seen  0 - 2 /hpf Final    Epithelial cells 12/17/2021 0-10  0 - 10 /hpf Final    Casts 12/17/2021 None seen  None seen /lpf Final    Crystals 12/17/2021 Present* N/A Final    Crystal type 12/17/2021 Calcium Oxalate  N/A Final    Bacteria 12/17/2021 Many* None seen/Few Final    Yeast 12/17/2021 Present* None seen Final    Cholesterol, total 12/17/2021 106  100 - 199 mg/dL Final    Triglyceride 12/17/2021 82  0 - 149 mg/dL Final    HDL Cholesterol 12/17/2021 31* >39 mg/dL Final    VLDL, calculated 12/17/2021 17  5 - 40 mg/dL Final    LDL, calculated 12/17/2021 58  0 - 99 mg/dL Final    Creatinine, urine 12/17/2021 186.8  Not Estab. mg/dL Final    Microalbumin, urine 12/17/2021 35.9  Not Estab. ug/mL Final    Microalb/Creat ratio (ug/mg creat.) 12/17/2021 19  0 - 29 mg/g creat Final    INTERPRETATION 12/17/2021 Note   Final    Hemoglobin A1c 12/17/2021 6.1* 4.8 - 5.6 % Final    Estimated average glucose 12/17/2021 128  mg/dL Final    VITAMIN D, 25-HYDROXY 12/17/2021 74.3  30.0 - 100.0 ng/mL Final    Magnesium 12/17/2021 1.3* 1.6 - 2.3 mg/dL Final     Office Visit on 04/13/2022   Component Date Value Ref Range Status    Color (UA POC) 04/13/2022 Yellow   Final    Clarity (UA POC) 04/13/2022 Cloudy   Final    Glucose (UA POC) 04/13/2022 Negative  Negative Final    Bilirubin (UA POC) 04/13/2022 Negative  Negative Final    Ketones (UA POC) 04/13/2022 Negative  Negative Final    Specific gravity (UA POC) 04/13/2022 1.020  1.001 - 1.035 Final    Blood (UA POC) 04/13/2022 2+  Negative Final    pH (UA POC) 04/13/2022 5.5  4.6 - 8.0 Final    Protein (UA POC) 04/13/2022 1+  Negative Final    Urobilinogen (UA POC) 04/13/2022 0.2 mg/dL  0.2 - 1 Final    Nitrites (UA POC) 04/13/2022 Positive  Negative Final    Leukocyte esterase (UA POC) 04/13/2022 3+  Negative Final    Urine Culture, Routine 04/13/2022 *  Final                    Value:Escherichia coli  Greater than 100,000 colony forming units per mL       Health Maintenance:  Screening:    Mammogram: negative (9/2021)   PAP smear: well women exams by Dr. Jojo Loyola (last 4/2021)    Colorectal: colonoscopy (10/2014) normal. Dr. Monique Beltran. Due 10/2024. Depression: under care of MOJGAN Pierre. DM (HbA1c/FPG): HbA1c 6.1 (12/2021)   Hepatitis C: negative (5/2017)   Falls: none   DEXA: osteopenia (2/2019) Dr. Edna Lam   Glaucoma: regular eye exams with Dr. Noé Baum (11/2021)   Smoking: none   Vitamin D: 74.3 (12/2021)   Medicare Wellness: N/A      Impression:  Patient Active Problem List   Diagnosis Code    HCD (hypertensive cardiovascular disease) I11.9    Hyperlipidemia E78.5    Migraine G43.909    Bilateral lumbar radiculopathy M54.16    Type 2 diabetes mellitus without complication ([facility] [address] G12.7    Essential hypertension I10    Vitamin D deficiency E55.9    Recurrent major depressive disorder ([facility] [address] F33.9    Overweight with body mass index (BMI) of 27 to 27.9 in adult E66.3, Z68.27    DDD (degenerative disc disease), lumbar M51.36    HNP (herniated nucleus pulposus), lumbar M51.26    History of Bell's palsy Z86.69    Hypomagnesemia E83.42       Plan:  Urinary tract infection. Patient presents with 4-day history of burning and pain with urination and urge incontinence. Denies any fever, chills, abdominal pain, and questionable mild back discomfort. POC urinalysis showed 2+ blood, 1+ protein, positive nitrite and 3+ leukocyte esterase. We will treat empirically while await urine culture results. Patient with allergy to penicillin and sulfa. Will treat with Keflex 500 mg twice daily for 7 days. Advised that may need to adjust antibiotics once culture returns. Advised to call if symptoms do not improve. Other chronic issues:  1. Hypertension. Blood pressure well controlled today on current regimen of lisinopril 10 mg daily and propranolol 40 mg bid. Renal function has been normal with creatinine 0.77/ eGFR 82.   Advised to increase fluid intake given evidence of hypernatremia and urine with ketones. Magnesium low at 1.3 in 12/2021 and advised to resume magnesium oxide 400 mg daily. Will reassess at next visit. Continue to follow. 2. Hyperlipidemia. Increased to high intensity 80 mg dose of atorvastatin in 4/2021 with LDL improved to 58 and HDL 31 (12/2021), indicative of excellent control and likely related to weight loss. Emphasized importance of continued lifestyle modifications, including diet, exercise, and weight loss. Continue to follow. 3. Diabetes mellitus. Significant improvement with the addition of Ozempic 0.5 mg weekly. Also continues on metformin ER 1000 mg bid. Weight decreased 18 pounds total today. HbA1c improved to 6.1 (12/2021). No evidence of microvascular complications. On statin and lisinopril. Continue follow-up for annual eye exams. Foot exam normal (12/2021) urine microalbumin/ creatinine ratio with resolution of microalbuminuria (19 mg/g 12/2021). On lisinopril. Emphasized importance of continued lifestyle modifications, including diet and exercise, and maintaining weight loss. 4. Migraines. Returned to baseline pattern involving pain on side of head radiating to lateral neck. On propranolol 40 mg twice daily as preventive therapy and readily responds to Imitrex if needed. Reports continued good control of headaches. 5. History of left facial nerve palsy. Presented with severe headache resulting in hospital admission to [facility]. Evaluation including head CT scan, brain MRI/MRA and neck MRA unrevealing and Dr. Lavelle Us of neurology consulted and felt most consistent with status migrainosus. Prescribed medrol dose pack and discharged. Developed left facial nerve palsy 10 days later and treated with prednisone 60 mg and Valtrex for 7 days.  Evaluated by Dr. 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 Now completely improved with resolution of facial nerve palsy and headaches returning to baseline pattern for migraines. 6. Lumbar herniated disc with right sciatica, s/p right L5-S1 laminectomy and discectomy. Doing well without significant discomfort. Stressed improtance of continuing stretches and exercise. Follow. 7. Depression/ADHD. Reports good control of symptoms on new regimen of Abilify and Prozac. Reports no longer requiring lorazepam at bedtime. Being followed by MOJGAN Solorzano at [address] [place] [address] Psychiatry. 8. Overweight. Weight increased 15 pounds since 8/2019, and had been overall stable despite the patient exercising now 4 to 5 days/week in attempt to lose weight. Emphasized importance of continued lifestyle modifications, including diet, exercise, and weight loss. Started on Ozempic in 9/2021 and weight now decreased a total of 18 pounds today. Patient very pleased as now BMI <25 and she has returned to her baseline weight. Advised to maintain. 9. Health maintenance. Completed the Moderna COVID-19 vaccine series but has not yet received a Moderna booster dose. Advised to proceed. Completed 2/2 doses of Shingrix vaccine. Other immunizations up to date. Well women exam completed by Dr. Abigail Noland in 4/19/2021. .  Mammogram up to date. Colonoscopy due 2024. Continue regular eye exams with Dr. Latoya Roberts. Vitamin D level improved on ergocalciferol to every 14 days. Will reassess at next visit. Patient understands recommendations and agrees with plan. Follow-up as previously scheduled.

## 2022-04-18 NOTE — TELEPHONE ENCOUNTER
Left message for patient to return call concerning message from Dr. Guillermina Shelby. Please contact patient and advise her that final results of urine culture showed that the infection is due to E. coli. Antibiotic susceptibilities show only intermediate sensitivity to cephalosporins so treatment with Keflex may not completely eradicate the infection.   Please advise that would recommend that she discontinue Keflex and begin treatment with ciprofloxacin for an additional 5 days to confirm effective treatment.     New prescription sent to [facility].

## 2022-04-18 NOTE — TELEPHONE ENCOUNTER
Urine Culture, Routine Abnormal   Escherichia coli   Greater than 100,000 colony forming units per mL         Susceptibility     Escherichia coli     Not Specified     Amoxicillin/Clavulanic A Susceptible     Ampicillin ($) Susceptible     Cefepime ($$) Susceptible     Ceftriaxone ($) Susceptible     Cefuroxime ($) Intermediate     Ciprofloxacin ($) Susceptible     Ertapenem ($$$$) Susceptible     Gentamicin ($) Susceptible     Imipenem Susceptible     Levofloxacin ($) Susceptible     Meropenem ($$) Susceptible     Nitrofurantoin Susceptible     Piperacillin/Tazobac ($) Susceptible     Tetracycline Susceptible     Tobramycin ($) Susceptible     Trimeth-Sulfamethoxa Susceptible      Reviewed urine culture results and showed greater than 100,000 cfu/mL of E. Coli. Susceptibilities reviewed and sensitive to all antibiotics except second generation cephalosporins (cefuroxime) showing intermediate susceptibility although GWEN not specified. Patient treated with Keflex so likely not appropriate treatment since first generation cephalosporin. Patient with allergy to sulfa and penicillin. Please contact patient and advise her that final results of urine culture showed that the infection is due to E. coli. Antibiotic susceptibilities show only intermediate sensitivity to cephalosporins so treatment with Keflex may not completely eradicate the infection. Please advise that would recommend that she discontinue Keflex and begin treatment with ciprofloxacin for an additional 5 days to confirm effective treatment.     New prescription sent to [facility].

## 2022-05-02 NOTE — PROGRESS NOTES
1. \"Have you been to the ER, urgent care clinic since your last visit? Hospitalized since your last visit? \" No    2. \"Have you seen or consulted any other health care providers outside of the [address] [place] [address] since your last visit? \" No     3. For patients aged 39-70: Has the patient had a colonoscopy / FIT/ Cologuard? Yes - no Care Gap present      If the patient is female:    4. For patients aged 41-77: Has the patient had a mammogram within the past 2 years? Yes - no Care Gap present      5. For patients aged 21-65: Has the patient had a pap smear?  Yes - no Care Gap present

## 2022-05-03 ENCOUNTER — LAB ONLY (OUTPATIENT)
Dept: INTERNAL MEDICINE CLINIC | Age: 67
End: 2022-05-03

## 2022-05-03 ENCOUNTER — HOSPITAL ENCOUNTER (OUTPATIENT)
Dept: LAB | Age: 67
Discharge: HOME | End: 2022-05-03

## 2022-05-03 ENCOUNTER — VIRTUAL VISIT (OUTPATIENT)
Dept: INTERNAL MEDICINE CLINIC | Age: 67
End: 2022-05-03
Payer: COMMERCIAL

## 2022-05-03 DIAGNOSIS — R82.90 ABNORMAL URINALYSIS: ICD-10-CM

## 2022-05-03 DIAGNOSIS — E11.9 TYPE 2 DIABETES MELLITUS WITHOUT COMPLICATION, WITHOUT LONG-TERM CURRENT USE OF INSULIN (HCC): ICD-10-CM

## 2022-05-03 DIAGNOSIS — R30.0 DYSURIA: Primary | ICD-10-CM

## 2022-05-03 DIAGNOSIS — N30.01 ACUTE CYSTITIS WITH HEMATURIA: ICD-10-CM

## 2022-05-03 DIAGNOSIS — R30.0 DYSURIA: ICD-10-CM

## 2022-05-03 DIAGNOSIS — I10 ESSENTIAL HYPERTENSION: ICD-10-CM

## 2022-05-03 DIAGNOSIS — R31.0 GROSS HEMATURIA: Primary | ICD-10-CM

## 2022-05-03 LAB — XX-LABCORP SPECIMEN COL,LCBCF: NORMAL

## 2022-05-03 PROCEDURE — 99001 SPECIMEN HANDLING PT-LAB: CPT

## 2022-05-03 PROCEDURE — 99443 PR PHYS/QHP TELEPHONE EVALUATION 21-30 MIN: CPT | Performed by: INTERNAL MEDICINE

## 2022-05-03 NOTE — PATIENT INSTRUCTIONS
Blood in the Urine: Care Instructions  Your Care Instructions     Blood in the urine, or hematuria, may make the urine look red, brown, or pink. There may be blood every time you urinate or just from time to time. You cannot always see blood in the urine, but it will show up in a urine test.  Blood in the urine may be serious. It should always be checked by a doctor. Your doctor may recommend more tests, including an X-ray, a CT scan, or a cystoscopy (which lets a doctor look inside the urethra and bladder). Blood in the urine can be a sign of another problem. Common causes are bladder infections and kidney stones. An injury to your groin or your genital area can also cause bleeding in the urinary tract. Very hard exercisesuch as running a marathoncan cause blood in the urine. Blood in the urine can also be a sign of kidney disease or cancer in the bladder or kidney. Many cases of blood in the urine are caused by a harmless condition that runs in families. This is called benign familial hematuria. It does not need any treatment. Sometimes your urine may look red or brown even though it does not contain blood. For example, not getting enough fluids (dehydration), taking certain medicines, or having a liver problem can change the color of your urine. Eating foods such as beets, rhubarb, or blackberries or foods with red food coloring can make your urine look red or pink. Follow-up care is a key part of your treatment and safety. Be sure to make and go to all appointments, and call your doctor if you are having problems. It's also a good idea to know your test results and keep a list of the medicines you take. When should you call for help? Call your doctor now or seek immediate medical care if:    · You have symptoms of a urinary infection. For example:  ? You have pus in your urine. ? You have pain in your back just below your rib cage. This is called flank pain. ?  You have a fever, chills, or body aches.  ? It hurts to urinate. ? You have groin or belly pain.     · You have more blood in your urine. Watch closely for changes in your health, and be sure to contact your doctor if:    · You have new urination problems.     · You do not get better as expected. Where can you learn more? Go to http://www.gray.com/  Enter T903 in the search box to learn more about \"Blood in the Urine: Care Instructions. \"  Current as of: October 18, 2021               Content Version: 13.2  © 8684-4466 [employer]. Care instructions adapted under license by [employer] (which disclaims liability or warranty for this information). If you have questions about a medical condition or this instruction, always ask your healthcare professional. [address] any warranty or liability for your use of this information.

## 2022-05-04 ENCOUNTER — TELEPHONE (OUTPATIENT)
Dept: INTERNAL MEDICINE CLINIC | Age: 67
End: 2022-05-04

## 2022-05-04 ENCOUNTER — LAB ONLY (OUTPATIENT)
Dept: INTERNAL MEDICINE CLINIC | Age: 67
End: 2022-05-04
Payer: COMMERCIAL

## 2022-05-04 DIAGNOSIS — R30.0 DYSURIA: Primary | ICD-10-CM

## 2022-05-04 LAB
BILIRUB UR QL STRIP: NEGATIVE
GLUCOSE UR-MCNC: NEGATIVE MG/DL
KETONES P FAST UR STRIP-MCNC: NEGATIVE MG/DL
PH UR STRIP: 7 [PH] (ref 4.6–8)
PROT UR QL STRIP: NEGATIVE
SP GR UR STRIP: 1.01 (ref 1–1.03)
UA UROBILINOGEN AMB POC: NORMAL (ref 0.2–1)
URINALYSIS CLARITY POC: CLEAR
URINALYSIS COLOR POC: YELLOW
URINE BLOOD POC: NORMAL
URINE LEUKOCYTES POC: NORMAL
URINE NITRITES POC: NEGATIVE

## 2022-05-04 PROCEDURE — 81003 URINALYSIS AUTO W/O SCOPE: CPT | Performed by: INTERNAL MEDICINE

## 2022-05-04 RX ORDER — CEFDINIR 300 MG/1
300 CAPSULE ORAL 2 TIMES DAILY
Qty: 14 CAPSULE | Refills: 0 | Status: SHIPPED | OUTPATIENT
Start: 2022-05-04 | End: 2022-05-11

## 2022-05-04 NOTE — TELEPHONE ENCOUNTER
Lab Only on 05/04/2022   Component Date Value Ref Range Status    Color (UA POC) 05/04/2022 Yellow   Final    Clarity (UA POC) 05/04/2022 Clear   Final    Glucose (UA POC) 05/04/2022 Negative  Negative Final    Bilirubin (UA POC) 05/04/2022 Negative  Negative Final    Ketones (UA POC) 05/04/2022 Negative  Negative Final    Specific gravity (UA POC) 05/04/2022 1.010  1.001 - 1.035 Final    Blood (UA POC) 05/04/2022 2+  Negative Final    pH (UA POC) 05/04/2022 7.0  4.6 - 8.0 Final    Protein (UA POC) 05/04/2022 Negative  Negative Final    Urobilinogen (UA POC) 05/04/2022 0.2 mg/dL  0.2 - 1 Final    Nitrites (UA POC) 05/04/2022 Negative  Negative Final    Leukocyte esterase (UA POC) 05/04/2022 3+  Negative Final   Hospital Outpatient Visit on 05/03/2022   Component Date Value Ref Range Status    [facility]. 05/03/2022 Specimens collected/sent to [facility]. Please direct inquiries to ([phone]). Final     Urinalysis consistent with infection (3+ LE and 2+ blood). Will treat empirically while await urine culture result. Allergy to penicillin and sulfa. Last infection on 4/13/2022 with resistance to cefuroxime and treated with ciprofloxacin. Will treat with cefdinir 300 mg bid for 7 days. Script sent to [facility]. Please let her know to  script. Thanks.

## 2022-05-04 NOTE — TELEPHONE ENCOUNTER
Patient is aware Urinalysis consistent with infection (3+ LE and 2+ blood). Will treat empirically while await urine culture result. Allergy to penicillin and sulfa. Last infection on 4/13/2022 with resistance to cefuroxime and treated with ciprofloxacin. Will treat with cefdinir 300 mg bid for 7 days. Script sent to [facility]. Please let her know to  script. Patient verbalizes understanding.

## 2022-05-05 LAB — BACTERIA UR CULT: NO GROWTH

## 2022-05-08 NOTE — PROGRESS NOTES
Amberly Soriano is a 61 y.o. female, evaluated via audio-only technology on 5/3/2022 for Urinary Pain (back pain started saturday- blood in urine saturday and sunday less blood in urine today )  Video virtual visit converted to audio only telephone encounter since unable to establish video connection. HPI:   Amberly Soriano is a 61y.o. year old female who presents today for an acute visit. She has a history of hypertension, hyperlipidemia, diabetes mellitus, migraine headaches, depression, anxiety, ADHD, and lumbar radiculopathy. She has completed the Moderna COVID-19 vaccine series but has not yet received the Moderna booster dose. She last seen on 4/13/2022 and reported a 4-day history of pain and burning with urination and urge incontinence. She denied any fever, chills, abdominal pain, hematuria, and did report some mild lower back discomfort. POC urinalysis was suggestive of infection with positive nitrites, 2+ blood, and 3+ LE. She was treated empirically with Keflex and urine culture showed > 100,000 CFU/mL E. coli with only intermediate sensitivity to cefuroxime. She was changed to ciprofloxacin and treated for 5 days with resolution of symptoms. She presents today and reports that on 4/30/2022, she began to experience mild low back pain and noted bright red blood in her urine. She denied any dysuria, fever, or chills that would suggest infection. She states that she continues to experience hematuria for 2 days until 5/2/2022 when she noted resolution of all symptoms. She states that she did not knowingly pass a kidney stone. She reports that she has not experienced any recurrence. She is otherwise without new complaints and overall feeling well.          Summary of prior hospitalizations and medical history:  She was hospitalized from 7/9-7/11/2019 at [facility] after presenting to [facility] ED with a severe posterior headache, unlike her usual migraines, with associated light sensitivity and confusion. Evaluation included WBC 6.0, Hb 11.9/ Hct 35.2, Na 138, Ca 9.6, creatinine 1.0/ eGFR >60, head CT scan no acute intracranial abnormality; MRI brain (7/10/2019) mild nonspecific white matter disease likely representing chronic small vessel changes, and very mild bilateral occipital paramedian encephalomalacic changes, likely from chronic small infarcts; brain MRA (7/10/2019) no high-grade intracranial stenosis; neck MRA (7/10/2019) mild proximal ICA irregularity without hemodynamically significant carotid stenosis. She was evaluated by Dr. Corey Marcelo of neurology who felt presentation was consistent with status migrainosus. Attempted treatment with Benadryl and compazine without much improvement, but notable improvement after Medrol dose pack started and subsequently discharged. On 7/24/2019, she noted onset of left facial weakness with difficulty with eye closure and forehead elevation, and presented to [facility] ED and diagnosed with left facial palsy. She was treated with prednisone 60 mg daily x 7 days and Valtrex. She was evaluated by Dr. Lidia Fong on 8/14/2019, and it was his opinion that her symptoms were likely not due to status migrainosus, but rather were secondary to a viral meningoencephalitis given the clear change in the pattern of her headaches and the subsequent development of a cranial nerve palsy. She has had complete resolution of her facial palsy, and a return to baseline for her migraine headaches, which respond readily to sumatriptan. She has a history of hypertension, treated with propranolol and lisinopril. She does not check her blood pressure at home. She has begun walking regularly for exercise. She denies any chest pain, shortness of breath at rest or with exertion, palpitations, or lightheadedness. She also has a history of hyperlipidemia, treated with high intensity dose rosuvastatin.      She has a history of diabetes mellitus, and was started on metformin in 2/2015 for a HbA1c of 7.5. She does not monitor her blood sugars at home. She denies any polyuria, polydipsia, nocturia, or blurry vision, and has no history of retinopathy, neuropathy, or nephropathy. She has regular eye exams with Dr. Nicole Pina. She also has a history of migraines without aura, which usually respond to Imitrex. Also now on propranolol for preventive therapy with good control. She has a history of lumbar radiculopathy (R>L), and was evaluated by Dr. Di Meeks in 5/2015. Lumbar x-rays showed degenerative changes in L4-L5 and L5-S1. She was treated conservatively with physical therapy and ibuprofen and reports significant improvement. She was evaluated by MOJGAN Badillo in 12/2016 for exacerbation of low back pain with right sciatica. She was treated with steroids and Norco with improvement. On 4/17/2018, she presented with increasing low back pain with bilateral sciatica. She was treated with a prednisone taper with initial improvement. However, her symptoms worsened and she was referred to Dr. Harmony Sanchez for evaluation. She gave her a Toradol injection, and ordered a lumbar MRI (6/24/2018) which showed a large right disc extrusion at L5-S1 which compressed the descending right S1 nerve root. She was referred to pain management and underwent a selective L5 and S1 selective nerve root blocks by Dr. Devon Lemon on 7/26/2018. However, due to persistent symptoms, she was referred to Dr. Leticia Guy and subsequently underwent a right L5-S1 laminectomy and diskectomy on 5/8/3266 without complications. She states that she did well and continues to do her back stretches and exercises. She states that she only has occasional discomfort. She had a screening colonoscopy in 10/2014 by Dr. Darrell Haynes which was normal. Follow-up due in 10 years. She denies any abdominal pain, nausea, vomiting, melena, hematochezia, or change in bowel movements.     She has a history of depression and anxiety, and is followed by  [facility]. She is currently taking Abilify 5 mg daily and Prozac 10 mg daily. Previously on lorazepam at bedtime, but no longer requiring. Past Medical History:   Diagnosis Date    ADHD     Anxiety     Bilateral lumbar radiculopathy     Carotid bruit     right    Depression     Diabetes mellitus (HCC)     HCD (hypertensive cardiovascular disease)     Hyperlipidemia     Hypertension     Migraine headache     Plantar fasciitis      Past Surgical History:   Procedure Laterality Date    HX SEPTOPLASTY  6/2002    HX TONSILLECTOMY      NEUROLOGICAL PROCEDURE UNLISTED  08/06/2018    laminectomy      Current Outpatient Medications   Medication Sig    atorvastatin (LIPITOR) 80 mg tablet TAKE 1 TABLET BY MOUTH EVERY DAY    propranoloL (INDERAL) 40 mg tablet TAKE 1 TABLET BY MOUTH TWICE A DAY    metFORMIN ER (GLUCOPHAGE XR) 500 mg tablet TAKE 2 TABLETS BY MOUTH TWICE A DAY WITH MEALS    semaglutide (OZEMPIC) 0.25 mg or 0.5 mg/dose (2 mg/1.5 ml) subq pen 0.5 mg by SubCUTAneous route every seven (7) days.  ergocalciferol (ERGOCALCIFEROL) 1,250 mcg (50,000 unit) capsule TAKE 1 CAPSULE BY MOUTH EVERY FOURTEEN (14) DAYS.  lisinopriL (PRINIVIL, ZESTRIL) 10 mg tablet TAKE 1 TABLET BY MOUTH EVERY DAY    SUMAtriptan (IMITREX) 50 mg tablet TAKE 1 TAB BY MOUTH DAILY AS NEEDED FOR MIGRAINE.  ondansetron (ZOFRAN ODT) 4 mg disintegrating tablet Take 1 Tablet by mouth every eight (8) hours as needed for Nausea or Nausea or Vomiting.  Insulin Needles, Disposable, 31 gauge x 5/16\" ndle Use for weekly SC injection of Ozempic    ARIPiprazole (ABILIFY) 5 mg tablet Take  by mouth daily.  white petrolatum-mineral oil (ARTIFICIAL TEARS, VLADISLAV/MIN,) 83-15 % ophthalmic ointment Administer  to left eye nightly. Small amount    FLUoxetine (PROZAC) 20 mg capsule Take 10 mg by mouth daily.  naloxone (NARCAN) 4 mg/actuation nasal spray Use 1 spray intranasally, then discard.  Repeat with new spray every 2 min as needed for opioid overdose symptoms, alternating nostrils.  cefdinir (OMNICEF) 300 mg capsule Take 1 Capsule by mouth two (2) times a day for 7 days. Indications: bacterial urinary tract infection     No current facility-administered medications for this visit. Allergies and Intolerances: Allergies   Allergen Reactions    Pcn [Penicillins] Rash    Sulfa (Sulfonamide Antibiotics) Rash     Family History: She has no family history of colon or breast cancer. Her mother  from a CVA. Her father  from throat cancer and cirrhosis. Family History   Problem Relation Age of Onset    Hypertension Brother         x2    Elevated Lipids Brother     Stroke Mother     Heart Surgery Mother         CABG    Cancer Father         cancer of the mouth    Hypertension Father     Hypertension Brother     Elevated Lipids Brother      Social History:   She  reports that she quit smoking about 33 years ago. She has a 1.00 pack-year smoking history. She has never used smokeless tobacco. She smoked approximately 0.25 ppd for 2 years, stopping in . She is a  and has one daughter. She was employed as a . 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 She was previously working as the [employer] for his business.     Social History     Substance and Sexual Activity   Alcohol Use No    Alcohol/week: 1.7 standard drinks    Types: 2 Glasses of wine per week     Immunization History:  Immunization History   Administered Date(s) Administered    COVID-19, Moderna, Primary or Immunocompromised Series, MRNA, PF, 100mcg/0.5mL 2021, 2021    Influenza Vaccine (Quad) PF (>6 Mo Flulaval, Fluarix, and >3 Yrs Afluria, Fluzone [id number]) 2018, 2019, 10/22/2020, 2021    Influenza Vaccine PF 2014, 10/13/2014    Influenza Vaccine Split 2011    Influenza Vaccine Whole 10/05/2010    Pneumococcal Polysaccharide (PPSV-23) 05/11/2017    TD Vaccine 01/23/2007    Tdap 11/10/2016    Zoster Recombinant 05/20/2018, 09/18/2018       Review of Systems:   As above included in HPI. Otherwise 11 point review of systems negative including constitutional, skin, HENT, eyes, respiratory, cardiovascular, gastrointestinal, genitourinary, musculoskeletal, endocrine, hematologic, allergy, and neurologic. Physical:   There were no vitals taken for this visit. None performed due to audio only visit. Review of Data:  Labs:  No visits with results within 2 Week(s) from this visit. Latest known visit with results is:   Office Visit on 04/13/2022   Component Date Value Ref Range Status    Color (UA POC) 04/13/2022 Yellow   Final    Clarity (UA POC) 04/13/2022 Cloudy   Final    Glucose (UA POC) 04/13/2022 Negative  Negative Final    Bilirubin (UA POC) 04/13/2022 Negative  Negative Final    Ketones (UA POC) 04/13/2022 Negative  Negative Final    Specific gravity (UA POC) 04/13/2022 1.020  1.001 - 1.035 Final    Blood (UA POC) 04/13/2022 2+  Negative Final    pH (UA POC) 04/13/2022 5.5  4.6 - 8.0 Final    Protein (UA POC) 04/13/2022 1+  Negative Final    Urobilinogen (UA POC) 04/13/2022 0.2 mg/dL  0.2 - 1 Final    Nitrites (UA POC) 04/13/2022 Positive  Negative Final    Leukocyte esterase (UA POC) 04/13/2022 3+  Negative Final    Urine Culture, Routine 04/13/2022 *  Final                    Value:Escherichia coli  Greater than 100,000 colony forming units per mL       Hospital Outpatient Visit on 05/03/2022   Component Date Value Ref Range Status    [facility]. 05/03/2022 Specimens collected/sent to [facility]. Please direct inquiries to ([phone]).     Final   Virtual Visit on 05/03/2022   Component Date Value Ref Range Status    Urine Culture, Routine 05/03/2022 No growth   Final     Health Maintenance:  Screening:    Mammogram: negative (9/2021)   PAP smear: well women exams by Dr. Nicola Aguilar Maco Hoskins (last 4/2021)    Colorectal: colonoscopy (10/2014) normal. Dr. Chaitanya Preston. Due 10/2024. Depression: under care of MOJGAN Thornton. DM (HbA1c/FPG): HbA1c 6.1 (12/2021)   Hepatitis C: negative (5/2017)   Falls: none   DEXA: osteopenia (2/2019) Dr. Maco Hoskins   Glaucoma: regular eye exams with Dr. Vikash De La Rosa (11/2021)   Smoking: none   Vitamin D: 74.3 (12/2021)   Medicare Wellness: N/A      Impression:  Patient Active Problem List   Diagnosis Code    HCD (hypertensive cardiovascular disease) I11.9    Hyperlipidemia E78.5    Migraine G43.909    Bilateral lumbar radiculopathy M54.16    Type 2 diabetes mellitus without complication ([facility] [address] K13.8    Essential hypertension I10    Vitamin D deficiency E55.9    Recurrent major depressive disorder ([facility] [address] F33.9    DDD (degenerative disc disease), lumbar M51.36    HNP (herniated nucleus pulposus), lumbar M51.26    History of Bell's palsy Z86.69    Hypomagnesemia E83.42       Plan:  Gross hematuria. Patient recently diagnosed with UTI on 4/13/2022 with urine culture showing > 100,000 CFU's/mL of E. coli. She was initially treated with Keflex which found to have intermediate resistance to second generation cephalosporins and changed to ciprofloxacin to complete a 7-day course. Reports resolution of symptoms until 4/30/2022 when began to experience mild low back pain and noted bright red blood in her urine. No dysuria, fever, or chills to suggest infection. Reports complete resolution of hematuria and back pain on 5/2/2022 and reports no recurrence. Did not knowingly pass a kidney stone. Will obtain urinalysis and urine culture to rule out infection. If negative, will consider referral to urology for evaluation. Addendum: POC urinalysis (5/4/2022) showed 2+ blood and 3+ LE suggestive of infection. She was started empirically on cefdinir 300 mg twice daily for 7 days while await urine culture results. Other chronic issues:  1. Hypertension.  Blood pressure has been well controlled on current regimen of lisinopril 10 mg daily and propranolol 40 mg bid. Renal function has been normal with creatinine 0.77/ eGFR 82. Advised to increase fluid intake given evidence of hypernatremia and urine with ketones. Magnesium low at 1.3 in 12/2021 and advised to resume magnesium oxide 400 mg daily. Will reassess at next visit. Continue to follow. 2. Hyperlipidemia. Increased to high intensity 80 mg dose of atorvastatin in 4/2021 with LDL improved to 58 and HDL 31 (12/2021), indicative of excellent control and likely related to weight loss. Emphasized importance of continued lifestyle modifications, including diet, exercise, and weight loss. Continue to follow. 3. Diabetes mellitus. Significant improvement with the addition of Ozempic 0.5 mg weekly. Also continues on metformin ER 1000 mg bid. Weight has decreased a total of 18 pounds. HbA1c improved to 6.1 (12/2021). No evidence of microvascular complications. On statin and lisinopril. Continue follow-up for annual eye exams. Foot exam normal (12/2021) urine microalbumin/ creatinine ratio with resolution of microalbuminuria (19 mg/g 12/2021). On lisinopril. Emphasized importance of continued lifestyle modifications, including heart healthy diet, regular exercise, and maintaining weight loss. 4. Migraines. Returned to baseline pattern involving pain on side of head radiating to lateral neck. On propranolol 40 mg twice daily as preventive therapy and readily responds to Imitrex if needed. Reports continued good control of headaches. 5. History of left facial nerve palsy. Presented with severe headache resulting in hospital admission to [facility]. Evaluation including head CT scan, brain MRI/MRA and neck MRA unrevealing and Dr. Bishop Marroquin of neurology consulted and felt most consistent with status migrainosus. Prescribed medrol dose pack and discharged.  Developed left facial nerve palsy 10 days later and treated with prednisone 60 mg and Valtrex for 7 days. Evaluated by Dr. Lyle Almeida who felt that presentation more likely consistent with viral meningoencephalitis given the clear change in the pattern of her headaches and the subsequent development of a cranial nerve palsy. Now completely improved with resolution of facial nerve palsy and headaches returned to her baseline pattern for migraines. 6. Lumbar herniated disc with right sciatica, s/p right L5-S1 laminectomy and discectomy. Doing well without significant discomfort. Stressed improtance of continuing stretches and exercise. Follow. 7. Depression/ADHD. Reports good control of symptoms on new regimen of Abilify and Prozac. Reports no longer requiring lorazepam at bedtime. Being followed by MOJGAN Santizo at [facility]. 8. Overweight. Weight increased 15 pounds since 8/2019, and had been overall stable despite the patient exercising now 4 to 5 days/week in attempt to lose weight. Emphasized importance of continued lifestyle modifications, including diet, exercise, and weight loss. Started on Ozempic in 9/2021 and weight now decreased a total of 18 pounds at last visit earlier this month. Patient very pleased as now BMI <25 and she has returned to her baseline weight. Advised to maintain. 9. Health maintenance. Completed the Moderna COVID-19 vaccine series but has not yet received a Moderna booster dose. Advised again to proceed. Completed 2/2 doses of Shingrix vaccine. Other immunizations up to date. Well women exam completed by Dr. Claudene March in 4/19/2021. Mammogram up to date. Colonoscopy due 2024. Continue regular eye exams with Dr. Sanchez Niño. Vitamin D level improved on ergocalciferol to every 14 days. Will reassess at next visit. Patient understands recommendations and agrees with plan. Follow-up as previously scheduled in 6/2022      Rosa Plascencia was evaluated through a patient-initiated, synchronous (real-time) audio only encounter.  She (or guardian if applicable) is aware that it is a billable service, which includes applicable co-pays, with coverage as determined by her insurance carrier. This visit was conducted with the patient's (and/or Chelo Alcantara guardian's) verbal consent. She has not had a related appointment within my department in the past 7 days or scheduled within the next 24 hours. The patient was located in a state where the provider was licensed to provide care.     Total Time: minutes: 21-30 minutes    Gray Bloch, MD

## 2022-05-10 NOTE — TELEPHONE ENCOUNTER
Urine culture is negative for infection. Patient was treated with cefdinir x 7 days given abnormal POC urinalysis and should be near completion. Would recommend that she complete the 7-day course. Please find out how she is doing and whether she has noted any recurrent hematuria.

## 2022-05-11 NOTE — TELEPHONE ENCOUNTER
Patient is aware urine culture is negative for infection. Patient states she completed antibiotics and has not noticed any blood in her urine.

## 2022-06-21 ENCOUNTER — APPOINTMENT (OUTPATIENT)
Dept: INTERNAL MEDICINE CLINIC | Age: 67
End: 2022-06-21

## 2022-06-21 DIAGNOSIS — Z00.00 LABORATORY TESTS ORDERED AS PART OF A COMPLETE PHYSICAL EXAM (CPE): ICD-10-CM

## 2022-06-21 DIAGNOSIS — I10 ESSENTIAL HYPERTENSION: ICD-10-CM

## 2022-06-21 DIAGNOSIS — E83.42 HYPOMAGNESEMIA: ICD-10-CM

## 2022-06-21 DIAGNOSIS — E78.5 HYPERLIPIDEMIA, UNSPECIFIED HYPERLIPIDEMIA TYPE: ICD-10-CM

## 2022-06-21 DIAGNOSIS — E11.9 TYPE 2 DIABETES MELLITUS WITHOUT COMPLICATION, WITHOUT LONG-TERM CURRENT USE OF INSULIN (HCC): ICD-10-CM

## 2022-06-21 DIAGNOSIS — E55.9 VITAMIN D DEFICIENCY: ICD-10-CM

## 2022-06-22 LAB
25(OH)D3+25(OH)D2 SERPL-MCNC: 74.7 NG/ML (ref 30–100)
ALBUMIN SERPL-MCNC: 4.5 G/DL (ref 3.8–4.8)
ALBUMIN/GLOB SERPL: 3 {RATIO} (ref 1.2–2.2)
ALP SERPL-CCNC: 77 IU/L (ref 44–121)
ALT SERPL-CCNC: 21 IU/L (ref 0–32)
AST SERPL-CCNC: 18 IU/L (ref 0–40)
BASOPHILS # BLD AUTO: 0 X10E3/UL (ref 0–0.2)
BASOPHILS NFR BLD AUTO: 0 %
BILIRUB SERPL-MCNC: 0.3 MG/DL (ref 0–1.2)
BUN SERPL-MCNC: 13 MG/DL (ref 8–27)
BUN/CREAT SERPL: 16 (ref 12–28)
CALCIUM SERPL-MCNC: 9.6 MG/DL (ref 8.7–10.3)
CHLORIDE SERPL-SCNC: 102 MMOL/L (ref 96–106)
CHOLEST SERPL-MCNC: 150 MG/DL (ref 100–199)
CO2 SERPL-SCNC: 25 MMOL/L (ref 20–29)
CREAT SERPL-MCNC: 0.79 MG/DL (ref 0.57–1)
EGFR: 83 ML/MIN/1.73
EOSINOPHIL # BLD AUTO: 0.2 X10E3/UL (ref 0–0.4)
EOSINOPHIL NFR BLD AUTO: 2 %
ERYTHROCYTE [DISTWIDTH] IN BLOOD BY AUTOMATED COUNT: 13.4 % (ref 11.7–15.4)
EST. AVERAGE GLUCOSE BLD GHB EST-MCNC: 131 MG/DL
GLOBULIN SER CALC-MCNC: 1.5 G/DL (ref 1.5–4.5)
GLUCOSE SERPL-MCNC: 80 MG/DL (ref 65–99)
HBA1C MFR BLD: 6.2 % (ref 4.8–5.6)
HCT VFR BLD AUTO: 38.2 % (ref 34–46.6)
HDLC SERPL-MCNC: 45 MG/DL
HGB BLD-MCNC: 12.5 G/DL (ref 11.1–15.9)
IMM GRANULOCYTES # BLD AUTO: 0 X10E3/UL (ref 0–0.1)
IMM GRANULOCYTES NFR BLD AUTO: 0 %
IMP & REVIEW OF LAB RESULTS: NORMAL
LDLC SERPL CALC-MCNC: 84 MG/DL (ref 0–99)
LYMPHOCYTES # BLD AUTO: 2.8 X10E3/UL (ref 0.7–3.1)
LYMPHOCYTES NFR BLD AUTO: 32 %
MAGNESIUM SERPL-MCNC: 1.7 MG/DL (ref 1.6–2.3)
MCH RBC QN AUTO: 30.4 PG (ref 26.6–33)
MCHC RBC AUTO-ENTMCNC: 32.7 G/DL (ref 31.5–35.7)
MCV RBC AUTO: 93 FL (ref 79–97)
MONOCYTES # BLD AUTO: 0.4 X10E3/UL (ref 0.1–0.9)
MONOCYTES NFR BLD AUTO: 4 %
NEUTROPHILS # BLD AUTO: 5.5 X10E3/UL (ref 1.4–7)
NEUTROPHILS NFR BLD AUTO: 62 %
PLATELET # BLD AUTO: 382 X10E3/UL (ref 150–450)
POTASSIUM SERPL-SCNC: 5.1 MMOL/L (ref 3.5–5.2)
PROT SERPL-MCNC: 6 G/DL (ref 6–8.5)
RBC # BLD AUTO: 4.11 X10E6/UL (ref 3.77–5.28)
SODIUM SERPL-SCNC: 142 MMOL/L (ref 134–144)
SPECIMEN STATUS REPORT, ROLRST: NORMAL
TRIGL SERPL-MCNC: 113 MG/DL (ref 0–149)
TSH SERPL DL<=0.005 MIU/L-ACNC: 1.22 UIU/ML (ref 0.45–4.5)
VLDLC SERPL CALC-MCNC: 21 MG/DL (ref 5–40)
WBC # BLD AUTO: 8.9 X10E3/UL (ref 3.4–10.8)

## 2022-06-27 ENCOUNTER — TELEPHONE (OUTPATIENT)
Dept: INTERNAL MEDICINE CLINIC | Age: 67
End: 2022-06-27

## 2022-06-27 NOTE — TELEPHONE ENCOUNTER
Patient had to reschedule her appointment she will now be seen in august. She wanted to know if she will have to repeat labs. Advised her that insurance will not cover for her to have labs again that soon so no she will not need labs before her future appointment.

## 2022-07-11 RX ORDER — SUMATRIPTAN 50 MG/1
50 TABLET, FILM COATED ORAL
Qty: 9 TABLET | Refills: 5 | Status: SHIPPED | OUTPATIENT
Start: 2022-07-11

## 2022-08-03 RX ORDER — ERGOCALCIFEROL 1.25 MG/1
50000 CAPSULE ORAL
Qty: 2 CAPSULE | Refills: 5 | Status: SHIPPED | OUTPATIENT
Start: 2022-08-03

## 2022-08-25 ENCOUNTER — OFFICE VISIT (OUTPATIENT)
Dept: INTERNAL MEDICINE CLINIC | Age: 67
End: 2022-08-25
Payer: MEDICARE

## 2022-08-25 VITALS
OXYGEN SATURATION: 97 % | BODY MASS INDEX: 23.99 KG/M2 | RESPIRATION RATE: 16 BRPM | DIASTOLIC BLOOD PRESSURE: 70 MMHG | WEIGHT: 119 LBS | HEART RATE: 78 BPM | SYSTOLIC BLOOD PRESSURE: 106 MMHG | TEMPERATURE: 97.4 F | HEIGHT: 59 IN

## 2022-08-25 DIAGNOSIS — G43.009 MIGRAINE WITHOUT AURA AND WITHOUT STATUS MIGRAINOSUS, NOT INTRACTABLE: ICD-10-CM

## 2022-08-25 DIAGNOSIS — Z00.00 WELCOME TO MEDICARE PREVENTIVE VISIT: ICD-10-CM

## 2022-08-25 DIAGNOSIS — E11.9 TYPE 2 DIABETES MELLITUS WITHOUT COMPLICATION, WITHOUT LONG-TERM CURRENT USE OF INSULIN (HCC): Primary | ICD-10-CM

## 2022-08-25 DIAGNOSIS — Z23 ENCOUNTER FOR IMMUNIZATION: ICD-10-CM

## 2022-08-25 DIAGNOSIS — F33.42 RECURRENT MAJOR DEPRESSIVE DISORDER, IN FULL REMISSION (HCC): ICD-10-CM

## 2022-08-25 DIAGNOSIS — R31.0 GROSS HEMATURIA: ICD-10-CM

## 2022-08-25 DIAGNOSIS — E55.9 VITAMIN D DEFICIENCY: ICD-10-CM

## 2022-08-25 DIAGNOSIS — H91.91 DECREASED HEARING OF RIGHT EAR: ICD-10-CM

## 2022-08-25 DIAGNOSIS — E83.42 HYPOMAGNESEMIA: ICD-10-CM

## 2022-08-25 DIAGNOSIS — Z13.31 SCREENING FOR DEPRESSION: ICD-10-CM

## 2022-08-25 DIAGNOSIS — Z12.31 ENCOUNTER FOR SCREENING MAMMOGRAM FOR MALIGNANT NEOPLASM OF BREAST: ICD-10-CM

## 2022-08-25 DIAGNOSIS — E78.5 HYPERLIPIDEMIA, UNSPECIFIED HYPERLIPIDEMIA TYPE: ICD-10-CM

## 2022-08-25 DIAGNOSIS — Z78.0 POSTMENOPAUSAL STATE: ICD-10-CM

## 2022-08-25 DIAGNOSIS — I10 ESSENTIAL HYPERTENSION: ICD-10-CM

## 2022-08-25 DIAGNOSIS — Z71.89 ADVANCED DIRECTIVES, COUNSELING/DISCUSSION: ICD-10-CM

## 2022-08-25 PROCEDURE — 3044F HG A1C LEVEL LT 7.0%: CPT | Performed by: INTERNAL MEDICINE

## 2022-08-25 PROCEDURE — 1090F PRES/ABSN URINE INCON ASSESS: CPT | Performed by: INTERNAL MEDICINE

## 2022-08-25 PROCEDURE — G8427 DOCREV CUR MEDS BY ELIG CLIN: HCPCS | Performed by: INTERNAL MEDICINE

## 2022-08-25 PROCEDURE — G8420 CALC BMI NORM PARAMETERS: HCPCS | Performed by: INTERNAL MEDICINE

## 2022-08-25 PROCEDURE — 1101F PT FALLS ASSESS-DOCD LE1/YR: CPT | Performed by: INTERNAL MEDICINE

## 2022-08-25 PROCEDURE — 90471 IMMUNIZATION ADMIN: CPT | Performed by: INTERNAL MEDICINE

## 2022-08-25 PROCEDURE — G0463 HOSPITAL OUTPT CLINIC VISIT: HCPCS | Performed by: INTERNAL MEDICINE

## 2022-08-25 PROCEDURE — 99214 OFFICE O/P EST MOD 30 MIN: CPT | Performed by: INTERNAL MEDICINE

## 2022-08-25 PROCEDURE — G0402 INITIAL PREVENTIVE EXAM: HCPCS | Performed by: INTERNAL MEDICINE

## 2022-08-25 PROCEDURE — G8754 DIAS BP LESS 90: HCPCS | Performed by: INTERNAL MEDICINE

## 2022-08-25 PROCEDURE — 3017F COLORECTAL CA SCREEN DOC REV: CPT | Performed by: INTERNAL MEDICINE

## 2022-08-25 PROCEDURE — G9899 SCRN MAM PERF RSLTS DOC: HCPCS | Performed by: INTERNAL MEDICINE

## 2022-08-25 PROCEDURE — 2022F DILAT RTA XM EVC RTNOPTHY: CPT | Performed by: INTERNAL MEDICINE

## 2022-08-25 PROCEDURE — G8400 PT W/DXA NO RESULTS DOC: HCPCS | Performed by: INTERNAL MEDICINE

## 2022-08-25 PROCEDURE — 90677 PCV20 VACCINE IM: CPT | Performed by: INTERNAL MEDICINE

## 2022-08-25 PROCEDURE — G9717 DOC PT DX DEP/BP F/U NT REQ: HCPCS | Performed by: INTERNAL MEDICINE

## 2022-08-25 PROCEDURE — 1123F ACP DISCUSS/DSCN MKR DOCD: CPT | Performed by: INTERNAL MEDICINE

## 2022-08-25 PROCEDURE — G8536 NO DOC ELDER MAL SCRN: HCPCS | Performed by: INTERNAL MEDICINE

## 2022-08-25 PROCEDURE — G8752 SYS BP LESS 140: HCPCS | Performed by: INTERNAL MEDICINE

## 2022-08-25 NOTE — PATIENT INSTRUCTIONS
Heart-Healthy Diet: Care Instructions  Your Care Instructions     A heart-healthy diet has lots of vegetables, fruits, nuts, beans, and whole grains, and is low in salt. It limits foods that are high in saturated fat, such as meats, cheeses, and fried foods. It may be hard to change your diet, but even small changes can lower your risk of heart attack and heart disease. Follow-up care is a key part of your treatment and safety. Be sure to make and go to all appointments, and call your doctor if you are having problems. It's also a good idea to know your test results and keep a list of the medicines you take. How can you care for yourself at home? Watch your portions  Learn what a serving is. A \"serving\" and a \"portion\" are not always the same thing. Make sure that you are not eating larger portions than are recommended. For example, a serving of pasta is ½ cup. A serving size of meat is 2 to 3 ounces. A 3-ounce serving is about the size of a deck of cards. Measure serving sizes until you are good at [place]" them. Keep in mind that restaurants often serve portions that are 2 or 3 times the size of one serving. To keep your energy level up and keep you from feeling hungry, eat often but in smaller portions. Eat only the number of calories you need to stay at a healthy weight. If you need to lose weight, eat fewer calories than your body burns (through exercise and other physical activity). Eat more fruits and vegetables  Eat a variety of fruit and vegetables every day. Dark green, deep orange, red, or yellow fruits and vegetables are especially good for you. Examples include spinach, carrots, peaches, and berries. Keep carrots, celery, and other veggies handy for snacks. Buy fruit that is in season and store it where you can see it so that you will be tempted to eat it. Cook dishes that have a lot of veggies in them, such as stir-fries and soups.   Limit saturated and trans fat  Read food labels, and try to avoid saturated and trans fats. They increase your risk of heart disease. Use olive or canola oil when you cook. Bake, broil, grill, or steam foods instead of frying them. Choose lean meats instead of high-fat meats such as hot dogs and sausages. Cut off all visible fat when you prepare meat. Eat fish, skinless poultry, and meat alternatives such as soy products instead of high-fat meats. Soy products, such as tofu, may be especially good for your heart. Choose low-fat or fat-free milk and dairy products. Eat foods high in fiber  Eat a variety of grain products every day. Include whole-grain foods that have lots of fiber and nutrients. Examples of whole-grain foods include oats, whole wheat bread, and brown rice. Buy whole-grain breads and cereals, instead of white bread or pastries. Limit salt and sodium  Limit how much salt and sodium you eat to help lower your blood pressure. Taste food before you salt it. Add only a little salt when you think you need it. With time, your taste buds will adjust to less salt. Eat fewer snack items, fast foods, and other high-salt, processed foods. Check food labels for the amount of sodium in packaged foods. Choose low-sodium versions of canned goods (such as soups, vegetables, and beans). Limit sugar  Limit drinks and foods with added sugar. These include candy, desserts, and soda pop. Limit alcohol  Limit alcohol to no more than 2 drinks a day for men and 1 drink a day for women. Too much alcohol can cause health problems. When should you call for help? Watch closely for changes in your health, and be sure to contact your doctor if:    You would like help planning heart-healthy meals. Where can you learn more? Go to http://jennifer-pavel.info/  Enter V137 in the search box to learn more about \"Heart-Healthy Diet: Care Instructions. \"  Current as of: August 22, 2019               Content Version: 12.6  © 1897-7832 [employer], [employer]. Care instructions adapted under license by [employer] (which disclaims liability or warranty for this information). If you have questions about a medical condition or this instruction, always ask your healthcare professional. [address] any warranty or liability for your use of this information. Learning About Diabetes Food Guidelines  Your Care Instructions     Meal planning is important to manage diabetes. It helps keep your blood sugar at a target level (which you set with your doctor). You don't have to eat special foods. You can eat what your family eats, including sweets once in a while. But you do have to pay attention to how often you eat and how much you eat of certain foods. You may want to work with a dietitian or a certified diabetes educator (CDE) to help you plan meals and snacks. A dietitian or CDE can also help you lose weight if that is one of your goals. What should you know about eating carbs? Managing the amount of carbohydrate (carbs) you eat is an important part of healthy meals when you have diabetes. Carbohydrate is found in many foods. Learn which foods have carbs. And learn the amounts of carbs in different foods. Bread, cereal, pasta, and rice have about 15 grams of carbs in a serving. A serving is 1 slice of bread (1 ounce), ½ cup of cooked cereal, or 1/3 cup of cooked pasta or rice. Fruits have 15 grams of carbs in a serving. A serving is 1 small fresh fruit, such as an apple or orange; ½ of a banana; ½ cup of cooked or canned fruit; ½ cup of fruit juice; 1 cup of melon or raspberries; or 2 tablespoons of dried fruit. Milk and no-sugar-added yogurt have 15 grams of carbs in a serving. A serving is 1 cup of milk or 2/3 cup of no-sugar-added yogurt. Starchy vegetables have 15 grams of carbs in a serving.  A serving is ½ cup of mashed potatoes or sweet potato; 1 cup winter squash; ½ of a small baked potato; ½ cup of cooked beans; or ½ cup cooked corn or green peas. Learn how much carbs to eat each day and at each meal. A dietitian or CDE can teach you how to keep track of the amount of carbs you eat. This is called carbohydrate counting. If you are not sure how to count carbohydrate grams, use the Plate Method to plan meals. It is a good, quick way to make sure that you have a balanced meal. It also helps you spread carbs throughout the day. Divide your plate by types of foods. Put non-starchy vegetables on half the plate, meat or other protein food on one-quarter of the plate, and a grain or starchy vegetable in the final quarter of the plate. To this you can add a small piece of fruit and 1 cup of milk or yogurt, depending on how many carbs you are supposed to eat at a meal.  Try to eat about the same amount of carbs at each meal. Do not \"save up\" your daily allowance of carbs to eat at one meal.  Proteins have very little or no carbs per serving. Examples of proteins are beef, chicken, turkey, fish, eggs, tofu, cheese, cottage cheese, and peanut butter. A serving size of meat is 3 ounces, which is about the size of a deck of cards. Examples of meat substitute serving sizes (equal to 1 ounce of meat) are 1/4 cup of cottage cheese, 1 egg, 1 tablespoon of peanut butter, and ½ cup of tofu. How can you eat out and still eat healthy? Learn to estimate the serving sizes of foods that have carbohydrate. If you measure food at home, it will be easier to estimate the amount in a serving of restaurant food. If the meal you order has too much carbohydrate (such as potatoes, corn, or baked beans), ask to have a low-carbohydrate food instead. Ask for a salad or green vegetables. If you use insulin, check your blood sugar before and after eating out to help you plan how much to eat in the future. If you eat more carbohydrate at a meal than you had planned, take a walk or do other exercise. This will help lower your blood sugar.   What else should you know? Limit saturated fat, such as the fat from meat and dairy products. This is a healthy choice because people who have diabetes are at higher risk of heart disease. So choose lean cuts of meat and nonfat or low-fat dairy products. Use olive or canola oil instead of butter or shortening when cooking. Don't skip meals. Your blood sugar may drop too low if you skip meals and take insulin or certain medicines for diabetes. Check with your doctor before you drink alcohol. Alcohol can cause your blood sugar to drop too low. Alcohol can also cause a bad reaction if you take certain diabetes medicines. Follow-up care is a key part of your treatment and safety. Be sure to make and go to all appointments, and call your doctor if you are having problems. It's also a good idea to know your test results and keep a list of the medicines you take. Where can you learn more? Go to http://www.allan.com/  Enter I147 in the search box to learn more about \"Learning About Diabetes Food Guidelines. \"  Current as of: December 20, 2019               Content Version: 12.6  © 1366-1799 [employer]. Care instructions adapted under license by [employer] (which disclaims liability or warranty for this information). If you have questions about a medical condition or this instruction, always ask your healthcare professional. [address] any warranty or liability for your use of this information. Medicare Wellness Visit, Female    The best way to improve and maintain good health is to have a healthy lifestyle by eating a well-balanced diet, exercising regularly, limiting alcohol and stopping smoking. Regular visits with your physician or non-physician health care provider also support your good health. Preventive screening tests can find health problems before they become diseases or illnesses.      Preventive services such as immunizations prevent serious infections. All people over age 72 should have a Pneumovax and a Prevnar-13 shot to prevent potentially life threatening infections with the pneumococcus bacteria, a common cause of pneumonia. These are once in a lifetime unless you and your provider decide differently. All people over 65 should have a yearly influenza vaccine or \"flu\" shot. This does not prevent infection with cold viruses but has been proven to prevent hospitalization and death from influenza. Although Medicare part B \"regular Medicare\" currently only covers tetanus vaccination in the context of an injury, a tetanus vaccine (Tdap or Td) is recommended every 10 years. A shingles vaccine is recommended once in a lifetime after age 61. The Shingles vaccine is also not covered by Medicare part B. Note, however, that both the Shingles vaccine and Tdap/Td are generally covered by secondary carriers. Please check your coverage and out of pocket expenses. Consider contacting your local health department because it may stock these vaccines for a reasonable charge. We currently have documentation of the following immunization history for you:  Immunization History   Administered Date(s) Administered    COVID-19, MODERNA BLUE border, Primary or Immunocompromised, (age 18y+), IM, 100 mcg/0.5mL 04/19/2021, 05/17/2021    Influenza Vaccine PF 02/03/2014, 10/13/2014    Influenza Vaccine Split 11/14/2011    Influenza Vaccine Whole 10/05/2010    Influenza, FLUARIX, FLULAVAL, (age 10 mo+) AND AFLURIA, FLUZONE (age 1 y+), PF 11/08/2018, 11/19/2019, 10/22/2020, 09/14/2021    Pneumococcal Polysaccharide (PPSV-23) 05/11/2017    TD Vaccine 01/23/2007    Tdap 11/10/2016    Zoster Recombinant 05/20/2018, 09/18/2018       Screening for infection with Hepatitis C is recommended for anyone born between [birth year] through [address]. The table at the bottom of this document indicates the status of this and other preventive services.     A bone mass density test (DEXA) to screen for osteoporosis or thinning of the bones should be done at least once after age 72 and may be done up to every 2 years as determined by you and your health care provider. The most recent DEXA we have on file for you is:  DEXA Results (most recent):  No results found for this or any previous visit. Screening for diabetes mellitus with a blood sugar test (glucose) should be done at least every 3 years until age 79. You and your health care provider may decide whether to continue screening after age 79. The most recent blood glucose we have on file for you is:   Lab Results   Component Value Date/Time    Glucose 80 06/21/2022 08:09 AM    Glucose (POC) 131 (H) 08/07/2018 06:14 AM         Glaucoma is a disease of the eye due to increased ocular pressure that can lead to blindness. People with risk factors for glaucoma ( race, diabetes, family history) should be screened at least every 2 years by an eye professional.     Cardiovascular screening tests that check for elevated lipids or cholesterol (fatty part of blood) which can lead to heart disease and strokes should be done every 4-6 years through age 79. You and your health care provider may decide whether to continue screening after age 79. The most recent lipid panel we have on file for you is:   Lab Results   Component Value Date/Time    Cholesterol, total 150 06/21/2022 08:09 AM    HDL Cholesterol 45 06/21/2022 08:09 AM    LDL, calculated 84 06/21/2022 08:09 AM    LDL, calculated 95 06/11/2020 08:21 AM    VLDL, calculated 21 06/21/2022 08:09 AM    VLDL, calculated 31 06/11/2020 08:21 AM    Triglyceride 113 06/21/2022 08:09 AM    CHOL/HDL Ratio 5.3 (H) 09/28/2010 10:53 AM       Colorectal cancer screening that evaluates for blood or polyps in your colon for people with average risk should be done yearly as a stool test, every five years as a flexible sigmoidoscope or every 10 years as a colonoscopy up to age 76.  You and your health care provider may decide whether to continue screening after age 76. Breast cancer screening with a mammogram is recommended at least once every 2 years  for women age 54-69. You and your health care provider may decide whether to continue screening after age 76. The most recent mammogram we have on file for you is:   [facility] Results (most recent):  Results from Hospital Encounter encounter on 09/09/21    MACHO 3D SHARIFA W MAMMO BI SCREENING INCL CAD    Narrative  BILATERAL SCREENING DIGITAL MAMMOGRAM WITH CAD WITH DIGITAL BREAST TOMOSYNTHESIS  IMAGING/COMBINATION 2-D 3-D EXAM    CLINICAL HISTORY/INDICATION:  asymptomatic    COMPARISON: mammogram 23 - 17    TECHNIQUE: 2-D and tomosynthesis 3-D digital mammograms were performed with CC  and MLO views obtained of both breasts. CAD analysis was performed with the  computer-aided detection system. Any areas marked correlated with the mammogram.    Breast composition C: The breasts are heterogenously dense, which may obscure  small masses. FINDINGS:    There are no suspicious masses, regions of distortion, nor suspicious  microcalcifications. Impression  No suspicious finding for malignancy. Birads : BI-RADS one-negative mammogram  Management Recommendation: Annual screening mammogram.      Screening for cervical cancer with a pap smear is recommended for all women with a cervix until age 72. The frequency of this test is based on the details of her prior pap smear testing. You and your health care provider may decide whether to continue screening after age 72. People who have smoked the equivalent of 1 pack per day for 30 years or more may benefit from screening for lung cancer with a yearly low dose CT scan until they have been non smokers for 15 years or competing health conditions render this unlikely to be beneficial. Our records show: n/a    Your Medicare Wellness Exam is recommended annually.     Here is a list of your current Health Maintenance items with a due date:  Health Maintenance   Topic Date Due    Pneumococcal 65+ years (2 - PCV) 05/11/2018    COVID-19 Vaccine (3 - Booster for Moderna series) 10/17/2021    Bone Densitometry (Dexa) Screening  Never done    Flu Vaccine (1) 09/01/2022    MICROALBUMIN Q1  12/17/2022    Foot Exam Q1  12/29/2022    Depression Monitoring  05/03/2023    A1C test (Diabetic or Prediabetic)  06/21/2023    Lipid Screen  06/21/2023    Breast Cancer Screen Mammogram  09/09/2023    Eye Exam Retinal or Dilated  11/14/2023    Colorectal Cancer Screening Combo  10/24/2024    Cervical cancer screen  04/27/2026    DTaP/Tdap/Td series (2 - Td or Tdap) 11/10/2026    Hepatitis C Screening  Completed    Shingrix Vaccine Age 50>  Completed

## 2022-08-25 NOTE — PROGRESS NOTES
1. \"Have you been to the ER, urgent care clinic since your last visit? Hospitalized since your last visit? \" No    2. \"Have you seen or consulted any other health care providers outside of the [address] [place] [address] since your last visit? \" No     3. For patients aged 39-70: Has the patient had a colonoscopy / FIT/ Cologuard? Yes - no Care Gap present      If the patient is female:    4. For patients aged 41-77: Has the patient had a mammogram within the past 2 years? Yes - no Care Gap present      5. For patients aged 21-65: Has the patient had a pap smear?  Yes - no Care Gap present

## 2022-08-25 NOTE — PROGRESS NOTES
This is a \"Welcome to [facility]"  Initial Preventive Physical Examination (IPPE) providing Personalized Prevention Plan Services (Performed in the first 12 months of enrollment)    I have reviewed the patient's medical history in detail and updated the computerized patient record. Assessment/Plan   Education and counseling provided:  Are appropriate based on today's review and evaluation  End-of-Life planning (with patient's consent)  Pneumococcal Vaccine  Influenza Vaccine  Screening Mammography  Screening Pap and pelvic (covered once every 2 years)  Colorectal cancer screening tests  Cardiovascular screening blood test  Bone mass measurement (DEXA)  Screening for glaucoma  Medical nutrition therapy for individuals with diabetes or renal disease  COVID-19 follow-up booster dose    1. Type 2 diabetes mellitus without complication, without long-term current use of insulin (HCC)  -     CBC WITH AUTOMATED DIFF; Future  -     HEMOGLOBIN A1C WITH EAG; Future  -     MAGNESIUM; Future  -     METABOLIC PANEL, COMPREHENSIVE; Future  -     MICROALBUMIN, UR, RAND W/ MICROALB/CREAT RATIO; Future  2. Essential hypertension  -     CBC WITH AUTOMATED DIFF; Future  -     MAGNESIUM; Future  -     METABOLIC PANEL, COMPREHENSIVE; Future  3. Hyperlipidemia, unspecified hyperlipidemia type  -     LIPID PANEL; Future  -     MAGNESIUM; Future  -     METABOLIC PANEL, COMPREHENSIVE; Future  4. Recurrent major depressive disorder, in full remission ([facility] [address]  5. Migraine without aura and without status migrainosus, not intractable  6. Vitamin D deficiency  -     VITAMIN D, 25 HYDROXY; Future  7. Decreased hearing of right ear  -     REFERRAL TO ENT-OTOLARYNGOLOGY  8. Hypomagnesemia  9. Gross hematuria  -     URINALYSIS W/MICROSCOPIC; Future  10. Welcome to Medicare preventive visit  11. Advanced directives, counseling/discussion  12. Screening for depression  13.  Encounter for immunization  -     MA IMMUNIZ ADMIN,1 SINGLE/COMB VAC/TOXOID  - PNEUMOCOCCAL, PCV20, PREVNAR 20, (AGE 18 YRS+), IM, PF  14. Encounter for screening mammogram for malignant neoplasm of breast  -     MACHO MAMMO BI SCREENING INCL CAD; Future  15. Postmenopausal state  -     DEXA BONE DENSITY STUDY AXIAL; Future       Depression Risk Screen     3 most recent PHQ Screens 8/25/2022   Little interest or pleasure in doing things Not at all   Feeling down, depressed, irritable, or hopeless Several days   Total Score PHQ 2 1   Trouble falling or staying asleep, or sleeping too much -   Feeling tired or having little energy -   Poor appetite, weight loss, or overeating -   Feeling bad about yourself - or that you are a failure or have let yourself or your family down -   Trouble concentrating on things such as school, work, reading, or watching TV -   Moving or speaking so slowly that other people could have noticed; or the opposite being so fidgety that others notice -   Thoughts of being better off dead, or hurting yourself in some way -   PHQ 9 Score -   How difficult have these problems made it for you to do your work, take care of your home and get along with others -       Alcohol & Drug Abuse Risk Screen    Do you average more than 1 drink per night or more than 7 drinks a week:  No    On any one occasion in the past three months have you have had more than 3 drinks containing alcohol:  No          Functional Ability and Level of Safety    Diet: The patient is prescribed and follows a special diet. Hearing: The patient needs further evaluation. Vision Screening:  Vision is good. Vision Screening    Right eye Left eye Both eyes   Without correction 20/70 20/100 20/70   With correction 20/20 20/25 20/15         Activities of Daily Living: The home contains: no safety equipment. Patient does total self care      Ambulation: with no difficulty      Exercise level: moderately active     Fall Risk Screen:  Fall Risk Assessment, last 12 mths 8/25/2022   Able to walk?  Yes Fall in past 12 months? 0   Do you feel unsteady? 0   Are you worried about falling 0      Abuse Screen:  Patient is not abused       Screening EKG   EKG order placed: No    End of Life Planning   Advanced care planning directives were discussed with the patient and /or family/caregiver. Health Maintenance Due     Health Maintenance Due   Topic Date Due    COVID-19 Vaccine (3 - Booster for Moderna series) 10/17/2021    Bone Densitometry (Dexa) Screening  Never done       Patient Care Team   Patient Care Team:  Lucianne Fabry, MD as PCP - General (Internal Medicine Physician)  Lucianne Fabry, MD as PCP - [address] [place] [address] Provider  Roselyn Mcdowell MD (Hematology and Oncology)  Dania Carmichael MD (Obstetrics & Gynecology)  Peri Martinez OD (Optometry)  Beatris Riggins NP as Nurse Practitioner (Psychiatry)  Melinda Stephenson MD (Orthopedic Surgery)    History     Past Medical History:   Diagnosis Date    ADHD     Anxiety     Bilateral lumbar radiculopathy     Carotid bruit     right    Depression     Diabetes mellitus ([address]     HCD (hypertensive cardiovascular disease)     Hyperlipidemia     Hypertension     Migraine headache     Plantar fasciitis       Past Surgical History:   Procedure Laterality Date    HX SEPTOPLASTY  6/2002    HX TONSILLECTOMY      NEUROLOGICAL PROCEDURE UNLISTED  08/06/2018    laminectomy      Current Outpatient Medications   Medication Sig Dispense Refill    ergocalciferol (ERGOCALCIFEROL) 1,250 mcg (50,000 unit) capsule TAKE 1 CAPSULE BY MOUTH EVERY FOURTEEN (14) DAYS. 2 Capsule 5    SUMAtriptan (IMITREX) 50 mg tablet TAKE 1 TAB BY MOUTH DAILY AS NEEDED FOR MIGRAINE.  9 Tablet 5    atorvastatin (LIPITOR) 80 mg tablet TAKE 1 TABLET BY MOUTH EVERY DAY 90 Tablet 3    propranoloL (INDERAL) 40 mg tablet TAKE 1 TABLET BY MOUTH TWICE A  Tablet 3    metFORMIN ER (GLUCOPHAGE XR) 500 mg tablet TAKE 2 TABLETS BY MOUTH TWICE A DAY WITH MEALS 360 Tablet 3    semaglutide (OZEMPIC) 0.25 mg or 0.5 mg/dose (2 mg/1.5 ml) subq pen 0.5 mg by SubCUTAneous route every seven (7) days. 12 Pen 3    lisinopriL (PRINIVIL, ZESTRIL) 10 mg tablet TAKE 1 TABLET BY MOUTH EVERY DAY 90 Tablet 3    ondansetron (ZOFRAN ODT) 4 mg disintegrating tablet Take 1 Tablet by mouth every eight (8) hours as needed for Nausea or Nausea or Vomiting. 30 Tablet 1    Insulin Needles, Disposable, 31 gauge x 5/16\" ndle Use for weekly SC injection of Ozempic 30 Each 2    white petrolatum-mineral oil (ARTIFICIAL TEARS, VLADISLAV/MIN,) 83-15 % ophthalmic ointment Administer  to left eye nightly. Small amount 3.5 g 2    FLUoxetine (PROzac) 20 mg capsule Take 10 mg by mouth daily. naloxone (NARCAN) 4 mg/actuation nasal spray Use 1 spray intranasally, then discard. Repeat with new spray every 2 min as needed for opioid overdose symptoms, alternating nostrils.  1 Each 0     Allergies   Allergen Reactions    Pcn [Penicillins] Rash    Sulfa (Sulfonamide Antibiotics) Rash       Family History   Problem Relation Age of Onset    Hypertension Brother         x2    Elevated Lipids Brother     Stroke Mother     Heart Surgery Mother         CABG    Cancer Father         cancer of the mouth    Hypertension Father     Hypertension Brother     Elevated Lipids Brother      Social History     Tobacco Use    Smoking status: Former     Packs/day: 0.25     Years: 4.00     Pack years: 1.00     Types: Cigarettes     Quit date: 1989     Years since quittin.6    Smokeless tobacco: Never   Substance Use Topics    Alcohol use: No     Alcohol/week: 1.7 standard drinks     Types: 2 Glasses of wine per week       Buzz Boudreaux MD

## 2022-08-25 NOTE — ACP (ADVANCE CARE PLANNING)
Advance Care Planning     Advance Care Planning (ACP) Physician/NP/PA Conversation      Date of Conversation: 8/25/2022  Conducted with: Patient with Decision Making Capacity    Healthcare Decision Maker:     Primary Decision Maker: Monet Tejeda - Tre - [phone]  Click here to complete [address] including selection of the Healthcare Decision Maker Relationship (ie \"Primary\")    Today we documented Decision Maker(s) consistent with Legal Next of Kin hierarchy. Care Preferences:    Hospitalization: \"If your health worsens and it becomes clear that your chance of recovery is unlikely, what would be your preference regarding hospitalization? \"  The patient would prefer hospitalization. Ventilation: \"If you were unable to breathe on your own and your chance of recovery was unlikely, what would be your preference about the use of a ventilator (breathing machine) if it was available to you? \"   The patient would desire the use of a ventilator. Resuscitation: \"In the event your heart stopped as a result of an underlying serious health condition, would you want attempts to be made to restart your heart, or would you prefer a natural death? \"   Yes, attempt to resuscitate.     Additional topics discussed: treatment goals, benefit/burden of treatment options, ventilation preferences, hospitalization preferences, resuscitation preferences, and end of life care preferences (vegetative state/imminent death)    Conversation Outcomes / Follow-Up Plan:   ACP in process - information provided, considering goals and options  Reviewed DNR/DNI and patient elects Full Code (Attempt Resuscitation)     Length of Voluntary ACP Conversation in minutes:  <16 minutes (Non-Billable)    Stacey Barber MD

## 2022-08-27 NOTE — PROGRESS NOTES
HPI:   Michael Bobo is a 74y.o. year old female who presents today for a routine visit. She has a history of hypertension, hyperlipidemia, diabetes mellitus, migraine headaches, depression, anxiety, ADHD, and lumbar radiculopathy. She has completed the Moderna COVID-19 vaccine series but has not received any Moderna booster doses. She reports that she is doing reasonably well. She states that she is continuing to exercise 3 days/week at [employer] walking on the treadmill and using the stationary bicycle and rowing machine. She does report noticing some difficulty with gradual decrease in hearing in her right ear over the last 2 years. She denies any ear pain. She is otherwise without new complaints. On 4/13/2022, she reported a 4-day history of pain and burning with urination and urge incontinence. She denied any fever, chills, abdominal pain, hematuria, and did report some mild lower back discomfort. POC urinalysis was suggestive of infection with positive nitrites, 2+ blood, and 3+ LE. She was treated empirically with Keflex and urine culture showed > 100,000 CFU/mL E. coli with only intermediate sensitivity to cefuroxime. She was changed to ciprofloxacin and treated for 5 days with resolution of symptoms. She reported that on 4/30/2022, she began to experience mild low back pain and noted bright red blood in her urine without any dysuria, fever, or chills that would suggest infection. She reported that she continued to experience hematuria for 2 days until 5/2/2022 when she noted resolution of all symptoms. POC urinalysis (5/4/2022) showed 2+ blood and 3+ LE suggestive of infection, and she was started empirically on cefdinir for 7 days but urine culture was negative.   She completed the 7-day course of antibiotics and denies any recurrence of hematuria today        Summary of prior hospitalizations and medical history:  She was hospitalized from 7/9-7/11/2019 at [facility] after presenting to [facility] ED with a severe posterior headache, unlike her usual migraines, with associated light sensitivity and confusion. Evaluation included WBC 6.0, Hb 11.9/ Hct 35.2, Na 138, Ca 9.6, creatinine 1.0/ eGFR >60, head CT scan no acute intracranial abnormality; MRI brain (7/10/2019) mild nonspecific white matter disease likely representing chronic small vessel changes, and very mild bilateral occipital paramedian encephalomalacic changes, likely from chronic small infarcts; brain MRA (7/10/2019) no high-grade intracranial stenosis; neck MRA (7/10/2019) mild proximal ICA irregularity without hemodynamically significant carotid stenosis. She was evaluated by Dr. Sudhir Jones of neurology who felt presentation was consistent with status migrainosus. Attempted treatment with Benadryl and compazine without much improvement, but notable improvement after Medrol dose pack started and subsequently discharged. On 7/24/2019, she noted onset of left facial weakness with difficulty with eye closure and forehead elevation, and presented to [facility] ED and diagnosed with left facial palsy. She was treated with prednisone 60 mg daily x 7 days and Valtrex. She was evaluated by Dr. Madison Min on 8/14/2019, and it was his opinion that her symptoms were likely not due to status migrainosus, but rather were secondary to a viral meningoencephalitis given the clear change in the pattern of her headaches and the subsequent development of a cranial nerve palsy. She has had complete resolution of her facial palsy, and a return to baseline for her migraine headaches, which respond readily to sumatriptan. She has a history of hypertension, treated with propranolol and lisinopril. She does not check her blood pressure at home. She has begun walking regularly for exercise. She denies any chest pain, shortness of breath at rest or with exertion, palpitations, or lightheadedness.  She also has a history of hyperlipidemia, treated with high intensity dose rosuvastatin. She has a history of diabetes mellitus, and was started on metformin in 2/2015 for a HbA1c of 7.5. She does not monitor her blood sugars at home. She denies any polyuria, polydipsia, nocturia, or blurry vision, and has no history of retinopathy, neuropathy, or nephropathy. She has regular eye exams with Dr. Randolph Kerns. She also has a history of migraines without aura, which usually respond to Imitrex. Also now on propranolol for preventive therapy with good control. She has a history of lumbar radiculopathy (R>L), and was evaluated by Dr. Amalia Garcia in 5/2015. Lumbar x-rays showed degenerative changes in L4-L5 and L5-S1. She was treated conservatively with physical therapy and ibuprofen and reports significant improvement. She was evaluated by MOJGAN Zamora in 12/2016 for exacerbation of low back pain with right sciatica. She was treated with steroids and Norco with improvement. On 4/17/2018, she presented with increasing low back pain with bilateral sciatica. She was treated with a prednisone taper with initial improvement. However, her symptoms worsened and she was referred to Dr. Yareli Hernández for evaluation. She gave her a Toradol injection, and ordered a lumbar MRI (6/24/2018) which showed a large right disc extrusion at L5-S1 which compressed the descending right S1 nerve root. She was referred to pain management and underwent a selective L5 and S1 selective nerve root blocks by Dr. Marcos Sarabia on 7/26/2018. However, due to persistent symptoms, she was referred to Dr. Polo Holley and subsequently underwent a right L5-S1 laminectomy and diskectomy on 6/6/7491 without complications. She states that she did well and continues to do her back stretches and exercises. She states that she only has occasional discomfort. She had a screening colonoscopy in 10/2014 by Dr. Erin Delgado which was normal. Follow-up due in 10 years.  She denies any abdominal pain, nausea, vomiting, melena, hematochezia, or change in bowel movements. She has a history of depression and anxiety, and is followed by Dr. Speedy Dawson. She is currently taking Abilify 5 mg daily and Prozac 10 mg daily. Previously on lorazepam at bedtime, but no longer requiring. Past Medical History:   Diagnosis Date    ADHD     Anxiety     Bilateral lumbar radiculopathy     Carotid bruit     right    Depression     Diabetes mellitus (HCC)     HCD (hypertensive cardiovascular disease)     Hyperlipidemia     Hypertension     Migraine headache     Plantar fasciitis      Past Surgical History:   Procedure Laterality Date    HX SEPTOPLASTY  6/2002    HX TONSILLECTOMY      NEUROLOGICAL PROCEDURE UNLISTED  08/06/2018    laminectomy      Current Outpatient Medications   Medication Sig    ergocalciferol (ERGOCALCIFEROL) 1,250 mcg (50,000 unit) capsule TAKE 1 CAPSULE BY MOUTH EVERY FOURTEEN (14) DAYS. SUMAtriptan (IMITREX) 50 mg tablet TAKE 1 TAB BY MOUTH DAILY AS NEEDED FOR MIGRAINE. atorvastatin (LIPITOR) 80 mg tablet TAKE 1 TABLET BY MOUTH EVERY DAY    propranoloL (INDERAL) 40 mg tablet TAKE 1 TABLET BY MOUTH TWICE A DAY    metFORMIN ER (GLUCOPHAGE XR) 500 mg tablet TAKE 2 TABLETS BY MOUTH TWICE A DAY WITH MEALS    semaglutide (OZEMPIC) 0.25 mg or 0.5 mg/dose (2 mg/1.5 ml) subq pen 0.5 mg by SubCUTAneous route every seven (7) days. lisinopriL (PRINIVIL, ZESTRIL) 10 mg tablet TAKE 1 TABLET BY MOUTH EVERY DAY    ondansetron (ZOFRAN ODT) 4 mg disintegrating tablet Take 1 Tablet by mouth every eight (8) hours as needed for Nausea or Nausea or Vomiting. Insulin Needles, Disposable, 31 gauge x 5/16\" ndle Use for weekly SC injection of Ozempic    white petrolatum-mineral oil (ARTIFICIAL TEARS, VLADISLAV/MIN,) 83-15 % ophthalmic ointment Administer  to left eye nightly. Small amount    FLUoxetine (PROzac) 20 mg capsule Take 10 mg by mouth daily. naloxone (NARCAN) 4 mg/actuation nasal spray Use 1 spray intranasally, then discard.  Repeat with new spray every 2 min as needed for opioid overdose symptoms, alternating nostrils. No current facility-administered medications for this visit. Allergies and Intolerances: Allergies   Allergen Reactions    Pcn [Penicillins] Rash    Sulfa (Sulfonamide Antibiotics) Rash     Family History: She has no family history of colon or breast cancer. Her mother  from a CVA. Her father  from throat cancer and cirrhosis. Family History   Problem Relation Age of Onset    Hypertension Brother         x2    Elevated Lipids Brother     Stroke Mother     Heart Surgery Mother         CABG    Cancer Father         cancer of the mouth    Hypertension Father     Hypertension Brother     Elevated Lipids Brother      Social History:   She  reports that she quit smoking about 33 years ago. Her smoking use included cigarettes. She has a 1.00 pack-year smoking history. She has never used smokeless tobacco. She smoked approximately 0.25 ppd for 2 years, stopping in . She is a  and has one daughter. She was employed as a . 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 She was previously working as the [email or web address] for his business.     Social History     Substance and Sexual Activity   Alcohol Use No    Alcohol/week: 1.7 standard drinks    Types: 2 Glasses of wine per week     Immunization History:  Immunization History   Administered Date(s) Administered    COVID-19, MODERNA BLUE border, Primary or Immunocompromised, (age 18y+), IM, 100 mcg/0.5mL 2021, 2021    Influenza Vaccine PF 2014, 10/13/2014    Influenza Vaccine Split 2011    Influenza Vaccine Whole 10/05/2010    Influenza, FLUARIX, FLULAVAL, (age 10 mo+) AND AFLURIA, FLUZONE (age 1 y+), PF 2018, 2019, 10/22/2020, 2021    Pneumococcal Conjugate PCV20, PF (Prevnar 20) 2022    Pneumococcal Polysaccharide (PPSV-23) 2017    TD Vaccine 01/23/2007    Tdap 11/10/2016    Zoster Recombinant 05/20/2018, 09/18/2018       Review of Systems:   As above included in HPI. Otherwise 11 point review of systems negative including constitutional, skin, HENT, eyes, respiratory, cardiovascular, gastrointestinal, genitourinary, musculoskeletal, endocrine, hematologic, allergy, and neurologic. Physical:   Visit Vitals  /70 (BP 1 Location: Left upper arm, BP Patient Position: Sitting)   Pulse 78   Temp 97.4 °F (36.3 °C) (Temporal)   Resp 16   Ht 4' 11\" (1.499 m)   Wt 119 lb (54 kg)   SpO2 97%   BMI 24.04 kg/m²       Patient appears in no apparent distress. Affect is appropriate. HEENT: PERRLA, anicteric, no JVD, adenopathy or thyromegaly. No carotid bruits or radiated murmur. Lungs: clear to auscultation, no wheezes, rhonchi, or rales. Heart: regular rate and rhythm. No murmur, rubs, gallops  Abdomen: soft, nontender, nondistended, normal bowel sounds, no hepatosplenomegaly or masses. Extremities: without edema. Review of Data:  Labs:  No visits with results within 2 Week(s) from this visit. Latest known visit with results is:   Orders Only on 06/21/2022   Component Date Value Ref Range Status    WBC 06/21/2022 8.9  3.4 - 10.8 x10E3/uL Final    RBC 06/21/2022 4.11  3.77 - 5.28 x10E6/uL Final    HGB 06/21/2022 12.5  11.1 - 15.9 g/dL Final    HCT 06/21/2022 38.2  34.0 - 46.6 % Final    MCV 06/21/2022 93  79 - 97 fL Final    MCH 06/21/2022 30.4  26.6 - 33.0 pg Final    MCHC 06/21/2022 32.7  31.5 - 35.7 g/dL Final    RDW 06/21/2022 13.4  11.7 - 15.4 % Final    PLATELET 20/10/8277 833  150 - 450 x10E3/uL Final    NEUTROPHILS 06/21/2022 62  Not Estab. % Final    Lymphocytes 06/21/2022 32  Not Estab. % Final    MONOCYTES 06/21/2022 4  Not Estab. % Final    EOSINOPHILS 06/21/2022 2  Not Estab. % Final    BASOPHILS 06/21/2022 0  Not Estab. % Final    ABS.  NEUTROPHILS 06/21/2022 5.5  1.4 - 7.0 x10E3/uL Final    Abs Lymphocytes 06/21/2022 2.8 0.7 - 3.1 x10E3/uL Final    ABS. MONOCYTES 06/21/2022 0.4  0.1 - 0.9 x10E3/uL Final    ABS. EOSINOPHILS 06/21/2022 0.2  0.0 - 0.4 x10E3/uL Final    ABS. BASOPHILS 06/21/2022 0.0  0.0 - 0.2 x10E3/uL Final    IMMATURE GRANULOCYTES 06/21/2022 0  Not Estab. % Final    ABS. IMM. GRANS. 06/21/2022 0.0  0.0 - 0.1 x10E3/uL Final    Glucose 06/21/2022 80  65 - 99 mg/dL Final    BUN 06/21/2022 13  8 - 27 mg/dL Final    Creatinine 06/21/2022 0.79  0.57 - 1.00 mg/dL Final    eGFR 06/21/2022 83  >59 mL/min/1.73 Final    BUN/Creatinine ratio 06/21/2022 16  12 - 28 Final    Sodium 06/21/2022 142  134 - 144 mmol/L Final    Potassium 06/21/2022 5.1  3.5 - 5.2 mmol/L Final    Chloride 06/21/2022 102  96 - 106 mmol/L Final    CO2 06/21/2022 25  20 - 29 mmol/L Final    Calcium 06/21/2022 9.6  8.7 - 10.3 mg/dL Final    Protein, total 06/21/2022 6.0  6.0 - 8.5 g/dL Final    Albumin 06/21/2022 4.5  3.8 - 4.8 g/dL Final    GLOBULIN, TOTAL 06/21/2022 1.5  1.5 - 4.5 g/dL Final    A-G Ratio 06/21/2022 3.0 (A) 1.2 - 2.2 Final    Bilirubin, total 06/21/2022 0.3  0.0 - 1.2 mg/dL Final    Alk. phosphatase 06/21/2022 77  44 - 121 IU/L Final    AST (SGOT) 06/21/2022 18  0 - 40 IU/L Final    ALT (SGPT) 06/21/2022 21  0 - 32 IU/L Final    Cholesterol, total 06/21/2022 150  100 - 199 mg/dL Final    Triglyceride 06/21/2022 113  0 - 149 mg/dL Final    HDL Cholesterol 06/21/2022 45  >39 mg/dL Final    VLDL, calculated 06/21/2022 21  5 - 40 mg/dL Final    LDL, calculated 06/21/2022 84  0 - 99 mg/dL Final    INTERPRETATION 06/21/2022 Note   Final    Hemoglobin A1c 06/21/2022 6.2 (A) 4.8 - 5.6 % Final    Estimated average glucose 06/21/2022 131  mg/dL Final    VITAMIN D, 25-HYDROXY 06/21/2022 74.7  30.0 - 100.0 ng/mL Final    TSH 06/21/2022 1.220  0.450 - 4.500 uIU/mL Final    Magnesium 06/21/2022 1.7  1.6 - 2.3 mg/dL Final    SPECIMEN STATUS REPORT 06/21/2022 COMMENT   Final     No visits with results within 2 Day(s) from this visit.    Latest known visit with results is:   Orders Only on 06/21/2022   Component Date Value Ref Range Status    WBC 06/21/2022 8.9  3.4 - 10.8 x10E3/uL Final    RBC 06/21/2022 4.11  3.77 - 5.28 x10E6/uL Final    HGB 06/21/2022 12.5  11.1 - 15.9 g/dL Final    HCT 06/21/2022 38.2  34.0 - 46.6 % Final    MCV 06/21/2022 93  79 - 97 fL Final    MCH 06/21/2022 30.4  26.6 - 33.0 pg Final    MCHC 06/21/2022 32.7  31.5 - 35.7 g/dL Final    RDW 06/21/2022 13.4  11.7 - 15.4 % Final    PLATELET 74/86/1529 035  150 - 450 x10E3/uL Final    NEUTROPHILS 06/21/2022 62  Not Estab. % Final    Lymphocytes 06/21/2022 32  Not Estab. % Final    MONOCYTES 06/21/2022 4  Not Estab. % Final    EOSINOPHILS 06/21/2022 2  Not Estab. % Final    BASOPHILS 06/21/2022 0  Not Estab. % Final    ABS. NEUTROPHILS 06/21/2022 5.5  1.4 - 7.0 x10E3/uL Final    Abs Lymphocytes 06/21/2022 2.8  0.7 - 3.1 x10E3/uL Final    ABS. MONOCYTES 06/21/2022 0.4  0.1 - 0.9 x10E3/uL Final    ABS. EOSINOPHILS 06/21/2022 0.2  0.0 - 0.4 x10E3/uL Final    ABS. BASOPHILS 06/21/2022 0.0  0.0 - 0.2 x10E3/uL Final    IMMATURE GRANULOCYTES 06/21/2022 0  Not Estab. % Final    ABS. IMM. GRANS. 06/21/2022 0.0  0.0 - 0.1 x10E3/uL Final    Glucose 06/21/2022 80  65 - 99 mg/dL Final    BUN 06/21/2022 13  8 - 27 mg/dL Final    Creatinine 06/21/2022 0.79  0.57 - 1.00 mg/dL Final    eGFR 06/21/2022 83  >59 mL/min/1.73 Final    BUN/Creatinine ratio 06/21/2022 16  12 - 28 Final    Sodium 06/21/2022 142  134 - 144 mmol/L Final    Potassium 06/21/2022 5.1  3.5 - 5.2 mmol/L Final    Chloride 06/21/2022 102  96 - 106 mmol/L Final    CO2 06/21/2022 25  20 - 29 mmol/L Final    Calcium 06/21/2022 9.6  8.7 - 10.3 mg/dL Final    Protein, total 06/21/2022 6.0  6.0 - 8.5 g/dL Final    Albumin 06/21/2022 4.5  3.8 - 4.8 g/dL Final    GLOBULIN, TOTAL 06/21/2022 1.5  1.5 - 4.5 g/dL Final    A-G Ratio 06/21/2022 3.0 (A) 1.2 - 2.2 Final    Bilirubin, total 06/21/2022 0.3  0.0 - 1.2 mg/dL Final    Alk.  phosphatase 06/21/2022 77  44 - 121 IU/L Final    AST (SGOT) 06/21/2022 18  0 - 40 IU/L Final    ALT (SGPT) 06/21/2022 21  0 - 32 IU/L Final    Cholesterol, total 06/21/2022 150  100 - 199 mg/dL Final    Triglyceride 06/21/2022 113  0 - 149 mg/dL Final    HDL Cholesterol 06/21/2022 45  >39 mg/dL Final    VLDL, calculated 06/21/2022 21  5 - 40 mg/dL Final    LDL, calculated 06/21/2022 84  0 - 99 mg/dL Final    INTERPRETATION 06/21/2022 Note   Final    Hemoglobin A1c 06/21/2022 6.2 (A) 4.8 - 5.6 % Final    Estimated average glucose 06/21/2022 131  mg/dL Final    VITAMIN D, 25-HYDROXY 06/21/2022 74.7  30.0 - 100.0 ng/mL Final    TSH 06/21/2022 1.220  0.450 - 4.500 uIU/mL Final    Magnesium 06/21/2022 1.7  1.6 - 2.3 mg/dL Final    SPECIMEN STATUS REPORT 06/21/2022 COMMENT   Final     Health Maintenance:  Screening:    Mammogram: negative (9/2021)   PAP smear: well women exams by Dr. Aura Walter (last 4/2021)    Colorectal: colonoscopy (10/2014) normal. Dr. Kathi Larson. Due 10/2024. Depression: under care of MOJGAN Lorenzana. DM (HbA1c/FPG): HbA1c 6.2 (6/2022)   Hepatitis C: negative (5/2017)   Falls: none   DEXA: osteopenia (2/2019) Dr. Sharon Mcdonough   Glaucoma: regular eye exams with Dr. Radhika Oliva (11/2021)   Smoking: none   Vitamin D: 74.7 (6/2022)   Medicare Wellness: today (Welcome)      Impression:  Patient Active Problem List   Diagnosis Code    HCD (hypertensive cardiovascular disease) I11.9    Hyperlipidemia E78.5    Migraine G43.909    Bilateral lumbar radiculopathy M54.16    Type 2 diabetes mellitus without complication ([facility] [address] D11.5    Essential hypertension I10    Vitamin D deficiency E55.9    Recurrent major depressive disorder (HCC) F33.9    DDD (degenerative disc disease), lumbar M51.36    HNP (herniated nucleus pulposus), lumbar M51.26    History of Bell's palsy Z86.69    Hypomagnesemia E83.42       Plan:  1. Hypertension. Blood pressure remains well controlled on current regimen of lisinopril 10 mg daily and propranolol 40 mg bid. Renal function remains normal with creatinine 0.79/ eGFR 83. Magnesium normalized today at 1.7 and advised to continue magnesium oxide 400 mg daily. Will reassess at next visit. 2. Hyperlipidemia. Increased to high intensity 80 mg dose of atorvastatin in 4/2021 and LDL 84 and HDL 45 today, indicative of excellent control. Emphasized importance of continued lifestyle modifications, including heart healthy diet, regular exercise, and weight loss. Continue to follow. 3. Diabetes mellitus. Significant improvement with the addition of Ozempic 0.5 mg weekly. Also continues on metformin ER 1000 mg bid. Weight has decreased a total of 19 pounds. HbA1c now stable at 6.2 (6/2022). No evidence of microvascular complications. On statin and lisinopril. Continue regular annual eye exams. Foot exam normal (12/2021) Urine microalbumin/ creatinine ratio with resolution of microalbuminuria (19 mg/g 12/2021). Will reassess at next visit. On lisinopril. Emphasized importance of continued lifestyle modifications, including heart healthy diet, regular exercise, and maintaining weight loss. 4. Migraines. Returned to baseline pattern involving pain on side of head radiating to lateral neck. On propranolol 40 mg twice daily as preventive therapy and readily responds to Imitrex if needed. Currently reports continued good control of headaches. 5. History of left facial nerve palsy. Presented with severe headache resulting in hospital admission to [facility]. Evaluation including head CT scan, brain MRI/MRA and neck MRA unrevealing and Dr. Marcin Rivera of neurology consulted and felt most consistent with status migrainosus. Prescribed medrol dose pack and discharged. Developed left facial nerve palsy 10 days later and treated with prednisone 60 mg and Valtrex for 7 days.  Evaluated by Dr. 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 Now completely improved with resolution of facial nerve palsy and headaches returned to her baseline pattern for migraines. 6. Gross hematuria. Diagnosed with UTI on 4/13/2022 with urine culture showing > 100,000 CFU's/mL of E. coli. She was initially treated with Keflex which found to have intermediate resistance to second generation cephalosporins and changed to ciprofloxacin to complete a 7-day course. On 4/30/2022, began to experience mild low back pain and noted bright red blood in her urine and POC urinalysis (5/4/2022) showed 2+ blood and 3+ LE suggestive of infection. She was started empirically on cefdinir for 7 days but urine culture was negative. She reports no recurrence of hematuria today and will reassess urinalysis at next visit. 7. Lumbar herniated disc with right sciatica, s/p right L5-S1 laminectomy and discectomy. Doing well without significant discomfort. Stressed improtance of continuing stretches and exercise. Follow. 8. Depression/ADHD. Previously reported good control of symptoms on new regimen of Abilify and Prozac. However, states had to wean Abilify due to cost.  Reports no longer requiring lorazepam at bedtime. Being followed by MOJGAN Shah at [facility] and reports has an upcoming appointment to address therapy. 9. Decreased hearing, right. Gradual onset over the last 2 years. Will place referral to Dr. Dahlia Dickerson for evaluation. 10. Overweight. Started on Ozempic in 9/2021 and weight decreased a total of 19 pounds since initiating (peak 138 pounds) and patient continuing to maintain. BMI <25 and she has returned to her baseline weight. Advised to continue with lifestyle changes including regular exercise. 10. Health maintenance. Completed the Moderna COVID-19 vaccine series but has not yet received any Moderna booster doses.   Discussed updated booster dose and urged to obtain this fall when available. Completed 2/2 doses of Shingrix vaccine. Will give Prevnar 20 today. Other immunizations up to date. Well women exam completed by Dr. Rosalee Gallegos in 4/19/2021. Mammogram due next month and will place order with baseline bone density study. Colonoscopy due 2024. Continue regular eye exams with Dr. Winnie Sneed. Vitamin D level remains normal on ergocalciferol to every 14 days. Will continue to monitor. In addition, a Welcome to [facility] wellness visit was done today. Patient understands recommendations and agrees with plan. Follow-up in 3 months. Future orders:    ICD-10-CM ICD-9-CM    1. Type 2 diabetes mellitus without complication, without long-term current use of insulin (HCC)  E11.9 250.00 CBC WITH AUTOMATED DIFF      HEMOGLOBIN A1C WITH EAG      MAGNESIUM      METABOLIC PANEL, COMPREHENSIVE      MICROALBUMIN, UR, RAND W/ MICROALB/CREAT RATIO      2. Essential hypertension  I10 401.9 CBC WITH AUTOMATED DIFF      MAGNESIUM      METABOLIC PANEL, COMPREHENSIVE      3. Hyperlipidemia, unspecified hyperlipidemia type  E78.5 272.4 LIPID PANEL      MAGNESIUM      METABOLIC PANEL, COMPREHENSIVE      4. Recurrent major depressive disorder, in full remission ([facility] [address]  F33.42 296.36       5. Migraine without aura and without status migrainosus, not intractable  G43.009 346.10       6. Vitamin D deficiency  E55.9 268.9 VITAMIN D, 25 HYDROXY      7. Decreased hearing of right ear  H91.91 389.9 REFERRAL TO ENT-OTOLARYNGOLOGY      8. Hypomagnesemia  E83.42 275.2       9. Gross hematuria  R31.0 599.71 URINALYSIS W/MICROSCOPIC      10. Welcome to Medicare preventive visit  Z00.00 V70.0       11. Advanced directives, counseling/discussion  Z71.89 V65.49       12. Screening for depression  Z13.31 V79.0       13. Encounter for immunization  Z23 V03.89 SD IMMUNIZ ADMIN,1 SINGLE/COMB VAC/TOXOID      PNEUMOCOCCAL, PCV20, PREVNAR 20, (AGE 18 YRS+), IM, PF      14.  Encounter for screening mammogram for malignant neoplasm of breast  Z12.31 V76.12 [facility]O MAMMO BI SCREENING INCL CAD      13.  Postmenopausal state  Z78.0 V49.81 DEXA BONE DENSITY STUDY AXIAL

## 2022-09-01 ENCOUNTER — TELEPHONE (OUTPATIENT)
Dept: INTERNAL MEDICINE CLINIC | Age: 67
End: 2022-09-01

## 2022-09-01 NOTE — TELEPHONE ENCOUNTER
Patient called to check on status of message, states she has never had a sore throat this bad before.   Please advise, thank you

## 2022-09-01 NOTE — TELEPHONE ENCOUNTER
Would advise her to perform a COVID test first. If positive, please advise her to call back and will prescribe treatment. If negative, please advise evaluation in urgent care.

## 2022-09-01 NOTE — TELEPHONE ENCOUNTER
Pt is having extreme sore throat, pressure in her ears upon swallowing , states her mouth is burning - \"like she ate a hot pepper\" cotton mouth  symptoms started last week

## 2022-09-08 ENCOUNTER — TELEPHONE (OUTPATIENT)
Dept: INTERNAL MEDICINE CLINIC | Age: 67
End: 2022-09-08

## 2022-09-08 NOTE — TELEPHONE ENCOUNTER
Severiano MACKEY 9 minutes ago (2:34 PM)     TL  Pt returned nurse's call. Said she was seen at [facility] in [place] (near [facility] on [address]) and they tested her for strep and Covid, both negative. Gave her script for Ibuprofren 400 mg. No improvement in her sore throat. Nidia Koenig it feels like she has eaten hot peppers and tongue feels swollen.

## 2022-09-08 NOTE — TELEPHONE ENCOUNTER
Per [facility], Pt is c/o very sore throat, feels like mouth is on fire. No fever. Did get Covid test last Thursday and it was negative.     Please advise her at [phone]

## 2022-09-08 NOTE — TELEPHONE ENCOUNTER
Called and spoke with patient. Reports that she has been experiencing 2 weeks of a painful swallowing and painful tongue with erythema but no lesions. No history of herpes labialis. Evaluated at urgent care and reports that strep testing was performed as well as testing for \"STD\" although she is not clear as to what evaluation was done. She states that she was told that all testing was negative. Denies any fever, chills, or other systemic complaints. Requested that she obtain the records from urgent care so that testing that was performed can be reviewed. Discussed empirically prescribing antiviral such as Valtrex for presumed herpes labialis but given lack of lesions, unclear if indicated until can review if testing was performed at urgent care. Patient states that she will obtain and sent to office. Will prescribe Magic mouthwash for symptomatic relief.   Prescription sent to [facility].

## 2022-09-08 NOTE — TELEPHONE ENCOUNTER
Please get more information. She called on 9/1/2022 and it was recommended that she undergo evaluation by urgent care. Please see if she was seen by them.

## 2022-09-08 NOTE — TELEPHONE ENCOUNTER
Pt returned nurse's call. Said she was seen at [facility] in [place] (near [facility] on [address]) and they tested her for strep and Covid, both negative. Gave her script for Ibuprofren 400 mg. No improvement in her sore throat. Hortencia Drivers it feels like she has eaten hot peppers and tongue feels swollen.

## 2022-09-09 DIAGNOSIS — Z12.31 ENCOUNTER FOR SCREENING MAMMOGRAM FOR MALIGNANT NEOPLASM OF BREAST: ICD-10-CM

## 2022-09-09 DIAGNOSIS — Z78.0 POSTMENOPAUSAL STATE: ICD-10-CM

## 2022-09-09 NOTE — TELEPHONE ENCOUNTER
Reviewed paperwork from urgent care. Patient was seen at [facility] urgent care in [place] on 9/2/2022 for evaluation of sore throat. According to notes, physical exam was normal without lesions in her mouth or throat. Rapid strep testing was negative. Group A strep culture was also negative. Testing for C. trachomatis and Neisseria gonorrhea were also negative. No testing for herpes performed. She was diagnosed with acute pharyngitis and instructed to use Cepacol and Chloraseptic as well as ibuprofen for discomfort. Please let patient know that unclear as to cause of her symptoms after reviewing urgent care paperwork and would recommend that she come in for an appointment to be evaluated. She may be added onto the schedule any day next week.

## 2022-09-13 ENCOUNTER — HOSPITAL ENCOUNTER (OUTPATIENT)
Dept: LAB | Age: 67
Discharge: HOME | End: 2022-09-13
Payer: MEDICARE

## 2022-09-13 ENCOUNTER — OFFICE VISIT (OUTPATIENT)
Dept: INTERNAL MEDICINE CLINIC | Age: 67
End: 2022-09-13
Payer: MEDICARE

## 2022-09-13 VITALS
BODY MASS INDEX: 23.99 KG/M2 | SYSTOLIC BLOOD PRESSURE: 124 MMHG | HEART RATE: 75 BPM | DIASTOLIC BLOOD PRESSURE: 82 MMHG | HEIGHT: 59 IN | OXYGEN SATURATION: 98 % | WEIGHT: 119 LBS | RESPIRATION RATE: 16 BRPM | TEMPERATURE: 97 F

## 2022-09-13 DIAGNOSIS — E11.9 TYPE 2 DIABETES MELLITUS WITHOUT COMPLICATION, WITHOUT LONG-TERM CURRENT USE OF INSULIN (HCC): ICD-10-CM

## 2022-09-13 DIAGNOSIS — K05.10 GINGIVOSTOMATITIS: Primary | ICD-10-CM

## 2022-09-13 DIAGNOSIS — J02.9 SORE THROAT: ICD-10-CM

## 2022-09-13 DIAGNOSIS — I10 ESSENTIAL HYPERTENSION: ICD-10-CM

## 2022-09-13 DIAGNOSIS — K05.10 GINGIVOSTOMATITIS: ICD-10-CM

## 2022-09-13 DIAGNOSIS — R13.10 PAINFUL SWALLOWING: ICD-10-CM

## 2022-09-13 PROCEDURE — G8752 SYS BP LESS 140: HCPCS | Performed by: INTERNAL MEDICINE

## 2022-09-13 PROCEDURE — 1090F PRES/ABSN URINE INCON ASSESS: CPT | Performed by: INTERNAL MEDICINE

## 2022-09-13 PROCEDURE — 3017F COLORECTAL CA SCREEN DOC REV: CPT | Performed by: INTERNAL MEDICINE

## 2022-09-13 PROCEDURE — G8427 DOCREV CUR MEDS BY ELIG CLIN: HCPCS | Performed by: INTERNAL MEDICINE

## 2022-09-13 PROCEDURE — G0463 HOSPITAL OUTPT CLINIC VISIT: HCPCS | Performed by: INTERNAL MEDICINE

## 2022-09-13 PROCEDURE — 1101F PT FALLS ASSESS-DOCD LE1/YR: CPT | Performed by: INTERNAL MEDICINE

## 2022-09-13 PROCEDURE — G8400 PT W/DXA NO RESULTS DOC: HCPCS | Performed by: INTERNAL MEDICINE

## 2022-09-13 PROCEDURE — G8754 DIAS BP LESS 90: HCPCS | Performed by: INTERNAL MEDICINE

## 2022-09-13 PROCEDURE — 3044F HG A1C LEVEL LT 7.0%: CPT | Performed by: INTERNAL MEDICINE

## 2022-09-13 PROCEDURE — 1123F ACP DISCUSS/DSCN MKR DOCD: CPT | Performed by: INTERNAL MEDICINE

## 2022-09-13 PROCEDURE — G9899 SCRN MAM PERF RSLTS DOC: HCPCS | Performed by: INTERNAL MEDICINE

## 2022-09-13 PROCEDURE — G8420 CALC BMI NORM PARAMETERS: HCPCS | Performed by: INTERNAL MEDICINE

## 2022-09-13 PROCEDURE — G8536 NO DOC ELDER MAL SCRN: HCPCS | Performed by: INTERNAL MEDICINE

## 2022-09-13 PROCEDURE — G9717 DOC PT DX DEP/BP F/U NT REQ: HCPCS | Performed by: INTERNAL MEDICINE

## 2022-09-13 PROCEDURE — 87255 GENET VIRUS ISOLATE HSV: CPT

## 2022-09-13 PROCEDURE — 2022F DILAT RTA XM EVC RTNOPTHY: CPT | Performed by: INTERNAL MEDICINE

## 2022-09-13 PROCEDURE — 87252 VIRUS INOCULATION TISSUE: CPT

## 2022-09-13 PROCEDURE — 99214 OFFICE O/P EST MOD 30 MIN: CPT | Performed by: INTERNAL MEDICINE

## 2022-09-13 RX ORDER — VALACYCLOVIR HYDROCHLORIDE 1 G/1
1000 TABLET, FILM COATED ORAL 2 TIMES DAILY
Qty: 20 TABLET | Refills: 0 | Status: SHIPPED | OUTPATIENT
Start: 2022-09-13 | End: 2022-09-23

## 2022-09-13 NOTE — PATIENT INSTRUCTIONS
Heart-Healthy Diet: Care Instructions  Your Care Instructions     A heart-healthy diet has lots of vegetables, fruits, nuts, beans, and whole grains, and is low in salt. It limits foods that are high in saturated fat, such as meats, cheeses, and fried foods. It may be hard to change your diet, but even small changes can lower your risk of heart attack and heart disease. Follow-up care is a key part of your treatment and safety. Be sure to make and go to all appointments, and call your doctor if you are having problems. It's also a good idea to know your test results and keep a list of the medicines you take. How can you care for yourself at home? Watch your portions  Learn what a serving is. A \"serving\" and a \"portion\" are not always the same thing. Make sure that you are not eating larger portions than are recommended. For example, a serving of pasta is ½ cup. A serving size of meat is 2 to 3 ounces. A 3-ounce serving is about the size of a deck of cards. Measure serving sizes until you are good at [place]" them. Keep in mind that restaurants often serve portions that are 2 or 3 times the size of one serving. To keep your energy level up and keep you from feeling hungry, eat often but in smaller portions. Eat only the number of calories you need to stay at a healthy weight. If you need to lose weight, eat fewer calories than your body burns (through exercise and other physical activity). Eat more fruits and vegetables  Eat a variety of fruit and vegetables every day. Dark green, deep orange, red, or yellow fruits and vegetables are especially good for you. Examples include spinach, carrots, peaches, and berries. Keep carrots, celery, and other veggies handy for snacks. Buy fruit that is in season and store it where you can see it so that you will be tempted to eat it. Cook dishes that have a lot of veggies in them, such as stir-fries and soups.   Limit saturated and trans fat  Read food labels, and try to avoid saturated and trans fats. They increase your risk of heart disease. Use olive or canola oil when you cook. Bake, broil, grill, or steam foods instead of frying them. Choose lean meats instead of high-fat meats such as hot dogs and sausages. Cut off all visible fat when you prepare meat. Eat fish, skinless poultry, and meat alternatives such as soy products instead of high-fat meats. Soy products, such as tofu, may be especially good for your heart. Choose low-fat or fat-free milk and dairy products. Eat foods high in fiber  Eat a variety of grain products every day. Include whole-grain foods that have lots of fiber and nutrients. Examples of whole-grain foods include oats, whole wheat bread, and brown rice. Buy whole-grain breads and cereals, instead of white bread or pastries. Limit salt and sodium  Limit how much salt and sodium you eat to help lower your blood pressure. Taste food before you salt it. Add only a little salt when you think you need it. With time, your taste buds will adjust to less salt. Eat fewer snack items, fast foods, and other high-salt, processed foods. Check food labels for the amount of sodium in packaged foods. Choose low-sodium versions of canned goods (such as soups, vegetables, and beans). Limit sugar  Limit drinks and foods with added sugar. These include candy, desserts, and soda pop. Limit alcohol  Limit alcohol to no more than 2 drinks a day for men and 1 drink a day for women. Too much alcohol can cause health problems. When should you call for help? Watch closely for changes in your health, and be sure to contact your doctor if:    You would like help planning heart-healthy meals. Where can you learn more? Go to http://www.allan.com/  Enter V137 in the search box to learn more about \"Heart-Healthy Diet: Care Instructions. \"  Current as of: August 22, 2019               Content Version: 12.6  © 0089-0835 [employer]. Care instructions adapted under license by [employer] (which disclaims liability or warranty for this information). If you have questions about a medical condition or this instruction, always ask your healthcare professional. [address] any warranty or liability for your use of this information.

## 2022-09-13 NOTE — PROGRESS NOTES
1. \"Have you been to the ER, urgent care clinic since your last visit? Hospitalized since your last visit? \"  Yes Urgent Care    2. \"Have you seen or consulted any other health care providers outside of the [address] [place] [address] since your last visit? \" No     3. For patients aged 39-70: Has the patient had a colonoscopy / FIT/ Cologuard? Yes - no Care Gap present      If the patient is female:    4. For patients aged 41-77: Has the patient had a mammogram within the past 2 years? Yes - no Care Gap present      5. For patients aged 21-65: Has the patient had a pap smear?  Yes - no Care Gap presentm

## 2022-09-15 ENCOUNTER — TELEPHONE (OUTPATIENT)
Dept: INTERNAL MEDICINE CLINIC | Age: 67
End: 2022-09-15

## 2022-09-15 NOTE — TELEPHONE ENCOUNTER
Called and spoke with patient. Advised that she may increase the frequency of the Magic mouthwash to 4 times per day and also instructed her to attempt to \"swish\" for approximately 2-3 minutes before spitting out the solution. Advised that HSV and viral cultures are still pending. On Valtrex and concerned that may be causing headaches but continuing to take. Patient has an appointment at Dr. Anibal Us office on 9/29/2022 and advised to discuss issue with him at that time if not improved. Advised that we will notify her once culture results return.

## 2022-09-15 NOTE — TELEPHONE ENCOUNTER
Pt is requesting something for pain due to blisters in her mouth. She said mouthwash that was prescribed only lasts about 1 hour and she can only take it 2x daily. She said Tylenol, Aleve, Ibuprofren aren't helping much.     Please advise her at [phone]

## 2022-09-16 LAB
HSV SPEC CULT: NORMAL
SPECIMEN SOURCE: NORMAL

## 2022-09-18 PROBLEM — K05.10 GINGIVOSTOMATITIS: Status: ACTIVE | Noted: 2022-09-18

## 2022-09-18 NOTE — PROGRESS NOTES
HPI:   Francisco Sal is a 74y.o. year old female who presents today for an acute visit. She has a history of hypertension, hyperlipidemia, diabetes mellitus, migraine headaches, depression, anxiety, ADHD, and lumbar radiculopathy. She has completed the Moderna COVID-19 vaccine series but has not received any Moderna booster doses. On 9/2/2022, she presented to [facility] urgent care complaining of a sore throat for 1 week. She described difficulty swallowing, but denied any fever, nasal congestion, or cough. She had performed a home rapid antigen COVID test on 9/1/2022 which was negative. No abnormalities were noted on exam and a rapid strep test performed was negative group A beta strep culture, and pharyngeal TATYANA test for Neisseria gonorrhea and chlamydia trachomatis were also negative. She was diagnosed with acute pharyngitis and advised to treat symptomatically with Cepacol/Chloraseptic, and ibuprofen. She contacted the office on 9/8/2022 and reported persistent symptoms with burning in her mouth and throat, and was prescribed Magic mouthwash and appointment scheduled. She presents today and reports that she is continuing to experience burning in her mouth and throat which has been unabated since onset. She does describe some relief with Magic mouthwash, but states that only will last for 1 hour after use. She states that her mouth feels very dry and it is very painful to swallow. She denies any recent antibiotic use, fever, chills, or URI symptoms. She denies any history of herpes labialis. She is otherwise without new complaints. On 4/13/2022, she reported a 4-day history of pain and burning with urination and urge incontinence. She denied any fever, chills, abdominal pain, hematuria, and did report some mild lower back discomfort. POC urinalysis was suggestive of infection with positive nitrites, 2+ blood, and 3+ LE.   She was treated empirically with Keflex and urine culture showed > 100,000 CFU/mL E. coli with only intermediate sensitivity to cefuroxime. She was changed to ciprofloxacin and treated for 5 days with resolution of symptoms. She reported that on 4/30/2022, she began to experience mild low back pain and noted bright red blood in her urine without any dysuria, fever, or chills that would suggest infection. She reported that she continued to experience hematuria for 2 days until 5/2/2022 when she noted resolution of all symptoms. POC urinalysis (5/4/2022) showed 2+ blood and 3+ LE suggestive of infection, and she was started empirically on cefdinir for 7 days but urine culture was negative. She completed the 7-day course of antibiotics and denies any recurrence of hematuria today. Summary of prior hospitalizations and medical history:  She was hospitalized from 7/9-7/11/2019 at [facility] after presenting to [facility] ED with a severe posterior headache, unlike her usual migraines, with associated light sensitivity and confusion. Evaluation included WBC 6.0, Hb 11.9/ Hct 35.2, Na 138, Ca 9.6, creatinine 1.0/ eGFR >60, head CT scan no acute intracranial abnormality; MRI brain (7/10/2019) mild nonspecific white matter disease likely representing chronic small vessel changes, and very mild bilateral occipital paramedian encephalomalacic changes, likely from chronic small infarcts; brain MRA (7/10/2019) no high-grade intracranial stenosis; neck MRA (7/10/2019) mild proximal ICA irregularity without hemodynamically significant carotid stenosis. She was evaluated by Dr. Dc Carmona of neurology who felt presentation was consistent with status migrainosus. Attempted treatment with Benadryl and compazine without much improvement, but notable improvement after Medrol dose pack started and subsequently discharged.  On 7/24/2019, she noted onset of left facial weakness with difficulty with eye closure and forehead elevation, and presented to [facility] ED and diagnosed with left facial palsy. She was treated with prednisone 60 mg daily x 7 days and Valtrex. She was evaluated by Dr. Madison Min on 8/14/2019, and it was his opinion that her symptoms were likely not due to status migrainosus, but rather were secondary to a viral meningoencephalitis given the clear change in the pattern of her headaches and the subsequent development of a cranial nerve palsy. She has had complete resolution of her facial palsy, and a return to baseline for her migraine headaches, which respond readily to sumatriptan. She has a history of hypertension, treated with propranolol and lisinopril. She does not check her blood pressure at home. She has begun walking regularly for exercise. She denies any chest pain, shortness of breath at rest or with exertion, palpitations, or lightheadedness. She also has a history of hyperlipidemia, treated with high intensity dose rosuvastatin. She has a history of diabetes mellitus, and was started on metformin in 2/2015 for a HbA1c of 7.5. She does not monitor her blood sugars at home. She denies any polyuria, polydipsia, nocturia, or blurry vision, and has no history of retinopathy, neuropathy, or nephropathy. She has regular eye exams with Dr. Daly Pizano. She also has a history of migraines without aura, which usually respond to Imitrex. Also now on propranolol for preventive therapy with good control. She has a history of lumbar radiculopathy (R>L), and was evaluated by Dr. Zulay Chow in 5/2015. Lumbar x-rays showed degenerative changes in L4-L5 and L5-S1. She was treated conservatively with physical therapy and ibuprofen and reports significant improvement. She was evaluated by MOJGAN Downs in 12/2016 for exacerbation of low back pain with right sciatica. She was treated with steroids and Norco with improvement. On 4/17/2018, she presented with increasing low back pain with bilateral sciatica. She was treated with a prednisone taper with initial improvement.  However, her symptoms worsened and she was referred to Dr. Portia Sue for evaluation. She gave her a Toradol injection, and ordered a lumbar MRI (6/24/2018) which showed a large right disc extrusion at L5-S1 which compressed the descending right S1 nerve root. She was referred to pain management and underwent a selective L5 and S1 selective nerve root blocks by Dr. Jaskaran Marx on 7/26/2018. However, due to persistent symptoms, she was referred to Dr. Esther Goodwin and subsequently underwent a right L5-S1 laminectomy and diskectomy on 3/2/6157 without complications. She states that she did well and continues to do her back stretches and exercises. She states that she only has occasional discomfort. She had a screening colonoscopy in 10/2014 by Dr. Shanika Cotton which was normal. Follow-up due in 10 years. She denies any abdominal pain, nausea, vomiting, melena, hematochezia, or change in bowel movements. She has a history of depression and anxiety, and is followed by Dr. Gabrielle Preciado. She is currently taking Abilify 5 mg daily and Prozac 10 mg daily. Previously on lorazepam at bedtime, but no longer requiring. Past Medical History:   Diagnosis Date    ADHD     Anxiety     Bilateral lumbar radiculopathy     Carotid bruit     right    Depression     Diabetes mellitus (HCC)     HCD (hypertensive cardiovascular disease)     Hyperlipidemia     Hypertension     Migraine headache     Plantar fasciitis      Past Surgical History:   Procedure Laterality Date    HX SEPTOPLASTY  6/2002    HX TONSILLECTOMY      NEUROLOGICAL PROCEDURE UNLISTED  08/06/2018    laminectomy      Current Outpatient Medications   Medication Sig    valACYclovir (VALTREX) 1 gram tablet Take 1 Tablet by mouth two (2) times a day for 10 days. Indications: herpes gingivostomatitis    ergocalciferol (ERGOCALCIFEROL) 1,250 mcg (50,000 unit) capsule TAKE 1 CAPSULE BY MOUTH EVERY FOURTEEN (14) DAYS. SUMAtriptan (IMITREX) 50 mg tablet TAKE 1 TAB BY MOUTH DAILY AS NEEDED FOR MIGRAINE. atorvastatin (LIPITOR) 80 mg tablet TAKE 1 TABLET BY MOUTH EVERY DAY    propranoloL (INDERAL) 40 mg tablet TAKE 1 TABLET BY MOUTH TWICE A DAY    metFORMIN ER (GLUCOPHAGE XR) 500 mg tablet TAKE 2 TABLETS BY MOUTH TWICE A DAY WITH MEALS    semaglutide (OZEMPIC) 0.25 mg or 0.5 mg/dose (2 mg/1.5 ml) subq pen 0.5 mg by SubCUTAneous route every seven (7) days. lisinopriL (PRINIVIL, ZESTRIL) 10 mg tablet TAKE 1 TABLET BY MOUTH EVERY DAY    Insulin Needles, Disposable, 31 gauge x 5/16\" ndle Use for weekly SC injection of Ozempic    white petrolatum-mineral oil (ARTIFICIAL TEARS, VLADISLAV/MIN,) 83-15 % ophthalmic ointment Administer  to left eye nightly. Small amount    FLUoxetine (PROzac) 20 mg capsule Take 10 mg by mouth daily. magic mouthwash solution Take 5 mL by mouth four (4) times daily. Magic mouth wash: Maalox, Lidocaine 2% viscous, Diphenhydramine oral solution. Pharmacy to mix equal portions of ingredients to a total volume as indicated in the dispense amount. Instruct to swish and spit. No current facility-administered medications for this visit. Allergies and Intolerances: Allergies   Allergen Reactions    Pcn [Penicillins] Rash    Sulfa (Sulfonamide Antibiotics) Rash     Family History: She has no family history of colon or breast cancer. Her mother  from a CVA. Her father  from throat cancer and cirrhosis. Family History   Problem Relation Age of Onset    Hypertension Brother         x2    Elevated Lipids Brother     Stroke Mother     Heart Surgery Mother         CABG    Cancer Father         cancer of the mouth    Hypertension Father     Hypertension Brother     Elevated Lipids Brother      Social History:   She  reports that she quit smoking about 33 years ago. Her smoking use included cigarettes. She has a 1.00 pack-year smoking history. She has never used smokeless tobacco. She smoked approximately 0.25 ppd for 2 years, stopping in . She is a  and has one daughter.  She was employed as a . 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 She was previously working as the [employer] for his business. Social History     Substance and Sexual Activity   Alcohol Use No    Alcohol/week: 1.7 standard drinks    Types: 2 Glasses of wine per week     Immunization History:  Immunization History   Administered Date(s) Administered    COVID-19, MODERNA BLUE border, Primary or Immunocompromised, (age 18y+), IM, 100 mcg/0.5mL 2021, 2021    Influenza Vaccine PF 2014, 10/13/2014    Influenza Vaccine Split 2011    Influenza Vaccine Whole 10/05/2010    Influenza, FLUARIX, FLULAVAL, FLUZONE (age 10 mo+) AND AFLURIA, (age 1 y+), PF, 0.5mL 2018, 2019, 10/22/2020, 2021    Pneumococcal Conjugate PCV20, PF (Prevnar 20) 2022    Pneumococcal Polysaccharide (PPSV-23) 2017    TD Vaccine 2007    Tdap 11/10/2016    Zoster Recombinant 2018, 2018       Review of Systems:   As above included in HPI. Otherwise 11 point review of systems negative including constitutional, skin, HENT, eyes, respiratory, cardiovascular, gastrointestinal, genitourinary, musculoskeletal, endocrine, hematologic, allergy, and neurologic. Physical:   Visit Vitals  /82 (BP 1 Location: Left upper arm, BP Patient Position: Sitting)   Pulse 75   Temp 97 °F (36.1 °C) (Temporal)   Resp 16   Ht 4' 11\" (1.499 m)   Wt 119 lb (54 kg)   SpO2 98%   BMI 24.04 kg/m²       Patient appears in no apparent distress. Affect is appropriate. HEENT: PERRLA, anicteric, oropharynx with minimal erythema in posterior pharynx and multiple small punctate ulcerations on bilateral tonsils; no other lesions seen throughout the oropharynx; no adenopathy or thyromegaly. Lungs: clear to auscultation, no wheezes, rhonchi, or rales. Heart: regular rate and rhythm.  No murmur, rubs, gallops  Abdomen: soft, nontender, nondistended, normal bowel sounds, no hepatosplenomegaly or masses. Extremities: without edema. Derm: without rash        Review of Data:  Labs:  No visits with results within 2 Week(s) from this visit. Latest known visit with results is:   Orders Only on 06/21/2022   Component Date Value Ref Range Status    WBC 06/21/2022 8.9  3.4 - 10.8 x10E3/uL Final    RBC 06/21/2022 4.11  3.77 - 5.28 x10E6/uL Final    HGB 06/21/2022 12.5  11.1 - 15.9 g/dL Final    HCT 06/21/2022 38.2  34.0 - 46.6 % Final    MCV 06/21/2022 93  79 - 97 fL Final    MCH 06/21/2022 30.4  26.6 - 33.0 pg Final    MCHC 06/21/2022 32.7  31.5 - 35.7 g/dL Final    RDW 06/21/2022 13.4  11.7 - 15.4 % Final    PLATELET 41/70/9350 749  150 - 450 x10E3/uL Final    NEUTROPHILS 06/21/2022 62  Not Estab. % Final    Lymphocytes 06/21/2022 32  Not Estab. % Final    MONOCYTES 06/21/2022 4  Not Estab. % Final    EOSINOPHILS 06/21/2022 2  Not Estab. % Final    BASOPHILS 06/21/2022 0  Not Estab. % Final    ABS. NEUTROPHILS 06/21/2022 5.5  1.4 - 7.0 x10E3/uL Final    Abs Lymphocytes 06/21/2022 2.8  0.7 - 3.1 x10E3/uL Final    ABS. MONOCYTES 06/21/2022 0.4  0.1 - 0.9 x10E3/uL Final    ABS. EOSINOPHILS 06/21/2022 0.2  0.0 - 0.4 x10E3/uL Final    ABS. BASOPHILS 06/21/2022 0.0  0.0 - 0.2 x10E3/uL Final    IMMATURE GRANULOCYTES 06/21/2022 0  Not Estab. % Final    ABS. IMM.  GRANS. 06/21/2022 0.0  0.0 - 0.1 x10E3/uL Final    Glucose 06/21/2022 80  65 - 99 mg/dL Final    BUN 06/21/2022 13  8 - 27 mg/dL Final    Creatinine 06/21/2022 0.79  0.57 - 1.00 mg/dL Final    eGFR 06/21/2022 83  >59 mL/min/1.73 Final    BUN/Creatinine ratio 06/21/2022 16  12 - 28 Final    Sodium 06/21/2022 142  134 - 144 mmol/L Final    Potassium 06/21/2022 5.1  3.5 - 5.2 mmol/L Final    Chloride 06/21/2022 102  96 - 106 mmol/L Final    CO2 06/21/2022 25  20 - 29 mmol/L Final    Calcium 06/21/2022 9.6  8.7 - 10.3 mg/dL Final    Protein, total 06/21/2022 6.0  6.0 - 8.5 g/dL Final Albumin 06/21/2022 4.5  3.8 - 4.8 g/dL Final    GLOBULIN, TOTAL 06/21/2022 1.5  1.5 - 4.5 g/dL Final    A-G Ratio 06/21/2022 3.0 (A) 1.2 - 2.2 Final    Bilirubin, total 06/21/2022 0.3  0.0 - 1.2 mg/dL Final    Alk. phosphatase 06/21/2022 77  44 - 121 IU/L Final    AST (SGOT) 06/21/2022 18  0 - 40 IU/L Final    ALT (SGPT) 06/21/2022 21  0 - 32 IU/L Final    Cholesterol, total 06/21/2022 150  100 - 199 mg/dL Final    Triglyceride 06/21/2022 113  0 - 149 mg/dL Final    HDL Cholesterol 06/21/2022 45  >39 mg/dL Final    VLDL, calculated 06/21/2022 21  5 - 40 mg/dL Final    LDL, calculated 06/21/2022 84  0 - 99 mg/dL Final    INTERPRETATION 06/21/2022 Note   Final    Hemoglobin A1c 06/21/2022 6.2 (A) 4.8 - 5.6 % Final    Estimated average glucose 06/21/2022 131  mg/dL Final    VITAMIN D, 25-HYDROXY 06/21/2022 74.7  30.0 - 100.0 ng/mL Final    TSH 06/21/2022 1.220  0.450 - 4.500 uIU/mL Final    Magnesium 06/21/2022 1.7  1.6 - 2.3 mg/dL Final    SPECIMEN STATUS REPORT 06/21/2022 COMMENT   Final     Hospital Outpatient Visit on 09/13/2022   Component Date Value Ref Range Status    Source 09/13/2022 THROAT SWAB    Final    Viral Culture, General 09/13/2022 Comment    Final    Source 09/13/2022 THROAT SWAB    Final    HSV culture w typing 09/13/2022 Comment    Final     Health Maintenance:  Screening:    Mammogram: negative (9/2021)   PAP smear: well women exams by Dr. Celia Baer (last 4/2021)    Colorectal: colonoscopy (10/2014) normal. Dr. Ron Santillan. Due 10/2024. Depression: under care of MOJGAN Mcdonald.    DM (HbA1c/FPG): HbA1c 6.2 (6/2022)   Hepatitis C: negative (5/2017)   Falls: none   DEXA: osteopenia (2/2019) Dr. Shimon Jose   Glaucoma: regular eye exams with Dr. Aixa Linton (11/2021)   Smoking: none   Vitamin D: 74.7 (6/2022)   Medicare Wellness: 8/25/2022 (Welcome)      Impression:  Patient Active Problem List   Diagnosis Code    HCD (hypertensive cardiovascular disease) I11.9    Hyperlipidemia E78.5 Migraine G43.909    Bilateral lumbar radiculopathy M54.16    Type 2 diabetes mellitus without complication (HCC) X86.0    Essential hypertension I10    Vitamin D deficiency E55.9    Recurrent major depressive disorder (HCC) F33.9    DDD (degenerative disc disease), lumbar M51.36    HNP (herniated nucleus pulposus), lumbar M51.26    History of Bell's palsy Z86.69    Hypomagnesemia E83.42    Gingivostomatitis K05.10       Plan:  Gingivostomatitis. Patient with approximately 2-week history of sore throat with burning pain in her mouth and throat. Initially evaluated in patient care on 9/2/2022 with negative rapid strep, group A strep culture, and gonorrhea/chlamydia trachomatis TATYANA test.  Persistent symptoms not relieved with treatment with ibuprofen, and was prescribed Magic mouthwash on 9/8/2022. Persistent symptoms today of burning in her mouth and throat which has been unabated since onset. Noting some relief with Magic mouthwash, but only transient. Denies any recent antibiotic use, fever, chills, or URI symptoms, and denies history of herpes labialis. Exam with punctate ulcers on bilateral tonsils and posterior pharynx with mild erythema. No other abnormalities seen and no cervical lymphadenopathy. Unclear etiology although suspicious for herpes simplex infection with gingivostomatitis. Viral and HSV cultures performed today. Will treat empirically with Valtrex 1 g twice daily for 10 days. Advised to continue using Magic mouthwash and ibuprofen for symptomatic relief. Has upcoming appointment with ENT for evaluation of decreased hearing and advised to address with Dr. Karol Crowe at that visit if not improved. Chronic issues:  1. Hypertension. Blood pressure remains well controlled on current regimen of lisinopril 10 mg daily and propranolol 40 mg bid. Renal function remains normal with creatinine 0.79/ eGFR 83. Magnesium normalized at 1.7 (6/2022) and advised to continue magnesium oxide 400 mg daily. Will reassess at next visit. 2. Hyperlipidemia. Increased to high intensity 80 mg dose of atorvastatin in 4/2021 and LDL 84 and HDL 45 (8/2022), indicative of excellent control. Emphasized importance of continued lifestyle modifications, including heart healthy diet, regular exercise, and weight loss. Continue to follow. 3. Diabetes mellitus. Significant improvement with the addition of Ozempic 0.5 mg weekly. Also continues on metformin ER 1000 mg bid. Weight has decreased a total of 19 pounds. HbA1c now stable at 6.2 (6/2022). No evidence of microvascular complications. On statin and lisinopril. Continue regular annual eye exams. Foot exam normal (12/2021) Urine microalbumin/ creatinine ratio with resolution of microalbuminuria (19 mg/g 12/2021). Will reassess at next visit. On lisinopril. Emphasized importance of continued lifestyle modifications, including heart healthy diet, regular exercise, and maintaining weight loss. 4. Migraines. Returned to baseline pattern involving pain on side of head radiating to lateral neck. On propranolol 40 mg twice daily as preventive therapy and readily responds to Imitrex if needed. Currently reports continued good control of headaches. 5. History of left facial nerve palsy. Presented with severe headache resulting in hospital admission to [facility]. Evaluation including head CT scan, brain MRI/MRA and neck MRA unrevealing and Dr. Heaven Roldan of neurology consulted and felt most consistent with status migrainosus. Prescribed medrol dose pack and discharged. Developed left facial nerve palsy 10 days later and treated with prednisone 60 mg and Valtrex for 7 days. Evaluated by Dr. 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  Now completely improved with resolution of facial nerve palsy and headaches returned to her baseline pattern for migraines. 6. Gross hematuria. Diagnosed with UTI on 4/13/2022 with urine culture showing > 100,000 CFU's/mL of E. coli. She was initially treated with Keflex which found to have intermediate resistance to second generation cephalosporins and changed to ciprofloxacin to complete a 7-day course. On 4/30/2022, began to experience mild low back pain and noted bright red blood in her urine and POC urinalysis (5/4/2022) showed 2+ blood and 3+ LE suggestive of infection. She was started empirically on cefdinir for 7 days but urine culture was negative. She reports no recurrence of hematuria today and will reassess urinalysis at next visit. 7. Lumbar herniated disc with right sciatica, s/p right L5-S1 laminectomy and discectomy. Doing well without significant discomfort. Stressed improtance of continuing stretches and exercise. Follow. 8. Depression/ADHD. Previously reported good control of symptoms on new regimen of Abilify and Prozac. However, states had to wean Abilify due to cost.  Reports no longer requiring lorazepam at bedtime. Being followed by MOJGAN Shah at [facility] and reports has an upcoming appointment to address therapy. 9. Decreased hearing, right. Gradual onset over the last 2 years. Referred to Dr. Dahlia Dickerson for evaluation and reports scheduled on 9/29/2022 for evaluation. 10. Overweight. Started on Ozempic in 9/2021 and weight decreased a total of 19 pounds since initiating (peak 138 pounds) and patient continuing to maintain. BMI <25 and she has returned to her baseline weight. Advised to continue with lifestyle changes including regular exercise. 10. Health maintenance. Completed the Moderna COVID-19 vaccine series but has not yet received any Moderna booster doses. Advised to obtain updated booster dose and influenza vaccine this fall. Completed 2/2 doses of Shingrix vaccine. Received Prevnar 20 at last visit. Other immunizations up to date. Well women exam completed by Dr. Gene Mancia in 4/19/2021. Mammogram due next month and ordered with baseline bone density study. Urged to schedule. Colonoscopy due 2024. Continue regular eye exams with Dr. Sonya Manning. Vitamin D level remains normal on ergocalciferol to every 14 days. Will continue to monitor. [facility]. Patient understands recommendations and agrees with plan. Follow-up previously scheduled. This visit required high complexity medically necessary decision making and management plans. Time spent in preparing for the visit, including review of history, tests done prior to arrival, additional time reviewing clinical data, imaging, outside records and test results:  5 minutes. Time spent in counseling with patient and/or family members regarding care plan: 25 minutes. Time spent in ordering tests, treatments, and referring patient for further care: 5 minutes. Time spent on visit does not include time for documentation. Future orders:    ICD-10-CM ICD-9-CM    1. Gingivostomatitis  K05.10 523.10 CULTURE, VIRAL      CULTURE, HSV W/ TYPING      valACYclovir (VALTREX) 1 gram tablet      2. Sore throat  J02.9 462       3. Painful swallowing  R13.10 787.20       4. Type 2 diabetes mellitus without complication, without long-term current use of insulin (HCC)  E11.9 250.00       5.  Essential hypertension  I10 401.9

## 2022-09-19 NOTE — PROGRESS NOTES
Called and discussed negative results of viral and HSV culture with patient. Reports pain and difficulty swallowing appears to be improving on treatment with Valtrex and advised to complete course given clinical response. Has new patient appointment with PA at Dr. Elena Phillips office next week and advised to discuss problem with them if not resolved.

## 2022-09-22 LAB
SPECIMEN SOURCE: NORMAL
VIRUS SPEC CULT: NORMAL

## 2022-09-29 ENCOUNTER — TRANSCRIBE ORDER (OUTPATIENT)
Dept: SCHEDULING | Age: 67
End: 2022-09-29

## 2022-09-29 DIAGNOSIS — H90.41 SENSORINEURAL HEARING LOSS, UNILATERAL, RIGHT EAR, WITH UNRESTRICTED HEARING ON THE CONTRALATERAL SIDE: Primary | ICD-10-CM

## 2022-10-07 ENCOUNTER — PATIENT MESSAGE (OUTPATIENT)
Dept: INTERNAL MEDICINE CLINIC | Age: 67
End: 2022-10-07

## 2022-10-07 NOTE — TELEPHONE ENCOUNTER
Called and spoke with patient. Reports gradual improvement in sore throat and mouth pain with resolution and now just with lingering pain on one side of her tongue. No obvious lesions evident. Reports that treatment with Valtrex was effective in helping with symptoms and no longer requiring Magic mouthwash. Discussed that given gradual slow improvement, the lingering pain on her tongue may actually represent a postviral neuralgia and advised I would recommend continued observation. Discussed that if symptoms flare again, she should call back and we will consider restarting Valtrex. Answered all questions.

## 2022-10-17 ENCOUNTER — PATIENT MESSAGE (OUTPATIENT)
Dept: INTERNAL MEDICINE CLINIC | Age: 67
End: 2022-10-17

## 2022-10-17 ENCOUNTER — HOSPITAL ENCOUNTER (OUTPATIENT)
Age: 67
Discharge: HOME | End: 2022-10-17
Attending: PHYSICIAN ASSISTANT
Payer: MEDICARE

## 2022-10-17 VITALS — WEIGHT: 113 LBS | BODY MASS INDEX: 22.82 KG/M2

## 2022-10-17 DIAGNOSIS — H90.41 SENSORINEURAL HEARING LOSS, UNILATERAL, RIGHT EAR, WITH UNRESTRICTED HEARING ON THE CONTRALATERAL SIDE: ICD-10-CM

## 2022-10-17 LAB — CREAT UR-MCNC: 0.7 MG/DL (ref 0.6–1.3)

## 2022-10-17 PROCEDURE — 74011250636 HC RX REV CODE- 250/636

## 2022-10-17 PROCEDURE — 70553 MRI BRAIN STEM W/O & W/DYE: CPT

## 2022-10-17 PROCEDURE — A9575 INJ GADOTERATE MEGLUMI 0.1ML: HCPCS

## 2022-10-17 PROCEDURE — 82565 ASSAY OF CREATININE: CPT

## 2022-10-17 RX ORDER — VALACYCLOVIR HYDROCHLORIDE 500 MG/1
TABLET, FILM COATED ORAL
Qty: 38 TABLET | Refills: 0 | Status: SHIPPED | OUTPATIENT
Start: 2022-10-17 | End: 2022-10-28 | Stop reason: SDUPTHER

## 2022-10-17 RX ADMIN — GADOTERATE MEGLUMINE 10 ML: 376.9 INJECTION INTRAVENOUS at 20:32

## 2022-10-17 NOTE — TELEPHONE ENCOUNTER
Called and spoke with patient. Reports resolution of symptoms of primary herpes labialis for approximately 7-10 days but now noting increasing pain and small ulcerations on tongue in area of initial outbreak. No diffuse mouth pain currently. Suspect recurrence and discussed restarting treatment with Valtrex and will treat for recurrent outbreak with 2 g twice daily for 1 day. Given rapid recurrence, will also treat for 30 days with suppressive therapy of Valtrex 500 mg daily. Will reassess after completion to determine if long-term suppressive therapy needed. Will also prescribe Magic mouthwash to help manage pain. Answered all questions.     Prescriptions sent to [facility].

## 2022-10-27 ENCOUNTER — PATIENT MESSAGE (OUTPATIENT)
Dept: INTERNAL MEDICINE CLINIC | Age: 67
End: 2022-10-27

## 2022-10-28 RX ORDER — VALACYCLOVIR HYDROCHLORIDE 500 MG/1
1000 TABLET, FILM COATED ORAL 2 TIMES DAILY
Qty: 40 TABLET | Refills: 0 | Status: SHIPPED | OUTPATIENT
Start: 2022-10-28 | End: 2022-11-07

## 2022-10-28 NOTE — TELEPHONE ENCOUNTER
Called and spoke with patient. Discussed having continued difficulty with tongue ulcers and erythema of her throat with pain. Reports did respond initially to Valtrex 2 g bid x 1 day, but noted worsening when dose decreased to 500 mg daily. Discussed that dentist felt appearance consistent with herpes labialis. Advised the followin-given response to Valtrex at higher dose, will increase dose to 1 gram bid. Refill sent to [facility].  2- stop Magiic mouthwash as causing more burning and pain  3-will reach out to Dr. Dustin Bowman (infectious disease) to discuss management    Please schedule patient in the office next week on 2022. May double book at 11:30 am. Please let her know appointment time.

## 2022-10-31 NOTE — TELEPHONE ENCOUNTER
Called and discussed case with Dr. Norris Delgadillo. Agreed with treating with a second course of Valtrex 1 g twice daily for 10 days to see if effective in treating recurrent herpes labialis. Also recommended performing an HIV test to confirm no other underlying predisposing factor in addition to her diabetes mellitus. If herpes labialis recurs, Dr. Norris Delgadillo stated that she would recommend obtaining punch biopsy of her tongue to confirm that etiology of lesions due to herpes virus vs other cause. Also would consider changing to valganciclovir to see if more effective if recurs. Will address with patient tomorrow at her visit.

## 2022-11-01 ENCOUNTER — HOSPITAL ENCOUNTER (OUTPATIENT)
Dept: LAB | Age: 67
Discharge: HOME | End: 2022-11-01
Payer: MEDICARE

## 2022-11-01 ENCOUNTER — OFFICE VISIT (OUTPATIENT)
Dept: INTERNAL MEDICINE CLINIC | Age: 67
End: 2022-11-01
Payer: MEDICARE

## 2022-11-01 VITALS
DIASTOLIC BLOOD PRESSURE: 78 MMHG | HEART RATE: 73 BPM | TEMPERATURE: 97.3 F | WEIGHT: 116.4 LBS | SYSTOLIC BLOOD PRESSURE: 124 MMHG | OXYGEN SATURATION: 97 % | HEIGHT: 59 IN | BODY MASS INDEX: 23.47 KG/M2 | RESPIRATION RATE: 16 BRPM

## 2022-11-01 DIAGNOSIS — K05.10 GINGIVOSTOMATITIS: ICD-10-CM

## 2022-11-01 DIAGNOSIS — R30.0 DYSURIA: ICD-10-CM

## 2022-11-01 DIAGNOSIS — H91.91 DECREASED HEARING OF RIGHT EAR: ICD-10-CM

## 2022-11-01 DIAGNOSIS — K13.79 RECURRENT ORAL ULCERS: ICD-10-CM

## 2022-11-01 DIAGNOSIS — Z23 NEEDS FLU SHOT: ICD-10-CM

## 2022-11-01 DIAGNOSIS — E11.9 TYPE 2 DIABETES MELLITUS WITHOUT COMPLICATION, WITHOUT LONG-TERM CURRENT USE OF INSULIN (HCC): ICD-10-CM

## 2022-11-01 DIAGNOSIS — I10 ESSENTIAL HYPERTENSION: ICD-10-CM

## 2022-11-01 DIAGNOSIS — K05.10 GINGIVOSTOMATITIS: Primary | ICD-10-CM

## 2022-11-01 DIAGNOSIS — E78.5 HYPERLIPIDEMIA, UNSPECIFIED HYPERLIPIDEMIA TYPE: ICD-10-CM

## 2022-11-01 LAB
ALBUMIN SERPL-MCNC: 4.2 G/DL (ref 3.4–5)
ALBUMIN/GLOB SERPL: 1.9 {RATIO} (ref 0.8–1.7)
ALP SERPL-CCNC: 73 U/L (ref 45–117)
ALT SERPL-CCNC: 25 U/L (ref 13–56)
ANION GAP SERPL CALC-SCNC: 5 MMOL/L (ref 3–18)
APPEARANCE UR: ABNORMAL
AST SERPL-CCNC: 13 U/L (ref 10–38)
BACTERIA URNS QL MICRO: ABNORMAL /HPF
BASOPHILS # BLD: 0 K/UL (ref 0–0.1)
BASOPHILS NFR BLD: 1 % (ref 0–2)
BILIRUB SERPL-MCNC: 0.6 MG/DL (ref 0.2–1)
BILIRUB UR QL: NEGATIVE
BUN SERPL-MCNC: 15 MG/DL (ref 7–18)
BUN/CREAT SERPL: 25 (ref 12–20)
CALCIUM SERPL-MCNC: 9.5 MG/DL (ref 8.5–10.1)
CHLORIDE SERPL-SCNC: 106 MMOL/L (ref 100–111)
CO2 SERPL-SCNC: 30 MMOL/L (ref 21–32)
COLOR UR: YELLOW
CREAT SERPL-MCNC: 0.61 MG/DL (ref 0.6–1.3)
DIFFERENTIAL METHOD BLD: ABNORMAL
EOSINOPHIL # BLD: 0.2 K/UL (ref 0–0.4)
EOSINOPHIL NFR BLD: 3 % (ref 0–5)
EPITH CASTS URNS QL MICRO: ABNORMAL /LPF (ref 0–5)
ERYTHROCYTE [DISTWIDTH] IN BLOOD BY AUTOMATED COUNT: 15.7 % (ref 11.6–14.5)
GLOBULIN SER CALC-MCNC: 2.2 G/DL (ref 2–4)
GLUCOSE SERPL-MCNC: 79 MG/DL (ref 74–99)
GLUCOSE UR STRIP.AUTO-MCNC: NEGATIVE MG/DL
HCT VFR BLD AUTO: 36 % (ref 35–45)
HGB BLD-MCNC: 11.7 G/DL (ref 12–16)
HGB UR QL STRIP: ABNORMAL
IMM GRANULOCYTES # BLD AUTO: 0 K/UL (ref 0–0.04)
IMM GRANULOCYTES NFR BLD AUTO: 0 % (ref 0–0.5)
KETONES UR QL STRIP.AUTO: NEGATIVE MG/DL
LEUKOCYTE ESTERASE UR QL STRIP.AUTO: ABNORMAL
LYMPHOCYTES # BLD: 2.7 K/UL (ref 0.9–3.6)
LYMPHOCYTES NFR BLD: 34 % (ref 21–52)
MCH RBC QN AUTO: 30.9 PG (ref 24–34)
MCHC RBC AUTO-ENTMCNC: 32.5 G/DL (ref 31–37)
MCV RBC AUTO: 95 FL (ref 78–100)
MONOCYTES # BLD: 0.4 K/UL (ref 0.05–1.2)
MONOCYTES NFR BLD: 5 % (ref 3–10)
NEUTS SEG # BLD: 4.7 K/UL (ref 1.8–8)
NEUTS SEG NFR BLD: 58 % (ref 40–73)
NITRITE UR QL STRIP.AUTO: NEGATIVE
NRBC # BLD: 0 K/UL (ref 0–0.01)
NRBC BLD-RTO: 0 PER 100 WBC
PH UR STRIP: 7 [PH] (ref 5–8)
PLATELET # BLD AUTO: 398 K/UL (ref 135–420)
PMV BLD AUTO: 10.1 FL (ref 9.2–11.8)
POTASSIUM SERPL-SCNC: 4.4 MMOL/L (ref 3.5–5.5)
PROT SERPL-MCNC: 6.4 G/DL (ref 6.4–8.2)
PROT UR STRIP-MCNC: NEGATIVE MG/DL
RBC # BLD AUTO: 3.79 M/UL (ref 4.2–5.3)
RBC #/AREA URNS HPF: ABNORMAL /HPF (ref 0–5)
SODIUM SERPL-SCNC: 141 MMOL/L (ref 136–145)
SP GR UR REFRACTOMETRY: 1.01 (ref 1–1.03)
UROBILINOGEN UR QL STRIP.AUTO: 0.2 EU/DL (ref 0.2–1)
WBC # BLD AUTO: 8.1 K/UL (ref 4.6–13.2)
WBC URNS QL MICRO: ABNORMAL /HPF (ref 0–4)

## 2022-11-01 PROCEDURE — G9717 DOC PT DX DEP/BP F/U NT REQ: HCPCS | Performed by: INTERNAL MEDICINE

## 2022-11-01 PROCEDURE — 80053 COMPREHEN METABOLIC PANEL: CPT

## 2022-11-01 PROCEDURE — 1101F PT FALLS ASSESS-DOCD LE1/YR: CPT | Performed by: INTERNAL MEDICINE

## 2022-11-01 PROCEDURE — 99215 OFFICE O/P EST HI 40 MIN: CPT | Performed by: INTERNAL MEDICINE

## 2022-11-01 PROCEDURE — 86225 DNA ANTIBODY NATIVE: CPT

## 2022-11-01 PROCEDURE — 87077 CULTURE AEROBIC IDENTIFY: CPT

## 2022-11-01 PROCEDURE — 3017F COLORECTAL CA SCREEN DOC REV: CPT | Performed by: INTERNAL MEDICINE

## 2022-11-01 PROCEDURE — G8400 PT W/DXA NO RESULTS DOC: HCPCS | Performed by: INTERNAL MEDICINE

## 2022-11-01 PROCEDURE — 90694 VACC AIIV4 NO PRSRV 0.5ML IM: CPT | Performed by: INTERNAL MEDICINE

## 2022-11-01 PROCEDURE — 36415 COLL VENOUS BLD VENIPUNCTURE: CPT

## 2022-11-01 PROCEDURE — G8427 DOCREV CUR MEDS BY ELIG CLIN: HCPCS | Performed by: INTERNAL MEDICINE

## 2022-11-01 PROCEDURE — 81001 URINALYSIS AUTO W/SCOPE: CPT

## 2022-11-01 PROCEDURE — 1123F ACP DISCUSS/DSCN MKR DOCD: CPT | Performed by: INTERNAL MEDICINE

## 2022-11-01 PROCEDURE — G8754 DIAS BP LESS 90: HCPCS | Performed by: INTERNAL MEDICINE

## 2022-11-01 PROCEDURE — 3078F DIAST BP <80 MM HG: CPT | Performed by: INTERNAL MEDICINE

## 2022-11-01 PROCEDURE — 3044F HG A1C LEVEL LT 7.0%: CPT | Performed by: INTERNAL MEDICINE

## 2022-11-01 PROCEDURE — G8752 SYS BP LESS 140: HCPCS | Performed by: INTERNAL MEDICINE

## 2022-11-01 PROCEDURE — G0463 HOSPITAL OUTPT CLINIC VISIT: HCPCS | Performed by: INTERNAL MEDICINE

## 2022-11-01 PROCEDURE — 3074F SYST BP LT 130 MM HG: CPT | Performed by: INTERNAL MEDICINE

## 2022-11-01 PROCEDURE — G8536 NO DOC ELDER MAL SCRN: HCPCS | Performed by: INTERNAL MEDICINE

## 2022-11-01 PROCEDURE — G9899 SCRN MAM PERF RSLTS DOC: HCPCS | Performed by: INTERNAL MEDICINE

## 2022-11-01 PROCEDURE — 2022F DILAT RTA XM EVC RTNOPTHY: CPT | Performed by: INTERNAL MEDICINE

## 2022-11-01 PROCEDURE — 87186 SC STD MICRODIL/AGAR DIL: CPT

## 2022-11-01 PROCEDURE — 87086 URINE CULTURE/COLONY COUNT: CPT

## 2022-11-01 PROCEDURE — 85025 COMPLETE CBC W/AUTO DIFF WBC: CPT

## 2022-11-01 PROCEDURE — 87389 HIV-1 AG W/HIV-1&-2 AB AG IA: CPT

## 2022-11-01 PROCEDURE — 1090F PRES/ABSN URINE INCON ASSESS: CPT | Performed by: INTERNAL MEDICINE

## 2022-11-01 PROCEDURE — G8420 CALC BMI NORM PARAMETERS: HCPCS | Performed by: INTERNAL MEDICINE

## 2022-11-01 NOTE — PATIENT INSTRUCTIONS
Vaccine Information Statement    Influenza (Flu) Vaccine (Inactivated or Recombinant): What You Need to Know    Many vaccine information statements are available in Latvian and other languages. See www.immunize.org/vis. Hojas de información sobre vacunas están disponibles en español y en muchos otros idiomas. Visite www.immunize.org/vis. 1. Why get vaccinated? Influenza vaccine can prevent influenza (flu). Flu is a contagious disease that spreads around the [facility] every year, usually between October and May. Anyone can get the flu, but it is more dangerous for some people. Infants and young children, people 72 years and older, pregnant people, and people with certain health conditions or a weakened immune system are at greatest risk of flu complications. Pneumonia, bronchitis, sinus infections, and ear infections are examples of flu-related complications. If you have a medical condition, such as heart disease, cancer, or diabetes, flu can make it worse. Flu can cause fever and chills, sore throat, muscle aches, fatigue, cough, headache, and runny or stuffy nose. Some people may have vomiting and diarrhea, though this is more common in children than adults. In an average year, thousands of people in the [facility] die from flu, and many more are hospitalized. Flu vaccine prevents millions of illnesses and flu-related visits to the doctor each year. 2. Influenza vaccines     CDC recommends everyone 6 months and older get vaccinated every flu season. Children 6 months through 6years of age may need 2 doses during a single flu season. Everyone else needs only 1 dose each flu season. It takes about 2 weeks for protection to develop after vaccination. There are many flu viruses, and they are always changing. Each year a new flu vaccine is made to protect against the influenza viruses believed to be likely to cause disease in the upcoming flu season.  Even when the vaccine doesnt exactly match these viruses, it may still provide some protection. Influenza vaccine does not cause flu. Influenza vaccine may be given at the same time as other vaccines. 3. Talk with your health care provider    Tell your vaccination provider if the person getting the vaccine:  Has had an allergic reaction after a previous dose of influenza vaccine, or has any severe, life-threatening allergies   Has ever had Guillain-Barré Syndrome (also called GBS)    In some cases, your health care provider may decide to postpone influenza vaccination until a future visit. Influenza vaccine can be administered at any time during pregnancy. People who are or will be pregnant during influenza season should receive inactivated influenza vaccine. People with minor illnesses, such as a cold, may be vaccinated. People who are moderately or severely ill should usually wait until they recover before getting influenza vaccine. Your health care provider can give you more information. 4. Risks of a vaccine reaction    Soreness, redness, and swelling where the shot is given, fever, muscle aches, and headache can happen after influenza vaccination. There may be a very small increased risk of Guillain-Barré Syndrome (GBS) after inactivated influenza vaccine (the flu shot). Artur Castillo children who get the flu shot along with pneumococcal vaccine (PCV13) and/or DTaP vaccine at the same time might be slightly more likely to have a seizure caused by fever. Tell your health care provider if a child who is getting flu vaccine has ever had a seizure. People sometimes faint after medical procedures, including vaccination. Tell your provider if you feel dizzy or have vision changes or ringing in the ears. As with any medicine, there is a very remote chance of a vaccine causing a severe allergic reaction, other serious injury, or death. 5. What if there is a serious problem?     An allergic reaction could occur after the vaccinated person leaves the clinic. If you see signs of a severe allergic reaction (hives, swelling of the face and throat, difficulty breathing, a fast heartbeat, dizziness, or weakness), call 9-1-1 and get the person to the nearest hospital.    For other signs that concern you, call your health care provider. Adverse reactions should be reported to the Vaccine Adverse Event Reporting System (VAERS). Your health care provider will usually file this report, or you can do it yourself. Visit the VAERS website at www.vaers. [facility].gov or call 9-937.905.1812. VAERS is only for reporting reactions, and VAERS staff members do not give medical advice. 6. The National Vaccine Injury Compensation Program    The [facility] Vaccine Injury Compensation Program (VICP) is a federal program that was created to compensate people who may have been injured by certain vaccines. Claims regarding alleged injury or death due to vaccination have a time limit for filing, which may be as short as two years. Visit the VICP website at www.[place].gov/vaccinecompensation or call 5-119.688.3527 to learn about the program and about filing a claim. 7. How can I learn more? Ask your health care provider. Call your local or state health department. Visit the website of the Food and Drug Administration (FDA) for vaccine package inserts and additional information at www.fda.gov/vaccines-blood-biologics/vaccines. Contact the Centers for Disease Control and Prevention (CDC): Call 6-844.640.8682 (9-200-QAU-INFO) or  Visit CDCs influenza website at www.cdc.gov/flu. Vaccine Information Statement   Inactivated Influenza Vaccine   8/6/2021  44 WILLIAN Neftali Marilou [id number]     Department of Health and Human Services  Centers for Disease Control and Prevention    Office Use Only         Heart-Healthy Diet: Care Instructions  Your Care Instructions     A heart-healthy diet has lots of vegetables, fruits, nuts, beans, and whole grains, and is low in salt. It limits foods that are high in saturated fat, such as meats, cheeses, and fried foods. It may be hard to change your diet, but even small changes can lower your risk of heart attack and heart disease. Follow-up care is a key part of your treatment and safety. Be sure to make and go to all appointments, and call your doctor if you are having problems. It's also a good idea to know your test results and keep a list of the medicines you take. How can you care for yourself at home? Watch your portions  Learn what a serving is. A \"serving\" and a \"portion\" are not always the same thing. Make sure that you are not eating larger portions than are recommended. For example, a serving of pasta is ½ cup. A serving size of meat is 2 to 3 ounces. A 3-ounce serving is about the size of a deck of cards. Measure serving sizes until you are good at [place]" them. Keep in mind that restaurants often serve portions that are 2 or 3 times the size of one serving. To keep your energy level up and keep you from feeling hungry, eat often but in smaller portions. Eat only the number of calories you need to stay at a healthy weight. If you need to lose weight, eat fewer calories than your body burns (through exercise and other physical activity). Eat more fruits and vegetables  Eat a variety of fruit and vegetables every day. Dark green, deep orange, red, or yellow fruits and vegetables are especially good for you. Examples include spinach, carrots, peaches, and berries. Keep carrots, celery, and other veggies handy for snacks. Buy fruit that is in season and store it where you can see it so that you will be tempted to eat it. Cook dishes that have a lot of veggies in them, such as stir-fries and soups. Limit saturated and trans fat  Read food labels, and try to avoid saturated and trans fats. They increase your risk of heart disease. Use olive or canola oil when you cook.   Bake, broil, grill, or steam foods instead of frying them.  Choose lean meats instead of high-fat meats such as hot dogs and sausages. Cut off all visible fat when you prepare meat. Eat fish, skinless poultry, and meat alternatives such as soy products instead of high-fat meats. Soy products, such as tofu, may be especially good for your heart. Choose low-fat or fat-free milk and dairy products. Eat foods high in fiber  Eat a variety of grain products every day. Include whole-grain foods that have lots of fiber and nutrients. Examples of whole-grain foods include oats, whole wheat bread, and brown rice. Buy whole-grain breads and cereals, instead of white bread or pastries. Limit salt and sodium  Limit how much salt and sodium you eat to help lower your blood pressure. Taste food before you salt it. Add only a little salt when you think you need it. With time, your taste buds will adjust to less salt. Eat fewer snack items, fast foods, and other high-salt, processed foods. Check food labels for the amount of sodium in packaged foods. Choose low-sodium versions of canned goods (such as soups, vegetables, and beans). Limit sugar  Limit drinks and foods with added sugar. These include candy, desserts, and soda pop. Limit alcohol  Limit alcohol to no more than 2 drinks a day for men and 1 drink a day for women. Too much alcohol can cause health problems. When should you call for help? Watch closely for changes in your health, and be sure to contact your doctor if:    You would like help planning heart-healthy meals. Where can you learn more? Go to http://www.allan.com/  Enter V137 in the search box to learn more about \"Heart-Healthy Diet: Care Instructions. \"  Current as of: August 22, 2019               Content Version: 12.6  © 2135-6697 [employer]. Care instructions adapted under license by [employer] (which disclaims liability or warranty for this information).  If you have questions about a medical condition or this instruction, always ask your healthcare professional. [address] any warranty or liability for your use of this information. Learning About Diabetes Food Guidelines  Your Care Instructions     Meal planning is important to manage diabetes. It helps keep your blood sugar at a target level (which you set with your doctor). You don't have to eat special foods. You can eat what your family eats, including sweets once in a while. But you do have to pay attention to how often you eat and how much you eat of certain foods. You may want to work with a dietitian or a certified diabetes educator (CDE) to help you plan meals and snacks. A dietitian or CDE can also help you lose weight if that is one of your goals. What should you know about eating carbs? Managing the amount of carbohydrate (carbs) you eat is an important part of healthy meals when you have diabetes. Carbohydrate is found in many foods. Learn which foods have carbs. And learn the amounts of carbs in different foods. Bread, cereal, pasta, and rice have about 15 grams of carbs in a serving. A serving is 1 slice of bread (1 ounce), ½ cup of cooked cereal, or 1/3 cup of cooked pasta or rice. Fruits have 15 grams of carbs in a serving. A serving is 1 small fresh fruit, such as an apple or orange; ½ of a banana; ½ cup of cooked or canned fruit; ½ cup of fruit juice; 1 cup of melon or raspberries; or 2 tablespoons of dried fruit. Milk and no-sugar-added yogurt have 15 grams of carbs in a serving. A serving is 1 cup of milk or 2/3 cup of no-sugar-added yogurt. Starchy vegetables have 15 grams of carbs in a serving. A serving is ½ cup of mashed potatoes or sweet potato; 1 cup winter squash; ½ of a small baked potato; ½ cup of cooked beans; or ½ cup cooked corn or green peas. Learn how much carbs to eat each day and at each meal. A dietitian or CDE can teach you how to keep track of the amount of carbs you eat. This is called carbohydrate counting. If you are not sure how to count carbohydrate grams, use the Plate Method to plan meals. It is a good, quick way to make sure that you have a balanced meal. It also helps you spread carbs throughout the day. Divide your plate by types of foods. Put non-starchy vegetables on half the plate, meat or other protein food on one-quarter of the plate, and a grain or starchy vegetable in the final quarter of the plate. To this you can add a small piece of fruit and 1 cup of milk or yogurt, depending on how many carbs you are supposed to eat at a meal.  Try to eat about the same amount of carbs at each meal. Do not \"save up\" your daily allowance of carbs to eat at one meal.  Proteins have very little or no carbs per serving. Examples of proteins are beef, chicken, turkey, fish, eggs, tofu, cheese, cottage cheese, and peanut butter. A serving size of meat is 3 ounces, which is about the size of a deck of cards. Examples of meat substitute serving sizes (equal to 1 ounce of meat) are 1/4 cup of cottage cheese, 1 egg, 1 tablespoon of peanut butter, and ½ cup of tofu. How can you eat out and still eat healthy? Learn to estimate the serving sizes of foods that have carbohydrate. If you measure food at home, it will be easier to estimate the amount in a serving of restaurant food. If the meal you order has too much carbohydrate (such as potatoes, corn, or baked beans), ask to have a low-carbohydrate food instead. Ask for a salad or green vegetables. If you use insulin, check your blood sugar before and after eating out to help you plan how much to eat in the future. If you eat more carbohydrate at a meal than you had planned, take a walk or do other exercise. This will help lower your blood sugar. What else should you know? Limit saturated fat, such as the fat from meat and dairy products. This is a healthy choice because people who have diabetes are at higher risk of heart disease. So choose lean cuts of meat and nonfat or low-fat dairy products. Use olive or canola oil instead of butter or shortening when cooking. Don't skip meals. Your blood sugar may drop too low if you skip meals and take insulin or certain medicines for diabetes. Check with your doctor before you drink alcohol. Alcohol can cause your blood sugar to drop too low. Alcohol can also cause a bad reaction if you take certain diabetes medicines. Follow-up care is a key part of your treatment and safety. Be sure to make and go to all appointments, and call your doctor if you are having problems. It's also a good idea to know your test results and keep a list of the medicines you take. Where can you learn more? Go to http://www.allan.com/  Enter I147 in the search box to learn more about \"Learning About Diabetes Food Guidelines. \"  Current as of: December 20, 2019               Content Version: 12.6  © 1451-4834 [employer]. Care instructions adapted under license by [employer] (which disclaims liability or warranty for this information). If you have questions about a medical condition or this instruction, always ask your healthcare professional. [place] [address] any warranty or liability for your use of this information.

## 2022-11-01 NOTE — PROGRESS NOTES
1. \"Have you been to the ER, urgent care clinic since your last visit? Hospitalized since your last visit? \" No    2. \"Have you seen or consulted any other health care providers outside of the [address] [place] [address] since your last visit? \"  Matt Bernard      3. For patients aged 39-70: Has the patient had a colonoscopy / FIT/ Cologuard? Yes - no Care Gap present      If the patient is female:    4. For patients aged 41-77: Has the patient had a mammogram within the past 2 years? Yes - no Care Gap present      5. For patients aged 21-65: Has the patient had a pap smear? NA - based on age or sex      [facility] [place] 1955 female who presents for routine immunizations. Patient denies any symptoms , reactions or allergies that would exclude them from being immunized today. Risks and adverse reactions were discussed and the VIS was given to them. All questions were addressed. Order placed for HD Influenza,  per order placed by   Patient was observed for 15 min post injection. There were no reactions observed.     Esme Sauceda LPN

## 2022-11-02 LAB
HIV 1+2 AB+HIV1 P24 AG SERPL QL IA: NONREACTIVE
HIV12 RESULT COMMENT, HHIVC: NORMAL

## 2022-11-03 LAB
CENTROMERE B AB SER-ACNC: <0.2 AI (ref 0–0.9)
CHROMATIN AB SERPL-ACNC: <0.2 AI (ref 0–0.9)
DSDNA AB SER-ACNC: <1 IU/ML (ref 0–9)
ENA JO1 AB SER-ACNC: <0.2 AI (ref 0–0.9)
ENA RNP AB SER-ACNC: 0.2 AI (ref 0–0.9)
ENA SCL70 AB SER-ACNC: <0.2 AI (ref 0–0.9)
ENA SM AB SER-ACNC: <0.2 AI (ref 0–0.9)
ENA SS-A AB SER-ACNC: <0.2 AI (ref 0–0.9)
ENA SS-B AB SER-ACNC: <0.2 AI (ref 0–0.9)
SEE BELOW, 164869: NORMAL

## 2022-11-04 ENCOUNTER — TELEPHONE (OUTPATIENT)
Dept: INTERNAL MEDICINE CLINIC | Age: 67
End: 2022-11-04

## 2022-11-04 DIAGNOSIS — K05.10 GINGIVOSTOMATITIS: Primary | ICD-10-CM

## 2022-11-04 DIAGNOSIS — K13.79 RECURRENT ORAL ULCERS: ICD-10-CM

## 2022-11-04 LAB
BACTERIA SPEC CULT: ABNORMAL
CC UR VC: ABNORMAL
SERVICE CMNT-IMP: ABNORMAL

## 2022-11-04 RX ORDER — CEFDINIR 300 MG/1
300 CAPSULE ORAL 2 TIMES DAILY
Qty: 14 CAPSULE | Refills: 0 | Status: SHIPPED | OUTPATIENT
Start: 2022-11-04 | End: 2022-11-11

## 2022-11-04 NOTE — TELEPHONE ENCOUNTER
Urine culture returned with infection secondary to Klebsiella pneumoniae. Susceptible to treatment with cefdinir with GWEN < 1 ug/ml to third-generation cephalosporins so will be effective treatment.

## 2022-11-04 NOTE — TELEPHONE ENCOUNTER
No visits with results within 2 Day(s) from this visit. Latest known visit with results is:   Hospital Outpatient Visit on 11/01/2022   Component Date Value Ref Range Status    WBC 11/01/2022 8.1  4.6 - 13.2 K/uL Final    RBC 11/01/2022 3.79 (A)  4.20 - 5.30 M/uL Final    HGB 11/01/2022 11.7 (A)  12.0 - 16.0 g/dL Final    HCT 11/01/2022 36.0  35.0 - 45.0 % Final    MCV 11/01/2022 95.0  78.0 - 100.0 FL Final    MCH 11/01/2022 30.9  24.0 - 34.0 PG Final    MCHC 11/01/2022 32.5  31.0 - 37.0 g/dL Final    RDW 11/01/2022 15.7 (A)  11.6 - 14.5 % Final    PLATELET 99/04/6561 529  135 - 420 K/uL Final    MPV 11/01/2022 10.1  9.2 - 11.8 FL Final    NRBC 11/01/2022 0.0  0  WBC Final    ABSOLUTE NRBC 11/01/2022 0.00  0.00 - 0.01 K/uL Final    NEUTROPHILS 11/01/2022 58  40 - 73 % Final    LYMPHOCYTES 11/01/2022 34  21 - 52 % Final    MONOCYTES 11/01/2022 5  3 - 10 % Final    EOSINOPHILS 11/01/2022 3  0 - 5 % Final    BASOPHILS 11/01/2022 1  0 - 2 % Final    IMMATURE GRANULOCYTES 11/01/2022 0  0.0 - 0.5 % Final    ABS. NEUTROPHILS 11/01/2022 4.7  1.8 - 8.0 K/UL Final    ABS. LYMPHOCYTES 11/01/2022 2.7  0.9 - 3.6 K/UL Final    ABS. MONOCYTES 11/01/2022 0.4  0.05 - 1.2 K/UL Final    ABS. EOSINOPHILS 11/01/2022 0.2  0.0 - 0.4 K/UL Final    ABS. BASOPHILS 11/01/2022 0.0  0.0 - 0.1 K/UL Final    ABS. IMM. GRANS.  11/01/2022 0.0  0.00 - 0.04 K/UL Final    DF 11/01/2022 AUTOMATED    Final    Sodium 11/01/2022 141  136 - 145 mmol/L Final    Potassium 11/01/2022 4.4  3.5 - 5.5 mmol/L Final    Chloride 11/01/2022 106  100 - 111 mmol/L Final    CO2 11/01/2022 30  21 - 32 mmol/L Final    Anion gap 11/01/2022 5  3.0 - 18 mmol/L Final    Glucose 11/01/2022 79  74 - 99 mg/dL Final    BUN 11/01/2022 15  7.0 - 18 MG/DL Final    Creatinine 11/01/2022 0.61  0.6 - 1.3 MG/DL Final    BUN/Creatinine ratio 11/01/2022 25 (A)  12 - 20   Final    eGFR 11/01/2022 >60  >60 ml/min/1.73m2 Final    Calcium 11/01/2022 9.5  8.5 - 10.1 MG/DL Final Bilirubin, total 11/01/2022 0.6  0.2 - 1.0 MG/DL Final    ALT (SGPT) 11/01/2022 25  13 - 56 U/L Final    AST (SGOT) 11/01/2022 13  10 - 38 U/L Final    Alk.  phosphatase 11/01/2022 73  45 - 117 U/L Final    Protein, total 11/01/2022 6.4  6.4 - 8.2 g/dL Final    Albumin 11/01/2022 4.2  3.4 - 5.0 g/dL Final    Globulin 11/01/2022 2.2  2.0 - 4.0 g/dL Final    A-G Ratio 11/01/2022 1.9 (A)  0.8 - 1.7   Final    Special Requests: 11/01/2022 NO SPECIAL REQUESTS    Preliminary    [place] Count 11/01/2022     Preliminary                    Value:>100,000  COLONIES/mL      Culture result: 11/01/2022 GRAM NEGATIVE RODS (A)    Preliminary    HIV 1/2 Interpretation 11/01/2022 NONREACTIVE  NR   Final    HIV 1/2 result comment 11/01/2022 SEE NOTE    Final    Color 11/01/2022 YELLOW    Final    Appearance 11/01/2022 CLOUDY    Final    Specific gravity 11/01/2022 1.007  1.005 - 1.030   Final    pH (UA) 11/01/2022 7.0  5.0 - 8.0   Final    Protein 11/01/2022 Negative  NEG mg/dL Final    Glucose 11/01/2022 Negative  NEG mg/dL Final    Ketone 11/01/2022 Negative  NEG mg/dL Final    Bilirubin 11/01/2022 Negative  NEG   Final    Blood 11/01/2022 TRACE (A)  NEG   Final    Urobilinogen 11/01/2022 0.2  0.2 - 1.0 EU/dL Final    Nitrites 11/01/2022 Negative  NEG   Final    Leukocyte Esterase 11/01/2022 LARGE (A)  NEG   Final    WBC 11/01/2022 TOO NUMEROUS TO COUNT  0 - 4 /hpf Final    RBC 11/01/2022 1 to 3  0 - 5 /hpf Final    Epithelial cells 11/01/2022 FEW  0 - 5 /lpf Final    Bacteria 11/01/2022 4+ (A)  NEG /hpf Final    Anti-DNA (DS) Ab, QT 11/01/2022 <1  0 - 9 IU/mL Final    RNP Abs 11/01/2022 0.2  0.0 - 0.9 AI Final    Zavala Abs 11/01/2022 <0.2  0.0 - 0.9 AI Final    Scleroderma-70 Ab 11/01/2022 <0.2  0.0 - 0.9 AI Final    Sjogren's Anti-SS-A 11/01/2022 <0.2  0.0 - 0.9 AI Final    Sjogren's Anti-SS-B 11/01/2022 <0.2  0.0 - 0.9 AI Final    Antichromatin Ab 11/01/2022 <0.2  0.0 - 0.9 AI Final    Anti-Consuelo-1 11/01/2022 <0.2  0.0 - 0.9 AI Final Centromere B Ab 11/01/2022 <0.2  0.0 - 0.9 AI Final    See below 11/01/2022 Comment    Final     Called and discussed results with patient. RICHIE and HIV negative in evaluation of oral ulcers. Reports developed new ulcer on palate despite continued treatment with Valtrex. Advised that would recommend biopsy of ulcer to confirm diagnosis given lack of response to antiviral treatment. Will place urgent referral to Dr. Sharee Staples. Urinalysis consistent with infection and urine culture showing gram-negative rods. Identification and susceptibilities pending but will begin empiric treatment with cefdinir 300 mg twice daily for 7 days.   Prescription sent to [facility].

## 2022-11-06 NOTE — PROGRESS NOTES
HPI:   Lilibeth Bernardo is a 74y.o. year old female who presents today for a follow-up acute visit. She has a history of hypertension, hyperlipidemia, diabetes mellitus, migraine headaches, depression, anxiety, ADHD, and lumbar radiculopathy. She has completed the Moderna COVID-19 vaccine series but has not received any Moderna booster doses. On 9/2/2022, she presented to [facility] urgent care complaining of a sore throat for 1 week. She described difficulty swallowing, but denied any fever, nasal congestion, or cough. She had performed a home rapid antigen COVID test on 9/1/2022 which was negative. No abnormalities were noted on exam and a rapid strep test performed was negative group A beta strep culture, and pharyngeal TATYANA test for Neisseria gonorrhea and chlamydia trachomatis were also negative. She was diagnosed with acute pharyngitis and advised to treat symptomatically with Cepacol/Chloraseptic, and ibuprofen. She contacted the office on 9/8/2022 and reported persistent symptoms with burning in her mouth and throat, and was prescribed Magic mouthwash. She was evaluated in the office on 9/13/2022, t and reported persistent burning in her mouth and throat with painful swallowing, which had been unabated since onset. She reported Magic mouthwash provided transient relief lasting only approximately 1 hour after use. She denied any recent antibiotic use, fever, chills, or URI symptoms, and denied any history of herpes labialis. She was noted to have ulcerative lesions on her bilateral tonsils and posterior pharynx with mild erythema, suspicious for herpes simplex infection. Viral and HSV cultures were obtained and were negative. 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 However, on 10/17/2022, she noted recurrence of small ulcerations on tongue in area of initial outbreak.   She was treated with Valtrex 2 g twice daily for 2 doses and then placed on suppressive therapy with Valtrex 500 mg daily. She contacted the office on 10/27/2022 and reported that she had undergone evaluation by her dentist who confirmed that the ulcerations and throat erythema were consistent with herpes simplex infection. Given lack of improvement on Valtrex 500 mg daily, her dose was subsequently changed to Valtrex 1 g twice daily for 10 days. She presents today for follow-up and reports lesions on the inside of her lower lip and persistent tongue soreness in the area of the initial eruption. However, reports that the erythema and soreness of her posterior pharynx had improved. She denies any fever, chills, joint pains, or hematuria. She does report dysuria and is concerned she may have another urine infection. She is otherwise without new complaints. Summary of prior hospitalizations and medical history:  On 4/13/2022, she reported a 4-day history of pain and burning with urination and urge incontinence. She denied any fever, chills, abdominal pain, hematuria, and did report some mild lower back discomfort. POC urinalysis was suggestive of infection with positive nitrites, 2+ blood, and 3+ LE. She was treated empirically with Keflex and urine culture showed > 100,000 colonies E.coli with only intermediate sensitivity to cefuroxime. She was changed to ciprofloxacin and treated for 5 days with resolution of symptoms. She reported that on 4/30/2022, she began to experience mild low back pain and noted bright red blood in her urine without any dysuria, fever, or chills that would suggest infection. She reported that she continued to experience hematuria for 2 days until 5/2/2022 when she noted resolution of all symptoms. POC urinalysis (5/4/2022) showed 2+ blood and 3+ LE suggestive of infection, and she was started empirically on cefdinir for 7 days but urine culture was negative.   She completed the 7-day course of antibiotics and denies any recurrence of hematuria. She was hospitalized from 7/9-7/11/2019 at [facility] after presenting to [facility] ED with a severe posterior headache, unlike her usual migraines, with associated light sensitivity and confusion. Evaluation included WBC 6.0, Hb 11.9/ Hct 35.2, Na 138, Ca 9.6, creatinine 1.0/ eGFR >60, head CT scan no acute intracranial abnormality; MRI brain (7/10/2019) mild nonspecific white matter disease likely representing chronic small vessel changes, and very mild bilateral occipital paramedian encephalomalacic changes, likely from chronic small infarcts; brain MRA (7/10/2019) no high-grade intracranial stenosis; neck MRA (7/10/2019) mild proximal ICA irregularity without hemodynamically significant carotid stenosis. She was evaluated by Dr. Thaddeus Kim of neurology who felt presentation was consistent with status migrainosus. Attempted treatment with Benadryl and compazine without much improvement, but notable improvement after Medrol dose pack started and subsequently discharged. On 7/24/2019, she noted onset of left facial weakness with difficulty with eye closure and forehead elevation, and presented to [facility] ED and diagnosed with left facial palsy. She was treated with prednisone 60 mg daily x 7 days and Valtrex. She was evaluated by Dr. Juan Atwood on 8/14/2019, and it was his opinion that her symptoms were likely not due to status migrainosus, but rather were secondary to a viral meningoencephalitis given the clear change in the pattern of her headaches and the subsequent development of a cranial nerve palsy. She has had complete resolution of her facial palsy, and a return to baseline for her migraine headaches, which respond readily to sumatriptan. She has a history of hypertension, treated with propranolol and lisinopril. She does not check her blood pressure at home. She has begun walking regularly for exercise.  She denies any chest pain, shortness of breath at rest or with exertion, palpitations, or lightheadedness. She also has a history of hyperlipidemia, treated with high intensity dose rosuvastatin. She has a history of diabetes mellitus, and was started on metformin in 2/2015 for a HbA1c of 7.5. She does not monitor her blood sugars at home. She denies any polyuria, polydipsia, nocturia, or blurry vision, and has no history of retinopathy, neuropathy, or nephropathy. She has regular eye exams with Dr. Vicki Bryant. She also has a history of migraines without aura, which usually respond to Imitrex. Also now on propranolol for preventive therapy with good control. She has a history of lumbar radiculopathy (R>L), and was evaluated by Dr. Osiel Rico in 5/2015. Lumbar x-rays showed degenerative changes in L4-L5 and L5-S1. She was treated conservatively with physical therapy and ibuprofen and reports significant improvement. She was evaluated by PA Rica Claude in 12/2016 for exacerbation of low back pain with right sciatica. She was treated with steroids and Norco with improvement. On 4/17/2018, she presented with increasing low back pain with bilateral sciatica. She was treated with a prednisone taper with initial improvement. However, her symptoms worsened and she was referred to Dr. Yelitza Nielson for evaluation. She gave her a Toradol injection, and ordered a lumbar MRI (6/24/2018) which showed a large right disc extrusion at L5-S1 which compressed the descending right S1 nerve root. She was referred to pain management and underwent a selective L5 and S1 selective nerve root blocks by Dr. Tisha Dickinson on 7/26/2018. However, due to persistent symptoms, she was referred to Dr. Irma Medrano and subsequently underwent a right L5-S1 laminectomy and diskectomy on 8/3/7881 without complications. She states that she did well and continues to do her back stretches and exercises. She states that she only has occasional discomfort.     She had a screening colonoscopy in 10/2014 by Dr. Priya Delgado which was normal. Follow-up due in 10 years. She denies any abdominal pain, nausea, vomiting, melena, hematochezia, or change in bowel movements. She has a history of depression and anxiety, and is followed by Dr. Darío Phillips. She is currently taking Abilify 5 mg daily and Prozac 10 mg daily. Previously on lorazepam at bedtime, but no longer requiring. Past Medical History:   Diagnosis Date    ADHD     Anxiety     Bilateral lumbar radiculopathy     Carotid bruit     right    Depression     Diabetes mellitus (HCC)     HCD (hypertensive cardiovascular disease)     Hyperlipidemia     Hypertension     Migraine headache     Plantar fasciitis      Past Surgical History:   Procedure Laterality Date    HX SEPTOPLASTY  6/2002    HX TONSILLECTOMY      NEUROLOGICAL PROCEDURE UNLISTED  08/06/2018    laminectomy      Current Outpatient Medications   Medication Sig    valACYclovir (VALTREX) 500 mg tablet Take 2 Tablets by mouth two (2) times a day for 10 days. Indications: a cold sore    magic mouthwash solution Take 5 mL by mouth four (4) times daily. Swish and spit. Magic mouth wash: Maalox, Lidocaine 2% viscous, Diphenhydramine oral solution. Pharmacy to mix equal portions of ingredients to a total volume as indicated in the dispense amount.    ergocalciferol (ERGOCALCIFEROL) 1,250 mcg (50,000 unit) capsule TAKE 1 CAPSULE BY MOUTH EVERY FOURTEEN (14) DAYS. SUMAtriptan (IMITREX) 50 mg tablet TAKE 1 TAB BY MOUTH DAILY AS NEEDED FOR MIGRAINE. atorvastatin (LIPITOR) 80 mg tablet TAKE 1 TABLET BY MOUTH EVERY DAY    propranoloL (INDERAL) 40 mg tablet TAKE 1 TABLET BY MOUTH TWICE A DAY    metFORMIN ER (GLUCOPHAGE XR) 500 mg tablet TAKE 2 TABLETS BY MOUTH TWICE A DAY WITH MEALS    semaglutide (OZEMPIC) 0.25 mg or 0.5 mg/dose (2 mg/1.5 ml) subq pen 0.5 mg by SubCUTAneous route every seven (7) days. lisinopriL (PRINIVIL, ZESTRIL) 10 mg tablet TAKE 1 TABLET BY MOUTH EVERY DAY    FLUoxetine (PROzac) 20 mg capsule Take 10 mg by mouth daily.     cefdinir (OMNICEF) 300 mg capsule Take 1 Capsule by mouth two (2) times a day for 7 days. No current facility-administered medications for this visit. Allergies and Intolerances: Allergies   Allergen Reactions    Pcn [Penicillins] Rash    Sulfa (Sulfonamide Antibiotics) Rash     Family History: She has no family history of colon or breast cancer. Her mother  from a CVA. Her father  from throat cancer and cirrhosis. Family History   Problem Relation Age of Onset    Hypertension Brother         x2    Elevated Lipids Brother     Stroke Mother     Heart Surgery Mother         CABG    Cancer Father         cancer of the mouth    Hypertension Father     Hypertension Brother     Elevated Lipids Brother      Social History:   She  reports that she quit smoking about 33 years ago. Her smoking use included cigarettes. She has a 1.00 pack-year smoking history. She has never used smokeless tobacco. She smoked approximately 0.25 ppd for 2 years, stopping in . She is a  and has one daughter. She was employed as a . 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 She was previously working as the [employer] for his business.     Social History     Substance and Sexual Activity   Alcohol Use No    Alcohol/week: 1.7 standard drinks    Types: 2 Glasses of wine per week     Immunization History:  Immunization History   Administered Date(s) Administered    COVID-19, MODERNA BLUE border, Primary or Immunocompromised, (age 18y+), IM, 100 mcg/0.5mL 2021, 2021    Influenza Vaccine PF 2014, 10/13/2014    Influenza Vaccine Split 2011    Influenza Vaccine Whole 10/05/2010    Influenza, FLUAD, (age 72 y+), Adjuvanted 2022    Influenza, FLUARIX, FLULAVAL, FLUZONE (age 10 mo+) AND AFLURIA, (age 1 y+), PF, 0.5mL 2018, 2019, 10/22/2020, 2021    Pneumococcal Conjugate PCV20, PF (Prevnar 20) 2022 Pneumococcal Polysaccharide (PPSV-23) 05/11/2017    TD Vaccine 01/23/2007    Tdap 11/10/2016    Zoster Recombinant 05/20/2018, 09/18/2018       Review of Systems:   As above included in HPI. Otherwise 11 point review of systems negative including constitutional, skin, HENT, eyes, respiratory, cardiovascular, gastrointestinal, genitourinary, musculoskeletal, endocrine, hematologic, allergy, and neurologic. Physical:   Visit Vitals  /78 (BP 1 Location: Left upper arm, BP Patient Position: Sitting)   Pulse 73   Temp 97.3 °F (36.3 °C) (Temporal)   Resp 16   Ht 4' 11\" (1.499 m)   Wt 116 lb 6.4 oz (52.8 kg)   SpO2 97%   BMI 23.51 kg/m²       Patient appears in no apparent distress. Affect is appropriate. HEENT: PERRLA, anicteric, oropharynx without erythema or ulcerative lesions in posterior pharynx; small punctate cluster of 3 ulcers on anterior surface of lower lip; no obvious lesion on right lateral tongue in area of discomfort; no other lesions seen throughout the oropharynx; no adenopathy or thyromegaly. Lungs: clear to auscultation, no wheezes, rhonchi, or rales. Heart: regular rate and rhythm. No murmur, rubs, gallops  Abdomen: soft, nontender, nondistended, normal bowel sounds, no hepatosplenomegaly or masses. Extremities: without edema. No erythema or swelling of joints. Derm: without rash        Review of Data:  Labs:  No visits with results within 2 Week(s) from this visit.    Latest known visit with results is:   Hospital Outpatient Visit on 10/17/2022   Component Date Value Ref Range Status    Creatinine, POC 10/17/2022 0.7  0.6 - 1.3 MG/DL Final    eGFR (POC) 10/17/2022 >60  >60 ml/min/1.73m2 Final     Hospital Outpatient Visit on 11/01/2022   Component Date Value Ref Range Status    WBC 11/01/2022 8.1  4.6 - 13.2 K/uL Final    RBC 11/01/2022 3.79 (A)  4.20 - 5.30 M/uL Final    HGB 11/01/2022 11.7 (A)  12.0 - 16.0 g/dL Final    HCT 11/01/2022 36.0  35.0 - 45.0 % Final    MCV 11/01/2022 95.0 78.0 - 100.0 FL Final    MCH 11/01/2022 30.9  24.0 - 34.0 PG Final    MCHC 11/01/2022 32.5  31.0 - 37.0 g/dL Final    RDW 11/01/2022 15.7 (A)  11.6 - 14.5 % Final    PLATELET 10/92/3443 180  135 - 420 K/uL Final    MPV 11/01/2022 10.1  9.2 - 11.8 FL Final    NRBC 11/01/2022 0.0  0  WBC Final    ABSOLUTE NRBC 11/01/2022 0.00  0.00 - 0.01 K/uL Final    NEUTROPHILS 11/01/2022 58  40 - 73 % Final    LYMPHOCYTES 11/01/2022 34  21 - 52 % Final    MONOCYTES 11/01/2022 5  3 - 10 % Final    EOSINOPHILS 11/01/2022 3  0 - 5 % Final    BASOPHILS 11/01/2022 1  0 - 2 % Final    IMMATURE GRANULOCYTES 11/01/2022 0  0.0 - 0.5 % Final    ABS. NEUTROPHILS 11/01/2022 4.7  1.8 - 8.0 K/UL Final    ABS. LYMPHOCYTES 11/01/2022 2.7  0.9 - 3.6 K/UL Final    ABS. MONOCYTES 11/01/2022 0.4  0.05 - 1.2 K/UL Final    ABS. EOSINOPHILS 11/01/2022 0.2  0.0 - 0.4 K/UL Final    ABS. BASOPHILS 11/01/2022 0.0  0.0 - 0.1 K/UL Final    ABS. IMM. GRANS. 11/01/2022 0.0  0.00 - 0.04 K/UL Final    DF 11/01/2022 AUTOMATED    Final    Sodium 11/01/2022 141  136 - 145 mmol/L Final    Potassium 11/01/2022 4.4  3.5 - 5.5 mmol/L Final    Chloride 11/01/2022 106  100 - 111 mmol/L Final    CO2 11/01/2022 30  21 - 32 mmol/L Final    Anion gap 11/01/2022 5  3.0 - 18 mmol/L Final    Glucose 11/01/2022 79  74 - 99 mg/dL Final    BUN 11/01/2022 15  7.0 - 18 MG/DL Final    Creatinine 11/01/2022 0.61  0.6 - 1.3 MG/DL Final    BUN/Creatinine ratio 11/01/2022 25 (A)  12 - 20   Final    eGFR 11/01/2022 >60  >60 ml/min/1.73m2 Final    Calcium 11/01/2022 9.5  8.5 - 10.1 MG/DL Final    Bilirubin, total 11/01/2022 0.6  0.2 - 1.0 MG/DL Final    ALT (SGPT) 11/01/2022 25  13 - 56 U/L Final    AST (SGOT) 11/01/2022 13  10 - 38 U/L Final    Alk.  phosphatase 11/01/2022 73  45 - 117 U/L Final    Protein, total 11/01/2022 6.4  6.4 - 8.2 g/dL Final    Albumin 11/01/2022 4.2  3.4 - 5.0 g/dL Final    Globulin 11/01/2022 2.2  2.0 - 4.0 g/dL Final    A-G Ratio 11/01/2022 1.9 (A)  0.8 - 1.7   Final    Special Requests: 11/01/2022 NO SPECIAL REQUESTS    Final    [place] Count 11/01/2022     Final                    Value:>100,000  COLONIES/mL      Culture result: 11/01/2022 KLEBSIELLA PNEUMONIAE (A)    Final    HIV 1/2 Interpretation 11/01/2022 NONREACTIVE  NR   Final    HIV 1/2 result comment 11/01/2022 SEE NOTE    Final    Color 11/01/2022 YELLOW    Final    Appearance 11/01/2022 CLOUDY    Final    Specific gravity 11/01/2022 1.007  1.005 - 1.030   Final    pH (UA) 11/01/2022 7.0  5.0 - 8.0   Final    Protein 11/01/2022 Negative  NEG mg/dL Final    Glucose 11/01/2022 Negative  NEG mg/dL Final    Ketone 11/01/2022 Negative  NEG mg/dL Final    Bilirubin 11/01/2022 Negative  NEG   Final    Blood 11/01/2022 TRACE (A)  NEG   Final    Urobilinogen 11/01/2022 0.2  0.2 - 1.0 EU/dL Final    Nitrites 11/01/2022 Negative  NEG   Final    Leukocyte Esterase 11/01/2022 LARGE (A)  NEG   Final    WBC 11/01/2022 TOO NUMEROUS TO COUNT  0 - 4 /hpf Final    RBC 11/01/2022 1 to 3  0 - 5 /hpf Final    Epithelial cells 11/01/2022 FEW  0 - 5 /lpf Final    Bacteria 11/01/2022 4+ (A)  NEG /hpf Final    Anti-DNA (DS) Ab, QT 11/01/2022 <1  0 - 9 IU/mL Final    RNP Abs 11/01/2022 0.2  0.0 - 0.9 AI Final    Zavala Abs 11/01/2022 <0.2  0.0 - 0.9 AI Final    Scleroderma-70 Ab 11/01/2022 <0.2  0.0 - 0.9 AI Final    Sjogren's Anti-SS-A 11/01/2022 <0.2  0.0 - 0.9 AI Final    Sjogren's Anti-SS-B 11/01/2022 <0.2  0.0 - 0.9 AI Final    Antichromatin Ab 11/01/2022 <0.2  0.0 - 0.9 AI Final    Anti-Consuelo-1 11/01/2022 <0.2  0.0 - 0.9 AI Final    Centromere B Ab 11/01/2022 <0.2  0.0 - 0.9 AI Final    See below 11/01/2022 Comment    Final         Health Maintenance:  Screening:    Mammogram: negative (9/2021) DUE   PAP smear: well women exams by Dr. Coco Scott (last 4/2021)    Colorectal: colonoscopy (10/2014) normal. Dr. Gabriel Hron. Due 10/2024. Depression: under care of PA Quinton Dance Dr. Georgiana Dixon.    DM (HbA1c/FPG): HbA1c 6.2 (6/2022)   Hepatitis C: negative (5/2017)   Falls: none   DEXA: osteopenia (2/2019) Dr. Papa Petersen   Glaucoma: regular eye exams with Dr. Demetra Sosa (11/2021)   Smoking: none   Vitamin D: 74.7 (6/2022)   Medicare Wellness: 8/25/2022 ([place])      Impression:  Patient Active Problem List   Diagnosis Code    HCD (hypertensive cardiovascular disease) I11.9    Hyperlipidemia E78.5    Migraine G43.909    Bilateral lumbar radiculopathy M54.16    Type 2 diabetes mellitus without complication ([facility] [place] [address] M74.8    Essential hypertension I10    Vitamin D deficiency E55.9    Recurrent major depressive disorder ([facility] [place] [address] F33.9    DDD (degenerative disc disease), lumbar M51.36    HNP (herniated nucleus pulposus), lumbar M51.26    History of Bell's palsy Z86.69    Hypomagnesemia E83.42    Gingivostomatitis K05.10       Plan:  Gingivostomatitis. Patient with approximately 2-week history of sore throat with burning pain in her mouth and throat. Initially evaluated in [facility] on 9/2/2022 with negative rapid strep, group A strep culture, and oropharyngeal gonorrhea/chlamydia trachomatis TATYANA test.  Persistent symptoms not relieved with treatment with ibuprofen and prescribed Magic mouthwash on 9/8/2022 with only transient relief. Denied any recent antibiotic use, fever, chills, joint pains, hematuria, or URI symptoms, and denies history of herpes labialis. Exam on 9/13/2022 with punctate ulcers on bilateral tonsils and posterior pharynx with mild erythema suggestive of herpes simplex infection with gingivostomatitis. Viral and HSV cultures (9/13/2022) were negative. Treated empirically with Valtrex 1 g twice daily for 10 days with improvement. Developed recurrence on 10/17/2022 with outbreak of ulcerations on her right tongue in area of the initial outbreak. She was treated with Valtrex 2 g twice daily for 1 day and then maintained on Valtrex 500 mg daily.   However, contacted the office on 10/27/2022 after examination by her dentist who confirmed findings appeared consistent with herpes simplex infection. Given lack of response on suppressive regimen, dose of Valtrex was increased to 1 g twice daily for 10 days. Reporting some improvement in swallowing and throat pain, although continuing to develop small ulcerations in her mouth. Labs obtained today showing mild anemia (Hb 11.7/HCT 36.0), normal creatinine at 0.61/eGFR >60, normal LFTs, HIV negative, and RICHIE negative. Repeat viral culture attempted today but not performed by lab. Discussed with Dr. Tami Porter of infectious disease and she recommended proceeding with punch biopsy of oral lesion to confirm diagnosis of HSV. Urgent referral placed to Dr. Mayda Sierra to perform. Advised patient to complete course of Valtrex, and if symptoms persist or do not resolve, will consider course of valganciclovir to see if more effective. Continue to follow. Chronic issues:  1. Hypertension. Blood pressure remains well controlled on current regimen of lisinopril 10 mg daily and propranolol 40 mg bid. Renal function remains normal with creatinine 0.79/ eGFR 83. Magnesium normalized at 1.7 (6/2022) and advised to continue magnesium oxide 400 mg daily. Will reassess at next visit. 2. Hyperlipidemia. Increased to high intensity 80 mg dose of atorvastatin in 4/2021 and LDL 84 and HDL 45 (8/2022), indicative of excellent control. Emphasized importance of continued lifestyle modifications, including heart healthy diet, regular exercise, and weight loss. Continue to follow. 3. Diabetes mellitus. Significant improvement with the addition of Ozempic 0.5 mg weekly. Also continues on metformin ER 1000 mg bid. Weight has decreased a total of 19 pounds. HbA1c now stable at 6.2 (6/2022). No evidence of microvascular complications. On statin and lisinopril. Continue regular annual eye exams.  Foot exam normal (12/2021) Urine microalbumin/ creatinine ratio with resolution of microalbuminuria (19 mg/g 12/2021). Will reassess at next visit. On lisinopril. Emphasized importance of continued lifestyle modifications, including heart healthy diet, regular exercise, and maintaining weight loss. 4. Migraines. Returned to baseline pattern involving pain on side of head radiating to lateral neck. On propranolol 40 mg twice daily as preventive therapy and readily responds to Imitrex if needed. Currently reports continued good control of headaches. 5. History of left facial nerve palsy. Presented with severe headache resulting in hospital admission to [facility]. Evaluation including head CT scan, brain MRI/MRA and neck MRA unrevealing and Dr. Shahid Rogers of neurology consulted and felt most consistent with status migrainosus. Prescribed medrol dose pack and discharged. Developed left facial nerve palsy 10 days later and treated with prednisone 60 mg and Valtrex for 7 days. Evaluated by Dr. Judit Galvan who felt that presentation more likely consistent with viral meningoencephalitis given the clear change in the pattern of her headaches and the subsequent development of a cranial nerve palsy. Now completely improved with resolution of facial nerve palsy and headaches returned to her baseline pattern for migraines. 6. Gross hematuria. Diagnosed with UTI on 4/13/2022 with urine culture showing > 100,000 CFU's/mL of E. coli. She was initially treated with Keflex which found to have intermediate resistance to second generation cephalosporins and changed to ciprofloxacin to complete a 7-day course. On 4/30/2022, began to experience mild low back pain and noted bright red blood in her urine and POC urinalysis (5/4/2022) showed 2+ blood and 3+ LE suggestive of infection. She was started empirically on cefdinir for 7 days but urine culture was negative. She reports no recurrence of hematuria today but describing dysuria. Urinalysis showing trace blood, large leukocyte esterase, TNTC WBCs, 1-3 RBCs, and 4+ bacteria.   She was started empirically on cefdinir 300 mg twice daily for 7 days while awaiting urine culture results. 7. Lumbar herniated disc with right sciatica, s/p right L5-S1 laminectomy and discectomy. Doing well without significant discomfort. Stressed improtance of continuing stretches and exercise. Follow. 8. Depression/ADHD. Previously reported good control of symptoms on new regimen of Abilify and Prozac. However, states had to wean Abilify due to cost.  Reports no longer requiring lorazepam at bedtime. Being followed by MOJGAN Zelaya at [address] [place] [address] Psychiatry and reports has an upcoming appointment to address therapy. 9. Decreased hearing, right. Gradual onset over the last 2 years. Referred to Dr. Mathew Murillo underwent MRI IAC (10/17/2022) which was essentially normal except for partial opacification of the right mastoid without enhancement. Reports in the process of being fitted for hearing aids. 10. Overweight. Started on Ozempic in 9/2021 and weight decreased a total of 22 pounds since initiating (peak 138 pounds) and patient continuing to maintain. BMI <25 and she has returned to her baseline weight. Advised to continue with lifestyle changes including regular exercise. 10. Health maintenance. Completed the Moderna COVID-19 vaccine series but has not yet received any Moderna booster doses. Advised to obtain updated bivalent booster dose. Will give influenza vaccine today. Completed 2/2 doses of Shingrix vaccine. Received Prevnar 20 at last visit. Other immunizations up to date. Well women exam completed by Dr. Sekou Freeman in 4/19/2021. Mammogram due and ordered with baseline bone density study. Urged to schedule. Colonoscopy due 2024. Continue regular eye exams with Dr. Elda Castillo. Vitamin D level has been normal on ergocalciferol to every 14 days. Will continue to monitor. [facility] to [facility] completed. Patient understands recommendations and agrees with plan.   Follow-up previously scheduled. This visit required high complexity medically necessary decision making and management plans. Time spent in preparing for the visit, including review of history, tests done prior to arrival, additional time reviewing clinical data, imaging, outside records and test results:  5 minutes. Time spent in counseling with patient and/or family members regarding care plan: 30 minutes. Time spent in ordering tests, treatments, and referring patient for further care: 10 minutes. Time spent on visit does not include time for documentation. Future orders:    ICD-10-CM ICD-9-CM    1. Gingivostomatitis  K05.10 523.10 CBC WITH AUTOMATED DIFF      METABOLIC PANEL, COMPREHENSIVE      HIV 1/2 AG/AB, 4TH GENERATION,W RFLX CONFIRM      CANCELED: RICHIE COMPREHENSIVE PANEL      2. Recurrent oral ulcers  K13.79 528.9 CBC WITH AUTOMATED DIFF      METABOLIC PANEL, COMPREHENSIVE      HIV 1/2 AG/AB, 4TH GENERATION,W RFLX CONFIRM      CULTURE, HSV W/ TYPING      CULTURE, VIRAL      CANCELED: RICHIE COMPREHENSIVE PANEL      3. Dysuria  R30.0 788. 1 URINALYSIS W/MICROSCOPIC      CULTURE, URINE      4. Type 2 diabetes mellitus without complication, without long-term current use of insulin (HCC)  E11.9 250.00       5. Essential hypertension  I10 401.9       6. Hyperlipidemia, unspecified hyperlipidemia type  E78.5 272.4       7. Decreased hearing of right ear  H91.91 389.9       8.  Needs flu shot  Z23 V04.81 INFLUENZA, FLUAD, (AGE 65 Y+), IM, PF, 0.5 ML

## 2022-11-23 ENCOUNTER — HOSPITAL ENCOUNTER (OUTPATIENT)
Dept: LAB | Age: 67
Discharge: HOME | End: 2022-11-23
Payer: MEDICARE

## 2022-11-23 PROCEDURE — 88305 TISSUE EXAM BY PATHOLOGIST: CPT

## 2022-11-23 PROCEDURE — 88312 SPECIAL STAINS GROUP 1: CPT

## 2022-11-29 ENCOUNTER — TELEPHONE (OUTPATIENT)
Dept: INTERNAL MEDICINE CLINIC | Age: 67
End: 2022-11-29

## 2022-11-29 ENCOUNTER — HOSPITAL ENCOUNTER (OUTPATIENT)
Dept: LAB | Age: 67
Discharge: HOME | End: 2022-11-29
Payer: MEDICARE

## 2022-11-29 ENCOUNTER — APPOINTMENT (OUTPATIENT)
Dept: INTERNAL MEDICINE CLINIC | Age: 67
End: 2022-11-29

## 2022-11-29 DIAGNOSIS — E11.9 TYPE 2 DIABETES MELLITUS WITHOUT COMPLICATION, WITHOUT LONG-TERM CURRENT USE OF INSULIN (HCC): ICD-10-CM

## 2022-11-29 DIAGNOSIS — I10 ESSENTIAL HYPERTENSION: ICD-10-CM

## 2022-11-29 DIAGNOSIS — E78.5 HYPERLIPIDEMIA, UNSPECIFIED HYPERLIPIDEMIA TYPE: ICD-10-CM

## 2022-11-29 DIAGNOSIS — E55.9 VITAMIN D DEFICIENCY: ICD-10-CM

## 2022-11-29 DIAGNOSIS — R82.90 ABNORMAL URINALYSIS: Primary | ICD-10-CM

## 2022-11-29 LAB
25(OH)D3 SERPL-MCNC: 76 NG/ML (ref 30–100)
ALBUMIN SERPL-MCNC: 3.9 G/DL (ref 3.4–5)
ALBUMIN/GLOB SERPL: 1.6 {RATIO} (ref 0.8–1.7)
ALP SERPL-CCNC: 69 U/L (ref 45–117)
ALT SERPL-CCNC: 22 U/L (ref 13–56)
ANION GAP SERPL CALC-SCNC: 6 MMOL/L (ref 3–18)
APPEARANCE UR: ABNORMAL
AST SERPL-CCNC: 13 U/L (ref 10–38)
BACTERIA URNS QL MICRO: ABNORMAL /HPF
BASOPHILS # BLD: 0 K/UL (ref 0–0.1)
BASOPHILS NFR BLD: 1 % (ref 0–2)
BILIRUB SERPL-MCNC: 0.5 MG/DL (ref 0.2–1)
BILIRUB UR QL: NEGATIVE
BUN SERPL-MCNC: 17 MG/DL (ref 7–18)
BUN/CREAT SERPL: 26 (ref 12–20)
CALCIUM SERPL-MCNC: 9.1 MG/DL (ref 8.5–10.1)
CHLORIDE SERPL-SCNC: 108 MMOL/L (ref 100–111)
CHOLEST SERPL-MCNC: 114 MG/DL
CO2 SERPL-SCNC: 29 MMOL/L (ref 21–32)
COLOR UR: YELLOW
CREAT SERPL-MCNC: 0.65 MG/DL (ref 0.6–1.3)
CREAT UR-MCNC: 87 MG/DL (ref 30–125)
DIFFERENTIAL METHOD BLD: ABNORMAL
EOSINOPHIL # BLD: 0.1 K/UL (ref 0–0.4)
EOSINOPHIL NFR BLD: 2 % (ref 0–5)
EPITH CASTS URNS QL MICRO: ABNORMAL /LPF (ref 0–5)
ERYTHROCYTE [DISTWIDTH] IN BLOOD BY AUTOMATED COUNT: 15.4 % (ref 11.6–14.5)
EST. AVERAGE GLUCOSE BLD GHB EST-MCNC: 111 MG/DL
GLOBULIN SER CALC-MCNC: 2.4 G/DL (ref 2–4)
GLUCOSE SERPL-MCNC: 86 MG/DL (ref 74–99)
GLUCOSE UR STRIP.AUTO-MCNC: NEGATIVE MG/DL
HBA1C MFR BLD: 5.5 % (ref 4.2–5.6)
HCT VFR BLD AUTO: 36.4 % (ref 35–45)
HDLC SERPL-MCNC: 39 MG/DL (ref 40–60)
HDLC SERPL: 2.9 {RATIO} (ref 0–5)
HGB BLD-MCNC: 11.5 G/DL (ref 12–16)
HGB UR QL STRIP: ABNORMAL
IMM GRANULOCYTES # BLD AUTO: 0 K/UL (ref 0–0.04)
IMM GRANULOCYTES NFR BLD AUTO: 0 % (ref 0–0.5)
KETONES UR QL STRIP.AUTO: ABNORMAL MG/DL
LDLC SERPL CALC-MCNC: 54.6 MG/DL (ref 0–100)
LEUKOCYTE ESTERASE UR QL STRIP.AUTO: ABNORMAL
LIPID PROFILE,FLP: ABNORMAL
LYMPHOCYTES # BLD: 2.7 K/UL (ref 0.9–3.6)
LYMPHOCYTES NFR BLD: 34 % (ref 21–52)
MAGNESIUM SERPL-MCNC: 1.3 MG/DL (ref 1.6–2.6)
MCH RBC QN AUTO: 31 PG (ref 24–34)
MCHC RBC AUTO-ENTMCNC: 31.6 G/DL (ref 31–37)
MCV RBC AUTO: 98.1 FL (ref 78–100)
MICROALBUMIN UR-MCNC: 11.9 MG/DL (ref 0–3)
MICROALBUMIN/CREAT UR-RTO: 137 MG/G (ref 0–30)
MONOCYTES # BLD: 0.4 K/UL (ref 0.05–1.2)
MONOCYTES NFR BLD: 5 % (ref 3–10)
NEUTS SEG # BLD: 4.7 K/UL (ref 1.8–8)
NEUTS SEG NFR BLD: 59 % (ref 40–73)
NITRITE UR QL STRIP.AUTO: POSITIVE
NRBC # BLD: 0 K/UL (ref 0–0.01)
NRBC BLD-RTO: 0 PER 100 WBC
PH UR STRIP: 5.5 [PH] (ref 5–8)
PLATELET # BLD AUTO: 319 K/UL (ref 135–420)
PMV BLD AUTO: 10 FL (ref 9.2–11.8)
POTASSIUM SERPL-SCNC: 4.3 MMOL/L (ref 3.5–5.5)
PROT SERPL-MCNC: 6.3 G/DL (ref 6.4–8.2)
PROT UR STRIP-MCNC: 30 MG/DL
RBC # BLD AUTO: 3.71 M/UL (ref 4.2–5.3)
RBC #/AREA URNS HPF: ABNORMAL /HPF (ref 0–5)
SODIUM SERPL-SCNC: 143 MMOL/L (ref 136–145)
SP GR UR REFRACTOMETRY: 1.02 (ref 1–1.03)
TRIGL SERPL-MCNC: 102 MG/DL (ref ?–150)
UROBILINOGEN UR QL STRIP.AUTO: 0.2 EU/DL (ref 0.2–1)
VLDLC SERPL CALC-MCNC: 20.4 MG/DL
WBC # BLD AUTO: 7.9 K/UL (ref 4.6–13.2)
WBC URNS QL MICRO: ABNORMAL /HPF (ref 0–4)

## 2022-11-29 PROCEDURE — 36415 COLL VENOUS BLD VENIPUNCTURE: CPT

## 2022-11-29 PROCEDURE — 82306 VITAMIN D 25 HYDROXY: CPT

## 2022-11-29 PROCEDURE — 80053 COMPREHEN METABOLIC PANEL: CPT

## 2022-11-29 PROCEDURE — 80061 LIPID PANEL: CPT

## 2022-11-29 PROCEDURE — 83735 ASSAY OF MAGNESIUM: CPT

## 2022-11-29 PROCEDURE — 83036 HEMOGLOBIN GLYCOSYLATED A1C: CPT

## 2022-11-29 PROCEDURE — 81001 URINALYSIS AUTO W/SCOPE: CPT

## 2022-11-29 PROCEDURE — 85025 COMPLETE CBC W/AUTO DIFF WBC: CPT

## 2022-11-29 PROCEDURE — 82043 UR ALBUMIN QUANTITATIVE: CPT

## 2022-11-29 NOTE — TELEPHONE ENCOUNTER
Please let the patient know that her urinalysis was very abnormal on her pre-visit labs, suggestive of infection. Please find out if she is having any symptoms and ask her to drop off another specimen for culture. Once receive specimen for culture, will be able to send in antibiotics for treatment. Please let me know once specimen received so antibiotics can be ordered. Thanks.

## 2022-11-30 ENCOUNTER — APPOINTMENT (OUTPATIENT)
Dept: INTERNAL MEDICINE CLINIC | Age: 67
End: 2022-11-30

## 2022-11-30 ENCOUNTER — HOSPITAL ENCOUNTER (OUTPATIENT)
Dept: LAB | Age: 67
Discharge: HOME | End: 2022-11-30
Payer: MEDICARE

## 2022-11-30 DIAGNOSIS — R82.90 ABNORMAL URINALYSIS: ICD-10-CM

## 2022-11-30 PROCEDURE — 87186 SC STD MICRODIL/AGAR DIL: CPT

## 2022-11-30 PROCEDURE — 87086 URINE CULTURE/COLONY COUNT: CPT

## 2022-11-30 PROCEDURE — 87077 CULTURE AEROBIC IDENTIFY: CPT

## 2022-11-30 NOTE — TELEPHONE ENCOUNTER
Patient reports having pain, pressure and urgency with urinating. She also reports her urine appears cloudy. Patient will come by the office today to submit another sample for urine culture.

## 2022-12-01 ENCOUNTER — TELEPHONE (OUTPATIENT)
Dept: INTERNAL MEDICINE CLINIC | Age: 67
End: 2022-12-01

## 2022-12-01 RX ORDER — CEFDINIR 300 MG/1
300 CAPSULE ORAL 2 TIMES DAILY
Qty: 20 CAPSULE | Refills: 0 | Status: SHIPPED | OUTPATIENT
Start: 2022-12-01 | End: 2022-12-11

## 2022-12-01 NOTE — TELEPHONE ENCOUNTER
Patient called and states that she still has blisters in her mouth, states that this has been going on since August. She saw Dr. Scott Dawn and he wants to prescribed a mouth wash for her to use, but she wants to know if there is an antibiotic or steroid she can take to help with the blisters, states she tried to ask Dr. Scott Dawn about it but she didn't get anywhere with them. Pharmacy: [facility] on [address]  Patient can be reached at [phone].   Please advise, thank you

## 2022-12-01 NOTE — TELEPHONE ENCOUNTER
Called and spoke with patient. Reports continuing to have difficulty with oral ulcers and mouth pain. Underwent biopsy by Dr. Crystal Cruz on 11/23/2022 which showed evidence of acute and chronic stomatitis but no viral inclusions and staining negative for fungus. Reports that she was prescribed a steroid solution to use today by Dr. Crystal Cruz but pharmacy stated that they need to order it and should be available tomorrow. Has follow-up appointment with me next week and will readdress at that time.

## 2022-12-01 NOTE — TELEPHONE ENCOUNTER
Urine culture results pending. Spoke with patient and having urgency and burning. Will send in empiric antibiotics while await results.  Script for Cefdinir sent to [facility].

## 2022-12-03 LAB
BACTERIA SPEC CULT: ABNORMAL
CC UR VC: ABNORMAL
SERVICE CMNT-IMP: ABNORMAL

## 2022-12-06 ENCOUNTER — HOSPITAL ENCOUNTER (OUTPATIENT)
Dept: LAB | Age: 67
Discharge: HOME | End: 2022-12-06
Payer: MEDICARE

## 2022-12-06 ENCOUNTER — APPOINTMENT (OUTPATIENT)
Dept: INTERNAL MEDICINE CLINIC | Age: 67
End: 2022-12-06

## 2022-12-06 ENCOUNTER — OFFICE VISIT (OUTPATIENT)
Dept: INTERNAL MEDICINE CLINIC | Age: 67
End: 2022-12-06
Payer: MEDICARE

## 2022-12-06 VITALS
BODY MASS INDEX: 22.86 KG/M2 | TEMPERATURE: 97.3 F | HEIGHT: 59 IN | HEART RATE: 66 BPM | SYSTOLIC BLOOD PRESSURE: 118 MMHG | WEIGHT: 113.4 LBS | RESPIRATION RATE: 16 BRPM | OXYGEN SATURATION: 97 % | DIASTOLIC BLOOD PRESSURE: 82 MMHG

## 2022-12-06 DIAGNOSIS — E55.9 VITAMIN D DEFICIENCY: ICD-10-CM

## 2022-12-06 DIAGNOSIS — D64.9 ANEMIA, UNSPECIFIED TYPE: ICD-10-CM

## 2022-12-06 DIAGNOSIS — R19.7 DIARRHEA, UNSPECIFIED TYPE: ICD-10-CM

## 2022-12-06 DIAGNOSIS — D50.9 IRON DEFICIENCY ANEMIA, UNSPECIFIED IRON DEFICIENCY ANEMIA TYPE: ICD-10-CM

## 2022-12-06 DIAGNOSIS — N39.0 RECURRENT UTI (URINARY TRACT INFECTION): ICD-10-CM

## 2022-12-06 DIAGNOSIS — R31.0 GROSS HEMATURIA: ICD-10-CM

## 2022-12-06 DIAGNOSIS — K05.10 GINGIVOSTOMATITIS: Primary | ICD-10-CM

## 2022-12-06 DIAGNOSIS — E78.5 HYPERLIPIDEMIA, UNSPECIFIED HYPERLIPIDEMIA TYPE: ICD-10-CM

## 2022-12-06 DIAGNOSIS — E53.8 B12 DEFICIENCY: ICD-10-CM

## 2022-12-06 DIAGNOSIS — E11.9 TYPE 2 DIABETES MELLITUS WITHOUT COMPLICATION, WITHOUT LONG-TERM CURRENT USE OF INSULIN (HCC): ICD-10-CM

## 2022-12-06 DIAGNOSIS — E83.42 HYPOMAGNESEMIA: ICD-10-CM

## 2022-12-06 DIAGNOSIS — E46 PROTEIN-CALORIE MALNUTRITION, UNSPECIFIED SEVERITY (HCC): ICD-10-CM

## 2022-12-06 DIAGNOSIS — I10 ESSENTIAL HYPERTENSION: ICD-10-CM

## 2022-12-06 LAB
BASOPHILS # BLD: 0 K/UL (ref 0–0.1)
BASOPHILS NFR BLD: 1 % (ref 0–2)
DIFFERENTIAL METHOD BLD: ABNORMAL
EOSINOPHIL # BLD: 0.1 K/UL (ref 0–0.4)
EOSINOPHIL NFR BLD: 2 % (ref 0–5)
ERYTHROCYTE [DISTWIDTH] IN BLOOD BY AUTOMATED COUNT: 15.3 % (ref 11.6–14.5)
FERRITIN SERPL-MCNC: 12 NG/ML (ref 8–388)
FOLATE SERPL-MCNC: 10.6 NG/ML (ref 3.1–17.5)
HCT VFR BLD AUTO: 35.8 % (ref 35–45)
HGB BLD-MCNC: 11.3 G/DL (ref 12–16)
IMM GRANULOCYTES # BLD AUTO: 0 K/UL (ref 0–0.04)
IMM GRANULOCYTES NFR BLD AUTO: 0 % (ref 0–0.5)
IRON SATN MFR SERPL: 20 % (ref 20–50)
IRON SERPL-MCNC: 69 UG/DL (ref 50–175)
LYMPHOCYTES # BLD: 2.5 K/UL (ref 0.9–3.6)
LYMPHOCYTES NFR BLD: 38 % (ref 21–52)
MCH RBC QN AUTO: 29.7 PG (ref 24–34)
MCHC RBC AUTO-ENTMCNC: 31.6 G/DL (ref 31–37)
MCV RBC AUTO: 94.2 FL (ref 78–100)
MONOCYTES # BLD: 0.4 K/UL (ref 0.05–1.2)
MONOCYTES NFR BLD: 6 % (ref 3–10)
NEUTS SEG # BLD: 3.6 K/UL (ref 1.8–8)
NEUTS SEG NFR BLD: 54 % (ref 40–73)
NRBC # BLD: 0 K/UL (ref 0–0.01)
NRBC BLD-RTO: 0 PER 100 WBC
PLATELET # BLD AUTO: 356 K/UL (ref 135–420)
PMV BLD AUTO: 10.1 FL (ref 9.2–11.8)
RBC # BLD AUTO: 3.8 M/UL (ref 4.2–5.3)
TIBC SERPL-MCNC: 341 UG/DL (ref 250–450)
VIT B12 SERPL-MCNC: 114 PG/ML (ref 211–911)
WBC # BLD AUTO: 6.7 K/UL (ref 4.6–13.2)

## 2022-12-06 PROCEDURE — 83540 ASSAY OF IRON: CPT

## 2022-12-06 PROCEDURE — 36415 COLL VENOUS BLD VENIPUNCTURE: CPT

## 2022-12-06 PROCEDURE — 85025 COMPLETE CBC W/AUTO DIFF WBC: CPT

## 2022-12-06 PROCEDURE — 82607 VITAMIN B-12: CPT

## 2022-12-06 PROCEDURE — 82728 ASSAY OF FERRITIN: CPT

## 2022-12-06 PROCEDURE — 82784 ASSAY IGA/IGD/IGG/IGM EACH: CPT

## 2022-12-06 PROCEDURE — G0463 HOSPITAL OUTPT CLINIC VISIT: HCPCS | Performed by: INTERNAL MEDICINE

## 2022-12-06 RX ORDER — LANOLIN ALCOHOL/MO/W.PET/CERES
400 CREAM (GRAM) TOPICAL 2 TIMES DAILY
COMMUNITY

## 2022-12-06 RX ORDER — PREDNISOLONE SODIUM PHOSPHATE 5 MG/5ML
SOLUTION ORAL
COMMUNITY
Start: 2022-12-02

## 2022-12-06 NOTE — PROGRESS NOTES
1. \"Have you been to the ER, urgent care clinic since your last visit? Hospitalized since your last visit? \" No    2. \"Have you seen or consulted any other health care providers outside of the [address] [place] [address] since your last visit? \" No     3. For patients aged 39-70: Has the patient had a colonoscopy / FIT/ Cologuard? Yes - no Care Gap present      If the patient is female:    4. For patients aged 41-77: Has the patient had a mammogram within the past 2 years? Yes - no Care Gap present      5. For patients aged 21-65: Has the patient had a pap smear?  Yes - no Care Gap present

## 2022-12-06 NOTE — PATIENT INSTRUCTIONS
Heart-Healthy Diet: Care Instructions  Your Care Instructions     A heart-healthy diet has lots of vegetables, fruits, nuts, beans, and whole grains, and is low in salt. It limits foods that are high in saturated fat, such as meats, cheeses, and fried foods. It may be hard to change your diet, but even small changes can lower your risk of heart attack and heart disease. Follow-up care is a key part of your treatment and safety. Be sure to make and go to all appointments, and call your doctor if you are having problems. It's also a good idea to know your test results and keep a list of the medicines you take. How can you care for yourself at home? Watch your portions  Learn what a serving is. A \"serving\" and a \"portion\" are not always the same thing. Make sure that you are not eating larger portions than are recommended. For example, a serving of pasta is ½ cup. A serving size of meat is 2 to 3 ounces. A 3-ounce serving is about the size of a deck of cards. Measure serving sizes until you are good at [place]" them. Keep in mind that restaurants often serve portions that are 2 or 3 times the size of one serving. To keep your energy level up and keep you from feeling hungry, eat often but in smaller portions. Eat only the number of calories you need to stay at a healthy weight. If you need to lose weight, eat fewer calories than your body burns (through exercise and other physical activity). Eat more fruits and vegetables  Eat a variety of fruit and vegetables every day. Dark green, deep orange, red, or yellow fruits and vegetables are especially good for you. Examples include spinach, carrots, peaches, and berries. Keep carrots, celery, and other veggies handy for snacks. Buy fruit that is in season and store it where you can see it so that you will be tempted to eat it. Cook dishes that have a lot of veggies in them, such as stir-fries and soups.   Limit saturated and trans fat  Read food labels, and try to avoid saturated and trans fats. They increase your risk of heart disease. Use olive or canola oil when you cook. Bake, broil, grill, or steam foods instead of frying them. Choose lean meats instead of high-fat meats such as hot dogs and sausages. Cut off all visible fat when you prepare meat. Eat fish, skinless poultry, and meat alternatives such as soy products instead of high-fat meats. Soy products, such as tofu, may be especially good for your heart. Choose low-fat or fat-free milk and dairy products. Eat foods high in fiber  Eat a variety of grain products every day. Include whole-grain foods that have lots of fiber and nutrients. Examples of whole-grain foods include oats, whole wheat bread, and brown rice. Buy whole-grain breads and cereals, instead of white bread or pastries. Limit salt and sodium  Limit how much salt and sodium you eat to help lower your blood pressure. Taste food before you salt it. Add only a little salt when you think you need it. With time, your taste buds will adjust to less salt. Eat fewer snack items, fast foods, and other high-salt, processed foods. Check food labels for the amount of sodium in packaged foods. Choose low-sodium versions of canned goods (such as soups, vegetables, and beans). Limit sugar  Limit drinks and foods with added sugar. These include candy, desserts, and soda pop. Limit alcohol  Limit alcohol to no more than 2 drinks a day for men and 1 drink a day for women. Too much alcohol can cause health problems. When should you call for help? Watch closely for changes in your health, and be sure to contact your doctor if:    You would like help planning heart-healthy meals. Where can you learn more? Go to http://jennifer-pavel.info/  Enter V137 in the search box to learn more about \"Heart-Healthy Diet: Care Instructions. \"  Current as of: August 22, 2019               Content Version: 12.6  © 1454-9447 [employer], Incorporated. Care instructions adapted under license by [employer] (which disclaims liability or warranty for this information). If you have questions about a medical condition or this instruction, always ask your healthcare professional. [address] any warranty or liability for your use of this information. Learning About Diabetes Food Guidelines  Your Care Instructions     Meal planning is important to manage diabetes. It helps keep your blood sugar at a target level (which you set with your doctor). You don't have to eat special foods. You can eat what your family eats, including sweets once in a while. But you do have to pay attention to how often you eat and how much you eat of certain foods. You may want to work with a dietitian or a certified diabetes educator (CDE) to help you plan meals and snacks. A dietitian or CDE can also help you lose weight if that is one of your goals. What should you know about eating carbs? Managing the amount of carbohydrate (carbs) you eat is an important part of healthy meals when you have diabetes. Carbohydrate is found in many foods. Learn which foods have carbs. And learn the amounts of carbs in different foods. Bread, cereal, pasta, and rice have about 15 grams of carbs in a serving. A serving is 1 slice of bread (1 ounce), ½ cup of cooked cereal, or 1/3 cup of cooked pasta or rice. Fruits have 15 grams of carbs in a serving. A serving is 1 small fresh fruit, such as an apple or orange; ½ of a banana; ½ cup of cooked or canned fruit; ½ cup of fruit juice; 1 cup of melon or raspberries; or 2 tablespoons of dried fruit. Milk and no-sugar-added yogurt have 15 grams of carbs in a serving. A serving is 1 cup of milk or 2/3 cup of no-sugar-added yogurt. Starchy vegetables have 15 grams of carbs in a serving.  A serving is ½ cup of mashed potatoes or sweet potato; 1 cup winter squash; ½ of a small baked potato; ½ cup of cooked beans; or ½ cup cooked corn or green peas. Learn how much carbs to eat each day and at each meal. A dietitian or CDE can teach you how to keep track of the amount of carbs you eat. This is called carbohydrate counting. If you are not sure how to count carbohydrate grams, use the Plate Method to plan meals. It is a good, quick way to make sure that you have a balanced meal. It also helps you spread carbs throughout the day. Divide your plate by types of foods. Put non-starchy vegetables on half the plate, meat or other protein food on one-quarter of the plate, and a grain or starchy vegetable in the final quarter of the plate. To this you can add a small piece of fruit and 1 cup of milk or yogurt, depending on how many carbs you are supposed to eat at a meal.  Try to eat about the same amount of carbs at each meal. Do not \"save up\" your daily allowance of carbs to eat at one meal.  Proteins have very little or no carbs per serving. Examples of proteins are beef, chicken, turkey, fish, eggs, tofu, cheese, cottage cheese, and peanut butter. A serving size of meat is 3 ounces, which is about the size of a deck of cards. Examples of meat substitute serving sizes (equal to 1 ounce of meat) are 1/4 cup of cottage cheese, 1 egg, 1 tablespoon of peanut butter, and ½ cup of tofu. How can you eat out and still eat healthy? Learn to estimate the serving sizes of foods that have carbohydrate. If you measure food at home, it will be easier to estimate the amount in a serving of restaurant food. If the meal you order has too much carbohydrate (such as potatoes, corn, or baked beans), ask to have a low-carbohydrate food instead. Ask for a salad or green vegetables. If you use insulin, check your blood sugar before and after eating out to help you plan how much to eat in the future. If you eat more carbohydrate at a meal than you had planned, take a walk or do other exercise. This will help lower your blood sugar.   What else should you know? Limit saturated fat, such as the fat from meat and dairy products. This is a healthy choice because people who have diabetes are at higher risk of heart disease. So choose lean cuts of meat and nonfat or low-fat dairy products. Use olive or canola oil instead of butter or shortening when cooking. Don't skip meals. Your blood sugar may drop too low if you skip meals and take insulin or certain medicines for diabetes. Check with your doctor before you drink alcohol. Alcohol can cause your blood sugar to drop too low. Alcohol can also cause a bad reaction if you take certain diabetes medicines. Follow-up care is a key part of your treatment and safety. Be sure to make and go to all appointments, and call your doctor if you are having problems. It's also a good idea to know your test results and keep a list of the medicines you take. Where can you learn more? Go to http://www.allan.com/  Enter I147 in the search box to learn more about \"Learning About Diabetes Food Guidelines. \"  Current as of: December 20, 2019               Content Version: 12.6  © 0805-8158 [employer]. Care instructions adapted under license by [employer] (which disclaims liability or warranty for this information). If you have questions about a medical condition or this instruction, always ask your healthcare professional. [address] any warranty or liability for your use of this information.

## 2022-12-07 ENCOUNTER — HOSPITAL ENCOUNTER (OUTPATIENT)
Dept: LAB | Age: 67
Discharge: HOME | End: 2022-12-07
Payer: MEDICARE

## 2022-12-07 ENCOUNTER — PATIENT MESSAGE (OUTPATIENT)
Dept: INTERNAL MEDICINE CLINIC | Age: 67
End: 2022-12-07

## 2022-12-07 DIAGNOSIS — D64.9 ANEMIA, UNSPECIFIED TYPE: ICD-10-CM

## 2022-12-07 PROCEDURE — 84156 ASSAY OF PROTEIN URINE: CPT

## 2022-12-07 RX ORDER — LANOLIN ALCOHOL/MO/W.PET/CERES
325 CREAM (GRAM) TOPICAL
COMMUNITY

## 2022-12-07 RX ORDER — LANOLIN ALCOHOL/MO/W.PET/CERES
1000 CREAM (GRAM) TOPICAL DAILY
COMMUNITY

## 2022-12-07 NOTE — PROGRESS NOTES
Called and discussed lab results with patient. Advised of evidence of vitamin B12 deficiency with level at 114 and iron deficiency with ferritin 12. Discussed that deficiency of B12 and iron likely cause of her new anemia. Last colonoscopy 10/2014 and advised that referral will be placed for GI to evaluate new iron deficiency. Advised to begin cyanocobalamin 1000 mcg daily and ferrous sulfate 325 mg daily. Will reassess levels at next visit. Answered all questions.

## 2022-12-07 NOTE — TELEPHONE ENCOUNTER
Called and spoke with patient. Discussed that she is now in the donut hole regarding her insurance for the remainder of the year. Trulicity was even more expensive than Ozempic currently. Patient wishing to continue with Zoran Kim as it helped with weight loss in addition to her blood sugar control. She states that Ozempic may again be covered in 2023 and will attempt to fill after January 1. HbA1c 5.5 so will not institute any additional therapy during this month while waiting to see regarding Ozempic coverage.

## 2022-12-09 LAB
ALBUMIN SERPL ELPH-MCNC: 3.7 G/DL (ref 2.9–4.4)
ALBUMIN/GLOB SERPL: 1.7 {RATIO} (ref 0.7–1.7)
ALPHA1 GLOB SERPL ELPH-MCNC: 0.2 G/DL (ref 0–0.4)
ALPHA2 GLOB SERPL ELPH-MCNC: 0.7 G/DL (ref 0.4–1)
B-GLOBULIN SERPL ELPH-MCNC: 0.8 G/DL (ref 0.7–1.3)
GAMMA GLOB SERPL ELPH-MCNC: 0.5 G/DL (ref 0.4–1.8)
GLOBULIN SER-MCNC: 2.2 G/DL (ref 2.2–3.9)
IGA SERPL-MCNC: 63 MG/DL (ref 87–352)
IGG SERPL-MCNC: 518 MG/DL (ref 586–1602)
IGM SERPL-MCNC: 31 MG/DL (ref 26–217)
INTERPRETATION SERPL IEP-IMP: ABNORMAL
KAPPA LC FREE SER-MCNC: 11.3 MG/L (ref 3.3–19.4)
KAPPA LC FREE/LAMBDA FREE SER: 0.92 {RATIO} (ref 0.26–1.65)
LAMBDA LC FREE SERPL-MCNC: 12.3 MG/L (ref 5.7–26.3)
M PROTEIN SERPL ELPH-MCNC: ABNORMAL G/DL
PROT SERPL-MCNC: 5.9 G/DL (ref 6–8.5)

## 2022-12-11 PROBLEM — R19.7 DIARRHEA: Status: ACTIVE | Noted: 2022-12-11

## 2022-12-11 PROBLEM — D50.9 IRON DEFICIENCY ANEMIA: Status: ACTIVE | Noted: 2022-12-11

## 2022-12-11 PROBLEM — D64.9 ANEMIA: Status: ACTIVE | Noted: 2022-12-11

## 2022-12-11 PROBLEM — E53.8 B12 DEFICIENCY: Status: ACTIVE | Noted: 2022-12-11

## 2022-12-11 RX ORDER — HYDROXYZINE PAMOATE 25 MG/1
CAPSULE ORAL
COMMUNITY
Start: 2022-10-08

## 2022-12-11 RX ORDER — ALPRAZOLAM 0.5 MG/1
TABLET ORAL
COMMUNITY
Start: 2022-11-22

## 2022-12-12 NOTE — PROGRESS NOTES
HPI:   Nneka Merino is a 74y.o. year old female who presents today for a routine follow-up visit. She has a history of hypertension, hyperlipidemia, diabetes mellitus, migraine headaches, depression, anxiety, ADHD, and lumbar radiculopathy. She has completed the Moderna COVID-19 vaccine series but has not received any Moderna booster doses. She reports that she is doing fairly well. She states that she has now been started on empiric treatment with prednisolone solution for 10 days in hopes to help with her oral symptoms, and she reports noting some dissipation of the discomfort while on treatment. She also reports that when she went to  her monthly supply of Ozempic, the price had increased to over $500 which she could not afford. She is otherwise without new complaints. On 9/2/2022, she presented to [facility] urgent care complaining of a sore throat for 1 week. She described difficulty swallowing, but denied any fever, nasal congestion, or cough. She had performed a home rapid antigen COVID test on 9/1/2022 which was negative. No abnormalities were noted on exam and a rapid strep test performed was negative group A beta strep culture, and pharyngeal TATYANA test for Neisseria gonorrhea and chlamydia trachomatis were also negative. She was diagnosed with acute pharyngitis and advised to treat symptomatically with Cepacol/Chloraseptic, and ibuprofen. She contacted the office on 9/8/2022 and reported persistent symptoms with burning in her mouth and throat, and was prescribed Magic mouthwash. She was evaluated in the office on 9/13/2022, t and reported persistent burning in her mouth and throat with painful swallowing, which had been unabated since onset. She reported Magic mouthwash provided transient relief lasting only approximately 1 hour after use. She denied any recent antibiotic use, fever, chills, or URI symptoms, and denied any history of herpes labialis.   She was noted to have ulcerative lesions on her bilateral tonsils and posterior pharynx with mild erythema, suspicious for herpes simplex infection. Viral and HSV cultures were obtained and were negative. 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 However, on 10/17/2022, she noted recurrence of small ulcerations on tongue in area of initial outbreak. She was treated with Valtrex 2 g twice daily for 2 doses and then placed on suppressive therapy with Valtrex 500 mg daily. She contacted the office on 10/27/2022 and reported that she had undergone evaluation by her dentist who confirmed that the ulcerations and throat erythema were consistent with herpes simplex infection. Given lack of improvement on Valtrex 500 mg daily, her dose was subsequently changed to Valtrex 1 g twice daily for 10 days. She presents today for follow-up and reports lesions on the inside of her lower lip and persistent tongue soreness in the area of the initial eruption. However, reports that the erythema and soreness of her posterior pharynx had improved, although she continued to develop small ulcerations in her mouth. Labs obtained (11/1/2022) showed mild anemia (Hb 11.7/HCT 36.0), normal creatinine at 0.61/eGFR >60, normal LFTs, HIV negative, and RICHIE negative. Repeat viral culture attempted but not performed by lab. Discussed with Dr. Gae Libman of infectious disease and she recommended proceeding with punch biopsy of oral lesion to confirm diagnosis of HSV. Referred to Dr. Crystal Cruz and underwent biopsy of lower lip lesion (11/23/2022) with pathology showing acute and chronic stomatitis with negative viral inclusions and negative fungal stains. Summary of prior hospitalizations and medical history:  On 4/13/2022, she reported a 4-day history of pain and burning with urination and urge incontinence.   She denied any fever, chills, abdominal pain, hematuria, and did report some mild lower back discomfort. POC urinalysis was suggestive of infection with positive nitrites, 2+ blood, and 3+ LE. She was treated empirically with Keflex and urine culture showed > 100,000 colonies E.coli with only intermediate sensitivity to cefuroxime. She was changed to ciprofloxacin and treated for 5 days with resolution of symptoms. She reported that on 4/30/2022, she began to experience mild low back pain and noted bright red blood in her urine without any dysuria, fever, or chills that would suggest infection. She reported that she continued to experience hematuria for 2 days until 5/2/2022 when she noted resolution of all symptoms. POC urinalysis (5/4/2022) showed 2+ blood and 3+ LE suggestive of infection, and she was started empirically on cefdinir for 7 days but urine culture was negative. She completed the 7-day course of antibiotics and denies any recurrence of hematuria. She was hospitalized from 7/9-7/11/2019 at [facility] after presenting to [facility] ED with a severe posterior headache, unlike her usual migraines, with associated light sensitivity and confusion. Evaluation included WBC 6.0, Hb 11.9/ Hct 35.2, Na 138, Ca 9.6, creatinine 1.0/ eGFR >60, head CT scan no acute intracranial abnormality; MRI brain (7/10/2019) mild nonspecific white matter disease likely representing chronic small vessel changes, and very mild bilateral occipital paramedian encephalomalacic changes, likely from chronic small infarcts; brain MRA (7/10/2019) no high-grade intracranial stenosis; neck MRA (7/10/2019) mild proximal ICA irregularity without hemodynamically significant carotid stenosis. She was evaluated by Dr. Julita Bird of neurology who felt presentation was consistent with status migrainosus. Attempted treatment with Benadryl and compazine without much improvement, but notable improvement after Medrol dose pack started and subsequently discharged.  On 7/24/2019, she noted onset of left facial weakness with difficulty with eye closure and forehead elevation, and presented to [facility] ED and diagnosed with left facial palsy. She was treated with prednisone 60 mg daily x 7 days and Valtrex. She was evaluated by Dr. Judit Galvan on 8/14/2019, and it was his opinion that her symptoms were likely not due to status migrainosus, but rather were secondary to a viral meningoencephalitis given the clear change in the pattern of her headaches and the subsequent development of a cranial nerve palsy. She has had complete resolution of her facial palsy, and a return to baseline for her migraine headaches, which respond readily to sumatriptan. She has a history of hypertension, treated with propranolol and lisinopril. She does not check her blood pressure at home. She has begun walking regularly for exercise. She denies any chest pain, shortness of breath at rest or with exertion, palpitations, or lightheadedness. She also has a history of hyperlipidemia, treated with high intensity dose rosuvastatin. She has a history of diabetes mellitus, and was started on metformin in 2/2015 for a HbA1c of 7.5. She does not monitor her blood sugars at home. She denies any polyuria, polydipsia, nocturia, or blurry vision, and has no history of retinopathy, neuropathy, or nephropathy. She has regular eye exams with Dr. Ha Patino. She also has a history of migraines without aura, which usually respond to Imitrex. Also now on propranolol for preventive therapy with good control. She has a history of lumbar radiculopathy (R>L), and was evaluated by Dr. Steven Rai in 5/2015. Lumbar x-rays showed degenerative changes in L4-L5 and L5-S1. She was treated conservatively with physical therapy and ibuprofen and reports significant improvement. She was evaluated by MOJGAN Lomeli in 12/2016 for exacerbation of low back pain with right sciatica. She was treated with steroids and Norco with improvement.  On 4/17/2018, she presented with increasing low back pain with bilateral sciatica. She was treated with a prednisone taper with initial improvement. However, her symptoms worsened and she was referred to Dr. Christal Stokes for evaluation. She gave her a Toradol injection, and ordered a lumbar MRI (6/24/2018) which showed a large right disc extrusion at L5-S1 which compressed the descending right S1 nerve root. She was referred to pain management and underwent a selective L5 and S1 selective nerve root blocks by Dr. Art Lau on 7/26/2018. However, due to persistent symptoms, she was referred to Dr. Jw Hillman and subsequently underwent a right L5-S1 laminectomy and diskectomy on 9/1/1991 without complications. She states that she did well and continues to do her back stretches and exercises. She states that she only has occasional discomfort. She had a screening colonoscopy in 10/2014 by Dr. Maura Cavanaugh which was normal. Follow-up due in 10 years. She denies any abdominal pain, nausea, vomiting, melena, hematochezia, or change in bowel movements. She has a history of depression and anxiety, and is followed by Dr. Maame Loera. She is currently taking Abilify 5 mg daily and Prozac 10 mg daily. Previously on lorazepam at bedtime, but no longer requiring. Past Medical History:   Diagnosis Date    ADHD     Anxiety     Bilateral lumbar radiculopathy     Carotid bruit     right    Depression     Diabetes mellitus (HCC)     HCD (hypertensive cardiovascular disease)     Hyperlipidemia     Hypertension     Migraine headache     Plantar fasciitis      Past Surgical History:   Procedure Laterality Date    HX SEPTOPLASTY  6/2002    HX TONSILLECTOMY      NEUROLOGICAL PROCEDURE UNLISTED  08/06/2018    laminectomy      Current Outpatient Medications   Medication Sig    cyanocobalamin 1,000 mcg tablet Take 1,000 mcg by mouth daily. ferrous sulfate 325 mg (65 mg iron) tablet Take 325 mg by mouth Daily (before breakfast).     prednisoLONE sod phosphate (PEDIAPRED) 5 mg base/5 mL (6.7 mg/5 mL) solution SWISH AND SPIT AND SPIT 10 ML TWICE A DAY FOR 10 DAYS    magnesium oxide (MAG-OX) 400 mg tablet Take 400 mg by mouth two (2) times a day. cefdinir (OMNICEF) 300 mg capsule Take 1 Capsule by mouth two (2) times a day for 10 days. Indications: bacterial urinary tract infection    ergocalciferol (ERGOCALCIFEROL) 1,250 mcg (50,000 unit) capsule TAKE 1 CAPSULE BY MOUTH EVERY FOURTEEN (14) DAYS. SUMAtriptan (IMITREX) 50 mg tablet TAKE 1 TAB BY MOUTH DAILY AS NEEDED FOR MIGRAINE. atorvastatin (LIPITOR) 80 mg tablet TAKE 1 TABLET BY MOUTH EVERY DAY    propranoloL (INDERAL) 40 mg tablet TAKE 1 TABLET BY MOUTH TWICE A DAY    metFORMIN ER (GLUCOPHAGE XR) 500 mg tablet TAKE 2 TABLETS BY MOUTH TWICE A DAY WITH MEALS    semaglutide (OZEMPIC) 0.25 mg or 0.5 mg/dose (2 mg/1.5 ml) subq pen 0.5 mg by SubCUTAneous route every seven (7) days. lisinopriL (PRINIVIL, ZESTRIL) 10 mg tablet TAKE 1 TABLET BY MOUTH EVERY DAY    FLUoxetine (PROzac) 20 mg capsule Take 20 mg by mouth daily. ALPRAZolam (XANAX) 0.5 mg tablet TAKE 1 (ONE) TABLET BY MOUTH DAILY AS NEEDED FOR ANXIETY    hydrOXYzine pamoate (VISTARIL) 25 mg capsule TAKE 2-3 CAPSULES BY MOUTH AT BEDTIME AS NEEDED FOR SLEEP     No current facility-administered medications for this visit. Allergies and Intolerances: Allergies   Allergen Reactions    Pcn [Penicillins] Rash    Sulfa (Sulfonamide Antibiotics) Rash     Family History: She has no family history of colon or breast cancer. Her mother  from a CVA. Her father  from throat cancer and cirrhosis. Family History   Problem Relation Age of Onset    Hypertension Brother         x2    Elevated Lipids Brother     Stroke Mother     Heart Surgery Mother         CABG    Cancer Father         cancer of the mouth    Hypertension Father     Hypertension Brother     Elevated Lipids Brother      Social History:   She  reports that she quit smoking about 33 years ago. Her smoking use included cigarettes. She has a 1.00 pack-year smoking history. She has never used smokeless tobacco. She smoked approximately 0.25 ppd for 2 years, stopping in . She is a  and has one daughter. She was employed as a . 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 She was previously working as the [employer] for his business. Social History     Substance and Sexual Activity   Alcohol Use No    Alcohol/week: 1.7 standard drinks    Types: 2 Glasses of wine per week     Immunization History:  Immunization History   Administered Date(s) Administered    COVID-19, MODERNA BLUE border, Primary or Immunocompromised, (age 18y+), IM, 100 mcg/0.5mL 2021, 2021    Influenza Vaccine PF 2014, 10/13/2014    Influenza Vaccine Split 2011    Influenza Vaccine Whole 10/05/2010    Influenza, FLUAD, (age 72 y+), Adjuvanted 2022    Influenza, FLUARIX, FLULAVAL, FLUZONE (age 10 mo+) AND AFLURIA, (age 1 y+), PF, 0.5mL 2018, 2019, 10/22/2020, 2021    Pneumococcal Conjugate PCV20, PF (Prevnar 20) 2022    Pneumococcal Polysaccharide (PPSV-23) 2017    TD Vaccine 2007    Tdap 11/10/2016    Zoster Recombinant 2018, 2018       Review of Systems:   As above included in HPI. Otherwise 11 point review of systems negative including constitutional, skin, HENT, eyes, respiratory, cardiovascular, gastrointestinal, genitourinary, musculoskeletal, endocrine, hematologic, allergy, and neurologic. Physical:   Visit Vitals  /82   Pulse 66   Temp 97.3 °F (36.3 °C) (Temporal)   Resp 16   Ht 4' 11\" (1.499 m)   Wt 113 lb 6.4 oz (51.4 kg)   SpO2 97%   BMI 22.90 kg/m²       Patient appears in no apparent distress. Affect is appropriate. HEENT: PERRLA, anicteric, oropharynx without erythema or ulcerative lesions . Lungs: clear to auscultation, no wheezes, rhonchi, or rales. Heart: regular rate and rhythm. No murmur, rubs, gallops  Abdomen: soft, nontender, nondistended, normal bowel sounds, no hepatosplenomegaly or masses. Extremities: without edema. No erythema or swelling of joints.   Derm: without rash        Review of Data:  Labs:  Hospital Outpatient Visit on 11/30/2022   Component Date Value Ref Range Status    Special Requests: 11/30/2022 NO SPECIAL REQUESTS    Final    [place] Count 11/30/2022     Final                    Value:>100,000  COLONIES/mL      Culture result: 11/30/2022 KLEBSIELLA PNEUMONIAE (A)    Final   Hospital Outpatient Visit on 11/29/2022   Component Date Value Ref Range Status    Microalbumin,urine random 11/29/2022 11.90 (A)  0 - 3.0 MG/DL Final    Creatinine, urine random 11/29/2022 87.00  30 - 125 mg/dL Final    Microalbumin/Creat ratio (mg/g cre* 11/29/2022 137 (A)  0 - 30 mg/g Final    Color 11/29/2022 YELLOW    Final    Appearance 11/29/2022 TURBID    Final    Specific gravity 11/29/2022 1.019  1.005 - 1.030   Final    pH (UA) 11/29/2022 5.5  5.0 - 8.0   Final    Protein 11/29/2022 30 (A)  NEG mg/dL Final    Glucose 11/29/2022 Negative  NEG mg/dL Final    Ketone 11/29/2022 TRACE (A)  NEG mg/dL Final    Bilirubin 11/29/2022 Negative  NEG   Final    Blood 11/29/2022 MODERATE (A)  NEG   Final    Urobilinogen 11/29/2022 0.2  0.2 - 1.0 EU/dL Final    Nitrites 11/29/2022 Positive (A)  NEG   Final    Leukocyte Esterase 11/29/2022 LARGE (A)  NEG   Final    WBC 11/29/2022 TOO NUMEROUS TO COUNT  0 - 4 /hpf Final    RBC 11/29/2022 UNABLE TO QUANTITATE MICROSCOPIC PARAMETERS DUE TO EXCESSIVE WBCS  0 - 5 /hpf Final    Epithelial cells 11/29/2022 FEW  0 - 5 /lpf Final    Bacteria 11/29/2022 4+ (A)  NEG /hpf Final    WBC 11/29/2022 7.9  4.6 - 13.2 K/uL Final    RBC 11/29/2022 3.71 (A)  4.20 - 5.30 M/uL Final    HGB 11/29/2022 11.5 (A)  12.0 - 16.0 g/dL Final    HCT 11/29/2022 36.4  35.0 - 45.0 % Final    MCV 11/29/2022 98.1  78.0 - 100.0 FL Final    MCH 11/29/2022 31.0  24.0 - 34.0 PG Final    MCHC 11/29/2022 31.6  31.0 - 37.0 g/dL Final    RDW 11/29/2022 15.4 (A)  11.6 - 14.5 % Final    PLATELET 48/92/8820 398  135 - 420 K/uL Final    MPV 11/29/2022 10.0  9.2 - 11.8 FL Final    NRBC 11/29/2022 0.0  0  WBC Final    ABSOLUTE NRBC 11/29/2022 0.00  0.00 - 0.01 K/uL Final    NEUTROPHILS 11/29/2022 59  40 - 73 % Final    LYMPHOCYTES 11/29/2022 34  21 - 52 % Final    MONOCYTES 11/29/2022 5  3 - 10 % Final    EOSINOPHILS 11/29/2022 2  0 - 5 % Final    BASOPHILS 11/29/2022 1  0 - 2 % Final    IMMATURE GRANULOCYTES 11/29/2022 0  0.0 - 0.5 % Final    ABS. NEUTROPHILS 11/29/2022 4.7  1.8 - 8.0 K/UL Final    ABS. LYMPHOCYTES 11/29/2022 2.7  0.9 - 3.6 K/UL Final    ABS. MONOCYTES 11/29/2022 0.4  0.05 - 1.2 K/UL Final    ABS. EOSINOPHILS 11/29/2022 0.1  0.0 - 0.4 K/UL Final    ABS. BASOPHILS 11/29/2022 0.0  0.0 - 0.1 K/UL Final    ABS. IMM.  GRANS. 11/29/2022 0.0  0.00 - 0.04 K/UL Final    DF 11/29/2022 AUTOMATED    Final    Hemoglobin A1c 11/29/2022 5.5  4.2 - 5.6 % Final    Est. average glucose 11/29/2022 111  mg/dL Final    LIPID PROFILE 11/29/2022        Final    Cholesterol, total 11/29/2022 114  <200 MG/DL Final    Triglyceride 11/29/2022 102  <150 MG/DL Final    HDL Cholesterol 11/29/2022 39 (A)  40 - 60 MG/DL Final    LDL, calculated 11/29/2022 54.6  0 - 100 MG/DL Final    VLDL, calculated 11/29/2022 20.4  MG/DL Final    CHOL/HDL Ratio 11/29/2022 2.9  0 - 5.0   Final    Magnesium 11/29/2022 1.3 (A)  1.6 - 2.6 mg/dL Final    Sodium 11/29/2022 143  136 - 145 mmol/L Final    Potassium 11/29/2022 4.3  3.5 - 5.5 mmol/L Final    Chloride 11/29/2022 108  100 - 111 mmol/L Final    CO2 11/29/2022 29  21 - 32 mmol/L Final    Anion gap 11/29/2022 6  3.0 - 18 mmol/L Final    Glucose 11/29/2022 86  74 - 99 mg/dL Final    BUN 11/29/2022 17  7.0 - 18 MG/DL Final    Creatinine 11/29/2022 0.65  0.6 - 1.3 MG/DL Final    BUN/Creatinine ratio 11/29/2022 26 (A)  12 - 20   Final    eGFR 11/29/2022 >60  >60 ml/min/1.73m2 Final Calcium 11/29/2022 9.1  8.5 - 10.1 MG/DL Final    Bilirubin, total 11/29/2022 0.5  0.2 - 1.0 MG/DL Final    ALT (SGPT) 11/29/2022 22  13 - 56 U/L Final    AST (SGOT) 11/29/2022 13  10 - 38 U/L Final    Alk. phosphatase 11/29/2022 69  45 - 117 U/L Final    Protein, total 11/29/2022 6.3 (A)  6.4 - 8.2 g/dL Final    Albumin 11/29/2022 3.9  3.4 - 5.0 g/dL Final    Globulin 11/29/2022 2.4  2.0 - 4.0 g/dL Final    A-G Ratio 11/29/2022 1.6  0.8 - 1.7   Final    Vitamin D 25-Hydroxy 11/29/2022 76.0  30 - 100 ng/mL Final     Hospital Outpatient Visit on 12/06/2022   Component Date Value Ref Range Status    WBC 12/06/2022 6.7  4.6 - 13.2 K/uL Final    RBC 12/06/2022 3.80 (A)  4.20 - 5.30 M/uL Final    HGB 12/06/2022 11.3 (A)  12.0 - 16.0 g/dL Final    HCT 12/06/2022 35.8  35.0 - 45.0 % Final    MCV 12/06/2022 94.2  78.0 - 100.0 FL Final    MCH 12/06/2022 29.7  24.0 - 34.0 PG Final    MCHC 12/06/2022 31.6  31.0 - 37.0 g/dL Final    RDW 12/06/2022 15.3 (A)  11.6 - 14.5 % Final    PLATELET 67/01/4518 639  135 - 420 K/uL Final    MPV 12/06/2022 10.1  9.2 - 11.8 FL Final    NRBC 12/06/2022 0.0  0  WBC Final    ABSOLUTE NRBC 12/06/2022 0.00  0.00 - 0.01 K/uL Final    NEUTROPHILS 12/06/2022 54  40 - 73 % Final    LYMPHOCYTES 12/06/2022 38  21 - 52 % Final    MONOCYTES 12/06/2022 6  3 - 10 % Final    EOSINOPHILS 12/06/2022 2  0 - 5 % Final    BASOPHILS 12/06/2022 1  0 - 2 % Final    IMMATURE GRANULOCYTES 12/06/2022 0  0.0 - 0.5 % Final    ABS. NEUTROPHILS 12/06/2022 3.6  1.8 - 8.0 K/UL Final    ABS. LYMPHOCYTES 12/06/2022 2.5  0.9 - 3.6 K/UL Final    ABS. MONOCYTES 12/06/2022 0.4  0.05 - 1.2 K/UL Final    ABS. EOSINOPHILS 12/06/2022 0.1  0.0 - 0.4 K/UL Final    ABS. BASOPHILS 12/06/2022 0.0  0.0 - 0.1 K/UL Final    ABS. IMM. GRANS.  12/06/2022 0.0  0.00 - 0.04 K/UL Final    DF 12/06/2022 AUTOMATED    Final    Immunoglobulin G, Qt. 12/06/2022 518 (A)  586 - 1,602 mg/dL Final    Immunoglobulin A, Qt. 12/06/2022 63 (A)  87 - 352 mg/dL Final    Immunoglobulin M, Qt. 12/06/2022 31  26 - 217 mg/dL Final    Protein, total 12/06/2022 5.9 (A)  6.0 - 8.5 g/dL Final    Albumin 12/06/2022 3.7  2.9 - 4.4 g/dL Final    Alpha-1-Globulin 12/06/2022 0.2  0.0 - 0.4 g/dL Final    Alpha-2-Globulin 12/06/2022 0.7  0.4 - 1.0 g/dL Final    Beta Globulin 12/06/2022 0.8  0.7 - 1.3 g/dL Final    Gamma Globulin 12/06/2022 0.5  0.4 - 1.8 g/dL Final    M-Fadi 12/06/2022 Not Observed  Not Observed g/dL Final    Globulin, total 12/06/2022 2.2  2.2 - 3.9 g/dL Final    A/G ratio 12/06/2022 1.7  0.7 - 1.7   Final    Immunofixation Result 12/06/2022 Comment    Final    Free Kappa Lt Chains, serum 12/06/2022 11.3  3.3 - 19.4 mg/L Final    Free Lambda Lt Chains, serum 12/06/2022 12.3  5.7 - 26.3 mg/L Final    Kappa/Lambda ratio, serum 12/06/2022 0.92  0.26 - 1.65   Final    Vitamin B12 12/06/2022 114 (A)  211 - 911 pg/mL Final    Folate 12/06/2022 10.6  3.10 - 17.50 ng/mL Final    Ferritin 12/06/2022 12  8 - 388 NG/ML Final    Iron 12/06/2022 69  50 - 175 ug/dL Final    TIBC 12/06/2022 341  250 - 450 ug/dL Final    Iron % saturation 12/06/2022 20  20 - 50 % Final         Health Maintenance:  Screening:    Mammogram: negative (9/2021) DUE   PAP smear: well women exams by Dr. Diana Méndez (last 4/2021)    Colorectal: colonoscopy (10/2014) normal. Dr. Tiburcio Sánchez. Due 10/2024.    Depression: under care of MOJGAN Awad/ Dileep   DM (HbA1c/FPG): HbA1c 5.5 (11/2022)   Hepatitis C: negative (5/2017)   Falls: none   DEXA: osteopenia (2/2019) Dr. Bret Underwood   Glaucoma: regular eye exams with Dr. Tavares Hermosillo (11/2021)/[facility] (2022)   Smoking: none   Vitamin D: 76.0 (11/2022)   Medicare Wellness: 8/25/2022 (Welcome)      Impression:  Patient Active Problem List   Diagnosis Code    HCD (hypertensive cardiovascular disease) I11.9    Hyperlipidemia E78.5    Migraine G43.909    Bilateral lumbar radiculopathy M54.16    Type 2 diabetes mellitus without complication ([facility] [address] X11.0 Essential hypertension I10    Vitamin D deficiency E55.9    Recurrent major depressive disorder (HCC) F33.9    DDD (degenerative disc disease), lumbar M51.36    HNP (herniated nucleus pulposus), lumbar M51.26    History of Bell's palsy Z86.69    Hypomagnesemia E83.42    Gingivostomatitis K05.10    B12 deficiency E53.8    Iron deficiency anemia D50.9    Diarrhea R19.7    Anemia D64.9       Plan:  1. Gingivostomatitis. Presented with a 2-week history of sore throat with burning pain in her mouth and throat to [facility] on 9/2/2022 with negative rapid strep, group A strep culture, and oropharyngeal gonorrhea/chlamydia trachomatis TATYANA test.  Persistent symptoms not relieved with treatment with ibuprofen, and prescribed Magic mouthwash on 9/8/2022 with only transient relief. Denied any recent antibiotic use, fever, chills, joint pains, hematuria, or URI symptoms, and denies history of herpes labialis. Exam on 9/13/2022 with punctate ulcers on bilateral tonsils and posterior pharynx with mild erythema suggestive of herpes simplex infection with gingivostomatitis. Viral and HSV cultures (9/13/2022) were negative. Treated empirically with Valtrex 1 g twice daily for 10 days with improvement. Developed recurrence on 10/17/2022 with outbreak of ulcerations on her right tongue in area of the initial outbreak. She was treated with Valtrex 2 g twice daily for 1 day and then maintained on Valtrex 500 mg daily. However, contacted the office on 10/27/2022 after examination by her dentist who confirmed findings appeared consistent with herpes simplex infection. Given lack of response on suppressive regimen, dose of Valtrex was increased to 1 g twice daily for 10 days. Reported some improvement in swallowing and throat pain, although continued to develop small ulcerations in her mouth.   Labs obtained (11/1/2022) showed mild anemia (Hb 11.7/HCT 36.0), normal creatinine at 0.61/eGFR >60, normal LFTs, HIV negative, and RICHIE negative. Repeat viral culture attempted today but not performed by lab. Discussed with Dr. Raven Hill of infectious disease and she recommended proceeding with punch biopsy of oral lesion to confirm diagnosis of HSV. Referred to Dr. Shemar Fernandes and underwent biopsy of lower lip lesion (11/23/2022) with pathology showing acute and chronic stomatitis with negative viral inclusions and negative fungal stains. She was treated empirically with prednisolone solution for 10 days reports noting some dissipation of the discomfort while on treatment. Reports that Dr. Shemar Fernandes felt that his symptoms could be secondary to burning mouth syndrome. Will continue to monitor. 2. Hypertension. Blood pressure remains well controlled on current regimen of lisinopril 10 mg daily and propranolol 40 mg bid. Renal function remains normal with creatinine 0.65/ eGFR >60. Magnesium n worsened today to 1.3 and advised to increase dose of magnesium oxide 400 mg twice daily. Will reassess at next visit. 3. Hyperlipidemia. Increased to high intensity 80 mg dose of atorvastatin in 4/2021 and LDL 54 and HDL 39, indicative of excellent control. Emphasized importance of continued lifestyle modifications, including heart healthy diet, regular exercise, and weight loss. Continue to follow. 4. Diabetes mellitus. Significant improvement with the addition of Ozempic 0.5 mg weekly and continues on metformin ER 1000 mg bid. Weight has decreased a total of 25 pounds. HbA1c improved to 5.5 today. However, patient states that cost of Ozempic has significantly increased this month and unable to obtain monthly supply. Will reassess after January 1 to see if more affordable and may restart. No evidence of microvascular complications. On statin and lisinopril. Continue regular annual eye exams. Foot exam normal (12/2021) Urine microalbumin/ creatinine ratio with moderate microalbuminuria today (137 mg/g). Will reassess at next visit. On lisinopril. Emphasized importance of continued lifestyle modifications, including heart healthy low carbohydrate diet, regular exercise, and maintaining weight loss. 5. Anemia. New onset anemia noted on 11/1/2022. Noted persistent decrease in Hb 11.5/HCT 36.4 (baseline 12.4-12.5), and repeat today showed Hb 11.3/HCT 35.8, evidence of iron deficiency with ferritin 12 and transferrin 20%, and evidence of B12 deficiency with level 114. Low vitamin B12 level may be contributing to her mouth symptoms and pain. Advised to begin ferrous sulfate 325 mg daily and cyanocobalamin 1000 mcg daily. Will also place referral to Dr. Bhumi Grace to evaluate cause for iron and B12 deficiency. Last colonoscopy in 2014 and patient reports having difficulty with diarrhea/loose stool 3 times per week. Will reassess at next visit. 6. History of migraines. Returned to baseline pattern involving pain on side of head radiating to lateral neck. On propranolol 40 mg twice daily as preventive therapy and readily responds to Imitrex if needed. Currently reports continued good control of headaches. 7. History of left facial nerve palsy. Presented with severe headache resulting in hospital admission to [facility]. Evaluation including head CT scan, brain MRI/MRA and neck MRA unrevealing and Dr. Murvin Bosworth of neurology consulted and felt most consistent with status migrainosus. Prescribed medrol dose pack and discharged. Developed left facial nerve palsy 10 days later and treated with prednisone 60 mg and Valtrex for 7 days. Evaluated by Dr. 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 Now completely improved with resolution of facial nerve palsy and headaches returned to her baseline pattern for migraines. 8.  Recurrent UTIs.   Diagnosed with UTI on 4/13/2022 with urine culture showing > 100,000 CFU/mL of E. coli. She was initially treated with Keflex which found to have intermediate resistance and changed to ciprofloxacin to complete a 7-day course. On 4/30/2022, began to experience mild low back pain and noted bright red blood in her urine and POC urinalysis (5/4/2022) showed 2+ blood and 3+ LE suggestive of infection. She was started empirically on cefdinir for 7 days but urine culture was negative. Again diagnosed with a urinary tract infection on 11/1/2022 with urine culture showing > 100,000 CFU/mL Klebsiella pneumoniae. She was treated with cefdinir for 7 days with improvement. However, previsit urinalysis significantly abnormal and repeat urine culture (11/30/2022) again showed infection due to Klebsiella pneumoniae. Treated with cefdinir 50 mg twice daily for 7 days. Given recurrent infections, will proceed with renal ultrasound to rule out structural abnormality. Also provided with UTI prevention worksheet and advised to begin cranberry tablets, D-mannose, and drink plenty of fluids. 9. Lumbar herniated disc with right sciatica, s/p right L5-S1 laminectomy and discectomy. Doing well without significant discomfort. Stressed improtance of continuing stretches and exercise. Follow. 10. Depression/ADHD. Previously reported good control of symptoms on new regimen of Abilify and Prozac. However, stated that had to wean Abilify due to cost.  Now being treated with Prozac 20 mg daily, alprazolam as needed, and hydroxyzine 50-75 mg nightly for insomnia. Being followed by MOJGAN Matos at [address] [place] [address] Psychiatry. 11. Decreased hearing, right. Gradual onset over the last 2 years. Referred to Dr. Mathew Murillo underwent MRI IAC (10/17/2022) which was essentially normal except for partial opacification of the right mastoid without enhancement. Reports in the process of being fitted for hearing aids. 12. Overweight.  Started on Ozempic in 9/2021 (peak weight 138 pounds) and weight decreased a total of 25 pounds since initiating and patient continuing to maintain. BMI <25 and she has returned to her baseline weight. Advised to continue with lifestyle changes including regular exercise. 13. Health maintenance. Completed the Moderna COVID-19 vaccine series but has not received any Moderna booster doses. Advised to obtain the updated bivalent booster dose. Already received the influenza vaccine. Completed 2/2 doses of Shingrix vaccine. Other immunizations up to date. Well women exam completed by Dr. Ben Yanez in 4/19/2021. Mammogram due and ordered with baseline bone density study. Again urged to schedule. Colonoscopy due 2024. Continue regular eye exams with Dr. Alfonzo Galvan. Vitamin D level in normal on ergocalciferol to every 14 days. Will continue to monitor. Welcome to [facility] visit completed. Patient understands recommendations and agrees with plan. Follow-up in 3 months. This visit required high complexity medically necessary decision making and management plans. Time spent in preparing for the visit, including review of history, tests done prior to arrival, additional time reviewing clinical data, imaging, outside records and test results:  5 minutes. Time spent in counseling with patient and/or family members regarding care plan: 30 minutes. Time spent in ordering tests, treatments, and referring patient for further care: 10 minutes. Time spent on visit does not include time for documentation. Future orders:    ICD-10-CM ICD-9-CM    1. Gingivostomatitis  G07.97 047.78 METABOLIC PANEL, COMPREHENSIVE      2. Type 2 diabetes mellitus without complication, without long-term current use of insulin (HCC)  E11.9 250.00 HEMOGLOBIN A1C WITH EAG      METABOLIC PANEL, COMPREHENSIVE      MICROALBUMIN, UR, RAND W/ MICROALB/CREAT RATIO      URINALYSIS W/MICROSCOPIC      3.  Anemia, unspecified type  D64.9 285.9 CBC WITH AUTOMATED DIFF      GAMMOPATHY EVAL, SPEP/GILDA, IG QT/FLC PROTEIN ELECT & GILDA, UR, RANDOM      CBC WITH AUTOMATED DIFF      METABOLIC PANEL, COMPREHENSIVE      CANCELED: FERRITIN      CANCELED: IRON PROFILE      CANCELED: VITAMIN B12 & FOLATE      4. B12 deficiency  E53.8 266.2 REFERRAL TO GASTROENTEROLOGY      CBC WITH AUTOMATED DIFF      VITAMIN B12 & FOLATE      5. Iron deficiency anemia, unspecified iron deficiency anemia type  D50.9 280.9 REFERRAL TO GASTROENTEROLOGY      CBC WITH AUTOMATED DIFF      FERRITIN      IRON PROFILE      6. Recurrent UTI (urinary tract infection)  N39.0 599.0 US RETROPERITONEUM COMP      7. Diarrhea, unspecified type  R19.7 787.91 REFERRAL TO GASTROENTEROLOGY      8. Gross hematuria  R31.0 599.71 US RETROPERITONEUM COMP      9. Protein-calorie malnutrition, unspecified severity (HCC)   E46 263.9 CANCELED: VITAMIN B12 & FOLATE      10. Essential hypertension  I10 401.9       11. Hypomagnesemia  E83.42 275.2 MAGNESIUM      METABOLIC PANEL, COMPREHENSIVE      12. Hyperlipidemia, unspecified hyperlipidemia type  E78.5 272.4 LIPID PANEL      13.  Vitamin D deficiency  E55.9 268.9 VITAMIN D, 25 HYDROXY

## 2022-12-16 ENCOUNTER — HOSPITAL ENCOUNTER (OUTPATIENT)
Dept: ULTRASOUND IMAGING | Age: 67
Discharge: HOME | End: 2022-12-16
Attending: INTERNAL MEDICINE
Payer: MEDICARE

## 2022-12-16 PROCEDURE — 76770 US EXAM ABDO BACK WALL COMP: CPT

## 2022-12-20 ENCOUNTER — TELEPHONE (OUTPATIENT)
Dept: INTERNAL MEDICINE CLINIC | Age: 67
End: 2022-12-20

## 2022-12-20 DIAGNOSIS — R31.0 GROSS HEMATURIA: ICD-10-CM

## 2022-12-20 DIAGNOSIS — R93.429 ABNORMAL RENAL ULTRASOUND: ICD-10-CM

## 2022-12-20 DIAGNOSIS — N20.0 NEPHROLITHIASIS: Primary | ICD-10-CM

## 2022-12-20 DIAGNOSIS — N39.0 RECURRENT UTI: ICD-10-CM

## 2022-12-21 NOTE — TELEPHONE ENCOUNTER
US Results (most recent):  Results from Orders Only encounter on 12/06/22    US RETROPERITONEUM COMP    Narrative  Ultrasound retroperitoneal    HISTORY: Recurrent UTI, hematuria    COMPARISON: None    FINDINGS: Multiple images from a retroperitoneal ultrasound study obtained. The bladder measures 8.2 x 6.8 x 10.0 cm for a volume of 287.5 mL. No wall  thickening or intraluminal filling defect. Bilateral ureteral jets present. There is a post void bladder residual of 20.9 mL. Right kidney measures 9.9 cm in length. Normal cortical thickness and  echotexture. There is mild pelvic caliectasis without overt hydronephrosis. Lower pole stone measures 9 mm. Left kidney measures 10.4 cm in length. Normal cortical thickness and  echotexture. Mild caliectasis. No hydronephrosis. Upper pole stone measures 8  mm. Impression  There is mild pelvic caliectasis right kidney and mild caliectasis left kidney. No overt hydronephrosis at this time. Bilateral intrarenal calculi subcentimeter size. Reviewed renal ultrasound and showed mild dilation of the renal pelvis and the right and left kidney without overt hydronephrosis. There were bilateral intrarenal stones present as well. Given findings and difficulty with recurrent UTI and hematuria, would recommend evaluation by urology. If patient is willing, will place referral to Dr. Torin Hope.

## 2023-01-04 RX ORDER — ATORVASTATIN CALCIUM 80 MG/1
TABLET, FILM COATED ORAL
Qty: 90 TABLET | Refills: 3 | Status: SHIPPED | OUTPATIENT
Start: 2023-01-04

## 2023-01-04 RX ORDER — PROPRANOLOL HYDROCHLORIDE 40 MG/1
TABLET ORAL
Qty: 180 TABLET | Refills: 3 | Status: SHIPPED | OUTPATIENT
Start: 2023-01-04

## 2023-01-18 ENCOUNTER — APPOINTMENT (OUTPATIENT)
Dept: CT IMAGING | Age: 68
DRG: 853 | End: 2023-01-18
Attending: NURSE PRACTITIONER
Payer: MEDICARE

## 2023-01-18 ENCOUNTER — HOSPITAL ENCOUNTER (INPATIENT)
Age: 68
LOS: 5 days | Discharge: HOME HEALTH | DRG: 853 | End: 2023-01-23
Attending: EMERGENCY MEDICINE | Admitting: INTERNAL MEDICINE
Payer: MEDICARE

## 2023-01-18 DIAGNOSIS — N17.9 AKI (ACUTE KIDNEY INJURY) (HCC): ICD-10-CM

## 2023-01-18 DIAGNOSIS — N13.30 HYDROURETERONEPHROSIS: ICD-10-CM

## 2023-01-18 DIAGNOSIS — N30.90 CYSTITIS WITHOUT HEMATURIA: ICD-10-CM

## 2023-01-18 DIAGNOSIS — D72.829 LEUKOCYTOSIS, UNSPECIFIED TYPE: ICD-10-CM

## 2023-01-18 DIAGNOSIS — N20.0 KIDNEY STONE: Primary | ICD-10-CM

## 2023-01-18 PROBLEM — R74.01 TRANSAMINITIS: Status: ACTIVE | Noted: 2023-01-18

## 2023-01-18 PROBLEM — E87.6 HYPOKALEMIA: Status: ACTIVE | Noted: 2023-01-18

## 2023-01-18 LAB
ALBUMIN SERPL-MCNC: 2.1 G/DL (ref 3.4–5)
ALBUMIN/GLOB SERPL: 0.4 (ref 0.8–1.7)
ALP SERPL-CCNC: 211 U/L (ref 45–117)
ALT SERPL-CCNC: 71 U/L (ref 13–56)
ANION GAP SERPL CALC-SCNC: 9 MMOL/L (ref 3–18)
APPEARANCE UR: ABNORMAL
AST SERPL-CCNC: 90 U/L (ref 10–38)
BACTERIA URNS QL MICRO: ABNORMAL /HPF
BASOPHILS # BLD: 0.1 K/UL (ref 0–0.1)
BASOPHILS NFR BLD: 1 % (ref 0–2)
BILIRUB SERPL-MCNC: 0.3 MG/DL (ref 0.2–1)
BILIRUB UR QL: NEGATIVE
BUN SERPL-MCNC: 36 MG/DL (ref 7–18)
BUN/CREAT SERPL: 18 (ref 12–20)
CALCIUM SERPL-MCNC: 8.9 MG/DL (ref 8.5–10.1)
CHLORIDE SERPL-SCNC: 105 MMOL/L (ref 100–111)
CO2 SERPL-SCNC: 23 MMOL/L (ref 21–32)
COLOR UR: YELLOW
COVID-19 RAPID TEST, COVR: NOT DETECTED
CREAT SERPL-MCNC: 1.98 MG/DL (ref 0.6–1.3)
DIFFERENTIAL METHOD BLD: ABNORMAL
EOSINOPHIL # BLD: 0.1 K/UL (ref 0–0.4)
EOSINOPHIL NFR BLD: 0 % (ref 0–5)
EPITH CASTS URNS QL MICRO: ABNORMAL /LPF (ref 0–5)
ERYTHROCYTE [DISTWIDTH] IN BLOOD BY AUTOMATED COUNT: 14 % (ref 11.6–14.5)
GLOBULIN SER CALC-MCNC: 4.7 G/DL (ref 2–4)
GLUCOSE SERPL-MCNC: 137 MG/DL (ref 74–99)
GLUCOSE UR STRIP.AUTO-MCNC: NEGATIVE MG/DL
HCT VFR BLD AUTO: 30.7 % (ref 35–45)
HGB BLD-MCNC: 10.6 G/DL (ref 12–16)
HGB UR QL STRIP: NEGATIVE
IMM GRANULOCYTES # BLD AUTO: 0.3 K/UL (ref 0–0.04)
IMM GRANULOCYTES NFR BLD AUTO: 2 % (ref 0–0.5)
KETONES UR QL STRIP.AUTO: ABNORMAL MG/DL
LACTATE BLD-SCNC: 1.35 MMOL/L (ref 0.4–2)
LEUKOCYTE ESTERASE UR QL STRIP.AUTO: ABNORMAL
LIPASE SERPL-CCNC: 286 U/L (ref 73–393)
LYMPHOCYTES # BLD: 2.2 K/UL (ref 0.9–3.6)
LYMPHOCYTES NFR BLD: 11 % (ref 21–52)
MAGNESIUM SERPL-MCNC: 2.1 MG/DL (ref 1.6–2.6)
MCH RBC QN AUTO: 30.4 PG (ref 24–34)
MCHC RBC AUTO-ENTMCNC: 34.5 G/DL (ref 31–37)
MCV RBC AUTO: 88 FL (ref 78–100)
MONOCYTES # BLD: 0.6 K/UL (ref 0.05–1.2)
MONOCYTES NFR BLD: 3 % (ref 3–10)
NEUTS SEG # BLD: 16.7 K/UL (ref 1.8–8)
NEUTS SEG NFR BLD: 83 % (ref 40–73)
NITRITE UR QL STRIP.AUTO: NEGATIVE
NRBC # BLD: 0 K/UL (ref 0–0.01)
NRBC BLD-RTO: 0 PER 100 WBC
PH UR STRIP: 5.5 (ref 5–8)
PLATELET # BLD AUTO: 485 K/UL (ref 135–420)
PMV BLD AUTO: 9.7 FL (ref 9.2–11.8)
POTASSIUM SERPL-SCNC: 3.2 MMOL/L (ref 3.5–5.5)
PROT SERPL-MCNC: 6.8 G/DL (ref 6.4–8.2)
PROT UR STRIP-MCNC: 100 MG/DL
RBC # BLD AUTO: 3.49 M/UL (ref 4.2–5.3)
RBC #/AREA URNS HPF: ABNORMAL /HPF (ref 0–5)
SODIUM SERPL-SCNC: 137 MMOL/L (ref 136–145)
SOURCE, COVRS: NORMAL
SP GR UR REFRACTOMETRY: 1.02 (ref 1–1.03)
UROBILINOGEN UR QL STRIP.AUTO: 1 EU/DL (ref 0.2–1)
WBC # BLD AUTO: 20 K/UL (ref 4.6–13.2)
WBC URNS QL MICRO: ABNORMAL /HPF (ref 0–4)

## 2023-01-18 PROCEDURE — 87086 URINE CULTURE/COLONY COUNT: CPT

## 2023-01-18 PROCEDURE — 83605 ASSAY OF LACTIC ACID: CPT

## 2023-01-18 PROCEDURE — APPSS180 APP SPLIT SHARED TIME > 60 MINUTES: Performed by: PHYSICIAN ASSISTANT

## 2023-01-18 PROCEDURE — 74011250636 HC RX REV CODE- 250/636: Performed by: EMERGENCY MEDICINE

## 2023-01-18 PROCEDURE — 87040 BLOOD CULTURE FOR BACTERIA: CPT

## 2023-01-18 PROCEDURE — 85025 COMPLETE CBC W/AUTO DIFF WBC: CPT

## 2023-01-18 PROCEDURE — 96375 TX/PRO/DX INJ NEW DRUG ADDON: CPT

## 2023-01-18 PROCEDURE — 99223 1ST HOSP IP/OBS HIGH 75: CPT | Performed by: INTERNAL MEDICINE

## 2023-01-18 PROCEDURE — 94761 N-INVAS EAR/PLS OXIMETRY MLT: CPT

## 2023-01-18 PROCEDURE — 74011250637 HC RX REV CODE- 250/637: Performed by: NURSE PRACTITIONER

## 2023-01-18 PROCEDURE — 87635 SARS-COV-2 COVID-19 AMP PRB: CPT

## 2023-01-18 PROCEDURE — 99285 EMERGENCY DEPT VISIT HI MDM: CPT

## 2023-01-18 PROCEDURE — 83690 ASSAY OF LIPASE: CPT

## 2023-01-18 PROCEDURE — 81001 URINALYSIS AUTO W/SCOPE: CPT

## 2023-01-18 PROCEDURE — 87077 CULTURE AEROBIC IDENTIFY: CPT

## 2023-01-18 PROCEDURE — 65270000029 HC RM PRIVATE

## 2023-01-18 PROCEDURE — 87186 SC STD MICRODIL/AGAR DIL: CPT

## 2023-01-18 PROCEDURE — 74011000250 HC RX REV CODE- 250: Performed by: NURSE PRACTITIONER

## 2023-01-18 PROCEDURE — 83735 ASSAY OF MAGNESIUM: CPT

## 2023-01-18 PROCEDURE — 80053 COMPREHEN METABOLIC PANEL: CPT

## 2023-01-18 PROCEDURE — 74011250636 HC RX REV CODE- 250/636: Performed by: NURSE PRACTITIONER

## 2023-01-18 PROCEDURE — 96374 THER/PROPH/DIAG INJ IV PUSH: CPT

## 2023-01-18 PROCEDURE — 74176 CT ABD & PELVIS W/O CONTRAST: CPT

## 2023-01-18 RX ORDER — FACIAL-BODY WIPES
10 EACH TOPICAL DAILY PRN
Status: DISCONTINUED | OUTPATIENT
Start: 2023-01-18 | End: 2023-01-23 | Stop reason: HOSPADM

## 2023-01-18 RX ORDER — FLUOXETINE HYDROCHLORIDE 20 MG/1
20 CAPSULE ORAL DAILY
Status: DISCONTINUED | OUTPATIENT
Start: 2023-01-19 | End: 2023-01-23 | Stop reason: HOSPADM

## 2023-01-18 RX ORDER — LEVOFLOXACIN 5 MG/ML
500 INJECTION, SOLUTION INTRAVENOUS ONCE
Status: COMPLETED | OUTPATIENT
Start: 2023-01-18 | End: 2023-01-18

## 2023-01-18 RX ORDER — SODIUM CHLORIDE 0.9 % (FLUSH) 0.9 %
5-40 SYRINGE (ML) INJECTION AS NEEDED
Status: DISCONTINUED | OUTPATIENT
Start: 2023-01-18 | End: 2023-01-23 | Stop reason: HOSPADM

## 2023-01-18 RX ORDER — PROPRANOLOL HYDROCHLORIDE 10 MG/1
40 TABLET ORAL 2 TIMES DAILY
Status: DISCONTINUED | OUTPATIENT
Start: 2023-01-19 | End: 2023-01-18

## 2023-01-18 RX ORDER — LANOLIN ALCOHOL/MO/W.PET/CERES
325 CREAM (GRAM) TOPICAL
Status: DISCONTINUED | OUTPATIENT
Start: 2023-01-19 | End: 2023-01-23 | Stop reason: HOSPADM

## 2023-01-18 RX ORDER — KETOROLAC TROMETHAMINE 15 MG/ML
15 INJECTION, SOLUTION INTRAMUSCULAR; INTRAVENOUS
Status: COMPLETED | OUTPATIENT
Start: 2023-01-18 | End: 2023-01-18

## 2023-01-18 RX ORDER — POLYETHYLENE GLYCOL 3350 17 G/17G
17 POWDER, FOR SOLUTION ORAL DAILY PRN
Status: DISCONTINUED | OUTPATIENT
Start: 2023-01-18 | End: 2023-01-23 | Stop reason: HOSPADM

## 2023-01-18 RX ORDER — ACETAMINOPHEN 325 MG/1
650 TABLET ORAL
Status: DISCONTINUED | OUTPATIENT
Start: 2023-01-18 | End: 2023-01-23 | Stop reason: HOSPADM

## 2023-01-18 RX ORDER — POTASSIUM CHLORIDE 20 MEQ/1
40 TABLET, EXTENDED RELEASE ORAL
Status: COMPLETED | OUTPATIENT
Start: 2023-01-18 | End: 2023-01-18

## 2023-01-18 RX ORDER — ONDANSETRON 2 MG/ML
4 INJECTION INTRAMUSCULAR; INTRAVENOUS
Status: DISCONTINUED | OUTPATIENT
Start: 2023-01-18 | End: 2023-01-23 | Stop reason: HOSPADM

## 2023-01-18 RX ORDER — CIPROFLOXACIN 2 MG/ML
400 INJECTION, SOLUTION INTRAVENOUS
Status: DISCONTINUED | OUTPATIENT
Start: 2023-01-18 | End: 2023-01-18 | Stop reason: RX

## 2023-01-18 RX ORDER — SODIUM CHLORIDE, SODIUM LACTATE, POTASSIUM CHLORIDE, CALCIUM CHLORIDE 600; 310; 30; 20 MG/100ML; MG/100ML; MG/100ML; MG/100ML
100 INJECTION, SOLUTION INTRAVENOUS CONTINUOUS
Status: DISCONTINUED | OUTPATIENT
Start: 2023-01-18 | End: 2023-01-19

## 2023-01-18 RX ORDER — SODIUM CHLORIDE 0.9 % (FLUSH) 0.9 %
5-40 SYRINGE (ML) INJECTION EVERY 8 HOURS
Status: DISCONTINUED | OUTPATIENT
Start: 2023-01-18 | End: 2023-01-23 | Stop reason: HOSPADM

## 2023-01-18 RX ORDER — NITROFURANTOIN (MACROCRYSTALS) 100 MG/1
100 CAPSULE ORAL
COMMUNITY
End: 2023-01-23

## 2023-01-18 RX ORDER — IBUPROFEN 200 MG
4 TABLET ORAL AS NEEDED
Status: DISCONTINUED | OUTPATIENT
Start: 2023-01-18 | End: 2023-01-23 | Stop reason: HOSPADM

## 2023-01-18 RX ORDER — ONDANSETRON 4 MG/1
4 TABLET, ORALLY DISINTEGRATING ORAL
Status: DISCONTINUED | OUTPATIENT
Start: 2023-01-18 | End: 2023-01-23 | Stop reason: HOSPADM

## 2023-01-18 RX ORDER — INSULIN LISPRO 100 [IU]/ML
INJECTION, SOLUTION INTRAVENOUS; SUBCUTANEOUS
Status: DISCONTINUED | OUTPATIENT
Start: 2023-01-19 | End: 2023-01-23 | Stop reason: HOSPADM

## 2023-01-18 RX ORDER — DEXTROSE MONOHYDRATE 100 MG/ML
0-250 INJECTION, SOLUTION INTRAVENOUS AS NEEDED
Status: DISCONTINUED | OUTPATIENT
Start: 2023-01-18 | End: 2023-01-23 | Stop reason: HOSPADM

## 2023-01-18 RX ORDER — ACETAMINOPHEN 650 MG/1
650 SUPPOSITORY RECTAL
Status: DISCONTINUED | OUTPATIENT
Start: 2023-01-18 | End: 2023-01-23 | Stop reason: HOSPADM

## 2023-01-18 RX ADMIN — SODIUM CHLORIDE 1000 ML: 9 INJECTION, SOLUTION INTRAVENOUS at 20:44

## 2023-01-18 RX ADMIN — POTASSIUM CHLORIDE 40 MEQ: 1500 TABLET, EXTENDED RELEASE ORAL at 20:48

## 2023-01-18 RX ADMIN — LEVOFLOXACIN 500 MG: 5 INJECTION, SOLUTION INTRAVENOUS at 20:44

## 2023-01-18 RX ADMIN — CEFEPIME 2 G: 2 INJECTION, POWDER, FOR SOLUTION INTRAVENOUS at 20:41

## 2023-01-18 RX ADMIN — KETOROLAC TROMETHAMINE 15 MG: 15 INJECTION, SOLUTION INTRAMUSCULAR; INTRAVENOUS at 20:38

## 2023-01-18 NOTE — ED TRIAGE NOTES
Pt c/o right flank pain for 2 weeks. Was dx'd with kidney stones.  States also has a UTI and has been on antibiotics

## 2023-01-18 NOTE — Clinical Note
Status[de-identified] INPATIENT [101]   Type of Bed: Telemetry [19]   Cardiac Monitoring Required?: Yes   Inpatient Hospitalization Certified Necessary for the Following Reasons: 3.  Patient receiving treatment that can only be provided in an inpatient setting (further clarification in H&P documentation)   Admitting Diagnosis: Kidney stone on right side [[id number]]   Admitting Diagnosis: CONNER (acute kidney injury) [facility]) [[id number]]   Admitting Diagnosis: Hypokalemia [[id number]]   Admitting Diagnosis: Leukocytosis [[id number]]   Admitting Diagnosis: Hydroureteronephrosis [[id number]]   Admitting Physician: Aleisha Persaud [[id number]]   Attending Physician: Lyn Collins   Estimated Length of Stay: 2 Midnights   Discharge Plan[de-identified] Home with Office Follow-up

## 2023-01-18 NOTE — ED PROVIDER NOTES
EMERGENCY DEPARTMENT HISTORY AND PHYSICAL EXAM    Date: 1/18/2023  Patient Name: Michael Bobo    History of Presenting Illness     Chief Complaint   Patient presents with    Flank Pain       History Provided By: Patient      Additional History (Context): Michael Bobo is a 63-year-old female with past medical history significant for diabetes, hypertension, high cholesterol, kidney stones, radiculopathy in the lumbar region, anxiety, and ADHD presents to the ER with complaints of right lower back pain over the last 2 weeks but worse over the last couple days. She is taking antibiotics for UTI and was told she had a kidney stone by her urologist.  Currently on [address]. She has been on various other antibiotics recently. She feels like she is not actually emptying her bladder and is going much more frequently than normal.  She is also been having recurrent UTIs in the last year. She had vomiting last week as well as fever as high as 101 on Sunday with chills this week. Decreased appetite overall. Denies diarrhea. Took Tylenol this morning and ibuprofen at 2 PM for pain. Nothing seems to make the pain better or worse. No abdominal pain. PCP: Rosario Flores MD    Current Facility-Administered Medications   Medication Dose Route Frequency Provider Last Rate Last Admin    sodium chloride 0.9 % bolus infusion 1,000 mL  1,000 mL IntraVENous ONCE Debroah Flatteryuliya, FNP 1,000 mL/hr at 01/18/23 2044 1,000 mL at 01/18/23 2044    levoFLOXacin (LEVAQUIN) 500 mg in D5W IVPB  500 mg IntraVENous Katerin Yadav  mL/hr at 01/18/23 2044 500 mg at 01/18/23 2044    [START ON 1/19/2023] cefepime (MAXIPIME) 1 g in 0.9% sodium chloride (MBP/ADV) 50 mL MBP  1 g IntraVENous Q24H Debroah Flattery, FNP         Current Outpatient Medications   Medication Sig Dispense Refill    nitrofurantoin (Macrodantin) 100 mg capsule Take 100 mg by mouth nightly.       atorvastatin (LIPITOR) 80 mg tablet TAKE 1 TABLET BY MOUTH EVERY DAY 90 Tablet 3    propranoloL (INDERAL) 40 mg tablet TAKE 1 TABLET BY MOUTH TWICE A  Tablet 3    hydrOXYzine pamoate (VISTARIL) 25 mg capsule TAKE 2-3 CAPSULES BY MOUTH AT BEDTIME AS NEEDED FOR SLEEP      cyanocobalamin 1,000 mcg tablet Take 1,000 mcg by mouth daily. ferrous sulfate 325 mg (65 mg iron) tablet Take 325 mg by mouth Daily (before breakfast). magnesium oxide (MAG-OX) 400 mg tablet Take 400 mg by mouth two (2) times a day. ergocalciferol (ERGOCALCIFEROL) 1,250 mcg (50,000 unit) capsule TAKE 1 CAPSULE BY MOUTH EVERY FOURTEEN (14) DAYS. 2 Capsule 5    metFORMIN ER (GLUCOPHAGE XR) 500 mg tablet TAKE 2 TABLETS BY MOUTH TWICE A DAY WITH MEALS 360 Tablet 3    lisinopriL (PRINIVIL, ZESTRIL) 10 mg tablet TAKE 1 TABLET BY MOUTH EVERY DAY 90 Tablet 3    FLUoxetine (PROzac) 20 mg capsule Take 20 mg by mouth daily. estradioL (Estrace) 0.01 % (0.1 mg/gram) vaginal cream Apply pea sized amount 3x weekly around urethra. 42.5 g 3    ALPRAZolam (XANAX) 0.5 mg tablet TAKE 1 (ONE) TABLET BY MOUTH DAILY AS NEEDED FOR ANXIETY (Patient not taking: Reported on 1/18/2023)      SUMAtriptan (IMITREX) 50 mg tablet TAKE 1 TAB BY MOUTH DAILY AS NEEDED FOR MIGRAINE. 9 Tablet 5    semaglutide (OZEMPIC) 0.25 mg or 0.5 mg/dose (2 mg/1.5 ml) subq pen 0.5 mg by SubCUTAneous route every seven (7) days.  (Patient not taking: Reported on 1/18/2023) 12 Pen 3       Past History     Past Medical History:  Past Medical History:   Diagnosis Date    ADHD     Anxiety     Bilateral lumbar radiculopathy     Carotid bruit     right    Depression     Diabetes mellitus (HCC)     HCD (hypertensive cardiovascular disease)     High cholesterol     Hyperlipidemia     Hypertension     Migraine headache     Plantar fasciitis        Past Surgical History:  Past Surgical History:   Procedure Laterality Date    HX SEPTOPLASTY  6/2002    HX TONSILLECTOMY      [facility] UNLISTED NEUROLOGICAL/NEUROMUSCULAR DX PX  08/06/2018 laminectomy        Family History:  Family History   Problem Relation Age of Onset    Hypertension Brother         x2    Elevated Lipids Brother     Stroke Mother     Heart Surgery Mother         CABG    Cancer Father         cancer of the mouth    Hypertension Father     Hypertension Brother     Elevated Lipids Brother        Social History:  Social History     Tobacco Use    Smoking status: Former     Packs/day: 0.25     Years: 4.00     Pack years: 1.00     Types: Cigarettes     Quit date: 1989     Years since quittin.0    Smokeless tobacco: Never   Substance Use Topics    Alcohol use: No     Alcohol/week: 1.7 standard drinks     Types: 2 Glasses of wine per week    Drug use: No       Allergies: Allergies   Allergen Reactions    Pcn [Penicillins] Rash     Tolerates cephalexin    Sulfa (Sulfonamide Antibiotics) Rash         Review of Systems     Review of Systems   Constitutional:  Positive for appetite change, chills and fever. HENT: Negative. Negative for congestion, ear pain and rhinorrhea. Eyes: Negative. Negative for pain and redness. Respiratory: Negative. Negative for cough and shortness of breath. Cardiovascular: Negative. Negative for chest pain, palpitations and leg swelling. Gastrointestinal:  Positive for nausea and vomiting. Negative for abdominal pain, constipation and diarrhea. Genitourinary:  Positive for decreased urine volume, difficulty urinating, flank pain, frequency and urgency. Negative for dysuria and hematuria. Musculoskeletal:  Negative for back pain, gait problem, joint swelling and neck pain. Skin: Negative. Negative for rash and wound. Neurological: Negative. Negative for dizziness, seizures, speech difficulty, weakness, light-headedness and headaches. Hematological:  Negative for adenopathy. Does not bruise/bleed easily. All other systems reviewed and are negative.     Physical Exam     Vitals:    23 1934   BP: 120/60   Pulse: 79   Resp: 18 Temp: 98.4 °F (36.9 °C)   SpO2: 97%   Weight: 52.6 kg (116 lb)   Height: 4' 11\" (1.499 m)     Physical Exam  Vitals and nursing note reviewed. Constitutional:       General: She is in acute distress. Appearance: Normal appearance. She is ill-appearing. She is not toxic-appearing or diaphoretic. HENT:      Head: Normocephalic and atraumatic. Nose: Nose normal.      Mouth/Throat:      Mouth: Mucous membranes are moist.      Pharynx: Oropharynx is clear. Eyes:      General: Lids are normal. Vision grossly intact. Conjunctiva/sclera: Conjunctivae normal.   Cardiovascular:      Rate and Rhythm: Normal rate and regular rhythm. Pulses: Normal pulses. Heart sounds: Normal heart sounds. Pulmonary:      Effort: Pulmonary effort is normal. No respiratory distress. Breath sounds: Normal breath sounds. No stridor. No wheezing, rhonchi or rales. Chest:      Chest wall: No tenderness. Abdominal:      Palpations: Abdomen is soft. Tenderness: There is no abdominal tenderness. There is right CVA tenderness. There is no left CVA tenderness or guarding. Musculoskeletal:         General: Normal range of motion. Cervical back: Full passive range of motion without pain, normal range of motion and neck supple. No tenderness. Lymphadenopathy:      Cervical: No cervical adenopathy. Skin:     General: Skin is warm and dry. Capillary Refill: Capillary refill takes less than 2 seconds. Neurological:      General: No focal deficit present. Mental Status: She is alert and oriented to person, place, and time. Psychiatric:         Mood and Affect: Mood normal.         Behavior: Behavior normal. Behavior is cooperative.          Diagnostic Study Results     Labs -     Recent Results (from the past 12 hour(s))   URINALYSIS W/ RFLX MICROSCOPIC    Collection Time: 01/18/23  6:25 PM   Result Value Ref Range    Color YELLOW      Appearance CLOUDY      Specific gravity 1.025 1.005 - 1.030      pH (UA) 5.5 5.0 - 8.0      Protein 100 (A) NEG mg/dL    Glucose Negative NEG mg/dL    Ketone TRACE (A) NEG mg/dL    Bilirubin Negative NEG      Blood Negative NEG      Urobilinogen 1.0 0.2 - 1.0 EU/dL    Nitrites Negative NEG      Leukocyte Esterase MODERATE (A) NEG     URINE MICROSCOPIC ONLY    Collection Time: 01/18/23  6:25 PM   Result Value Ref Range    WBC 21 to 35 0 - 4 /hpf    RBC 0 to 3 0 - 5 /hpf    Epithelial cells 2+ 0 - 5 /lpf    Bacteria 2+ (A) NEG /hpf   CBC WITH AUTOMATED DIFF    Collection Time: 01/18/23  6:45 PM   Result Value Ref Range    WBC 20.0 (H) 4.6 - 13.2 K/uL    RBC 3.49 (L) 4.20 - 5.30 M/uL    HGB 10.6 (L) 12.0 - 16.0 g/dL    HCT 30.7 (L) 35.0 - 45.0 %    MCV 88.0 78.0 - 100.0 FL    MCH 30.4 24.0 - 34.0 PG    MCHC 34.5 31.0 - 37.0 g/dL    RDW 14.0 11.6 - 14.5 %    PLATELET 964 (H) 325 - 420 K/uL    MPV 9.7 9.2 - 11.8 FL    NRBC 0.0 0  WBC    ABSOLUTE NRBC 0.00 0.00 - 0.01 K/uL    NEUTROPHILS 83 (H) 40 - 73 %    LYMPHOCYTES 11 (L) 21 - 52 %    MONOCYTES 3 3 - 10 %    EOSINOPHILS 0 0 - 5 %    BASOPHILS 1 0 - 2 %    IMMATURE GRANULOCYTES 2 (H) 0.0 - 0.5 %    ABS. NEUTROPHILS 16.7 (H) 1.8 - 8.0 K/UL    ABS. LYMPHOCYTES 2.2 0.9 - 3.6 K/UL    ABS. MONOCYTES 0.6 0.05 - 1.2 K/UL    ABS. EOSINOPHILS 0.1 0.0 - 0.4 K/UL    ABS. BASOPHILS 0.1 0.0 - 0.1 K/UL    ABS. IMM. GRANS. 0.3 (H) 0.00 - 0.04 K/UL    DF AUTOMATED     METABOLIC PANEL, COMPREHENSIVE    Collection Time: 01/18/23  6:45 PM   Result Value Ref Range    Sodium 137 136 - 145 mmol/L    Potassium 3.2 (L) 3.5 - 5.5 mmol/L    Chloride 105 100 - 111 mmol/L    CO2 23 21 - 32 mmol/L    Anion gap 9 3.0 - 18 mmol/L    Glucose 137 (H) 74 - 99 mg/dL    BUN 36 (H) 7.0 - 18 MG/DL    Creatinine 1.98 (H) 0.6 - 1.3 MG/DL    BUN/Creatinine ratio 18 12 - 20      eGFR 28 (L) >60 ml/min/1.73m2    Calcium 8.9 8.5 - 10.1 MG/DL    Bilirubin, total 0.3 0.2 - 1.0 MG/DL    ALT (SGPT) 71 (H) 13 - 56 U/L    AST (SGOT) 90 (H) 10 - 38 U/L    Alk. phosphatase 211 (H) 45 - 117 U/L    Protein, total 6.8 6.4 - 8.2 g/dL    Albumin 2.1 (L) 3.4 - 5.0 g/dL    Globulin 4.7 (H) 2.0 - 4.0 g/dL    A-G Ratio 0.4 (L) 0.8 - 1.7     LIPASE    Collection Time: 01/18/23  6:45 PM   Result Value Ref Range    Lipase 286 73 - 393 U/L   MAGNESIUM    Collection Time: 01/18/23  6:45 PM   Result Value Ref Range    Magnesium 2.1 1.6 - 2.6 mg/dL   POC LACTIC ACID    Collection Time: 01/18/23  7:45 PM   Result Value Ref Range    Lactic Acid (POC) 1.35 0.40 - 2.00 mmol/L       Radiologic Studies -   CT ABD PELV WO CONT   Final Result   Right hydroureteronephrosis to the level of a proximal ureteral stone measuring   6 mm. Punctate intrarenal stone left kidney. CT Results  (Last 48 hours)                 01/18/23 1838  CT ABD PELV WO CONT Final result    Impression:  Right hydroureteronephrosis to the level of a proximal ureteral stone measuring   6 mm. Punctate intrarenal stone left kidney. Narrative:  EXAM: CT ABD PELV WO CONT       CLINICAL INDICATION/HISTORY: right flank pain       TECHNIQUE: Contiguous axial images were obtained through the abdomen and pelvis. From these, sagittal and coronal reconstructions were generated. Contrast : None       CT scans at this facility are performed using dose optimization technique as   appropriate with performed exam, to include automated exposure control,   adjustment of mA and/or kV according to patient's size (including appropriate   matching for site-specific examinations), or use of iterative reconstruction   technique. COMPARISON: Ultrasound 12/16/2022       FINDINGS:   Lower chest: Bilateral lung bases are clear. The base of the heart is within   limits. Liver: No focal lesions identified. Gallbladder/Biliary: No calcified gallstones identified. Spleen: Normal in size and contour. 1.6 cm accessory spleen at splenic hilum. Pancreas: Within normal limits for technique. Kidneys, Urinary Bladder:  Enlargement of the right kidney with   hydroureteronephrosis and perinephric fat stranding. Proximal right ureteral   stone measuring 5 mm. Punctate intrarenal stone at the inferior pole left   kidney. . Urinary bladder is decompressed limiting evaluation but grossly   unremarkable. Adrenal Glands: Unremarkable       Bowels/Mesentery: No obstruction or mesenteric inflammation. Peritoneum/Abdominal Wall: Unremarkable        Pelvic organs: Small calcifications of the uterus consistent with fibroids. Vascular: Aorta unremarkable for age. IVC unremarkable. Lymph Nodes: No adenopathy. Bones: No acute findings. Grade 1 anterolisthesis L4 on L5 with uncovering of   disc. Probable moderate canal stenosis. CXR Results  (Last 48 hours)      None              Medical Decision Making   I am the first provider for this patient. I reviewed the vital signs, available nursing notes, past medical history, past surgical history, family history and social history. Vital Signs-Reviewed the patient's vital signs. Records Reviewed: Nursing notes, old medical records and any previous labs, imaging, visits, consultations pertinent to patient care    Procedures:  Procedures      ED Course: Progress Notes, Reevaluation, and Consults:  6:22 PM  Initial assessment performed. The patients presenting problems have been discussed, and they/their family are in agreement with the care plan formulated and outlined with them. I have encouraged them to ask questions as they arise throughout their visit. 7:53 PM consult to urology. Patient has a white blood cell count of 20,000 with a left shift. No bands. Also has evidence of a UTI with moderate leukocyte esterase and 21-35 WBCs. Urine culture is pending.   CMP shows hypokalemia with a potassium of 3.2 and a normal magnesium as well as an acute kidney injury with a creatinine of 1.98 and BUN of 36 which is significantly elevated from her baseline which is completely normal. She also has some mild transaminitis with elevated ALT, AST, and alk phos. Lipase is within normal limits. Also did obtain a POC lactic acid that was within normal limits at 1.35. Antibiotics have been initiated with both cefepime and Levaquin. 7:58 PM consultation with MOJGAN Cortes with [facility] [place]. Standard discussion regarding patient's history, physical exam findings and available diagnostic/radiologic results and she does recommend medical admission. Patient will need to be stented likely this evening due to her white blood cell count and recommends NPO.    8:42 PM consultation with hospitalist, Dr. Joanna Carbone. Standard discussion regarding patient's history, physical exam findings, and available diagnostic/radiologic results. Agrees to admission     8:59 PM consultation with ER attending at [facility], Dr. Katelyn Jewell. We will send patient ED to ED to expedite patient getting to the OR per request by urology. Provider Notes (Medical Decision Making):   Differential diagnosis: Recurrent UTI, pyelonephritis, kidney stone, infected stone, sepsis    Patient is a 74 female presents to the ER from home with her daughter with complaints of worsening right flank pain with associated nausea, vomiting, decreased appetite, fever, and chills. She was recently diagnosed with a kidney stone and also been suffering from recurrent UTIs over the last year. She is currently on Macrobid and has 1 pill left to take. She is having urinary symptoms and like she is not emptying her bladder with a lot of irritation when she urinates, frequency, and urgency. She is tender to palpation in the right flank and abdomen is soft and nontender. She appears mildly ill but vitals are stable and she is afebrile at this time. She has ibuprofen and Tylenol on board however. She appears well-hydrated in mild distress secondary to pain.     Will obtain appropriate studies to evaluate patient's complaints and treat symptomatically. Will disposition after reassessment assuming no clinical change or worsening and appropriate response to symptomatic treatment. MED RECONCILIATION:  Current Facility-Administered Medications   Medication Dose Route Frequency    sodium chloride 0.9 % bolus infusion 1,000 mL  1,000 mL IntraVENous ONCE    levoFLOXacin (LEVAQUIN) 500 mg in D5W IVPB  500 mg IntraVENous ONCE    [START ON 1/19/2023] cefepime (MAXIPIME) 1 g in 0.9% sodium chloride (MBP/ADV) 50 mL MBP  1 g IntraVENous Q24H     Current Outpatient Medications   Medication Sig    nitrofurantoin (Macrodantin) 100 mg capsule Take 100 mg by mouth nightly. atorvastatin (LIPITOR) 80 mg tablet TAKE 1 TABLET BY MOUTH EVERY DAY    propranoloL (INDERAL) 40 mg tablet TAKE 1 TABLET BY MOUTH TWICE A DAY    hydrOXYzine pamoate (VISTARIL) 25 mg capsule TAKE 2-3 CAPSULES BY MOUTH AT BEDTIME AS NEEDED FOR SLEEP    cyanocobalamin 1,000 mcg tablet Take 1,000 mcg by mouth daily. ferrous sulfate 325 mg (65 mg iron) tablet Take 325 mg by mouth Daily (before breakfast). magnesium oxide (MAG-OX) 400 mg tablet Take 400 mg by mouth two (2) times a day. ergocalciferol (ERGOCALCIFEROL) 1,250 mcg (50,000 unit) capsule TAKE 1 CAPSULE BY MOUTH EVERY FOURTEEN (14) DAYS. metFORMIN ER (GLUCOPHAGE XR) 500 mg tablet TAKE 2 TABLETS BY MOUTH TWICE A DAY WITH MEALS    lisinopriL (PRINIVIL, ZESTRIL) 10 mg tablet TAKE 1 TABLET BY MOUTH EVERY DAY    FLUoxetine (PROzac) 20 mg capsule Take 20 mg by mouth daily. estradioL (Estrace) 0.01 % (0.1 mg/gram) vaginal cream Apply pea sized amount 3x weekly around urethra. ALPRAZolam (XANAX) 0.5 mg tablet TAKE 1 (ONE) TABLET BY MOUTH DAILY AS NEEDED FOR ANXIETY (Patient not taking: Reported on 1/18/2023)    SUMAtriptan (IMITREX) 50 mg tablet TAKE 1 TAB BY MOUTH DAILY AS NEEDED FOR MIGRAINE.     semaglutide (OZEMPIC) 0.25 mg or 0.5 mg/dose (2 mg/1.5 ml) subq pen 0.5 mg by SubCUTAneous route every seven (7) days. (Patient not taking: Reported on 1/18/2023)       Disposition:  admit    Follow-up Information    None         Current Discharge Medication List                Diagnosis     Clinical Impression:   1. Kidney stone    2. Hydroureteronephrosis    3. CONNER (acute kidney injury) ([facility] [address]    4. Leukocytosis, unspecified type    5. Cystitis without hematuria        Dictation disclaimer:  Please note that this dictation was completed with [employer], the computer voice recognition software. Quite often unanticipated grammatical, syntax, homophones, and other interpretive errors are inadvertently transcribed by the computer software. Please disregard these errors. Please excuse any errors that have escaped final proofreading.

## 2023-01-19 ENCOUNTER — APPOINTMENT (OUTPATIENT)
Dept: GENERAL RADIOLOGY | Age: 68
DRG: 853 | End: 2023-01-19
Attending: UROLOGY
Payer: MEDICARE

## 2023-01-19 ENCOUNTER — ANESTHESIA (OUTPATIENT)
Dept: SURGERY | Age: 68
DRG: 854 | End: 2023-01-19
Payer: MEDICARE

## 2023-01-19 ENCOUNTER — ANESTHESIA EVENT (OUTPATIENT)
Dept: SURGERY | Age: 68
DRG: 854 | End: 2023-01-19
Payer: MEDICARE

## 2023-01-19 ENCOUNTER — APPOINTMENT (OUTPATIENT)
Dept: ULTRASOUND IMAGING | Age: 68
DRG: 853 | End: 2023-01-19
Attending: PHYSICIAN ASSISTANT
Payer: MEDICARE

## 2023-01-19 LAB
ALBUMIN SERPL-MCNC: 1.8 G/DL (ref 3.4–5)
ALBUMIN/GLOB SERPL: 0.5 (ref 0.8–1.7)
ALP SERPL-CCNC: 174 U/L (ref 45–117)
ALT SERPL-CCNC: 50 U/L (ref 13–56)
ANION GAP SERPL CALC-SCNC: 7 MMOL/L (ref 3–18)
AST SERPL-CCNC: 39 U/L (ref 10–38)
BILIRUB DIRECT SERPL-MCNC: 0.1 MG/DL (ref 0–0.2)
BILIRUB SERPL-MCNC: 0.5 MG/DL (ref 0.2–1)
BUN SERPL-MCNC: 32 MG/DL (ref 7–18)
BUN/CREAT SERPL: 20 (ref 12–20)
CALCIUM SERPL-MCNC: 8.4 MG/DL (ref 8.5–10.1)
CHLORIDE SERPL-SCNC: 113 MMOL/L (ref 100–111)
CO2 SERPL-SCNC: 19 MMOL/L (ref 21–32)
CREAT SERPL-MCNC: 1.61 MG/DL (ref 0.6–1.3)
ERYTHROCYTE [DISTWIDTH] IN BLOOD BY AUTOMATED COUNT: 14.2 % (ref 11.6–14.5)
GLOBULIN SER CALC-MCNC: 4 G/DL (ref 2–4)
GLUCOSE BLD STRIP.AUTO-MCNC: 111 MG/DL (ref 70–110)
GLUCOSE BLD STRIP.AUTO-MCNC: 155 MG/DL (ref 70–110)
GLUCOSE BLD STRIP.AUTO-MCNC: 156 MG/DL (ref 70–110)
GLUCOSE SERPL-MCNC: 165 MG/DL (ref 74–99)
HAV IGM SER QL: NEGATIVE
HBV CORE IGM SER QL: NEGATIVE
HBV SURFACE AG SER QL: <0.1 INDEX
HBV SURFACE AG SER QL: NEGATIVE
HCT VFR BLD AUTO: 27.2 % (ref 35–45)
HCV AB SER IA-ACNC: <0.02 INDEX
HCV AB SERPL QL IA: NEGATIVE
HCV COMMENT,HCGAC: NORMAL
HGB BLD-MCNC: 9.4 G/DL (ref 12–16)
MCH RBC QN AUTO: 30.2 PG (ref 24–34)
MCHC RBC AUTO-ENTMCNC: 34.6 G/DL (ref 31–37)
MCV RBC AUTO: 87.5 FL (ref 78–100)
NRBC # BLD: 0 K/UL (ref 0–0.01)
NRBC BLD-RTO: 0 PER 100 WBC
PLATELET # BLD AUTO: 404 K/UL (ref 135–420)
PMV BLD AUTO: 9.4 FL (ref 9.2–11.8)
POTASSIUM SERPL-SCNC: 3.5 MMOL/L (ref 3.5–5.5)
PROCALCITONIN SERPL-MCNC: 1.04 NG/ML
PROT SERPL-MCNC: 5.8 G/DL (ref 6.4–8.2)
RBC # BLD AUTO: 3.11 M/UL (ref 4.2–5.3)
SODIUM SERPL-SCNC: 139 MMOL/L (ref 136–145)
SP1: NORMAL
SP2: NORMAL
SP3: NORMAL
WBC # BLD AUTO: 17.1 K/UL (ref 4.6–13.2)

## 2023-01-19 PROCEDURE — 76210000016 HC OR PH I REC 1 TO 1.5 HR: Performed by: UROLOGY

## 2023-01-19 PROCEDURE — 74011000258 HC RX REV CODE- 258: Performed by: UROLOGY

## 2023-01-19 PROCEDURE — 76010000138 HC OR TIME 0.5 TO 1 HR: Performed by: UROLOGY

## 2023-01-19 PROCEDURE — 80076 HEPATIC FUNCTION PANEL: CPT

## 2023-01-19 PROCEDURE — 99232 SBSQ HOSP IP/OBS MODERATE 35: CPT | Performed by: EMERGENCY MEDICINE

## 2023-01-19 PROCEDURE — 80074 ACUTE HEPATITIS PANEL: CPT

## 2023-01-19 PROCEDURE — 74011000636 HC RX REV CODE- 636: Performed by: UROLOGY

## 2023-01-19 PROCEDURE — 74011250636 HC RX REV CODE- 250/636: Performed by: ANESTHESIOLOGY

## 2023-01-19 PROCEDURE — 74011000250 HC RX REV CODE- 250: Performed by: UROLOGY

## 2023-01-19 PROCEDURE — 0T768DZ DILATION OF RIGHT URETER WITH INTRALUMINAL DEVICE, VIA NATURAL OR ARTIFICIAL OPENING ENDOSCOPIC: ICD-10-PCS | Performed by: UROLOGY

## 2023-01-19 PROCEDURE — 74011250636 HC RX REV CODE- 250/636: Performed by: UROLOGY

## 2023-01-19 PROCEDURE — C1758 CATHETER, URETERAL: HCPCS | Performed by: UROLOGY

## 2023-01-19 PROCEDURE — 2709999900 HC NON-CHARGEABLE SUPPLY: Performed by: UROLOGY

## 2023-01-19 PROCEDURE — BT1D1ZZ FLUOROSCOPY OF RIGHT KIDNEY, URETER AND BLADDER USING LOW OSMOLAR CONTRAST: ICD-10-PCS | Performed by: UROLOGY

## 2023-01-19 PROCEDURE — 84145 PROCALCITONIN (PCT): CPT

## 2023-01-19 PROCEDURE — 80048 BASIC METABOLIC PNL TOTAL CA: CPT

## 2023-01-19 PROCEDURE — 76705 ECHO EXAM OF ABDOMEN: CPT

## 2023-01-19 PROCEDURE — C2617 STENT, NON-COR, TEM W/O DEL: HCPCS | Performed by: UROLOGY

## 2023-01-19 PROCEDURE — 74420 UROGRAPHY RTRGR +-KUB: CPT

## 2023-01-19 PROCEDURE — 74011250637 HC RX REV CODE- 250/637: Performed by: PHYSICIAN ASSISTANT

## 2023-01-19 PROCEDURE — 74011250636 HC RX REV CODE- 250/636: Performed by: NURSE ANESTHETIST, CERTIFIED REGISTERED

## 2023-01-19 PROCEDURE — 65270000046 HC RM TELEMETRY

## 2023-01-19 PROCEDURE — 82962 GLUCOSE BLOOD TEST: CPT

## 2023-01-19 PROCEDURE — 74011000250 HC RX REV CODE- 250: Performed by: NURSE ANESTHETIST, CERTIFIED REGISTERED

## 2023-01-19 PROCEDURE — 87086 URINE CULTURE/COLONY COUNT: CPT

## 2023-01-19 PROCEDURE — 74011250636 HC RX REV CODE- 250/636: Performed by: PHYSICIAN ASSISTANT

## 2023-01-19 PROCEDURE — 74011250637 HC RX REV CODE- 250/637: Performed by: ANESTHESIOLOGY

## 2023-01-19 PROCEDURE — 85027 COMPLETE CBC AUTOMATED: CPT

## 2023-01-19 PROCEDURE — 77030040361 HC SLV COMPR DVT MDII -B: Performed by: UROLOGY

## 2023-01-19 PROCEDURE — 77030019927 HC TBNG IRR CYSTO BAXT -A: Performed by: UROLOGY

## 2023-01-19 PROCEDURE — 74011636637 HC RX REV CODE- 636/637: Performed by: ANESTHESIOLOGY

## 2023-01-19 PROCEDURE — 76060000032 HC ANESTHESIA 0.5 TO 1 HR: Performed by: UROLOGY

## 2023-01-19 DEVICE — URETERAL STENT
Type: IMPLANTABLE DEVICE | Site: KIDNEY | Status: FUNCTIONAL
Brand: PERCUFLEX™ PLUS

## 2023-01-19 RX ORDER — ONDANSETRON 2 MG/ML
4 INJECTION INTRAMUSCULAR; INTRAVENOUS ONCE
Status: DISCONTINUED | OUTPATIENT
Start: 2023-01-19 | End: 2023-01-19 | Stop reason: HOSPADM

## 2023-01-19 RX ORDER — INSULIN LISPRO 100 [IU]/ML
INJECTION, SOLUTION INTRAVENOUS; SUBCUTANEOUS ONCE
Status: COMPLETED | OUTPATIENT
Start: 2023-01-19 | End: 2023-01-19

## 2023-01-19 RX ORDER — SODIUM CHLORIDE, SODIUM LACTATE, POTASSIUM CHLORIDE, CALCIUM CHLORIDE 600; 310; 30; 20 MG/100ML; MG/100ML; MG/100ML; MG/100ML
50 INJECTION, SOLUTION INTRAVENOUS CONTINUOUS
Status: DISPENSED | OUTPATIENT
Start: 2023-01-19 | End: 2023-01-19

## 2023-01-19 RX ORDER — ONDANSETRON 2 MG/ML
INJECTION INTRAMUSCULAR; INTRAVENOUS AS NEEDED
Status: DISCONTINUED | OUTPATIENT
Start: 2023-01-19 | End: 2023-01-19 | Stop reason: HOSPADM

## 2023-01-19 RX ORDER — DOCUSATE SODIUM 100 MG/1
100 CAPSULE, LIQUID FILLED ORAL DAILY
Status: DISCONTINUED | OUTPATIENT
Start: 2023-01-20 | End: 2023-01-23 | Stop reason: HOSPADM

## 2023-01-19 RX ORDER — SODIUM CHLORIDE 0.9 % (FLUSH) 0.9 %
5-40 SYRINGE (ML) INJECTION EVERY 8 HOURS
Status: DISCONTINUED | OUTPATIENT
Start: 2023-01-19 | End: 2023-01-19 | Stop reason: HOSPADM

## 2023-01-19 RX ORDER — HYDROMORPHONE HYDROCHLORIDE 2 MG/ML
0.5 INJECTION, SOLUTION INTRAMUSCULAR; INTRAVENOUS; SUBCUTANEOUS AS NEEDED
Status: DISCONTINUED | OUTPATIENT
Start: 2023-01-19 | End: 2023-01-19 | Stop reason: HOSPADM

## 2023-01-19 RX ORDER — DEXAMETHASONE SODIUM PHOSPHATE 4 MG/ML
INJECTION, SOLUTION INTRA-ARTICULAR; INTRALESIONAL; INTRAMUSCULAR; INTRAVENOUS; SOFT TISSUE AS NEEDED
Status: DISCONTINUED | OUTPATIENT
Start: 2023-01-19 | End: 2023-01-19 | Stop reason: HOSPADM

## 2023-01-19 RX ORDER — LIDOCAINE HYDROCHLORIDE 20 MG/ML
INJECTION, SOLUTION EPIDURAL; INFILTRATION; INTRACAUDAL; PERINEURAL AS NEEDED
Status: DISCONTINUED | OUTPATIENT
Start: 2023-01-19 | End: 2023-01-19 | Stop reason: HOSPADM

## 2023-01-19 RX ORDER — PHENYLEPHRINE HCL IN 0.9% NACL 1 MG/10 ML
SYRINGE (ML) INTRAVENOUS AS NEEDED
Status: DISCONTINUED | OUTPATIENT
Start: 2023-01-19 | End: 2023-01-19 | Stop reason: HOSPADM

## 2023-01-19 RX ORDER — DEXTROSE MONOHYDRATE 100 MG/ML
0-250 INJECTION, SOLUTION INTRAVENOUS AS NEEDED
Status: DISCONTINUED | OUTPATIENT
Start: 2023-01-19 | End: 2023-01-19 | Stop reason: HOSPADM

## 2023-01-19 RX ORDER — PROPOFOL 10 MG/ML
INJECTION, EMULSION INTRAVENOUS AS NEEDED
Status: DISCONTINUED | OUTPATIENT
Start: 2023-01-19 | End: 2023-01-19 | Stop reason: HOSPADM

## 2023-01-19 RX ORDER — OXYCODONE HYDROCHLORIDE 5 MG/1
5 TABLET ORAL
Status: DISCONTINUED | OUTPATIENT
Start: 2023-01-19 | End: 2023-01-23 | Stop reason: HOSPADM

## 2023-01-19 RX ORDER — IBUPROFEN 200 MG
4 TABLET ORAL AS NEEDED
Status: DISCONTINUED | OUTPATIENT
Start: 2023-01-19 | End: 2023-01-19 | Stop reason: HOSPADM

## 2023-01-19 RX ORDER — NALOXONE HYDROCHLORIDE 0.4 MG/ML
0.4 INJECTION, SOLUTION INTRAMUSCULAR; INTRAVENOUS; SUBCUTANEOUS AS NEEDED
Status: DISCONTINUED | OUTPATIENT
Start: 2023-01-19 | End: 2023-01-23 | Stop reason: HOSPADM

## 2023-01-19 RX ORDER — IODIXANOL 320 MG/ML
INJECTION, SOLUTION INTRAVASCULAR AS NEEDED
Status: DISCONTINUED | OUTPATIENT
Start: 2023-01-19 | End: 2023-01-19 | Stop reason: HOSPADM

## 2023-01-19 RX ORDER — SODIUM CHLORIDE 0.9 % (FLUSH) 0.9 %
5-40 SYRINGE (ML) INJECTION AS NEEDED
Status: DISCONTINUED | OUTPATIENT
Start: 2023-01-19 | End: 2023-01-19 | Stop reason: HOSPADM

## 2023-01-19 RX ORDER — FENTANYL CITRATE 50 UG/ML
INJECTION, SOLUTION INTRAMUSCULAR; INTRAVENOUS AS NEEDED
Status: DISCONTINUED | OUTPATIENT
Start: 2023-01-19 | End: 2023-01-19 | Stop reason: HOSPADM

## 2023-01-19 RX ORDER — SODIUM CHLORIDE, SODIUM LACTATE, POTASSIUM CHLORIDE, CALCIUM CHLORIDE 600; 310; 30; 20 MG/100ML; MG/100ML; MG/100ML; MG/100ML
125 INJECTION, SOLUTION INTRAVENOUS CONTINUOUS
Status: DISCONTINUED | OUTPATIENT
Start: 2023-01-19 | End: 2023-01-19 | Stop reason: HOSPADM

## 2023-01-19 RX ORDER — FAMOTIDINE 20 MG/1
20 TABLET, FILM COATED ORAL ONCE
Status: COMPLETED | OUTPATIENT
Start: 2023-01-19 | End: 2023-01-19

## 2023-01-19 RX ORDER — SODIUM CHLORIDE, SODIUM LACTATE, POTASSIUM CHLORIDE, CALCIUM CHLORIDE 600; 310; 30; 20 MG/100ML; MG/100ML; MG/100ML; MG/100ML
100 INJECTION, SOLUTION INTRAVENOUS CONTINUOUS
Status: DISCONTINUED | OUTPATIENT
Start: 2023-01-19 | End: 2023-01-19 | Stop reason: HOSPADM

## 2023-01-19 RX ORDER — MIDAZOLAM HYDROCHLORIDE 1 MG/ML
INJECTION, SOLUTION INTRAMUSCULAR; INTRAVENOUS AS NEEDED
Status: DISCONTINUED | OUTPATIENT
Start: 2023-01-19 | End: 2023-01-19 | Stop reason: HOSPADM

## 2023-01-19 RX ORDER — INSULIN LISPRO 100 [IU]/ML
INJECTION, SOLUTION INTRAVENOUS; SUBCUTANEOUS ONCE
Status: DISCONTINUED | OUTPATIENT
Start: 2023-01-19 | End: 2023-01-19 | Stop reason: HOSPADM

## 2023-01-19 RX ADMIN — CEFEPIME 2 G: 2 INJECTION, POWDER, FOR SOLUTION INTRAVENOUS at 15:15

## 2023-01-19 RX ADMIN — Medication 3 UNITS: at 13:02

## 2023-01-19 RX ADMIN — LIDOCAINE HYDROCHLORIDE 60 MG: 20 INJECTION, SOLUTION EPIDURAL; INFILTRATION; INTRACAUDAL; PERINEURAL at 14:18

## 2023-01-19 RX ADMIN — MIDAZOLAM HYDROCHLORIDE 2 MG: 2 INJECTION, SOLUTION INTRAMUSCULAR; INTRAVENOUS at 14:10

## 2023-01-19 RX ADMIN — SODIUM CHLORIDE 328 MG: 9 INJECTION, SOLUTION INTRAVENOUS at 15:27

## 2023-01-19 RX ADMIN — ONDANSETRON 4 MG: 2 INJECTION INTRAMUSCULAR; INTRAVENOUS at 14:32

## 2023-01-19 RX ADMIN — PROPOFOL 100 MG: 10 INJECTION, EMULSION INTRAVENOUS at 14:18

## 2023-01-19 RX ADMIN — SODIUM CHLORIDE, SODIUM LACTATE, POTASSIUM CHLORIDE, AND CALCIUM CHLORIDE: 600; 310; 30; 20 INJECTION, SOLUTION INTRAVENOUS at 14:53

## 2023-01-19 RX ADMIN — SODIUM CHLORIDE, SODIUM LACTATE, POTASSIUM CHLORIDE, AND CALCIUM CHLORIDE 50 ML/HR: 600; 310; 30; 20 INJECTION, SOLUTION INTRAVENOUS at 12:45

## 2023-01-19 RX ADMIN — FERROUS SULFATE TAB 325 MG (65 MG ELEMENTAL FE) 325 MG: 325 (65 FE) TAB at 08:27

## 2023-01-19 RX ADMIN — Medication 100 MCG: at 14:25

## 2023-01-19 RX ADMIN — SODIUM CHLORIDE, POTASSIUM CHLORIDE, SODIUM LACTATE AND CALCIUM CHLORIDE 100 ML/HR: 600; 310; 30; 20 INJECTION, SOLUTION INTRAVENOUS at 18:53

## 2023-01-19 RX ADMIN — FAMOTIDINE 20 MG: 20 TABLET, FILM COATED ORAL at 12:58

## 2023-01-19 RX ADMIN — FENTANYL CITRATE 50 MCG: 50 INJECTION, SOLUTION INTRAMUSCULAR; INTRAVENOUS at 14:18

## 2023-01-19 RX ADMIN — Medication 100 MCG: at 14:32

## 2023-01-19 RX ADMIN — FLUOXETINE 20 MG: 20 CAPSULE ORAL at 08:27

## 2023-01-19 RX ADMIN — DEXAMETHASONE SODIUM PHOSPHATE 4 MG: 4 INJECTION, SOLUTION INTRAMUSCULAR; INTRAVENOUS at 14:32

## 2023-01-19 NOTE — ROUTINE PROCESS
TRANSFER - IN REPORT:    Verbal report received from [address] [place] [address] RN(name) on Porter Rosado  being received from ED(unit) for ordered procedure      Report consisted of patients Situation, Background, Assessment and   Recommendations(SBAR). Information from the following report(s) SBAR and Kardex was reviewed with the receiving nurse. Opportunity for questions and clarification was provided. Assessment completed upon patients arrival to unit and care assumed.

## 2023-01-19 NOTE — PROGRESS NOTES
completed the initial Spiritual Assessment of the patient, and offered Pastoral Care support to patient and family member present. Listened to her story of being here. Asked about her having an advance directive, on file here and she said she thought that there was one here. Found her document and informed her of its presence here. Patient does not have any Synagogue/cultural needs that will affect patients preferences in health care. Chaplains will continue to follow and will provide pastoral care on an as needed/requested basis.     Lauren Fletcher  [facility] Care Department  [phone]

## 2023-01-19 NOTE — OP NOTES
Cystoscopy, Retrograde Pyelograms, Double J Stent Operative Note  1/19/2023      Brii Ontiveros   1955      Preoperative Diagnosis:  7 mm rt UPJ stone; UTI    Postoperative Diagnosis: Same    Procedure:  Cystoscopy, right Retrograde Pyelograms, right Double J Stent    Surgeon(s):  Luella Paget, MD   Assistant: None  Anesthesia:  General  EBL:  Minimal  Tubes and Drains:  6 Fr x 22 cm JJ  Specimen(s):  Rt renal pelvis urine for cx    Complications:  none    Description of Procedure: The patient was identified and surgical site verification was performed prior to obtaining consent. The patient was brought to the cystoscopy suite. Under adequate general anesthesia the patient was positioned in dorsal lithotomy and prepped and draped. A preoperative Time Out was performed addressing the anticipated surgical site, procedure, and safety precautions. The 19 Fr cystoscope was inserted into the patient's urethral meatus and advanced to the bladder. The anterior urethra was normal.     The bladder was inspected. The ureteral orifices were in their normal anatomic positions. The bladder showed mild trabeculation. Bladder tumors were not noted. Next, a guidewire was passed through the Right ureteral orifice up the ureter. With a ureteral catheter, contrast was injected in retrograde fashion up the ureter. Imaging prior to the procedure showed possible faint stone in the area of the rt UPJ. The ureter was dilated above. The calyces were dilated. Cultures were obtained above the level of obstruction by the open-ended ureteral catheter. Over the guidewire, a jj stent measuring  6 Fr  x  22 cm was positioned in the ureter by fluoro guidance and direct vision. A string was left attached to the stent and cut short left in the bladder. The patient's bladder was drained via the cystoscope. The patient was awakened and transferred to the recovery room in stable condition.     Plan:   Con't Cefipime  Dose of Gent 320 mg to be given ASAP  Observe for sepsis  Will need definitive stone rx later      Cady Daniels MD

## 2023-01-19 NOTE — ED NOTES
Rounded on patient, she is sleeping soundly. No needs at this time. Cardiac monitoring continues in place. Will continue to round.

## 2023-01-19 NOTE — ACP (ADVANCE CARE PLANNING)
Advance Care Planning   Advance Care Planning Inpatient Note  [address] [place] [address]   Spiritual Care Department    Today's Date: 1/19/2023  Unit: [facility]    Received request from . Upon review of chart and communication with care team, patient's decision making abilities are not in question. Patient was/were present in the room during visit. Goals of ACP Conversation:  Discuss Advance Care planning documents    Health Care Decision Makers:      Primary Decision Maker: Monet Tejeda - Daughter - [phone]    Summary:  Verified Documents    Advance Care Planning Documents (Patient Wishes) on file:  Healthcare Power of /Advance Directive appointment of Health care agent  Living Will/ Advance Directive     Assessment:    Patient seen as a new admit to the hospital from bed 17 of the emergency room. Patient was asked if she had an advance directive and she said that she though there might be one here.  Found document in chart and informed patient of its presence    Interventions:      Care Preferences Communicated:  No    Outcomes/Plan:  Existing Advance Directive reviewed with patient; no changes to patient's previously recorded wishes    [place] Harmon [facility] on 1/19/2023 at 1:05 PM

## 2023-01-19 NOTE — H&P
History and Physical          Subjective     HPI: Lakisha Hagen is a 74 y.o. female with a PMHx of recurrent UTIs, depression, DM, HLD, HTN who presented to the ED with c/o lower back pain that started 2 weeks ago, with significant worsening over the last couple days. Pain now radiating to RLQ. She was diagnosed with a UTI and has been taking Macrobid for the past 6 days. She reports associated feelings of incomplete bladder emptying, urinary frequency, poor PO intake, and fever 103 on Saturday. She denies diarrhea, sore throat, nasal congestion, sob, cough. She reports having at least 4 UTIs in the past 6 months treated with different abx. In the ED, VSS. Labs with WBC 20, K 3.2, Cr 1.98 (baseline 0.6), ALT 71, AST 90, alk phos 211. UA 21-35 WBCs, 2+ bacteria. CT with 6mm obstructing stone. Urology was consulted with plans to take patient to OR tomorrow morning.      PMHx:  Past Medical History:   Diagnosis Date    ADHD     Anxiety     Bilateral lumbar radiculopathy     Carotid bruit     right    Depression     Diabetes mellitus (HCC)     HCD (hypertensive cardiovascular disease)     High cholesterol     Hyperlipidemia     Hypertension     Migraine headache     Plantar fasciitis        PSurgHx:  Past Surgical History:   Procedure Laterality Date    HX SEPTOPLASTY  2002    HX TONSILLECTOMY      RI UNLISTED NEUROLOGICAL/NEUROMUSCULAR DX PX  2018    laminectomy        SocialHx:  Social History     Socioeconomic History    Marital status:    Tobacco Use    Smoking status: Former     Packs/day: 0.25     Years: 4.00     Pack years: 1.00     Types: Cigarettes     Quit date: 1989     Years since quittin.0    Smokeless tobacco: Never   Substance and Sexual Activity    Alcohol use: No     Alcohol/week: 1.7 standard drinks     Types: 2 Glasses of wine per week    Drug use: No    Sexual activity: Not Currently       FamilyHx:  Family History   Problem Relation Age of Onset    Hypertension Brother x2    Elevated Lipids Brother     Stroke Mother     Heart Surgery Mother         CABG    Cancer Father         cancer of the mouth    Hypertension Father     Hypertension Brother     Elevated Lipids Brother        Home Medications:  Prior to Admission Medications   Prescriptions Last Dose Informant Patient Reported? Taking? ALPRAZolam (XANAX) 0.5 mg tablet Not Taking  Yes No   Sig: TAKE 1 (ONE) TABLET BY MOUTH DAILY AS NEEDED FOR ANXIETY   Patient not taking: Reported on 1/18/2023   FLUoxetine (PROzac) 20 mg capsule 1/17/2023  Yes Yes   Sig: Take 20 mg by mouth daily. SUMAtriptan (IMITREX) 50 mg tablet Unknown  No No   Sig: TAKE 1 TAB BY MOUTH DAILY AS NEEDED FOR MIGRAINE. atorvastatin (LIPITOR) 80 mg tablet 1/17/2023  No Yes   Sig: TAKE 1 TABLET BY MOUTH EVERY DAY   cyanocobalamin 1,000 mcg tablet 1/17/2023  Yes Yes   Sig: Take 1,000 mcg by mouth daily. ergocalciferol (ERGOCALCIFEROL) 1,250 mcg (50,000 unit) capsule 1/11/2023  No Yes   Sig: TAKE 1 CAPSULE BY MOUTH EVERY FOURTEEN (14) DAYS.   estradioL (Estrace) 0.01 % (0.1 mg/gram) vaginal cream   No No   Sig: Apply pea sized amount 3x weekly around urethra. ferrous sulfate 325 mg (65 mg iron) tablet 1/17/2023  Yes Yes   Sig: Take 325 mg by mouth Daily (before breakfast). hydrOXYzine pamoate (VISTARIL) 25 mg capsule 1/17/2023  Yes Yes   Sig: TAKE 2-3 CAPSULES BY MOUTH AT BEDTIME AS NEEDED FOR SLEEP   lisinopriL (PRINIVIL, ZESTRIL) 10 mg tablet 1/17/2023  No Yes   Sig: TAKE 1 TABLET BY MOUTH EVERY DAY   magnesium oxide (MAG-OX) 400 mg tablet 1/17/2023  Yes Yes   Sig: Take 400 mg by mouth two (2) times a day. metFORMIN ER (GLUCOPHAGE XR) 500 mg tablet 1/17/2023  No Yes   Sig: TAKE 2 TABLETS BY MOUTH TWICE A DAY WITH MEALS   nitrofurantoin (Macrodantin) 100 mg capsule   Yes Yes   Sig: Take 100 mg by mouth nightly.    nitrofurantoin, macrocrystal-monohydrate, (MACROBID) 100 mg capsule   No No   Sig: Take 1 Capsule by mouth two (2) times a day for 7 days.   propranoloL (INDERAL) 40 mg tablet 2023  No Yes   Sig: TAKE 1 TABLET BY MOUTH TWICE A DAY   semaglutide (OZEMPIC) 0.25 mg or 0.5 mg/dose (2 mg/1.5 ml) subq pen Not Taking  No No   Si.5 mg by SubCUTAneous route every seven (7) days. Patient not taking: Reported on 2023      Facility-Administered Medications: None       Allergies: Allergies   Allergen Reactions    Pcn [Penicillins] Rash     Tolerates cephalexin    Sulfa (Sulfonamide Antibiotics) Rash        Review of Systems:  CONST: no weight loss/gain, + fever + chills, no fatigue  Eyes: No change in vision, no itching or drainage  ENT: no sore throat or sinus congestion. PULM: No shortness of breath, no cough or wheeze. CV: no pnd or orthopnea, no CP, no palpitations, no edema  GI: + abdominal pain, + nausea, no vomiting or diarrhea  : + urinary frequency, no urgency, no hesitancy or dysuria. MSK: No joint or muscle pain, + back pain, no neck pain, no recent trauma. INTEG: No rash, no itching, no lesions. ENDO: No polyuria, no polydipsia, no heat or cold intolerance. HEME: No anemia or easy bruising or bleeding.    NEURO: No headache, no dizziness  PSYCH: No anxiety, no depression      Objective     Physical Exam:  Visit Vitals  BP (!) 105/52   Pulse 87   Temp 98 °F (36.7 °C)   Resp 17   Ht 4' 11\" (1.499 m)   Wt 52.6 kg (116 lb)   SpO2 98%   BMI 23.43 kg/m²       General: NAD, appears stated age, alert  Skin: warm, dry, no rashes, flushed  Eyes: PERRL, sclera is non-icteric  HENT: normocephalic/atraumatic, dry mucus membranes  Respiratory: CTA with no signs of respiratory distress  Cardiovascular: RRR, no m/r/g, no cyanosis or peripheral edema of extremities  GI: soft, RLQ TTP, no guarding or rigidity, normal bowel sounds  MSK: right CVA TTP  Neuro: moves all extremities, no focal deficits, normal speech  Psych: appropriate mood and affect, no visual or auditory hallucinations    Laboratory Studies:  Recent Results (from the past 24 hour(s))   URINALYSIS W/ RFLX MICROSCOPIC    Collection Time: 01/18/23  6:25 PM   Result Value Ref Range    Color YELLOW      Appearance CLOUDY      Specific gravity 1.025 1.005 - 1.030      pH (UA) 5.5 5.0 - 8.0      Protein 100 (A) NEG mg/dL    Glucose Negative NEG mg/dL    Ketone TRACE (A) NEG mg/dL    Bilirubin Negative NEG      Blood Negative NEG      Urobilinogen 1.0 0.2 - 1.0 EU/dL    Nitrites Negative NEG      Leukocyte Esterase MODERATE (A) NEG     URINE MICROSCOPIC ONLY    Collection Time: 01/18/23  6:25 PM   Result Value Ref Range    WBC 21 to 35 0 - 4 /hpf    RBC 0 to 3 0 - 5 /hpf    Epithelial cells 2+ 0 - 5 /lpf    Bacteria 2+ (A) NEG /hpf   CBC WITH AUTOMATED DIFF    Collection Time: 01/18/23  6:45 PM   Result Value Ref Range    WBC 20.0 (H) 4.6 - 13.2 K/uL    RBC 3.49 (L) 4.20 - 5.30 M/uL    HGB 10.6 (L) 12.0 - 16.0 g/dL    HCT 30.7 (L) 35.0 - 45.0 %    MCV 88.0 78.0 - 100.0 FL    MCH 30.4 24.0 - 34.0 PG    MCHC 34.5 31.0 - 37.0 g/dL    RDW 14.0 11.6 - 14.5 %    PLATELET 381 (H) 457 - 420 K/uL    MPV 9.7 9.2 - 11.8 FL    NRBC 0.0 0  WBC    ABSOLUTE NRBC 0.00 0.00 - 0.01 K/uL    NEUTROPHILS 83 (H) 40 - 73 %    LYMPHOCYTES 11 (L) 21 - 52 %    MONOCYTES 3 3 - 10 %    EOSINOPHILS 0 0 - 5 %    BASOPHILS 1 0 - 2 %    IMMATURE GRANULOCYTES 2 (H) 0.0 - 0.5 %    ABS. NEUTROPHILS 16.7 (H) 1.8 - 8.0 K/UL    ABS. LYMPHOCYTES 2.2 0.9 - 3.6 K/UL    ABS. MONOCYTES 0.6 0.05 - 1.2 K/UL    ABS. EOSINOPHILS 0.1 0.0 - 0.4 K/UL    ABS. BASOPHILS 0.1 0.0 - 0.1 K/UL    ABS. IMM.  GRANS. 0.3 (H) 0.00 - 0.04 K/UL    DF AUTOMATED     METABOLIC PANEL, COMPREHENSIVE    Collection Time: 01/18/23  6:45 PM   Result Value Ref Range    Sodium 137 136 - 145 mmol/L    Potassium 3.2 (L) 3.5 - 5.5 mmol/L    Chloride 105 100 - 111 mmol/L    CO2 23 21 - 32 mmol/L    Anion gap 9 3.0 - 18 mmol/L    Glucose 137 (H) 74 - 99 mg/dL    BUN 36 (H) 7.0 - 18 MG/DL    Creatinine 1.98 (H) 0.6 - 1.3 MG/DL    BUN/Creatinine ratio 18 12 - 20      eGFR 28 (L) >60 ml/min/1.73m2    Calcium 8.9 8.5 - 10.1 MG/DL    Bilirubin, total 0.3 0.2 - 1.0 MG/DL    ALT (SGPT) 71 (H) 13 - 56 U/L    AST (SGOT) 90 (H) 10 - 38 U/L    Alk. phosphatase 211 (H) 45 - 117 U/L    Protein, total 6.8 6.4 - 8.2 g/dL    Albumin 2.1 (L) 3.4 - 5.0 g/dL    Globulin 4.7 (H) 2.0 - 4.0 g/dL    A-G Ratio 0.4 (L) 0.8 - 1.7     LIPASE    Collection Time: 01/18/23  6:45 PM   Result Value Ref Range    Lipase 286 73 - 393 U/L   MAGNESIUM    Collection Time: 01/18/23  6:45 PM   Result Value Ref Range    Magnesium 2.1 1.6 - 2.6 mg/dL   POC LACTIC ACID    Collection Time: 01/18/23  7:45 PM   Result Value Ref Range    Lactic Acid (POC) 1.35 0.40 - 2.00 mmol/L   COVID-19 RAPID TEST    Collection Time: 01/18/23  9:10 PM   Result Value Ref Range    Specimen source Nasopharyngeal      COVID-19 rapid test Not detected         Imaging Reviewed:  CT ABD PELV WO CONT    Result Date: 1/18/2023  EXAM: CT ABD PELV WO CONT CLINICAL INDICATION/HISTORY: right flank pain TECHNIQUE: Contiguous axial images were obtained through the abdomen and pelvis. From these, sagittal and coronal reconstructions were generated. Contrast : None  CT scans at this facility are performed using dose optimization technique as appropriate with performed exam, to include automated exposure control, adjustment of mA and/or kV according to patient's size (including appropriate matching for site-specific examinations), or use of iterative reconstruction technique. COMPARISON: Ultrasound 12/16/2022 FINDINGS: Lower chest: Bilateral lung bases are clear. The base of the heart is within limits. Liver: No focal lesions identified. Gallbladder/Biliary: No calcified gallstones identified. Spleen: Normal in size and contour. 1.6 cm accessory spleen at splenic hilum. Pancreas: Within normal limits for technique. Kidneys, Urinary Bladder:  Enlargement of the right kidney with hydroureteronephrosis and perinephric fat stranding.  Proximal right ureteral stone measuring 5 mm. Punctate intrarenal stone at the inferior pole left kidney. . Urinary bladder is decompressed limiting evaluation but grossly unremarkable. Adrenal Glands: Unremarkable Bowels/Mesentery: No obstruction or mesenteric inflammation. Peritoneum/Abdominal Wall: Unremarkable Pelvic organs: Small calcifications of the uterus consistent with fibroids. Vascular: Aorta unremarkable for age. IVC unremarkable. Lymph Nodes: No adenopathy. Bones: No acute findings. Grade 1 anterolisthesis L4 on L5 with uncovering of disc. Probable moderate canal stenosis. Right hydroureteronephrosis to the level of a proximal ureteral stone measuring 6 mm. Punctate intrarenal stone left kidney.           Assessment/Plan     Hospital Problems  Date Reviewed: 1/5/2023            Codes Class Noted POA    Hypokalemia ICD-10-CM: E87.6  ICD-9-CM: 276.8  1/18/2023 Yes        Hydroureteronephrosis ICD-10-CM: N13.30  ICD-9-CM: 273  1/18/2023 Yes        Leukocytosis ICD-10-CM: L51.374  ICD-9-CM: 288.60  1/18/2023 Yes        CONNER (acute kidney injury) [facility]) ICD-10-CM: N17.9  ICD-9-CM: 584.9  1/18/2023 Yes        Kidney stone on right side ICD-10-CM: N20.0  ICD-9-CM: 592.0  1/18/2023 Yes        Transaminitis ICD-10-CM: R74.01  ICD-9-CM: 790.4  1/18/2023 Yes        Type 2 diabetes mellitus without complication (HCC) R-82-GN: E11.9  ICD-9-CM: 250.00  6/17/2015 Yes           Obstructive uropathy, Right hydroureteronephrosis, nephrolithiasis, complicated UTI  - urology consulted, appreciate assistance  - f/up Bcx, Ucx  - NPO p MN for surgery  - obtain PVR, if > 300cc, place durbin  - cont IV cefepime  - trend CBC, temp  - check procal tomorrow  - PRN pain control    CONNER: 2/2 above  - IV fluids  - monitor BMP  - hold ACEI and metformin    DM  - ssi, monitor achs  - recent a1c 5.5    Transaminitis  - RUQ US  - trend LFTs  - check AHP  - hold statin    Hypokalemia  - replaced in ED, monitor      Anticipated Discharge: 2 days    DVT Prophylaxis:  []Lovenox  []Hep SQ  [x]SCDs  []Coumadin []DOAC  []On Heparin gtt     I have personally reviewed all pertinent labs, films and EKGs that have officially resulted. I reviewed available electronic documentation outlining the initial presentation as well as the emergency room physician's encounter.    Time spent reviewing records, independently interpreting results, obtaining history from patient or caregiver, performing physical exam, ordering tests and medications, documenting in the chart, and coordinating overall care is 75 minutes    Marcell Cox PA-C  [address] [facility]  Office:  [phone]  Pager: [phone]

## 2023-01-19 NOTE — ED NOTES
Assumed care of patient. Patient A/O x 4, complaining of flank pain x 2 weeks 7/10. Patient denies other complaints at this time. Will administer medication as ordered by provider. Patient's daughter at bedside.

## 2023-01-19 NOTE — PROGRESS NOTES
Levofloxacin  mg x 1 was therapeutically interchanged for Ciprofloxacin 400 mg IV x 1 per the P&T Committee approved Therapeutic Interchanges Policy.     Cecille June, PHARMD,   1/18/2023 7:29 PM

## 2023-01-19 NOTE — PROGRESS NOTES
Pharmacy Note     Cefepime 2gm Q8h ordered for treatment of UTI. Per [facility] Policy, Cefepime will be changed to 2gm now then 1 gm Q24. Estimated Creatinine Clearance: Estimated Creatinine Clearance: 21.8 mL/min (A) (based on SCr of 1.98 mg/dL (H)). Dialysis Status, CONNER, CKD: conner    BMI:  Body mass index is 23.43 kg/m². Rationale for Adjustment:  [facility] B-Lactam extended infusion policy    Pharmacy will continue to monitor and adjust dose as necessary. Please call with any questions.     Thank you,  Nat Benson, PHARMD

## 2023-01-19 NOTE — ED NOTES
Pt is ambulatory to bathroom, bladder scan shows residual amount of 75mL post void. Pt is back in bed, cardiac monitoring continues. Will continue to round.

## 2023-01-19 NOTE — PERIOP NOTES
Daughter updated of room numbers. TRANSFER - OUT REPORT:    Verbal report given to Silke(name) on Carlota Rayo  being transferred to [facility](unit) for routine post - op       Report consisted of patients Situation, Background, Assessment and   Recommendations(SBAR). Information from the following report(s) SBAR, OR Summary, Intake/Output, MAR, Recent Results, Cardiac Rhythm NSR, and Alarm Parameters  was reviewed with the receiving nurse. Lines:   Peripheral IV 01/18/23 Left Antecubital (Active)   Site Assessment Clean, dry, & intact 01/18/23 1800   Phlebitis Assessment 0 01/18/23 1800   Infiltration Assessment 0 01/18/23 1800   Dressing Status Clean, dry, & intact 01/18/23 1800   Dressing Type Tape;Transparent 01/18/23 1800   Hub Color/Line Status Pink;Flushed;Patent 01/18/23 1800   Action Taken Blood drawn 01/18/23 1800        Opportunity for questions and clarification was provided.       Patient transported with:   [employer]

## 2023-01-19 NOTE — PROGRESS NOTES
01/19/23 1811   Vitals   Temp 97.9 °F (36.6 °C)   Temp Source Oral   Pulse (Heart Rate) 82   Heart Rate Source Monitor   Resp Rate 17   O2 Sat (%) 95 %   /77   MAP (Calculated) 92   BP 1 Location Right upper arm   BP 1 Method Automatic   BP Patient Position At rest   Pain 1   Pain Scale 1 Numeric (0 - 10)   Pain Intensity 1 0   Patient Stated Pain Goal 3   POSS Scale   Opioid Sedation Scale 1   Oxygen Therapy   O2 Device None (Room air)     Received patient from PACU in no apparent distress. Oriented to room and call light.

## 2023-01-19 NOTE — ANESTHESIA PREPROCEDURE EVALUATION
Relevant Problems   No relevant active problems       Anesthetic History   No history of anesthetic complications            Review of Systems / Medical History  Patient summary reviewed and pertinent labs reviewed    Pulmonary                   Neuro/Psych         Psychiatric history     Cardiovascular    Hypertension                   GI/Hepatic/Renal         Renal disease: stones       Endo/Other    Diabetes: type 2    Arthritis     Other Findings              Physical Exam    Airway  Mallampati: II  TM Distance: 4 - 6 cm  Neck ROM: normal range of motion   Mouth opening: Normal     Cardiovascular    Rhythm: regular  Rate: normal         Dental    Dentition: Poor dentition     Pulmonary  Breath sounds clear to auscultation               Abdominal  GI exam deferred       Other Findings            Anesthetic Plan    ASA: 3  Anesthesia type: general          Induction: Intravenous  Anesthetic plan and risks discussed with: Patient

## 2023-01-19 NOTE — CONSULTS
ICD-10-CM ICD-9-CM    1. Kidney stone  N20.0 592.0       2. Hydroureteronephrosis  N13.30 591       3. CONNER (acute kidney injury) ([facility] [address]  N17.9 584.9       4. Leukocytosis, unspecified type  D72.829 288.60       5. Cystitis without hematuria  N30.90 595.9               ASSESSMENT:   6mm Right proximal ureteral stone, hydroureteronephrosis  H/o recurrent UTI- on Macrobid    UA 1/18/23: neg nitrites, mod LE, 2+ bacteria   WBC 17.7 < 20.0;  LA 1.35   Afebrile, VSS    CONNER   Creat 1.61 < 1.98   Baseline Creat 0.65    H/o kidney stones   DM  HTN      PLAN:    Appreciate overall management per primary  Abx per primary  Follow cultures, tailor abx to sensitivities   To OR for cysto, R RPG, R JJ stent placement   NPO   Hold [facility]   Consent signed  Following     Follow up arranged? Pt will need definitive stone surgery as an outpatient when infection clears     Suzanne Mishra PA-C  Pager: [phone], M-F 8AM-5PM    I was present and have reviewed the diagnosis and discussed the plan and I agree. The procedure, risks and complications were reviewed at length. All questions were answered and the pt desires to proceed. Renny Simon MD  [facility]  [phone]      January 19, 2023         Chief Complaint   Patient presents with    Flank Pain       HISTORY OF PRESENT ILLNESS:  Sol Manjarrez is a 74 y.o. female who is seen in consultation as referred by Jessie James NP for kidney stones, hydronephrosis, urinary infection and flank pain. Pt has a PMH of kidney stones and recurrent UTIs. She presented to the ED on 1/18 with right flank pain radiating to right abdomen, intermittent fevers, and nausea x 1 week. She was recently seen by [facility] (MOJGAN Lanza) and diagnosed with a UTI on 1/5- patient reports symptoms of Dysuria and was started on Macrobid. On presentation, patient was afebrile and VSS.  ER work-up significant for CT findings of a 6mm right proximal ureteral stone and hydroureteronephrosis. UA is positive for LE and bacteria, negative nitrites. Pt has been on antibiotics for UTI. CONNER present with a Creat 1.9, baseline around 0.6 just 2 months ago. Today, patient reports improvement in her right flank pain. She is voiding well without hematuria. Notes some dysuria. Temp 1002. H/o also includes kidney stones, gross hematuria with negative urine cytology 23.       Past Medical History:   Diagnosis Date    ADHD     Anxiety     Bilateral lumbar radiculopathy     Carotid bruit     right    Depression     Diabetes mellitus (HCC)     HCD (hypertensive cardiovascular disease)     High cholesterol     Hyperlipidemia     Hypertension     Migraine headache     Plantar fasciitis        Past Surgical History:   Procedure Laterality Date    HX SEPTOPLASTY  2002    HX TONSILLECTOMY      ID UNLISTED NEUROLOGICAL/NEUROMUSCULAR DX PX  2018    laminectomy        Social History     Tobacco Use    Smoking status: Former     Packs/day: 0.25     Years: 4.00     Pack years: 1.00     Types: Cigarettes     Quit date: 1989     Years since quittin.0    Smokeless tobacco: Never   Substance Use Topics    Alcohol use: No     Alcohol/week: 1.7 standard drinks     Types: 2 Glasses of wine per week    Drug use: No       Allergies   Allergen Reactions    Pcn [Penicillins] Rash     Tolerates cephalexin    Sulfa (Sulfonamide Antibiotics) Rash       Family History   Problem Relation Age of Onset    Hypertension Brother         x2    Elevated Lipids Brother     Stroke Mother     Heart Surgery Mother         CABG    Cancer Father         cancer of the mouth    Hypertension Father     Hypertension Brother     Elevated Lipids Brother        Current Facility-Administered Medications   Medication Dose Route Frequency Provider Last Rate Last Admin    cefepime (MAXIPIME) 1 g in 0.9% sodium chloride (MBP/ADV) 50 mL MBP  1 g IntraVENous Q24H Hamida, MOJGAN Oliveira        sodium chloride (NS) flush 5-40 mL  5-40 mL IntraVENous Q8H ReprogleNyla PA        sodium chloride (NS) flush 5-40 mL  5-40 mL IntraVENous PRN ReproglSteven pacheco PA        acetaminophen (TYLENOL) tablet 650 mg  650 mg Oral Q6H PRN ReproglSteven pacheco PA        Or    acetaminophen (TYLENOL) suppository 650 mg  650 mg Rectal Q6H PRN ReproglSteven pacheco PA        polyethylene glycol (MIRALAX) packet 17 g  17 g Oral DAILY PRN ReprogleSteven PA        bisacodyL (DULCOLAX) suppository 10 mg  10 mg Rectal DAILY PRN ReproglSteven pacheco PA        ondansetron (ZOFRAN ODT) tablet 4 mg  4 mg Oral Q8H PRN ReproglSteven pacheco PA        Or    ondansetron (ZOFRAN) injection 4 mg  4 mg IntraVENous Q6H PRN ReproglSteven pacheco PA        lactated Ringers infusion  100 mL/hr IntraVENous CONTINUOUS ReproglSteven pacheco PA        ferrous sulfate tablet 325 mg  325 mg Oral ACB ReprogleSteven PA        FLUoxetine (PROzac) capsule 20 mg  20 mg Oral DAILY ReproglSteven pacheco PA        insulin lispro (HUMALOG) injection   SubCUTAneous AC&HS ReproglSteven pacheco PA        glucose chewable tablet 16 g  4 Tablet Oral PRN ReproglSteven pacheco PA        glucagon (GLUCAGEN) injection 1 mg  1 mg IntraMUSCular PRN ReproglNyla pacheco PA        dextrose 10% infusion 0-250 mL  0-250 mL IntraVENous PRN ReproglSteven pacheco PA         Current Outpatient Medications   Medication Sig Dispense Refill    nitrofurantoin (Macrodantin) 100 mg capsule Take 100 mg by mouth nightly. atorvastatin (LIPITOR) 80 mg tablet TAKE 1 TABLET BY MOUTH EVERY DAY 90 Tablet 3    propranoloL (INDERAL) 40 mg tablet TAKE 1 TABLET BY MOUTH TWICE A  Tablet 3    hydrOXYzine pamoate (VISTARIL) 25 mg capsule TAKE 2-3 CAPSULES BY MOUTH AT BEDTIME AS NEEDED FOR SLEEP      cyanocobalamin 1,000 mcg tablet Take 1,000 mcg by mouth daily. ferrous sulfate 325 mg (65 mg iron) tablet Take 325 mg by mouth Daily (before breakfast).       magnesium oxide (MAG-OX) 400 mg tablet Take 400 mg by mouth two (2) times a day. ergocalciferol (ERGOCALCIFEROL) 1,250 mcg (50,000 unit) capsule TAKE 1 CAPSULE BY MOUTH EVERY FOURTEEN (14) DAYS. 2 Capsule 5    metFORMIN ER (GLUCOPHAGE XR) 500 mg tablet TAKE 2 TABLETS BY MOUTH TWICE A DAY WITH MEALS 360 Tablet 3    lisinopriL (PRINIVIL, ZESTRIL) 10 mg tablet TAKE 1 TABLET BY MOUTH EVERY DAY 90 Tablet 3    FLUoxetine (PROzac) 20 mg capsule Take 20 mg by mouth daily. estradioL (Estrace) 0.01 % (0.1 mg/gram) vaginal cream Apply pea sized amount 3x weekly around urethra. 42.5 g 3    ALPRAZolam (XANAX) 0.5 mg tablet TAKE 1 (ONE) TABLET BY MOUTH DAILY AS NEEDED FOR ANXIETY (Patient not taking: Reported on 1/18/2023)      SUMAtriptan (IMITREX) 50 mg tablet TAKE 1 TAB BY MOUTH DAILY AS NEEDED FOR MIGRAINE. 9 Tablet 5    semaglutide (OZEMPIC) 0.25 mg or 0.5 mg/dose (2 mg/1.5 ml) subq pen 0.5 mg by SubCUTAneous route every seven (7) days. (Patient not taking: Reported on 1/18/2023) 12 Pen 3         Review of Systems:  ROS is:    Negative for: Ophthalmologic issues, ENT issues, Cardiovascular issues, respiratory issues, GI issues, neurologic issues, hematoogic issues, skin lesions, musculoskeletal issues, psychiatric issues  Exceptions: yes    Positive for:    Flank pain, abdominal pain, dysuria, fevers       PHYSICAL EXAMINATION:     Visit Vitals  /77 (BP 1 Location: Right arm, BP Patient Position: At rest)   Pulse 86   Temp 100.2 °F (37.9 °C)   Resp 18   Ht 4' 11\" (1.499 m)   Wt 52.6 kg (116 lb)   LMP  (LMP Unknown)   SpO2 97%   BMI 23.43 kg/m²     Constitutional: Well developed, well-nourished female in no acute distress. HEENT: Normocephalic, Atraumatic   CV:   no edema   Respiratory: No respiratory distress or difficulties breathing   Abdomen:  Soft and nontender.  Female:  No CVAT  Skin:  Normal color. No evidence of jaundice. Neuro/Psych:  Patient with appropriate affect. Alert and oriented.         REVIEW OF LABS AND IMAGING:      CT A/P 1/18/23  IMPRESSION  Right hydroureteronephrosis to the level of a proximal ureteral stone measuring  6 mm. Punctate intrarenal stone left kidney. Kidneys, Urinary Bladder:  Enlargement of the right kidney with  hydroureteronephrosis and perinephric fat stranding. Proximal right ureteral  stone measuring 5 mm. Punctate intrarenal stone at the inferior pole left  kidney. . Urinary bladder is decompressed limiting evaluation but grossly  unremarkable. Labs: Results:   Chemistry    Recent Labs     01/19/23  0504 01/18/23  1845   * 137*    137   K 3.5 3.2*   * 105   CO2 19* 23   BUN 32* 36*   CREA 1.61* 1.98*   CA 8.4* 8.9   AGAP 7 9   BUCR 20 18   * 211*   TP 5.8* 6.8   ALB 1.8* 2.1*   GLOB 4.0 4.7*   AGRAT 0.5* 0.4*      CBC w/Diff Recent Labs     01/19/23  0504 01/18/23 1845   WBC 17.1* 20.0*   RBC 3.11* 3.49*   HGB 9.4* 10.6*   HCT 27.2* 30.7*    485*   GRANS  --  83*   LYMPH  --  11*   EOS  --  0      Cultures No results for input(s): CULT in the last 72 hours. All Micro Results       Procedure Component Value Units Date/Time    CULTURE, URINE [[id number]] Collected: 01/18/23 1825    Order Status: Sent Specimen: Urine from Clean catch Updated: 01/19/23 0639    COVID-19 RAPID TEST [[id number]] Collected: 01/18/23 2110    Order Status: Completed Specimen: Nasopharyngeal Updated: 01/18/23 2134     Specimen source Nasopharyngeal        COVID-19 rapid test Not detected        Comment: Rapid Abbott ID Now       Rapid NAAT:  The specimen is NEGATIVE for SARS-CoV-2, the novel coronavirus associated with COVID-19. Negative results should be treated as presumptive and, if inconsistent with clinical signs and symptoms or necessary for patient management, should be tested with an alternative molecular assay. Negative results do not preclude SARS-CoV-2 infection and should not be used as the sole basis for patient management decisions.        This test has been authorized by the FDA under an Emergency Use Authorization (EUA) for use by authorized laboratories.    Fact sheet for Healthcare Providers:  http://www.jorgito.holly/  Fact sheet for Patients: http://www.jorgito.holly/       Methodology: Isothermal Nucleic Acid Amplification         CULTURE, BLOOD [[id number]] Collected: 01/18/23 1945    Order Status: Sent Specimen: Blood Updated: 01/18/23 2027    CULTURE, BLOOD [[id number]] Collected: 01/18/23 1940    Order Status: Sent Specimen: Blood Updated: 01/18/23 2026              Urinalysis Color   Date Value Ref Range Status   01/18/2023 YELLOW   Final     Appearance   Date Value Ref Range Status   01/18/2023 CLOUDY   Final     Specific gravity   Date Value Ref Range Status   01/18/2023 1.025 1.005 - 1.030   Final     pH (UA)   Date Value Ref Range Status   01/18/2023 5.5 5.0 - 8.0   Final     Protein   Date Value Ref Range Status   01/18/2023 100 (A) NEG mg/dL Final     Ketone   Date Value Ref Range Status   01/18/2023 TRACE (A) NEG mg/dL Final     Bilirubin   Date Value Ref Range Status   01/18/2023 Negative NEG   Final     Blood   Date Value Ref Range Status   01/18/2023 Negative NEG   Final     Urobilinogen   Date Value Ref Range Status   01/18/2023 1.0 0.2 - 1.0 EU/dL Final     Nitrites   Date Value Ref Range Status   01/18/2023 Negative NEG   Final     Leukocyte Esterase   Date Value Ref Range Status   01/18/2023 MODERATE (A) NEG   Final     Potassium   Date Value Ref Range Status   01/19/2023 3.5 3.5 - 5.5 mmol/L Final     Creatinine   Date Value Ref Range Status   01/19/2023 1.61 (H) 0.6 - 1.3 MG/DL Final     BUN   Date Value Ref Range Status   01/19/2023 32 (H) 7.0 - 18 MG/DL Final      Coagulation Lab Results   Component Value Date/Time    INR, External 2.5 10/13/2014 12:00 AM

## 2023-01-19 NOTE — ANESTHESIA POSTPROCEDURE EVALUATION
Procedure(s):  CYSTOSCOPY RIGHT RETROGRADE PYLEOGRAM RIGHT DOUBLE J URETERAL STENT INSERTION WITH C-ARM. general    Anesthesia Post Evaluation      Multimodal analgesia: multimodal analgesia used between 6 hours prior to anesthesia start to PACU discharge  Patient location during evaluation: bedside  Patient participation: complete - patient participated  Level of consciousness: awake  Pain management: adequate  Airway patency: patent  Anesthetic complications: no  Cardiovascular status: stable  Respiratory status: acceptable  Hydration status: acceptable  Post anesthesia nausea and vomiting:  controlled      INITIAL Post-op Vital signs:   Vitals Value Taken Time   /58 01/19/23 1632   Temp 36.9 °C (98.5 °F) 01/19/23 1612   Pulse 73 01/19/23 1637   Resp 19 01/19/23 1637   SpO2 95 % 01/19/23 1637   Vitals shown include unvalidated device data.

## 2023-01-19 NOTE — ED NOTES
Assumed care of patient. She is settled into room. Cardiac monitoring initiated, VS updated. Pt is covered with a warm blanket for comfort. Lights are dimmed for comfort. Will continue to round.

## 2023-01-20 LAB
ALBUMIN SERPL-MCNC: 1.6 G/DL (ref 3.4–5)
ALBUMIN/GLOB SERPL: 0.4 (ref 0.8–1.7)
ALP SERPL-CCNC: 155 U/L (ref 45–117)
ALT SERPL-CCNC: 47 U/L (ref 13–56)
ANION GAP SERPL CALC-SCNC: 6 MMOL/L (ref 3–18)
AST SERPL-CCNC: 40 U/L (ref 10–38)
BASOPHILS # BLD: 0 K/UL (ref 0–0.1)
BASOPHILS # BLD: 0.1 K/UL (ref 0–0.1)
BASOPHILS NFR BLD: 0 % (ref 0–2)
BASOPHILS NFR BLD: 0 % (ref 0–2)
BILIRUB SERPL-MCNC: 0.4 MG/DL (ref 0.2–1)
BUN SERPL-MCNC: 39 MG/DL (ref 7–18)
BUN/CREAT SERPL: 27 (ref 12–20)
CALCIUM SERPL-MCNC: 8.9 MG/DL (ref 8.5–10.1)
CHLORIDE SERPL-SCNC: 111 MMOL/L (ref 100–111)
CO2 SERPL-SCNC: 19 MMOL/L (ref 21–32)
CREAT SERPL-MCNC: 1.42 MG/DL (ref 0.6–1.3)
DIFFERENTIAL METHOD BLD: ABNORMAL
DIFFERENTIAL METHOD BLD: ABNORMAL
EOSINOPHIL # BLD: 0 K/UL (ref 0–0.4)
EOSINOPHIL # BLD: 0.1 K/UL (ref 0–0.4)
EOSINOPHIL NFR BLD: 0 % (ref 0–5)
EOSINOPHIL NFR BLD: 1 % (ref 0–5)
ERYTHROCYTE [DISTWIDTH] IN BLOOD BY AUTOMATED COUNT: 14.4 % (ref 11.6–14.5)
ERYTHROCYTE [DISTWIDTH] IN BLOOD BY AUTOMATED COUNT: 14.5 % (ref 11.6–14.5)
GLOBULIN SER CALC-MCNC: 4 G/DL (ref 2–4)
GLUCOSE BLD STRIP.AUTO-MCNC: 131 MG/DL (ref 70–110)
GLUCOSE BLD STRIP.AUTO-MCNC: 152 MG/DL (ref 70–110)
GLUCOSE BLD STRIP.AUTO-MCNC: 190 MG/DL (ref 70–110)
GLUCOSE BLD STRIP.AUTO-MCNC: 225 MG/DL (ref 70–110)
GLUCOSE BLD STRIP.AUTO-MCNC: 296 MG/DL (ref 70–110)
GLUCOSE SERPL-MCNC: 181 MG/DL (ref 74–99)
HCT VFR BLD AUTO: 29.3 % (ref 35–45)
HCT VFR BLD AUTO: 30.5 % (ref 35–45)
HGB BLD-MCNC: 10.3 G/DL (ref 12–16)
HGB BLD-MCNC: 9.8 G/DL (ref 12–16)
IMM GRANULOCYTES # BLD AUTO: 0.2 K/UL (ref 0–0.04)
IMM GRANULOCYTES # BLD AUTO: 0.4 K/UL (ref 0–0.04)
IMM GRANULOCYTES NFR BLD AUTO: 1 % (ref 0–0.5)
IMM GRANULOCYTES NFR BLD AUTO: 2 % (ref 0–0.5)
LACTATE SERPL-SCNC: 3.1 MMOL/L (ref 0.4–2)
LYMPHOCYTES # BLD: 1.8 K/UL (ref 0.9–3.6)
LYMPHOCYTES # BLD: 2 K/UL (ref 0.9–3.6)
LYMPHOCYTES NFR BLD: 11 % (ref 21–52)
LYMPHOCYTES NFR BLD: 14 % (ref 21–52)
MAGNESIUM SERPL-MCNC: 1.8 MG/DL (ref 1.6–2.6)
MCH RBC QN AUTO: 29.7 PG (ref 24–34)
MCH RBC QN AUTO: 30.1 PG (ref 24–34)
MCHC RBC AUTO-ENTMCNC: 33.4 G/DL (ref 31–37)
MCHC RBC AUTO-ENTMCNC: 33.8 G/DL (ref 31–37)
MCV RBC AUTO: 88.8 FL (ref 78–100)
MCV RBC AUTO: 89.2 FL (ref 78–100)
MONOCYTES # BLD: 0.5 K/UL (ref 0.05–1.2)
MONOCYTES # BLD: 0.5 K/UL (ref 0.05–1.2)
MONOCYTES NFR BLD: 3 % (ref 3–10)
MONOCYTES NFR BLD: 3 % (ref 3–10)
NEUTS SEG # BLD: 11.8 K/UL (ref 1.8–8)
NEUTS SEG # BLD: 13.8 K/UL (ref 1.8–8)
NEUTS SEG NFR BLD: 81 % (ref 40–73)
NEUTS SEG NFR BLD: 83 % (ref 40–73)
NRBC # BLD: 0 K/UL (ref 0–0.01)
NRBC # BLD: 0 K/UL (ref 0–0.01)
NRBC BLD-RTO: 0 PER 100 WBC
NRBC BLD-RTO: 0 PER 100 WBC
PLATELET # BLD AUTO: 400 K/UL (ref 135–420)
PLATELET # BLD AUTO: 452 K/UL (ref 135–420)
PMV BLD AUTO: 9.6 FL (ref 9.2–11.8)
PMV BLD AUTO: 9.6 FL (ref 9.2–11.8)
POTASSIUM SERPL-SCNC: 4.3 MMOL/L (ref 3.5–5.5)
PROT SERPL-MCNC: 5.6 G/DL (ref 6.4–8.2)
RBC # BLD AUTO: 3.3 M/UL (ref 4.2–5.3)
RBC # BLD AUTO: 3.42 M/UL (ref 4.2–5.3)
SODIUM SERPL-SCNC: 136 MMOL/L (ref 136–145)
WBC # BLD AUTO: 14.6 K/UL (ref 4.6–13.2)
WBC # BLD AUTO: 16.5 K/UL (ref 4.6–13.2)

## 2023-01-20 PROCEDURE — 74011250636 HC RX REV CODE- 250/636: Performed by: INTERNAL MEDICINE

## 2023-01-20 PROCEDURE — 74011000250 HC RX REV CODE- 250: Performed by: INTERNAL MEDICINE

## 2023-01-20 PROCEDURE — 65270000046 HC RM TELEMETRY

## 2023-01-20 PROCEDURE — 74011250637 HC RX REV CODE- 250/637: Performed by: PHYSICIAN ASSISTANT

## 2023-01-20 PROCEDURE — 74011000258 HC RX REV CODE- 258: Performed by: PHYSICIAN ASSISTANT

## 2023-01-20 PROCEDURE — 36415 COLL VENOUS BLD VENIPUNCTURE: CPT

## 2023-01-20 PROCEDURE — 2709999900 HC NON-CHARGEABLE SUPPLY

## 2023-01-20 PROCEDURE — 97161 PT EVAL LOW COMPLEX 20 MIN: CPT

## 2023-01-20 PROCEDURE — 77030018842 HC SOL IRR SOD CL 9% BAXT -A

## 2023-01-20 PROCEDURE — 80053 COMPREHEN METABOLIC PANEL: CPT

## 2023-01-20 PROCEDURE — 97535 SELF CARE MNGMENT TRAINING: CPT

## 2023-01-20 PROCEDURE — 74011000250 HC RX REV CODE- 250: Performed by: PHYSICIAN ASSISTANT

## 2023-01-20 PROCEDURE — 85025 COMPLETE CBC W/AUTO DIFF WBC: CPT

## 2023-01-20 PROCEDURE — 74011250637 HC RX REV CODE- 250/637: Performed by: EMERGENCY MEDICINE

## 2023-01-20 PROCEDURE — 97166 OT EVAL MOD COMPLEX 45 MIN: CPT

## 2023-01-20 PROCEDURE — 83605 ASSAY OF LACTIC ACID: CPT

## 2023-01-20 PROCEDURE — 83735 ASSAY OF MAGNESIUM: CPT

## 2023-01-20 PROCEDURE — 99232 SBSQ HOSP IP/OBS MODERATE 35: CPT | Performed by: EMERGENCY MEDICINE

## 2023-01-20 PROCEDURE — 74011250636 HC RX REV CODE- 250/636: Performed by: PHYSICIAN ASSISTANT

## 2023-01-20 PROCEDURE — 74011636637 HC RX REV CODE- 636/637: Performed by: PHYSICIAN ASSISTANT

## 2023-01-20 PROCEDURE — 82962 GLUCOSE BLOOD TEST: CPT

## 2023-01-20 PROCEDURE — 74011250636 HC RX REV CODE- 250/636: Performed by: EMERGENCY MEDICINE

## 2023-01-20 RX ORDER — TAMSULOSIN HYDROCHLORIDE 0.4 MG/1
0.4 CAPSULE ORAL
Status: DISCONTINUED | OUTPATIENT
Start: 2023-01-20 | End: 2023-01-23 | Stop reason: HOSPADM

## 2023-01-20 RX ORDER — MIDODRINE HYDROCHLORIDE 5 MG/1
10 TABLET ORAL
Status: DISCONTINUED | OUTPATIENT
Start: 2023-01-20 | End: 2023-01-22

## 2023-01-20 RX ORDER — SODIUM CHLORIDE 9 MG/ML
75 INJECTION, SOLUTION INTRAVENOUS CONTINUOUS
Status: DISCONTINUED | OUTPATIENT
Start: 2023-01-20 | End: 2023-01-23 | Stop reason: HOSPADM

## 2023-01-20 RX ADMIN — SODIUM CHLORIDE, PRESERVATIVE FREE 10 ML: 5 INJECTION INTRAVENOUS at 07:53

## 2023-01-20 RX ADMIN — SODIUM CHLORIDE 75 ML/HR: 9 INJECTION, SOLUTION INTRAVENOUS at 18:16

## 2023-01-20 RX ADMIN — Medication 2 UNITS: at 08:00

## 2023-01-20 RX ADMIN — TAMSULOSIN HYDROCHLORIDE 0.4 MG: 0.4 CAPSULE ORAL at 22:15

## 2023-01-20 RX ADMIN — Medication 2 UNITS: at 00:22

## 2023-01-20 RX ADMIN — CEFEPIME 1 G: 1 INJECTION, POWDER, FOR SOLUTION INTRAMUSCULAR; INTRAVENOUS at 00:15

## 2023-01-20 RX ADMIN — FERROUS SULFATE TAB 325 MG (65 MG ELEMENTAL FE) 325 MG: 325 (65 FE) TAB at 09:20

## 2023-01-20 RX ADMIN — Medication 6 UNITS: at 22:15

## 2023-01-20 RX ADMIN — DOCUSATE SODIUM 100 MG: 100 CAPSULE, LIQUID FILLED ORAL at 09:20

## 2023-01-20 RX ADMIN — SODIUM CHLORIDE, PRESERVATIVE FREE 10 ML: 5 INJECTION INTRAVENOUS at 13:18

## 2023-01-20 RX ADMIN — Medication 4 UNITS: at 12:10

## 2023-01-20 RX ADMIN — OXYCODONE HYDROCHLORIDE 5 MG: 5 TABLET ORAL at 22:14

## 2023-01-20 RX ADMIN — CEFEPIME 1 G: 1 INJECTION, POWDER, FOR SOLUTION INTRAMUSCULAR; INTRAVENOUS at 13:18

## 2023-01-20 RX ADMIN — OXYCODONE HYDROCHLORIDE 5 MG: 5 TABLET ORAL at 12:03

## 2023-01-20 RX ADMIN — FLUOXETINE 20 MG: 20 CAPSULE ORAL at 09:20

## 2023-01-20 RX ADMIN — SODIUM CHLORIDE, PRESERVATIVE FREE 10 ML: 5 INJECTION INTRAVENOUS at 00:22

## 2023-01-20 NOTE — PROGRESS NOTES
Urology Progress Note        Assessment     Patient Active Problem List   Diagnosis Code    HCD (hypertensive cardiovascular disease) I11.9    Hyperlipidemia E78.5    Migraine G43.909    Bilateral lumbar radiculopathy M54.16    Type 2 diabetes mellitus without complication (HCC) O01.0    Essential hypertension I10    Vitamin D deficiency E55.9    Recurrent major depressive disorder (HCC) F33.9    DDD (degenerative disc disease), lumbar M51.36    HNP (herniated nucleus pulposus), lumbar M51.26    History of Bell's palsy Z86.69    Hypomagnesemia E83.42    Gingivostomatitis K05.10    B12 deficiency E53.8    Iron deficiency anemia D50.9    Diarrhea R19.7    Anemia D64.9    Hypokalemia E87.6    Hydroureteronephrosis N13.30    Leukocytosis D72.829    CONNER (acute kidney injury) ([facility] [address] N17.9    Kidney stone on right side N20.0    Transaminitis R74.01     6mm right proximal ureteral stone, hydroureteronephrosis s/p cysto, R RPG, R stent placement on 1/19 by Kourtney Felicita  H/o recurrent UTI- on Macrobid               UA 1/18/23: neg nitrites, mod LE, 2+ bacteria   Ucx pending    Blood Cx prelim NG              WBC 16.5 < 17.1 < 20.0;  LA 1.35              Afebrile, VSS     CONNER              Creat 1.42 < 1.61 < 1.98              Baseline Creat 0.65     H/o kidney stones   DM  HTN    Plan   Appreciate overall management per primary  Abx per ID on Cefepime  Follow cultures, tailor abx to sensitivities   Trend labs  Thorpe catheter in place draining clear yellow urine. Voiding trial prior to DC   Flush with 60 cc's sterile water for decreased UOP  Patient is aware that she will need outpatient follow-up for definitive stone treatment. Available if needed  Will sign off      Follow up arranged? Yes, Message sent to [facility] and Ricardo Ramsey. Esdras Medina PA-C  Pager: [phone], M-F 8AM-5PM      Subjective:     Daily Progress Note: 1/20/2023 1:31 PM    Carlota Rayo is doing good. Patient is afebrile and VSS.  She feels improved. She reports some mild discomfort due to stent and durbin, but otherwise denies significant pain. Tolerating PO intake without difficulty. Objective:     Visit Vitals  BP (!) 112/58 (BP 1 Location: Right upper arm, BP Patient Position: At rest)   Pulse 67   Temp 97.9 °F (36.6 °C)   Resp 16   Ht 4' 11\" (1.499 m)   Wt 52.6 kg (116 lb)   LMP  (LMP Unknown)   SpO2 95%   BMI 23.43 kg/m²        Temp (24hrs), Av.1 °F (36.7 °C), Min:97.4 °F (36.3 °C), Max:98.6 °F (37 °C)      Intake and Output:   1901 -  0700  In: 700 [I.V.:700]  Out: 700 [Urine:700]  No intake/output data recorded. PHYSICAL EXAMINATION:   Visit Vitals  BP (!) 112/58 (BP 1 Location: Right upper arm, BP Patient Position: At rest)   Pulse 67   Temp 97.9 °F (36.6 °C)   Resp 16   Ht 4' 11\" (1.499 m)   Wt 52.6 kg (116 lb)   LMP  (LMP Unknown)   SpO2 95%   BMI 23.43 kg/m²     Constitutional: Well developed, well nourished female. No acute distress. HEENT: Normocephalic, Atraumatic  CV:  no edema  Pulm: No respiratory distress or difficulties breathing   GI:  No abdominal tenderness.  female: No CVAT. Durbin draining clear yellow urine. Skin: No evidence of jaundice. Normal color  Neuro/Psych:  Alert and oriented. Affect appropriate. MSK: FROM      Lab/Data Review: All lab results for the last 24 hours reviewed.     Labs:     Labs: Results:   Chemistry    Recent Labs     23  0609 23  0504 23  1845   * 165* 137*    139 137   K 4.3 3.5 3.2*    113* 105   CO2 19* 19* 23   BUN 39* 32* 36*   CREA 1.42* 1.61* 1.98*   CA 8.9 8.4* 8.9   AGAP 6 7 9   BUCR 27* 20 18   * 174* 211*   TP 5.6* 5.8* 6.8   ALB 1.6* 1.8* 2.1*   GLOB 4.0 4.0 4.7*   AGRAT 0.4* 0.5* 0.4*      CBC w/Diff Recent Labs     23  0609 23  0504 23  1845   WBC 16.5* 17.1* 20.0*   RBC 3.30* 3.11* 3.49*   HGB 9.8* 9.4* 10.6*   HCT 29.3* 27.2* 30.7*    404 485*   GRANS 83*  --  83*   LYMPH 11*  -- 11*   EOS 0  --  0      Cultures Recent Labs     01/18/23 1945 01/18/23 1940 01/18/23 1825   CULT NO GROWTH AFTER 21 HOURS NO GROWTH AFTER 21 HOURS Gram negative rods*     All Micro Results       Procedure Component Value Units Date/Time    CULTURE, URINE [[id number]]  (Abnormal) Collected: 01/18/23 1825    Order Status: Completed Specimen: Urine from Clean catch Updated: 01/20/23 0840     Special Requests: NO SPECIAL REQUESTS        Wadley Count --        >100,000  COLONIES/mL       Culture result: Gram negative rods       CULTURE, BLOOD [[id number]] Collected: 01/18/23 1940    Order Status: Completed Specimen: Blood Updated: 01/20/23 0639     Special Requests: NO SPECIAL REQUESTS        Culture result: NO GROWTH AFTER 21 HOURS       CULTURE, BLOOD [[id number]] Collected: 01/18/23 1945    Order Status: Completed Specimen: Blood Updated: 01/20/23 0639     Special Requests: NO SPECIAL REQUESTS        Culture result: NO GROWTH AFTER 21 HOURS       CULTURE, URINE [[id number]] Collected: 01/19/23 1435    Order Status: No result Updated: 01/19/23 2243    COVID-19 RAPID TEST [[id number]] Collected: 01/18/23 2110    Order Status: Completed Specimen: Nasopharyngeal Updated: 01/18/23 2134     Specimen source Nasopharyngeal        COVID-19 rapid test Not detected        Comment: Rapid Abbott ID Now       Rapid NAAT:  The specimen is NEGATIVE for SARS-CoV-2, the novel coronavirus associated with COVID-19. Negative results should be treated as presumptive and, if inconsistent with clinical signs and symptoms or necessary for patient management, should be tested with an alternative molecular assay. Negative results do not preclude SARS-CoV-2 infection and should not be used as the sole basis for patient management decisions. This test has been authorized by the FDA under an Emergency Use Authorization (EUA) for use by authorized laboratories.    Fact sheet for Healthcare Providers: http://www.jorgito.holly/  Fact sheet for Patients: http://www.jorgito.holly/       Methodology: Isothermal Nucleic Acid Amplification                   Urinalysis Color   Date Value Ref Range Status   01/18/2023 YELLOW   Final     Appearance   Date Value Ref Range Status   01/18/2023 CLOUDY   Final     Specific gravity   Date Value Ref Range Status   01/18/2023 1.025 1.005 - 1.030   Final     pH (UA)   Date Value Ref Range Status   01/18/2023 5.5 5.0 - 8.0   Final     Protein   Date Value Ref Range Status   01/18/2023 100 (A) NEG mg/dL Final     Ketone   Date Value Ref Range Status   01/18/2023 TRACE (A) NEG mg/dL Final     Bilirubin   Date Value Ref Range Status   01/18/2023 Negative NEG   Final     Blood   Date Value Ref Range Status   01/18/2023 Negative NEG   Final     Urobilinogen   Date Value Ref Range Status   01/18/2023 1.0 0.2 - 1.0 EU/dL Final     Nitrites   Date Value Ref Range Status   01/18/2023 Negative NEG   Final     Leukocyte Esterase   Date Value Ref Range Status   01/18/2023 MODERATE (A) NEG   Final     Potassium   Date Value Ref Range Status   01/20/2023 4.3 3.5 - 5.5 mmol/L Final     Creatinine   Date Value Ref Range Status   01/20/2023 1.42 (H) 0.6 - 1.3 MG/DL Final     BUN   Date Value Ref Range Status   01/20/2023 39 (H) 7.0 - 18 MG/DL Final      PSA No results for input(s): PSA in the last 72 hours.    Coagulation Lab Results   Component Value Date/Time    INR, External 2.5 10/13/2014 12:00 AM

## 2023-01-20 NOTE — PROGRESS NOTES
[facility] Hospitalist Group  Progress Note    Patient: Daiva Kanner Age: 74 y.o. : 1955 MR#: [id number] SSN: [id number]  Date/Time: 2023    Subjective:     Patient is sitting in bed in no apparent distress, awake and follows commands.   Daughter at bedside    Assessment/Plan:     Right hydroureteronephrosis  Obstructive uropathy  Complicated UTI  Leukocytosis  Acute kidney injury  Type 2 diabetes  Elevated LFTs    Plan  Patient is status post cystoscopy and stent placement  Continue cefepime  Discussed with ID  IV fluids, monitor renal function  Monitor sugars, sliding scale insulin  Monitor LFTs  PT OT input noted  Disposition-Home soon    Case discussed with:  [x]Patient  []Family  []Nursing  []Case Management  DVT Prophylaxis:  []Lovenox  []Hep SQ  [x]SCDs  []Coumadin   []On Heparin gtt    Objective:   VS: Visit Vitals  BP (!) 98/59 (BP 1 Location: Left upper arm, BP Patient Position: At rest)   Pulse 65   Temp 97.4 °F (36.3 °C)   Resp 16   Ht 4' 11\" (1.499 m)   Wt 52.6 kg (116 lb)   LMP  (LMP Unknown)   SpO2 98%   BMI 23.43 kg/m²      Tmax/24hrs: Temp (24hrs), Av.7 °F (36.5 °C), Min:97.4 °F (36.3 °C), Max:98.1 °F (36.7 °C)    Input/Output:   Intake/Output Summary (Last 24 hours) at 2023 1728  Last data filed at 2023 0422  Gross per 24 hour   Intake --   Output 500 ml   Net -500 ml       General:  Awake, alert  Cardiovascular:  S1S2+, RRR  Pulmonary:  CTA b/l  GI:  Soft, BS+, NT, ND  Extremities:  No edema        Labs:    Recent Results (from the past 24 hour(s))   GLUCOSE, POC    Collection Time: 23 12:20 AM   Result Value Ref Range    Glucose (POC) 190 (H) 70 - 110 mg/dL   CBC WITH AUTOMATED DIFF    Collection Time: 23  6:09 AM   Result Value Ref Range    WBC 16.5 (H) 4.6 - 13.2 K/uL    RBC 3.30 (L) 4.20 - 5.30 M/uL    HGB 9.8 (L) 12.0 - 16.0 g/dL    HCT 29.3 (L) 35.0 - 45.0 %    MCV 88.8 78.0 - 100.0 FL    MCH 29.7 24.0 - 34.0 PG    MCHC 33.4 31.0 - 37.0 g/dL    RDW 14.4 11.6 - 14.5 %    PLATELET 468 427 - 778 K/uL    MPV 9.6 9.2 - 11.8 FL    NRBC 0.0 0  WBC    ABSOLUTE NRBC 0.00 0.00 - 0.01 K/uL    NEUTROPHILS 83 (H) 40 - 73 %    LYMPHOCYTES 11 (L) 21 - 52 %    MONOCYTES 3 3 - 10 %    EOSINOPHILS 0 0 - 5 %    BASOPHILS 0 0 - 2 %    IMMATURE GRANULOCYTES 2 (H) 0.0 - 0.5 %    ABS. NEUTROPHILS 13.8 (H) 1.8 - 8.0 K/UL    ABS. LYMPHOCYTES 1.8 0.9 - 3.6 K/UL    ABS. MONOCYTES 0.5 0.05 - 1.2 K/UL    ABS. EOSINOPHILS 0.0 0.0 - 0.4 K/UL    ABS. BASOPHILS 0.1 0.0 - 0.1 K/UL    ABS. IMM. GRANS. 0.4 (H) 0.00 - 0.04 K/UL    DF AUTOMATED     METABOLIC PANEL, COMPREHENSIVE    Collection Time: 01/20/23  6:09 AM   Result Value Ref Range    Sodium 136 136 - 145 mmol/L    Potassium 4.3 3.5 - 5.5 mmol/L    Chloride 111 100 - 111 mmol/L    CO2 19 (L) 21 - 32 mmol/L    Anion gap 6 3.0 - 18 mmol/L    Glucose 181 (H) 74 - 99 mg/dL    BUN 39 (H) 7.0 - 18 MG/DL    Creatinine 1.42 (H) 0.6 - 1.3 MG/DL    BUN/Creatinine ratio 27 (H) 12 - 20      eGFR 41 (L) >60 ml/min/1.73m2    Calcium 8.9 8.5 - 10.1 MG/DL    Bilirubin, total 0.4 0.2 - 1.0 MG/DL    ALT (SGPT) 47 13 - 56 U/L    AST (SGOT) 40 (H) 10 - 38 U/L    Alk. phosphatase 155 (H) 45 - 117 U/L    Protein, total 5.6 (L) 6.4 - 8.2 g/dL    Albumin 1.6 (L) 3.4 - 5.0 g/dL    Globulin 4.0 2.0 - 4.0 g/dL    A-G Ratio 0.4 (L) 0.8 - 1.7     MAGNESIUM    Collection Time: 01/20/23  6:09 AM   Result Value Ref Range    Magnesium 1.8 1.6 - 2.6 mg/dL   GLUCOSE, POC    Collection Time: 01/20/23  7:44 AM   Result Value Ref Range    Glucose (POC) 152 (H) 70 - 110 mg/dL   GLUCOSE, POC    Collection Time: 01/20/23 12:02 PM   Result Value Ref Range    Glucose (POC) 225 (H) 70 - 110 mg/dL   GLUCOSE, POC    Collection Time: 01/20/23  4:36 PM   Result Value Ref Range    Glucose (POC) 131 (H) 70 - 110 mg/dL     Additional Data Reviewed:    Dragon medical dictation software was used for portions of this report.  Unintended errors may occur.      Signed By: Clint Gusman MD     January 20, 2023

## 2023-01-20 NOTE — PROGRESS NOTES
PHYSICAL THERAPY EVALUATION AND DISCHARGE    Patient: Rory Rodríguez (42 y.o. female)  Date: 1/20/2023  Primary Diagnosis: Kidney stone on right side [N20.0]  CONNER (acute kidney injury) ([facility] [address] [N17.9]  Hypokalemia [E87.6]  Leukocytosis [D72.829]  Hydroureteronephrosis [N13.30]  Procedure(s) (LRB):  CYSTOSCOPY RIGHT RETROGRADE PYLEOGRAM RIGHT DOUBLE J URETERAL STENT INSERTION WITH C-ARM (Right) 1 Day Post-Op   Precautions: standard     PLOF: Independent, lives with her daughter in a single story home. ASSESSMENT :  Based on the objective data described below, the patient is close to baseline level of mobility. She is limited by pain. Mod I bed mobility. She transfers to standing and ambulates in the room with supervision. Steady gait, just slow cautious gate pattern 2/2 pain. Encouraged patient to get OOB for all meals and to maintain mobility in the acute setting. Patient does not require further skilled intervention at this level of care. PLAN :  Recommendations and Planned Interventions:   No formal PT needs identified at this time. Further Equipment Recommendations for Discharge: N/A    AMPAC: At this time and based on an AM-PAC score of 21/24 (or **/20 if omitting stairs), no further PT is recommended upon discharge due to (i.e. patient at baseline functional statusetc). Recommend patient returns to prior setting with prior services. This AMPAC score should be considered in conjunction with interdisciplinary team recommendations to determine the most appropriate discharge setting. Patient's social support, diagnosis, medical stability, and prior level of function should also be taken into consideration. SUBJECTIVE:   Patient stated This cather hurts.     OBJECTIVE DATA SUMMARY:     Past Medical History:   Diagnosis Date    ADHD     Anxiety     Bilateral lumbar radiculopathy     Carotid bruit     right    Depression     Diabetes mellitus ([facility] [address]     HCD (hypertensive cardiovascular disease)     High cholesterol     Hyperlipidemia     Hypertension     Migraine headache     Plantar fasciitis      Past Surgical History:   Procedure Laterality Date    HX SEPTOPLASTY  6/2002    HX TONSILLECTOMY      [place] UNLISTED NEUROLOGICAL/NEUROMUSCULAR DX PX  08/06/2018    laminectomy      Barriers to Learning/Limitations: None  Compensate with: N/A  Home Situation:   Home Situation  Home Environment: Private residence  One/Two Story Residence: One story  Living Alone: No  Support Systems: Child(martine)  Patient Expects to be Discharged to[de-identified] Home  Current DME Used/Available at Home: None  Critical Behavior:              Psychosocial  Patient Behaviors: Calm; Cooperative  Purposeful Interaction: Yes  Pt Identified Daily Priority: Clinical issues (comment)  Caritas Process: Nurture loving kindness;Establish trust;Teaching/learning; Attend basic human needs;Create healing environment  Caring Interventions: Reassure; Therapeutic modalities  Reassure: Therapeutic listening;Caring rounds  Therapeutic Modalities: Deep breathing                 Strength:    Strength: Generally decreased, functional                    Tone & Sensation:   Tone: Normal              Sensation: Intact               Range Of Motion:  AROM: Within functional limits               Functional Mobility:  Bed Mobility:     Supine to Sit: Modified independent  Sit to Supine: Modified independent     Transfers:  Sit to Stand: Modified independent  Stand to Sit: Modified independent          Balance:   Sitting: Intact  Standing: Intact    Ambulation/Gait Training:  Distance (ft): 25 Feet (ft)     Ambulation - Level of Assistance: Supervision        Gait Abnormalities: Decreased step clearance                 Pain:  Pain level pre-treatment: 8/10   Pain level post-treatment: 8/10  Pain Intervention(s): Medication (see MAR);  Rest, Ice, Repositioning   Response to intervention: Nurse notified, See doc flow    Activity Tolerance:   Fair  Please refer to the flowsheet for vital signs taken during this treatment. After treatment:   []         Patient left in no apparent distress sitting up in chair  [x]         Patient left in no apparent distress in bed  [x]         Call bell left within reach  [x]         Nursing notified  []         Caregiver present  []         Bed alarm activated  []         SCDs applied    COMMUNICATION/EDUCATION:   []         Role of Physical Therapy in the acute care setting. [x]         Fall prevention education was provided and the patient/caregiver indicated understanding. [x]         Patient/family have participated as able in goal setting and plan of care. [x]         Patient/family agree to work toward stated goals and plan of care. []         Patient understands intent and goals of therapy, but is neutral about his/her participation. []         Patient is unable to participate in goal setting/plan of care: ongoing with therapy staff.  []         Other: Thank you for this referral.  Basilia Trevizo, PT   Time Calculation: 8 mins      Eval Complexity: History: LOW Complexity : Zero comorbidities / personal factors that will impact the outcome / POCExam:LOW Complexity : 1-2 Standardized tests and measures addressing body structure, function, activity limitation and / or participation in recreation  Presentation: LOW Complexity : Stable, uncomplicated  Clinical Decision Making:Low Complexity    Overall Complexity:LOW     Jeannie Redd AM-PAC® Basic Mobility Inpatient Short Form (6-Clicks) Version 2    How much HELP from another person does the patient currently need    (If the patient hasn't done an activity recently, how much help from another person do you think he/she would need if he/she tried?)   Total (Total A or Dep)   A Lot  (Mod to Max A)   A Little (Sup or Min A)   None (Mod I to I)   Turning from your back to your side while in a flat bed without using bedrails? [] 1 [] 2 [] 3 [x] 4   2.  Moving from lying on your back to sitting on the side of a flat bed without using bedrails? [] 1 [] 2 [] 3 [x] 4   3. Moving to and from a bed to a chair (including a wheelchair)? [] 1 [] 2 [] 3 [x] 4   4. Standing up from a chair using your arms (e.g., wheelchair, or bedside chair)? [] 1 [] 2 [x] 3 [] 4   5. Walking in hospital room? [] 1 [] 2 [x] 3 [] 4   6. Climbing 3-5 steps with a railing?+   [] 1 [] 2 [x] 3 [] 4   +If stair climbing cannot be assessed, skip item #6. Sum responses from items 1-5.

## 2023-01-20 NOTE — PROGRESS NOTES
Pharmacy Note     Cefepime 2 gm IV q12h ordered for treatment of UTI. Per [facility] Renal / Extended Infusion B Lactam Policy, Cefepime will be changed to Cefepime 2 gm IV q24h. Estimated Creatinine Clearance: Estimated Creatinine Clearance: 30.4 mL/min (A) (based on SCr of 1.42 mg/dL (H)). Dialysis Status, CONNER, CKD: CONNER    BMI:  Body mass index is 23.43 kg/m². Recent Labs     23  0609 23  0504 23  1845   WBC 16.5* 17.1* 20.0*     Temp (24hrs), Av.1 °F (36.7 °C), Min:97.4 °F (36.3 °C), Max:98.6 °F (37 °C)      Rationale for Adjustment:  Renal dosing is because drug is renally eliminated. / Extended infusion dosing strategy aims to enhance microbiology and clinical efficacy. Pharmacy will continue to monitor and adjust dose as necessary. Please call with any questions.     Thank you,  Cm Zhao, PHARMD

## 2023-01-20 NOTE — PROGRESS NOTES
[facility]ist [facility]  Progress Note    Patient: Kierra Mesa Age: 74 y.o. : 1955 MR#: [id number] SSN: [id number]  Date/Time: 2023    Subjective:     I personally saw and evaluated this patient on 2023  I saw patient in PACU.   Patient is somnolent    Assessment/Plan:     Right hydroureteronephrosis  Obstructive uropathy  Complicated UTI  Leukocytosis  Acute kidney injury  Type 2 diabetes  Elevated LFTs    Plan  Patient is status post cystoscopy and stent placement  Continue antibiotics  ID consult  IV fluids, monitor renal function  Monitor sugars, sliding scale insulin  Monitor LFTs    Case discussed with:  [x]Patient  []Family  []Nursing  []Case Management  DVT Prophylaxis:  []Lovenox  []Hep SQ  [x]SCDs  []Coumadin   []On Heparin gtt    Objective:   VS: Visit Vitals  /70 (BP 1 Location: Right upper arm, BP Patient Position: At rest)   Pulse 61   Temp 98.1 °F (36.7 °C)   Resp 17   Ht 4' 11\" (1.499 m)   Wt 52.6 kg (116 lb)   LMP  (LMP Unknown)   SpO2 95%   BMI 23.43 kg/m²      Tmax/24hrs: Temp (24hrs), Av.6 °F (37 °C), Min:97.9 °F (36.6 °C), Max:100.2 °F (37.9 °C)    Input/Output:   Intake/Output Summary (Last 24 hours) at 2023  Last data filed at 2023 1652  Gross per 24 hour   Intake 700 ml   Output 200 ml   Net 500 ml       General: Somnolent  Cardiovascular:  S1S2+, RRR  Pulmonary:  CTA b/l  GI:  Soft, BS+, NT, ND  Extremities:  No edema      Labs:    Recent Results (from the past 24 hour(s))   COVID-19 RAPID TEST    Collection Time: 23  9:10 PM   Result Value Ref Range    Specimen source Nasopharyngeal      COVID-19 rapid test Not detected     METABOLIC PANEL, BASIC    Collection Time: 23  5:04 AM   Result Value Ref Range    Sodium 139 136 - 145 mmol/L    Potassium 3.5 3.5 - 5.5 mmol/L    Chloride 113 (H) 100 - 111 mmol/L    CO2 19 (L) 21 - 32 mmol/L    Anion gap 7 3.0 - 18 mmol/L    Glucose 165 (H) 74 - 99 mg/dL BUN 32 (H) 7.0 - 18 MG/DL    Creatinine 1.61 (H) 0.6 - 1.3 MG/DL    BUN/Creatinine ratio 20 12 - 20      eGFR 35 (L) >60 ml/min/1.73m2    Calcium 8.4 (L) 8.5 - 10.1 MG/DL   CBC W/O DIFF    Collection Time: 01/19/23  5:04 AM   Result Value Ref Range    WBC 17.1 (H) 4.6 - 13.2 K/uL    RBC 3.11 (L) 4.20 - 5.30 M/uL    HGB 9.4 (L) 12.0 - 16.0 g/dL    HCT 27.2 (L) 35.0 - 45.0 %    MCV 87.5 78.0 - 100.0 FL    MCH 30.2 24.0 - 34.0 PG    MCHC 34.6 31.0 - 37.0 g/dL    RDW 14.2 11.6 - 14.5 %    PLATELET 302 502 - 253 K/uL    MPV 9.4 9.2 - 11.8 FL    NRBC 0.0 0  WBC    ABSOLUTE NRBC 0.00 0.00 - 0.01 K/uL   PROCALCITONIN    Collection Time: 01/19/23  5:04 AM   Result Value Ref Range    Procalcitonin 1.04 ng/mL   HEPATITIS PANEL, ACUTE    Collection Time: 01/19/23  5:04 AM   Result Value Ref Range    Hepatitis A, IgM Negative NEG      __          Hepatitis B surface Ag <0.10 <1.00 Index    Hep B surface Ag Interp. Negative NEG      __          Hepatitis B core, IgM Negative NEG      __          Hepatitis C virus Ab <0.02 <0.80 Index    Hep C virus Ab Interp. Negative NEG      Hep C  virus Ab comment         HEPATIC FUNCTION PANEL    Collection Time: 01/19/23  5:04 AM   Result Value Ref Range    Protein, total 5.8 (L) 6.4 - 8.2 g/dL    Albumin 1.8 (L) 3.4 - 5.0 g/dL    Globulin 4.0 2.0 - 4.0 g/dL    A-G Ratio 0.5 (L) 0.8 - 1.7      Bilirubin, total 0.5 0.2 - 1.0 MG/DL    Bilirubin, direct 0.1 0.0 - 0.2 MG/DL    Alk.  phosphatase 174 (H) 45 - 117 U/L    AST (SGOT) 39 (H) 10 - 38 U/L    ALT (SGPT) 50 13 - 56 U/L   GLUCOSE, POC    Collection Time: 01/19/23  8:29 AM   Result Value Ref Range    Glucose (POC) 156 (H) 70 - 110 mg/dL   GLUCOSE, POC    Collection Time: 01/19/23 12:39 PM   Result Value Ref Range    Glucose (POC) 155 (H) 70 - 110 mg/dL   GLUCOSE, POC    Collection Time: 01/19/23  3:12 PM   Result Value Ref Range    Glucose (POC) 111 (H) 70 - 110 mg/dL     Additional Data Reviewed:    Dragon medical dictation software was used for portions of this report. Unintended errors may occur.       Signed By: Suresh Muñoz MD     January 19, 2023

## 2023-01-20 NOTE — PROGRESS NOTES
Bedside shift change report given to Paris Gomez RN (oncoming nurse) by Cesar Ma RN (offgoing nurse). Report included the following information SBAR, Kardex, Procedure Summary, and MAR.     Wound Prevention Checklist    Patient: Bre Dc (32 y.o. female)  Date: 1/19/2023  Diagnosis: Kidney stone on right side [N20.0]  CONNER (acute kidney injury) ([facility] [address] [N17.9]  Hypokalemia [E87.6]  Leukocytosis [D72.829]  Hydroureteronephrosis [N13.30] <principal problem not specified>    Precautions:         []  Heel prevention boots placed on patient    []  Patient turned q2h during shift    []  Lift team ordered    []  Patient on Talha bed/Specialty bed    []  Each Wound is documented during shift (Stage, Color, drainage, odor, measurements, and dressings)    [x]  Dual skin check done with Jana Chavez RN

## 2023-01-20 NOTE — CONSULTS
Infectious Disease Consultation Note        Reason:complicated UTI    Current abx Prior abx   Cefepime since 1/18 Gentamicin on 1/19  levofloxacin on 1/18  Nitrofurantoin 1/10-1/17     Lines:       Assessment :  74 y.o. female with a PMHx of recurrent UTIs, depression, DM, HLD, HTN who presented to the ED on 1/18/2023 with c/o lower back pain that started 2 weeks ago, with significant worsening over the last couple days. Clinical presentation consistent with partially treated sepsis-present on admission due to complicated urinary tract infection, right kidney pyelonephritis   Status post cystoscopy, right retrograde pyelogram, right double-J stent placement on 1/19/2023. Recent outpatient oral antibiotics likely masked the full clinical presentation    Temperature 103 prior to arrival, WBC 20 K, urine culture greater than 100,000 cons of gram-negative rods, elevated transaminases (sepsis induced cholestasis) is suggestive of partially treated sepsis. Elevated transaminases-likely sepsis induced cholestasis. Improving. No hepatobiliary pathology identified on CT scan 1/18    Antibiotic management complicated due to history of penicillin allergy-hives with penicillin in childhood. Has tolerated cefuroxime in the past.  Currently tolerating cefepime    Acute kidney injury-likely due to sepsis, volume depletion. Improving    Recommendations:    Continue cefepime. Increase dose to 2 g IV every 8 hours  Follow-up results of urine culture and modify antibiotics accordingly  Follow-up blood cultures  Follow-up urology recommendations regarding stent removal, stone surgery  Patient can be switched to oral antibiotics tomorrow if blood cultures remain negative and urine culture reveals bacteria susceptible to p.o. antibiotics.   Will need total 14 days of  antibiotics      Thank you for consultation request. Above plan was discussed in details with patient, family and primary team. Please call me if any further questions or concerns. Will continue to participate in the care of this patient. HPI:    74 y.o. female with a PMHx of recurrent UTIs, depression, DM, HLD, HTN who presented to the ED on 1/18/2023 with c/o lower back pain that started 2 weeks ago, with significant worsening over the last couple days. Patient states that she was diagnosed with kidney stone and was followed up by urologist.  She has received multiple course of antibiotic in the past 3 months for urinary tract infection. She started having worsening right flank pain along with subjective fever, chills about 2 weeks ago. She was seen by primary care physician and diagnosed with urinary tract infection. She was placed on antibiotics for this. Patient is not aware of any urine culture results. Per records patient received Macrobid 1/10 to 1/17/2023. However patient had worsening symptoms despite this antibiotic. On Saturday she had a temperature of 103 along with poor p.o. intake, significant right flank pain, fever, chills. Hence came to emergency room on 1/18. In the ED, VSS. Labs with WBC 20, K 3.2, Cr 1.98 (baseline 0.6), ALT 71, AST 90, alk phos 211. UA 21-35 WBCs, 2+ bacteria. CT with 6mm obstructing stone. Urology was consulted. Status post cystoscopy, right retrograde pyelogram, right double-J stent placement on 1/19/2023. Urine culture 1/18 greater than 100,000 colonies gram-negative rods. Blood culture 1/18 no growth. I have been consulted for further recommendations. Patient feels better today compared to day of admission. She currently states that her flank pain is better. Denies any lower abdominal pain.   No nausea, vomiting  Past Medical History:   Diagnosis Date    ADHD     Anxiety     Bilateral lumbar radiculopathy     Carotid bruit     right    Depression     Diabetes mellitus (HCC)     HCD (hypertensive cardiovascular disease)     High cholesterol     Hyperlipidemia     Hypertension     Migraine headache     Plantar fasciitis        Past Surgical History:   Procedure Laterality Date    HX SEPTOPLASTY  6/2002    HX TONSILLECTOMY      [place] UNLISTED NEUROLOGICAL/NEUROMUSCULAR DX PX  08/06/2018    laminectomy        home Medication List      Details   nitrofurantoin (Macrodantin) 100 mg capsule Take 100 mg by mouth nightly. atorvastatin (LIPITOR) 80 mg tablet TAKE 1 TABLET BY MOUTH EVERY DAY  Qty: 90 Tablet, Refills: 3      propranoloL (INDERAL) 40 mg tablet TAKE 1 TABLET BY MOUTH TWICE A DAY  Qty: 180 Tablet, Refills: 3      hydrOXYzine pamoate (VISTARIL) 25 mg capsule TAKE 2-3 CAPSULES BY MOUTH AT BEDTIME AS NEEDED FOR SLEEP      cyanocobalamin 1,000 mcg tablet Take 1,000 mcg by mouth daily. ferrous sulfate 325 mg (65 mg iron) tablet Take 325 mg by mouth Daily (before breakfast). magnesium oxide (MAG-OX) 400 mg tablet Take 400 mg by mouth two (2) times a day. ergocalciferol (ERGOCALCIFEROL) 1,250 mcg (50,000 unit) capsule TAKE 1 CAPSULE BY MOUTH EVERY FOURTEEN (14) DAYS. Qty: 2 Capsule, Refills: 5      metFORMIN ER (GLUCOPHAGE XR) 500 mg tablet TAKE 2 TABLETS BY MOUTH TWICE A DAY WITH MEALS  Qty: 360 Tablet, Refills: 3      lisinopriL (PRINIVIL, ZESTRIL) 10 mg tablet TAKE 1 TABLET BY MOUTH EVERY DAY  Qty: 90 Tablet, Refills: 3      FLUoxetine (PROzac) 20 mg capsule Take 20 mg by mouth daily. estradioL (Estrace) 0.01 % (0.1 mg/gram) vaginal cream Apply pea sized amount 3x weekly around urethra. Qty: 42.5 g, Refills: 3      ALPRAZolam (XANAX) 0.5 mg tablet TAKE 1 (ONE) TABLET BY MOUTH DAILY AS NEEDED FOR ANXIETY      SUMAtriptan (IMITREX) 50 mg tablet TAKE 1 TAB BY MOUTH DAILY AS NEEDED FOR MIGRAINE. Qty: 9 Tablet, Refills: 5      semaglutide (OZEMPIC) 0.25 mg or 0.5 mg/dose (2 mg/1.5 ml) subq pen 0.5 mg by SubCUTAneous route every seven (7) days.   Qty: 12 Pen, Refills: 3            Current Facility-Administered Medications   Medication Dose Route Frequency    oxyCODONE IR (ROXICODONE) tablet 5 mg  5 mg Oral Q6H PRN    naloxone (NARCAN) injection 0.4 mg  0.4 mg IntraVENous PRN    docusate sodium (COLACE) capsule 100 mg  100 mg Oral DAILY    cefepime (MAXIPIME) 1 g in 0.9% sodium chloride (MBP/ADV) 50 mL MBP  1 g IntraVENous Q24H    sodium chloride (NS) flush 5-40 mL  5-40 mL IntraVENous Q8H    sodium chloride (NS) flush 5-40 mL  5-40 mL IntraVENous PRN    acetaminophen (TYLENOL) tablet 650 mg  650 mg Oral Q6H PRN    Or    acetaminophen (TYLENOL) suppository 650 mg  650 mg Rectal Q6H PRN    polyethylene glycol (MIRALAX) packet 17 g  17 g Oral DAILY PRN    bisacodyL (DULCOLAX) suppository 10 mg  10 mg Rectal DAILY PRN    ondansetron (ZOFRAN ODT) tablet 4 mg  4 mg Oral Q8H PRN    Or    ondansetron (ZOFRAN) injection 4 mg  4 mg IntraVENous Q6H PRN    ferrous sulfate tablet 325 mg  325 mg Oral ACB    FLUoxetine (PROzac) capsule 20 mg  20 mg Oral DAILY    insulin lispro (HUMALOG) injection   SubCUTAneous AC&HS    glucose chewable tablet 16 g  4 Tablet Oral PRN    glucagon (GLUCAGEN) injection 1 mg  1 mg IntraMUSCular PRN    dextrose 10% infusion 0-250 mL  0-250 mL IntraVENous PRN       Allergies: Pcn [penicillins] and Sulfa (sulfonamide antibiotics)    Family History   Problem Relation Age of Onset    Hypertension Brother         x2    Elevated Lipids Brother     Stroke Mother     Heart Surgery Mother         CABG    Cancer Father         cancer of the mouth    Hypertension Father     Hypertension Brother     Elevated Lipids Brother      Social History     Socioeconomic History    Marital status:      Spouse name: Not on file    Number of children: Not on file    Years of education: Not on file    Highest education level: Not on file   Occupational History    Not on file   Tobacco Use    Smoking status: Former     Packs/day: 0.25     Years: 4.00     Pack years: 1.00     Types: Cigarettes     Quit date: 1989     Years since quittin.0    Smokeless tobacco: Never   Substance and Sexual Activity    Alcohol use: No     Alcohol/week: 1.7 standard drinks     Types: 2 Glasses of wine per week    Drug use: No    Sexual activity: Not Currently   Other Topics Concern    Not on file   Social History Narrative    Not on file     Social Determinants of Health     Financial Resource Strain: Not on file   Food Insecurity: Not on file   Transportation Needs: Not on file   Physical Activity: Not on file   Stress: Not on file   Social Connections: Not on file   Intimate Partner Violence: Not on file   Housing Stability: Not on file     Social History     Tobacco Use   Smoking Status Former    Packs/day: 0.25    Years: 4.00    Pack years: 1.00    Types: Cigarettes    Quit date: 1989    Years since quittin.0   Smokeless Tobacco Never        Temp (24hrs), Av.4 °F (36.9 °C), Min:97.4 °F (36.3 °C), Max:100.2 °F (37.9 °C)    Visit Vitals  BP (!) 101/58 (BP 1 Location: Right upper arm, BP Patient Position: At rest)   Pulse 60   Temp 97.5 °F (36.4 °C)   Resp 16   Ht 4' 11\" (1.499 m)   Wt 52.6 kg (116 lb)   LMP  (LMP Unknown)   SpO2 96%   BMI 23.43 kg/m²       ROS: 12 point ROS obtained in details. Pertinent positives as mentioned in HPI,   otherwise negative    Physical Exam:    General: Well developed, well nourished female  sitting on the  bed AAOx3 in no acute distress. General:   awake alert and oriented   HEENT:  Normocephalic, atraumatic,  EOMI, no scleral icterus or pallor; no conjunctival hemmohage;  nasal and oral mucous are moist and without evidence of lesions. Neck supple, no bruits. Lymph Nodes:   no cervical, axillary or inguinal adenopathy   Lungs:   non-labored, bilateral clear to auscultation- no crackles wheezes rales or rhonchi   Heart:  RRR, s1 and s2; no rubs or gallops, no edema, + pedal pulses   Abdomen:  soft, non-distended, active bowel sounds, no hepatomegaly, no splenomegaly. Non-tender   Genitourinary:  deferred   Extremities:   no clubbing, cyanosis; no joint effusions or swelling;  Full ROM of all large joints to the upper and lower extremities; muscle mass appropriate for age   Neurologic:  No gross focal sensory abnormalities; 5/5 muscle strength to upper and lower extremities. Speech appropriate. Cranial nerves intact                        Skin:  No rash or ulcers noted   Back:  no spinal or paraspinal muscle tenderness or rigidity, no CVA tenderness     Psychiatric:  No suicidal or homicidal ideations, appropriate mood and affect         Labs: Results:   Chemistry Recent Labs     01/20/23  0609 01/19/23  0504 01/18/23  1845   * 165* 137*    139 137   K 4.3 3.5 3.2*    113* 105   CO2 19* 19* 23   BUN 39* 32* 36*   CREA 1.42* 1.61* 1.98*   CA 8.9 8.4* 8.9   AGAP 6 7 9   BUCR 27* 20 18   * 174* 211*   TP 5.6* 5.8* 6.8   ALB 1.6* 1.8* 2.1*   GLOB 4.0 4.0 4.7*   AGRAT 0.4* 0.5* 0.4*      CBC w/Diff Recent Labs     01/20/23  0609 01/19/23  0504 01/18/23  1845   WBC 16.5* 17.1* 20.0*   RBC 3.30* 3.11* 3.49*   HGB 9.8* 9.4* 10.6*   HCT 29.3* 27.2* 30.7*    404 485*   GRANS 83*  --  83*   LYMPH 11*  --  11*   EOS 0  --  0      Microbiology Recent Labs     01/18/23  1945 01/18/23  1940 01/18/23  1825   CULT NO GROWTH AFTER 21 HOURS NO GROWTH AFTER 21 HOURS Gram negative rods*          RADIOLOGY:    All available imaging studies/reports in [facility] care for this admission were reviewed      Disclaimer: Sections of this note are dictated utilizing voice recognition software, which may have resulted in some phonetic based errors in grammar and contents. Even though attempts were made to correct all the mistakes, some may have been missed, and remained in the body of the document. If questions arise, please contact our department.     Dr. Mirlande Yuen, Infectious Disease Specialist  [phone]  January 20, 2023  8:51 AM

## 2023-01-20 NOTE — PROGRESS NOTES
Problem: Falls - Risk of  Goal: *Absence of Falls  Description: Document Lui Singleton Fall Risk and appropriate interventions in the flowsheet. Outcome: Progressing Towards Goal  Note: Fall Risk Interventions:            Medication Interventions: Assess postural VS orthostatic hypotension, Patient to call before getting OOB, Teach patient to arise slowly                   Problem: Patient Education: Go to Patient Education Activity  Goal: Patient/Family Education  Outcome: Progressing Towards Goal     Problem: Pain  Goal: *Control of Pain  Outcome: Progressing Towards Goal  Goal: *PALLIATIVE CARE:  Alleviation of Pain  Outcome: Progressing Towards Goal     Problem: Patient Education: Go to Patient Education Activity  Goal: Patient/Family Education  Outcome: Progressing Towards Goal     Problem: Diabetes Self-Management  Goal: *Disease process and treatment process  Description: Define diabetes and identify own type of diabetes; list 3 options for treating diabetes. Outcome: Progressing Towards Goal  Goal: *Incorporating nutritional management into lifestyle  Description: Describe effect of type, amount and timing of food on blood glucose; list 3 methods for planning meals. Outcome: Progressing Towards Goal  Goal: *Incorporating physical activity into lifestyle  Description: State effect of exercise on blood glucose levels. Outcome: Progressing Towards Goal  Goal: *Developing strategies to promote health/change behavior  Description: Define the ABC's of diabetes; identify appropriate screenings, schedule and personal plan for screenings. Outcome: Progressing Towards Goal  Goal: *Using medications safely  Description: State effect of diabetes medications on diabetes; name diabetes medication taking, action and side effects. Outcome: Progressing Towards Goal  Goal: *Monitoring blood glucose, interpreting and using results  Description: Identify recommended blood glucose targets  and personal targets.   Outcome: Progressing Towards Goal  Goal: *Prevention, detection, treatment of acute complications  Description: List symptoms of hyper- and hypoglycemia; describe how to treat low blood sugar and actions for lowering  high blood glucose level. Outcome: Progressing Towards Goal  Goal: *Prevention, detection and treatment of chronic complications  Description: Define the natural course of diabetes and describe the relationship of blood glucose levels to long term complications of diabetes.   Outcome: Progressing Towards Goal  Goal: *Developing strategies to address psychosocial issues  Description: Describe feelings about living with diabetes; identify support needed and support network  Outcome: Progressing Towards Goal  Goal: *Insulin pump training  Outcome: Progressing Towards Goal  Goal: *Sick day guidelines  Outcome: Progressing Towards Goal  Goal: *Patient Specific Goal (EDIT GOAL, INSERT TEXT)  Outcome: Progressing Towards Goal     Problem: Patient Education: Go to Patient Education Activity  Goal: Patient/Family Education  Outcome: Progressing Towards Goal     Problem: General Medical Care Plan  Goal: *Vital signs within specified parameters  Outcome: Progressing Towards Goal  Goal: *Labs within defined limits  Outcome: Progressing Towards Goal  Goal: *Absence of infection signs and symptoms  Outcome: Progressing Towards Goal  Goal: *Optimal pain control at patient's stated goal  Outcome: Progressing Towards Goal  Goal: *Skin integrity maintained  Outcome: Progressing Towards Goal  Goal: *Fluid volume balance  Outcome: Progressing Towards Goal  Goal: *Optimize nutritional status  Outcome: Progressing Towards Goal  Goal: *Anxiety reduced or absent  Outcome: Progressing Towards Goal  Goal: *Progressive mobility and function (eg: ADL's)  Outcome: Progressing Towards Goal     Problem: Patient Education: Go to Patient Education Activity  Goal: Patient/Family Education  Outcome: Progressing Towards Goal     Problem: Discharge Planning  Goal: *Discharge to safe environment  Outcome: Progressing Towards Goal  Goal: *Knowledge of medication management  Outcome: Progressing Towards Goal  Goal: *Knowledge of discharge instructions  Outcome: Progressing Towards Goal     Problem: Pressure Injury - Risk of  Goal: *Prevention of pressure injury  Description: Document Nima Scale and appropriate interventions in the flowsheet.   Outcome: Progressing Towards Goal     Problem: Patient Education: Go to Patient Education Activity  Goal: Patient/Family Education  Outcome: Progressing Towards Goal

## 2023-01-21 LAB
ANION GAP SERPL CALC-SCNC: 4 MMOL/L (ref 3–18)
BACTERIA SPEC CULT: ABNORMAL
BACTERIA SPEC CULT: NORMAL
BUN SERPL-MCNC: 40 MG/DL (ref 7–18)
BUN/CREAT SERPL: 24 (ref 12–20)
CALCIUM SERPL-MCNC: 8 MG/DL (ref 8.5–10.1)
CC UR VC: ABNORMAL
CHLORIDE SERPL-SCNC: 111 MMOL/L (ref 100–111)
CO2 SERPL-SCNC: 21 MMOL/L (ref 21–32)
CREAT SERPL-MCNC: 1.64 MG/DL (ref 0.6–1.3)
ERYTHROCYTE [DISTWIDTH] IN BLOOD BY AUTOMATED COUNT: 14.4 % (ref 11.6–14.5)
GLUCOSE BLD STRIP.AUTO-MCNC: 119 MG/DL (ref 70–110)
GLUCOSE BLD STRIP.AUTO-MCNC: 145 MG/DL (ref 70–110)
GLUCOSE BLD STRIP.AUTO-MCNC: 157 MG/DL (ref 70–110)
GLUCOSE BLD STRIP.AUTO-MCNC: 235 MG/DL (ref 70–110)
GLUCOSE SERPL-MCNC: 168 MG/DL (ref 74–99)
HCT VFR BLD AUTO: 27.5 % (ref 35–45)
HGB BLD-MCNC: 9 G/DL (ref 12–16)
LACTATE SERPL-SCNC: 1.1 MMOL/L (ref 0.4–2)
MCH RBC QN AUTO: 29.9 PG (ref 24–34)
MCHC RBC AUTO-ENTMCNC: 32.7 G/DL (ref 31–37)
MCV RBC AUTO: 91.4 FL (ref 78–100)
NRBC # BLD: 0 K/UL (ref 0–0.01)
NRBC BLD-RTO: 0 PER 100 WBC
PLATELET # BLD AUTO: 401 K/UL (ref 135–420)
PMV BLD AUTO: 9.3 FL (ref 9.2–11.8)
POTASSIUM SERPL-SCNC: 3.9 MMOL/L (ref 3.5–5.5)
RBC # BLD AUTO: 3.01 M/UL (ref 4.2–5.3)
SERVICE CMNT-IMP: ABNORMAL
SERVICE CMNT-IMP: NORMAL
SODIUM SERPL-SCNC: 136 MMOL/L (ref 136–145)
WBC # BLD AUTO: 13.7 K/UL (ref 4.6–13.2)

## 2023-01-21 PROCEDURE — 74011250636 HC RX REV CODE- 250/636: Performed by: EMERGENCY MEDICINE

## 2023-01-21 PROCEDURE — 74011250636 HC RX REV CODE- 250/636: Performed by: INTERNAL MEDICINE

## 2023-01-21 PROCEDURE — 74011250637 HC RX REV CODE- 250/637: Performed by: PHYSICIAN ASSISTANT

## 2023-01-21 PROCEDURE — 74011636637 HC RX REV CODE- 636/637: Performed by: PHYSICIAN ASSISTANT

## 2023-01-21 PROCEDURE — 74011000250 HC RX REV CODE- 250: Performed by: INTERNAL MEDICINE

## 2023-01-21 PROCEDURE — 74011250637 HC RX REV CODE- 250/637: Performed by: EMERGENCY MEDICINE

## 2023-01-21 PROCEDURE — 74011250637 HC RX REV CODE- 250/637: Performed by: INTERNAL MEDICINE

## 2023-01-21 PROCEDURE — 85027 COMPLETE CBC AUTOMATED: CPT

## 2023-01-21 PROCEDURE — 83605 ASSAY OF LACTIC ACID: CPT

## 2023-01-21 PROCEDURE — 82962 GLUCOSE BLOOD TEST: CPT

## 2023-01-21 PROCEDURE — 99232 SBSQ HOSP IP/OBS MODERATE 35: CPT | Performed by: EMERGENCY MEDICINE

## 2023-01-21 PROCEDURE — 65270000046 HC RM TELEMETRY

## 2023-01-21 PROCEDURE — 74011000250 HC RX REV CODE- 250: Performed by: PHYSICIAN ASSISTANT

## 2023-01-21 PROCEDURE — 80048 BASIC METABOLIC PNL TOTAL CA: CPT

## 2023-01-21 PROCEDURE — 36415 COLL VENOUS BLD VENIPUNCTURE: CPT

## 2023-01-21 PROCEDURE — P9047 ALBUMIN (HUMAN), 25%, 50ML: HCPCS | Performed by: INTERNAL MEDICINE

## 2023-01-21 RX ORDER — ALBUMIN HUMAN 250 G/1000ML
25 SOLUTION INTRAVENOUS EVERY 6 HOURS
Status: COMPLETED | OUTPATIENT
Start: 2023-01-21 | End: 2023-01-21

## 2023-01-21 RX ADMIN — ALBUMIN (HUMAN) 25 G: 0.25 INJECTION, SOLUTION INTRAVENOUS at 17:50

## 2023-01-21 RX ADMIN — FERROUS SULFATE TAB 325 MG (65 MG ELEMENTAL FE) 325 MG: 325 (65 FE) TAB at 08:48

## 2023-01-21 RX ADMIN — ALBUMIN (HUMAN) 25 G: 0.25 INJECTION, SOLUTION INTRAVENOUS at 12:02

## 2023-01-21 RX ADMIN — TAMSULOSIN HYDROCHLORIDE 0.4 MG: 0.4 CAPSULE ORAL at 21:10

## 2023-01-21 RX ADMIN — Medication 6 UNITS: at 12:02

## 2023-01-21 RX ADMIN — MIDODRINE HYDROCHLORIDE 10 MG: 5 TABLET ORAL at 12:02

## 2023-01-21 RX ADMIN — FLUOXETINE 20 MG: 20 CAPSULE ORAL at 08:48

## 2023-01-21 RX ADMIN — SODIUM CHLORIDE 2 G: 9 INJECTION INTRAMUSCULAR; INTRAVENOUS; SUBCUTANEOUS at 00:10

## 2023-01-21 RX ADMIN — SODIUM CHLORIDE, PRESERVATIVE FREE 10 ML: 5 INJECTION INTRAVENOUS at 21:13

## 2023-01-21 RX ADMIN — DOCUSATE SODIUM 100 MG: 100 CAPSULE, LIQUID FILLED ORAL at 08:48

## 2023-01-21 RX ADMIN — SODIUM CHLORIDE 75 ML/HR: 9 INJECTION, SOLUTION INTRAVENOUS at 12:02

## 2023-01-21 RX ADMIN — SODIUM CHLORIDE, PRESERVATIVE FREE 10 ML: 5 INJECTION INTRAVENOUS at 14:00

## 2023-01-21 RX ADMIN — ACETAMINOPHEN 650 MG: 325 TABLET ORAL at 18:24

## 2023-01-21 RX ADMIN — OXYCODONE HYDROCHLORIDE 5 MG: 5 TABLET ORAL at 07:07

## 2023-01-21 NOTE — PROGRESS NOTES
Problem: Falls - Risk of  Goal: *Absence of Falls  Description: Document Franklin Fall Risk and appropriate interventions in the flowsheet. Outcome: Progressing Towards Goal  Note: Fall Risk Interventions:            Medication Interventions: Assess postural VS orthostatic hypotension, Patient to call before getting OOB, Teach patient to arise slowly                   Problem: Patient Education: Go to Patient Education Activity  Goal: Patient/Family Education  Outcome: Progressing Towards Goal     Problem: Pain  Goal: *Control of Pain  Outcome: Progressing Towards Goal  Goal: *PALLIATIVE CARE:  Alleviation of Pain  Outcome: Progressing Towards Goal     Problem: Patient Education: Go to Patient Education Activity  Goal: Patient/Family Education  Outcome: Progressing Towards Goal     Problem: Diabetes Self-Management  Goal: *Disease process and treatment process  Description: Define diabetes and identify own type of diabetes; list 3 options for treating diabetes. Outcome: Progressing Towards Goal  Goal: *Incorporating nutritional management into lifestyle  Description: Describe effect of type, amount and timing of food on blood glucose; list 3 methods for planning meals. Outcome: Progressing Towards Goal  Goal: *Incorporating physical activity into lifestyle  Description: State effect of exercise on blood glucose levels. Outcome: Progressing Towards Goal  Goal: *Developing strategies to promote health/change behavior  Description: Define the ABC's of diabetes; identify appropriate screenings, schedule and personal plan for screenings. Outcome: Progressing Towards Goal  Goal: *Using medications safely  Description: State effect of diabetes medications on diabetes; name diabetes medication taking, action and side effects. Outcome: Progressing Towards Goal  Goal: *Monitoring blood glucose, interpreting and using results  Description: Identify recommended blood glucose targets  and personal targets.   Outcome: Progressing Towards Goal  Goal: *Prevention, detection, treatment of acute complications  Description: List symptoms of hyper- and hypoglycemia; describe how to treat low blood sugar and actions for lowering  high blood glucose level. Outcome: Progressing Towards Goal  Goal: *Prevention, detection and treatment of chronic complications  Description: Define the natural course of diabetes and describe the relationship of blood glucose levels to long term complications of diabetes.   Outcome: Progressing Towards Goal  Goal: *Developing strategies to address psychosocial issues  Description: Describe feelings about living with diabetes; identify support needed and support network  Outcome: Progressing Towards Goal  Goal: *Insulin pump training  Outcome: Progressing Towards Goal  Goal: *Sick day guidelines  Outcome: Progressing Towards Goal  Goal: *Patient Specific Goal (EDIT GOAL, INSERT TEXT)  Outcome: Progressing Towards Goal     Problem: Patient Education: Go to Patient Education Activity  Goal: Patient/Family Education  Outcome: Progressing Towards Goal     Problem: General Medical Care Plan  Goal: *Vital signs within specified parameters  Outcome: Progressing Towards Goal  Goal: *Labs within defined limits  Outcome: Progressing Towards Goal  Goal: *Absence of infection signs and symptoms  Outcome: Progressing Towards Goal  Goal: *Optimal pain control at patient's stated goal  Outcome: Progressing Towards Goal  Goal: *Skin integrity maintained  Outcome: Progressing Towards Goal  Goal: *Fluid volume balance  Outcome: Progressing Towards Goal  Goal: *Optimize nutritional status  Outcome: Progressing Towards Goal  Goal: *Anxiety reduced or absent  Outcome: Progressing Towards Goal  Goal: *Progressive mobility and function (eg: ADL's)  Outcome: Progressing Towards Goal     Problem: Patient Education: Go to Patient Education Activity  Goal: Patient/Family Education  Outcome: Progressing Towards Goal     Problem: Discharge Planning  Goal: *Discharge to safe environment  Outcome: Progressing Towards Goal  Goal: *Knowledge of medication management  Outcome: Progressing Towards Goal  Goal: *Knowledge of discharge instructions  Outcome: Progressing Towards Goal     Problem: Patient Education: Go to Patient Education Activity  Goal: Patient/Family Education  Outcome: Progressing Towards Goal     Problem: Pressure Injury - Risk of  Goal: *Prevention of pressure injury  Description: Document Nima Scale and appropriate interventions in the flowsheet.   Outcome: Progressing Towards Goal     Problem: Patient Education: Go to Patient Education Activity  Goal: Patient/Family Education  Outcome: Progressing Towards Goal     Problem: Patient Education: Go to Patient Education Activity  Goal: Patient/Family Education  Outcome: Progressing Towards Goal     Problem: Patient Education: Go to Patient Education Activity  Goal: Patient/Family Education  Outcome: Progressing Towards Goal

## 2023-01-21 NOTE — ROUTINE PROCESS
Bedside shift change report given to Shantanu Bridges RN (oncoming nurse) by Manju Rodrigez RN (offgoing nurse). Report included the following information SBAR, Kardex, Intake/Output, and MAR.

## 2023-01-21 NOTE — PROGRESS NOTES
Bedside and Verbal shift change report given to [facility] JOELLEN PAZ RN (oncoming nurse) by Benigno Smith RN   (offgoing nurse). Report included the following information SBAR, Procedure Summary, Recent Results, and Cardiac Rhythm sinus rhythm .

## 2023-01-21 NOTE — PROGRESS NOTES
Chart reviewed  Consult noted for CONNER   A/P in brief as below    Patient is a 81-year-old white female with past medical history of recurrent UTIs, diabetes mellitus with complications with proteinuria, hypertension, dyslipidemia who presented with worsening lower back pain. Pain was radiating to her right lower quadrant. She was diagnosed with a UTI and had been taking Macrobid Lasix days. With continuing fever and poor p.o. intake she presented. On presentation patient had a increased white count and CONNER with creatinine of 1.98, potassium of 3.2, AST ALT were elevated. Urine analysis showed elevated white count 2+ bacteria 100 protein. CT showed a 6 mm obstructing stone in proximal right ureter, hydronephrosis. Urology was consulted and had s/p cystoscopy right-sided RPG and right-sided stent placement on 1/19. Patient's CONNER improved with hydration but then got worse again today with creatinine of 1.6. Patient has a normal baseline. Therefore nephrology was consulted    Assessment and plan:    #1 acute kidney injury, secondary to obstructive uropathy secondary to left obstructing ureteric calculus s/p cystoscopy and RPG and right stent placement. Patient also had prerenal due to poor p.o. intake. Patient also had received gentamycin and ketorolac which can be nephrotoxic and may have contributed. Patient has normal baseline. Patients creatinine was improving but has trended up again. May be secondary to relative hypotension as blood pressures were in the 90s yesterday.   #2 obstructive uropathy secondary to right obstructed ureter ureteric proximal calculus, now s/p RPG and stent placement  #3 right-sided hydronephrosis  #4 type 2 diabetes with complications, microalbuminuria  #5 complicated UTI  #6 transaminitis, suspect shock liver due to hypotension  #7 severe hypoalbuminemia  #8 lactic acidosis resolved    Plan:    #1 strict intake output charting, Thorpe catheter in place  #2 hydration with normal saline 75 mL an hour  #3 continue midodrine 10 mg every 8 hours to augment blood pressures  #4 diffuse doses of intermittent albumin  #5 avoid IV contrast  #6 renally dose medications for EGFR of less than 30  #7 trend liver functions  #8 quantify proteinuria  #9 monitor renal functions daily    Discussed plan with primary team.  Formal consult by Dr. Biju Muñoz  later today    Please call with questions    Xander Bill MD FASN  Cell [phone]  Pager: [phone]

## 2023-01-21 NOTE — PROGRESS NOTES
1704 temp 100.2, tylenol 650 mg given at 1824. Pt is in bed resting with eyes open. No distress noted.

## 2023-01-21 NOTE — CONSULTS
Nephrology Consult Note    Patient: Fredy Severe               Sex: female          DOA: 1/18/2023  YOB: 1955      Age:  74 y.o.        LOS:  LOS: 3 days   MRN: [id number]                    [facility]: [id number]       IMPRESSION:     CONNER - this mild-moderate CONNER is definitely MULTIFACTORIAL in nature, clearly an obstructive element with patient presenting with proximal right kidney stone. On top of this, I do believe she was likely 'dry' and prerenal, and then she received several meds (gent, ketorolac, ACE) which would further aggravate CONNER. Cr remains a little high (mid-high 1's) but actually has improved a little since admission when it was 1.9mg/dl. She is presently on IVF's and making VERY GOOD URINE. This is encouraging, and I really think it's just a matter of time before this CONNER improves. She does NOT have a history of CKD and her Cr was very normal (0.6 mg/dl) just a couple months ago. Kidney stones - obstructing 5 mm right kidney stone which was causing hydronephosis and hydroureter. Stone lodged in proximal ureter on CT scan, and she underwent stent placement on 1/19 per urology. Pain much better and clinically she's improving. Kidney function is looking better, as above, though Cr has not yet normalized. She does NOT have a history of kidney stones in the past per patient. I presume this is likely a calcium stone though we don't know this for sure. UTI - she does report a history of frequent infections and the obstructing stone may have been the predisposing factor for this latest UTI. Urine culture from admit growing >100K Klebsiella, on IV cefepime presently to manage this. She is responding to ABX, WBC down from 20K on presentation to 13K today. No recent temp spikes and symptoms improving as well. HTN - she was on lisinopril PTA for BP management. This is presently on hold in light of CONNER.   BP's actually have been on the low side lately with readings even dipping down into the 90's yesterday. These lower BP's are likely result of infection, sepsis, etc and I'm sure the relative hypotension is contributing to the persistent elevation in serum Cr. DISCUSSION AND PLAN:    Agree with pushing some IVF's, especially with the low BP's. Agree with NS as ordered 75/hr. She is taking PO pretty well per her report, thus I'll refrain from increasing the IVF\"s at this time. Definitely no more ACE anytime soon. Must avoid all possible renal toxins. Meds reviewed and look okay -> no plans for further gent, toradol. We'll keep the durbin for now but once Cr starts to trend down further (which it will very soon), then I would not be opposed to taking this out and giving a VT. Ultimately, though, will defer to urology as they are following the patient. I'll leave the midodrine to help support BP's. I do believe that kidneys will be quicker to improve if BP's are higher. The fluids and ABX should help with this -> likely won't need the midodrine for much longer. Continue ABX for the Klebsiella UTI associated with this obstructing stone. She will need definitive stone management soon per urology once the infection is treated and CONNER resolves. She will need 24 hr urine studies later as outpatient once she recovers from this acute illness. It would be good to know why she's forming these stones and what can be done (diet and/or pharmacologically) to reduce the rate of recurrence. She looks good, and I expect to see Cr steadily trend down over the next few days. It'll eventually get back to normal but it may take time for this to happen. I anticipate she'll need to stay in the hospital for 2-3 more days. Following closely, call with questions. CONSULTING PHYSICIAN:  Dr. Kt Gleason:  CONNER     HISTORY OF PRESENT ILLNESS:  Patient admitted with CONNER Cr 1.9mg/dl. CT scan showed obstructing right kidney stone.   She has no history of kidney disease, no prior history of stones. She underwent cysto and stent placement on 1/19 per urology. Cr level improved and got down to 1.4mg/dl at one point, but up slightly today to 1.6mg/dl today prompting the consultation. She did have low BP's noted yesterday, dropping into the 90's. She does not have h/o low BP's, actually was on lisinopril PTA. She did not get IV contrast in hospital but did get a dose of ketorolac on 1/18 and a dose of IV gent 328mg on 1/19. Urine cultures growing klebsiella. PAST MEDICAL HISTORY:  Past Medical History:   Diagnosis Date    ADHD     Anxiety     Bilateral lumbar radiculopathy     Carotid bruit     right    Depression     Diabetes mellitus (HCC)     HCD (hypertensive cardiovascular disease)     High cholesterol     Hyperlipidemia     Hypertension     Migraine headache     Plantar fasciitis          PAST SURGICAL HISTORY:     has a past surgical history that includes hx septoplasty (6/2002); hx tonsillectomy; and pr unlisted neurological/neuromuscular dx px (08/06/2018). FAMILY HISTORY:  family history includes Cancer in her father; Elevated Lipids in her brother and brother; Heart Surgery in her mother; Hypertension in her brother, brother, and father; Stroke in her mother. SOCIAL HISTORY:   reports that she quit smoking about 34 years ago. Her smoking use included cigarettes. She has a 1.00 pack-year smoking history. She has never used smokeless tobacco. She reports that she does not drink alcohol and does not use drugs.       CURRENT MEDICATIONS:  Current Facility-Administered Medications   Medication Dose Route Frequency    albumin human 25% (BUMINATE) solution 25 g  25 g IntraVENous Q6H    cefepime (MAXIPIME) 2 g in 0.9% sodium chloride 10 mL IV syringe  2 g IntraVENous Q24H    tamsulosin (FLOMAX) capsule 0.4 mg  0.4 mg Oral QHS    0.9% sodium chloride infusion  75 mL/hr IntraVENous CONTINUOUS    midodrine (PROAMATINE) tablet 10 mg  10 mg Oral TID WITH MEALS    sodium chloride 0.9 % bolus infusion 1,000 mL  1,000 mL IntraVENous ONCE PRN    oxyCODONE IR (ROXICODONE) tablet 5 mg  5 mg Oral Q6H PRN    naloxone (NARCAN) injection 0.4 mg  0.4 mg IntraVENous PRN    docusate sodium (COLACE) capsule 100 mg  100 mg Oral DAILY    sodium chloride (NS) flush 5-40 mL  5-40 mL IntraVENous Q8H    sodium chloride (NS) flush 5-40 mL  5-40 mL IntraVENous PRN    acetaminophen (TYLENOL) tablet 650 mg  650 mg Oral Q6H PRN    Or    acetaminophen (TYLENOL) suppository 650 mg  650 mg Rectal Q6H PRN    polyethylene glycol (MIRALAX) packet 17 g  17 g Oral DAILY PRN    bisacodyL (DULCOLAX) suppository 10 mg  10 mg Rectal DAILY PRN    ondansetron (ZOFRAN ODT) tablet 4 mg  4 mg Oral Q8H PRN    Or    ondansetron (ZOFRAN) injection 4 mg  4 mg IntraVENous Q6H PRN    ferrous sulfate tablet 325 mg  325 mg Oral ACB    FLUoxetine (PROzac) capsule 20 mg  20 mg Oral DAILY    insulin lispro (HUMALOG) injection   SubCUTAneous AC&HS    glucose chewable tablet 16 g  4 Tablet Oral PRN    glucagon (GLUCAGEN) injection 1 mg  1 mg IntraMUSCular PRN    dextrose 10% infusion 0-250 mL  0-250 mL IntraVENous PRN            REVIEW OF SYSTEMS:  Constitutional: No fever, chills, or weight loss  Eyes: No visual symptoms. ENT:No dysphagia, no hearing problem  Respiratory: No cough, dyspnea or wheezing. Cardiovascular: No chest pain, pressure, palpitations, tightness or heaviness. Gastrointestinal: No vomiting, diarrhea or abdominal pain. Genitourinary: No dysuria, frequency, or urgency. Musculoskeletal: No joint pain or swelling. Integumentary: No rashes. Neurological: No headaches, sensory or motor symptoms. Denies complaints in all other systems. PHYSICAL EXAMINATION:  Visit Vitals  /67 (BP 1 Location: Right upper arm, BP Patient Position: Semi fowlers; Lying)   Pulse 78   Temp 99.1 °F (37.3 °C)   Resp 18   Ht 4' 11\" (1.499 m)   Wt 56.4 kg (124 lb 6.4 oz)   SpO2 98% BMI 25.13 kg/m²      Awake, NAD. Responds appropriately. HEENT no scleral icterus, MM slightly dry. Neck no JVD. Lungs clear, no rales or wheezes. RRR no mr/g. Abdomen soft, ND/NT +BS. Back no CVA tenderness.  + durbin with excellent volumes, light yellow urine. Ext no edema. No rash. No focal neuro findings. Affect full and appropriate. LABORATORY DATA:    CBC w/Diff    Recent Labs     01/21/23 0152 01/20/23 2200 01/20/23  0609   WBC 13.7* 14.6* 16.5*   RBC 3.01* 3.42* 3.30*   HGB 9.0* 10.3* 9.8*   HCT 27.5* 30.5* 29.3*   MCV 91.4 89.2 88.8   MCH 29.9 30.1 29.7   MCHC 32.7 33.8 33.4   RDW 14.4 14.5 14.4    Recent Labs     01/21/23 0152 01/20/23 2200 01/20/23  0609 01/19/23  0504 01/18/23  1845   GRANS  --  81* 83*  --  83*   MONOS  --  3 3  --  3   EOS  --  1 0  --  0   BASOS  --  0 0  --  1   RDW 14.4 14.5 14.4   < > 14.0    < > = values in this interval not displayed. Comprehensive Metabolic Profile    Recent Labs     01/21/23 0152 01/20/23  0609 01/19/23  0504    136 139   K 3.9 4.3 3.5    111 113*   CO2 21 19* 19*   AGAP 4 6 7   BUN 40* 39* 32*   CREA 1.64* 1.42* 1.61*   * 181* 165*    Recent Labs     01/21/23 0152 01/20/23  0609 01/19/23  0504 01/18/23  1845   CA 8.0* 8.9 8.4* 8.9   ALB  --  1.6* 1.8* 2.1*   TP  --  5.6* 5.8* 6.8   AP  --  155* 174* 211*   CBIL  --   --  0.1  --    TBILI  --  0.4 0.5 0.3                 Thanks again, and call me with questions or concerns.       Olivia Andersen MD  01/21/23

## 2023-01-21 NOTE — PROGRESS NOTES
[facility]  [facility]    Patient: Isabelle Stewart Age: 74 y.o. : 1955 MR#: [id number] SSN: [id number]  Date/Time: 2023    Subjective:     Patient is laying in bed in no apparent distress, awake and alert. Patient states that she is feeling better    Assessment/Plan:     Right hydroureteronephrosis  Obstructive uropathy  Complicated UTI  Leukocytosis  Acute kidney injury  Type 2 diabetes  Elevated LFTs    Plan  Patient is status post cystoscopy and stent placement  Patient has a Thorpe in place  Continue antibiotics. ID is following  Nephrology consulted, monitor renal function, I's and O's  IV fluids, monitor renal function  Monitor sugars, sliding scale insulin  Monitor LFTs  PT OT input noted  Disposition-Home soon  Discussed with patient    Case discussed with:  [x]Patient  []Family  []Nursing  []Case Management  DVT Prophylaxis:  []Lovenox  []Hep SQ  [x]SCDs  []Coumadin   []On Heparin gtt    Objective:   VS: Visit Vitals  /70 (BP 1 Location: Right upper arm, BP Patient Position: Semi fowlers; Lying)   Pulse 75   Temp 100.2 °F (37.9 °C)   Resp 18   Ht 4' 11\" (1.499 m)   Wt 56.4 kg (124 lb 6.4 oz)   LMP  (LMP Unknown)   SpO2 98%   BMI 25.13 kg/m²      Tmax/24hrs: Temp (24hrs), Av.4 °F (37.4 °C), Min:98.5 °F (36.9 °C), Max:100.2 °F (37.9 °C)    Input/Output:   Intake/Output Summary (Last 24 hours) at 2023 1807  Last data filed at 2023 1304  Gross per 24 hour   Intake 240 ml   Output 1400 ml   Net -1160 ml       General:  Awake, alert  Cardiovascular:  S1S2+, RRR  Pulmonary:  CTA b/l  GI:  Soft, BS+, NT, ND  Extremities:  No edema    Labs:    Recent Results (from the past 24 hour(s))   GLUCOSE, POC    Collection Time: 23  9:59 PM   Result Value Ref Range    Glucose (POC) 296 (H) 70 - 110 mg/dL   CBC WITH AUTOMATED DIFF    Collection Time: 23 10:00 PM   Result Value Ref Range    WBC 14.6 (H) 4.6 - 13.2 K/uL    RBC 3.42 (L) 4.20 - 5.30 M/uL    HGB 10.3 (L) 12.0 - 16.0 g/dL    HCT 30.5 (L) 35.0 - 45.0 %    MCV 89.2 78.0 - 100.0 FL    MCH 30.1 24.0 - 34.0 PG    MCHC 33.8 31.0 - 37.0 g/dL    RDW 14.5 11.6 - 14.5 %    PLATELET 589 (H) 937 - 420 K/uL    MPV 9.6 9.2 - 11.8 FL    NRBC 0.0 0  WBC    ABSOLUTE NRBC 0.00 0.00 - 0.01 K/uL    NEUTROPHILS 81 (H) 40 - 73 %    LYMPHOCYTES 14 (L) 21 - 52 %    MONOCYTES 3 3 - 10 %    EOSINOPHILS 1 0 - 5 %    BASOPHILS 0 0 - 2 %    IMMATURE GRANULOCYTES 1 (H) 0.0 - 0.5 %    ABS. NEUTROPHILS 11.8 (H) 1.8 - 8.0 K/UL    ABS. LYMPHOCYTES 2.0 0.9 - 3.6 K/UL    ABS. MONOCYTES 0.5 0.05 - 1.2 K/UL    ABS. EOSINOPHILS 0.1 0.0 - 0.4 K/UL    ABS. BASOPHILS 0.0 0.0 - 0.1 K/UL    ABS. IMM.  GRANS. 0.2 (H) 0.00 - 0.04 K/UL    DF AUTOMATED     LACTIC ACID    Collection Time: 01/20/23 10:00 PM   Result Value Ref Range    Lactic acid 3.1 (HH) 0.4 - 2.0 MMOL/L   METABOLIC PANEL, BASIC    Collection Time: 01/21/23  1:52 AM   Result Value Ref Range    Sodium 136 136 - 145 mmol/L    Potassium 3.9 3.5 - 5.5 mmol/L    Chloride 111 100 - 111 mmol/L    CO2 21 21 - 32 mmol/L    Anion gap 4 3.0 - 18 mmol/L    Glucose 168 (H) 74 - 99 mg/dL    BUN 40 (H) 7.0 - 18 MG/DL    Creatinine 1.64 (H) 0.6 - 1.3 MG/DL    BUN/Creatinine ratio 24 (H) 12 - 20      eGFR 35 (L) >60 ml/min/1.73m2    Calcium 8.0 (L) 8.5 - 10.1 MG/DL   CBC W/O DIFF    Collection Time: 01/21/23  1:52 AM   Result Value Ref Range    WBC 13.7 (H) 4.6 - 13.2 K/uL    RBC 3.01 (L) 4.20 - 5.30 M/uL    HGB 9.0 (L) 12.0 - 16.0 g/dL    HCT 27.5 (L) 35.0 - 45.0 %    MCV 91.4 78.0 - 100.0 FL    MCH 29.9 24.0 - 34.0 PG    MCHC 32.7 31.0 - 37.0 g/dL    RDW 14.4 11.6 - 14.5 %    PLATELET 037 429 - 694 K/uL    MPV 9.3 9.2 - 11.8 FL    NRBC 0.0 0  WBC    ABSOLUTE NRBC 0.00 0.00 - 0.01 K/uL   LACTIC ACID    Collection Time: 01/21/23  1:52 AM   Result Value Ref Range    Lactic acid 1.1 0.4 - 2.0 MMOL/L   GLUCOSE, POC    Collection Time: 01/21/23  8:02 AM   Result Value Ref Range    Glucose (POC) 145 (H) 70 - 110 mg/dL   GLUCOSE, POC    Collection Time: 01/21/23 11:44 AM   Result Value Ref Range    Glucose (POC) 235 (H) 70 - 110 mg/dL   GLUCOSE, POC    Collection Time: 01/21/23  4:50 PM   Result Value Ref Range    Glucose (POC) 119 (H) 70 - 110 mg/dL     Additional Data Reviewed:    Dragon medical dictation software was used for portions of this report. Unintended errors may occur.       Signed By: Abran Keating MD     January 21, 2023

## 2023-01-21 NOTE — PROGRESS NOTES
Problem: Falls - Risk of  Goal: *Absence of Falls  Description: Document Gonzalo Valero Fall Risk and appropriate interventions in the flowsheet. Outcome: Progressing Towards Goal  Note: Fall Risk Interventions:            Medication Interventions: Assess postural VS orthostatic hypotension, Patient to call before getting OOB, Teach patient to arise slowly                   Problem: Patient Education: Go to Patient Education Activity  Goal: Patient/Family Education  Outcome: Progressing Towards Goal     Problem: Pain  Goal: *Control of Pain  Outcome: Progressing Towards Goal  Goal: *PALLIATIVE CARE:  Alleviation of Pain  Outcome: Progressing Towards Goal     Problem: Patient Education: Go to Patient Education Activity  Goal: Patient/Family Education  Outcome: Progressing Towards Goal     Problem: Diabetes Self-Management  Goal: *Disease process and treatment process  Description: Define diabetes and identify own type of diabetes; list 3 options for treating diabetes. Outcome: Progressing Towards Goal  Goal: *Incorporating nutritional management into lifestyle  Description: Describe effect of type, amount and timing of food on blood glucose; list 3 methods for planning meals. Outcome: Progressing Towards Goal  Goal: *Incorporating physical activity into lifestyle  Description: State effect of exercise on blood glucose levels. Outcome: Progressing Towards Goal  Goal: *Developing strategies to promote health/change behavior  Description: Define the ABC's of diabetes; identify appropriate screenings, schedule and personal plan for screenings. Outcome: Progressing Towards Goal  Goal: *Using medications safely  Description: State effect of diabetes medications on diabetes; name diabetes medication taking, action and side effects. Outcome: Progressing Towards Goal  Goal: *Monitoring blood glucose, interpreting and using results  Description: Identify recommended blood glucose targets  and personal targets.   Outcome: Progressing Towards Goal  Goal: *Prevention, detection, treatment of acute complications  Description: List symptoms of hyper- and hypoglycemia; describe how to treat low blood sugar and actions for lowering  high blood glucose level. Outcome: Progressing Towards Goal  Goal: *Prevention, detection and treatment of chronic complications  Description: Define the natural course of diabetes and describe the relationship of blood glucose levels to long term complications of diabetes. Outcome: Progressing Towards Goal  Goal: *Developing strategies to address psychosocial issues  Description: Describe feelings about living with diabetes; identify support needed and support network  Outcome: Progressing Towards Goal  Goal: *Insulin pump training  Outcome: Progressing Towards Goal  Goal: *Sick day guidelines  Outcome: Progressing Towards Goal  Goal: *Patient Specific Goal (EDIT GOAL, INSERT TEXT)  Outcome: Progressing Towards Goal     Problem: Diabetes Self-Management  Goal: *Disease process and treatment process  Description: Define diabetes and identify own type of diabetes; list 3 options for treating diabetes. Outcome: Progressing Towards Goal  Goal: *Incorporating nutritional management into lifestyle  Description: Describe effect of type, amount and timing of food on blood glucose; list 3 methods for planning meals. Outcome: Progressing Towards Goal  Goal: *Incorporating physical activity into lifestyle  Description: State effect of exercise on blood glucose levels. Outcome: Progressing Towards Goal  Goal: *Developing strategies to promote health/change behavior  Description: Define the ABC's of diabetes; identify appropriate screenings, schedule and personal plan for screenings. Outcome: Progressing Towards Goal  Goal: *Using medications safely  Description: State effect of diabetes medications on diabetes; name diabetes medication taking, action and side effects.   Outcome: Progressing Towards Goal  Goal: *Monitoring blood glucose, interpreting and using results  Description: Identify recommended blood glucose targets  and personal targets. Outcome: Progressing Towards Goal  Goal: *Prevention, detection, treatment of acute complications  Description: List symptoms of hyper- and hypoglycemia; describe how to treat low blood sugar and actions for lowering  high blood glucose level. Outcome: Progressing Towards Goal  Goal: *Prevention, detection and treatment of chronic complications  Description: Define the natural course of diabetes and describe the relationship of blood glucose levels to long term complications of diabetes.   Outcome: Progressing Towards Goal  Goal: *Developing strategies to address psychosocial issues  Description: Describe feelings about living with diabetes; identify support needed and support network  Outcome: Progressing Towards Goal  Goal: *Insulin pump training  Outcome: Progressing Towards Goal  Goal: *Sick day guidelines  Outcome: Progressing Towards Goal  Goal: *Patient Specific Goal (EDIT GOAL, INSERT TEXT)  Outcome: Progressing Towards Goal     Problem: Patient Education: Go to Patient Education Activity  Goal: Patient/Family Education  Outcome: Progressing Towards Goal     Problem: General Medical Care Plan  Goal: *Vital signs within specified parameters  Outcome: Progressing Towards Goal  Goal: *Labs within defined limits  Outcome: Progressing Towards Goal  Goal: *Absence of infection signs and symptoms  Outcome: Progressing Towards Goal  Goal: *Optimal pain control at patient's stated goal  Outcome: Progressing Towards Goal  Goal: *Skin integrity maintained  Outcome: Progressing Towards Goal  Goal: *Fluid volume balance  Outcome: Progressing Towards Goal  Goal: *Optimize nutritional status  Outcome: Progressing Towards Goal  Goal: *Anxiety reduced or absent  Outcome: Progressing Towards Goal  Goal: *Progressive mobility and function (eg: ADL's)  Outcome: Progressing Towards Goal Problem: Patient Education: Go to Patient Education Activity  Goal: Patient/Family Education  Outcome: Progressing Towards Goal     Problem: Discharge Planning  Goal: *Discharge to safe environment  Outcome: Progressing Towards Goal  Goal: *Knowledge of medication management  Outcome: Progressing Towards Goal  Goal: *Knowledge of discharge instructions  Outcome: Progressing Towards Goal

## 2023-01-22 LAB
ANION GAP SERPL CALC-SCNC: 3 MMOL/L (ref 3–18)
BASOPHILS # BLD: 0 K/UL (ref 0–0.1)
BASOPHILS NFR BLD: 0 % (ref 0–2)
BUN SERPL-MCNC: 26 MG/DL (ref 7–18)
BUN/CREAT SERPL: 21 (ref 12–20)
CALCIUM SERPL-MCNC: 8.8 MG/DL (ref 8.5–10.1)
CHLORIDE SERPL-SCNC: 112 MMOL/L (ref 100–111)
CO2 SERPL-SCNC: 24 MMOL/L (ref 21–32)
CREAT SERPL-MCNC: 1.26 MG/DL (ref 0.6–1.3)
DIFFERENTIAL METHOD BLD: ABNORMAL
EOSINOPHIL # BLD: 0.1 K/UL (ref 0–0.4)
EOSINOPHIL NFR BLD: 1 % (ref 0–5)
ERYTHROCYTE [DISTWIDTH] IN BLOOD BY AUTOMATED COUNT: 14.4 % (ref 11.6–14.5)
GLUCOSE BLD STRIP.AUTO-MCNC: 124 MG/DL (ref 70–110)
GLUCOSE BLD STRIP.AUTO-MCNC: 133 MG/DL (ref 70–110)
GLUCOSE BLD STRIP.AUTO-MCNC: 183 MG/DL (ref 70–110)
GLUCOSE BLD STRIP.AUTO-MCNC: 191 MG/DL (ref 70–110)
GLUCOSE SERPL-MCNC: 124 MG/DL (ref 74–99)
HCT VFR BLD AUTO: 25.8 % (ref 35–45)
HGB BLD-MCNC: 8.6 G/DL (ref 12–16)
IMM GRANULOCYTES # BLD AUTO: 0.1 K/UL (ref 0–0.04)
IMM GRANULOCYTES NFR BLD AUTO: 1 % (ref 0–0.5)
LYMPHOCYTES # BLD: 1.8 K/UL (ref 0.9–3.6)
LYMPHOCYTES NFR BLD: 18 % (ref 21–52)
MAGNESIUM SERPL-MCNC: 1.8 MG/DL (ref 1.6–2.6)
MCH RBC QN AUTO: 30.1 PG (ref 24–34)
MCHC RBC AUTO-ENTMCNC: 33.3 G/DL (ref 31–37)
MCV RBC AUTO: 90.2 FL (ref 78–100)
MONOCYTES # BLD: 0.5 K/UL (ref 0.05–1.2)
MONOCYTES NFR BLD: 5 % (ref 3–10)
NEUTS SEG # BLD: 7.4 K/UL (ref 1.8–8)
NEUTS SEG NFR BLD: 74 % (ref 40–73)
NRBC # BLD: 0 K/UL (ref 0–0.01)
NRBC BLD-RTO: 0 PER 100 WBC
PLATELET # BLD AUTO: 342 K/UL (ref 135–420)
PMV BLD AUTO: 9.6 FL (ref 9.2–11.8)
POTASSIUM SERPL-SCNC: 4.1 MMOL/L (ref 3.5–5.5)
RBC # BLD AUTO: 2.86 M/UL (ref 4.2–5.3)
SODIUM SERPL-SCNC: 139 MMOL/L (ref 136–145)
WBC # BLD AUTO: 10 K/UL (ref 4.6–13.2)

## 2023-01-22 PROCEDURE — 85025 COMPLETE CBC W/AUTO DIFF WBC: CPT

## 2023-01-22 PROCEDURE — 51798 US URINE CAPACITY MEASURE: CPT

## 2023-01-22 PROCEDURE — 82962 GLUCOSE BLOOD TEST: CPT

## 2023-01-22 PROCEDURE — 77030018842 HC SOL IRR SOD CL 9% BAXT -A

## 2023-01-22 PROCEDURE — 74011250637 HC RX REV CODE- 250/637: Performed by: PHYSICIAN ASSISTANT

## 2023-01-22 PROCEDURE — 74011636637 HC RX REV CODE- 636/637: Performed by: PHYSICIAN ASSISTANT

## 2023-01-22 PROCEDURE — 74011250636 HC RX REV CODE- 250/636: Performed by: EMERGENCY MEDICINE

## 2023-01-22 PROCEDURE — 83735 ASSAY OF MAGNESIUM: CPT

## 2023-01-22 PROCEDURE — 94762 N-INVAS EAR/PLS OXIMTRY CONT: CPT

## 2023-01-22 PROCEDURE — 74011250636 HC RX REV CODE- 250/636: Performed by: INTERNAL MEDICINE

## 2023-01-22 PROCEDURE — 74011250637 HC RX REV CODE- 250/637: Performed by: EMERGENCY MEDICINE

## 2023-01-22 PROCEDURE — 36415 COLL VENOUS BLD VENIPUNCTURE: CPT

## 2023-01-22 PROCEDURE — 74011000250 HC RX REV CODE- 250: Performed by: PHYSICIAN ASSISTANT

## 2023-01-22 PROCEDURE — 74011000250 HC RX REV CODE- 250: Performed by: INTERNAL MEDICINE

## 2023-01-22 PROCEDURE — 65270000046 HC RM TELEMETRY

## 2023-01-22 PROCEDURE — 80048 BASIC METABOLIC PNL TOTAL CA: CPT

## 2023-01-22 PROCEDURE — 99232 SBSQ HOSP IP/OBS MODERATE 35: CPT | Performed by: EMERGENCY MEDICINE

## 2023-01-22 RX ORDER — MIDODRINE HYDROCHLORIDE 5 MG/1
5 TABLET ORAL
Status: DISCONTINUED | OUTPATIENT
Start: 2023-01-22 | End: 2023-01-23

## 2023-01-22 RX ADMIN — Medication 2 UNITS: at 22:21

## 2023-01-22 RX ADMIN — SODIUM CHLORIDE 2 G: 9 INJECTION INTRAMUSCULAR; INTRAVENOUS; SUBCUTANEOUS at 00:00

## 2023-01-22 RX ADMIN — TAMSULOSIN HYDROCHLORIDE 0.4 MG: 0.4 CAPSULE ORAL at 22:23

## 2023-01-22 RX ADMIN — FLUOXETINE 20 MG: 20 CAPSULE ORAL at 09:45

## 2023-01-22 RX ADMIN — Medication 2 UNITS: at 11:40

## 2023-01-22 RX ADMIN — DOCUSATE SODIUM 100 MG: 100 CAPSULE, LIQUID FILLED ORAL at 09:45

## 2023-01-22 RX ADMIN — Medication 2 UNITS: at 00:24

## 2023-01-22 RX ADMIN — SODIUM CHLORIDE, PRESERVATIVE FREE 10 ML: 5 INJECTION INTRAVENOUS at 22:23

## 2023-01-22 RX ADMIN — OXYCODONE HYDROCHLORIDE 5 MG: 5 TABLET ORAL at 22:30

## 2023-01-22 RX ADMIN — FERROUS SULFATE TAB 325 MG (65 MG ELEMENTAL FE) 325 MG: 325 (65 FE) TAB at 09:45

## 2023-01-22 RX ADMIN — SODIUM CHLORIDE 75 ML/HR: 9 INJECTION, SOLUTION INTRAVENOUS at 06:02

## 2023-01-22 RX ADMIN — SODIUM CHLORIDE 2 G: 9 INJECTION INTRAMUSCULAR; INTRAVENOUS; SUBCUTANEOUS at 23:07

## 2023-01-22 NOTE — PROGRESS NOTES
Problem: Falls - Risk of  Goal: *Absence of Falls  Description: Document Jimmy Castro Fall Risk and appropriate interventions in the flowsheet. Outcome: Progressing Towards Goal  Note: Fall Risk Interventions:            Medication Interventions: Patient to call before getting OOB, Teach patient to arise slowly                   Problem: Patient Education: Go to Patient Education Activity  Goal: Patient/Family Education  Outcome: Progressing Towards Goal     Problem: Pain  Goal: *Control of Pain  Outcome: Progressing Towards Goal  Goal: *PALLIATIVE CARE:  Alleviation of Pain  Outcome: Progressing Towards Goal     Problem: Patient Education: Go to Patient Education Activity  Goal: Patient/Family Education  Outcome: Progressing Towards Goal     Problem: Diabetes Self-Management  Goal: *Disease process and treatment process  Description: Define diabetes and identify own type of diabetes; list 3 options for treating diabetes. Outcome: Progressing Towards Goal  Goal: *Incorporating nutritional management into lifestyle  Description: Describe effect of type, amount and timing of food on blood glucose; list 3 methods for planning meals. Outcome: Progressing Towards Goal  Goal: *Incorporating physical activity into lifestyle  Description: State effect of exercise on blood glucose levels. Outcome: Progressing Towards Goal  Goal: *Developing strategies to promote health/change behavior  Description: Define the ABC's of diabetes; identify appropriate screenings, schedule and personal plan for screenings. Outcome: Progressing Towards Goal  Goal: *Using medications safely  Description: State effect of diabetes medications on diabetes; name diabetes medication taking, action and side effects. Outcome: Progressing Towards Goal  Goal: *Monitoring blood glucose, interpreting and using results  Description: Identify recommended blood glucose targets  and personal targets.   Outcome: Progressing Towards Goal  Goal: *Prevention, detection, treatment of acute complications  Description: List symptoms of hyper- and hypoglycemia; describe how to treat low blood sugar and actions for lowering  high blood glucose level. Outcome: Progressing Towards Goal  Goal: *Prevention, detection and treatment of chronic complications  Description: Define the natural course of diabetes and describe the relationship of blood glucose levels to long term complications of diabetes.   Outcome: Progressing Towards Goal  Goal: *Developing strategies to address psychosocial issues  Description: Describe feelings about living with diabetes; identify support needed and support network  Outcome: Progressing Towards Goal  Goal: *Insulin pump training  Outcome: Progressing Towards Goal  Goal: *Sick day guidelines  Outcome: Progressing Towards Goal  Goal: *Patient Specific Goal (EDIT GOAL, INSERT TEXT)  Outcome: Progressing Towards Goal     Problem: Patient Education: Go to Patient Education Activity  Goal: Patient/Family Education  Outcome: Progressing Towards Goal     Problem: General Medical Care Plan  Goal: *Vital signs within specified parameters  Outcome: Progressing Towards Goal  Goal: *Labs within defined limits  Outcome: Progressing Towards Goal  Goal: *Absence of infection signs and symptoms  Outcome: Progressing Towards Goal  Goal: *Optimal pain control at patient's stated goal  Outcome: Progressing Towards Goal  Goal: *Skin integrity maintained  Outcome: Progressing Towards Goal  Goal: *Fluid volume balance  Outcome: Progressing Towards Goal  Goal: *Optimize nutritional status  Outcome: Progressing Towards Goal  Goal: *Anxiety reduced or absent  Outcome: Progressing Towards Goal  Goal: *Progressive mobility and function (eg: ADL's)  Outcome: Progressing Towards Goal     Problem: Patient Education: Go to Patient Education Activity  Goal: Patient/Family Education  Outcome: Progressing Towards Goal     Problem: Discharge Planning  Goal: *Discharge to safe environment  Outcome: Progressing Towards Goal  Goal: *Knowledge of medication management  Outcome: Progressing Towards Goal  Goal: *Knowledge of discharge instructions  Outcome: Progressing Towards Goal     Problem: Patient Education: Go to Patient Education Activity  Goal: Patient/Family Education  Outcome: Progressing Towards Goal     Problem: Pressure Injury - Risk of  Goal: *Prevention of pressure injury  Description: Document Nima Scale and appropriate interventions in the flowsheet.   Outcome: Progressing Towards Goal     Problem: Patient Education: Go to Patient Education Activity  Goal: Patient/Family Education  Outcome: Progressing Towards Goal     Problem: Patient Education: Go to Patient Education Activity  Goal: Patient/Family Education  Outcome: Progressing Towards Goal     Problem: Patient Education: Go to Patient Education Activity  Goal: Patient/Family Education  Outcome: Progressing Towards Goal

## 2023-01-22 NOTE — PROGRESS NOTES
In Patient Progress note      Admit Date: 1/18/2023          Impression:        CONNER - kidney function looking much better today as expected -> Cr almost back to normal at 1.26mg/dl this am.  Urine outputs excellent as well, voided 4L yesterday per I/O's. This CONNER was related to several factors -> obstruction of right kidney, sepsis, relative hypotension, + exposure to numerous meds (gent, toradol, ACE). The obstruction was dealt with a few days ago with a stent, all offending meds have been stopped, and she remains on some IVF's ->  NS at 75/hr. GFR is looking great and close to normal at this point. Kidney stone - presented with proximal right ureteral stone, no prior history of clinical stones. She underwent cysto and stent placement 1/19 per urology. Clinically she is doing so much better. Pain is minimal now, renal function greatly improved as noted above. UTI - complicated UTI due to obstructing stone, urine culture 1/18 growing Klebsiella pneumonia. HTN - BP's have been a little soft over the last few days and suspect the marginal BP's likely contributed to the bump in Cr that we saw yesterday. BP's look great now, consistently running in a good range 120-130's. She is not on any BP meds, actually on midodrine for BP support. Plan:     Continue the NS at 75/hr. She is handling the fluids fine, no evidence of edema/overload. I really don't think she needs the midodrine. I'll reduce this to 5mg and assuming BP's remain stable on the lower dose, I think it can be d/c'd entirely tomorrow. Continue to hold ACE and would not plan to restart anytime soon. Also, make certain no more NSAIDS or other nephrotoxic exposures. Meds reviewed and look okay. Continue ABX to treat Klebsiella UTI. Urology following for the stone -> she'll need definitive stone management in the near future at their discretion. Cr should continue to improve barring any unforseen complications. It looks like her baseline Cr prior to this CONNER was totally normal at 0.65mg/dl (Nov '22). She'll need 24 hr urine studies later to determine why she's forming stones and what can be done to prevent them. This is something that will be done later, however, as an outpatient when she is well past this acute illness. Looking very good from our end. Will continue to follow. Subjective:     No new complaints.               Current Facility-Administered Medications:     cefepime (MAXIPIME) 2 g in 0.9% sodium chloride 10 mL IV syringe, 2 g, IntraVENous, Q24H, Autumn Keys MD, 2 g at 01/22/23 0000    tamsulosin (FLOMAX) capsule 0.4 mg, 0.4 mg, Oral, QHS, Danitza Slater PA-C, 0.4 mg at 01/21/23 2110    0.9% sodium chloride infusion, 75 mL/hr, IntraVENous, CONTINUOUS, Bin Chan MD, Last Rate: 75 mL/hr at 01/22/23 0602, 75 mL/hr at 01/22/23 0602    midodrine (PROAMATINE) tablet 10 mg, 10 mg, Oral, TID WITH MEALS, Srinivas Hercules DO, 10 mg at 01/21/23 1202    oxyCODONE IR (ROXICODONE) tablet 5 mg, 5 mg, Oral, Q6H PRN, Charley Blackburn MD, 5 mg at 01/21/23 0707    naloxone (NARCAN) injection 0.4 mg, 0.4 mg, IntraVENous, PRN, Bin Rowe MD    docusate sodium (COLACE) capsule 100 mg, 100 mg, Oral, DAILY, Bin Chan MD, 100 mg at 01/22/23 0945    sodium chloride (NS) flush 5-40 mL, 5-40 mL, IntraVENous, Q8H, Nyla Mei PA, 10 mL at 01/21/23 2113    sodium chloride (NS) flush 5-40 mL, 5-40 mL, IntraVENous, PRN, Edy Mei PA    acetaminophen (TYLENOL) tablet 650 mg, 650 mg, Oral, Q6H PRN, 650 mg at 01/21/23 1824 **OR** acetaminophen (TYLENOL) suppository 650 mg, 650 mg, Rectal, Q6H PRN, Nyla Mei, PA    polyethylene glycol (MIRALAX) packet 17 g, 17 g, Oral, DAILY PRN, Marisol Mei, PA    bisacodyL (DULCOLAX) suppository 10 mg, 10 mg, Rectal, DAILY PRN, Nyla Mei, PA    ondansetron (ZOFRAN ODT) tablet 4 mg, 4 mg, Oral, Q8H PRN **OR** ondansetron [facility]) injection 4 mg, 4 mg, IntraVENous, Q6H PRN, Rory Mei PA    ferrous sulfate tablet 325 mg, 325 mg, Oral, ACB, Nyla Mei PA, 325 mg at 01/22/23 0945    FLUoxetine (PROzac) capsule 20 mg, 20 mg, Oral, DAILY, Nyla Mei PA, 20 mg at 01/22/23 0945    insulin lispro (HUMALOG) injection, , SubCUTAneous, AC&HS, Megan Mei PA, 2 Units at 01/22/23 1140    glucose chewable tablet 16 g, 4 Tablet, Oral, PRN, Megan Mei PA    glucagon (GLUCAGEN) injection 1 mg, 1 mg, IntraMUSCular, PRN, Nyla Mei PA    dextrose 10% infusion 0-250 mL, 0-250 mL, IntraVENous, PRN, Rory Mei PA        Objective:     Visit Vitals  /71 (BP 1 Location: Right upper arm, BP Patient Position: At rest)   Pulse 62   Temp 98.4 °F (36.9 °C)   Resp 18   Ht 4' 11\" (1.499 m)   Wt 56.4 kg (124 lb 6.4 oz)   LMP  (LMP Unknown)   SpO2 96%   BMI 25.13 kg/m²         Intake/Output Summary (Last 24 hours) at 1/22/2023 1449  Last data filed at 1/22/2023 1401  Gross per 24 hour   Intake 2323.75 ml   Output 4150 ml   Net -1826.25 ml       Physical Exam:     Lying in bed, comfortable. NAD. Clear lungs, no rales or wheezes. RRR no mr/g. Ext - no edema. No rash. No focal neuro findings.         Data Review:    Recent Labs     01/22/23  0400   WBC 10.0   RBC 2.86*   HCT 25.8*   MCV 90.2   MCH 30.1   MCHC 33.3   RDW 14.4     Recent Labs     01/22/23  0400 01/21/23  0152 01/20/23  0609   BUN 26* 40* 39*   CREA 1.26 1.64* 1.42*   CA 8.8 8.0* 8.9   ALB  --   --  1.6*   K 4.1 3.9 4.3    136 136   * 111 111   CO2 24 21 19*   * 168* 181*       Samantha Whitney MD  Cell [phone]  Pager [phone]

## 2023-01-22 NOTE — ROUTINE PROCESS
I am fine with her doing Prolia on Friday despite UTI but I am also fine with her waiting until June if she is more comfortable with that.   Pt DTV 8 pm.

## 2023-01-22 NOTE — PROGRESS NOTES
[facility]  [facility]    Patient: Maurice Stephen Age: 74 y.o. : 1955 MR#: [id number] SSN: [id number]  Date/Time: 2023    Subjective:     Patient is sitting in a chair in no apparent distress, awake and alert. Denies any nausea vomiting or abdominal pain. Patient had a low-grade fever last evening. Assessment/Plan:     Right hydroureteronephrosis  Obstructive uropathy  Complicated UTI  Leukocytosis  Acute kidney injury  Type 2 diabetes  Elevated LFTs    Plan  Patient is status post cystoscopy and stent placement  Patient has a Thorpe in place. Voiding trial today  Continue antibiotics, follow cultures, ID is following  Nephrology input appreciated.   Monitor I's and O's, monitor renal function  Monitor sugars, sliding scale insulin  Monitor LFTs  PT OT input noted  Disposition-Home soon  Discussed with patient    Case discussed with:  [x]Patient  []Family  []Nursing  []Case Management  DVT Prophylaxis:  []Lovenox  []Hep SQ  [x]SCDs  []Coumadin   []On Heparin gtt    Objective:   VS: Visit Vitals  /71 (BP 1 Location: Right upper arm, BP Patient Position: At rest)   Pulse 62   Temp 98.4 °F (36.9 °C)   Resp 18   Ht 4' 11\" (1.499 m)   Wt 56.4 kg (124 lb 6.4 oz)   LMP  (LMP Unknown)   SpO2 96%   BMI 25.13 kg/m²      Tmax/24hrs: Temp (24hrs), Av.7 °F (37.1 °C), Min:97.9 °F (36.6 °C), Max:100.2 °F (37.9 °C)    Input/Output:   Intake/Output Summary (Last 24 hours) at 2023 1249  Last data filed at 2023 1023  Gross per 24 hour   Intake 2563.75 ml   Output 3650 ml   Net -1086.25 ml       General:  Awake, alert  Cardiovascular:  S1S2+, RRR  Pulmonary:  CTA b/l  GI:  Soft, BS+, NT, ND  Extremities:  No edema      Labs:    Recent Results (from the past 24 hour(s))   GLUCOSE, POC    Collection Time: 23  4:50 PM   Result Value Ref Range    Glucose (POC) 119 (H) 70 - 110 mg/dL   GLUCOSE, POC    Collection Time: 23  9:54 PM   Result Value Ref Range    Glucose (POC) 157 (H) 70 - 110 mg/dL   CBC WITH AUTOMATED DIFF    Collection Time: 01/22/23  4:00 AM   Result Value Ref Range    WBC 10.0 4.6 - 13.2 K/uL    RBC 2.86 (L) 4.20 - 5.30 M/uL    HGB 8.6 (L) 12.0 - 16.0 g/dL    HCT 25.8 (L) 35.0 - 45.0 %    MCV 90.2 78.0 - 100.0 FL    MCH 30.1 24.0 - 34.0 PG    MCHC 33.3 31.0 - 37.0 g/dL    RDW 14.4 11.6 - 14.5 %    PLATELET 400 856 - 629 K/uL    MPV 9.6 9.2 - 11.8 FL    NRBC 0.0 0  WBC    ABSOLUTE NRBC 0.00 0.00 - 0.01 K/uL    NEUTROPHILS 74 (H) 40 - 73 %    LYMPHOCYTES 18 (L) 21 - 52 %    MONOCYTES 5 3 - 10 %    EOSINOPHILS 1 0 - 5 %    BASOPHILS 0 0 - 2 %    IMMATURE GRANULOCYTES 1 (H) 0.0 - 0.5 %    ABS. NEUTROPHILS 7.4 1.8 - 8.0 K/UL    ABS. LYMPHOCYTES 1.8 0.9 - 3.6 K/UL    ABS. MONOCYTES 0.5 0.05 - 1.2 K/UL    ABS. EOSINOPHILS 0.1 0.0 - 0.4 K/UL    ABS. BASOPHILS 0.0 0.0 - 0.1 K/UL    ABS. IMM. GRANS. 0.1 (H) 0.00 - 0.04 K/UL    DF AUTOMATED     METABOLIC PANEL, BASIC    Collection Time: 01/22/23  4:00 AM   Result Value Ref Range    Sodium 139 136 - 145 mmol/L    Potassium 4.1 3.5 - 5.5 mmol/L    Chloride 112 (H) 100 - 111 mmol/L    CO2 24 21 - 32 mmol/L    Anion gap 3 3.0 - 18 mmol/L    Glucose 124 (H) 74 - 99 mg/dL    BUN 26 (H) 7.0 - 18 MG/DL    Creatinine 1.26 0.6 - 1.3 MG/DL    BUN/Creatinine ratio 21 (H) 12 - 20      eGFR 47 (L) >60 ml/min/1.73m2    Calcium 8.8 8.5 - 10.1 MG/DL   MAGNESIUM    Collection Time: 01/22/23  4:00 AM   Result Value Ref Range    Magnesium 1.8 1.6 - 2.6 mg/dL   GLUCOSE, POC    Collection Time: 01/22/23  7:58 AM   Result Value Ref Range    Glucose (POC) 124 (H) 70 - 110 mg/dL   GLUCOSE, POC    Collection Time: 01/22/23 11:20 AM   Result Value Ref Range    Glucose (POC) 183 (H) 70 - 110 mg/dL     Additional Data Reviewed:    Dragon medical dictation software was used for portions of this report. Unintended errors may occur.       Signed By: Ronny Laughlin MD     January 22, 2023

## 2023-01-22 NOTE — ROUTINE PROCESS
Pt assisted to bathroom, voided 100 cc, bladder scan 0. Pt will notify nurse when she needs to use the bathroom.

## 2023-01-23 ENCOUNTER — TELEPHONE (OUTPATIENT)
Dept: INTERNAL MEDICINE CLINIC | Age: 68
End: 2023-01-23

## 2023-01-23 VITALS
DIASTOLIC BLOOD PRESSURE: 78 MMHG | WEIGHT: 124.4 LBS | TEMPERATURE: 97.7 F | HEART RATE: 75 BPM | HEIGHT: 59 IN | RESPIRATION RATE: 20 BRPM | SYSTOLIC BLOOD PRESSURE: 121 MMHG | BODY MASS INDEX: 25.08 KG/M2 | OXYGEN SATURATION: 96 %

## 2023-01-23 LAB
ALBUMIN SERPL-MCNC: 2.3 G/DL (ref 3.4–5)
ANION GAP SERPL CALC-SCNC: 5 MMOL/L (ref 3–18)
BUN SERPL-MCNC: 17 MG/DL (ref 7–18)
BUN/CREAT SERPL: 16 (ref 12–20)
CALCIUM SERPL-MCNC: 8.5 MG/DL (ref 8.5–10.1)
CHLORIDE SERPL-SCNC: 110 MMOL/L (ref 100–111)
CO2 SERPL-SCNC: 24 MMOL/L (ref 21–32)
CREAT SERPL-MCNC: 1.05 MG/DL (ref 0.6–1.3)
GLUCOSE BLD STRIP.AUTO-MCNC: 140 MG/DL (ref 70–110)
GLUCOSE BLD STRIP.AUTO-MCNC: 205 MG/DL (ref 70–110)
GLUCOSE SERPL-MCNC: 189 MG/DL (ref 74–99)
PHOSPHATE SERPL-MCNC: 2.1 MG/DL (ref 2.5–4.9)
POTASSIUM SERPL-SCNC: 3.5 MMOL/L (ref 3.5–5.5)
SODIUM SERPL-SCNC: 139 MMOL/L (ref 136–145)

## 2023-01-23 PROCEDURE — 74011000250 HC RX REV CODE- 250: Performed by: PHYSICIAN ASSISTANT

## 2023-01-23 PROCEDURE — 82962 GLUCOSE BLOOD TEST: CPT

## 2023-01-23 PROCEDURE — 94762 N-INVAS EAR/PLS OXIMTRY CONT: CPT

## 2023-01-23 PROCEDURE — 2709999900 HC NON-CHARGEABLE SUPPLY

## 2023-01-23 PROCEDURE — 74011250637 HC RX REV CODE- 250/637: Performed by: INTERNAL MEDICINE

## 2023-01-23 PROCEDURE — 36415 COLL VENOUS BLD VENIPUNCTURE: CPT

## 2023-01-23 PROCEDURE — 74011250637 HC RX REV CODE- 250/637: Performed by: PHYSICIAN ASSISTANT

## 2023-01-23 PROCEDURE — 74011250637 HC RX REV CODE- 250/637: Performed by: EMERGENCY MEDICINE

## 2023-01-23 PROCEDURE — 74011636637 HC RX REV CODE- 636/637: Performed by: PHYSICIAN ASSISTANT

## 2023-01-23 PROCEDURE — 80069 RENAL FUNCTION PANEL: CPT

## 2023-01-23 RX ORDER — LEVOFLOXACIN 500 MG/1
500 TABLET, FILM COATED ORAL EVERY 24 HOURS
Status: DISCONTINUED | OUTPATIENT
Start: 2023-01-23 | End: 2023-01-23

## 2023-01-23 RX ORDER — LEVOFLOXACIN 250 MG/1
250 TABLET ORAL EVERY 24 HOURS
Qty: 8 TABLET | Refills: 0 | Status: SHIPPED | OUTPATIENT
Start: 2023-01-24 | End: 2023-02-01

## 2023-01-23 RX ORDER — TAMSULOSIN HYDROCHLORIDE 0.4 MG/1
0.4 CAPSULE ORAL
Qty: 30 CAPSULE | Refills: 0 | Status: SHIPPED | OUTPATIENT
Start: 2023-01-23

## 2023-01-23 RX ORDER — MIDODRINE HYDROCHLORIDE 5 MG/1
2.5 TABLET ORAL
Status: DISCONTINUED | OUTPATIENT
Start: 2023-01-23 | End: 2023-01-23 | Stop reason: HOSPADM

## 2023-01-23 RX ORDER — MIDODRINE HYDROCHLORIDE 2.5 MG/1
2.5 TABLET ORAL
Qty: 45 TABLET | Refills: 0 | Status: SHIPPED | OUTPATIENT
Start: 2023-01-23 | End: 2023-01-26 | Stop reason: ALTCHOICE

## 2023-01-23 RX ORDER — LEVOFLOXACIN 250 MG/1
250 TABLET ORAL EVERY 24 HOURS
Status: DISCONTINUED | OUTPATIENT
Start: 2023-01-24 | End: 2023-01-23 | Stop reason: HOSPADM

## 2023-01-23 RX ORDER — LEVOFLOXACIN 500 MG/1
500 TABLET, FILM COATED ORAL ONCE
Status: COMPLETED | OUTPATIENT
Start: 2023-01-23 | End: 2023-01-23

## 2023-01-23 RX ADMIN — SODIUM CHLORIDE, PRESERVATIVE FREE 10 ML: 5 INJECTION INTRAVENOUS at 05:22

## 2023-01-23 RX ADMIN — FERROUS SULFATE TAB 325 MG (65 MG ELEMENTAL FE) 325 MG: 325 (65 FE) TAB at 09:11

## 2023-01-23 RX ADMIN — Medication 4 UNITS: at 12:10

## 2023-01-23 RX ADMIN — LEVOFLOXACIN 500 MG: 500 TABLET, FILM COATED ORAL at 09:16

## 2023-01-23 RX ADMIN — FLUOXETINE 20 MG: 20 CAPSULE ORAL at 09:11

## 2023-01-23 RX ADMIN — SODIUM CHLORIDE, PRESERVATIVE FREE 10 ML: 5 INJECTION INTRAVENOUS at 13:12

## 2023-01-23 RX ADMIN — ACETAMINOPHEN 650 MG: 325 TABLET ORAL at 09:16

## 2023-01-23 RX ADMIN — DOCUSATE SODIUM 100 MG: 100 CAPSULE, LIQUID FILLED ORAL at 09:11

## 2023-01-23 NOTE — PROGRESS NOTES
Reason for Admission:  Kidney stone on right side [N20.0]  CONNER (acute kidney injury) ([facility] [address] [N17.9]  Hypokalemia [E87.6]  Leukocytosis [D72.829]  Hydroureteronephrosis [N13.30]                 RUR Score:    18            Plan for utilizing home health:    yes                      Likelihood of Readmission:   Moderate                         Do you (patient/family) have any concerns for transition/discharge?  no    Transition of Care Plan:       Initial assessment completed with patient. Cognitive status of patient: oriented to time, place, person and situation. Face sheet information confirmed:  yes. The patient designates her daughter Yadira [facility] to participate in her discharge plan and to receive any needed information. This patient lives in a story single family home with daughter. Patient is able to navigate 2 steps as needed. Prior to hospitalization, patient was considered to be independent with ADLs/IADLS : yes . Patient has a current ACP document on file: yes      Healthcare Decision Maker:   Primary Decision Maker: Monet Tejeda - Daughter - [phone]    Click here to complete [address] including selection of the Healthcare Decision Maker Relationship (ie \"Primary\")    The daughter will be available to transport patient home upon discharge. The patient already has none reported medical equipment available in the home. Patient is not currently active with home health. Patient has not stayed in a skilled nursing facility or rehab. Was  stay within last 60 days : no. This patient is on dialysis :no  .    List of available Home Health agencies were provided and reviewed with the patient prior to discharge. Freedom of choice signed: yes, for [facility] [place] [facility]. Currently, the discharge plan is Home with [address] [place] [address] Sha Montano. The patient states that she can obtain her medications from the pharmacy, and take her medications as directed.     Patient's current insurance is [employer], [address] [facility],  [address] Management Interventions  PCP Verified by CM: Yes  Palliative Care Criteria Met (RRAT>21 & CHF Dx)?: No  Mode of Transport at Discharge:  Other (see comment) (family)  Transition of Care Consult (CM Consult): [address]: Yes  Physical Therapy Consult: Yes  Occupational Therapy Consult: Yes  Support Systems: Child(martine)  The Patient and/or Patient Representative was Provided with a Choice of Provider and Agrees with the Discharge Plan?: Yes  Freedom of Choice List was Provided with Basic Dialogue that Supports the Patient's Individualized Plan of Care/Goals, Treatment Preferences and Shares the Quality Data Associated with the Providers?: Yes  Discharge Location  Patient Expects to be Discharged to[de-identified] Home with home health        LUCIA Carbone RN  Care Management  Pager: [phone]

## 2023-01-23 NOTE — PROGRESS NOTES
Problem: Falls - Risk of  Goal: *Absence of Falls  Description: Document Donovan Alanis Fall Risk and appropriate interventions in the flowsheet. Outcome: Progressing Towards Goal  Note: Fall Risk Interventions:            Medication Interventions: Bed/chair exit alarm                   Problem: Patient Education: Go to Patient Education Activity  Goal: Patient/Family Education  Outcome: Progressing Towards Goal     Problem: Pain  Goal: *Control of Pain  Outcome: Progressing Towards Goal  Goal: *PALLIATIVE CARE:  Alleviation of Pain  Outcome: Progressing Towards Goal     Problem: Patient Education: Go to Patient Education Activity  Goal: Patient/Family Education  Outcome: Progressing Towards Goal     Problem: Diabetes Self-Management  Goal: *Disease process and treatment process  Description: Define diabetes and identify own type of diabetes; list 3 options for treating diabetes. Outcome: Progressing Towards Goal  Goal: *Incorporating nutritional management into lifestyle  Description: Describe effect of type, amount and timing of food on blood glucose; list 3 methods for planning meals. Outcome: Progressing Towards Goal  Goal: *Incorporating physical activity into lifestyle  Description: State effect of exercise on blood glucose levels. Outcome: Progressing Towards Goal  Goal: *Developing strategies to promote health/change behavior  Description: Define the ABC's of diabetes; identify appropriate screenings, schedule and personal plan for screenings. Outcome: Progressing Towards Goal  Goal: *Using medications safely  Description: State effect of diabetes medications on diabetes; name diabetes medication taking, action and side effects. Outcome: Progressing Towards Goal  Goal: *Monitoring blood glucose, interpreting and using results  Description: Identify recommended blood glucose targets  and personal targets.   Outcome: Progressing Towards Goal  Goal: *Prevention, detection, treatment of acute complications  Description: List symptoms of hyper- and hypoglycemia; describe how to treat low blood sugar and actions for lowering  high blood glucose level. Outcome: Progressing Towards Goal  Goal: *Prevention, detection and treatment of chronic complications  Description: Define the natural course of diabetes and describe the relationship of blood glucose levels to long term complications of diabetes.   Outcome: Progressing Towards Goal  Goal: *Developing strategies to address psychosocial issues  Description: Describe feelings about living with diabetes; identify support needed and support network  Outcome: Progressing Towards Goal  Goal: *Insulin pump training  Outcome: Progressing Towards Goal  Goal: *Sick day guidelines  Outcome: Progressing Towards Goal  Goal: *Patient Specific Goal (EDIT GOAL, INSERT TEXT)  Outcome: Progressing Towards Goal     Problem: Patient Education: Go to Patient Education Activity  Goal: Patient/Family Education  Outcome: Progressing Towards Goal     Problem: General Medical Care Plan  Goal: *Vital signs within specified parameters  Outcome: Progressing Towards Goal  Goal: *Labs within defined limits  Outcome: Progressing Towards Goal  Goal: *Absence of infection signs and symptoms  Outcome: Progressing Towards Goal  Goal: *Optimal pain control at patient's stated goal  Outcome: Progressing Towards Goal  Goal: *Skin integrity maintained  Outcome: Progressing Towards Goal  Goal: *Fluid volume balance  Outcome: Progressing Towards Goal  Goal: *Optimize nutritional status  Outcome: Progressing Towards Goal  Goal: *Anxiety reduced or absent  Outcome: Progressing Towards Goal  Goal: *Progressive mobility and function (eg: ADL's)  Outcome: Progressing Towards Goal     Problem: Patient Education: Go to Patient Education Activity  Goal: Patient/Family Education  Outcome: Progressing Towards Goal     Problem: Discharge Planning  Goal: *Discharge to safe environment  Outcome: Progressing Towards Goal  Goal: *Knowledge of medication management  Outcome: Progressing Towards Goal  Goal: *Knowledge of discharge instructions  Outcome: Progressing Towards Goal     Problem: Patient Education: Go to Patient Education Activity  Goal: Patient/Family Education  Outcome: Progressing Towards Goal     Problem: Pressure Injury - Risk of  Goal: *Prevention of pressure injury  Description: Document Nima Scale and appropriate interventions in the flowsheet.   Outcome: Progressing Towards Goal     Problem: Patient Education: Go to Patient Education Activity  Goal: Patient/Family Education  Outcome: Progressing Towards Goal     Problem: Patient Education: Go to Patient Education Activity  Goal: Patient/Family Education  Outcome: Progressing Towards Goal     Problem: Patient Education: Go to Patient Education Activity  Goal: Patient/Family Education  Outcome: Progressing Towards Goal

## 2023-01-23 NOTE — TELEPHONE ENCOUNTER
Pt is being d/c from [facility] today 01/23 and needs a hospital f/up is there a day you can see her I offered the hosp rep a virtual appt she declined it .  Please advise

## 2023-01-23 NOTE — HOME CARE
Received home health referral for [facility] for (SN, PT, OT). Spoke with patient in room. Patient is concerned about work and states will be returning back to work early this week. 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 Patient declined [facility] services at this time. Also if patient returns back to work Tue/Wed, patient will be non-home bound and will not meet HH criteria. Encouraged to see PCP as soon as possible for hospital follow up. Patient verbalized understanding, if [facility] is needed, can obtain from PCP. [facility] Dr Shane Cantrell and  Shiva Bridges with update.         ---   Reno Monteiro LPN  [facility] [place] - INPATIENT Liaison

## 2023-01-23 NOTE — PROGRESS NOTES
Pharmacy Note     Levofloxacin 500mg q24h ordered for treatment of UTI. Per [facility] Policy, Levofloxacin will be changed to 500mg x1 followed by 250 mgq24h. Estimated Creatinine Clearance: Estimated Creatinine Clearance: 34.3 mL/min (based on SCr of 1.26 mg/dL). Dialysis Status, CONNER, CKD: -  BMI:  Body mass index is 25.13 kg/m². Rationale for Adjustment:  [facility] renal dosing policy. Pharmacy will continue to monitor and adjust dose as necessary. Please call with any questions.     Thank you,  Eleanor Espinosa, [facility]

## 2023-01-23 NOTE — PROGRESS NOTES
Infectious Disease progress Note        Reason:complicated UTI    Current abx Prior abx   Cefepime since 1/18 Gentamicin on 1/19  levofloxacin on 1/18  Nitrofurantoin 1/10-1/17     Lines:       Assessment :  74 y.o. female with a PMHx of recurrent UTIs, depression, DM, HLD, HTN who presented to the ED on 1/18/2023 with c/o lower back pain that started 2 weeks ago, with significant worsening over the last couple days. Clinical presentation consistent with partially treated sepsis-present on admission due to complicated urinary tract infection, right kidney pyelonephritis   Status post cystoscopy, right retrograde pyelogram, right double-J stent placement on 1/19/2023. Recent outpatient oral antibiotics likely masked the full clinical presentation    Temperature 103 prior to arrival, WBC 20 K, urine culture greater than 100,000 cons of gram-negative rods, elevated transaminases (sepsis induced cholestasis) is suggestive of partially treated sepsis. Elevated transaminases-likely sepsis induced cholestasis. Improving. No hepatobiliary pathology identified on CT scan 1/18    Antibiotic management complicated due to history of penicillin allergy-hives with penicillin in childhood. Has tolerated cefuroxime in the past.  Currently tolerating cefepime    Acute kidney injury-likely due to sepsis, volume depletion. Improving    Recommendations:    D/c cefepime. recommend po levofloxacin till 1/31  Follow-up urology recommendations regarding stent removal, stone surgery  Discharge planning per primary team       Above plan was discussed in details with patient,  and primary team. Please call me if any further questions or concerns. Will continue to participate in the care of this patient. HPI:        Patient feels better today. She currently states that her flank pain is better. Denies any lower abdominal pain.   No nausea, vomiting  Past Medical History:   Diagnosis Date    ADHD     Anxiety     Bilateral lumbar radiculopathy     Carotid bruit     right    Depression     Diabetes mellitus (HCC)     HCD (hypertensive cardiovascular disease)     High cholesterol     Hyperlipidemia     Hypertension     Migraine headache     Plantar fasciitis        Past Surgical History:   Procedure Laterality Date    HX SEPTOPLASTY  6/2002    HX TONSILLECTOMY      [place] UNLISTED NEUROLOGICAL/NEUROMUSCULAR DX PX  08/06/2018    laminectomy        home Medication List      Details   nitrofurantoin (Macrodantin) 100 mg capsule Take 100 mg by mouth nightly. atorvastatin (LIPITOR) 80 mg tablet TAKE 1 TABLET BY MOUTH EVERY DAY  Qty: 90 Tablet, Refills: 3      propranoloL (INDERAL) 40 mg tablet TAKE 1 TABLET BY MOUTH TWICE A DAY  Qty: 180 Tablet, Refills: 3      hydrOXYzine pamoate (VISTARIL) 25 mg capsule TAKE 2-3 CAPSULES BY MOUTH AT BEDTIME AS NEEDED FOR SLEEP      cyanocobalamin 1,000 mcg tablet Take 1,000 mcg by mouth daily. ferrous sulfate 325 mg (65 mg iron) tablet Take 325 mg by mouth Daily (before breakfast). magnesium oxide (MAG-OX) 400 mg tablet Take 400 mg by mouth two (2) times a day. ergocalciferol (ERGOCALCIFEROL) 1,250 mcg (50,000 unit) capsule TAKE 1 CAPSULE BY MOUTH EVERY FOURTEEN (14) DAYS. Qty: 2 Capsule, Refills: 5      metFORMIN ER (GLUCOPHAGE XR) 500 mg tablet TAKE 2 TABLETS BY MOUTH TWICE A DAY WITH MEALS  Qty: 360 Tablet, Refills: 3      lisinopriL (PRINIVIL, ZESTRIL) 10 mg tablet TAKE 1 TABLET BY MOUTH EVERY DAY  Qty: 90 Tablet, Refills: 3      FLUoxetine (PROzac) 20 mg capsule Take 20 mg by mouth daily. estradioL (Estrace) 0.01 % (0.1 mg/gram) vaginal cream Apply pea sized amount 3x weekly around urethra. Qty: 42.5 g, Refills: 3      ALPRAZolam (XANAX) 0.5 mg tablet TAKE 1 (ONE) TABLET BY MOUTH DAILY AS NEEDED FOR ANXIETY      SUMAtriptan (IMITREX) 50 mg tablet TAKE 1 TAB BY MOUTH DAILY AS NEEDED FOR MIGRAINE.   Qty: 9 Tablet, Refills: 5      semaglutide (OZEMPIC) 0.25 mg or 0.5 mg/dose (2 mg/1.5 ml) subq pen 0.5 mg by SubCUTAneous route every seven (7) days.   Qty: 12 Pen, Refills: 3            Current Facility-Administered Medications   Medication Dose Route Frequency    midodrine (PROAMATINE) tablet 5 mg  5 mg Oral TID WITH MEALS    cefepime (MAXIPIME) 2 g in 0.9% sodium chloride 10 mL IV syringe  2 g IntraVENous Q24H    tamsulosin (FLOMAX) capsule 0.4 mg  0.4 mg Oral QHS    0.9% sodium chloride infusion  75 mL/hr IntraVENous CONTINUOUS    oxyCODONE IR (ROXICODONE) tablet 5 mg  5 mg Oral Q6H PRN    naloxone (NARCAN) injection 0.4 mg  0.4 mg IntraVENous PRN    docusate sodium (COLACE) capsule 100 mg  100 mg Oral DAILY    sodium chloride (NS) flush 5-40 mL  5-40 mL IntraVENous Q8H    sodium chloride (NS) flush 5-40 mL  5-40 mL IntraVENous PRN    acetaminophen (TYLENOL) tablet 650 mg  650 mg Oral Q6H PRN    Or    acetaminophen (TYLENOL) suppository 650 mg  650 mg Rectal Q6H PRN    polyethylene glycol (MIRALAX) packet 17 g  17 g Oral DAILY PRN    bisacodyL (DULCOLAX) suppository 10 mg  10 mg Rectal DAILY PRN    ondansetron (ZOFRAN ODT) tablet 4 mg  4 mg Oral Q8H PRN    Or    ondansetron (ZOFRAN) injection 4 mg  4 mg IntraVENous Q6H PRN    ferrous sulfate tablet 325 mg  325 mg Oral ACB    FLUoxetine (PROzac) capsule 20 mg  20 mg Oral DAILY    insulin lispro (HUMALOG) injection   SubCUTAneous AC&HS    glucose chewable tablet 16 g  4 Tablet Oral PRN    glucagon (GLUCAGEN) injection 1 mg  1 mg IntraMUSCular PRN    dextrose 10% infusion 0-250 mL  0-250 mL IntraVENous PRN       Allergies: Pcn [penicillins] and Sulfa (sulfonamide antibiotics)    Family History   Problem Relation Age of Onset    Hypertension Brother         x2    Elevated Lipids Brother     Stroke Mother     Heart Surgery Mother         CABG    Cancer Father         cancer of the mouth    Hypertension Father     Hypertension Brother     Elevated Lipids Brother      Social History     Socioeconomic History    Marital status:      Spouse name: Not on file    Number of children: Not on file    Years of education: Not on file    Highest education level: Not on file   Occupational History    Not on file   Tobacco Use    Smoking status: Former     Packs/day: 0.25     Years: 4.00     Pack years: 1.00     Types: Cigarettes     Quit date: 1989     Years since quittin.0    Smokeless tobacco: Never   Substance and Sexual Activity    Alcohol use: No     Alcohol/week: 1.7 standard drinks     Types: 2 Glasses of wine per week    Drug use: No    Sexual activity: Not Currently   Other Topics Concern    Not on file   Social History Narrative    Not on file     Social Determinants of Health     Financial Resource Strain: Not on file   Food Insecurity: Not on file   Transportation Needs: Not on file   Physical Activity: Not on file   Stress: Not on file   Social Connections: Not on file   Intimate Partner Violence: Not on file   Housing Stability: Not on file     Social History     Tobacco Use   Smoking Status Former    Packs/day: 0.25    Years: 4.00    Pack years: 1.00    Types: Cigarettes    Quit date: 1989    Years since quittin.0   Smokeless Tobacco Never        Temp (24hrs), Av.6 °F (37 °C), Min:98.1 °F (36.7 °C), Max:99.1 °F (37.3 °C)    Visit Vitals  BP (!) 148/80 (BP 1 Location: Left upper arm)   Pulse 71   Temp 98.1 °F (36.7 °C)   Resp 20   Ht 4' 11\" (1.499 m)   Wt 56.4 kg (124 lb 6.4 oz)   LMP  (LMP Unknown)   SpO2 96%   BMI 25.13 kg/m²       ROS: 12 point ROS obtained in details. Pertinent positives as mentioned in HPI,   otherwise negative    Physical Exam:    General: Well developed, well nourished female  sitting on the  bed AAOx3 in no acute distress. General:   awake alert and oriented   HEENT:  Normocephalic, atraumatic,  EOMI, no scleral icterus or pallor; no conjunctival hemmohage;  nasal and oral mucous are moist and without evidence of lesions. Neck supple, no bruits.    Lymph Nodes:   no cervical, axillary or inguinal adenopathy   Lungs:   non-labored, bilateral clear to auscultation- no crackles wheezes rales or rhonchi   Heart:  RRR, s1 and s2; no rubs or gallops, no edema, + pedal pulses   Abdomen:  soft, non-distended, active bowel sounds, no hepatomegaly, no splenomegaly. Non-tender   Genitourinary:  deferred   Extremities:   no clubbing, cyanosis; no joint effusions or swelling; Full ROM of all large joints to the upper and lower extremities; muscle mass appropriate for age   Neurologic:  No gross focal sensory abnormalities; 5/5 muscle strength to upper and lower extremities. Speech appropriate. Cranial nerves intact                        Skin:  No rash or ulcers noted   Back:  no spinal or paraspinal muscle tenderness or rigidity, no CVA tenderness     Psychiatric:  No suicidal or homicidal ideations, appropriate mood and affect         Labs: Results:   Chemistry Recent Labs     01/22/23  0400 01/21/23  0152   * 168*    136   K 4.1 3.9   * 111   CO2 24 21   BUN 26* 40*   CREA 1.26 1.64*   CA 8.8 8.0*   AGAP 3 4   BUCR 21* 24*        CBC w/Diff Recent Labs     01/22/23  0400 01/21/23  0152 01/20/23  2200   WBC 10.0 13.7* 14.6*   RBC 2.86* 3.01* 3.42*   HGB 8.6* 9.0* 10.3*   HCT 25.8* 27.5* 30.5*    401 452*   GRANS 74*  --  81*   LYMPH 18*  --  14*   EOS 1  --  1        Microbiology No results for input(s): CULT in the last 72 hours. RADIOLOGY:    All available imaging studies/reports in [facility] care for this admission were reviewed      Disclaimer: Sections of this note are dictated utilizing voice recognition software, which may have resulted in some phonetic based errors in grammar and contents. Even though attempts were made to correct all the mistakes, some may have been missed, and remained in the body of the document. If questions arise, please contact our department.     Dr. Darlina Sandifer, Infectious Disease Specialist  [phone]  January 23, 2023  8:51 AM

## 2023-01-23 NOTE — PROGRESS NOTES
Physician Progress Note      Anju Broussard  CSN #:                  [id number]  :                       1955  ADMIT DATE:       2023 6:43 PM  [address] [place] DATE:  RESPONDING  PROVIDER #:        Donaldo Mujica MD          QUERY TEXT:    Dear Hospitalist  Pt admitted with complicated  UTI. Pt noted to have  leukocytosis with elevated  lactic acid level on . If possible, please document in the progress notes and discharge summary if you are evaluating and /or treating any of the following: The medical record reflects the following:  Risk Factors: frequent  UTI's;   diabetes;  Clinical Indicators: pt had elevated  WBC on admit  but no fever >   100.2; tachycardia    or tachypnea  within first 24  hrs ; lactic acid  on admit   1.35. ID  consult- Clinical presentation consistent with partially treated sepsis-present on admission due to complicated urinary tract infection, right kidney pyelonephritis; Recent outpatient oral antibiotics likely masked the full clinical presentation  Treatment:  IV  Maxipime    started on  . Thank you,   Skyler Dove RN   CCDS  Options provided:  -- Sepsis   present on admission  -- Sepsis, developed following admission  -- UTI  without Sepsis  -- Sepsis was ruled out  -- Other - I will add my own diagnosis  -- Disagree - Not applicable / Not valid  -- Disagree - Clinically unable to determine / Unknown  -- Refer to Clinical Documentation Reviewer    PROVIDER RESPONSE TEXT:    This patient had sepsis present on admission.     Query created by: Salome Chauhan on 2023 7:52 AM      Electronically signed by:  Donaldo Mujica MD 2023 10:58 AM

## 2023-01-23 NOTE — PROGRESS NOTES
Spoke with pt's daughter Cricket Husain. Discharge order noted for today. Pt has been accepted to [facility] agency. Met with patient and spoke with her daughter blaise  and are agreeable to the transition plan today. Transport has been arranged through pt's daughter . Patient's discharge summary and home health  orders have been forwarded to [address] [place] [address]  agency via [facility]. Updated bedside RN, Carmen Haney,  to the transition plan. Discharge information has been documented on the AVS.       Per Shannan of [facility], pt declined home health when she went to see pt.   She stated he informed Dr. Danica Brunner, BSN RN  Care Management  Pager: [phone]

## 2023-01-23 NOTE — DISCHARGE INSTRUCTIONS
Admit Date:  1/18/23    Discharge Date:  1/23/23    Discharge Diagnosis:  #1 - Acute kidney injury, secondary to obstructive uropathy secondary to left obstructing ureteric calculus s/p cystoscopy and RPG and right stent placement/ prerenal due to poor p.o. intake , also relative hypotension as blood pressures were in the 90s, now improved   #2 - obstructive uropathy secondary to right obstructed ureter ureteric proximal calculus, now s/p RPG and stent placement  #3 - right-sided hydronephrosis  #4 - type 2 diabetes with complications, microalbuminuria  #5  - complicated UTI  #6 - transaminitis, suspect shock liver due to hypotension  #7 - severe hypoalbuminemia  #8 - lactic acidosis resolved      DISCHARGE SUMMARY from Nurse PATIENT INSTRUCTIONS:    After general anesthesia or intravenous sedation, for 24 hours or while taking prescription Narcotics:  Limit your activities  Do not drive and operate hazardous machinery  Do not make important personal or business decisions  Do  not drink alcoholic beverages  If you have not urinated within 8 hours after discharge, please contact your surgeon on call. Report the following to your surgeon:  Excessive pain, swelling, redness or odor of or around the surgical area  Temperature over 100.5  Nausea and vomiting lasting longer than 4 hours or if unable to take medications  Any signs of decreased circulation or nerve impairment to extremity: change in color, persistent  numbness, tingling, coldness or increase pain  Any questions    What to do at Home:  Recommended activity: Activity as tolerated,     If you experience any of the following symptoms difficulty urine,abdominal & back pain, fever please follow up with primary care physician. *  Please give a list of your current medications to your Primary Care Provider.     *  Please update this list whenever your medications are discontinued, doses are      changed, or new medications (including over-the-counter products) are added. *  Please carry medication information at all times in case of emergency situations. These are general instructions for a healthy lifestyle:    No smoking/ No tobacco products/ Avoid exposure to second hand smoke  Surgeon General's Warning:  Quitting smoking now greatly reduces serious risk to your health. Obesity, smoking, and sedentary lifestyle greatly increases your risk for illness    A healthy diet, regular physical exercise & weight monitoring are important for maintaining a healthy lifestyle    You may be retaining fluid if you have a history of heart failure or if you experience any of the following symptoms:  Weight gain of 3 pounds or more overnight or 5 pounds in a week, increased swelling in our hands or feet or shortness of breath while lying flat in bed. Please call your doctor as soon as you notice any of these symptoms; do not wait until your next office visit. The discharge information has been reviewed with the patient. The patient verbalized understanding. Discharge medications reviewed with the patient and appropriate educational materials and side effects teaching were provided.   _____________________________________________________________________________________________________________________________    Patient armband removed and shredded

## 2023-01-23 NOTE — PROGRESS NOTES
In Patient Progress note      Admit Date: 1/18/2023    Impression:     #1 Acute kidney injury, secondary to obstructive uropathy secondary to left obstructing ureteric calculus s/p cystoscopy and RPG and right stent placement/ prerenal due to poor p.o. intake , also relative hypotension as blood pressures were in the 90s, now improved   #2 obstructive uropathy secondary to right obstructed ureter ureteric proximal calculus, now s/p RPG and stent placement  #3 right-sided hydronephrosis  #4 type 2 diabetes with complications, microalbuminuria  #5 complicated UTI  #6 transaminitis, suspect shock liver due to hypotension  #7 severe hypoalbuminemia  #8 lactic acidosis resolved     Plan:  #1 strict intake output charting, Thorpe catheter in place  #2 ok to d/c IVF , encourage po intake   #3 labs pending from today , follow  #4 wean midodrine   #5 avoid IV contrast  #6 renally dose medications for EGFR of less than 30  #7 outpatient evaluation for sone risk   #8 definitive stone management per urology      Discussed plan with primary team.     Please call with questions     Yuni Hassan MD FASN  Cell [phone]  Pager: [phone]     Subjective:     - No acute over night events.   - respiratory - stable  - hemodynamics - stable, no pressrs  - UOP-improved  - Nutrition -ok    Objective:     Visit Vitals  /78 (BP 1 Location: Left upper arm, BP Patient Position: At rest)   Pulse 75   Temp 97.7 °F (36.5 °C)   Resp 20   Ht 4' 11\" (1.499 m)   Wt 56.4 kg (124 lb 6.4 oz)   LMP  (LMP Unknown)   SpO2 96%   BMI 25.13 kg/m²         Intake/Output Summary (Last 24 hours) at 1/23/2023 1343  Last data filed at 1/23/2023 1140  Gross per 24 hour   Intake 360 ml   Output 2200 ml   Net -1840 ml       Physical Exam:     Gen NAD  HENT mmm  RS AEBE clear   CVS s1 s2 wnl no JVD  GI soft BS +  Ext no edema     Data Review:    Recent Labs     01/22/23  0400   WBC 10.0   RBC 2.86*   HCT 25.8*   MCV 90.2   MCH 30.1   MCHC 33.3   RDW 14.4 Recent Labs     01/22/23  0400 01/21/23  0152   BUN 26* 40*   CREA 1.26 1.64*   CA 8.8 8.0*   K 4.1 3.9    136   * 111   CO2 24 21   * 168*       Amando Hobbs MD

## 2023-01-25 LAB
BACTERIA SPEC CULT: NORMAL
BACTERIA SPEC CULT: NORMAL
SERVICE CMNT-IMP: NORMAL
SERVICE CMNT-IMP: NORMAL

## 2023-01-25 NOTE — PROGRESS NOTES
Physician Progress Note      Jigar Butler  CSN #:                  [id number]  :                       1955  ADMIT DATE:       2023 6:43 PM  [address] [place] DATE:        2023 3:30 PM  RESPONDING  PROVIDER #:        Raoul Cui MD          QUERY TEXT:    Pt admitted with  obstructive uropathy. Noted documentation of Elevated transaminases-likely sepsis induced cholestasis on    by  ID  consultant. If possible, please document in progress notes and discharge summary:    The medical record reflects the following:  Risk Factors: high cholesterol  Clinical Indicators: on admit-  ALT 71, AST 90, alk phos 211. Transaminitis noted in  H&P   and elevated  LFT's in  progress notes. No hepatobiliary pathology identified on CT scan   RUQ   US- Liver: The liver demonstrates normal echogenicity and echotexture. No focal  lesion appreciated. Treatment: monitoring  LFT's;  RUQ   US ; Hold   statin. Thank you,    Teetee Nielsen RN   CCDS  Options provided:  -- sepsis induced cholestasis  confirmed present on admission  -- sepsis induced cholestasis  ruled out  -- Defer to ID consultant documentation regarding sepsis induced cholestasis  -- Cholestasis  not related to sepsis  -- Other - I will add my own diagnosis  -- Disagree - Not applicable / Not valid  -- Disagree - Clinically unable to determine / Unknown  -- Refer to Clinical Documentation Reviewer    PROVIDER RESPONSE TEXT:    I defer to ID consultant regarding documentation of sepsis induced cholestasis .     Query created by: Deepti Doss on 2023 6:31 AM      Electronically signed by:  Raoul Cui MD 2023 8:21 AM

## 2023-01-26 ENCOUNTER — OFFICE VISIT (OUTPATIENT)
Dept: INTERNAL MEDICINE CLINIC | Age: 68
End: 2023-01-26
Payer: MEDICARE

## 2023-01-26 VITALS
HEIGHT: 59 IN | BODY MASS INDEX: 23.71 KG/M2 | TEMPERATURE: 97.7 F | WEIGHT: 117.6 LBS | RESPIRATION RATE: 16 BRPM | OXYGEN SATURATION: 99 % | DIASTOLIC BLOOD PRESSURE: 70 MMHG | SYSTOLIC BLOOD PRESSURE: 124 MMHG | HEART RATE: 82 BPM

## 2023-01-26 DIAGNOSIS — N20.1 RIGHT URETERAL STONE: ICD-10-CM

## 2023-01-26 DIAGNOSIS — D50.9 IRON DEFICIENCY ANEMIA, UNSPECIFIED IRON DEFICIENCY ANEMIA TYPE: ICD-10-CM

## 2023-01-26 DIAGNOSIS — N13.30 HYDROURETERONEPHROSIS: ICD-10-CM

## 2023-01-26 DIAGNOSIS — E11.9 TYPE 2 DIABETES MELLITUS WITHOUT COMPLICATION, WITHOUT LONG-TERM CURRENT USE OF INSULIN (HCC): ICD-10-CM

## 2023-01-26 DIAGNOSIS — I10 ESSENTIAL HYPERTENSION: ICD-10-CM

## 2023-01-26 DIAGNOSIS — E83.42 HYPOMAGNESEMIA: ICD-10-CM

## 2023-01-26 DIAGNOSIS — R74.01 TRANSAMINITIS: ICD-10-CM

## 2023-01-26 DIAGNOSIS — E53.8 B12 DEFICIENCY: ICD-10-CM

## 2023-01-26 DIAGNOSIS — N17.9 AKI (ACUTE KIDNEY INJURY) (HCC): ICD-10-CM

## 2023-01-26 DIAGNOSIS — D72.825 BANDEMIA: ICD-10-CM

## 2023-01-26 DIAGNOSIS — N39.0 COMPLICATED UTI (URINARY TRACT INFECTION): Primary | ICD-10-CM

## 2023-01-26 PROCEDURE — 1111F DSCHRG MED/CURRENT MED MERGE: CPT | Performed by: INTERNAL MEDICINE

## 2023-01-26 PROCEDURE — 1123F ACP DISCUSS/DSCN MKR DOCD: CPT | Performed by: INTERNAL MEDICINE

## 2023-01-26 PROCEDURE — 99215 OFFICE O/P EST HI 40 MIN: CPT | Performed by: INTERNAL MEDICINE

## 2023-01-26 PROCEDURE — 3074F SYST BP LT 130 MM HG: CPT | Performed by: INTERNAL MEDICINE

## 2023-01-26 PROCEDURE — 2022F DILAT RTA XM EVC RTNOPTHY: CPT | Performed by: INTERNAL MEDICINE

## 2023-01-26 PROCEDURE — G0463 HOSPITAL OUTPT CLINIC VISIT: HCPCS | Performed by: INTERNAL MEDICINE

## 2023-01-26 PROCEDURE — G9717 DOC PT DX DEP/BP F/U NT REQ: HCPCS | Performed by: INTERNAL MEDICINE

## 2023-01-26 PROCEDURE — G8420 CALC BMI NORM PARAMETERS: HCPCS | Performed by: INTERNAL MEDICINE

## 2023-01-26 PROCEDURE — G8400 PT W/DXA NO RESULTS DOC: HCPCS | Performed by: INTERNAL MEDICINE

## 2023-01-26 PROCEDURE — 1101F PT FALLS ASSESS-DOCD LE1/YR: CPT | Performed by: INTERNAL MEDICINE

## 2023-01-26 PROCEDURE — G8536 NO DOC ELDER MAL SCRN: HCPCS | Performed by: INTERNAL MEDICINE

## 2023-01-26 PROCEDURE — 3017F COLORECTAL CA SCREEN DOC REV: CPT | Performed by: INTERNAL MEDICINE

## 2023-01-26 PROCEDURE — 3046F HEMOGLOBIN A1C LEVEL >9.0%: CPT | Performed by: INTERNAL MEDICINE

## 2023-01-26 PROCEDURE — G8427 DOCREV CUR MEDS BY ELIG CLIN: HCPCS | Performed by: INTERNAL MEDICINE

## 2023-01-26 PROCEDURE — 3078F DIAST BP <80 MM HG: CPT | Performed by: INTERNAL MEDICINE

## 2023-01-26 PROCEDURE — G9899 SCRN MAM PERF RSLTS DOC: HCPCS | Performed by: INTERNAL MEDICINE

## 2023-01-26 PROCEDURE — 1090F PRES/ABSN URINE INCON ASSESS: CPT | Performed by: INTERNAL MEDICINE

## 2023-01-26 RX ORDER — FLUOXETINE HYDROCHLORIDE 20 MG/1
20 CAPSULE ORAL DAILY
Qty: 90 CAPSULE | Refills: 1 | Status: SHIPPED | OUTPATIENT
Start: 2023-01-26

## 2023-01-26 RX ORDER — LANCETS
EACH MISCELLANEOUS
Qty: 100 EACH | Refills: 3 | Status: SHIPPED | OUTPATIENT
Start: 2023-01-26

## 2023-01-26 RX ORDER — INSULIN PUMP SYRINGE, 3 ML
EACH MISCELLANEOUS
Qty: 1 KIT | Refills: 0 | Status: SHIPPED | OUTPATIENT
Start: 2023-01-26

## 2023-01-26 NOTE — PROGRESS NOTES
1. \"Have you been to the ER, urgent care clinic since your last visit? Hospitalized since your last visit? \"  Yes on file    2. \"Have you seen or consulted any other health care providers outside of the [address] [place] [address] since your last visit? \" No     3. For patients aged 39-70: Has the patient had a colonoscopy / FIT/ Cologuard? Yes - no Care Gap present      If the patient is female:    4. For patients aged 41-77: Has the patient had a mammogram within the past 2 years? Yes - no Care Gap present      5. For patients aged 21-65: Has the patient had a pap smear?  Yes - no Care Gap present

## 2023-01-26 NOTE — PATIENT INSTRUCTIONS
Heart-Healthy Diet: Care Instructions  Your Care Instructions     A heart-healthy diet has lots of vegetables, fruits, nuts, beans, and whole grains, and is low in salt. It limits foods that are high in saturated fat, such as meats, cheeses, and fried foods. It may be hard to change your diet, but even small changes can lower your risk of heart attack and heart disease. Follow-up care is a key part of your treatment and safety. Be sure to make and go to all appointments, and call your doctor if you are having problems. It's also a good idea to know your test results and keep a list of the medicines you take. How can you care for yourself at home? Watch your portions  Learn what a serving is. A \"serving\" and a \"portion\" are not always the same thing. Make sure that you are not eating larger portions than are recommended. For example, a serving of pasta is ½ cup. A serving size of meat is 2 to 3 ounces. A 3-ounce serving is about the size of a deck of cards. Measure serving sizes until you are good at [place]" them. Keep in mind that restaurants often serve portions that are 2 or 3 times the size of one serving. To keep your energy level up and keep you from feeling hungry, eat often but in smaller portions. Eat only the number of calories you need to stay at a healthy weight. If you need to lose weight, eat fewer calories than your body burns (through exercise and other physical activity). Eat more fruits and vegetables  Eat a variety of fruit and vegetables every day. Dark green, deep orange, red, or yellow fruits and vegetables are especially good for you. Examples include spinach, carrots, peaches, and berries. Keep carrots, celery, and other veggies handy for snacks. Buy fruit that is in season and store it where you can see it so that you will be tempted to eat it. Cook dishes that have a lot of veggies in them, such as stir-fries and soups.   Limit saturated and trans fat  Read food labels, and try to avoid saturated and trans fats. They increase your risk of heart disease. Use olive or canola oil when you cook. Bake, broil, grill, or steam foods instead of frying them. Choose lean meats instead of high-fat meats such as hot dogs and sausages. Cut off all visible fat when you prepare meat. Eat fish, skinless poultry, and meat alternatives such as soy products instead of high-fat meats. Soy products, such as tofu, may be especially good for your heart. Choose low-fat or fat-free milk and dairy products. Eat foods high in fiber  Eat a variety of grain products every day. Include whole-grain foods that have lots of fiber and nutrients. Examples of whole-grain foods include oats, whole wheat bread, and brown rice. Buy whole-grain breads and cereals, instead of white bread or pastries. Limit salt and sodium  Limit how much salt and sodium you eat to help lower your blood pressure. Taste food before you salt it. Add only a little salt when you think you need it. With time, your taste buds will adjust to less salt. Eat fewer snack items, fast foods, and other high-salt, processed foods. Check food labels for the amount of sodium in packaged foods. Choose low-sodium versions of canned goods (such as soups, vegetables, and beans). Limit sugar  Limit drinks and foods with added sugar. These include candy, desserts, and soda pop. Limit alcohol  Limit alcohol to no more than 2 drinks a day for men and 1 drink a day for women. Too much alcohol can cause health problems. When should you call for help? Watch closely for changes in your health, and be sure to contact your doctor if:    You would like help planning heart-healthy meals. Where can you learn more? Go to http://www.allan.com/  Enter V137 in the search box to learn more about \"Heart-Healthy Diet: Care Instructions. \"  Current as of: August 22, 2019               Content Version: 12.6  © 0145-1716 Healthwise, Incorporated. Care instructions adapted under license by [employer] (which disclaims liability or warranty for this information). If you have questions about a medical condition or this instruction, always ask your healthcare professional. [address] any warranty or liability for your use of this information. Learning About Diabetes Food Guidelines  Your Care Instructions     Meal planning is important to manage diabetes. It helps keep your blood sugar at a target level (which you set with your doctor). You don't have to eat special foods. You can eat what your family eats, including sweets once in a while. But you do have to pay attention to how often you eat and how much you eat of certain foods. You may want to work with a dietitian or a certified diabetes educator (CDE) to help you plan meals and snacks. A dietitian or CDE can also help you lose weight if that is one of your goals. What should you know about eating carbs? Managing the amount of carbohydrate (carbs) you eat is an important part of healthy meals when you have diabetes. Carbohydrate is found in many foods. Learn which foods have carbs. And learn the amounts of carbs in different foods. Bread, cereal, pasta, and rice have about 15 grams of carbs in a serving. A serving is 1 slice of bread (1 ounce), ½ cup of cooked cereal, or 1/3 cup of cooked pasta or rice. Fruits have 15 grams of carbs in a serving. A serving is 1 small fresh fruit, such as an apple or orange; ½ of a banana; ½ cup of cooked or canned fruit; ½ cup of fruit juice; 1 cup of melon or raspberries; or 2 tablespoons of dried fruit. Milk and no-sugar-added yogurt have 15 grams of carbs in a serving. A serving is 1 cup of milk or 2/3 cup of no-sugar-added yogurt. Starchy vegetables have 15 grams of carbs in a serving.  A serving is ½ cup of mashed potatoes or sweet potato; 1 cup winter squash; ½ of a small baked potato; ½ cup of cooked beans; or ½ cup cooked corn or green peas. Learn how much carbs to eat each day and at each meal. A dietitian or CDE can teach you how to keep track of the amount of carbs you eat. This is called carbohydrate counting. If you are not sure how to count carbohydrate grams, use the Plate Method to plan meals. It is a good, quick way to make sure that you have a balanced meal. It also helps you spread carbs throughout the day. Divide your plate by types of foods. Put non-starchy vegetables on half the plate, meat or other protein food on one-quarter of the plate, and a grain or starchy vegetable in the final quarter of the plate. To this you can add a small piece of fruit and 1 cup of milk or yogurt, depending on how many carbs you are supposed to eat at a meal.  Try to eat about the same amount of carbs at each meal. Do not \"save up\" your daily allowance of carbs to eat at one meal.  Proteins have very little or no carbs per serving. Examples of proteins are beef, chicken, turkey, fish, eggs, tofu, cheese, cottage cheese, and peanut butter. A serving size of meat is 3 ounces, which is about the size of a deck of cards. Examples of meat substitute serving sizes (equal to 1 ounce of meat) are 1/4 cup of cottage cheese, 1 egg, 1 tablespoon of peanut butter, and ½ cup of tofu. How can you eat out and still eat healthy? Learn to estimate the serving sizes of foods that have carbohydrate. If you measure food at home, it will be easier to estimate the amount in a serving of restaurant food. If the meal you order has too much carbohydrate (such as potatoes, corn, or baked beans), ask to have a low-carbohydrate food instead. Ask for a salad or green vegetables. If you use insulin, check your blood sugar before and after eating out to help you plan how much to eat in the future. If you eat more carbohydrate at a meal than you had planned, take a walk or do other exercise. This will help lower your blood sugar.   What else should you know? Limit saturated fat, such as the fat from meat and dairy products. This is a healthy choice because people who have diabetes are at higher risk of heart disease. So choose lean cuts of meat and nonfat or low-fat dairy products. Use olive or canola oil instead of butter or shortening when cooking. Don't skip meals. Your blood sugar may drop too low if you skip meals and take insulin or certain medicines for diabetes. Check with your doctor before you drink alcohol. Alcohol can cause your blood sugar to drop too low. Alcohol can also cause a bad reaction if you take certain diabetes medicines. Follow-up care is a key part of your treatment and safety. Be sure to make and go to all appointments, and call your doctor if you are having problems. It's also a good idea to know your test results and keep a list of the medicines you take. Where can you learn more? Go to http://jennifer-pavel.info/  Enter I147 in the search box to learn more about \"Learning About Diabetes Food Guidelines. \"  Current as of: December 20, 2019               Content Version: 12.6  © 9906-2958 [employer], Incorporated. Care instructions adapted under license by [employer] (which disclaims liability or warranty for this information). If you have questions about a medical condition or this instruction, always ask your healthcare professional. [address] any warranty or liability for your use of this information. Learning About High-Protein Foods  What foods are high in protein? The foods you eat contain nutrients, such as vitamins and minerals. Protein is a nutrient. Your body needs the right amount to stay healthy and work as it should. You can use the list below to help you make choices about which foods to eat. Here are some examples of foods that are high in protein.   Dairy and dairy alternatives  Cheese  Milk  Soy milk  Yogurt (especially [place])  Meat  Beef  Chicken  Ham  Lamb  Lunch meat  Pork  Sausage  [place] [facility])  Other protein foods  Beans (black, garbanzo, kidney, lima)  Eggs  Hummus  Lentils  Nuts  Peanut butter and other nut butters  Peas  Soybeans  Tofu  Veggie or soy terrance (Check the nutrition label for the amount of protein in each serving.)  [facility]  Protein supplements  Bars (Check the nutrition label for the amount of protein in each serving.)  Drinks  Powders  Work with your doctor to find out how much of this nutrient you need. Depending on your health, you may need more or less of it in your diet. Where can you learn more? Go to http://www.gray.com/  Enter P335 in the search box to learn more about \"Learning About High-Protein Foods. \"  Current as of: August 22, 2019               Content Version: 12.6  © 5608-1734 [employer]. Care instructions adapted under license by [employer] (which disclaims liability or warranty for this information). If you have questions about a medical condition or this instruction, always ask your healthcare professional. [place] [address] any warranty or liability for your use of this information. Learning About Getting More Protein  How does your body use protein? Protein is an essential part of our diets. It is made up of chemicals called amino acids. Your body needs protein to help build and repair muscle, skin, and other body tissues. Protein also helps fight infection, balance body fluids, and carry oxygen through the body. How much protein do you need? How much protein you need each day depends on your age, sex, and how active you are. It's recommended that most adults eat 5 to 7 ounces of protein foods a day. Sometimes you may need to eat more protein. Your doctor may advise you on the right amount of protein you need.   What foods contain protein? Protein is found in a variety of foods. High-protein foods include lean meat, poultry, and fish. A serving of these foods is 2 to 3 ounces. (3 ounces is about the size and thickness of a deck of cards.)  Protein isn't just found in meat. If you are looking for other types of protein, the following foods are equal to about 1 ounce of meat:  ¼ cup of cooked beans, peas, or lentils  ¼ cup of tofu (about 2 ounces)  2 Tbsp of hummus  ½ ounce of nuts or seeds (for example, 12 almonds or 7 walnut halves)  1 egg  1 Tbsp of peanut butter or other nut or seed butter  Other sources of protein include cheese, milk, and other milk products. You can also buy protein bars, drinks, and powders. Check the nutrition label for the amount of protein in each serving. What are some tips for getting more protein? You can get more protein in your food by adding high-protein ingredients. For example, you can: Add powdered milk to other foods, such as pudding or soups. Add powdered protein to fruit smoothies and cooked cereal.  Add beans to soup and chili. Add nuts, seeds, or wheat germ to yogurt. You can also:  Spread peanut butter onto a banana. Mix cottage cheese into noodle dishes or casseroles. Sprinkle hard-boiled eggs on a salad. Grate cheese over vegetables and soups. Where can you learn more? Go to http://www.gray.com/  Enter X375 in the search box to learn more about \"Learning About Getting More Protein. \"  Current as of: August 22, 2019               Content Version: 12.6  © 8290-3028 [employer]. Care instructions adapted under license by [employer] (which disclaims liability or warranty for this information). If you have questions about a medical condition or this instruction, always ask your healthcare professional. [address] any warranty or liability for your use of this information.

## 2023-01-30 PROBLEM — N20.1 RIGHT URETERAL STONE: Status: ACTIVE | Noted: 2023-01-18

## 2023-01-30 PROBLEM — E87.6 HYPOKALEMIA: Status: RESOLVED | Noted: 2023-01-18 | Resolved: 2023-01-30

## 2023-01-30 NOTE — PROGRESS NOTES
Patient being seen today for transitional care management. Patient was admitted to [facility] from: 1/18 - 1/23/2023            Initial interactive contact was NOT done by nurse navigator. I thoroughly reviewed the discharge summary, notes, consults, labs and imaging studies in the electronic medical record. Pertinent details are summarized below and the record has been updated to reflect recent events. HPI:  x 2   Mk Velásquez is a 74y.o. year old female who presents today for posthospitalization follow-up. She has a history of hypertension, hyperlipidemia, diabetes mellitus, migraine headaches, depression, anxiety, ADHD, and lumbar radiculopathy. She has completed the Moderna COVID-19 vaccine series but has not received any booster doses. She was last seen in the office on 12/6/2022 and due to recurrent difficulty with UTIs, she underwent a renal ultrasound (12/16/2022) which showed bilateral intrarenal subcentimeter calculi and mild pelvic caliectasis bilaterally without hydronephrosis. She was referred to urology and underwent assessment by MOJGAN Pascual on 1/5/2023. Given prior history of gross hematuria, it was recommended that she undergo a CT urogram and cystoscopy, and urine cytology (1/5/2023) was negative for malignancy. She was symptomatic on day of visit and urine culture obtained (1/5/2023) showing infection due to Klebsiella pneumoniae. She was started on Macrobid 100 mg twice daily for 7 days. However, she presented to [facility] ED on 1/18/2023 due to persistent symptoms, right flank pain, fever to 103 °F, and fatigue.   Evaluation revealed WBC 20.0, Hb 10.6/HCT 30.7, platelets 941, creatinine 1.98/eGFR 28, ALT 71, AST 90, alkaline phosphatase 211, lipase 286, lactate 1.35, magnesium 2.1; COVID-19 rapid antigen test negative; urinalysis trace ketones, moderate leukocyte esterase, 21-35 WBCs; CT abdomen/pelvis showed right hydroureteronephrosis due to a proximal ureteral stone measuring 6 mm and punctate intrarenal stone left kidney. She received a dose of Toradol for pain in the ED, and started on IV fluids and IV cefepime. She was admitted and Dr. Brandon Vargas was consulted, and on 1/19/2023, she underwent cystoscopy, right retrograde pyelogram, and placement of right double-J stent. She was also given a dose of IV gentamicin in the OR. Urine culture > 100,000 colonies/mL Klebsiella pneumoniae (resistant to ampicillin); blood culture x 2 were negative. Dr. Ermelinda Trammell was consulted and felt that presentation represented partially treated sepsis given outpatient treatment with Macrobid. Recommendation was to continue on IV cefepime and she was subsequently changed to p.o. Levaquin to complete 14-day course of antibiotic therapy. Nephrology was consulted due to acute renal insufficiency, and because felt to be multifactorial related to obstructive uropathy from proximal right ureteral stone, treatment with gentamicin and ketorolac during hospitalization as well as ACE inhibitor prior to admission, and hypotension during hospitalization. Her renal function did improve with IV fluid hydration and holding of antihypertensives. Renal function prior to discharge showed improvement with creatinine 1.05/eGFR 59. She clinically improved and was subsequently discharged on 1/23/2023. She presents today and reports that she is continuing to feel very weak. She is currently taking Levaquin 250 mg daily with completion date on 2/2/2023. She is scheduled for a follow-up appointment with urology on 2/17/2023. She is otherwise without new complaints. Summary of prior hospitalizations and medical history:  On 9/2/2022, she presented to [facility] urgent care complaining of a sore throat for 1 week. She described difficulty swallowing, but denied any fever, nasal congestion, or cough. She had performed a home rapid antigen COVID test on 9/1/2022 which was negative.   No abnormalities were noted on exam and a rapid strep test performed was negative group A beta strep culture, and pharyngeal TATYANA test for Neisseria gonorrhea and chlamydia trachomatis were also negative. She was diagnosed with acute pharyngitis and advised to treat symptomatically with Cepacol/Chloraseptic, and ibuprofen. She contacted the office on 9/8/2022 and reported persistent symptoms with burning in her mouth and throat, and was prescribed Magic mouthwash. She was evaluated in the office on 9/13/2022, t and reported persistent burning in her mouth and throat with painful swallowing, which had been unabated since onset. She reported Magic mouthwash provided transient relief lasting only approximately 1 hour after use. She denied any recent antibiotic use, fever, chills, or URI symptoms, and denied any history of herpes labialis. She was noted to have ulcerative lesions on her bilateral tonsils and posterior pharynx with mild erythema, suspicious for herpes simplex infection. Viral and HSV cultures were obtained and were negative. 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 However, on 10/17/2022, she noted recurrence of small ulcerations on tongue in area of initial outbreak. She was treated with Valtrex 2 g twice daily for 2 doses and then placed on suppressive therapy with Valtrex 500 mg daily. She contacted the office on 10/27/2022 and reported that she had undergone evaluation by her dentist who confirmed that the ulcerations and throat erythema were consistent with herpes simplex infection. Given lack of improvement on Valtrex 500 mg daily, her dose was subsequently changed to Valtrex 1 g twice daily for 10 days. She presents today for follow-up and reports lesions on the inside of her lower lip and persistent tongue soreness in the area of the initial eruption.   However, reports that the erythema and soreness of her posterior pharynx had improved, although she continued to develop small ulcerations in her mouth. Labs obtained (11/1/2022) showed mild anemia (Hb 11.7/HCT 36.0), normal creatinine at 0.61/eGFR >60, normal LFTs, HIV negative, and RICHIE negative. Repeat viral culture attempted but not performed by lab. Discussed with Dr. Mari Wilks of infectious disease and she recommended proceeding with punch biopsy of oral lesion to confirm diagnosis of HSV. Referred to Dr. Mery Bojorquez and underwent biopsy of lower lip lesion (11/23/2022) with pathology showing acute and chronic stomatitis with negative viral inclusions and negative fungal stains. On 4/13/2022, she reported a 4-day history of pain and burning with urination and urge incontinence. She denied any fever, chills, abdominal pain, hematuria, and did report some mild lower back discomfort. POC urinalysis was suggestive of infection with positive nitrites, 2+ blood, and 3+ LE. She was treated empirically with Keflex and urine culture showed > 100,000 colonies E.coli with only intermediate sensitivity to cefuroxime. She was changed to ciprofloxacin and treated for 5 days with resolution of symptoms. She reported that on 4/30/2022, she began to experience mild low back pain and noted bright red blood in her urine without any dysuria, fever, or chills that would suggest infection. She reported that she continued to experience hematuria for 2 days until 5/2/2022 when she noted resolution of all symptoms. POC urinalysis (5/4/2022) showed 2+ blood and 3+ LE suggestive of infection, and she was started empirically on cefdinir for 7 days but urine culture was negative. She completed the 7-day course of antibiotics and denies any recurrence of hematuria. She was hospitalized from 7/9-7/11/2019 at [facility] after presenting to [facility] ED with a severe posterior headache, unlike her usual migraines, with associated light sensitivity and confusion.  Evaluation included WBC 6.0, Hb 11.9/ Hct 35.2, Na 138, Ca 9.6, creatinine 1.0/ eGFR >60, head CT scan no acute intracranial abnormality; MRI brain (7/10/2019) mild nonspecific white matter disease likely representing chronic small vessel changes, and very mild bilateral occipital paramedian encephalomalacic changes, likely from chronic small infarcts; brain MRA (7/10/2019) no high-grade intracranial stenosis; neck MRA (7/10/2019) mild proximal ICA irregularity without hemodynamically significant carotid stenosis. She was evaluated by Dr. Faisal Sneed of neurology who felt presentation was consistent with status migrainosus. Attempted treatment with Benadryl and compazine without much improvement, but notable improvement after Medrol dose pack started and subsequently discharged. On 7/24/2019, she noted onset of left facial weakness with difficulty with eye closure and forehead elevation, and presented to [facility] ED and diagnosed with left facial palsy. She was treated with prednisone 60 mg daily x 7 days and Valtrex. She was evaluated by Dr. Fuentes Ludwig on 8/14/2019, and it was his opinion that her symptoms were likely not due to status migrainosus, but rather were secondary to a viral meningoencephalitis given the clear change in the pattern of her headaches and the subsequent development of a cranial nerve palsy. She has had complete resolution of her facial palsy, and a return to baseline for her migraine headaches, which respond readily to sumatriptan. She has a history of hypertension, treated with propranolol and lisinopril. She does not check her blood pressure at home. She has begun walking regularly for exercise. She denies any chest pain, shortness of breath at rest or with exertion, palpitations, or lightheadedness. She also has a history of hyperlipidemia, treated with high intensity dose rosuvastatin. She has a history of diabetes mellitus, and was started on metformin in 2/2015 for a HbA1c of 7.5. She does not monitor her blood sugars at home.  She denies any polyuria, polydipsia, nocturia, or blurry vision, and has no history of retinopathy, neuropathy, or nephropathy. She has regular eye exams with Dr. Concha Larkin. She also has a history of migraines without aura, which usually respond to Imitrex. Also now on propranolol for preventive therapy with good control. She has a history of lumbar radiculopathy (R>L), and was evaluated by Dr. Jackie Castro in 5/2015. Lumbar x-rays showed degenerative changes in L4-L5 and L5-S1. She was treated conservatively with physical therapy and ibuprofen and reports significant improvement. She was evaluated by MOJGAN Rubio in 12/2016 for exacerbation of low back pain with right sciatica. She was treated with steroids and Norco with improvement. On 4/17/2018, she presented with increasing low back pain with bilateral sciatica. She was treated with a prednisone taper with initial improvement. However, her symptoms worsened and she was referred to Dr. Tylor Chavarria for evaluation. She gave her a Toradol injection, and ordered a lumbar MRI (6/24/2018) which showed a large right disc extrusion at L5-S1 which compressed the descending right S1 nerve root. She was referred to pain management and underwent a selective L5 and S1 selective nerve root blocks by Dr. Raulito Camacho on 7/26/2018. However, due to persistent symptoms, she was referred to Dr. Mitzy Perez and subsequently underwent a right L5-S1 laminectomy and diskectomy on 3/6/7984 without complications. She states that she did well and continues to do her back stretches and exercises. She states that she only has occasional discomfort. She had a screening colonoscopy in 10/2014 by Dr. Kyra Connelly which was normal. Follow-up due in 10 years. She denies any abdominal pain, nausea, vomiting, melena, hematochezia, or change in bowel movements. She has a history of depression and anxiety, and is followed by Dr. Fransisca Shane. She is currently taking Abilify 5 mg daily and Prozac 10 mg daily.   Previously on lorazepam at bedtime, but no longer requiring. Past Medical History:   Diagnosis Date    ADHD     Anxiety     Bilateral lumbar radiculopathy     Carotid bruit     right    Depression     Diabetes mellitus (HCC)     HCD (hypertensive cardiovascular disease)     High cholesterol     Hyperlipidemia     Hypertension     Migraine headache     Plantar fasciitis      Past Surgical History:   Procedure Laterality Date    HX SEPTOPLASTY  6/2002    HX TONSILLECTOMY      [place] UNLISTED NEUROLOGICAL/NEUROMUSCULAR DX PX  08/06/2018    laminectomy      Current Outpatient Medications   Medication Sig    FLUoxetine (PROzac) 20 mg capsule Take 1 Capsule by mouth daily. Blood-Glucose Meter monitoring kit Monitor blood glucose once daily. glucose blood VI test strips strip Monitor blood glucose once daily. lancets misc Monitor blood glucose once daily. levoFLOXacin (LEVAQUIN) 250 mg tablet Take 1 Tablet by mouth every twenty-four (24) hours for 8 days. tamsulosin (FLOMAX) 0.4 mg capsule Take 1 Capsule by mouth nightly. SITagliptin phosphate (Januvia) 25 mg tablet Take 1 Tablet by mouth daily. estradioL (Estrace) 0.01 % (0.1 mg/gram) vaginal cream Apply pea sized amount 3x weekly around urethra. atorvastatin (LIPITOR) 80 mg tablet TAKE 1 TABLET BY MOUTH EVERY DAY    cyanocobalamin 1,000 mcg tablet Take 1,000 mcg by mouth daily. ferrous sulfate 325 mg (65 mg iron) tablet Take 325 mg by mouth Daily (before breakfast). ergocalciferol (ERGOCALCIFEROL) 1,250 mcg (50,000 unit) capsule TAKE 1 CAPSULE BY MOUTH EVERY FOURTEEN (14) DAYS. SUMAtriptan (IMITREX) 50 mg tablet TAKE 1 TAB BY MOUTH DAILY AS NEEDED FOR MIGRAINE. L. acidophilus,casei,rhamnosus (Bio-K plus) 50 billion cell cpDR capsule Take 1 Capsule by mouth daily for 14 days. No current facility-administered medications for this visit. Allergies and Intolerances:    Allergies   Allergen Reactions    Pcn [Penicillins] Rash     Tolerates cephalexin    Sulfa (Sulfonamide Antibiotics) Rash     Family History: She has no family history of colon or breast cancer. Her mother  from a CVA. Her father  from throat cancer and cirrhosis. Family History   Problem Relation Age of Onset    Hypertension Brother         x2    Elevated Lipids Brother     Stroke Mother     Heart Surgery Mother         CABG    Cancer Father         cancer of the mouth    Hypertension Father     Hypertension Brother     Elevated Lipids Brother      Social History:   She  reports that she quit smoking about 34 years ago. Her smoking use included cigarettes. She has a 1.00 pack-year smoking history. She has never used smokeless tobacco. She smoked approximately 0.25 ppd for 2 years, stopping in . She is a  and has one daughter. She was employed as a . 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 She was previously working as the [employer] for his business. Social History     Substance and Sexual Activity   Alcohol Use No    Alcohol/week: 1.7 standard drinks    Types: 2 Glasses of wine per week     Immunization History:  Immunization History   Administered Date(s) Administered    COVID-19, MODERNA BLUE border, Primary or Immunocompromised, (age 18y+), IM, 100 mcg/0.5mL 2021, 2021    Influenza Vaccine PF 2014, 10/13/2014    Influenza Vaccine Split 2011    Influenza Vaccine Whole 10/05/2010    Influenza, FLUAD, (age 72 y+), Adjuvanted 2022    Influenza, FLUARIX, FLULAVAL, FLUZONE (age 10 mo+) AND AFLURIA, (age 1 y+), PF, 0.5mL 2018, 2019, 10/22/2020, 2021    Pneumococcal Conjugate PCV20, PF (Prevnar 20) 2022    Pneumococcal Polysaccharide (PPSV-23) 2017    TD Vaccine 2007    Tdap 11/10/2016    Zoster Recombinant 2018, 2018       Review of Systems:   As above included in HPI.   Otherwise 11 point review of systems negative including constitutional, skin, HENT, eyes, respiratory, cardiovascular, gastrointestinal, genitourinary, musculoskeletal, endocrine, hematologic, allergy, and neurologic. Physical:   Visit Vitals  /70   Pulse 82   Temp 97.7 °F (36.5 °C) (Temporal)   Resp 16   Ht 4' 11\" (1.499 m)   Wt 117 lb 9.6 oz (53.3 kg)   SpO2 99%   BMI 23.75 kg/m²       Patient appears in no apparent distress. Affect is appropriate. HEENT: PERRLA, anicteric, oropharynx without erythema or ulcerative lesions . Lungs: clear to auscultation, no wheezes, rhonchi, or rales. Heart: regular rate and rhythm. No murmur, rubs, gallops  Abdomen: soft, nontender, nondistended, normal bowel sounds, no hepatosplenomegaly or masses. Extremities: without edema. No erythema or swelling of joints. Derm: without rash        Review of Data:  Labs:  No results displayed because visit has over 200 results. No visits with results within 2 Day(s) from this visit. Latest known visit with results is:   No results displayed because visit has over 200 results. Health Maintenance:  Screening:    Mammogram: negative (9/2021) [facility]   PAP smear: well women exams by Dr. Jason Walters (last 4/2021)    Colorectal: colonoscopy (10/2014) normal. Dr. Ravinder Montgomery. Due 10/2024.    Depression: under care of MOJGAN Awad/ Helen Deutsch   DM (HbA1c/FPG): HbA1c 5.5 (11/2022)   Hepatitis C: negative (5/2017)   Falls: none   DEXA: osteopenia (2/2019) Dr. Matthew Vera [facility]   Glaucoma: regular eye exams with Dr. Simi Ariza (11/2021)/[facility] (2022)   Smoking: none   Vitamin D: 76.0 (11/2022)   Medicare Wellness: 8/25/2022 ([place])      Impression:  Patient Active Problem List   Diagnosis Code    HCD (hypertensive cardiovascular disease) I11.9    Hyperlipidemia E78.5    Migraine G43.909    Bilateral lumbar radiculopathy M54.16    Type 2 diabetes mellitus without complication ([facility] [address] H06.2    Essential hypertension I10    Vitamin D deficiency E55.9    Recurrent major depressive disorder ([facility] F33.9    DDD (degenerative disc disease), lumbar M51.36    HNP (herniated nucleus pulposus), lumbar M51.26    History of Bell's palsy Z86.69    Hypomagnesemia E83.42    Gingivostomatitis K05.10    B12 deficiency E53.8    Iron deficiency anemia D50.9    Diarrhea R19.7    Anemia D64.9    Hydroureteronephrosis N13.30    Leukocytosis D72.829    CONNER (acute kidney injury) ([facility] [address] N17.9    Right ureteral stone N20.1    Transaminitis R74.01       Plan:  Complicated urinary tract infection. Right ureteral stone with hydronephrosis. Partially treated sepsis secondary to UTI. Patient underwent renal ultrasound (12/16/2022) for issues with recurrent UTIs, and bilateral intrarenal subcentimeter calculi were seen and mild pelvic caliectasis bilaterally without hydronephrosis. She was referred to urology and underwent assessment by MOJGAN Rose on 1/5/2023. Noted to be symptomatic on day of visit and urine culture obtained (1/5/2023) showing infection due to Klebsiella pneumoniae. She was started on Macrobid 100 mg twice daily for 7 days. However, on 1/18/2023, she presented to [facility] ED with complaint of right flank pain, fatigue, and fever. Evaluation revealed WBC 20.0, creatinine 1.98/eGFR 28, urinalysis with trace ketones, moderate leukocyte esterase, and 21-35 WBCs; CT abdomen and pelvis showed right hydroureteronephrosis due to a proximal ureteral stone measuring 6 mm. She was started on IV fluids and IV cefepime, and was admitted. On 1/19/2023, she underwent cystoscopy, right retrograde pyelogram, and placement of right double-J stent. Recommendation is for definitive management of stone as an outpatient. Urine culture showed infection secondary to Klebsiella pneumoniae which was resistant only to ampicillin; blood cultures remain negative. She was transitioned to Levaquin 250 mg daily with recommendation to complete 14-day course. She is scheduled for follow-up with urology on 2/17/2023.     Acute kidney injury. Baseline creatinine normal at 0.65/eGFR >60. Admission creatinine at 1.98/eGFR 28 and cause of acute renal injury likely multifactorial: Due to infection/sepsis, dehydration, and obstructive uropathy with right ureteral stone and hydronephrosis. Also was being treated with ACE inhibitor prior to admission. Was slow to correct during hospitalization and contributing factors likely due to administration of dose of gentamicin and ketorolac as well as relative hypotension requiring transient treatment with midodrine. Nephrology was consulted and advised treatment with IV fluids and avoidance of all nephrotoxic agents. Advised not to restart lisinopril or other antihypertensives given current blood pressure control. Advised to maintain good fluid intake. Renal function on day of discharge had improved to creatinine 1.05/eGFR 59. Will reassess at next visit. Elevated LFTs. Felt likely to be related to sepsis induced cholestasis given no evidence of hepatobiliary pathology identified on CT scan or right upper quadrant ultrasound. Appeared to be slowly improving during hospitalization and will reassess at next visit. Chronic issues:  1. Hypertension. Blood pressure had been well controlled on current regimen of lisinopril 10 mg daily and propranolol 40 mg bid. Both medications discontinued upon hospital discharge and blood pressure remaining stable today without treatment. Advised to monitor at home and will reassess in 2 weeks. Renal function had been normal with creatinine 0.65/ eGFR >60 prior to hospital admission. Magnesium worsened at last visit to 1.3 and dose of magnesium oxide increased to 400 mg twice daily. However, normalized during hospitalization so discontinued. Will reassess at next visit. 2. Hyperlipidemia. Increased to high intensity 80 mg dose of atorvastatin in 4/2021 and LDL 54 and HDL 39, indicative of excellent control.   Emphasized importance of continued lifestyle modifications, including heart healthy diet, regular exercise, and weight loss. Continue to follow. 3. Diabetes mellitus. Significant improvement on Ozempic 0.5 mg weekly and metformin ER 1000 mg bid. Weight has decreased a total of 25 pounds. HbA1c improved to 5.5 (12/2022). Ozempic and metformin discontinued during hospitalization and started on sitagliptin 25 mg daily. Will prescribe blood glucose meter and supplies to monitor blood sugar readings and determine if remaining controlled on sitagliptin alone. Will reassess in 2 weeks. No evidence of microvascular complications. On statin and lisinopril. Continue regular annual eye exams. Foot exam normal (12/2021) Urine microalbumin/ creatinine ratio with moderate microalbuminuria today (137 mg/g). Will reassess at next visit. On lisinopril. Emphasized importance of continued lifestyle modifications, including heart healthy low carbohydrate diet, regular exercise, and maintaining weight loss. 4. Anemia. New onset anemia noted on 11/1/2022. Noted persistent decrease in Hb 11.5/HCT 36.4 (baseline 12.4-12.5), and repeat today showed Hb 11.3/HCT 35.8, evidence of iron deficiency with ferritin 12 and transferrin 20%, and evidence of B12 deficiency with level 114. Low vitamin B12 level may be contributing to her mouth symptoms and pain. Advised to begin ferrous sulfate 325 mg daily and cyanocobalamin 1000 mcg daily. Referred to Dr. Priya Delgado and reports that scheduled for colonoscopy in 2/2023, but given current issues, will likely need to postpone. 5. History of recurrent UTIs. Diagnosed with UTI on 4/13/2022 with urine culture showing > 100,000 CFU/mL of E. coli. She was initially treated with Keflex which found to have intermediate resistance and changed to ciprofloxacin to complete a 7-day course.   On 4/30/2022, began to experience mild low back pain and noted bright red blood in her urine and POC urinalysis (5/4/2022) showed 2+ blood and 3+ LE suggestive of infection. She was started empirically on cefdinir for 7 days but urine culture was negative. Again diagnosed with a urinary tract infection on 11/1/2022 with urine culture showing > 100,000 CFU/mL Klebsiella pneumoniae. She was treated with cefdinir for 7 days with improvement. However, previsit urinalysis significantly abnormal and repeat urine culture (11/30/2022) again showed infection due to Klebsiella pneumoniae. Treated with cefdinir 50 mg twice daily for 7 days. Provided with UTI prevention worksheet and advised to begin cranberry tablets, D-mannose, and drink plenty of fluids at last visit. Underwent renal ultrasound (12/16/2022) which showed bilateral intrarenal subcentimeter calculi and mild pelvic caliectasis bilaterally without hydronephrosis. She was referred to urology and underwent assessment by MOJGAN Hood on 1/5/2023. Noted to be symptomatic on day of visit and urine culture obtained (1/5/2023) showing infection due to Klebsiella pneumoniae. She was started on Macrobid 100 mg twice daily for 7 days. However, presented to [facility] ED on 1/18/2023 with complaint of flank pain and fatigue as discussed. 6. History of migraines. Baseline pattern involves pain on side of head radiating to lateral neck. On propranolol 40 mg twice daily as preventive therapy and readily responds to Imitrex if needed. Currently reports continued good control of headaches. 7. History of gingivostomatitis. Presented with a 2-week history of sore throat with burning pain in her mouth and throat to [facility] on 9/2/2022 with negative rapid strep, group A strep culture, and oropharyngeal gonorrhea/chlamydia trachomatis TATYANA test.  Persistent symptoms not relieved by ibuprofen, and prescribed Magic mouthwash on 9/8/2022 with only transient relief.  Exam on 9/13/2022 with punctate ulcers on bilateral tonsils and posterior pharynx with mild erythema suggestive of herpes simplex infection with gingivostomatitis. Viral and HSV cultures (9/13/2022) were negative. Treated empirically with Valtrex 1 g twice daily for 10 days with improvement. Developed recurrence on 10/17/2022 with outbreak of ulcerations on her right tongue in area of the initial outbreak. She was treated with Valtrex 2 g twice daily for 1 day and then maintained on Valtrex 500 mg daily. Underwent examination by her dentist who confirmed findings appeared consistent with herpes simplex infection. Given lack of response on suppressive regimen, dose of Valtrex was increased to 1 g twice daily for 10 days. Reported some improvement in swallowing and throat pain, although continued to develop small ulcerations in her mouth. Labs evaluation (11/1/2022) showed mild anemia (Hb 11.7/HCT 36.0), normal creatinine at 0.61/eGFR >60, normal LFTs, HIV negative, and RICHIE negative. Referred to Dr. Mayo Stark and underwent biopsy of lower lip lesion (11/23/2022) with pathology showing acute and chronic stomatitis with negative viral inclusions and negative fungal stains. She was treated empirically with prednisolone solution for 10 days with some improvement. Reports that Dr. 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 Reports overall improvement today. 8. History of left Bell's palsy. Presented with severe headache resulting in hospital admission to [facility]. Evaluation including head CT scan, brain MRI/MRA and neck MRA unrevealing and Dr. Fan Ayala of neurology consulted and felt most consistent with status migrainosus. Prescribed medrol dose pack and discharged. Developed left facial nerve palsy 10 days later and treated with prednisone 60 mg and Valtrex for 7 days. Evaluated by Dr. 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  Now completely improved with resolution of facial nerve palsy and headaches returned to her baseline pattern for migraines. 9. Lumbar herniated disc with right sciatica, s/p right L5-S1 laminectomy and discectomy. Doing well without significant discomfort. Stressed improtance of continuing stretches and exercise. Follow. 10. Depression/ADHD. Previously reported good control of symptoms on new regimen of Abilify and Prozac. However, stated that had to wean Abilify due to cost.  Now being treated with Prozac 20 mg daily, alprazolam as needed, and hydroxyzine 50-75 mg nightly for insomnia. However, reports that she has not been able to refill Prozac and has run out of the medication. Provided with refill today. Advised that she may resume taking hydroxyzine nightly to help with insomnia. Being followed by MOJGAN Ramachandran at [facility]. 11. Decreased hearing, right. Gradual onset over the last 2 years. Referred to Dr. Mayda Sierra underwent MRI IAC (10/17/2022) which was essentially normal except for partial opacification of the right mastoid without enhancement. Reports in the process of being fitted for hearing aids. 12. Overweight. Started on Ozempic in 9/2021 (peak weight 138 pounds) and weight decreased a total of 25 pounds since initiating and patient has been continuing to maintain. Ozempic discontinued and changed to Januvia 25 mg daily on hospital discharge. Will reassess stability of blood sugars at next visit to determine if may resume Ozempic and discontinue DPP 4 inhibitor. Advised to continue with lifestyle changes including regular exercise. 13. Health maintenance. Completed the Moderna COVID-19 vaccine series but has not received any Moderna booster doses. Advised to obtain the updated bivalent booster dose. Already received the influenza vaccine. Completed 2/2 doses of Shingrix vaccine. Other immunizations up to date. Well women exam completed by Dr. Vale Kaur in 4/19/2021.   Mammogram overdue and ordered with baseline bone density study. Again urged to schedule. Colonoscopy due 2024. Continue regular eye exams with Dr. Nyla Bansal. Vitamin D level remaining stable on ergocalciferol to every 14 days. Will continue to monitor. [facility] to [facility] visit completed. Patient understands recommendations and agrees with plan. Follow-up in 2 weeks. This visit required high complexity medically necessary decision making and management plans. Time spent in preparing for the visit, including review of history, tests done prior to arrival, additional time reviewing clinical data, imaging, outside records and test results:  5 minutes. Time spent in counseling with patient and/or family members regarding care plan: 35 minutes. Time spent in ordering tests, treatments, and referring patient for further care: 10 minutes. Time spent on visit does not include time for documentation. Future orders:    ICD-10-CM ICD-9-CM    1. Complicated UTI (urinary tract infection)  N39.0 599.0 CBC WITH AUTOMATED DIFF      2. Right ureteral stone  N20.1 592.1       3. Hydroureteronephrosis  N13.30 591       4. CONNER (acute kidney injury) ([facility] [address]  N17.9 584.9       5. Bandemia  D72.825 288.66 CBC WITH AUTOMATED DIFF      6. Transaminitis  O52.22 348.7 METABOLIC PANEL, COMPREHENSIVE      7. Type 2 diabetes mellitus without complication, without long-term current use of insulin (HCC)  E11.9 250.00 Blood-Glucose Meter monitoring kit      glucose blood VI test strips strip      lancets misc      8. Essential hypertension  I10 401.9       9. Iron deficiency anemia, unspecified iron deficiency anemia type  D50.9 280.9 CBC WITH AUTOMATED DIFF      10. B12 deficiency  E53.8 266.2       11.  Hypomagnesemia  E83.42 275.2 MAGNESIUM      METABOLIC PANEL, COMPREHENSIVE

## 2023-02-01 ENCOUNTER — PATIENT MESSAGE (OUTPATIENT)
Dept: INTERNAL MEDICINE CLINIC | Age: 68
End: 2023-02-01

## 2023-02-01 NOTE — TELEPHONE ENCOUNTER
Patient added to nurse visit schedule on 2/2/23 advised patient to come prior to her urology appointment.

## 2023-02-04 DIAGNOSIS — D64.9 ANEMIA, UNSPECIFIED TYPE: Primary | ICD-10-CM

## 2023-02-04 DIAGNOSIS — E53.8 B12 DEFICIENCY: ICD-10-CM

## 2023-02-04 DIAGNOSIS — D50.9 IRON DEFICIENCY ANEMIA, UNSPECIFIED IRON DEFICIENCY ANEMIA TYPE: ICD-10-CM

## 2023-02-05 DIAGNOSIS — E83.42 HYPOMAGNESEMIA: Primary | ICD-10-CM

## 2023-02-05 DIAGNOSIS — D64.9 ANEMIA, UNSPECIFIED TYPE: Primary | ICD-10-CM

## 2023-02-05 DIAGNOSIS — D50.9 IRON DEFICIENCY ANEMIA, UNSPECIFIED IRON DEFICIENCY ANEMIA TYPE: Primary | ICD-10-CM

## 2023-02-05 DIAGNOSIS — E83.42 HYPOMAGNESEMIA: ICD-10-CM

## 2023-02-05 DIAGNOSIS — E11.9 TYPE 2 DIABETES MELLITUS WITHOUT COMPLICATION, WITHOUT LONG-TERM CURRENT USE OF INSULIN (HCC): Primary | ICD-10-CM

## 2023-02-06 DIAGNOSIS — E53.8 B12 DEFICIENCY: Primary | ICD-10-CM

## 2023-02-06 DIAGNOSIS — E55.9 VITAMIN D DEFICIENCY: Primary | ICD-10-CM

## 2023-02-06 NOTE — DISCHARGE SUMMARY
[address], [address]     DISCHARGE SUMMARY    Name: Starla Scheuermann MRN: [id number]   Age / Sex: 74 y.o. / female CSN: [id number]   YOB: 1955 Length of Stay: 5 days   Admit Date: 1/18/2023 Discharge Date:        PRIMARY CARE PHYSICIAN: Gail Barillas MD      DISCHARGE DIAGNOSES:    Right hydroureteronephrosis  Obstructive uropathy  Complicated UTI  Leukocytosis  Acute kidney injury  Type 2 diabetes  Elevated LFTs    CONSULTS CALLED: Urology, nephrology, ID      PROCEDURES DONE: Cystoscopy and stent placement      Ilana [address] 67: This is a 82-year-old female with a past medical history of recurrent UTIs and diabetes and hypertension who presented to the ED with complaints of some low back pain. Patient was evaluated and was noted to have a UTI. Patient was noted to have right hydroureteronephrosis. Urology was consulted. Patient had acute kidney injury. Patient was started on IV fluids. Nephrology was also consulted. Thorpe catheter was initially placed. Patient underwent cystoscopy and stent placement. Urology recommended outpatient follow-up. ID also was consulted. Cultures were followed. ID made recommendations for p.o. antibiotics. Patient was cleared for discharge. Thorpe was discontinued. Discharge plans and medications were discussed with the patient and she verbally understanding and agreed. Patient was discharged home. MEDICATIONS ON DISCHARGE:    Discharge Medication List as of 1/23/2023  2:35 PM        START taking these medications    Details   levoFLOXacin (LEVAQUIN) 250 mg tablet Take 1 Tablet by mouth every twenty-four (24) hours for 8 days. , Normal, Disp-8 Tablet, R-0      tamsulosin (FLOMAX) 0.4 mg capsule Take 1 Capsule by mouth nightly., Normal, Disp-30 Capsule, R-0      L. acidophilus,casei,rhamnosus (Bio-K plus) 50 billion cell cpDR capsule Take 1 Capsule by mouth daily for 14 days. , Normal, Disp-14 Capsule, R-0 SITagliptin phosphate (Januvia) 25 mg tablet Take 1 Tablet by mouth daily. , Normal, Disp-30 Tablet, R-0      midodrine (PROAMATINE) 2.5 mg tablet Take 1 Tablet by mouth three (3) times daily (with meals). , Normal, Disp-45 Tablet, R-0           CONTINUE these medications which have NOT CHANGED    Details   estradioL (Estrace) 0.01 % (0.1 mg/gram) vaginal cream Apply pea sized amount 3x weekly around urethra., Normal, Disp-42.5 g, R-3      atorvastatin (LIPITOR) 80 mg tablet TAKE 1 TABLET BY MOUTH EVERY DAY, Normal, Disp-90 Tablet, R-3      cyanocobalamin 1,000 mcg tablet Take 1,000 mcg by mouth daily. , Historical Med      ferrous sulfate 325 mg (65 mg iron) tablet Take 325 mg by mouth Daily (before breakfast). , Historical Med      ergocalciferol (ERGOCALCIFEROL) 1,250 mcg (50,000 unit) capsule TAKE 1 CAPSULE BY MOUTH EVERY FOURTEEN (14) DAYS., Normal, Disp-2 Capsule, R-5      SUMAtriptan (IMITREX) 50 mg tablet TAKE 1 TAB BY MOUTH DAILY AS NEEDED FOR MIGRAINE., Normal, Disp-9 Tablet, R-5      FLUoxetine (PROzac) 20 mg capsule Take 20 mg by mouth daily. , Historical Med           STOP taking these medications       nitrofurantoin (Macrodantin) 100 mg capsule Comments:   Reason for Stopping:         nitrofurantoin, macrocrystal-monohydrate, (MACROBID) 100 mg capsule Comments:   Reason for Stopping:         propranoloL (INDERAL) 40 mg tablet Comments:   Reason for Stopping:         ALPRAZolam (XANAX) 0.5 mg tablet Comments:   Reason for Stopping:         hydrOXYzine pamoate (VISTARIL) 25 mg capsule Comments:   Reason for Stopping:         magnesium oxide (MAG-OX) 400 mg tablet Comments:   Reason for Stopping:         metFORMIN ER (GLUCOPHAGE XR) 500 mg tablet Comments:   Reason for Stopping:         semaglutide (OZEMPIC) 0.25 mg or 0.5 mg/dose (2 mg/1.5 ml) subq pen Comments:   Reason for Stopping:         lisinopriL (PRINIVIL, ZESTRIL) 10 mg tablet Comments:   Reason for Stopping:                 DISCHARGE VITAL SIGNS:  Visit Vitals  /78 (BP 1 Location: Left upper arm, BP Patient Position: At rest)   Pulse 75   Temp 97.7 °F (36.5 °C)   Resp 20   Ht 4' 11\" (1.499 m)   Wt 56.4 kg (124 lb 6.4 oz)   SpO2 96%   BMI 25.13 kg/m²       CONDITION ON DISCHARGE: Stable. DISPOSITION: home      FOLLOW-UP RECOMMENDATIONS:   Follow-up Information       Follow up With Specialties Details Why Contact Ashley Ureña MD Internal Medicine Physician Go on 1/23/2023 the call will call you Nayely RON/[address]  [phone]      Jas Valencia MD Urology Call on 1/23/2023 Call the office [address]  [phone]      Britta Schwartz MD Nephrology Go on 2/22/2023 @ 2:30pm Go to the [employer] building at [address] [facility] SERVICES, [facility]) Follow up  [address]  [phone]            OTHER INSTRUCTIONS:        TIME SPENT ON DISCHARGE ACTIVITIES: More than 35 minutes. Dragon medical dictation software was used for portions of this report. Unintended errors may occur.       Signed:  Joana Ovalles MD      2/5/2023

## 2023-02-07 DIAGNOSIS — E11.9 TYPE 2 DIABETES MELLITUS WITHOUT COMPLICATION, WITHOUT LONG-TERM CURRENT USE OF INSULIN (HCC): Primary | ICD-10-CM

## 2023-02-20 ENCOUNTER — HOSPITAL ENCOUNTER (OUTPATIENT)
Facility: HOSPITAL | Age: 68
Discharge: HOME | End: 2023-02-23
Payer: MEDICARE

## 2023-02-20 DIAGNOSIS — E83.42 HYPOMAGNESEMIA: ICD-10-CM

## 2023-02-20 DIAGNOSIS — N17.9 ACUTE NONTRAUMATIC KIDNEY INJURY (HCC): ICD-10-CM

## 2023-02-20 DIAGNOSIS — E13.319 DIABETES MELLITUS OF OTHER TYPE WITH RETINOPATHY, MACULAR EDEMA PRESENCE UNSPECIFIED, UNSPECIFIED LATERALITY, UNSPECIFIED RETINOPATHY SEVERITY, UNSPECIFIED WHETHER LONG TERM INSULIN USE (HCC): ICD-10-CM

## 2023-02-20 DIAGNOSIS — N17.9 ACUTE RENAL FAILURE, UNSPECIFIED ACUTE RENAL FAILURE TYPE (HCC): ICD-10-CM

## 2023-02-20 LAB
25(OH)D3 SERPL-MCNC: 57.2 NG/ML (ref 30–100)
ALBUMIN SERPL-MCNC: 3.5 G/DL (ref 3.4–5)
ANION GAP SERPL CALC-SCNC: 3 MMOL/L (ref 3–18)
BUN SERPL-MCNC: 22 MG/DL (ref 7–18)
BUN/CREAT SERPL: 22 (ref 12–20)
CALCIUM SERPL-MCNC: 9.3 MG/DL (ref 8.5–10.1)
CALCIUM SERPL-MCNC: 9.3 MG/DL (ref 8.5–10.1)
CHLORIDE SERPL-SCNC: 110 MMOL/L (ref 100–111)
CO2 SERPL-SCNC: 26 MMOL/L (ref 21–32)
CREAT SERPL-MCNC: 0.99 MG/DL (ref 0.6–1.3)
CREAT UR-MCNC: 22 MG/DL (ref 30–125)
CREAT UR-MCNC: 24 MG/DL (ref 30–125)
ERYTHROCYTE [DISTWIDTH] IN BLOOD BY AUTOMATED COUNT: 14.6 % (ref 11.6–14.5)
GLUCOSE SERPL-MCNC: 135 MG/DL (ref 74–99)
HCT VFR BLD AUTO: 32.5 % (ref 35–45)
HGB BLD-MCNC: 10.4 G/DL (ref 12–16)
MAGNESIUM SERPL-MCNC: 2.2 MG/DL (ref 1.6–2.6)
MCH RBC QN AUTO: 29.5 PG (ref 24–34)
MCHC RBC AUTO-ENTMCNC: 32 G/DL (ref 31–37)
MCV RBC AUTO: 92.1 FL (ref 78–100)
MICROALBUMIN UR-MCNC: 4.22 MG/DL (ref 0–3)
MICROALBUMIN/CREAT UR-RTO: 192 MG/G (ref 0–30)
NRBC # BLD: 0 K/UL (ref 0–0.01)
NRBC BLD-RTO: 0 PER 100 WBC
PHOSPHATE SERPL-MCNC: 3.7 MG/DL (ref 2.5–4.9)
PLATELET # BLD AUTO: 345 K/UL (ref 135–420)
PMV BLD AUTO: 9.2 FL (ref 9.2–11.8)
POTASSIUM SERPL-SCNC: 5 MMOL/L (ref 3.5–5.5)
PROT UR-MCNC: 17 MG/DL
PTH-INTACT SERPL-MCNC: 66.7 PG/ML (ref 18.4–88)
RBC # BLD AUTO: 3.53 M/UL (ref 4.2–5.3)
SODIUM SERPL-SCNC: 139 MMOL/L (ref 136–145)
WBC # BLD AUTO: 8.1 K/UL (ref 4.6–13.2)

## 2023-02-20 PROCEDURE — 80069 RENAL FUNCTION PANEL: CPT

## 2023-02-20 PROCEDURE — 84156 ASSAY OF PROTEIN URINE: CPT

## 2023-02-20 PROCEDURE — 82570 ASSAY OF URINE CREATININE: CPT

## 2023-02-20 PROCEDURE — 83735 ASSAY OF MAGNESIUM: CPT

## 2023-02-20 PROCEDURE — 36415 COLL VENOUS BLD VENIPUNCTURE: CPT

## 2023-02-20 PROCEDURE — 85027 COMPLETE CBC AUTOMATED: CPT

## 2023-02-20 PROCEDURE — 83970 ASSAY OF PARATHORMONE: CPT

## 2023-02-20 PROCEDURE — 82306 VITAMIN D 25 HYDROXY: CPT

## 2023-02-20 RX ORDER — SUMATRIPTAN 50 MG/1
TABLET, FILM COATED ORAL
Qty: 9 TABLET | Refills: 5 | Status: SHIPPED | OUTPATIENT
Start: 2023-02-20

## 2023-02-21 ENCOUNTER — OFFICE VISIT (OUTPATIENT)
Age: 68
End: 2023-02-21
Payer: MEDICARE

## 2023-02-21 VITALS
SYSTOLIC BLOOD PRESSURE: 134 MMHG | WEIGHT: 122.2 LBS | BODY MASS INDEX: 24.64 KG/M2 | TEMPERATURE: 97.3 F | OXYGEN SATURATION: 100 % | RESPIRATION RATE: 16 BRPM | DIASTOLIC BLOOD PRESSURE: 68 MMHG | HEART RATE: 60 BPM | HEIGHT: 59 IN

## 2023-02-21 DIAGNOSIS — N13.30 HYDROURETERONEPHROSIS: ICD-10-CM

## 2023-02-21 DIAGNOSIS — D50.9 IRON DEFICIENCY ANEMIA, UNSPECIFIED IRON DEFICIENCY ANEMIA TYPE: ICD-10-CM

## 2023-02-21 DIAGNOSIS — N17.9 AKI (ACUTE KIDNEY INJURY) (HCC): ICD-10-CM

## 2023-02-21 DIAGNOSIS — I10 ESSENTIAL HYPERTENSION: ICD-10-CM

## 2023-02-21 DIAGNOSIS — N20.1 RIGHT URETERAL STONE: ICD-10-CM

## 2023-02-21 DIAGNOSIS — E78.5 HYPERLIPIDEMIA, UNSPECIFIED HYPERLIPIDEMIA TYPE: ICD-10-CM

## 2023-02-21 DIAGNOSIS — R74.01 TRANSAMINITIS: ICD-10-CM

## 2023-02-21 DIAGNOSIS — E11.29 TYPE 2 DIABETES MELLITUS WITH MICROALBUMINURIA, WITHOUT LONG-TERM CURRENT USE OF INSULIN (HCC): Primary | ICD-10-CM

## 2023-02-21 DIAGNOSIS — R80.9 TYPE 2 DIABETES MELLITUS WITH MICROALBUMINURIA, WITHOUT LONG-TERM CURRENT USE OF INSULIN (HCC): Primary | ICD-10-CM

## 2023-02-21 DIAGNOSIS — F33.42 MAJOR DEPRESSIVE DISORDER, RECURRENT, IN FULL REMISSION (HCC): ICD-10-CM

## 2023-02-21 DIAGNOSIS — E53.8 B12 DEFICIENCY: ICD-10-CM

## 2023-02-21 PROBLEM — E83.42 HYPOMAGNESEMIA: Status: RESOLVED | Noted: 2022-01-02 | Resolved: 2023-02-21

## 2023-02-21 PROBLEM — D72.829 LEUKOCYTOSIS: Status: RESOLVED | Noted: 2023-01-18 | Resolved: 2023-02-21

## 2023-02-21 PROBLEM — R19.7 DIARRHEA: Status: RESOLVED | Noted: 2022-12-11 | Resolved: 2023-02-21

## 2023-02-21 PROCEDURE — 99213 OFFICE O/P EST LOW 20 MIN: CPT

## 2023-02-21 PROCEDURE — 3075F SYST BP GE 130 - 139MM HG: CPT | Performed by: INTERNAL MEDICINE

## 2023-02-21 PROCEDURE — 3078F DIAST BP <80 MM HG: CPT | Performed by: INTERNAL MEDICINE

## 2023-02-21 PROCEDURE — 3017F COLORECTAL CA SCREEN DOC REV: CPT | Performed by: INTERNAL MEDICINE

## 2023-02-21 PROCEDURE — 2022F DILAT RTA XM EVC RTNOPTHY: CPT | Performed by: INTERNAL MEDICINE

## 2023-02-21 PROCEDURE — 99215 OFFICE O/P EST HI 40 MIN: CPT | Performed by: INTERNAL MEDICINE

## 2023-02-21 PROCEDURE — 3046F HEMOGLOBIN A1C LEVEL >9.0%: CPT | Performed by: INTERNAL MEDICINE

## 2023-02-21 PROCEDURE — G8420 CALC BMI NORM PARAMETERS: HCPCS | Performed by: INTERNAL MEDICINE

## 2023-02-21 PROCEDURE — G8400 PT W/DXA NO RESULTS DOC: HCPCS | Performed by: INTERNAL MEDICINE

## 2023-02-21 PROCEDURE — 1036F TOBACCO NON-USER: CPT | Performed by: INTERNAL MEDICINE

## 2023-02-21 PROCEDURE — G8427 DOCREV CUR MEDS BY ELIG CLIN: HCPCS | Performed by: INTERNAL MEDICINE

## 2023-02-21 PROCEDURE — G8484 FLU IMMUNIZE NO ADMIN: HCPCS | Performed by: INTERNAL MEDICINE

## 2023-02-21 PROCEDURE — 1123F ACP DISCUSS/DSCN MKR DOCD: CPT | Performed by: INTERNAL MEDICINE

## 2023-02-21 PROCEDURE — 1090F PRES/ABSN URINE INCON ASSESS: CPT | Performed by: INTERNAL MEDICINE

## 2023-02-21 RX ORDER — ESTRADIOL 0.1 MG/G
CREAM VAGINAL
COMMUNITY
Start: 2023-01-05

## 2023-02-21 RX ORDER — TAMSULOSIN HYDROCHLORIDE 0.4 MG/1
0.4 CAPSULE ORAL DAILY
Qty: 30 CAPSULE | Refills: 0 | Status: SHIPPED | OUTPATIENT
Start: 2023-02-21

## 2023-02-21 RX ORDER — METFORMIN HYDROCHLORIDE 500 MG/1
1000 TABLET, EXTENDED RELEASE ORAL
Qty: 1 TABLET | Refills: 0
Start: 2023-02-21

## 2023-02-21 RX ORDER — TAMSULOSIN HYDROCHLORIDE 0.4 MG/1
0.4 CAPSULE ORAL
COMMUNITY
Start: 2023-01-23 | End: 2023-02-21 | Stop reason: SDUPTHER

## 2023-02-21 ASSESSMENT — PATIENT HEALTH QUESTIONNAIRE - PHQ9
SUM OF ALL RESPONSES TO PHQ9 QUESTIONS 1 & 2: 0
1. LITTLE INTEREST OR PLEASURE IN DOING THINGS: 0
2. FEELING DOWN, DEPRESSED OR HOPELESS: 0
SUM OF ALL RESPONSES TO PHQ QUESTIONS 1-9: 0

## 2023-02-21 NOTE — PATIENT INSTRUCTIONS
Restart Ozempic 0.5 mg weekly and Metformin ER 1000 mg with breakfast. Discontinue Januvia.    Monitor blood pressure and blood sugar daily.  Goal: BP <140/90 and blood sugar < 150 fasting.     Heart-Healthy Diet: Care Instructions  Your Care Instructions     A heart-healthy diet has lots of vegetables, fruits, nuts, beans, and whole grains, and is low in salt. It limits foods that are high in saturated fat, such as meats, cheeses, and fried foods. It may be hard to change your diet, but even small changes can lower your risk of heart attack and heart disease.  Follow-up care is a key part of your treatment and safety. Be sure to make and go to all appointments, and call your doctor if you are having problems. It's also a good idea to know your test results and keep a list of the medicines you take.  How can you care for yourself at home?  Watch your portions  Learn what a serving is. A \"serving\" and a \"portion\" are not always the same thing. Make sure that you are not eating larger portions than are recommended. For example, a serving of pasta is ½ cup. A serving size of meat is 2 to 3 ounces. A 3-ounce serving is about the size of a deck of cards. Measure serving sizes until you are good at \"eyeballing\" them. Keep in mind that restaurants often serve portions that are 2 or 3 times the size of one serving.  To keep your energy level up and keep you from feeling hungry, eat often but in smaller portions.  Eat only the number of calories you need to stay at a healthy weight. If you need to lose weight, eat fewer calories than your body burns (through exercise and other physical activity).  Eat more fruits and vegetables  Eat a variety of fruit and vegetables every day. Dark green, deep orange, red, or yellow fruits and vegetables are especially good for you. Examples include spinach, carrots, peaches, and berries.  Keep carrots, celery, and other veggies handy for snacks. Buy fruit that is in season and store it  where you can see it so that you will be tempted to eat it. Cook dishes that have a lot of veggies in them, such as stir-fries and soups. Limit saturated and trans fat  Read food labels, and try to avoid saturated and trans fats. They increase your risk of heart disease. Use olive or canola oil when you cook. Bake, broil, grill, or steam foods instead of frying them. Choose lean meats instead of high-fat meats such as hot dogs and sausages. Cut off all visible fat when you prepare meat. Eat fish, skinless poultry, and meat alternatives such as soy products instead of high-fat meats. Soy products, such as tofu, may be especially good for your heart. Choose low-fat or fat-free milk and dairy products. Eat foods high in fiber  Eat a variety of grain products every day. Include whole-grain foods that have lots of fiber and nutrients. Examples of whole-grain foods include oats, whole wheat bread, and brown rice. Buy whole-grain breads and cereals, instead of white bread or pastries. Limit salt and sodium  Limit how much salt and sodium you eat to help lower your blood pressure. Taste food before you salt it. Add only a little salt when you think you need it. With time, your taste buds will adjust to less salt. Eat fewer snack items, fast foods, and other high-salt, processed foods. Check food labels for the amount of sodium in packaged foods. Choose low-sodium versions of canned goods (such as soups, vegetables, and beans). Limit sugar  Limit drinks and foods with added sugar. These include candy, desserts, and soda pop. Limit alcohol  Limit alcohol to no more than 2 drinks a day for men and 1 drink a day for women. Too much alcohol can cause health problems. When should you call for help? Watch closely for changes in your health, and be sure to contact your doctor if:    You would like help planning heart-healthy meals. Where can you learn more?   Go to http://www.gray.com/  Enter V137 in the search box to learn more about \"Heart-Healthy Diet: Care Instructions. \"  Current as of: August 22, 2019               Content Version: 12.6  © 0514-5365 [employer]. Care instructions adapted under license by [employer] (which disclaims liability or warranty for this information). If you have questions about a medical condition or this instruction, always ask your healthcare professional. [address] any warranty or liability for your use of this information. Learning About Diabetes Food Guidelines  Your Care Instructions     Meal planning is important to manage diabetes. It helps keep your blood sugar at a target level (which you set with your doctor). You don't have to eat special foods. You can eat what your family eats, including sweets once in a while. But you do have to pay attention to how often you eat and how much you eat of certain foods. You may want to work with a dietitian or a certified diabetes educator (CDE) to help you plan meals and snacks. A dietitian or CDE can also help you lose weight if that is one of your goals. What should you know about eating carbs? Managing the amount of carbohydrate (carbs) you eat is an important part of healthy meals when you have diabetes. Carbohydrate is found in many foods. Learn which foods have carbs. And learn the amounts of carbs in different foods. Bread, cereal, pasta, and rice have about 15 grams of carbs in a serving. A serving is 1 slice of bread (1 ounce), ½ cup of cooked cereal, or 1/3 cup of cooked pasta or rice. Fruits have 15 grams of carbs in a serving. A serving is 1 small fresh fruit, such as an apple or orange; ½ of a banana; ½ cup of cooked or canned fruit; ½ cup of fruit juice; 1 cup of melon or raspberries; or 2 tablespoons of dried fruit. Milk and no-sugar-added yogurt have 15 grams of carbs in a serving.  A serving is 1 cup of milk or 2/3 cup of no-sugar-added yogurt. Starchy vegetables have 15 grams of carbs in a serving. A serving is ½ cup of mashed potatoes or sweet potato; 1 cup winter squash; ½ of a small baked potato; ½ cup of cooked beans; or ½ cup cooked corn or green peas. Learn how much carbs to eat each day and at each meal. A dietitian or CDE can teach you how to keep track of the amount of carbs you eat. This is called carbohydrate counting. If you are not sure how to count carbohydrate grams, use the Plate Method to plan meals. It is a good, quick way to make sure that you have a balanced meal. It also helps you spread carbs throughout the day. Divide your plate by types of foods. Put non-starchy vegetables on half the plate, meat or other protein food on one-quarter of the plate, and a grain or starchy vegetable in the final quarter of the plate. To this you can add a small piece of fruit and 1 cup of milk or yogurt, depending on how many carbs you are supposed to eat at a meal.  Try to eat about the same amount of carbs at each meal. Do not \"save up\" your daily allowance of carbs to eat at one meal.  Proteins have very little or no carbs per serving. Examples of proteins are beef, chicken, turkey, fish, eggs, tofu, cheese, cottage cheese, and peanut butter. A serving size of meat is 3 ounces, which is about the size of a deck of cards. Examples of meat substitute serving sizes (equal to 1 ounce of meat) are 1/4 cup of cottage cheese, 1 egg, 1 tablespoon of peanut butter, and ½ cup of tofu. How can you eat out and still eat healthy? Learn to estimate the serving sizes of foods that have carbohydrate. If you measure food at home, it will be easier to estimate the amount in a serving of restaurant food. If the meal you order has too much carbohydrate (such as potatoes, corn, or baked beans), ask to have a low-carbohydrate food instead. Ask for a salad or green vegetables.   If you use insulin, check your blood sugar before and after eating out to help you plan how much to eat in the future. If you eat more carbohydrate at a meal than you had planned, take a walk or do other exercise. This will help lower your blood sugar. What else should you know? Limit saturated fat, such as the fat from meat and dairy products. This is a healthy choice because people who have diabetes are at higher risk of heart disease. So choose lean cuts of meat and nonfat or low-fat dairy products. Use olive or canola oil instead of butter or shortening when cooking. Don't skip meals. Your blood sugar may drop too low if you skip meals and take insulin or certain medicines for diabetes. Check with your doctor before you drink alcohol. Alcohol can cause your blood sugar to drop too low. Alcohol can also cause a bad reaction if you take certain diabetes medicines. Follow-up care is a key part of your treatment and safety. Be sure to make and go to all appointments, and call your doctor if you are having problems. It's also a good idea to know your test results and keep a list of the medicines you take. Where can you learn more? Go to http://www.roger.com/  Enter I147 in the search box to learn more about \"Learning About Diabetes Food Guidelines. \"  Current as of: December 20, 2019               Content Version: 12.6  © 2343-4218 [employer]. Care instructions adapted under license by [employer] (which disclaims liability or warranty for this information). If you have questions about a medical condition or this instruction, always ask your healthcare professional. [place] [address] any warranty or liability for your use of this information. Learning About Low-Sodium Foods  What foods are low in sodium? The foods you eat contain nutrients, such as vitamins and minerals. Sodium is a nutrient. Your body needs the right amount to stay healthy and work as it should.  You can use the list below to help you make choices about which foods to eat. Fruits  Fresh, frozen, canned, or dried fruit  Vegetables  Fresh or frozen vegetables, with no added salt  Canned vegetables, low-sodium or with no added salt  Grains  Bagels without salted tops  Cereal with no added salt  Corn tortillas  Crackers with no added salt  Oatmeal, cooked without salt  Popcorn with no salt  Pasta and noodles, cooked without salt  Rice, cooked without salt  Unsalted pretzels  Dairy and dairy alternatives  Butter, unsalted  Cream cheese  Ice cream  Milk  Soy milk  Meats and other protein foods  Beans and peas, canned with no salt  Eggs  Fresh fish (not smoked)  Fresh meats (not smoked or cured)  Nuts and nut butter, prepared without salt  Poultry, not packaged with sodium solution  Tofu, unseasoned  Tuna, canned without salt  Seasonings  Garlic  Herbs and spices  Lemon juice  Mustard  Olive oil  Salt-free seasoning mixes  Vinegar  Work with your doctor to find out how much of this nutrient you need. Depending on your health, you may need more or less of it in your diet. Where can you learn more? Go to http://www.gray.com/  Enter S460 in the search box to learn more about \"Learning About Low-Sodium Foods. \"  Current as of: August 22, 2019               Content Version: 12.6  © 4517-9353 "[employer] Care instructions adapted under license by [employer] (which disclaims liability or warranty for this information). If you have questions about a medical condition or this instruction, always ask your healthcare professional. [place] [address] any warranty or liability for your use of this information. Low Sodium Diet (2,000 Milligram): Care Instructions  Your Care Instructions     Too much sodium causes your body to hold on to extra water. This can raise your blood pressure and force your heart and kidneys to work harder.  In very serious cases, this could cause you to be put in the hospital. It might even be life-threatening. By limiting sodium, you will feel better and lower your risk of serious problems. The most common source of sodium is salt. People get most of the salt in their diet from canned, prepared, and packaged foods. Fast food and restaurant meals also are very high in sodium. Your doctor will probably limit your sodium to less than 2,000 milligrams (mg) a day. This limit counts all the sodium in prepared and packaged foods and any salt you add to your food. Follow-up care is a key part of your treatment and safety. Be sure to make and go to all appointments, and call your doctor if you are having problems. It's also a good idea to know your test results and keep a list of the medicines you take. How can you care for yourself at home? Read food labels  Read labels on cans and food packages. The labels tell you how much sodium is in each serving. Make sure that you look at the serving size. If you eat more than the serving size, you have eaten more sodium. Food labels also tell you the Percent Daily Value for sodium. Choose products with low Percent Daily Values for sodium. Be aware that sodium can come in forms other than salt, including monosodium glutamate (MSG), sodium citrate, and sodium bicarbonate (baking soda). MSG is often added to Asian food. When you eat out, you can sometimes ask for food without MSG or added salt. Buy low-sodium foods  Buy foods that are labeled \"unsalted\" (no salt added), \"sodium-free\" (less than 5 mg of sodium per serving), or \"low-sodium\" (less than 140 mg of sodium per serving). Foods labeled \"reduced-sodium\" and \"light sodium\" may still have too much sodium. Be sure to read the label to see how much sodium you are getting. Buy fresh vegetables, or frozen vegetables without added sauces. Buy low-sodium versions of canned vegetables, soups, and other canned goods.   Prepare low-sodium meals  Cut back on the amount of salt you use in cooking. This will help you adjust to the taste. Do not add salt after cooking. One teaspoon of salt has about 2,300 mg of sodium. Take the salt shaker off the table. Flavor your food with garlic, lemon juice, onion, vinegar, herbs, and spices. Do not use soy sauce, lite soy sauce, steak sauce, onion salt, garlic salt, celery salt, mustard, or ketchup on your food. Use low-sodium salad dressings, sauces, and ketchup. Or make your own salad dressings and sauces without adding salt. Use less salt (or none) when recipes call for it. You can often use half the salt a recipe calls for without losing flavor. Other foods such as rice, pasta, and grains do not need added salt. Rinse canned vegetables, and cook them in fresh water. This removes some--but not all--of the salt. Avoid water that is naturally high in sodium or that has been treated with water softeners, which add sodium. Call your local water company to find out the sodium content of your water supply. If you buy bottled water, read the label and choose a sodium-free brand. Avoid high-sodium foods  Avoid eating:  Smoked, cured, salted, and canned meat, fish, and poultry. Ham, stiles, hot dogs, and luncheon meats. Regular, hard, and processed cheese and regular peanut butter. Crackers with salted tops, and other salted snack foods such as pretzels, chips, and salted popcorn. Frozen prepared meals, unless labeled low-sodium. Canned and dried soups, broths, and bouillon, unless labeled sodium-free or low-sodium. Canned vegetables, unless labeled sodium-free or low-sodium. [place] fries, pizza, tacos, and other fast foods. Pickles, olives, ketchup, and other condiments, especially soy sauce, unless labeled sodium-free or low-sodium. Where can you learn more? Go to http://www.gray.com/  Enter V448 in the search box to learn more about \"Low Sodium Diet (2,000 Milligram): Care Instructions. \"  Current as of: August 22, 2019               Content Version: 12.6  © 4669-8573 [employer]. Care instructions adapted under license by [employer] (which disclaims liability or warranty for this information). If you have questions about a medical condition or this instruction, always ask your healthcare professional. [address] any warranty or liability for your use of this information.

## 2023-03-01 ENCOUNTER — CLINICAL DOCUMENTATION (OUTPATIENT)
Facility: HOSPITAL | Age: 68
End: 2023-03-01

## 2023-03-06 NOTE — PROGRESS NOTES
Baljit Campa  Appointment: 3/1/2023 2:45 PM  Location: [facility]  Patient #: [id number]  : 1955  Undefined / Language: Blanka Ly / Race: White  Female      History of Present Illness Malissa Ye MD; 3/5/2023 8:04 PM)  The patient is a 74year old female. Note: Patient is a 72-year-old female who was following up in nephrology office post hospitalization for SONDRA and obstructive uropathy    Past medical history    #1 diabetes mellitus type 2  #2 hypertension  #3 dyslipidemia  #4 migraine headaches  #5 ADHD  #6 depression  # recent acute kidney injury  #7 right-sided hydronephrosis  #8 left obstructive ureteric calculus s/p cystoscopy and right stent placement  #9 recent complicated UTI and multiple UTIs and frequent    Patient was recently admitted with SONDRA to [facility].  Creatinine was 1.9 on presentation CT showed obstructing right kidney stone. No prior kidney disease. No history of stones in the past but did have frequent UTIs. She was also noted to have a UTI on presentation. She underwent cystoscopy and stent placement on  per urology. Creatinine improved and got down to 1.4 but started getting worse. She had relative hypotension and was also on lisinopril. Nephrology was consulted and she was given hydration and blood pressures were kept up. Patient did get a dose of ketorolac which may have contributed as well. Urine cultures grew Klebsiella and patient was kept on antibiotics on discharge patient's creatinine was down to 1.1. Now follows up with nephrology    Denies any urinary symptoms. Denies extremity edema shortness of breath. Patient is following up with urology. Still complains of some occasional back pain but uses Tylenol blood pressures in good range patient was on midodrine on discharge which is stopped by PCP.     Problem List/Past Medical (Yamileth Arizmendi; 3/1/2023 2:55 PM)  Vitamin D deficiency (E55.9)    Hypomagnesemia (E83.42)    DM type 2 (diabetes mellitus, type 2) (E11.9)    HTN (hypertension) (I10)    SONDRA (acute kidney injury) (N17.9)    Transaminitis (R74.01)    Kidney stone on right side (N20.0)    Leukocytopenia (D72.819)    Hydroureteronephrosis (N13.30)    Hypokalemia (E87.6)    Iron deficiency anemia (D50.9)    B12 deficiency (E53.8)    Gingivostomatitis (K05.10)    History of Bell's palsy (Z86.69)    HNP (herniated nucleus pulposus), lumbar (M51.26)    DDD (degenerative disc disease)    Recurrent major depressive disorder (F33.9)    Bilateral lumbar radiculopathy (M54.16)    Migraine (G43.909)    HLD (hyperlipidemia) (E78.5)    HCD (hypertensive cardiovascular disease) (I11.9)    Problems Reconciled      Allergies (Chrissy Rice; 3/1/2023 2:55 PM)  Sulfa Drugs    Penicillin   Rash. Allergies Reconciled      Social History (Samantha Cisse; 3/1/2023 2:55 PM)  Alcohol Use   Moderate alcohol use. Tobacco use   Former smoker. Medication History (Samantha Cisse; 3/1/2023 3:03 PM)  ferrous sulfate  (325 mg(65 mg iro tablet, 1 oral Daily) Active. Vitamin B-12  (1,000mcg tablet, 1 oral Daily) Active.  ergocalciferol (vitamin D2)  (1,250 mcg(50,000 un capsule, 1 oral EVERY 14 DAYS) Active. FLUoxetine  (20mg capsule, 1 oral Daily) Active. SUMAtriptan succinate  (50mg tablet, 1 oral AS needed) Active. tamsulosin  (0.4mg capsule, 1 oral AT bedtime) Active. atorvastatin  (80mg tablet, 1 oral Daily) Active.  estradioL  (0.01% (0.1 mg/ Cream With Appl, PEA SIZE AMOUNT vaginal THREE TIMES A WEEK) Active. metFORMIN  (500mg tablet, 2 oral IN THE MORNING) Active.  magnesium oxide  (500mg tablet, 1 oral Daily) Active. hydrOXYzine pamoate  (25mg capsule, 2 oral AT BEDTIME) Active. propranoloL  (40mg tablet, 1 oral Two times daily) Active. Centrum Silver Women  (8 mg iron-400mcg-300 mc tablet, 1 oral Daily) Active. Ozempic  (0.25 mg or 0.5mg(2 mg/1. Pen Injector, subcutaneous 1 WEEKLY AFTER BREAKFAST) Active.   Medications Reconciled     Past Surgical History Gwen Galvan Dagoberto; 3/1/2023 2:55 PM)  HX TONSILLECTOMY    HX SEPTOPLASTY    [place] UNLISTED NEUROLOGICAL/MEUROMUSCULAR DX PX LAMINECTOMY      Health Maintenance History (Sandra Olguin; 3/1/2023 3:03 PM)  covid 19 vaccnine dates: moderna - 04/19/2021 5/17/2021    pneumococcal conjugate pcv20   [08/25/2022]:  Pneumovax   [05/11/2017]: #23  Flu Vaccine   [11/01/2022]: Mammogram, Screening   [04/2022]: Normal.  Colonoscopy, Screening   [10/24/2014]: Normal.        Review of Systems Mayte Shankar MD; 3/5/2023 8:04 PM)  General Not Present- Anorexia, Chills, Fatigue and Fever. Skin Not Present- Bruising, Pruritus, Rash and Ulcer. HEENT Not Present- Dry Mucous Membranes, Dysgeusia, Oral Ulcers, Periorbital Puffiness and Sore Throat. Respiratory Not Present- Cough, Difficulty Breathing on Exertion, Dyspnea and Hemoptysis. Cardiovascular Not Present- Chest Pain, Claudications, Orthopnea, Palpitations, Paroxysmal Nocturnal Dyspnea and Swelling of Extremities. Gastrointestinal Not Present- Abdominal Pain, Abdominal Swelling, Constipation, Diarrhea, Hematochezia, Melena, Nausea and Vomiting. Female Genitourinary Not Present- Blood in Urine, Difficulty Emptying Bladder, Dysuria, Frequency, Hematuria and Nocturia. Musculoskeletal Not Present- Joint Pain, Joint Redness, Joint Stiffness, Joint Swelling, Leg Cramps and Myalgia. Neurological Not Present- Dizziness, Headaches, Syncope and Trouble walking. Endocrine Not Present- Appetite Changes, Excessive Thirst, Polydipsia and Polyuria. Hematology Not Present- Easy Bruising and Excessive bleeding. Vitals Tremaine Liza Dagoberto; 3/1/2023 3:06 PM)  3/1/2023 3:04 PM  Weight: 122 lb   Height: 58.5 in   Body Surface Area: 1.49 m²   Body Mass Index: 25.06 kg/m²    Pain Level: 3/10    Temp.: 97.7° F    Pulse: 61 (Regular)    Resp. : 14 (Unlabored)    P. OX: 96% (Room air)  BP: 128/72(Sitting, Left Arm, Standard)  PAIN IS WITHIN HER LOWER BACK            Physical Exam Mayte Shankar MD; 3/5/2023 8:04 PM)  Chest and Lung Exam  Auscultation  Breath sounds - Clear. Cardiovascular  Auscultation  Rhythm - Regular. Heart Sounds - Normal heart sounds. Abdomen  Palpation/Percussion  Palpation and Percussion of the abdomen reveal - Soft, Non Tender and No hepatosplenomegaly. Auscultation  Auscultation of the abdomen reveals - Bowel sounds normal.    Peripheral Vascular  Upper Extremity  Palpation - Edema - Left - No edema - Left. Edema - Right - No edema - Right. Assessment & Plan Rogelio Brandt MD; 3/5/2023 8:05 PM)    SONDRA (acute kidney injury) (N17.9) <[id number]>  Impression: . Charisse Rubio #1 acute kidney injury appears to have resolved. Recommended patient to maintain aggressive hydration, follow with urology closely. Creatinine is down to 1.1,  #2 hypertension, patient blood pressures appear to be well controlled.  Lisinopril was stopped in the hospital, may be restarted if creatinine remains stable per PCP or on follow-up  #3 diabetes mellitus type 2  #4 nephrolithiasis, follow-up with urology, likely need a 24-hour Litholink stone risk profile prior to follow-up  #5 obstructive uropathy with left-sided hydronephrosis s/p stent placement  #6 frequent UTIs    Plan:    #1 aggressive hydration  #2 avoid caffeine and low-salt diet as below increased risk for nephrolithiasis as well as hypertension  #3 avoid NSAIDs  #4 follow-up with urology closely  #5 monitor blood pressures at home, goal less than 130/80  #6 check magnesium as patient is on magnesium supplement    Plan to follow-up in 6 months discussed with patient  Please cc with thanks to Dr. Martin Randolph by Rogelio Brandt MD (3/5/2023 8:05 PM)

## 2023-03-08 ENCOUNTER — TELEPHONE (OUTPATIENT)
Age: 68
End: 2023-03-08

## 2023-03-08 RX ORDER — PROPRANOLOL HYDROCHLORIDE 40 MG/1
TABLET ORAL
Qty: 60 TABLET | Refills: 0 | Status: SHIPPED | OUTPATIENT
Start: 2023-03-08

## 2023-03-08 NOTE — TELEPHONE ENCOUNTER
Patient is requesting refill be sent to [facility]    propranolol (INDERAL) 40 MG tablet   3/19/2022     Sig: TAKE 1 TABLET BY MOUTH TWICE A DAY    Class: Historical Med

## 2023-03-17 RX ORDER — TAMSULOSIN HYDROCHLORIDE 0.4 MG/1
CAPSULE ORAL
Qty: 30 CAPSULE | Refills: 0 | OUTPATIENT
Start: 2023-03-17

## 2023-03-23 ENCOUNTER — HOSPITAL ENCOUNTER (OUTPATIENT)
Facility: HOSPITAL | Age: 68
Setting detail: SPECIMEN
Discharge: HOME | End: 2023-03-23
Payer: MEDICARE

## 2023-03-23 ENCOUNTER — TELEPHONE (OUTPATIENT)
Age: 68
End: 2023-03-23

## 2023-03-23 DIAGNOSIS — E55.9 VITAMIN D DEFICIENCY: ICD-10-CM

## 2023-03-23 DIAGNOSIS — E53.8 B12 DEFICIENCY: ICD-10-CM

## 2023-03-23 DIAGNOSIS — D50.9 IRON DEFICIENCY ANEMIA, UNSPECIFIED IRON DEFICIENCY ANEMIA TYPE: ICD-10-CM

## 2023-03-23 DIAGNOSIS — R35.0 URINARY FREQUENCY: Primary | ICD-10-CM

## 2023-03-23 DIAGNOSIS — D64.9 ANEMIA, UNSPECIFIED TYPE: ICD-10-CM

## 2023-03-23 DIAGNOSIS — E83.42 HYPOMAGNESEMIA: ICD-10-CM

## 2023-03-23 DIAGNOSIS — E78.5 HYPERLIPIDEMIA, UNSPECIFIED HYPERLIPIDEMIA TYPE: ICD-10-CM

## 2023-03-23 DIAGNOSIS — E11.9 TYPE 2 DIABETES MELLITUS WITHOUT COMPLICATION, WITHOUT LONG-TERM CURRENT USE OF INSULIN (HCC): ICD-10-CM

## 2023-03-23 LAB
25(OH)D3 SERPL-MCNC: 78.4 NG/ML (ref 30–100)
ALBUMIN SERPL-MCNC: 3.8 G/DL (ref 3.4–5)
ALBUMIN/GLOB SERPL: 1.2 (ref 0.8–1.7)
ALP SERPL-CCNC: 116 U/L (ref 45–117)
ALT SERPL-CCNC: 29 U/L (ref 13–56)
ANION GAP SERPL CALC-SCNC: 5 MMOL/L (ref 3–18)
APPEARANCE UR: CLEAR
AST SERPL-CCNC: 17 U/L (ref 10–38)
BACTERIA URNS QL MICRO: ABNORMAL /HPF
BASOPHILS # BLD: 0 K/UL (ref 0–0.1)
BASOPHILS NFR BLD: 0 % (ref 0–2)
BILIRUB SERPL-MCNC: 0.5 MG/DL (ref 0.2–1)
BILIRUB UR QL: NEGATIVE
BUN SERPL-MCNC: 25 MG/DL (ref 7–18)
BUN/CREAT SERPL: 27 (ref 12–20)
CALCIUM SERPL-MCNC: 9.7 MG/DL (ref 8.5–10.1)
CAOX CRY URNS QL MICRO: ABNORMAL
CHLORIDE SERPL-SCNC: 106 MMOL/L (ref 100–111)
CHOLEST SERPL-MCNC: 139 MG/DL
CO2 SERPL-SCNC: 29 MMOL/L (ref 21–32)
COLOR UR: YELLOW
CREAT SERPL-MCNC: 0.91 MG/DL (ref 0.6–1.3)
CREAT UR-MCNC: 72 MG/DL (ref 30–125)
DIFFERENTIAL METHOD BLD: ABNORMAL
EOSINOPHIL # BLD: 0.2 K/UL (ref 0–0.4)
EOSINOPHIL NFR BLD: 2 % (ref 0–5)
EPITH CASTS URNS QL MICRO: ABNORMAL /LPF (ref 0–5)
ERYTHROCYTE [DISTWIDTH] IN BLOOD BY AUTOMATED COUNT: 14.2 % (ref 11.6–14.5)
EST. AVERAGE GLUCOSE BLD GHB EST-MCNC: 126 MG/DL
FERRITIN SERPL-MCNC: 14 NG/ML (ref 8–388)
FOLATE SERPL-MCNC: >20 NG/ML (ref 3.1–17.5)
GLOBULIN SER CALC-MCNC: 3.2 G/DL (ref 2–4)
GLUCOSE SERPL-MCNC: 107 MG/DL (ref 74–99)
GLUCOSE UR STRIP.AUTO-MCNC: NEGATIVE MG/DL
HBA1C MFR BLD: 6 % (ref 4.2–5.6)
HCT VFR BLD AUTO: 36.1 % (ref 35–45)
HDLC SERPL-MCNC: 47 MG/DL (ref 40–60)
HDLC SERPL: 3 (ref 0–5)
HGB BLD-MCNC: 11.3 G/DL (ref 12–16)
HGB UR QL STRIP: NEGATIVE
IMM GRANULOCYTES # BLD AUTO: 0 K/UL (ref 0–0.04)
IMM GRANULOCYTES NFR BLD AUTO: 0 % (ref 0–0.5)
IRON SATN MFR SERPL: 15 % (ref 20–50)
IRON SERPL-MCNC: 59 UG/DL (ref 50–175)
KETONES UR QL STRIP.AUTO: NEGATIVE MG/DL
LDLC SERPL CALC-MCNC: 74.2 MG/DL (ref 0–100)
LEUKOCYTE ESTERASE UR QL STRIP.AUTO: ABNORMAL
LIPID PANEL: NORMAL
LYMPHOCYTES # BLD: 2.7 K/UL (ref 0.9–3.6)
LYMPHOCYTES NFR BLD: 33 % (ref 21–52)
MAGNESIUM SERPL-MCNC: 2 MG/DL (ref 1.6–2.6)
MCH RBC QN AUTO: 28.7 PG (ref 24–34)
MCHC RBC AUTO-ENTMCNC: 31.3 G/DL (ref 31–37)
MCV RBC AUTO: 91.6 FL (ref 78–100)
MICROALBUMIN UR-MCNC: 1.89 MG/DL (ref 0–3)
MICROALBUMIN/CREAT UR-RTO: 26 MG/G (ref 0–30)
MONOCYTES # BLD: 0.3 K/UL (ref 0.05–1.2)
MONOCYTES NFR BLD: 4 % (ref 3–10)
MUCOUS THREADS URNS QL MICRO: ABNORMAL /LPF
NEUTS SEG # BLD: 5 K/UL (ref 1.8–8)
NEUTS SEG NFR BLD: 61 % (ref 40–73)
NITRITE UR QL STRIP.AUTO: NEGATIVE
NRBC # BLD: 0 K/UL (ref 0–0.01)
NRBC BLD-RTO: 0 PER 100 WBC
PH UR STRIP: 6 (ref 5–8)
PLATELET # BLD AUTO: 356 K/UL (ref 135–420)
PMV BLD AUTO: 10.3 FL (ref 9.2–11.8)
POTASSIUM SERPL-SCNC: 4.1 MMOL/L (ref 3.5–5.5)
PROT SERPL-MCNC: 7 G/DL (ref 6.4–8.2)
PROT UR STRIP-MCNC: NEGATIVE MG/DL
RBC # BLD AUTO: 3.94 M/UL (ref 4.2–5.3)
RBC #/AREA URNS HPF: ABNORMAL /HPF (ref 0–5)
SODIUM SERPL-SCNC: 140 MMOL/L (ref 136–145)
SP GR UR REFRACTOMETRY: 1.01 (ref 1–1.03)
TIBC SERPL-MCNC: 381 UG/DL (ref 250–450)
TRIGL SERPL-MCNC: 89 MG/DL
UROBILINOGEN UR QL STRIP.AUTO: 0.2 EU/DL (ref 0.2–1)
VIT B12 SERPL-MCNC: 535 PG/ML (ref 211–911)
VLDLC SERPL CALC-MCNC: 17.8 MG/DL
WBC # BLD AUTO: 8.2 K/UL (ref 4.6–13.2)
WBC URNS QL MICRO: ABNORMAL /HPF (ref 0–4)

## 2023-03-23 PROCEDURE — 82306 VITAMIN D 25 HYDROXY: CPT

## 2023-03-23 PROCEDURE — 82728 ASSAY OF FERRITIN: CPT

## 2023-03-23 PROCEDURE — 82607 VITAMIN B-12: CPT

## 2023-03-23 PROCEDURE — 85025 COMPLETE CBC W/AUTO DIFF WBC: CPT

## 2023-03-23 PROCEDURE — 83735 ASSAY OF MAGNESIUM: CPT

## 2023-03-23 PROCEDURE — 83036 HEMOGLOBIN GLYCOSYLATED A1C: CPT

## 2023-03-23 PROCEDURE — 81001 URINALYSIS AUTO W/SCOPE: CPT

## 2023-03-23 PROCEDURE — 80061 LIPID PANEL: CPT

## 2023-03-23 PROCEDURE — 36415 COLL VENOUS BLD VENIPUNCTURE: CPT

## 2023-03-23 PROCEDURE — 82043 UR ALBUMIN QUANTITATIVE: CPT

## 2023-03-23 PROCEDURE — 83540 ASSAY OF IRON: CPT

## 2023-03-23 PROCEDURE — 80053 COMPREHEN METABOLIC PANEL: CPT

## 2023-03-24 ENCOUNTER — HOSPITAL ENCOUNTER (OUTPATIENT)
Facility: HOSPITAL | Age: 68
Setting detail: SPECIMEN
End: 2023-03-24
Payer: MEDICARE

## 2023-03-24 DIAGNOSIS — R35.0 URINARY FREQUENCY: ICD-10-CM

## 2023-03-24 DIAGNOSIS — I10 ESSENTIAL HYPERTENSION: ICD-10-CM

## 2023-03-24 PROCEDURE — 87086 URINE CULTURE/COLONY COUNT: CPT

## 2023-03-24 NOTE — TELEPHONE ENCOUNTER
Called and spoke with patient provided her with the message below she verbalized understanding. Patient reports having some increased frequency but denies all other symptoms. Patient will come by the office at her earliest convenience (before 3:30pm) to complete the urine culture. Lab ordered.

## 2023-03-25 LAB
BACTERIA SPEC CULT: NORMAL
SERVICE CMNT-IMP: NORMAL

## 2023-03-29 ENCOUNTER — OFFICE VISIT (OUTPATIENT)
Age: 68
End: 2023-03-29
Payer: MEDICARE

## 2023-03-29 VITALS
DIASTOLIC BLOOD PRESSURE: 72 MMHG | BODY MASS INDEX: 24.8 KG/M2 | TEMPERATURE: 97.7 F | RESPIRATION RATE: 16 BRPM | WEIGHT: 123 LBS | SYSTOLIC BLOOD PRESSURE: 132 MMHG | OXYGEN SATURATION: 96 % | HEART RATE: 72 BPM | HEIGHT: 59 IN

## 2023-03-29 DIAGNOSIS — E78.00 PURE HYPERCHOLESTEROLEMIA: ICD-10-CM

## 2023-03-29 DIAGNOSIS — M51.36 DDD (DEGENERATIVE DISC DISEASE), LUMBAR: ICD-10-CM

## 2023-03-29 DIAGNOSIS — M54.50 ACUTE BILATERAL LOW BACK PAIN WITHOUT SCIATICA: ICD-10-CM

## 2023-03-29 DIAGNOSIS — I10 ESSENTIAL HYPERTENSION: ICD-10-CM

## 2023-03-29 DIAGNOSIS — E11.29 TYPE 2 DIABETES MELLITUS WITH MICROALBUMINURIA, WITHOUT LONG-TERM CURRENT USE OF INSULIN (HCC): Primary | ICD-10-CM

## 2023-03-29 DIAGNOSIS — N20.1 RIGHT URETERAL STONE: ICD-10-CM

## 2023-03-29 DIAGNOSIS — F33.42 RECURRENT MAJOR DEPRESSIVE DISORDER, IN FULL REMISSION (HCC): ICD-10-CM

## 2023-03-29 DIAGNOSIS — M85.89 OSTEOPENIA OF MULTIPLE SITES: ICD-10-CM

## 2023-03-29 DIAGNOSIS — D50.9 IRON DEFICIENCY ANEMIA, UNSPECIFIED IRON DEFICIENCY ANEMIA TYPE: ICD-10-CM

## 2023-03-29 DIAGNOSIS — Z12.31 SCREENING MAMMOGRAM FOR BREAST CANCER: ICD-10-CM

## 2023-03-29 DIAGNOSIS — E55.9 VITAMIN D DEFICIENCY: ICD-10-CM

## 2023-03-29 DIAGNOSIS — N17.9 AKI (ACUTE KIDNEY INJURY) (HCC): ICD-10-CM

## 2023-03-29 DIAGNOSIS — E53.8 B12 DEFICIENCY: ICD-10-CM

## 2023-03-29 DIAGNOSIS — R80.9 TYPE 2 DIABETES MELLITUS WITH MICROALBUMINURIA, WITHOUT LONG-TERM CURRENT USE OF INSULIN (HCC): Primary | ICD-10-CM

## 2023-03-29 PROCEDURE — G8400 PT W/DXA NO RESULTS DOC: HCPCS | Performed by: INTERNAL MEDICINE

## 2023-03-29 PROCEDURE — 1123F ACP DISCUSS/DSCN MKR DOCD: CPT | Performed by: INTERNAL MEDICINE

## 2023-03-29 PROCEDURE — 3078F DIAST BP <80 MM HG: CPT | Performed by: INTERNAL MEDICINE

## 2023-03-29 PROCEDURE — 3074F SYST BP LT 130 MM HG: CPT | Performed by: INTERNAL MEDICINE

## 2023-03-29 PROCEDURE — G8484 FLU IMMUNIZE NO ADMIN: HCPCS | Performed by: INTERNAL MEDICINE

## 2023-03-29 PROCEDURE — 1090F PRES/ABSN URINE INCON ASSESS: CPT | Performed by: INTERNAL MEDICINE

## 2023-03-29 PROCEDURE — 99211 OFF/OP EST MAY X REQ PHY/QHP: CPT | Performed by: INTERNAL MEDICINE

## 2023-03-29 PROCEDURE — 2022F DILAT RTA XM EVC RTNOPTHY: CPT | Performed by: INTERNAL MEDICINE

## 2023-03-29 PROCEDURE — 1036F TOBACCO NON-USER: CPT | Performed by: INTERNAL MEDICINE

## 2023-03-29 PROCEDURE — G8420 CALC BMI NORM PARAMETERS: HCPCS | Performed by: INTERNAL MEDICINE

## 2023-03-29 PROCEDURE — 3044F HG A1C LEVEL LT 7.0%: CPT | Performed by: INTERNAL MEDICINE

## 2023-03-29 PROCEDURE — 99215 OFFICE O/P EST HI 40 MIN: CPT | Performed by: INTERNAL MEDICINE

## 2023-03-29 PROCEDURE — G8427 DOCREV CUR MEDS BY ELIG CLIN: HCPCS | Performed by: INTERNAL MEDICINE

## 2023-03-29 PROCEDURE — 3017F COLORECTAL CA SCREEN DOC REV: CPT | Performed by: INTERNAL MEDICINE

## 2023-03-29 RX ORDER — PROPRANOLOL HYDROCHLORIDE 40 MG/1
TABLET ORAL
Qty: 180 TABLET | Refills: 3 | Status: SHIPPED | OUTPATIENT
Start: 2023-03-29

## 2023-03-29 RX ORDER — METFORMIN HYDROCHLORIDE 500 MG/1
1000 TABLET, EXTENDED RELEASE ORAL
Qty: 180 TABLET | Refills: 3 | Status: SHIPPED | OUTPATIENT
Start: 2023-03-29

## 2023-03-29 RX ORDER — LISINOPRIL 5 MG/1
5 TABLET ORAL DAILY
Qty: 90 TABLET | Refills: 3 | Status: SHIPPED | OUTPATIENT
Start: 2023-03-29

## 2023-03-29 SDOH — ECONOMIC STABILITY: FOOD INSECURITY: WITHIN THE PAST 12 MONTHS, THE FOOD YOU BOUGHT JUST DIDN'T LAST AND YOU DIDN'T HAVE MONEY TO GET MORE.: NEVER TRUE

## 2023-03-29 SDOH — ECONOMIC STABILITY: TRANSPORTATION INSECURITY
IN THE PAST 12 MONTHS, HAS LACK OF TRANSPORTATION KEPT YOU FROM MEETINGS, WORK, OR FROM GETTING THINGS NEEDED FOR DAILY LIVING?: NO

## 2023-03-29 SDOH — ECONOMIC STABILITY: HOUSING INSECURITY
IN THE LAST 12 MONTHS, WAS THERE A TIME WHEN YOU DID NOT HAVE A STEADY PLACE TO SLEEP OR SLEPT IN A SHELTER (INCLUDING NOW)?: NO

## 2023-03-29 SDOH — ECONOMIC STABILITY: INCOME INSECURITY: HOW HARD IS IT FOR YOU TO PAY FOR THE VERY BASICS LIKE FOOD, HOUSING, MEDICAL CARE, AND HEATING?: SOMEWHAT HARD

## 2023-03-29 SDOH — ECONOMIC STABILITY: FOOD INSECURITY: WITHIN THE PAST 12 MONTHS, YOU WORRIED THAT YOUR FOOD WOULD RUN OUT BEFORE YOU GOT MONEY TO BUY MORE.: NEVER TRUE

## 2023-03-29 ASSESSMENT — PATIENT HEALTH QUESTIONNAIRE - PHQ9
SUM OF ALL RESPONSES TO PHQ QUESTIONS 1-9: 1
SUM OF ALL RESPONSES TO PHQ9 QUESTIONS 1 & 2: 1
2. FEELING DOWN, DEPRESSED OR HOPELESS: 1
1. LITTLE INTEREST OR PLEASURE IN DOING THINGS: 0
SUM OF ALL RESPONSES TO PHQ QUESTIONS 1-9: 1

## 2023-03-29 NOTE — PROGRESS NOTES
Bhanu Queen presents today for   Chief Complaint   Patient presents with    Hypertension     3 month follow up                  1. \"Have you been to the ER, urgent care clinic since your last visit? Hospitalized since your last visit? \" no    2. \"Have you seen or consulted any other health care providers outside of the [address] [place] [address] since your last visit? \" no     3. For patients aged 39-70: Has the patient had a colonoscopy / FIT/ Cologuard? Yes - no Care Gap present      If the patient is female:    4. For patients aged 41-77: Has the patient had a mammogram within the past 2 years? Yes - no Care Gap present      5. For patients aged 21-65: Has the patient had a pap smear?  NA - based on age or sex
with mild erythema, suspicious for herpes simplex infection. Viral and HSV cultures were obtained and were negative. 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 However, on 10/17/2022, she noted recurrence of small ulcerations on tongue in area of initial outbreak. She was treated with Valtrex 2 g twice daily for 2 doses and then placed on suppressive therapy with Valtrex 500 mg daily. She contacted the office on 10/27/2022 and reported that she had undergone evaluation by her dentist who confirmed that the ulcerations and throat erythema were consistent with herpes simplex infection. Given lack of improvement on Valtrex 500 mg daily, her dose was subsequently changed to Valtrex 1 g twice daily for 10 days. She presents today for follow-up and reports lesions on the inside of her lower lip and persistent tongue soreness in the area of the initial eruption. However, reports that the erythema and soreness of her posterior pharynx had improved, although she continued to develop small ulcerations in her mouth. Labs obtained (11/1/2022) showed mild anemia (Hb 11.7/HCT 36.0), normal creatinine at 0.61/eGFR >60, normal LFTs, HIV negative, and WOLF negative. Repeat viral culture attempted but not performed by lab. Discussed with Dr. Monica Broderick of infectious disease and she recommended proceeding with punch biopsy of oral lesion to confirm diagnosis of HSV. Referred to Dr. Divina Cannon and underwent biopsy of lower lip lesion (11/23/2022) with pathology showing acute and chronic stomatitis with negative viral inclusions and negative fungal stains. On 4/13/2022, she reported a 4-day history of pain and burning with urination and urge incontinence. She denied any fever, chills, abdominal pain, hematuria, and did report some mild lower back discomfort. POC urinalysis was suggestive of infection with positive nitrites, 2+ blood, and 3+ LE.   She was

## 2023-03-29 NOTE — PATIENT INSTRUCTIONS
in the final quarter of the plate. To this you can add a small piece of fruit and 1 cup of milk or yogurt, depending on how many carbs you are supposed to eat at a meal.  Try to eat about the same amount of carbs at each meal. Do not \"save up\" your daily allowance of carbs to eat at one meal.  Proteins have very little or no carbs per serving. Examples of proteins are beef, chicken, turkey, fish, eggs, tofu, cheese, cottage cheese, and peanut butter. A serving size of meat is 3 ounces, which is about the size of a deck of cards. Examples of meat substitute serving sizes (equal to 1 ounce of meat) are 1/4 cup of cottage cheese, 1 egg, 1 tablespoon of peanut butter, and ½ cup of tofu. How can you eat out and still eat healthy? Learn to estimate the serving sizes of foods that have carbohydrate. If you measure food at home, it will be easier to estimate the amount in a serving of restaurant food. If the meal you order has too much carbohydrate (such as potatoes, corn, or baked beans), ask to have a low-carbohydrate food instead. Ask for a salad or green vegetables. If you use insulin, check your blood sugar before and after eating out to help you plan how much to eat in the future. If you eat more carbohydrate at a meal than you had planned, take a walk or do other exercise. This will help lower your blood sugar. What else should you know? Limit saturated fat, such as the fat from meat and dairy products. This is a healthy choice because people who have diabetes are at higher risk of heart disease. So choose lean cuts of meat and nonfat or low-fat dairy products. Use olive or canola oil instead of butter or shortening when cooking. Don't skip meals. Your blood sugar may drop too low if you skip meals and take insulin or certain medicines for diabetes. Check with your doctor before you drink alcohol. Alcohol can cause your blood sugar to drop too low.  Alcohol can also cause a bad reaction if you take certain

## 2023-04-02 PROBLEM — R74.01 TRANSAMINITIS: Status: RESOLVED | Noted: 2023-01-18 | Resolved: 2023-04-02

## 2023-04-02 PROBLEM — K05.10 GINGIVOSTOMATITIS: Status: RESOLVED | Noted: 2022-09-18 | Resolved: 2023-04-02

## 2023-04-02 PROBLEM — D64.9 ANEMIA: Status: RESOLVED | Noted: 2022-12-11 | Resolved: 2023-04-02

## 2023-04-02 PROBLEM — M54.50 ACUTE BILATERAL LOW BACK PAIN WITHOUT SCIATICA: Status: ACTIVE | Noted: 2023-04-02

## 2023-04-07 NOTE — TELEPHONE ENCOUNTER
Called patient, per patient, she is taking Trintellix 20 mg once a day. Per patient, she is in severe pain, is unable to get around the house or drive a car. Patient added on to Dr. Jammie Gleason schedule for tomorrow 07/31/18 @ 1400. Patient aware, patient informed to bring disc of MRI to review with Dr. Guerita Blackmon. Patient verbalized agreement/understanding. No further action required at this time.
If she is not on any anti-depressants we can try Elavil at night. That will help her sleep and might help her pain throughout the day.  She has an appointment this Friday with Dr. Guerita Blackmon
Patient called stating she is having such bad pain she can't even drive to work. She is wondering if there is something else she can have prescribed to her besides the prescriptions topiramate (TOPAMAX) 25 mg tablet and acetaminophen-codeine (TYLENOL #3) 300-30 mg per tablet. Please advise patient regarding this at [phone].
rolling walker

## 2023-04-14 ENCOUNTER — HOSPITAL ENCOUNTER (OUTPATIENT)
Facility: HOSPITAL | Age: 68
Setting detail: SPECIMEN
Discharge: HOME | End: 2023-04-17
Payer: MEDICARE

## 2023-04-14 DIAGNOSIS — R30.0 DYSURIA: ICD-10-CM

## 2023-04-14 LAB
APPEARANCE UR: ABNORMAL
BACTERIA URNS QL MICRO: ABNORMAL /HPF
BILIRUB UR QL: NEGATIVE
COLOR UR: YELLOW
GLUCOSE UR STRIP.AUTO-MCNC: NEGATIVE MG/DL
HGB UR QL STRIP: ABNORMAL
KETONES UR QL STRIP.AUTO: NEGATIVE MG/DL
LEUKOCYTE ESTERASE UR QL STRIP.AUTO: ABNORMAL
NITRITE UR QL STRIP.AUTO: POSITIVE
PH UR STRIP: 6.5 (ref 5–8)
PROT UR STRIP-MCNC: 30 MG/DL
RBC #/AREA URNS HPF: ABNORMAL /HPF (ref 0–5)
SP GR UR REFRACTOMETRY: 1.01 (ref 1–1.03)
UROBILINOGEN UR QL STRIP.AUTO: 0.2 EU/DL (ref 0.2–1)
WBC URNS QL MICRO: ABNORMAL /HPF (ref 0–4)

## 2023-04-14 PROCEDURE — 87186 SC STD MICRODIL/AGAR DIL: CPT

## 2023-04-14 PROCEDURE — 87086 URINE CULTURE/COLONY COUNT: CPT

## 2023-04-14 PROCEDURE — 87077 CULTURE AEROBIC IDENTIFY: CPT

## 2023-04-14 PROCEDURE — 81001 URINALYSIS AUTO W/SCOPE: CPT

## 2023-04-17 LAB
BACTERIA SPEC CULT: ABNORMAL
CC UR VC: ABNORMAL
SERVICE CMNT-IMP: ABNORMAL

## 2023-05-10 ENCOUNTER — TELEPHONE (OUTPATIENT)
Age: 68
End: 2023-05-10

## 2023-05-10 DIAGNOSIS — R80.9 TYPE 2 DIABETES MELLITUS WITH MICROALBUMINURIA, WITHOUT LONG-TERM CURRENT USE OF INSULIN (HCC): Primary | ICD-10-CM

## 2023-05-10 DIAGNOSIS — E11.29 TYPE 2 DIABETES MELLITUS WITH MICROALBUMINURIA, WITHOUT LONG-TERM CURRENT USE OF INSULIN (HCC): Primary | ICD-10-CM

## 2023-06-07 ENCOUNTER — TELEPHONE (OUTPATIENT)
Age: 68
End: 2023-06-07

## 2023-06-07 DIAGNOSIS — E11.29 TYPE 2 DIABETES MELLITUS WITH MICROALBUMINURIA, WITHOUT LONG-TERM CURRENT USE OF INSULIN (HCC): ICD-10-CM

## 2023-06-07 DIAGNOSIS — R80.9 TYPE 2 DIABETES MELLITUS WITH MICROALBUMINURIA, WITHOUT LONG-TERM CURRENT USE OF INSULIN (HCC): ICD-10-CM

## 2023-06-07 RX ORDER — SEMAGLUTIDE 0.68 MG/ML
0.5 INJECTION, SOLUTION SUBCUTANEOUS
Qty: 3 ML | Refills: 3 | Status: SHIPPED | OUTPATIENT
Start: 2023-06-07

## 2023-06-29 ENCOUNTER — HOSPITAL ENCOUNTER (OUTPATIENT)
Facility: HOSPITAL | Age: 68
Setting detail: SPECIMEN
Discharge: HOME | End: 2023-06-29
Payer: MEDICARE

## 2023-06-29 DIAGNOSIS — E55.9 VITAMIN D DEFICIENCY: ICD-10-CM

## 2023-06-29 DIAGNOSIS — E11.29 TYPE 2 DIABETES MELLITUS WITH MICROALBUMINURIA, WITHOUT LONG-TERM CURRENT USE OF INSULIN (HCC): ICD-10-CM

## 2023-06-29 DIAGNOSIS — E78.00 PURE HYPERCHOLESTEROLEMIA: ICD-10-CM

## 2023-06-29 DIAGNOSIS — N17.9 AKI (ACUTE KIDNEY INJURY) (HCC): ICD-10-CM

## 2023-06-29 DIAGNOSIS — R80.9 TYPE 2 DIABETES MELLITUS WITH MICROALBUMINURIA, WITHOUT LONG-TERM CURRENT USE OF INSULIN (HCC): ICD-10-CM

## 2023-06-29 DIAGNOSIS — D50.9 IRON DEFICIENCY ANEMIA, UNSPECIFIED IRON DEFICIENCY ANEMIA TYPE: ICD-10-CM

## 2023-06-29 DIAGNOSIS — E53.8 B12 DEFICIENCY: ICD-10-CM

## 2023-06-29 LAB
25(OH)D3 SERPL-MCNC: 74.9 NG/ML (ref 30–100)
ALBUMIN SERPL-MCNC: 4 G/DL (ref 3.4–5)
ALBUMIN/GLOB SERPL: 1.4 (ref 0.8–1.7)
ALP SERPL-CCNC: 96 U/L (ref 45–117)
ALT SERPL-CCNC: 41 U/L (ref 13–56)
ANION GAP SERPL CALC-SCNC: 6 MMOL/L (ref 3–18)
AST SERPL-CCNC: 19 U/L (ref 10–38)
BASOPHILS # BLD: 0.1 K/UL (ref 0–0.1)
BASOPHILS NFR BLD: 1 % (ref 0–2)
BILIRUB SERPL-MCNC: 0.6 MG/DL (ref 0.2–1)
BUN SERPL-MCNC: 24 MG/DL (ref 7–18)
BUN/CREAT SERPL: 22 (ref 12–20)
CALCIUM SERPL-MCNC: 9.5 MG/DL (ref 8.5–10.1)
CHLORIDE SERPL-SCNC: 105 MMOL/L (ref 100–111)
CHOLEST SERPL-MCNC: 165 MG/DL
CO2 SERPL-SCNC: 30 MMOL/L (ref 21–32)
CREAT SERPL-MCNC: 1.07 MG/DL (ref 0.6–1.3)
CREAT UR-MCNC: 110 MG/DL (ref 30–125)
DIFFERENTIAL METHOD BLD: ABNORMAL
EOSINOPHIL # BLD: 0.2 K/UL (ref 0–0.4)
EOSINOPHIL NFR BLD: 2 % (ref 0–5)
ERYTHROCYTE [DISTWIDTH] IN BLOOD BY AUTOMATED COUNT: 14.8 % (ref 11.6–14.5)
FERRITIN SERPL-MCNC: 19 NG/ML (ref 8–388)
GLOBULIN SER CALC-MCNC: 2.9 G/DL (ref 2–4)
GLUCOSE SERPL-MCNC: 116 MG/DL (ref 74–99)
HBA1C MFR BLD: 6.3 % (ref 4.2–5.6)
HCT VFR BLD AUTO: 39 % (ref 35–45)
HDLC SERPL-MCNC: 57 MG/DL (ref 40–60)
HDLC SERPL: 2.9 (ref 0–5)
HGB BLD-MCNC: 12.4 G/DL (ref 12–16)
IMM GRANULOCYTES # BLD AUTO: 0 K/UL (ref 0–0.04)
IMM GRANULOCYTES NFR BLD AUTO: 0 % (ref 0–0.5)
IRON SATN MFR SERPL: 25 % (ref 20–50)
IRON SERPL-MCNC: 100 UG/DL (ref 50–175)
LDLC SERPL CALC-MCNC: 89.6 MG/DL (ref 0–100)
LIPID PANEL: NORMAL
LYMPHOCYTES # BLD: 2.6 K/UL (ref 0.9–3.6)
LYMPHOCYTES NFR BLD: 33 % (ref 21–52)
MAGNESIUM SERPL-MCNC: 2.1 MG/DL (ref 1.6–2.6)
MCH RBC QN AUTO: 28.5 PG (ref 24–34)
MCHC RBC AUTO-ENTMCNC: 31.8 G/DL (ref 31–37)
MCV RBC AUTO: 89.7 FL (ref 78–100)
MICROALBUMIN UR-MCNC: 1.68 MG/DL (ref 0–3)
MICROALBUMIN/CREAT UR-RTO: 15 MG/G (ref 0–30)
MONOCYTES # BLD: 0.4 K/UL (ref 0.05–1.2)
MONOCYTES NFR BLD: 5 % (ref 3–10)
NEUTS SEG # BLD: 4.6 K/UL (ref 1.8–8)
NEUTS SEG NFR BLD: 59 % (ref 40–73)
NRBC # BLD: 0 K/UL (ref 0–0.01)
NRBC BLD-RTO: 0 PER 100 WBC
PLATELET # BLD AUTO: 325 K/UL (ref 135–420)
PMV BLD AUTO: 10 FL (ref 9.2–11.8)
POTASSIUM SERPL-SCNC: 4.5 MMOL/L (ref 3.5–5.5)
PROT SERPL-MCNC: 6.9 G/DL (ref 6.4–8.2)
RBC # BLD AUTO: 4.35 M/UL (ref 4.2–5.3)
SODIUM SERPL-SCNC: 141 MMOL/L (ref 136–145)
TIBC SERPL-MCNC: 406 UG/DL (ref 250–450)
TRIGL SERPL-MCNC: 92 MG/DL
VLDLC SERPL CALC-MCNC: 18.4 MG/DL
WBC # BLD AUTO: 7.9 K/UL (ref 4.6–13.2)

## 2023-06-29 PROCEDURE — 80061 LIPID PANEL: CPT

## 2023-06-29 PROCEDURE — 82043 UR ALBUMIN QUANTITATIVE: CPT

## 2023-06-29 PROCEDURE — 83550 IRON BINDING TEST: CPT

## 2023-06-29 PROCEDURE — 82306 VITAMIN D 25 HYDROXY: CPT

## 2023-06-29 PROCEDURE — 85025 COMPLETE CBC W/AUTO DIFF WBC: CPT

## 2023-06-29 PROCEDURE — 80053 COMPREHEN METABOLIC PANEL: CPT

## 2023-06-29 PROCEDURE — 82570 ASSAY OF URINE CREATININE: CPT

## 2023-06-29 PROCEDURE — 36415 COLL VENOUS BLD VENIPUNCTURE: CPT

## 2023-06-29 PROCEDURE — 82728 ASSAY OF FERRITIN: CPT

## 2023-06-29 PROCEDURE — 83540 ASSAY OF IRON: CPT

## 2023-06-29 PROCEDURE — 83735 ASSAY OF MAGNESIUM: CPT

## 2023-06-29 PROCEDURE — 83036 HEMOGLOBIN GLYCOSYLATED A1C: CPT

## 2023-07-04 NOTE — PROGRESS NOTES
consulted, and on 1/19/2023, she underwent cystoscopy, right retrograde pyelogram, and placement of right double-J stent. She was also given a dose of IV gentamicin in the OR. Urine culture > 100,000 colonies/mL Klebsiella pneumoniae (resistant to ampicillin); blood culture x 2 were negative. Dr. Karen Gonzalez was consulted and felt that presentation represented partially treated sepsis given outpatient treatment with Macrobid. Recommendation was to continue on IV cefepime and she was subsequently changed to p.o. Levaquin to complete 14-day course of antibiotic therapy. Nephrology was consulted due to acute renal insufficiency, and because felt to be multifactorial related to obstructive uropathy from proximal right ureteral stone, treatment with gentamicin and ketorolac during hospitalization as well as ACE inhibitor prior to admission, and hypotension during hospitalization. Her renal function did improve with IV fluid hydration and holding of antihypertensives. Renal function prior to discharge showed improvement with creatinine 1.05/eGFR 59. She clinically improved and was subsequently discharged on 1/23/2023. She had a follow-up visit with AQUILES Madrid on 2/2/2023 and repeat urine culture was found to be positive for 50,000 cfu/mL of Aerococcus and she was treated with cefdinir x 7 days. On 2/27/2023, she underwent cystoscopy, right retrograde pyelogram, right ureteroscopy and laser lithotripsy, and right ureteral stent exchange by Dr. Leatha Hodgkins without complications. Summary of PRIOR hospitalizations and medical history:  On 9/2/2022, she presented to [facility] urgent care complaining of a sore throat for 1 week. She described difficulty swallowing, but denied any fever, nasal congestion, or cough. She had performed a home rapid antigen COVID test on 9/1/2022 which was negative.   No abnormalities were noted on exam and a rapid strep test performed was negative group A beta strep culture, and pharyngeal MAHOGANY test

## 2023-07-05 ENCOUNTER — OFFICE VISIT (OUTPATIENT)
Age: 68
End: 2023-07-05
Payer: MEDICARE

## 2023-07-05 VITALS
TEMPERATURE: 97.4 F | WEIGHT: 126 LBS | SYSTOLIC BLOOD PRESSURE: 142 MMHG | OXYGEN SATURATION: 96 % | HEIGHT: 59 IN | BODY MASS INDEX: 25.4 KG/M2 | DIASTOLIC BLOOD PRESSURE: 70 MMHG | RESPIRATION RATE: 18 BRPM | HEART RATE: 65 BPM

## 2023-07-05 DIAGNOSIS — E53.8 B12 DEFICIENCY: ICD-10-CM

## 2023-07-05 DIAGNOSIS — N20.0 NEPHROLITHIASIS: ICD-10-CM

## 2023-07-05 DIAGNOSIS — E78.00 PURE HYPERCHOLESTEROLEMIA: ICD-10-CM

## 2023-07-05 DIAGNOSIS — M54.50 ACUTE BILATERAL LOW BACK PAIN WITHOUT SCIATICA: ICD-10-CM

## 2023-07-05 DIAGNOSIS — N18.31 TYPE 2 DIABETES MELLITUS WITH STAGE 3A CHRONIC KIDNEY DISEASE, WITHOUT LONG-TERM CURRENT USE OF INSULIN (HCC): Primary | ICD-10-CM

## 2023-07-05 DIAGNOSIS — E11.29 TYPE 2 DIABETES MELLITUS WITH MICROALBUMINURIA, WITHOUT LONG-TERM CURRENT USE OF INSULIN (HCC): ICD-10-CM

## 2023-07-05 DIAGNOSIS — N18.31 STAGE 3A CHRONIC KIDNEY DISEASE (HCC): ICD-10-CM

## 2023-07-05 DIAGNOSIS — R80.9 TYPE 2 DIABETES MELLITUS WITH MICROALBUMINURIA, WITHOUT LONG-TERM CURRENT USE OF INSULIN (HCC): ICD-10-CM

## 2023-07-05 DIAGNOSIS — D50.9 IRON DEFICIENCY ANEMIA, UNSPECIFIED IRON DEFICIENCY ANEMIA TYPE: ICD-10-CM

## 2023-07-05 DIAGNOSIS — E11.22 TYPE 2 DIABETES MELLITUS WITH STAGE 3A CHRONIC KIDNEY DISEASE, WITHOUT LONG-TERM CURRENT USE OF INSULIN (HCC): Primary | ICD-10-CM

## 2023-07-05 DIAGNOSIS — E55.9 VITAMIN D DEFICIENCY: ICD-10-CM

## 2023-07-05 DIAGNOSIS — M85.89 OSTEOPENIA OF MULTIPLE SITES: ICD-10-CM

## 2023-07-05 DIAGNOSIS — I10 ESSENTIAL HYPERTENSION: ICD-10-CM

## 2023-07-05 PROCEDURE — 3078F DIAST BP <80 MM HG: CPT | Performed by: INTERNAL MEDICINE

## 2023-07-05 PROCEDURE — 2022F DILAT RTA XM EVC RTNOPTHY: CPT | Performed by: INTERNAL MEDICINE

## 2023-07-05 PROCEDURE — 3074F SYST BP LT 130 MM HG: CPT | Performed by: INTERNAL MEDICINE

## 2023-07-05 PROCEDURE — 1090F PRES/ABSN URINE INCON ASSESS: CPT | Performed by: INTERNAL MEDICINE

## 2023-07-05 PROCEDURE — 3017F COLORECTAL CA SCREEN DOC REV: CPT | Performed by: INTERNAL MEDICINE

## 2023-07-05 PROCEDURE — 1036F TOBACCO NON-USER: CPT | Performed by: INTERNAL MEDICINE

## 2023-07-05 PROCEDURE — G8427 DOCREV CUR MEDS BY ELIG CLIN: HCPCS | Performed by: INTERNAL MEDICINE

## 2023-07-05 PROCEDURE — 1123F ACP DISCUSS/DSCN MKR DOCD: CPT | Performed by: INTERNAL MEDICINE

## 2023-07-05 PROCEDURE — 3044F HG A1C LEVEL LT 7.0%: CPT | Performed by: INTERNAL MEDICINE

## 2023-07-05 PROCEDURE — G8399 PT W/DXA RESULTS DOCUMENT: HCPCS | Performed by: INTERNAL MEDICINE

## 2023-07-05 PROCEDURE — 99215 OFFICE O/P EST HI 40 MIN: CPT | Performed by: INTERNAL MEDICINE

## 2023-07-05 PROCEDURE — G8419 CALC BMI OUT NRM PARAM NOF/U: HCPCS | Performed by: INTERNAL MEDICINE

## 2023-07-05 RX ORDER — LISINOPRIL 10 MG/1
10 TABLET ORAL DAILY
Qty: 90 TABLET | Refills: 3 | Status: SHIPPED | OUTPATIENT
Start: 2023-07-05

## 2023-07-05 ASSESSMENT — PATIENT HEALTH QUESTIONNAIRE - PHQ9
SUM OF ALL RESPONSES TO PHQ QUESTIONS 1-9: 0
SUM OF ALL RESPONSES TO PHQ9 QUESTIONS 1 & 2: 0
SUM OF ALL RESPONSES TO PHQ QUESTIONS 1-9: 0
SUM OF ALL RESPONSES TO PHQ QUESTIONS 1-9: 0
4. FEELING TIRED OR HAVING LITTLE ENERGY: 0
SUM OF ALL RESPONSES TO PHQ QUESTIONS 1-9: 0
1. LITTLE INTEREST OR PLEASURE IN DOING THINGS: 0
2. FEELING DOWN, DEPRESSED OR HOPELESS: 0
SUM OF ALL RESPONSES TO PHQ QUESTIONS 1-9: 0
7. TROUBLE CONCENTRATING ON THINGS, SUCH AS READING THE NEWSPAPER OR WATCHING TELEVISION: 0
9. THOUGHTS THAT YOU WOULD BE BETTER OFF DEAD, OR OF HURTING YOURSELF: 0
2. FEELING DOWN, DEPRESSED OR HOPELESS: 0
SUM OF ALL RESPONSES TO PHQ QUESTIONS 1-9: 0
8. MOVING OR SPEAKING SO SLOWLY THAT OTHER PEOPLE COULD HAVE NOTICED. OR THE OPPOSITE, BEING SO FIGETY OR RESTLESS THAT YOU HAVE BEEN MOVING AROUND A LOT MORE THAN USUAL: 0
SUM OF ALL RESPONSES TO PHQ QUESTIONS 1-9: 0
6. FEELING BAD ABOUT YOURSELF - OR THAT YOU ARE A FAILURE OR HAVE LET YOURSELF OR YOUR FAMILY DOWN: 0
1. LITTLE INTEREST OR PLEASURE IN DOING THINGS: 0
SUM OF ALL RESPONSES TO PHQ9 QUESTIONS 1 & 2: 0
10. IF YOU CHECKED OFF ANY PROBLEMS, HOW DIFFICULT HAVE THESE PROBLEMS MADE IT FOR YOU TO DO YOUR WORK, TAKE CARE OF THINGS AT HOME, OR GET ALONG WITH OTHER PEOPLE: 0
SUM OF ALL RESPONSES TO PHQ QUESTIONS 1-9: 0
3. TROUBLE FALLING OR STAYING ASLEEP: 0
5. POOR APPETITE OR OVEREATING: 0

## 2023-07-06 PROBLEM — N18.31 STAGE 3A CHRONIC KIDNEY DISEASE (HCC): Status: ACTIVE | Noted: 2023-07-06

## 2023-07-06 PROBLEM — N13.30 HYDROURETERONEPHROSIS: Status: RESOLVED | Noted: 2023-01-18 | Resolved: 2023-07-06

## 2023-07-06 PROBLEM — M85.80 OSTEOPENIA: Status: ACTIVE | Noted: 2023-07-06

## 2023-07-06 PROBLEM — E11.22 TYPE 2 DIABETES MELLITUS WITH STAGE 3 CHRONIC KIDNEY DISEASE, WITHOUT LONG-TERM CURRENT USE OF INSULIN (HCC): Status: ACTIVE | Noted: 2023-07-06

## 2023-07-06 PROBLEM — N18.30 TYPE 2 DIABETES MELLITUS WITH STAGE 3 CHRONIC KIDNEY DISEASE, WITHOUT LONG-TERM CURRENT USE OF INSULIN (HCC): Status: ACTIVE | Noted: 2023-07-06

## 2023-07-06 PROBLEM — N20.0 NEPHROLITHIASIS: Status: ACTIVE | Noted: 2023-01-18

## 2023-07-07 NOTE — TELEPHONE ENCOUNTER
States you removed medication - therapy complete     Unsure if its an error or patient shouldn't have script filled. Please advise when available.      PCP: Oswald Silva MD    Last appt: [unfilled]  Future Appointments   Date Time Provider [address]   7/19/2023  1:30 PM [facility] LAB VISIT [facility] AMB   10/5/2023 11:25 AM [facility]   10/12/2023 10:00 AM Oswald Silva MD [facility]   10/25/2023  9:20 AM Maria Del Rosario Zamudio PA-C [facility] Sched       Requested Prescriptions     Pending Prescriptions Disp Refills    tamsulosin (FLOMAX) 0.4 MG capsule [Pharmacy Med Name: TAMSULOSIN HCL 0.4 MG CAPSULE] 30 capsule 0     Sig: TAKE 1 CAPSULE BY MOUTH EVERY DAY

## 2023-07-07 NOTE — TELEPHONE ENCOUNTER
Patient called checking on the status of her Ozempic which she stated Dr. Titus Ibarra would be upping the dose. Pharmacy:    [facility]/pharmacy [address] [facility] [address]   Phone:  [phone]  Fax:  [phone]    Please Advise.

## 2023-07-08 NOTE — TELEPHONE ENCOUNTER
1 mg Dose of Ozempic sent to [facility] on 7/5 2023. Please ask her to check with pharmacy. Please clarify if she is still taking tamsulosin. This was prescribed by urology when she had the kidney stone to help with expulsion but is no longer needed.

## 2023-07-09 RX ORDER — TAMSULOSIN HYDROCHLORIDE 0.4 MG/1
CAPSULE ORAL
Qty: 30 CAPSULE | Refills: 0 | OUTPATIENT
Start: 2023-07-09

## 2023-07-19 ENCOUNTER — HOSPITAL ENCOUNTER (OUTPATIENT)
Facility: HOSPITAL | Age: 68
Setting detail: SPECIMEN
Discharge: HOME | End: 2023-07-22
Payer: MEDICARE

## 2023-07-19 DIAGNOSIS — N18.31 STAGE 3A CHRONIC KIDNEY DISEASE (HCC): ICD-10-CM

## 2023-07-19 DIAGNOSIS — I10 ESSENTIAL HYPERTENSION: ICD-10-CM

## 2023-07-19 LAB
ALBUMIN SERPL-MCNC: 3.9 G/DL (ref 3.4–5)
ANION GAP SERPL CALC-SCNC: 5 MMOL/L (ref 3–18)
BUN SERPL-MCNC: 23 MG/DL (ref 7–18)
BUN/CREAT SERPL: 19 (ref 12–20)
CALCIUM SERPL-MCNC: 9.6 MG/DL (ref 8.5–10.1)
CHLORIDE SERPL-SCNC: 104 MMOL/L (ref 100–111)
CO2 SERPL-SCNC: 31 MMOL/L (ref 21–32)
CREAT SERPL-MCNC: 1.24 MG/DL (ref 0.6–1.3)
GLUCOSE SERPL-MCNC: 105 MG/DL (ref 74–99)
PHOSPHATE SERPL-MCNC: 4.3 MG/DL (ref 2.5–4.9)
POTASSIUM SERPL-SCNC: 4.3 MMOL/L (ref 3.5–5.5)
SODIUM SERPL-SCNC: 140 MMOL/L (ref 136–145)

## 2023-07-19 PROCEDURE — 36415 COLL VENOUS BLD VENIPUNCTURE: CPT

## 2023-07-19 PROCEDURE — 80069 RENAL FUNCTION PANEL: CPT

## 2023-07-20 ENCOUNTER — TELEPHONE (OUTPATIENT)
Age: 68
End: 2023-07-20

## 2023-07-20 NOTE — TELEPHONE ENCOUNTER
Hospital Outpatient Visit on 07/19/2023   Component Date Value Ref Range Status    Sodium 07/19/2023 140  136 - 145 mmol/L Final    Potassium 07/19/2023 4.3  3.5 - 5.5 mmol/L Final    Chloride 07/19/2023 104  100 - 111 mmol/L Final    CO2 07/19/2023 31  21 - 32 mmol/L Final    Anion Gap 07/19/2023 5  3.0 - 18 mmol/L Final    Glucose 07/19/2023 105 (H)  74 - 99 mg/dL Final    BUN 07/19/2023 23 (H)  7.0 - 18 MG/DL Final    Creatinine 07/19/2023 1.24  0.6 - 1.3 MG/DL Final    Bun/Cre Ratio 07/19/2023 19  12 - 20   Final    Est, Glom Filt Rate 07/19/2023 48 (L)  >60 ml/min/1.73m2 Final    Calcium 07/19/2023 9.6  8.5 - 10.1 MG/DL Final    Phosphorus 07/19/2023 4.3  2.5 - 4.9 MG/DL Final    Albumin 07/19/2023 3.9  3.4 - 5.0 g/dL Final     Called and spoke with patient. Discussed that her kidney function has decreased with the titration of lisinopril to 10 mg daily. Creatinine now 1.24/eGFR 48 and advised that she should increase her fluid intake given elevated BUN of 23. Will need to reassess at her next visit and if does not improve, consider changing therapy. Reviewed blood pressure readings dropped off by patient and showed blood pressure from 7/10 - 7/19/2023 ranging 123-154/70-90. Discussed with patient and she reports that she has been checking her blood pressure when she wakes up in the morning prior to taking her dose of lisinopril 10 mg daily. Advised that she should begin taking her blood pressure at least 2-3 hours after taking her medication and send an update in 2 weeks for review. Answered all questions.

## 2023-09-05 ENCOUNTER — HOSPITAL ENCOUNTER (OUTPATIENT)
Facility: HOSPITAL | Age: 68
Discharge: HOME | End: 2023-09-08
Payer: MEDICARE

## 2023-09-05 DIAGNOSIS — N17.9 ACUTE RENAL FAILURE, UNSPECIFIED ACUTE RENAL FAILURE TYPE (HCC): ICD-10-CM

## 2023-09-05 DIAGNOSIS — E55.9 AVITAMINOSIS D: ICD-10-CM

## 2023-09-05 DIAGNOSIS — I10 ESSENTIAL HYPERTENSION, MALIGNANT: ICD-10-CM

## 2023-09-05 DIAGNOSIS — E11.9 DIABETES MELLITUS WITHOUT COMPLICATION (HCC): ICD-10-CM

## 2023-09-05 DIAGNOSIS — E83.40 DISORDER OF MAGNESIUM METABOLISM: ICD-10-CM

## 2023-09-05 LAB
25(OH)D3 SERPL-MCNC: 87.2 NG/ML (ref 30–100)
ALBUMIN SERPL-MCNC: 3.9 G/DL (ref 3.4–5)
ANION GAP SERPL CALC-SCNC: 6 MMOL/L (ref 3–18)
BUN SERPL-MCNC: 26 MG/DL (ref 7–18)
BUN/CREAT SERPL: 21 (ref 12–20)
CALCIUM SERPL-MCNC: 9.3 MG/DL (ref 8.5–10.1)
CALCIUM SERPL-MCNC: 9.7 MG/DL (ref 8.5–10.1)
CHLORIDE SERPL-SCNC: 104 MMOL/L (ref 100–111)
CO2 SERPL-SCNC: 30 MMOL/L (ref 21–32)
CREAT SERPL-MCNC: 1.26 MG/DL (ref 0.6–1.3)
CREAT UR-MCNC: 150 MG/DL (ref 30–125)
CREAT UR-MCNC: 156 MG/DL (ref 30–125)
ERYTHROCYTE [DISTWIDTH] IN BLOOD BY AUTOMATED COUNT: 14.1 % (ref 11.6–14.5)
GLUCOSE SERPL-MCNC: 116 MG/DL (ref 74–99)
HCT VFR BLD AUTO: 38.8 % (ref 35–45)
HGB BLD-MCNC: 12.4 G/DL (ref 12–16)
MAGNESIUM SERPL-MCNC: 2.1 MG/DL (ref 1.6–2.6)
MCH RBC QN AUTO: 29.7 PG (ref 24–34)
MCHC RBC AUTO-ENTMCNC: 32 G/DL (ref 31–37)
MCV RBC AUTO: 93 FL (ref 78–100)
MICROALBUMIN UR-MCNC: 1.81 MG/DL (ref 0–3)
MICROALBUMIN/CREAT UR-RTO: 12 MG/G (ref 0–30)
NRBC # BLD: 0 K/UL (ref 0–0.01)
NRBC BLD-RTO: 0 PER 100 WBC
PHOSPHATE SERPL-MCNC: 3.7 MG/DL (ref 2.5–4.9)
PLATELET # BLD AUTO: 303 K/UL (ref 135–420)
PMV BLD AUTO: 10.2 FL (ref 9.2–11.8)
POTASSIUM SERPL-SCNC: 4.6 MMOL/L (ref 3.5–5.5)
PROT UR-MCNC: 21 MG/DL
PTH-INTACT SERPL-MCNC: 70 PG/ML (ref 18.4–88)
RBC # BLD AUTO: 4.17 M/UL (ref 4.2–5.3)
SODIUM SERPL-SCNC: 140 MMOL/L (ref 136–145)
WBC # BLD AUTO: 8.1 K/UL (ref 4.6–13.2)

## 2023-09-05 PROCEDURE — 83735 ASSAY OF MAGNESIUM: CPT

## 2023-09-05 PROCEDURE — 82306 VITAMIN D 25 HYDROXY: CPT

## 2023-09-05 PROCEDURE — 82043 UR ALBUMIN QUANTITATIVE: CPT

## 2023-09-05 PROCEDURE — 85027 COMPLETE CBC AUTOMATED: CPT

## 2023-09-05 PROCEDURE — 80069 RENAL FUNCTION PANEL: CPT

## 2023-09-05 PROCEDURE — 36415 COLL VENOUS BLD VENIPUNCTURE: CPT

## 2023-09-05 PROCEDURE — 84156 ASSAY OF PROTEIN URINE: CPT

## 2023-09-05 PROCEDURE — 83970 ASSAY OF PARATHORMONE: CPT

## 2023-09-05 PROCEDURE — 82570 ASSAY OF URINE CREATININE: CPT

## 2023-10-05 ENCOUNTER — HOSPITAL ENCOUNTER (OUTPATIENT)
Facility: HOSPITAL | Age: 68
Setting detail: SPECIMEN
Discharge: HOME | End: 2023-10-05
Payer: MEDICARE

## 2023-10-05 DIAGNOSIS — N18.31 STAGE 3A CHRONIC KIDNEY DISEASE (HCC): ICD-10-CM

## 2023-10-05 DIAGNOSIS — E11.29 TYPE 2 DIABETES MELLITUS WITH MICROALBUMINURIA, WITHOUT LONG-TERM CURRENT USE OF INSULIN (HCC): ICD-10-CM

## 2023-10-05 DIAGNOSIS — E53.8 B12 DEFICIENCY: ICD-10-CM

## 2023-10-05 DIAGNOSIS — D50.9 IRON DEFICIENCY ANEMIA, UNSPECIFIED IRON DEFICIENCY ANEMIA TYPE: ICD-10-CM

## 2023-10-05 DIAGNOSIS — R80.9 TYPE 2 DIABETES MELLITUS WITH MICROALBUMINURIA, WITHOUT LONG-TERM CURRENT USE OF INSULIN (HCC): ICD-10-CM

## 2023-10-05 DIAGNOSIS — E78.00 PURE HYPERCHOLESTEROLEMIA: ICD-10-CM

## 2023-10-05 LAB
25(OH)D3 SERPL-MCNC: 98.3 NG/ML (ref 30–100)
ALBUMIN SERPL-MCNC: 4.3 G/DL (ref 3.4–5)
ALBUMIN/GLOB SERPL: 1.7 (ref 0.8–1.7)
ALP SERPL-CCNC: 89 U/L (ref 45–117)
ALT SERPL-CCNC: 61 U/L (ref 13–56)
ANION GAP SERPL CALC-SCNC: 9 MMOL/L (ref 3–18)
AST SERPL-CCNC: 28 U/L (ref 10–38)
BASOPHILS # BLD: 0.1 K/UL (ref 0–0.1)
BASOPHILS NFR BLD: 1 % (ref 0–2)
BILIRUB SERPL-MCNC: 0.5 MG/DL (ref 0.2–1)
BUN SERPL-MCNC: 19 MG/DL (ref 7–18)
BUN/CREAT SERPL: 17 (ref 12–20)
CALCIUM SERPL-MCNC: 9.8 MG/DL (ref 8.5–10.1)
CHLORIDE SERPL-SCNC: 104 MMOL/L (ref 100–111)
CHOLEST SERPL-MCNC: 160 MG/DL
CO2 SERPL-SCNC: 27 MMOL/L (ref 21–32)
CREAT SERPL-MCNC: 1.1 MG/DL (ref 0.6–1.3)
CREAT UR-MCNC: 16 MG/DL (ref 30–125)
DIFFERENTIAL METHOD BLD: ABNORMAL
EOSINOPHIL # BLD: 0.3 K/UL (ref 0–0.4)
EOSINOPHIL NFR BLD: 3 % (ref 0–5)
ERYTHROCYTE [DISTWIDTH] IN BLOOD BY AUTOMATED COUNT: 13.7 % (ref 11.6–14.5)
EST. AVERAGE GLUCOSE BLD GHB EST-MCNC: 114 MG/DL
FERRITIN SERPL-MCNC: 14 NG/ML (ref 8–388)
GLOBULIN SER CALC-MCNC: 2.5 G/DL (ref 2–4)
GLUCOSE SERPL-MCNC: 104 MG/DL (ref 74–99)
HBA1C MFR BLD: 5.6 % (ref 4.2–5.6)
HCT VFR BLD AUTO: 37.9 % (ref 35–45)
HDLC SERPL-MCNC: 52 MG/DL (ref 40–60)
HDLC SERPL: 3.1 (ref 0–5)
HGB BLD-MCNC: 12.1 G/DL (ref 12–16)
IMM GRANULOCYTES # BLD AUTO: 0 K/UL (ref 0–0.04)
IMM GRANULOCYTES NFR BLD AUTO: 0 % (ref 0–0.5)
LDLC SERPL CALC-MCNC: 85.8 MG/DL (ref 0–100)
LIPID PANEL: NORMAL
LYMPHOCYTES # BLD: 3 K/UL (ref 0.9–3.6)
LYMPHOCYTES NFR BLD: 37 % (ref 21–52)
MAGNESIUM SERPL-MCNC: 2 MG/DL (ref 1.6–2.6)
MCH RBC QN AUTO: 29.7 PG (ref 24–34)
MCHC RBC AUTO-ENTMCNC: 31.9 G/DL (ref 31–37)
MCV RBC AUTO: 92.9 FL (ref 78–100)
MICROALBUMIN UR-MCNC: 0.81 MG/DL (ref 0–3)
MICROALBUMIN/CREAT UR-RTO: 51 MG/G (ref 0–30)
MONOCYTES # BLD: 0.4 K/UL (ref 0.05–1.2)
MONOCYTES NFR BLD: 4 % (ref 3–10)
NEUTS SEG # BLD: 4.5 K/UL (ref 1.8–8)
NEUTS SEG NFR BLD: 55 % (ref 40–73)
NRBC # BLD: 0 K/UL (ref 0–0.01)
NRBC BLD-RTO: 0 PER 100 WBC
PLATELET # BLD AUTO: 314 K/UL (ref 135–420)
PMV BLD AUTO: 10.5 FL (ref 9.2–11.8)
POTASSIUM SERPL-SCNC: 4.8 MMOL/L (ref 3.5–5.5)
PROT SERPL-MCNC: 6.8 G/DL (ref 6.4–8.2)
RBC # BLD AUTO: 4.08 M/UL (ref 4.2–5.3)
SODIUM SERPL-SCNC: 140 MMOL/L (ref 136–145)
TRIGL SERPL-MCNC: 111 MG/DL
VIT B12 SERPL-MCNC: 959 PG/ML (ref 211–911)
VLDLC SERPL CALC-MCNC: 22.2 MG/DL
WBC # BLD AUTO: 8.2 K/UL (ref 4.6–13.2)

## 2023-10-05 PROCEDURE — 82728 ASSAY OF FERRITIN: CPT

## 2023-10-05 PROCEDURE — 82570 ASSAY OF URINE CREATININE: CPT

## 2023-10-05 PROCEDURE — 36415 COLL VENOUS BLD VENIPUNCTURE: CPT

## 2023-10-05 PROCEDURE — 80061 LIPID PANEL: CPT

## 2023-10-05 PROCEDURE — 82043 UR ALBUMIN QUANTITATIVE: CPT

## 2023-10-05 PROCEDURE — 85025 COMPLETE CBC W/AUTO DIFF WBC: CPT

## 2023-10-05 PROCEDURE — 83735 ASSAY OF MAGNESIUM: CPT

## 2023-10-05 PROCEDURE — 83036 HEMOGLOBIN GLYCOSYLATED A1C: CPT

## 2023-10-05 PROCEDURE — 82306 VITAMIN D 25 HYDROXY: CPT

## 2023-10-05 PROCEDURE — 82607 VITAMIN B-12: CPT

## 2023-10-05 PROCEDURE — 80053 COMPREHEN METABOLIC PANEL: CPT

## 2023-10-09 SDOH — HEALTH STABILITY: PHYSICAL HEALTH: ON AVERAGE, HOW MANY MINUTES DO YOU ENGAGE IN EXERCISE AT THIS LEVEL?: 30 MIN

## 2023-10-09 SDOH — HEALTH STABILITY: PHYSICAL HEALTH: ON AVERAGE, HOW MANY DAYS PER WEEK DO YOU ENGAGE IN MODERATE TO STRENUOUS EXERCISE (LIKE A BRISK WALK)?: 3 DAYS

## 2023-10-09 ASSESSMENT — LIFESTYLE VARIABLES
HOW OFTEN DO YOU HAVE A DRINK CONTAINING ALCOHOL: 1
HOW MANY STANDARD DRINKS CONTAINING ALCOHOL DO YOU HAVE ON A TYPICAL DAY: 0
HOW MANY STANDARD DRINKS CONTAINING ALCOHOL DO YOU HAVE ON A TYPICAL DAY: PATIENT DOES NOT DRINK
HOW OFTEN DO YOU HAVE SIX OR MORE DRINKS ON ONE OCCASION: 1
HOW OFTEN DO YOU HAVE A DRINK CONTAINING ALCOHOL: NEVER

## 2023-10-09 ASSESSMENT — PATIENT HEALTH QUESTIONNAIRE - PHQ9
8. MOVING OR SPEAKING SO SLOWLY THAT OTHER PEOPLE COULD HAVE NOTICED. OR THE OPPOSITE, BEING SO FIGETY OR RESTLESS THAT YOU HAVE BEEN MOVING AROUND A LOT MORE THAN USUAL: 0
4. FEELING TIRED OR HAVING LITTLE ENERGY: 0
2. FEELING DOWN, DEPRESSED OR HOPELESS: 1
9. THOUGHTS THAT YOU WOULD BE BETTER OFF DEAD, OR OF HURTING YOURSELF: 0
5. POOR APPETITE OR OVEREATING: 0
SUM OF ALL RESPONSES TO PHQ QUESTIONS 1-9: 2
1. LITTLE INTEREST OR PLEASURE IN DOING THINGS: 1
3. TROUBLE FALLING OR STAYING ASLEEP: 0
6. FEELING BAD ABOUT YOURSELF - OR THAT YOU ARE A FAILURE OR HAVE LET YOURSELF OR YOUR FAMILY DOWN: 0
SUM OF ALL RESPONSES TO PHQ9 QUESTIONS 1 & 2: 2
7. TROUBLE CONCENTRATING ON THINGS, SUCH AS READING THE NEWSPAPER OR WATCHING TELEVISION: 0
SUM OF ALL RESPONSES TO PHQ QUESTIONS 1-9: 2
10. IF YOU CHECKED OFF ANY PROBLEMS, HOW DIFFICULT HAVE THESE PROBLEMS MADE IT FOR YOU TO DO YOUR WORK, TAKE CARE OF THINGS AT HOME, OR GET ALONG WITH OTHER PEOPLE: 0

## 2023-10-12 ENCOUNTER — OFFICE VISIT (OUTPATIENT)
Age: 68
End: 2023-10-12

## 2023-10-12 VITALS
WEIGHT: 129 LBS | HEART RATE: 65 BPM | RESPIRATION RATE: 16 BRPM | SYSTOLIC BLOOD PRESSURE: 122 MMHG | OXYGEN SATURATION: 97 % | BODY MASS INDEX: 26 KG/M2 | HEIGHT: 59 IN | DIASTOLIC BLOOD PRESSURE: 72 MMHG | TEMPERATURE: 98.4 F

## 2023-10-12 DIAGNOSIS — E53.8 B12 DEFICIENCY: ICD-10-CM

## 2023-10-12 DIAGNOSIS — D50.9 IRON DEFICIENCY ANEMIA, UNSPECIFIED IRON DEFICIENCY ANEMIA TYPE: ICD-10-CM

## 2023-10-12 DIAGNOSIS — E78.00 PURE HYPERCHOLESTEROLEMIA: ICD-10-CM

## 2023-10-12 DIAGNOSIS — E11.29 TYPE 2 DIABETES MELLITUS WITH MICROALBUMINURIA, WITHOUT LONG-TERM CURRENT USE OF INSULIN (HCC): ICD-10-CM

## 2023-10-12 DIAGNOSIS — N18.31 STAGE 3A CHRONIC KIDNEY DISEASE (HCC): ICD-10-CM

## 2023-10-12 DIAGNOSIS — N18.31 TYPE 2 DIABETES MELLITUS WITH STAGE 3A CHRONIC KIDNEY DISEASE, WITHOUT LONG-TERM CURRENT USE OF INSULIN (HCC): Primary | ICD-10-CM

## 2023-10-12 DIAGNOSIS — Z71.89 ACP (ADVANCE CARE PLANNING): ICD-10-CM

## 2023-10-12 DIAGNOSIS — Z00.00 MEDICARE ANNUAL WELLNESS VISIT, SUBSEQUENT: ICD-10-CM

## 2023-10-12 DIAGNOSIS — M85.89 OSTEOPENIA OF MULTIPLE SITES: ICD-10-CM

## 2023-10-12 DIAGNOSIS — N20.0 NEPHROLITHIASIS: ICD-10-CM

## 2023-10-12 DIAGNOSIS — I10 ESSENTIAL HYPERTENSION: ICD-10-CM

## 2023-10-12 DIAGNOSIS — E11.22 TYPE 2 DIABETES MELLITUS WITH STAGE 3A CHRONIC KIDNEY DISEASE, WITHOUT LONG-TERM CURRENT USE OF INSULIN (HCC): Primary | ICD-10-CM

## 2023-10-12 DIAGNOSIS — R80.9 TYPE 2 DIABETES MELLITUS WITH MICROALBUMINURIA, WITHOUT LONG-TERM CURRENT USE OF INSULIN (HCC): ICD-10-CM

## 2023-10-12 DIAGNOSIS — F33.9 RECURRENT MAJOR DEPRESSIVE DISORDER, REMISSION STATUS UNSPECIFIED (HCC): ICD-10-CM

## 2023-10-12 RX ORDER — LORAZEPAM 0.5 MG/1
0.5 TABLET ORAL 3 TIMES DAILY
COMMUNITY

## 2023-10-12 RX ORDER — PROPRANOLOL HYDROCHLORIDE 40 MG/1
TABLET ORAL
Qty: 180 TABLET | Refills: 3 | Status: SHIPPED | OUTPATIENT
Start: 2023-10-12

## 2023-10-12 RX ORDER — LORAZEPAM 0.5 MG/1
TABLET ORAL
COMMUNITY
Start: 2023-10-07 | End: 2023-10-12 | Stop reason: SDUPTHER

## 2023-10-12 NOTE — PATIENT INSTRUCTIONS
full while other may be subject to a deductible, co-insurance, and/or copay. Some of these benefits include a comprehensive review of your medical history including lifestyle, illnesses that may run in your family, and various assessments and screenings as appropriate. After reviewing your medical record and screening and assessments performed today your provider may have ordered immunizations, labs, imaging, and/or referrals for you. A list of these orders (if applicable) as well as your Preventive Care list are included within your After Visit Summary for your review. Other Preventive Recommendations:    A preventive eye exam performed by an eye specialist is recommended every 1-2 years to screen for glaucoma; cataracts, macular degeneration, and other eye disorders. A preventive dental visit is recommended every 6 months. Try to get at least 150 minutes of exercise per week or 10,000 steps per day on a pedometer . Order or download the FREE \"Exercise & Physical Activity: Your Everyday Guide\" from [employer]. Call 9-867.879.4452 or search [employer] online. You need 0541-9371 mg of calcium and 7519-3558 IU of vitamin D per day. It is possible to meet your calcium requirement with diet alone, but a vitamin D supplement is usually necessary to meet this goal.  When exposed to the sun, use a sunscreen that protects against both UVA and UVB radiation with an SPF of 30 or greater. Reapply every 2 to 3 hours or after sweating, drying off with a towel, or swimming. Always wear a seat belt when traveling in a car. Always wear a helmet when riding a bicycle or motorcycle.

## 2023-10-12 NOTE — PROGRESS NOTES
HPI:   Elana Cain is a 79y.o. year old female who presents today for a routine visit. She has a history of hypertension, hyperlipidemia, diabetes mellitus, migraine headaches, depression, anxiety, ADHD, and lumbar radiculopathy. She reports today that she is doing reasonably well. She states that she is no longer taking Austedo as prescribed by by AQUILES Murcia for involuntary mouth movements since it was causing worsening tremors. She is continuing on Prozac 80 mg daily for management of her anxiety and depression, and has been started on Ativan 0.5 mg 3 times daily to control the tremor and involuntary movements. She states that she feels the Ativan may be making her tired, although she is finding that she is sleeping better at night since initiating. She states that she missed her eye appointment and has not yet scheduled with Dr. Luigi Sunshine for her new iron deficiency anemia since she has been needing to visit psychiatry every 2 weeks and states that this is causing her to feel overwhelmed. She states that she also missed her eye appointment with Dr. Elisa Varner. She had a follow-up with AQUILES Madrid on 4/25/2023 and it was recommended that she continue cranberry tablets, d-mannose, Estrace 3 times weekly, and a stool softener for UTI prevention. Also recommended that she increase her fluid intake, decrease sodium, and increase citrus for stone prevention. She has a follow-up appointment on 10/25/2023 to reassess. She is otherwise without new complaints. Summary of prior hospitalizations and medical history:  She had a follow-up appointment with Dr. Ge Gomez on 3/1/2023 and he recommended aggressive hydration and avoidance of caffeine and NSAIDs to maintain her renal function. He stated that lisinopril may be restarted as long as her creatinine remained stable. Also recommended close follow-up with urology for management of nephrolithiasis and instructed her to follow-up in 6 months.     On

## 2023-10-12 NOTE — PROGRESS NOTES
Medicare Annual Wellness Visit    Darryl Austin is here for Medicare AWV    Assessment & Plan   Type 2 diabetes mellitus with stage 3a chronic kidney disease, without long-term current use of insulin ([facility])  -     Comprehensive Metabolic Panel; Future  -     Hemoglobin A1C; Future  -     Microalbumin / Creatinine Urine Ratio; Future  Type 2 diabetes mellitus with microalbuminuria, without long-term current use of insulin (HCC)  -     Comprehensive Metabolic Panel; Future  -     Hemoglobin A1C; Future  -     Microalbumin / Creatinine Urine Ratio; Future  Essential hypertension  Pure hypercholesterolemia  -     Lipid Panel; Future  Stage 3a chronic kidney disease ([address])  -     Comprehensive Metabolic Panel; Future  -     Microalbumin / Creatinine Urine Ratio; Future  -     Vitamin D 25 Hydroxy; Future  Nephrolithiasis  Iron deficiency anemia, unspecified iron deficiency anemia type  -     External Referral To Gastroenterology  -     Ferritin; Future  -     CBC with Auto Differential; Future  B12 deficiency  -     Vitamin B12; Future  Recurrent major depressive disorder, remission status unspecified ([facility])  Osteopenia of multiple sites  -     Vitamin D 25 Hydroxy; Future  Medicare annual wellness visit, subsequent  ACP (advance care planning)    Recommendations for Preventive Services Due: see orders and patient instructions/AVS.  Recommended screening schedule for the next 5-10 years is provided to the patient in written form: see Patient Instructions/AVS.     Return in about 3 months (around 1/12/2024), or if symptoms worsen or fail to improve. Subjective   See office progress note for details. Patient's complete Health Risk Assessment and screening values have been reviewed and are found in Flowsheets. The following problems were reviewed today and where indicated follow up appointments were made and/or referrals ordered.     Positive Risk Factor Screenings with Interventions:                    Vision

## 2023-10-12 NOTE — ACP (ADVANCE CARE PLANNING)
Advance Care Planning     Advance Care Planning (ACP) Physician/NP/PA Conversation    Date of Conversation: 10/12/2023  Conducted with: Patient with Decision Making Capacity    Healthcare Decision Maker:      Primary Decision Maker: Heidy Quesada - [phone]    Click here to complete Healthcare Decision Makers including selection of the Healthcare Decision Maker Relationship (ie \"Primary\")  Today we confirmed her healthcare decision-maker as her daughter    Care Preferences:    Hospitalization: \"If your health worsens and it becomes clear that your chance of recovery is unlikely, what would be your preference regarding hospitalization? \"  The patient would prefer hospitalization. Ventilation: \"If you were unable to breath on your own and your chance of recovery was unlikely, what would be your preference about the use of a ventilator (breathing machine) if it was available to you? \"  The patient would desire the use of a ventilator. Resuscitation: \"In the event your heart stopped as a result of an underlying serious health condition, would you want attempts made to restart your heart, or would you prefer a natural death? \"  Yes, attempt to resuscitate.     treatment goals, benefit/burden of treatment options, ventilation preferences, hospitalization preferences, resuscitation preferences, and end of life care preferences (vegetative state/imminent death)    Conversation Outcomes / Follow-Up Plan:  ACP complete - no further action today  Reviewed DNR/DNI and patient elects Full Code (Attempt Resuscitation)    Length of Voluntary ACP Conversation in minutes:  16 minutes    Christy Henson MD

## 2023-10-12 NOTE — PROGRESS NOTES
Jimy Tomlin presents today for   Chief Complaint   Patient presents with    Medicare AWV       1. \"Have you been to the ER, urgent care clinic since your last visit? Hospitalized since your last visit? \" no    2. \"Have you seen or consulted any other health care providers outside of the [address] [place] [address] since your last visit? \" no     3. For patients aged 43-73: Has the patient had a colonoscopy / FIT/ Cologuard? Yes - no Care Gap present      If the patient is female:    4. For patients aged 43-66: Has the patient had a mammogram within the past 2 years? Yes - no Care Gap present      5. For patients aged 21-65: Has the patient had a pap smear?  NA - based on age or sex

## 2023-10-15 PROBLEM — M54.50 ACUTE BILATERAL LOW BACK PAIN WITHOUT SCIATICA: Status: RESOLVED | Noted: 2023-04-02 | Resolved: 2023-10-15

## 2023-11-07 RX ORDER — SEMAGLUTIDE 1.34 MG/ML
INJECTION, SOLUTION SUBCUTANEOUS
Qty: 3 ML | Refills: 3 | Status: SHIPPED | OUTPATIENT
Start: 2023-11-07

## 2023-11-07 NOTE — TELEPHONE ENCOUNTER
PCP: Myke Turner MD    LAST REFILL PER CHART:  Semaglutide, 1 MG/DOSE, 4 MG/3ML SOPN         Summary: Inject 1 mg into the skin once a week Dx: E11.29, Disp-3 mL, R-3  Dose, Frequency: 1 mg, WEEKLY  Start: 7/5/2023  Pharmacy: [facility]/pharmacy [address] [phone]  Med Dose History       Patient Sig: Inject 1 mg into the skin once a week Dx: E11.29       Ordered on: 7/5/2023       Authorized by: Audrey Stuart: 3 mL       Refills: 3 ordered       Admin Instructions: Dx: E11.29          Future Appointments   Date Time Provider [address] [place] [address]   1/8/2024 10:15 AM [facility] LAB VISIT [facility] BS AMB   1/17/2024  2:30 PM Myke Turner MD [facility] BS AMB   1/22/2024  3:20 PM Andrey Fairchild PA-C [facility] Jalen Proctor

## 2023-11-15 ENCOUNTER — TELEPHONE (OUTPATIENT)
Age: 68
End: 2023-11-15

## 2023-11-15 NOTE — TELEPHONE ENCOUNTER
[facility] transferred pt for urgent appt. Patient complaining of ear pain and headache for 3 days. No other symptoms. Stating she keeps having sharp pains in her ear. Please advise.

## 2023-11-15 NOTE — TELEPHONE ENCOUNTER
Called and spoke with patient. Reports having ear pain for approximately 4 days. States that it is in the ear that she wears a hearing aid. Denies any fever, chills, nasal congestion, or sore throat. Denies any neck pain. She states that she went to the dentist and they did not identify anything wrong with her teeth. She reports that she does remove her hearing aid at night and the pain seems to diminish. She is followed by Dr. Dumont Adjutant and advised that she contact his office so that they can evaluate her hearing aid and determine if this is the cause of her ear pain. Answered all questions.

## 2023-11-17 ENCOUNTER — TELEPHONE (OUTPATIENT)
Age: 68
End: 2023-11-17

## 2023-11-17 RX ORDER — LISINOPRIL 10 MG/1
10 TABLET ORAL DAILY
Qty: 90 TABLET | Refills: 3 | Status: SHIPPED | OUTPATIENT
Start: 2023-11-17

## 2023-11-18 ENCOUNTER — HOSPITAL ENCOUNTER (OUTPATIENT)
Facility: HOSPITAL | Age: 68
End: 2023-11-18
Payer: MEDICARE

## 2023-11-18 DIAGNOSIS — I63.033 CEREBRAL INFARCTION DUE TO BILATERAL THROMBOSIS OF CAROTID ARTERIES (HCC): ICD-10-CM

## 2023-11-18 PROCEDURE — 70551 MRI BRAIN STEM W/O DYE: CPT

## 2023-11-20 ENCOUNTER — HOSPITAL ENCOUNTER (OUTPATIENT)
Facility: HOSPITAL | Age: 68
Discharge: HOME | End: 2023-11-23
Payer: MEDICARE

## 2023-11-20 LAB
ANION GAP SERPL CALC-SCNC: 4 MMOL/L (ref 3–18)
BUN SERPL-MCNC: 20 MG/DL (ref 7–18)
BUN/CREAT SERPL: 17 (ref 12–20)
CALCIUM SERPL-MCNC: 9.7 MG/DL (ref 8.5–10.1)
CHLORIDE SERPL-SCNC: 105 MMOL/L (ref 100–111)
CO2 SERPL-SCNC: 32 MMOL/L (ref 21–32)
CREAT SERPL-MCNC: 1.17 MG/DL (ref 0.6–1.3)
ERYTHROCYTE [SEDIMENTATION RATE] IN BLOOD: 9 MM/HR (ref 0–30)
FOLATE SERPL-MCNC: >20 NG/ML (ref 3.1–17.5)
GLUCOSE SERPL-MCNC: 99 MG/DL (ref 74–99)
POTASSIUM SERPL-SCNC: 4.5 MMOL/L (ref 3.5–5.5)
SODIUM SERPL-SCNC: 141 MMOL/L (ref 136–145)
TSH SERPL DL<=0.05 MIU/L-ACNC: 1.95 UIU/ML (ref 0.36–3.74)
VIT B12 SERPL-MCNC: 883 PG/ML (ref 211–911)

## 2023-11-20 PROCEDURE — 85652 RBC SED RATE AUTOMATED: CPT

## 2023-11-20 PROCEDURE — 80048 BASIC METABOLIC PNL TOTAL CA: CPT

## 2023-11-20 PROCEDURE — 86780 TREPONEMA PALLIDUM: CPT

## 2023-11-20 PROCEDURE — 82607 VITAMIN B-12: CPT

## 2023-11-20 PROCEDURE — 86225 DNA ANTIBODY NATIVE: CPT

## 2023-11-20 PROCEDURE — 82746 ASSAY OF FOLIC ACID SERUM: CPT

## 2023-11-20 PROCEDURE — 84443 ASSAY THYROID STIM HORMONE: CPT

## 2023-11-20 PROCEDURE — 36415 COLL VENOUS BLD VENIPUNCTURE: CPT

## 2023-11-20 PROCEDURE — 86235 NUCLEAR ANTIGEN ANTIBODY: CPT

## 2023-11-22 LAB
CENTROMERE B AB SER-ACNC: <0.2 AI (ref 0–0.9)
CHROMATIN AB SERPL-ACNC: <0.2 AI (ref 0–0.9)
DSDNA AB SER-ACNC: <1 IU/ML (ref 0–9)
ENA JO1 AB SER-ACNC: <0.2 AI (ref 0–0.9)
ENA RNP AB SER-ACNC: 0.2 AI (ref 0–0.9)
ENA SCL70 AB SER-ACNC: <0.2 AI (ref 0–0.9)
ENA SM AB SER-ACNC: <0.2 AI (ref 0–0.9)
ENA SS-A AB SER-ACNC: <0.2 AI (ref 0–0.9)
ENA SS-B AB SER-ACNC: <0.2 AI (ref 0–0.9)
Lab: NORMAL
T PALLIDUM AB SER QL IF: NON REACTIVE

## 2023-12-04 ENCOUNTER — TELEPHONE (OUTPATIENT)
Age: 68
End: 2023-12-04

## 2023-12-04 RX ORDER — METFORMIN HYDROCHLORIDE 500 MG/1
1000 TABLET, EXTENDED RELEASE ORAL
Qty: 180 TABLET | Refills: 3 | Status: SHIPPED | OUTPATIENT
Start: 2023-12-04

## 2023-12-04 NOTE — TELEPHONE ENCOUNTER
Please send refill to [facility]. She is no longer using [facility].     Disp Refills Start End    metFORMIN (GLUCOPHAGE-XR) 500 MG extended release tablet 180 tablet 3 3/29/2023     Sig - Route:  Take 2 tablets by mouth daily (with breakfast) - Oral    Sent to pharmacy as: metFORMIN HCl  MG Oral Tablet Extended Release 24

## 2024-01-08 ENCOUNTER — HOSPITAL ENCOUNTER (OUTPATIENT)
Facility: HOSPITAL | Age: 69
Setting detail: SPECIMEN
Discharge: HOME | End: 2024-01-11
Payer: MEDICARE

## 2024-01-08 DIAGNOSIS — N18.31 STAGE 3A CHRONIC KIDNEY DISEASE (HCC): ICD-10-CM

## 2024-01-08 DIAGNOSIS — D50.9 IRON DEFICIENCY ANEMIA, UNSPECIFIED IRON DEFICIENCY ANEMIA TYPE: ICD-10-CM

## 2024-01-08 DIAGNOSIS — N18.31 TYPE 2 DIABETES MELLITUS WITH STAGE 3A CHRONIC KIDNEY DISEASE, WITHOUT LONG-TERM CURRENT USE OF INSULIN (HCC): ICD-10-CM

## 2024-01-08 DIAGNOSIS — E78.00 PURE HYPERCHOLESTEROLEMIA: ICD-10-CM

## 2024-01-08 DIAGNOSIS — E11.29 TYPE 2 DIABETES MELLITUS WITH MICROALBUMINURIA, WITHOUT LONG-TERM CURRENT USE OF INSULIN (HCC): ICD-10-CM

## 2024-01-08 DIAGNOSIS — R80.9 TYPE 2 DIABETES MELLITUS WITH MICROALBUMINURIA, WITHOUT LONG-TERM CURRENT USE OF INSULIN (HCC): ICD-10-CM

## 2024-01-08 DIAGNOSIS — E11.22 TYPE 2 DIABETES MELLITUS WITH STAGE 3A CHRONIC KIDNEY DISEASE, WITHOUT LONG-TERM CURRENT USE OF INSULIN (HCC): ICD-10-CM

## 2024-01-08 DIAGNOSIS — E53.8 B12 DEFICIENCY: ICD-10-CM

## 2024-01-08 DIAGNOSIS — M85.89 OSTEOPENIA OF MULTIPLE SITES: ICD-10-CM

## 2024-01-08 LAB
25(OH)D3 SERPL-MCNC: 64.3 NG/ML (ref 30–100)
ALBUMIN SERPL-MCNC: 4.2 G/DL (ref 3.4–5)
ALBUMIN/GLOB SERPL: 1.7 (ref 0.8–1.7)
ALP SERPL-CCNC: 95 U/L (ref 45–117)
ALT SERPL-CCNC: 29 U/L (ref 13–56)
ANION GAP SERPL CALC-SCNC: 2 MMOL/L (ref 3–18)
AST SERPL-CCNC: 16 U/L (ref 10–38)
BASOPHILS # BLD: 0.1 K/UL (ref 0–0.1)
BASOPHILS NFR BLD: 1 % (ref 0–2)
BILIRUB SERPL-MCNC: 0.6 MG/DL (ref 0.2–1)
BUN SERPL-MCNC: 20 MG/DL (ref 7–18)
BUN/CREAT SERPL: 18 (ref 12–20)
CALCIUM SERPL-MCNC: 9.5 MG/DL (ref 8.5–10.1)
CHLORIDE SERPL-SCNC: 107 MMOL/L (ref 100–111)
CHOLEST SERPL-MCNC: 164 MG/DL
CO2 SERPL-SCNC: 31 MMOL/L (ref 21–32)
CREAT SERPL-MCNC: 1.14 MG/DL (ref 0.6–1.3)
CREAT UR-MCNC: 13 MG/DL (ref 30–125)
DIFFERENTIAL METHOD BLD: ABNORMAL
EOSINOPHIL # BLD: 0.2 K/UL (ref 0–0.4)
EOSINOPHIL NFR BLD: 3 % (ref 0–5)
ERYTHROCYTE [DISTWIDTH] IN BLOOD BY AUTOMATED COUNT: 13.7 % (ref 11.6–14.5)
EST. AVERAGE GLUCOSE BLD GHB EST-MCNC: 117 MG/DL
FERRITIN SERPL-MCNC: 18 NG/ML (ref 8–388)
GLOBULIN SER CALC-MCNC: 2.5 G/DL (ref 2–4)
GLUCOSE SERPL-MCNC: 105 MG/DL (ref 74–99)
HBA1C MFR BLD: 5.7 % (ref 4.2–5.6)
HCT VFR BLD AUTO: 38.7 % (ref 35–45)
HDLC SERPL-MCNC: 57 MG/DL (ref 40–60)
HDLC SERPL: 2.9 (ref 0–5)
HGB BLD-MCNC: 12.2 G/DL (ref 12–16)
IMM GRANULOCYTES # BLD AUTO: 0 K/UL (ref 0–0.04)
IMM GRANULOCYTES NFR BLD AUTO: 0 % (ref 0–0.5)
LDLC SERPL CALC-MCNC: 81.8 MG/DL (ref 0–100)
LIPID PANEL: NORMAL
LYMPHOCYTES # BLD: 2.8 K/UL (ref 0.9–3.6)
LYMPHOCYTES NFR BLD: 38 % (ref 21–52)
MCH RBC QN AUTO: 29.5 PG (ref 24–34)
MCHC RBC AUTO-ENTMCNC: 31.5 G/DL (ref 31–37)
MCV RBC AUTO: 93.7 FL (ref 78–100)
MICROALBUMIN UR-MCNC: <0.5 MG/DL (ref 0–3)
MICROALBUMIN/CREAT UR-RTO: ABNORMAL MG/G (ref 0–30)
MONOCYTES # BLD: 0.4 K/UL (ref 0.05–1.2)
MONOCYTES NFR BLD: 6 % (ref 3–10)
NEUTS SEG # BLD: 4 K/UL (ref 1.8–8)
NEUTS SEG NFR BLD: 53 % (ref 40–73)
NRBC # BLD: 0 K/UL (ref 0–0.01)
NRBC BLD-RTO: 0 PER 100 WBC
PLATELET # BLD AUTO: 324 K/UL (ref 135–420)
PMV BLD AUTO: 10.2 FL (ref 9.2–11.8)
POTASSIUM SERPL-SCNC: 4.6 MMOL/L (ref 3.5–5.5)
PROT SERPL-MCNC: 6.7 G/DL (ref 6.4–8.2)
RBC # BLD AUTO: 4.13 M/UL (ref 4.2–5.3)
SODIUM SERPL-SCNC: 140 MMOL/L (ref 136–145)
TRIGL SERPL-MCNC: 126 MG/DL
VIT B12 SERPL-MCNC: 779 PG/ML (ref 211–911)
VLDLC SERPL CALC-MCNC: 25.2 MG/DL
WBC # BLD AUTO: 7.5 K/UL (ref 4.6–13.2)

## 2024-01-08 PROCEDURE — 85025 COMPLETE CBC W/AUTO DIFF WBC: CPT

## 2024-01-08 PROCEDURE — 82728 ASSAY OF FERRITIN: CPT

## 2024-01-08 PROCEDURE — 82043 UR ALBUMIN QUANTITATIVE: CPT

## 2024-01-08 PROCEDURE — 83036 HEMOGLOBIN GLYCOSYLATED A1C: CPT

## 2024-01-08 PROCEDURE — 82607 VITAMIN B-12: CPT

## 2024-01-08 PROCEDURE — 36415 COLL VENOUS BLD VENIPUNCTURE: CPT

## 2024-01-08 PROCEDURE — 80061 LIPID PANEL: CPT

## 2024-01-08 PROCEDURE — 80053 COMPREHEN METABOLIC PANEL: CPT

## 2024-01-08 PROCEDURE — 82306 VITAMIN D 25 HYDROXY: CPT

## 2024-01-08 PROCEDURE — 82570 ASSAY OF URINE CREATININE: CPT

## 2024-01-17 ENCOUNTER — OFFICE VISIT (OUTPATIENT)
Age: 69
End: 2024-01-17
Payer: MEDICARE

## 2024-01-17 VITALS
HEART RATE: 62 BPM | DIASTOLIC BLOOD PRESSURE: 80 MMHG | SYSTOLIC BLOOD PRESSURE: 128 MMHG | OXYGEN SATURATION: 99 % | RESPIRATION RATE: 16 BRPM | TEMPERATURE: 98.3 F | WEIGHT: 131 LBS | BODY MASS INDEX: 26.41 KG/M2 | HEIGHT: 59 IN

## 2024-01-17 DIAGNOSIS — N18.31 TYPE 2 DIABETES MELLITUS WITH STAGE 3A CHRONIC KIDNEY DISEASE, WITHOUT LONG-TERM CURRENT USE OF INSULIN (HCC): Primary | ICD-10-CM

## 2024-01-17 DIAGNOSIS — E11.29 TYPE 2 DIABETES MELLITUS WITH MICROALBUMINURIA, WITHOUT LONG-TERM CURRENT USE OF INSULIN (HCC): ICD-10-CM

## 2024-01-17 DIAGNOSIS — F41.9 ANXIETY: ICD-10-CM

## 2024-01-17 DIAGNOSIS — E78.00 PURE HYPERCHOLESTEROLEMIA: ICD-10-CM

## 2024-01-17 DIAGNOSIS — F33.9 RECURRENT MAJOR DEPRESSIVE DISORDER, REMISSION STATUS UNSPECIFIED (HCC): ICD-10-CM

## 2024-01-17 DIAGNOSIS — E11.22 TYPE 2 DIABETES MELLITUS WITH STAGE 3A CHRONIC KIDNEY DISEASE, WITHOUT LONG-TERM CURRENT USE OF INSULIN (HCC): Primary | ICD-10-CM

## 2024-01-17 DIAGNOSIS — D50.9 IRON DEFICIENCY ANEMIA, UNSPECIFIED IRON DEFICIENCY ANEMIA TYPE: ICD-10-CM

## 2024-01-17 DIAGNOSIS — E53.8 B12 DEFICIENCY: ICD-10-CM

## 2024-01-17 DIAGNOSIS — R80.9 TYPE 2 DIABETES MELLITUS WITH MICROALBUMINURIA, WITHOUT LONG-TERM CURRENT USE OF INSULIN (HCC): ICD-10-CM

## 2024-01-17 DIAGNOSIS — N20.0 NEPHROLITHIASIS: ICD-10-CM

## 2024-01-17 DIAGNOSIS — N18.31 STAGE 3A CHRONIC KIDNEY DISEASE (HCC): ICD-10-CM

## 2024-01-17 DIAGNOSIS — I10 ESSENTIAL HYPERTENSION: ICD-10-CM

## 2024-01-17 PROCEDURE — G8484 FLU IMMUNIZE NO ADMIN: HCPCS | Performed by: INTERNAL MEDICINE

## 2024-01-17 PROCEDURE — 3044F HG A1C LEVEL LT 7.0%: CPT | Performed by: INTERNAL MEDICINE

## 2024-01-17 PROCEDURE — 1123F ACP DISCUSS/DSCN MKR DOCD: CPT | Performed by: INTERNAL MEDICINE

## 2024-01-17 PROCEDURE — 2022F DILAT RTA XM EVC RTNOPTHY: CPT | Performed by: INTERNAL MEDICINE

## 2024-01-17 PROCEDURE — 99215 OFFICE O/P EST HI 40 MIN: CPT | Performed by: INTERNAL MEDICINE

## 2024-01-17 PROCEDURE — G8399 PT W/DXA RESULTS DOCUMENT: HCPCS | Performed by: INTERNAL MEDICINE

## 2024-01-17 PROCEDURE — G8419 CALC BMI OUT NRM PARAM NOF/U: HCPCS | Performed by: INTERNAL MEDICINE

## 2024-01-17 PROCEDURE — 3078F DIAST BP <80 MM HG: CPT | Performed by: INTERNAL MEDICINE

## 2024-01-17 PROCEDURE — 3074F SYST BP LT 130 MM HG: CPT | Performed by: INTERNAL MEDICINE

## 2024-01-17 PROCEDURE — 1090F PRES/ABSN URINE INCON ASSESS: CPT | Performed by: INTERNAL MEDICINE

## 2024-01-17 PROCEDURE — 3017F COLORECTAL CA SCREEN DOC REV: CPT | Performed by: INTERNAL MEDICINE

## 2024-01-17 PROCEDURE — 1036F TOBACCO NON-USER: CPT | Performed by: INTERNAL MEDICINE

## 2024-01-17 PROCEDURE — G8427 DOCREV CUR MEDS BY ELIG CLIN: HCPCS | Performed by: INTERNAL MEDICINE

## 2024-01-17 RX ORDER — LORAZEPAM 1 MG/1
1 TABLET ORAL EVERY 8 HOURS
COMMUNITY
Start: 2024-01-07

## 2024-01-17 SDOH — ECONOMIC STABILITY: FOOD INSECURITY: WITHIN THE PAST 12 MONTHS, YOU WORRIED THAT YOUR FOOD WOULD RUN OUT BEFORE YOU GOT MONEY TO BUY MORE.: NEVER TRUE

## 2024-01-17 SDOH — ECONOMIC STABILITY: FOOD INSECURITY: WITHIN THE PAST 12 MONTHS, THE FOOD YOU BOUGHT JUST DIDN'T LAST AND YOU DIDN'T HAVE MONEY TO GET MORE.: NEVER TRUE

## 2024-01-17 SDOH — ECONOMIC STABILITY: INCOME INSECURITY: HOW HARD IS IT FOR YOU TO PAY FOR THE VERY BASICS LIKE FOOD, HOUSING, MEDICAL CARE, AND HEATING?: NOT HARD AT ALL

## 2024-01-17 ASSESSMENT — PATIENT HEALTH QUESTIONNAIRE - PHQ9
2. FEELING DOWN, DEPRESSED OR HOPELESS: 1
10. IF YOU CHECKED OFF ANY PROBLEMS, HOW DIFFICULT HAVE THESE PROBLEMS MADE IT FOR YOU TO DO YOUR WORK, TAKE CARE OF THINGS AT HOME, OR GET ALONG WITH OTHER PEOPLE: 0
4. FEELING TIRED OR HAVING LITTLE ENERGY: 0
8. MOVING OR SPEAKING SO SLOWLY THAT OTHER PEOPLE COULD HAVE NOTICED. OR THE OPPOSITE, BEING SO FIGETY OR RESTLESS THAT YOU HAVE BEEN MOVING AROUND A LOT MORE THAN USUAL: 0
SUM OF ALL RESPONSES TO PHQ9 QUESTIONS 1 & 2: 1
6. FEELING BAD ABOUT YOURSELF - OR THAT YOU ARE A FAILURE OR HAVE LET YOURSELF OR YOUR FAMILY DOWN: 0
3. TROUBLE FALLING OR STAYING ASLEEP: 0
5. POOR APPETITE OR OVEREATING: 0
SUM OF ALL RESPONSES TO PHQ QUESTIONS 1-9: 1
9. THOUGHTS THAT YOU WOULD BE BETTER OFF DEAD, OR OF HURTING YOURSELF: 0
SUM OF ALL RESPONSES TO PHQ QUESTIONS 1-9: 1
SUM OF ALL RESPONSES TO PHQ QUESTIONS 1-9: 1
1. LITTLE INTEREST OR PLEASURE IN DOING THINGS: 0
SUM OF ALL RESPONSES TO PHQ QUESTIONS 1-9: 1
7. TROUBLE CONCENTRATING ON THINGS, SUCH AS READING THE NEWSPAPER OR WATCHING TELEVISION: 0

## 2024-01-17 NOTE — PROGRESS NOTES
Megha Peralta presents today for   Chief Complaint   Patient presents with    3 Month Follow-Up       1. \"Have you been to the ER, urgent care clinic since your last visit?  Hospitalized since your last visit?\" no    2. \"Have you seen or consulted any other health care providers outside of the [facility] since your last visit?\" no     3. For patients aged 45-75: Has the patient had a colonoscopy / FIT/ Cologuard? Yes - no Care Gap present      If the patient is female:    4. For patients aged 40-74: Has the patient had a mammogram within the past 2 years? Yes - no Care Gap present      5. For patients aged 21-65: Has the patient had a pap smear? Yes - no Care Gap present

## 2024-01-17 NOTE — PROGRESS NOTES
HPI:   Megha Peralta is a 68 y.o. year old female who presents today for a routine visit.  She has a history of hypertension, hyperlipidemia, diabetes mellitus, migraine headaches, depression, anxiety, ADHD, and lumbar radiculopathy. She reports today that she is doing reasonably well.     At her last visit in 10/2023, she reported that she had discontinued Austedo as prescribed by by AQUILES Medrano for involuntary mouth movements since it was causing worsening tremors.  She states that she was started on Ingrezza but is no longer able to continue taking it due to lack of coverage by her insurance.  She is continuing on Prozac 40 mg daily for management of her anxiety and depression, and is now taking Ativan 1 mg 3 times daily to control the tremor and involuntary movements.  She states that it is felt that her involuntary movements may actually be related to anxiety, and she has noted improvement in both her tremor and involuntary movements since initiating Ativan.  She states that she underwent evaluation by Dr. 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  Lab evaluation was also performed (11/20/2023), which included WOLF, B12/folate levels, BNP, ESR, T. pallidum antibody, and TSH, which were unremarkable.  She states that she has not had been able to obtain the results of her MRI from Dr. Goldberg's office, but has a follow-up appointment with him in 3/2024.    She reports that she is scheduled for a follow-up appointment with AQUILES Poe on 1/22/2024.  She denies any recurrence of dysuria or other UTI symptoms.    She is otherwise without new complaints.        Summary of prior hospitalizations and medical history:  She had a follow-up appointment with Dr. Herrera on 3/1/2023 and he recommended

## 2024-01-17 NOTE — PATIENT INSTRUCTIONS
develop anemia.  Most people can get the iron their bodies need by eating enough of certain iron-rich foods. Your doctor may recommend that you take an iron supplement along with eating an iron-rich diet.  Follow-up care is a key part of your treatment and safety. Be sure to make and go to all appointments, and call your doctor if you are having problems. It's also a good idea to know your test results and keep a list of the medicines you take.  How can you care for yourself at home?  Make iron-rich foods a part of your daily diet. Iron-rich foods include:  All meats, such as chicken, beef, lamb, pork, fish, and shellfish. Liver is especially high in iron.  Leafy green vegetables.  Raisins, peas, beans, lentils, barley, and eggs.  Iron-fortified breakfast cereals.  Eat foods with vitamin C along with iron-rich foods. Vitamin C helps you absorb more iron from food. Drink a glass of orange juice or another citrus juice with your food.  Eat meat and vegetables or grains together. The iron in meat helps your body absorb the iron in other foods.  Where can you learn more?  Go to https://www.[employer].net/AugustpConnections  Enter Z290 in the search box to learn more about \"Iron-Rich Diet: Care Instructions.\"  Current as of: August 22, 2019               Content Version: 12.6  © 3470-9346 [employer].   Care instructions adapted under license by [employer] (which disclaims liability or warranty for this information). If you have questions about a medical condition or this instruction, always ask your healthcare professional. [employer] disclaims any warranty or liability for your use of this information.

## 2024-01-21 PROBLEM — F41.9 ANXIETY: Status: ACTIVE | Noted: 2024-01-21

## 2024-01-29 RX ORDER — ATORVASTATIN CALCIUM 80 MG/1
80 TABLET, FILM COATED ORAL DAILY
Qty: 90 TABLET | Refills: 3 | Status: SHIPPED | OUTPATIENT
Start: 2024-01-29

## 2024-02-23 ENCOUNTER — OFFICE VISIT (OUTPATIENT)
Age: 69
End: 2024-02-23
Payer: MEDICARE

## 2024-02-23 ENCOUNTER — HOSPITAL ENCOUNTER (OUTPATIENT)
Facility: HOSPITAL | Age: 69
Setting detail: SPECIMEN
End: 2024-02-23
Payer: MEDICARE

## 2024-02-23 ENCOUNTER — TELEPHONE (OUTPATIENT)
Age: 69
End: 2024-02-23

## 2024-02-23 DIAGNOSIS — N39.0 URINARY TRACT INFECTION WITH HEMATURIA, SITE UNSPECIFIED: ICD-10-CM

## 2024-02-23 DIAGNOSIS — R31.9 URINARY TRACT INFECTION WITH HEMATURIA, SITE UNSPECIFIED: ICD-10-CM

## 2024-02-23 DIAGNOSIS — N39.0 URINARY TRACT INFECTION WITH HEMATURIA, SITE UNSPECIFIED: Primary | ICD-10-CM

## 2024-02-23 DIAGNOSIS — R31.9 URINARY TRACT INFECTION WITH HEMATURIA, SITE UNSPECIFIED: Primary | ICD-10-CM

## 2024-02-23 DIAGNOSIS — N30.00 ACUTE CYSTITIS WITHOUT HEMATURIA: ICD-10-CM

## 2024-02-23 LAB
BILIRUBIN, URINE, POC: NEGATIVE
BLOOD URINE, POC: ABNORMAL
GLUCOSE URINE, POC: NEGATIVE
KETONES, URINE, POC: NEGATIVE
LEUKOCYTE ESTERASE, URINE, POC: ABNORMAL
NITRITE, URINE, POC: POSITIVE
PH, URINE, POC: 7 (ref 4.6–8)
PROTEIN,URINE, POC: ABNORMAL
SPECIFIC GRAVITY, URINE, POC: 1.01 (ref 1–1.03)
URINALYSIS CLARITY, POC: ABNORMAL
URINALYSIS COLOR, POC: YELLOW
UROBILINOGEN, POC: ABNORMAL

## 2024-02-23 PROCEDURE — G8484 FLU IMMUNIZE NO ADMIN: HCPCS | Performed by: INTERNAL MEDICINE

## 2024-02-23 PROCEDURE — 1036F TOBACCO NON-USER: CPT | Performed by: INTERNAL MEDICINE

## 2024-02-23 PROCEDURE — 99212 OFFICE O/P EST SF 10 MIN: CPT | Performed by: INTERNAL MEDICINE

## 2024-02-23 PROCEDURE — 3017F COLORECTAL CA SCREEN DOC REV: CPT | Performed by: INTERNAL MEDICINE

## 2024-02-23 PROCEDURE — 1090F PRES/ABSN URINE INCON ASSESS: CPT | Performed by: INTERNAL MEDICINE

## 2024-02-23 PROCEDURE — G8428 CUR MEDS NOT DOCUMENT: HCPCS | Performed by: INTERNAL MEDICINE

## 2024-02-23 PROCEDURE — 87086 URINE CULTURE/COLONY COUNT: CPT

## 2024-02-23 PROCEDURE — 87186 SC STD MICRODIL/AGAR DIL: CPT

## 2024-02-23 PROCEDURE — 87077 CULTURE AEROBIC IDENTIFY: CPT

## 2024-02-23 PROCEDURE — 1123F ACP DISCUSS/DSCN MKR DOCD: CPT | Performed by: INTERNAL MEDICINE

## 2024-02-23 PROCEDURE — G8399 PT W/DXA RESULTS DOCUMENT: HCPCS | Performed by: INTERNAL MEDICINE

## 2024-02-23 PROCEDURE — 81003 URINALYSIS AUTO W/O SCOPE: CPT | Performed by: INTERNAL MEDICINE

## 2024-02-23 PROCEDURE — G8419 CALC BMI OUT NRM PARAM NOF/U: HCPCS | Performed by: INTERNAL MEDICINE

## 2024-02-23 RX ORDER — CEFUROXIME AXETIL 250 MG/1
250 TABLET ORAL 2 TIMES DAILY
Qty: 10 TABLET | Refills: 0 | Status: SHIPPED | OUTPATIENT
Start: 2024-02-23 | End: 2024-02-28

## 2024-02-23 NOTE — TELEPHONE ENCOUNTER
Pt experiencing UTI symptoms for the last 2 days. Per the chart pt  reached out to urology yesterday. Pt is requesting to bring in urine sample to be tested.     Please advise   Pt can be reached at [phone]

## 2024-02-23 NOTE — TELEPHONE ENCOUNTER
Pls call  Ua +; ceftin 1 bid x5 days [facility]    Patient is aware medication was sent to the pharmacy.

## 2024-02-24 LAB
BACTERIA SPEC CULT: ABNORMAL
CC UR VC: ABNORMAL
SERVICE CMNT-IMP: ABNORMAL

## 2024-02-26 LAB
BACTERIA SPEC CULT: ABNORMAL
CC UR VC: ABNORMAL
SERVICE CMNT-IMP: ABNORMAL

## 2024-02-26 RX ORDER — SEMAGLUTIDE 1.34 MG/ML
INJECTION, SOLUTION SUBCUTANEOUS
Qty: 3 ML | Refills: 5 | Status: SHIPPED | OUTPATIENT
Start: 2024-02-26

## 2024-04-09 ENCOUNTER — HOSPITAL ENCOUNTER (OUTPATIENT)
Facility: HOSPITAL | Age: 69
Discharge: HOME | End: 2024-04-12
Payer: MEDICARE

## 2024-04-09 DIAGNOSIS — N20.0 KIDNEY STONE: ICD-10-CM

## 2024-04-09 PROCEDURE — 76770 US EXAM ABDO BACK WALL COMP: CPT

## 2024-05-24 ENCOUNTER — TRANSCRIBE ORDERS (OUTPATIENT)
Facility: HOSPITAL | Age: 69
End: 2024-05-24

## 2024-05-24 DIAGNOSIS — R06.02 SHORTNESS OF BREATH: Primary | ICD-10-CM

## 2024-05-30 ENCOUNTER — HOSPITAL ENCOUNTER (OUTPATIENT)
Facility: HOSPITAL | Age: 69
Discharge: HOME | End: 2024-06-01
Payer: COMMERCIAL

## 2024-05-30 DIAGNOSIS — I63.033 CEREBRAL INFARCTION DUE TO BILATERAL THROMBOSIS OF CAROTID ARTERIES (HCC): ICD-10-CM

## 2024-05-30 PROCEDURE — 93880 EXTRACRANIAL BILAT STUDY: CPT

## 2024-05-31 LAB
VAS LEFT CCA DIST EDV: 22.5 CM/S
VAS LEFT CCA DIST PSV: 87.1 CM/S
VAS LEFT CCA MID EDV: 22.45 CM/S
VAS LEFT CCA MID PSV: 85.45 CM/S
VAS LEFT CCA PROX EDV: 14.4 CM/S
VAS LEFT CCA PROX PSV: 104.8 CM/S
VAS LEFT ECA EDV: 5.33 CM/S
VAS LEFT ECA PSV: 57.5 CM/S
VAS LEFT ICA DIST EDV: 24.1 CM/S
VAS LEFT ICA DIST PSV: 70.9 CM/S
VAS LEFT ICA MID EDV: 22.5 CM/S
VAS LEFT ICA MID PSV: 69.3 CM/S
VAS LEFT ICA PROX EDV: 20.8 CM/S
VAS LEFT ICA PROX PSV: 88.7 CM/S
VAS LEFT ICA/CCA PSV: 1.02 NO UNITS
VAS LEFT SUBCLAVIAN PROX EDV: 0 CM/S
VAS LEFT SUBCLAVIAN PROX PSV: 115.4 CM/S
VAS LEFT VERTEBRAL EDV: 17.61 CM/S
VAS LEFT VERTEBRAL PSV: 80.6 CM/S
VAS RIGHT CCA DIST EDV: 15.4 CM/S
VAS RIGHT CCA DIST PSV: 72.3 CM/S
VAS RIGHT CCA MID EDV: 23.17 CM/S
VAS RIGHT CCA MID PSV: 90.46 CM/S
VAS RIGHT CCA PROX EDV: 16.7 CM/S
VAS RIGHT CCA PROX PSV: 87.9 CM/S
VAS RIGHT ECA EDV: 7.6 CM/S
VAS RIGHT ECA PSV: 90.2 CM/S
VAS RIGHT ICA DIST EDV: 20.8 CM/S
VAS RIGHT ICA DIST PSV: 73.5 CM/S
VAS RIGHT ICA MID EDV: 24.1 CM/S
VAS RIGHT ICA MID PSV: 71.3 CM/S
VAS RIGHT ICA PROX EDV: 18.6 CM/S
VAS RIGHT ICA PROX PSV: 56 CM/S
VAS RIGHT ICA/CCA PSV: 1 NO UNITS
VAS RIGHT SUBCLAVIAN PROX EDV: 0 CM/S
VAS RIGHT SUBCLAVIAN PROX PSV: 70.9 CM/S
VAS RIGHT VERTEBRAL EDV: 17.61 CM/S
VAS RIGHT VERTEBRAL PSV: 72.5 CM/S

## 2024-06-07 ENCOUNTER — HOSPITAL ENCOUNTER (OUTPATIENT)
Facility: HOSPITAL | Age: 69
End: 2024-06-07
Payer: MEDICARE

## 2024-06-07 VITALS
HEIGHT: 60 IN | BODY MASS INDEX: 25.72 KG/M2 | SYSTOLIC BLOOD PRESSURE: 128 MMHG | DIASTOLIC BLOOD PRESSURE: 80 MMHG | WEIGHT: 131 LBS

## 2024-06-07 DIAGNOSIS — R06.02 SHORTNESS OF BREATH: ICD-10-CM

## 2024-06-07 LAB
ECHO AO ASC DIAM: 2.9 CM
ECHO AO ASCENDING AORTA INDEX: 1.87 CM/M2
ECHO AO ROOT DIAM: 2.5 CM
ECHO AO ROOT INDEX: 1.61 CM/M2
ECHO AR MAX VEL PISA: 3.2 M/S
ECHO AV AREA PEAK VELOCITY: 1.2 CM2
ECHO AV AREA/BSA PEAK VELOCITY: 0.8 CM2/M2
ECHO AV PEAK GRADIENT: 6 MMHG
ECHO AV PEAK VELOCITY: 1.3 M/S
ECHO AV REGURGITANT PHT: 462.1 MILLISECOND
ECHO AV VELOCITY RATIO: 0.62
ECHO BSA: 1.58 M2
ECHO EST RA PRESSURE: 3 MMHG
ECHO LA VOL A-L A2C: 31 ML (ref 22–52)
ECHO LA VOL A-L A4C: 23 ML (ref 22–52)
ECHO LA VOL MOD A2C: 30 ML (ref 22–52)
ECHO LA VOL MOD A4C: 21 ML (ref 22–52)
ECHO LA VOLUME AREA LENGTH: 27 ML
ECHO LA VOLUME INDEX A-L A2C: 20 ML/M2 (ref 16–34)
ECHO LA VOLUME INDEX A-L A4C: 15 ML/M2 (ref 16–34)
ECHO LA VOLUME INDEX AREA LENGTH: 17 ML/M2 (ref 16–34)
ECHO LA VOLUME INDEX MOD A2C: 19 ML/M2 (ref 16–34)
ECHO LA VOLUME INDEX MOD A4C: 14 ML/M2 (ref 16–34)
ECHO LV E' LATERAL VELOCITY: 6 CM/S
ECHO LV E' SEPTAL VELOCITY: 6 CM/S
ECHO LV FRACTIONAL SHORTENING: 23 % (ref 28–44)
ECHO LV INTERNAL DIMENSION DIASTOLE INDEX: 2.77 CM/M2
ECHO LV INTERNAL DIMENSION DIASTOLIC: 4.3 CM (ref 3.9–5.3)
ECHO LV INTERNAL DIMENSION SYSTOLIC INDEX: 2.13 CM/M2
ECHO LV INTERNAL DIMENSION SYSTOLIC: 3.3 CM
ECHO LV IVSD: 0.7 CM (ref 0.6–0.9)
ECHO LV MASS 2D: 88.5 G (ref 67–162)
ECHO LV MASS INDEX 2D: 57.1 G/M2 (ref 43–95)
ECHO LV POSTERIOR WALL DIASTOLIC: 0.7 CM (ref 0.6–0.9)
ECHO LV RELATIVE WALL THICKNESS RATIO: 0.33
ECHO LVOT AREA: 1.8 CM2
ECHO LVOT DIAM: 1.5 CM
ECHO LVOT MEAN GRADIENT: 1 MMHG
ECHO LVOT PEAK GRADIENT: 3 MMHG
ECHO LVOT PEAK VELOCITY: 0.8 M/S
ECHO LVOT STROKE VOLUME INDEX: 23 ML/M2
ECHO LVOT SV: 35.7 ML
ECHO LVOT VTI: 20.2 CM
ECHO MV A VELOCITY: 1.02 M/S
ECHO MV E DECELERATION TIME (DT): 309.5 MS
ECHO MV E VELOCITY: 0.74 M/S
ECHO MV E/A RATIO: 0.73
ECHO MV E/E' LATERAL: 12.33
ECHO MV E/E' RATIO (AVERAGED): 12.33
ECHO MV E/E' SEPTAL: 12.33
ECHO PV MAX VELOCITY: 0.8 M/S
ECHO PV PEAK GRADIENT: 2 MMHG
ECHO RA AREA 4C: 6.6 CM2
ECHO RA END SYSTOLIC VOLUME APICAL 4 CHAMBER INDEX BSA: 7 ML/M2
ECHO RA VOLUME AREA LENGTH APICAL 4 CHAMBER: 10 ML
ECHO RA VOLUME: 11 ML
ECHO RIGHT VENTRICULAR SYSTOLIC PRESSURE (RVSP): 19 MMHG
ECHO RV BASAL DIMENSION: 2.4 CM
ECHO RV TAPSE: 2.1 CM (ref 1.7–?)
ECHO RVOT PEAK GRADIENT: 1 MMHG
ECHO RVOT PEAK VELOCITY: 0.5 M/S
ECHO TV REGURGITANT MAX VELOCITY: 2.03 M/S
ECHO TV REGURGITANT PEAK GRADIENT: 16 MMHG

## 2024-06-07 PROCEDURE — 93306 TTE W/DOPPLER COMPLETE: CPT

## 2024-07-26 ENCOUNTER — HOSPITAL ENCOUNTER (OUTPATIENT)
Facility: HOSPITAL | Age: 69
Setting detail: SPECIMEN
End: 2024-07-26
Payer: MEDICARE

## 2024-07-26 DIAGNOSIS — N18.31 STAGE 3A CHRONIC KIDNEY DISEASE (HCC): ICD-10-CM

## 2024-07-26 DIAGNOSIS — E11.22 TYPE 2 DIABETES MELLITUS WITH STAGE 3A CHRONIC KIDNEY DISEASE, WITHOUT LONG-TERM CURRENT USE OF INSULIN (HCC): ICD-10-CM

## 2024-07-26 DIAGNOSIS — R80.9 TYPE 2 DIABETES MELLITUS WITH MICROALBUMINURIA, WITHOUT LONG-TERM CURRENT USE OF INSULIN (HCC): ICD-10-CM

## 2024-07-26 DIAGNOSIS — E78.00 PURE HYPERCHOLESTEROLEMIA: ICD-10-CM

## 2024-07-26 DIAGNOSIS — D50.9 IRON DEFICIENCY ANEMIA, UNSPECIFIED IRON DEFICIENCY ANEMIA TYPE: ICD-10-CM

## 2024-07-26 DIAGNOSIS — E11.29 TYPE 2 DIABETES MELLITUS WITH MICROALBUMINURIA, WITHOUT LONG-TERM CURRENT USE OF INSULIN (HCC): ICD-10-CM

## 2024-07-26 DIAGNOSIS — E53.8 B12 DEFICIENCY: ICD-10-CM

## 2024-07-26 DIAGNOSIS — N18.31 TYPE 2 DIABETES MELLITUS WITH STAGE 3A CHRONIC KIDNEY DISEASE, WITHOUT LONG-TERM CURRENT USE OF INSULIN (HCC): ICD-10-CM

## 2024-07-26 LAB
25(OH)D3 SERPL-MCNC: 53.1 NG/ML (ref 30–100)
ALBUMIN SERPL-MCNC: 4 G/DL (ref 3.4–5)
ALBUMIN/GLOB SERPL: 1.5 (ref 0.8–1.7)
ALP SERPL-CCNC: 86 U/L (ref 45–117)
ALT SERPL-CCNC: 37 U/L (ref 13–56)
ANION GAP SERPL CALC-SCNC: 5 MMOL/L (ref 3–18)
AST SERPL-CCNC: 21 U/L (ref 10–38)
BASOPHILS # BLD: 0.1 K/UL (ref 0–0.1)
BASOPHILS NFR BLD: 1 % (ref 0–2)
BILIRUB SERPL-MCNC: 1 MG/DL (ref 0.2–1)
BUN SERPL-MCNC: 25 MG/DL (ref 7–18)
BUN/CREAT SERPL: 22 (ref 12–20)
CALCIUM SERPL-MCNC: 10.1 MG/DL (ref 8.5–10.1)
CHLORIDE SERPL-SCNC: 103 MMOL/L (ref 100–111)
CHOLEST SERPL-MCNC: 188 MG/DL
CO2 SERPL-SCNC: 29 MMOL/L (ref 21–32)
CREAT SERPL-MCNC: 1.14 MG/DL (ref 0.6–1.3)
CREAT UR-MCNC: 45 MG/DL (ref 30–125)
DIFFERENTIAL METHOD BLD: NORMAL
EOSINOPHIL # BLD: 0.2 K/UL (ref 0–0.4)
EOSINOPHIL NFR BLD: 3 % (ref 0–5)
ERYTHROCYTE [DISTWIDTH] IN BLOOD BY AUTOMATED COUNT: 14.1 % (ref 11.6–14.5)
EST. AVERAGE GLUCOSE BLD GHB EST-MCNC: 117 MG/DL
FERRITIN SERPL-MCNC: 11 NG/ML (ref 8–388)
FOLATE SERPL-MCNC: >20 NG/ML (ref 3.1–17.5)
GLOBULIN SER CALC-MCNC: 2.7 G/DL (ref 2–4)
GLUCOSE SERPL-MCNC: 117 MG/DL (ref 74–99)
HBA1C MFR BLD: 5.7 % (ref 4.2–5.6)
HCT VFR BLD AUTO: 39.8 % (ref 35–45)
HDLC SERPL-MCNC: 63 MG/DL (ref 40–60)
HDLC SERPL: 3 (ref 0–5)
HGB BLD-MCNC: 12.7 G/DL (ref 12–16)
IMM GRANULOCYTES # BLD AUTO: 0 K/UL (ref 0–0.04)
IMM GRANULOCYTES NFR BLD AUTO: 0 % (ref 0–0.5)
LDLC SERPL CALC-MCNC: 105.6 MG/DL (ref 0–100)
LIPID PANEL: ABNORMAL
LYMPHOCYTES # BLD: 2.7 K/UL (ref 0.9–3.6)
LYMPHOCYTES NFR BLD: 41 % (ref 21–52)
MAGNESIUM SERPL-MCNC: 2.3 MG/DL (ref 1.6–2.6)
MCH RBC QN AUTO: 29.5 PG (ref 24–34)
MCHC RBC AUTO-ENTMCNC: 31.9 G/DL (ref 31–37)
MCV RBC AUTO: 92.3 FL (ref 78–100)
MICROALBUMIN UR-MCNC: 0.83 MG/DL (ref 0–3)
MICROALBUMIN/CREAT UR-RTO: 18 MG/G (ref 0–30)
MONOCYTES # BLD: 0.3 K/UL (ref 0.05–1.2)
MONOCYTES NFR BLD: 5 % (ref 3–10)
NEUTS SEG # BLD: 3.4 K/UL (ref 1.8–8)
NEUTS SEG NFR BLD: 51 % (ref 40–73)
NRBC # BLD: 0 K/UL (ref 0–0.01)
NRBC BLD-RTO: 0 PER 100 WBC
PLATELET # BLD AUTO: 327 K/UL (ref 135–420)
PMV BLD AUTO: 10.3 FL (ref 9.2–11.8)
POTASSIUM SERPL-SCNC: 4.9 MMOL/L (ref 3.5–5.5)
PROT SERPL-MCNC: 6.7 G/DL (ref 6.4–8.2)
RBC # BLD AUTO: 4.31 M/UL (ref 4.2–5.3)
SODIUM SERPL-SCNC: 137 MMOL/L (ref 136–145)
TRIGL SERPL-MCNC: 97 MG/DL
VIT B12 SERPL-MCNC: 996 PG/ML (ref 211–911)
VLDLC SERPL CALC-MCNC: 19.4 MG/DL
WBC # BLD AUTO: 6.6 K/UL (ref 4.6–13.2)

## 2024-07-26 PROCEDURE — 82306 VITAMIN D 25 HYDROXY: CPT

## 2024-07-26 PROCEDURE — 80053 COMPREHEN METABOLIC PANEL: CPT

## 2024-07-26 PROCEDURE — 82043 UR ALBUMIN QUANTITATIVE: CPT

## 2024-07-26 PROCEDURE — 82570 ASSAY OF URINE CREATININE: CPT

## 2024-07-26 PROCEDURE — 36415 COLL VENOUS BLD VENIPUNCTURE: CPT

## 2024-07-26 PROCEDURE — 83735 ASSAY OF MAGNESIUM: CPT

## 2024-07-26 PROCEDURE — 80061 LIPID PANEL: CPT

## 2024-07-26 PROCEDURE — 82607 VITAMIN B-12: CPT

## 2024-07-26 PROCEDURE — 82728 ASSAY OF FERRITIN: CPT

## 2024-07-26 PROCEDURE — 85025 COMPLETE CBC W/AUTO DIFF WBC: CPT

## 2024-07-26 PROCEDURE — 82746 ASSAY OF FOLIC ACID SERUM: CPT

## 2024-07-26 PROCEDURE — 83036 HEMOGLOBIN GLYCOSYLATED A1C: CPT

## 2024-08-01 ENCOUNTER — TELEPHONE (OUTPATIENT)
Facility: CLINIC | Age: 69
End: 2024-08-01

## 2024-08-01 ENCOUNTER — OFFICE VISIT (OUTPATIENT)
Facility: CLINIC | Age: 69
End: 2024-08-01

## 2024-08-01 VITALS
HEIGHT: 59 IN | RESPIRATION RATE: 14 BRPM | WEIGHT: 131 LBS | TEMPERATURE: 98.4 F | BODY MASS INDEX: 26.41 KG/M2 | OXYGEN SATURATION: 99 % | SYSTOLIC BLOOD PRESSURE: 142 MMHG | DIASTOLIC BLOOD PRESSURE: 80 MMHG | HEART RATE: 62 BPM

## 2024-08-01 DIAGNOSIS — E11.22 TYPE 2 DIABETES MELLITUS WITH STAGE 3A CHRONIC KIDNEY DISEASE, WITHOUT LONG-TERM CURRENT USE OF INSULIN (HCC): Primary | ICD-10-CM

## 2024-08-01 DIAGNOSIS — Z86.73 HISTORY OF CVA (CEREBROVASCULAR ACCIDENT) WITHOUT RESIDUAL DEFICITS: ICD-10-CM

## 2024-08-01 DIAGNOSIS — E78.00 PURE HYPERCHOLESTEROLEMIA: ICD-10-CM

## 2024-08-01 DIAGNOSIS — F33.9 RECURRENT MAJOR DEPRESSIVE DISORDER, REMISSION STATUS UNSPECIFIED (HCC): ICD-10-CM

## 2024-08-01 DIAGNOSIS — N18.31 TYPE 2 DIABETES MELLITUS WITH STAGE 3A CHRONIC KIDNEY DISEASE, WITHOUT LONG-TERM CURRENT USE OF INSULIN (HCC): Primary | ICD-10-CM

## 2024-08-01 DIAGNOSIS — E11.29 TYPE 2 DIABETES MELLITUS WITH MICROALBUMINURIA (HCC): ICD-10-CM

## 2024-08-01 DIAGNOSIS — E53.8 B12 DEFICIENCY: ICD-10-CM

## 2024-08-01 DIAGNOSIS — N18.31 STAGE 3A CHRONIC KIDNEY DISEASE (HCC): ICD-10-CM

## 2024-08-01 DIAGNOSIS — D50.9 IRON DEFICIENCY ANEMIA, UNSPECIFIED IRON DEFICIENCY ANEMIA TYPE: ICD-10-CM

## 2024-08-01 DIAGNOSIS — R80.9 TYPE 2 DIABETES MELLITUS WITH MICROALBUMINURIA (HCC): ICD-10-CM

## 2024-08-01 DIAGNOSIS — E66.3 OVERWEIGHT (BMI 25.0-29.9): ICD-10-CM

## 2024-08-01 DIAGNOSIS — Z12.31 SCREENING MAMMOGRAM FOR BREAST CANCER: ICD-10-CM

## 2024-08-01 DIAGNOSIS — F41.9 ANXIETY: ICD-10-CM

## 2024-08-01 DIAGNOSIS — I10 ESSENTIAL HYPERTENSION: ICD-10-CM

## 2024-08-01 RX ORDER — MELOXICAM 15 MG/1
15 TABLET ORAL DAILY
COMMUNITY
End: 2024-08-01 | Stop reason: ALTCHOICE

## 2024-08-01 RX ORDER — EZETIMIBE 10 MG/1
10 TABLET ORAL DAILY
Qty: 90 TABLET | Refills: 3 | Status: SHIPPED | OUTPATIENT
Start: 2024-08-01

## 2024-08-01 RX ORDER — FERROUS SULFATE 325(65) MG
325 TABLET ORAL EVERY OTHER DAY
Qty: 30 TABLET | Status: SHIPPED | COMMUNITY
Start: 2024-08-01

## 2024-08-01 RX ORDER — PANTOPRAZOLE SODIUM 40 MG/1
40 TABLET, DELAYED RELEASE ORAL
Qty: 30 TABLET | Status: SHIPPED | COMMUNITY
Start: 2024-08-01

## 2024-08-01 RX ORDER — PANTOPRAZOLE SODIUM 40 MG/1
40 TABLET, DELAYED RELEASE ORAL DAILY
COMMUNITY
Start: 2024-05-04 | End: 2024-08-01 | Stop reason: DRUGHIGH

## 2024-08-01 RX ORDER — VALBENAZINE 40 MG/1
40 CAPSULE ORAL
COMMUNITY
Start: 2024-06-17

## 2024-08-01 RX ORDER — LISINOPRIL 20 MG/1
20 TABLET ORAL DAILY
Qty: 90 TABLET | Refills: 3 | Status: SHIPPED | OUTPATIENT
Start: 2024-08-01

## 2024-08-01 NOTE — PATIENT INSTRUCTIONS
Low Back Pain: Exercises  Introduction  Here are some examples of exercises for you to try. The exercises may be suggested for a condition or for rehabilitation. Start each exercise slowly. Ease off the exercises if you start to have pain.  You will be told when to start these exercises and which ones will work best for you.  How to do the exercises  Press-up   Lie on your stomach, supporting your body with your forearms.  Press your elbows down into the floor to raise your upper back. As you do this, relax your stomach muscles and allow your back to arch without using your back muscles. As your press up, do not let your hips or pelvis come off the floor.  Hold for 15 to 30 seconds, then relax.  Repeat 2 to 4 times.    Alternate arm and leg (bird dog) exercise   Do this exercise slowly. Try to keep your body straight at all times, and do not let one hip drop lower than the other.  Start on the floor, on your hands and knees.  Tighten your belly muscles.  Raise one leg off the floor, and hold it straight out behind you. Be careful not to let your hip drop down, because that will twist your trunk.  Hold for about 6 seconds, then lower your leg and switch to the other leg.  Repeat 8 to 12 times on each leg.  Over time, work up to holding for 10 to 30 seconds each time.  If you feel stable and secure with your leg raised, try raising the opposite arm straight out in front of you at the same time.    Knee-to-chest exercise   Lie on your back with your knees bent and your feet flat on the floor.  Bring one knee to your chest, keeping the other foot flat on the floor (or keeping the other leg straight, whichever feels better on your lower back).  Keep your lower back pressed to the floor. Hold for at least 15 to 30 seconds.  Relax, and lower the knee to the starting position.  Repeat with the other leg. Repeat 2 to 4 times with each leg.  To get more stretch, put your other leg flat on the floor while pulling your knee to

## 2024-08-01 NOTE — PROGRESS NOTES
Megha Peralta presents today for   Chief Complaint   Patient presents with    6 Month Follow-Up       \"Have you been to the ER, urgent care clinic since your last visit?  Hospitalized since your last visit?\"    NO    “Have you seen or consulted any other health care providers outside of [facility] since your last visit?”    NO            
bilateral sciatica. She was treated with a prednisone taper with initial improvement. However, her symptoms worsened and she was referred to Dr. Resendiz for evaluation. She gave her a Toradol injection, and ordered a lumbar MRI (6/24/2018) which showed a large right disc extrusion at L5-S1 which compressed the descending right S1 nerve root. She was referred to pain management and underwent a selective L5 and S1 selective nerve root blocks by Dr. Lind on 7/26/2018. However, due to persistent symptoms, she was referred to Dr. Garza and subsequently underwent a right L5-S1 laminectomy and diskectomy on 8/6/2018 without complications. She states that she did well and continues to do her back stretches and exercises. She states that she only has occasional discomfort.     She had a screening colonoscopy in 10/2014 by Dr. Justice which was normal. Follow-up due in 10 years. She denies any abdominal pain, nausea, vomiting, melena, hematochezia, or change in bowel movements.     She has a history of depression and anxiety, and is followed by Dr. Medrano. She is currently taking Abilify 5 mg daily and Prozac 10 mg daily.  Previously on lorazepam at bedtime, but no longer requiring.        Past Medical History:   Diagnosis Date    ADHD     Anxiety     Bilateral lumbar radiculopathy     Carotid bruit     right    Depression     Diabetes mellitus (HCC)     HCD (hypertensive cardiovascular disease)     High cholesterol     Hyperlipidemia     Hypertension     Migraine headache     Plantar fasciitis      Past Surgical History:   Procedure Laterality Date    NEUROLOGICAL SURGERY  08/06/2018    laminectomy     SEPTOPLASTY  6/2002    TONSILLECTOMY      UROLOGICAL SURGERY  02/27/2023    CYSTOSCOPY,RIGHT RETROGRADE PYELOGRAM, URETEROSCOPY, LASER LITHOTRIPSY, RIGHT STENT EXCHANGE; Dr Maldonado     Current Outpatient Medications   Medication Sig    INGREZZA 40 MG CAPS Take 1 capsule by mouth every 48 hours    semaglutide, 2 MG/DOSE,

## 2024-08-01 NOTE — TELEPHONE ENCOUNTER
Please request the upper endoscopy and colonoscopy report performed earlier this year by Dr. Justice.

## 2024-08-04 PROBLEM — I63.9 OCCIPITAL CEREBRAL INFARCTION (HCC): Status: ACTIVE | Noted: 2024-08-04

## 2024-08-04 PROBLEM — I63.9 OCCIPITAL CEREBRAL INFARCTION (HCC): Status: RESOLVED | Noted: 2024-08-04 | Resolved: 2024-08-04

## 2024-08-04 PROBLEM — E66.3 OVERWEIGHT (BMI 25.0-29.9): Status: ACTIVE | Noted: 2018-05-06

## 2024-08-04 PROBLEM — Z86.73 HISTORY OF CVA (CEREBROVASCULAR ACCIDENT) WITHOUT RESIDUAL DEFICITS: Status: ACTIVE | Noted: 2024-08-04

## 2024-08-06 ENCOUNTER — CLINICAL DOCUMENTATION (OUTPATIENT)
Facility: CLINIC | Age: 69
End: 2024-08-06

## 2024-08-06 NOTE — PROGRESS NOTES
Received report from Dr. Justice for upper endoscopy and colonoscopy which was performed on 2/11/2024:    Upper endoscopy  Normal stomach, grade D esophagitis in the gastroesophageal junction and lower third of the esophagus compatible with erosive esophagitis and normal mucosa in the duodenum.  Pathology: Duodenal biopsy negative; stomach biopsy showed mild reactive gastropathy but negative H. pylori or evidence of atrophy    Treatment with pantoprazole 40 mg with dinner recommended.      Colonoscopy  Single sessile 4 mm polyp in the sigmoid colon (pathology: Tubular adenoma)    Follow-up colonoscopy recommended in 5 years.

## 2024-08-07 ENCOUNTER — HOSPITAL ENCOUNTER (OUTPATIENT)
Facility: HOSPITAL | Age: 69
Setting detail: SPECIMEN
Discharge: HOME | End: 2024-08-10
Payer: MEDICARE

## 2024-08-07 DIAGNOSIS — E78.00 PURE HYPERCHOLESTEROLEMIA: ICD-10-CM

## 2024-08-07 DIAGNOSIS — I10 ESSENTIAL HYPERTENSION: ICD-10-CM

## 2024-08-07 LAB
ALBUMIN SERPL-MCNC: 4.1 G/DL (ref 3.4–5)
ALBUMIN/GLOB SERPL: 1.5 (ref 0.8–1.7)
ALP SERPL-CCNC: 87 U/L (ref 45–117)
ALT SERPL-CCNC: 39 U/L (ref 13–56)
ANION GAP SERPL CALC-SCNC: 7 MMOL/L (ref 3–18)
AST SERPL-CCNC: 22 U/L (ref 10–38)
BILIRUB SERPL-MCNC: 1.2 MG/DL (ref 0.2–1)
BUN SERPL-MCNC: 22 MG/DL (ref 7–18)
BUN/CREAT SERPL: 19 (ref 12–20)
CALCIUM SERPL-MCNC: 9.7 MG/DL (ref 8.5–10.1)
CHLORIDE SERPL-SCNC: 104 MMOL/L (ref 100–111)
CHOLEST SERPL-MCNC: 162 MG/DL
CO2 SERPL-SCNC: 29 MMOL/L (ref 21–32)
CREAT SERPL-MCNC: 1.17 MG/DL (ref 0.6–1.3)
GLOBULIN SER CALC-MCNC: 2.7 G/DL (ref 2–4)
GLUCOSE SERPL-MCNC: 95 MG/DL (ref 74–99)
HDLC SERPL-MCNC: 64 MG/DL (ref 40–60)
HDLC SERPL: 2.5 (ref 0–5)
LDLC SERPL CALC-MCNC: 74.8 MG/DL (ref 0–100)
LIPID PANEL: ABNORMAL
POTASSIUM SERPL-SCNC: 4.6 MMOL/L (ref 3.5–5.5)
PROT SERPL-MCNC: 6.8 G/DL (ref 6.4–8.2)
SODIUM SERPL-SCNC: 140 MMOL/L (ref 136–145)
TRIGL SERPL-MCNC: 116 MG/DL
VLDLC SERPL CALC-MCNC: 23.2 MG/DL

## 2024-08-07 PROCEDURE — 36415 COLL VENOUS BLD VENIPUNCTURE: CPT

## 2024-08-07 PROCEDURE — 80053 COMPREHEN METABOLIC PANEL: CPT

## 2024-08-07 PROCEDURE — 80061 LIPID PANEL: CPT

## 2024-08-14 ENCOUNTER — OFFICE VISIT (OUTPATIENT)
Facility: CLINIC | Age: 69
End: 2024-08-14

## 2024-08-14 VITALS
RESPIRATION RATE: 16 BRPM | TEMPERATURE: 98.2 F | SYSTOLIC BLOOD PRESSURE: 138 MMHG | OXYGEN SATURATION: 97 % | DIASTOLIC BLOOD PRESSURE: 78 MMHG | BODY MASS INDEX: 26.41 KG/M2 | HEART RATE: 67 BPM | HEIGHT: 59 IN | WEIGHT: 131 LBS

## 2024-08-14 DIAGNOSIS — E11.29 TYPE 2 DIABETES MELLITUS WITH MICROALBUMINURIA (HCC): ICD-10-CM

## 2024-08-14 DIAGNOSIS — D50.9 IRON DEFICIENCY ANEMIA, UNSPECIFIED IRON DEFICIENCY ANEMIA TYPE: ICD-10-CM

## 2024-08-14 DIAGNOSIS — R80.9 TYPE 2 DIABETES MELLITUS WITH MICROALBUMINURIA (HCC): ICD-10-CM

## 2024-08-14 DIAGNOSIS — E78.00 PURE HYPERCHOLESTEROLEMIA: ICD-10-CM

## 2024-08-14 DIAGNOSIS — E66.3 OVERWEIGHT (BMI 25.0-29.9): ICD-10-CM

## 2024-08-14 DIAGNOSIS — I10 ESSENTIAL HYPERTENSION: Primary | ICD-10-CM

## 2024-08-14 DIAGNOSIS — N18.31 TYPE 2 DIABETES MELLITUS WITH STAGE 3A CHRONIC KIDNEY DISEASE, WITHOUT LONG-TERM CURRENT USE OF INSULIN (HCC): ICD-10-CM

## 2024-08-14 DIAGNOSIS — E11.22 TYPE 2 DIABETES MELLITUS WITH STAGE 3A CHRONIC KIDNEY DISEASE, WITHOUT LONG-TERM CURRENT USE OF INSULIN (HCC): ICD-10-CM

## 2024-08-14 DIAGNOSIS — N18.31 STAGE 3A CHRONIC KIDNEY DISEASE (HCC): ICD-10-CM

## 2024-08-14 DIAGNOSIS — E53.8 B12 DEFICIENCY: ICD-10-CM

## 2024-08-14 RX ORDER — LISINOPRIL 20 MG/1
20 TABLET ORAL 2 TIMES DAILY
Qty: 180 TABLET | Refills: 3 | Status: SHIPPED | OUTPATIENT
Start: 2024-08-14

## 2024-08-14 NOTE — PATIENT INSTRUCTIONS
Content Version: 12.6  © 3233-0328 [employer].   Care instructions adapted under license by [employer] (which disclaims liability or warranty for this information). If you have questions about a medical condition or this instruction, always ask your healthcare professional. [employer] disclaims any warranty or liability for your use of this information.      Low Sodium Diet (2,000 Milligram): Care Instructions  Your Care Instructions     Too much sodium causes your body to hold on to extra water. This can raise your blood pressure and force your heart and kidneys to work harder. In very serious cases, this could cause you to be put in the hospital. It might even be life-threatening. By limiting sodium, you will feel better and lower your risk of serious problems.  The most common source of sodium is salt. People get most of the salt in their diet from canned, prepared, and packaged foods. Fast food and restaurant meals also are very high in sodium. Your doctor will probably limit your sodium to less than 2,000 milligrams (mg) a day. This limit counts all the sodium in prepared and packaged foods and any salt you add to your food.  Follow-up care is a key part of your treatment and safety. Be sure to make and go to all appointments, and call your doctor if you are having problems. It's also a good idea to know your test results and keep a list of the medicines you take.  How can you care for yourself at home?  Read food labels  Read labels on cans and food packages. The labels tell you how much sodium is in each serving. Make sure that you look at the serving size. If you eat more than the serving size, you have eaten more sodium.  Food labels also tell you the Percent Daily Value for sodium. Choose products with low Percent Daily Values for sodium.  Be aware that sodium can come in forms other than salt, including monosodium glutamate (MSG), sodium citrate, and sodium

## 2024-08-14 NOTE — PROGRESS NOTES
Megha Peralta presents today for   Chief Complaint   Patient presents with    2 Week Follow-Up       \"Have you been to the ER, urgent care clinic since your last visit?  Hospitalized since your last visit?\"    NO    “Have you seen or consulted any other health care providers outside of [facility] since your last visit?”    NO            
urinalysis trace ketones, moderate leukocyte esterase, 21-35 WBCs; CT abdomen/pelvis showed right hydroureteronephrosis due to a proximal ureteral stone measuring 6 mm and punctate intrarenal stone left kidney.  She received a dose of Toradol for pain in the ED, and started on IV fluids and IV cefepime.  She was admitted and Dr. Rizo was consulted, and on 1/19/2023, she underwent cystoscopy, right retrograde pyelogram, and placement of right double-J stent.  She was also given a dose of IV gentamicin in the OR. Urine culture > 100,000 colonies/mL Klebsiella pneumoniae (resistant to ampicillin); blood culture x 2 were negative.  Dr. Sandra was consulted and felt that presentation represented partially treated sepsis given outpatient treatment with Macrobid.  Recommendation was to continue on IV cefepime and she was subsequently changed to p.o. Levaquin to complete 14-day course of antibiotic therapy.  Nephrology was consulted due to acute renal insufficiency, and because felt to be multifactorial related to obstructive uropathy from proximal right ureteral stone, treatment with gentamicin and ketorolac during hospitalization as well as ACE inhibitor prior to admission, and hypotension during hospitalization.  Her renal function did improve with IV fluid hydration and holding of antihypertensives.  Renal function prior to discharge showed improvement with creatinine 1.05/eGFR 59.  She clinically improved and was subsequently discharged on 1/23/2023. She had a follow-up visit with AQUILES Mardid on 2/2/2023 and repeat urine culture was found to be positive for 50,000 cfu/mL of Aerococcus and she was treated with cefdinir x 7 days.  On 2/27/2023, she underwent cystoscopy, right retrograde pyelogram, right ureteroscopy and laser lithotripsy, and right ureteral stent exchange by Dr. Maldonado without complications.       On 9/2/2022, she presented to [facility] urgent care complaining of a sore throat for 1 week.  She described

## 2024-08-27 ENCOUNTER — HOSPITAL ENCOUNTER (OUTPATIENT)
Facility: HOSPITAL | Age: 69
Discharge: HOME | End: 2024-08-30
Payer: COMMERCIAL

## 2024-08-27 VITALS — BODY MASS INDEX: 26.44 KG/M2 | HEIGHT: 59 IN

## 2024-08-27 DIAGNOSIS — Z12.31 SCREENING MAMMOGRAM FOR BREAST CANCER: ICD-10-CM

## 2024-08-27 PROCEDURE — 77063 BREAST TOMOSYNTHESIS BI: CPT

## 2024-08-27 PROCEDURE — 77067 SCR MAMMO BI INCL CAD: CPT

## 2024-09-04 ENCOUNTER — HOSPITAL ENCOUNTER (OUTPATIENT)
Facility: HOSPITAL | Age: 69
Setting detail: SPECIMEN
Discharge: HOME | End: 2024-09-07
Payer: COMMERCIAL

## 2024-09-04 DIAGNOSIS — I10 ESSENTIAL HYPERTENSION: ICD-10-CM

## 2024-09-04 DIAGNOSIS — N18.31 STAGE 3A CHRONIC KIDNEY DISEASE (HCC): ICD-10-CM

## 2024-09-04 LAB
ALBUMIN SERPL-MCNC: 4 G/DL (ref 3.4–5)
ANION GAP SERPL CALC-SCNC: 6 MMOL/L (ref 3–18)
BUN SERPL-MCNC: 18 MG/DL (ref 7–18)
BUN/CREAT SERPL: 14 (ref 12–20)
CALCIUM SERPL-MCNC: 9.5 MG/DL (ref 8.5–10.1)
CHLORIDE SERPL-SCNC: 105 MMOL/L (ref 100–111)
CO2 SERPL-SCNC: 28 MMOL/L (ref 21–32)
CREAT SERPL-MCNC: 1.26 MG/DL (ref 0.6–1.3)
GLUCOSE SERPL-MCNC: 131 MG/DL (ref 74–99)
PHOSPHATE SERPL-MCNC: 4.4 MG/DL (ref 2.5–4.9)
POTASSIUM SERPL-SCNC: 4.8 MMOL/L (ref 3.5–5.5)
SODIUM SERPL-SCNC: 139 MMOL/L (ref 136–145)

## 2024-09-04 PROCEDURE — 80069 RENAL FUNCTION PANEL: CPT

## 2024-09-04 PROCEDURE — 36415 COLL VENOUS BLD VENIPUNCTURE: CPT

## 2024-10-08 RX ORDER — PROPRANOLOL HYDROCHLORIDE 40 MG/1
TABLET ORAL
Qty: 180 TABLET | Refills: 3 | Status: SHIPPED | OUTPATIENT
Start: 2024-10-08

## 2024-10-11 RX ORDER — SUMATRIPTAN 50 MG/1
50 TABLET, FILM COATED ORAL
Qty: 9 TABLET | Refills: 5 | Status: SHIPPED | OUTPATIENT
Start: 2024-10-11 | End: 2024-10-11

## 2024-10-11 NOTE — TELEPHONE ENCOUNTER
PCP: Crystal Johnson MD    LAST OFFICE VISIT: 08/14/2024    LAST REFILL PER CHART:  Medication:SUMAtriptan (IMITREX) 50 MG tablet   Ordered On:02/20/2023  Instructions:TAKE 1 TAB BY MOUTH DAILY AS NEEDED FOR MIGRAINE   Dispense: 9 tablets  Refills:5      Future Appointments   Date Time Provider Department Center   11/13/2024  8:30 AM Crystal Johnson MD [facility] DEP

## 2024-11-12 SDOH — HEALTH STABILITY: PHYSICAL HEALTH: ON AVERAGE, HOW MANY DAYS PER WEEK DO YOU ENGAGE IN MODERATE TO STRENUOUS EXERCISE (LIKE A BRISK WALK)?: 4 DAYS

## 2024-11-12 SDOH — HEALTH STABILITY: PHYSICAL HEALTH: ON AVERAGE, HOW MANY MINUTES DO YOU ENGAGE IN EXERCISE AT THIS LEVEL?: 20 MIN

## 2024-11-12 ASSESSMENT — PATIENT HEALTH QUESTIONNAIRE - PHQ9
4. FEELING TIRED OR HAVING LITTLE ENERGY: NOT AT ALL
SUM OF ALL RESPONSES TO PHQ QUESTIONS 1-9: 0
8. MOVING OR SPEAKING SO SLOWLY THAT OTHER PEOPLE COULD HAVE NOTICED. OR THE OPPOSITE, BEING SO FIGETY OR RESTLESS THAT YOU HAVE BEEN MOVING AROUND A LOT MORE THAN USUAL: NOT AT ALL
7. TROUBLE CONCENTRATING ON THINGS, SUCH AS READING THE NEWSPAPER OR WATCHING TELEVISION: NOT AT ALL
10. IF YOU CHECKED OFF ANY PROBLEMS, HOW DIFFICULT HAVE THESE PROBLEMS MADE IT FOR YOU TO DO YOUR WORK, TAKE CARE OF THINGS AT HOME, OR GET ALONG WITH OTHER PEOPLE: NOT DIFFICULT AT ALL
SUM OF ALL RESPONSES TO PHQ QUESTIONS 1-9: 0
5. POOR APPETITE OR OVEREATING: NOT AT ALL
1. LITTLE INTEREST OR PLEASURE IN DOING THINGS: NOT AT ALL
9. THOUGHTS THAT YOU WOULD BE BETTER OFF DEAD, OR OF HURTING YOURSELF: NOT AT ALL
SUM OF ALL RESPONSES TO PHQ9 QUESTIONS 1 & 2: 0
6. FEELING BAD ABOUT YOURSELF - OR THAT YOU ARE A FAILURE OR HAVE LET YOURSELF OR YOUR FAMILY DOWN: NOT AT ALL
SUM OF ALL RESPONSES TO PHQ QUESTIONS 1-9: 0
2. FEELING DOWN, DEPRESSED OR HOPELESS: NOT AT ALL
SUM OF ALL RESPONSES TO PHQ QUESTIONS 1-9: 0
3. TROUBLE FALLING OR STAYING ASLEEP: NOT AT ALL

## 2024-11-12 ASSESSMENT — LIFESTYLE VARIABLES
HOW MANY STANDARD DRINKS CONTAINING ALCOHOL DO YOU HAVE ON A TYPICAL DAY: PATIENT DOES NOT DRINK
HOW OFTEN DO YOU HAVE SIX OR MORE DRINKS ON ONE OCCASION: 1
HOW OFTEN DO YOU HAVE A DRINK CONTAINING ALCOHOL: 1
HOW OFTEN DO YOU HAVE A DRINK CONTAINING ALCOHOL: NEVER
HOW MANY STANDARD DRINKS CONTAINING ALCOHOL DO YOU HAVE ON A TYPICAL DAY: 0

## 2024-11-13 ENCOUNTER — TELEPHONE (OUTPATIENT)
Facility: CLINIC | Age: 69
End: 2024-11-13

## 2024-11-13 ENCOUNTER — OFFICE VISIT (OUTPATIENT)
Facility: CLINIC | Age: 69
End: 2024-11-13

## 2024-11-13 VITALS
HEIGHT: 59 IN | WEIGHT: 130 LBS | SYSTOLIC BLOOD PRESSURE: 128 MMHG | DIASTOLIC BLOOD PRESSURE: 70 MMHG | TEMPERATURE: 98.6 F | OXYGEN SATURATION: 98 % | BODY MASS INDEX: 26.21 KG/M2 | HEART RATE: 66 BPM | RESPIRATION RATE: 14 BRPM

## 2024-11-13 DIAGNOSIS — I10 ESSENTIAL HYPERTENSION: ICD-10-CM

## 2024-11-13 DIAGNOSIS — F33.9 RECURRENT MAJOR DEPRESSIVE DISORDER, REMISSION STATUS UNSPECIFIED (HCC): ICD-10-CM

## 2024-11-13 DIAGNOSIS — E53.8 B12 DEFICIENCY: ICD-10-CM

## 2024-11-13 DIAGNOSIS — E78.00 PURE HYPERCHOLESTEROLEMIA: ICD-10-CM

## 2024-11-13 DIAGNOSIS — Z01.818 PREOPERATIVE EVALUATION TO RULE OUT SURGICAL CONTRAINDICATION: Primary | ICD-10-CM

## 2024-11-13 DIAGNOSIS — D50.9 IRON DEFICIENCY ANEMIA, UNSPECIFIED IRON DEFICIENCY ANEMIA TYPE: ICD-10-CM

## 2024-11-13 DIAGNOSIS — N18.31 STAGE 3A CHRONIC KIDNEY DISEASE (HCC): ICD-10-CM

## 2024-11-13 DIAGNOSIS — E66.3 OVERWEIGHT (BMI 25.0-29.9): ICD-10-CM

## 2024-11-13 DIAGNOSIS — E11.29 TYPE 2 DIABETES MELLITUS WITH MICROALBUMINURIA (HCC): ICD-10-CM

## 2024-11-13 DIAGNOSIS — F41.9 ANXIETY: ICD-10-CM

## 2024-11-13 DIAGNOSIS — Z00.00 MEDICARE ANNUAL WELLNESS VISIT, SUBSEQUENT: ICD-10-CM

## 2024-11-13 DIAGNOSIS — E11.22 TYPE 2 DIABETES MELLITUS WITH STAGE 3A CHRONIC KIDNEY DISEASE, WITHOUT LONG-TERM CURRENT USE OF INSULIN (HCC): ICD-10-CM

## 2024-11-13 DIAGNOSIS — N18.31 TYPE 2 DIABETES MELLITUS WITH STAGE 3A CHRONIC KIDNEY DISEASE, WITHOUT LONG-TERM CURRENT USE OF INSULIN (HCC): ICD-10-CM

## 2024-11-13 DIAGNOSIS — Z23 ENCOUNTER FOR IMMUNIZATION: ICD-10-CM

## 2024-11-13 DIAGNOSIS — Z71.89 ACP (ADVANCE CARE PLANNING): ICD-10-CM

## 2024-11-13 DIAGNOSIS — R80.9 TYPE 2 DIABETES MELLITUS WITH MICROALBUMINURIA (HCC): ICD-10-CM

## 2024-11-13 RX ORDER — LORAZEPAM 1 MG/1
1 TABLET ORAL EVERY 8 HOURS PRN
COMMUNITY

## 2024-11-13 RX ORDER — ATORVASTATIN CALCIUM 80 MG/1
80 TABLET, FILM COATED ORAL DAILY
Qty: 90 TABLET | Refills: 3 | Status: SHIPPED | OUTPATIENT
Start: 2024-11-13

## 2024-11-13 RX ORDER — METFORMIN HYDROCHLORIDE 500 MG/1
1000 TABLET, EXTENDED RELEASE ORAL
Qty: 180 TABLET | Refills: 3 | Status: SHIPPED | OUTPATIENT
Start: 2024-11-13

## 2024-11-13 NOTE — PROGRESS NOTES
HPI:   Megha Peralta is a 69 y.o. year old female who presents today for a routine follow-up visit and preoperative evaluation.  She has a history of hypertension, hyperlipidemia, diabetes mellitus, migraine headaches, depression, anxiety, ADHD, and lumbar radiculopathy. She reports today that she is doing reasonably well.     She reports today that she is currently taking Ozempic 2 mg weekly and is tolerating it well.  She states that she feels her weight has decreased a few pounds since her last visit since she is wearing heavy clothing today.  She reports that she is walking for exercise approximately 4 days/week and denies any change in exercise tolerance.      She reports that she has been having difficulty driving at night and also reading small numbers while at work.  She had a follow-up appointment with Dr. Dominguez 2 weeks ago and was noted to have bilateral cataracts.  She states that she is now scheduled for cataract surgery on 12/4/2024 (right) and 12/13/2024 (left).    She discusses that she is continuing to have difficulty with her memory and states that she has trouble focusing.  She acknowledges this may be due to her psychiatric medications and reports that she is continuing on Prozac 40 mg daily, Ingrezza 40 mg every 48 hours, and lorazepam 1 mg every 8 hours as needed.  She states that she has generally been taken lorazepam at bedtime to help with sleep but has been attempting to avoid during the day while at work.  She states that she previously was also treated with Adderall but discontinued taking it due to concern for side effects given her advancing age.  She reports that she is continuing to follow with AQUILES Medrano, but acknowledges that she canceled her follow-up appointment with Dr. Goldberg in 9/2024 and did not reschedule.    She also reports that she has not had a follow-up appointment with AQUILES Poe.  She states that her last appointment was canceled and she did not schedule a new 
Megha Peralta presents today for   Chief Complaint   Patient presents with    Medicare AWV       \"Have you been to the ER, urgent care clinic since your last visit?  Hospitalized since your last visit?\"    NO    “Have you seen or consulted any other health care providers outside of [facility] since your last visit?”    NO            
  Cognitive:   Clock Drawing Test (CDT): (!) Abnormal  Words recalled: 2 Words Recalled  Total Score: (!) 2  Total Score Interpretation: Abnormal Mini-Cog  Interventions:  Advised to follow-up with psychiatry and neurology                 Vision Screen:  Do you have difficulty driving, watching TV, or doing any of your daily activities because of your eyesight?: (!) Yes  Have you had an eye exam within the past year?: Yes  Interventions:    Scheduled for cataract surgery with Dr. Dominguez next month                    Objective   Vitals:    11/13/24 0822   BP: 128/70   Pulse: 66   Resp: 14   Temp: 98.6 °F (37 °C)   TempSrc: Temporal   SpO2: 98%   Weight: 59 kg (130 lb)   Height: 1.499 m (4' 11\")      Body mass index is 26.26 kg/m².        See office progress note for details.              Allergies   Allergen Reactions    Penicillins Rash    Sulfa Antibiotics Rash     Prior to Visit Medications    Medication Sig Taking? Authorizing Provider   atorvastatin (LIPITOR) 80 MG tablet Take 1 tablet by mouth daily Yes Crystal Johnson MD   metFORMIN (GLUCOPHAGE-XR) 500 MG extended release tablet Take 2 tablets by mouth daily (with breakfast) Yes Crystal Johnson MD   LORazepam (ATIVAN) 1 MG tablet Take 1 tablet by mouth every 8 hours as needed for Anxiety. Max Daily Amount: 3 mg Yes Maryjo Chery MD   propranolol (INDERAL) 40 MG tablet TAKE 1 TABLET BY MOUTH TWICE A DAY Yes Crystal Johnson MD   lisinopril (PRINIVIL;ZESTRIL) 20 MG tablet Take 1 tablet by mouth 2 times daily Yes Crystal Johnson MD   INGREZZA 40 MG CAPS Take 1 capsule by mouth every 48 hours Yes Maryjo Chery MD   semaglutide, 2 MG/DOSE, (OZEMPIC) 8 MG/3ML SOPN sc injection Inject 2 mg into the skin every 7 days Yes Crystal Johnson MD   pantoprazole (PROTONIX) 40 MG tablet Take 1 tablet by mouth Daily with supper Yes Crystal Johnson MD   ferrous sulfate (IRON 325) 325 (65 Fe) MG tablet Take 1 tablet by mouth every other day Yes

## 2024-11-13 NOTE — PATIENT INSTRUCTIONS
Please obtain the updated COVID-vaccine.    Please schedule follow-up appointments with urology and with Dr. Goldberg.      Heart-Healthy Diet: Care Instructions  Your Care Instructions     A heart-healthy diet has lots of vegetables, fruits, nuts, beans, and whole grains, and is low in salt. It limits foods that are high in saturated fat, such as meats, cheeses, and fried foods. It may be hard to change your diet, but even small changes can lower your risk of heart attack and heart disease.  Follow-up care is a key part of your treatment and safety. Be sure to make and go to all appointments, and call your doctor if you are having problems. It's also a good idea to know your test results and keep a list of the medicines you take.  How can you care for yourself at home?  Watch your portions  Learn what a serving is. A \"serving\" and a \"portion\" are not always the same thing. Make sure that you are not eating larger portions than are recommended. For example, a serving of pasta is ½ cup. A serving size of meat is 2 to 3 ounces. A 3-ounce serving is about the size of a deck of cards. Measure serving sizes until you are good at \"eyeballing\" them. Keep in mind that restaurants often serve portions that are 2 or 3 times the size of one serving.  To keep your energy level up and keep you from feeling hungry, eat often but in smaller portions.  Eat only the number of calories you need to stay at a healthy weight. If you need to lose weight, eat fewer calories than your body burns (through exercise and other physical activity).  Eat more fruits and vegetables  Eat a variety of fruit and vegetables every day. Dark green, deep orange, red, or yellow fruits and vegetables are especially good for you. Examples include spinach, carrots, peaches, and berries.  Keep carrots, celery, and other veggies handy for snacks. Buy fruit that is in season and store it where you can see it so that you will be tempted to eat it.  Cook dishes

## 2024-11-13 NOTE — TELEPHONE ENCOUNTER
Please request recent diabetic eye exam from Dr. Dominguez. Patient reports being seen 2 weeks ago.    Thank you.

## 2024-11-16 ENCOUNTER — TELEPHONE (OUTPATIENT)
Facility: CLINIC | Age: 69
End: 2024-11-16

## 2024-11-16 NOTE — TELEPHONE ENCOUNTER
Please fax office note for preoperative evaluation to Dr. Dominguez for bilateral cataract surgery on 12/4 - 12/13/2024.

## 2024-12-02 ENCOUNTER — HOSPITAL ENCOUNTER (OUTPATIENT)
Facility: HOSPITAL | Age: 69
Setting detail: SPECIMEN
Discharge: HOME | End: 2024-12-05
Payer: COMMERCIAL

## 2024-12-02 ENCOUNTER — TELEPHONE (OUTPATIENT)
Facility: CLINIC | Age: 69
End: 2024-12-02

## 2024-12-02 DIAGNOSIS — R39.9 UTI SYMPTOMS: ICD-10-CM

## 2024-12-02 DIAGNOSIS — R39.9 UTI SYMPTOMS: Primary | ICD-10-CM

## 2024-12-02 LAB
BILIRUBIN, URINE, POC: NEGATIVE
BLOOD URINE, POC: ABNORMAL
GLUCOSE URINE, POC: NEGATIVE
KETONES, URINE, POC: NEGATIVE
LEUKOCYTE ESTERASE, URINE, POC: ABNORMAL
NITRITE, URINE, POC: NEGATIVE
PH, URINE, POC: 6 (ref 4.6–8)
PROTEIN,URINE, POC: NEGATIVE
SPECIFIC GRAVITY, URINE, POC: 1.02 (ref 1–1.03)
URINALYSIS CLARITY, POC: ABNORMAL
URINALYSIS COLOR, POC: ABNORMAL
UROBILINOGEN, POC: ABNORMAL

## 2024-12-02 PROCEDURE — 87086 URINE CULTURE/COLONY COUNT: CPT

## 2024-12-02 PROCEDURE — 87088 URINE BACTERIA CULTURE: CPT

## 2024-12-02 PROCEDURE — 87186 SC STD MICRODIL/AGAR DIL: CPT

## 2024-12-02 PROCEDURE — 81003 URINALYSIS AUTO W/O SCOPE: CPT | Performed by: INTERNAL MEDICINE

## 2024-12-02 RX ORDER — CEFDINIR 300 MG/1
300 CAPSULE ORAL 2 TIMES DAILY
Qty: 14 CAPSULE | Refills: 0 | Status: SHIPPED | OUTPATIENT
Start: 2024-12-02 | End: 2024-12-09

## 2024-12-02 NOTE — TELEPHONE ENCOUNTER
Orders Only on 12/02/2024   Component Date Value Ref Range Status    Color, Urine, POC 12/02/2024 Svetlana   Final    Clarity, Urine, POC 12/02/2024 Cloudy   Final    Glucose, Urine, POC 12/02/2024 Negative   Final    Bilirubin, Urine, POC 12/02/2024 Negative   Final    Ketones, Urine, POC 12/02/2024 Negative   Final    Specific Gravity, Urine, POC 12/02/2024 1.025  1.001 - 1.035 Final    Blood, Urine, POC 12/02/2024 1+  Negative Final    pH, Urine, POC 12/02/2024 6.0  4.6 - 8.0 Final    Protein, Urine, POC 12/02/2024 Negative   Final    Urobilinogen, POC 12/02/2024 0.2 mg/dL   Final    Nitrite, Urine, POC 12/02/2024 Negative   Final    Leukocyte Esterase, Urine, POC 12/02/2024 3+   Final     Urinalysis consistent with infection showing 3+ leukocyte esterase and 1+ blood.  Will treat empirically while awaiting urine culture results.  Last urine culture in 2/23/2024 showed infection due to E. coli which was pansensitive.  Will treat with cefdinir 300 mg twice daily for 7 days.    [facility].  Please let her know.

## 2024-12-02 NOTE — TELEPHONE ENCOUNTER
Orders placed. Please have patient come in to drop off/ leave a urine specimen. Anytime before 3pm.

## 2024-12-05 LAB
BACTERIA SPEC CULT: ABNORMAL
CC UR VC: ABNORMAL
SERVICE CMNT-IMP: ABNORMAL

## 2025-01-02 ENCOUNTER — TELEPHONE (OUTPATIENT)
Facility: CLINIC | Age: 70
End: 2025-01-02

## 2025-01-02 ENCOUNTER — HOSPITAL ENCOUNTER (OUTPATIENT)
Facility: HOSPITAL | Age: 70
Setting detail: SPECIMEN
Discharge: HOME | End: 2025-01-02
Payer: MEDICARE

## 2025-01-02 DIAGNOSIS — R39.9 UTI SYMPTOMS: ICD-10-CM

## 2025-01-02 DIAGNOSIS — R39.9 UTI SYMPTOMS: Primary | ICD-10-CM

## 2025-01-02 LAB
BILIRUBIN, URINE, POC: NEGATIVE
BLOOD URINE, POC: ABNORMAL
GLUCOSE URINE, POC: NEGATIVE
KETONES, URINE, POC: ABNORMAL
LEUKOCYTE ESTERASE, URINE, POC: ABNORMAL
NITRITE, URINE, POC: NEGATIVE
PH, URINE, POC: 5 (ref 4.6–8)
PROTEIN,URINE, POC: 300
SPECIFIC GRAVITY, URINE, POC: 1.01 (ref 1–1.03)
URINALYSIS CLARITY, POC: ABNORMAL
URINALYSIS COLOR, POC: ABNORMAL
UROBILINOGEN, POC: ABNORMAL

## 2025-01-02 PROCEDURE — 87086 URINE CULTURE/COLONY COUNT: CPT

## 2025-01-02 PROCEDURE — 87088 URINE BACTERIA CULTURE: CPT

## 2025-01-02 PROCEDURE — 87186 SC STD MICRODIL/AGAR DIL: CPT

## 2025-01-02 PROCEDURE — 81003 URINALYSIS AUTO W/O SCOPE: CPT | Performed by: INTERNAL MEDICINE

## 2025-01-02 RX ORDER — CEFDINIR 300 MG/1
300 CAPSULE ORAL 2 TIMES DAILY
Qty: 14 CAPSULE | Refills: 0 | Status: SHIPPED | OUTPATIENT
Start: 2025-01-02 | End: 2025-01-09

## 2025-01-02 NOTE — TELEPHONE ENCOUNTER
Orders Only on 01/02/2025   Component Date Value Ref Range Status    Color, Urine, POC 01/02/2025 Svetlana   Final    Clarity, Urine, POC 01/02/2025 Cloudy   Final    Glucose, Urine, POC 01/02/2025 Negative   Final    Bilirubin, Urine, POC 01/02/2025 Negative   Final    Ketones, Urine, POC 01/02/2025 Moderate   Final    Specific Gravity, Urine, POC 01/02/2025 1.010  1.001 - 1.035 Final    Blood, Urine, POC 01/02/2025 Large  Negative Final    pH, Urine, POC 01/02/2025 5.0  4.6 - 8.0 Final    Protein, Urine, POC 01/02/2025 300   Final    Urobilinogen, POC 01/02/2025 0.2 mg/dL   Final    Nitrite, Urine, POC 01/02/2025 Negative   Final    Leukocyte Esterase, Urine, POC 01/02/2025 Moderate   Final     POC urinalysis consistent with infection showing moderate leukocyte esterase and large blood.  Will treat empirically while awaiting urine culture results.  Last urine culture (12/2/2024) showed infection due to Proteus mirabilis which was resistant to Macrobid.  Patient with allergy to penicillin and sulfa.  Will treat with cefdinir 300 mg twice daily for 7 days.    [facility]cription sent to [facility].  Please let patient know.

## 2025-01-02 NOTE — TELEPHONE ENCOUNTER
----- Message from Rosa DICKENS sent at 1/2/2025  8:51 AM EST -----  Regarding: ECC Escalation To Practice  ECC Escalation To Practice      Type of Escalation: Acute Care Symptom  --------------------------------------------------------------------------------------------------------------------------    Information for Provider:  Patient is looking for appointment for: Symptom Urinary Symtoms  Reasons for Message: Unable to reach practice     Additional Information Patient experiencing UTI and it started last thursday  --------------------------------------------------------------------------------------------------------------------------    Relationship to Patient: Self     Call Back Info: Do not leave any message, patient will call back for answer  Preferred Call Back Number: Phone [phone]

## 2025-01-05 LAB
BACTERIA SPEC CULT: ABNORMAL
CC UR VC: ABNORMAL
SERVICE CMNT-IMP: ABNORMAL

## 2025-02-03 ENCOUNTER — HOSPITAL ENCOUNTER (OUTPATIENT)
Facility: HOSPITAL | Age: 70
Setting detail: SPECIMEN
Discharge: HOME | End: 2025-02-06
Payer: COMMERCIAL

## 2025-02-03 DIAGNOSIS — N18.31 STAGE 3A CHRONIC KIDNEY DISEASE (HCC): ICD-10-CM

## 2025-02-03 DIAGNOSIS — E11.29 TYPE 2 DIABETES MELLITUS WITH MICROALBUMINURIA (HCC): ICD-10-CM

## 2025-02-03 DIAGNOSIS — R80.9 TYPE 2 DIABETES MELLITUS WITH MICROALBUMINURIA (HCC): ICD-10-CM

## 2025-02-03 DIAGNOSIS — E78.00 PURE HYPERCHOLESTEROLEMIA: ICD-10-CM

## 2025-02-03 DIAGNOSIS — E53.8 B12 DEFICIENCY: ICD-10-CM

## 2025-02-03 DIAGNOSIS — D50.9 IRON DEFICIENCY ANEMIA, UNSPECIFIED IRON DEFICIENCY ANEMIA TYPE: ICD-10-CM

## 2025-02-03 DIAGNOSIS — N18.31 TYPE 2 DIABETES MELLITUS WITH STAGE 3A CHRONIC KIDNEY DISEASE, WITHOUT LONG-TERM CURRENT USE OF INSULIN (HCC): ICD-10-CM

## 2025-02-03 DIAGNOSIS — I10 ESSENTIAL HYPERTENSION: ICD-10-CM

## 2025-02-03 DIAGNOSIS — E11.22 TYPE 2 DIABETES MELLITUS WITH STAGE 3A CHRONIC KIDNEY DISEASE, WITHOUT LONG-TERM CURRENT USE OF INSULIN (HCC): ICD-10-CM

## 2025-02-03 LAB
25(OH)D3 SERPL-MCNC: 52.6 NG/ML (ref 30–100)
ALBUMIN SERPL-MCNC: 3.9 G/DL (ref 3.4–5)
ALBUMIN/GLOB SERPL: 1.6 (ref 0.8–1.7)
ALP SERPL-CCNC: 76 U/L (ref 45–117)
ALT SERPL-CCNC: 40 U/L (ref 13–56)
ANION GAP SERPL CALC-SCNC: 4 MMOL/L (ref 3–18)
APPEARANCE UR: CLEAR
AST SERPL-CCNC: 20 U/L (ref 10–38)
BACTERIA URNS QL MICRO: ABNORMAL /HPF
BASOPHILS # BLD: 0.04 K/UL (ref 0–0.1)
BASOPHILS NFR BLD: 0.6 % (ref 0–2)
BILIRUB SERPL-MCNC: 0.7 MG/DL (ref 0.2–1)
BILIRUB UR QL: NEGATIVE
BUN SERPL-MCNC: 19 MG/DL (ref 7–18)
BUN/CREAT SERPL: 18 (ref 12–20)
CALCIUM SERPL-MCNC: 9.6 MG/DL (ref 8.5–10.1)
CAOX CRY URNS QL MICRO: ABNORMAL
CHLORIDE SERPL-SCNC: 105 MMOL/L (ref 100–111)
CHOLEST SERPL-MCNC: 136 MG/DL
CO2 SERPL-SCNC: 31 MMOL/L (ref 21–32)
COLOR UR: YELLOW
CREAT SERPL-MCNC: 1.04 MG/DL (ref 0.6–1.3)
CREAT UR-MCNC: 175 MG/DL (ref 30–125)
DIFFERENTIAL METHOD BLD: ABNORMAL
EOSINOPHIL # BLD: 0.16 K/UL (ref 0–0.4)
EOSINOPHIL NFR BLD: 2.2 % (ref 0–5)
EPITH CASTS URNS QL MICRO: ABNORMAL /LPF (ref 0–5)
ERYTHROCYTE [DISTWIDTH] IN BLOOD BY AUTOMATED COUNT: 14.8 % (ref 11.6–14.5)
EST. AVERAGE GLUCOSE BLD GHB EST-MCNC: 126 MG/DL
FERRITIN SERPL-MCNC: 20 NG/ML (ref 8–388)
GLOBULIN SER CALC-MCNC: 2.5 G/DL (ref 2–4)
GLUCOSE SERPL-MCNC: 106 MG/DL (ref 74–99)
GLUCOSE UR STRIP.AUTO-MCNC: NEGATIVE MG/DL
HBA1C MFR BLD: 6 % (ref 4.2–5.6)
HCT VFR BLD AUTO: 40.7 % (ref 35–45)
HDLC SERPL-MCNC: 59 MG/DL (ref 40–60)
HDLC SERPL: 2.3 (ref 0–5)
HGB BLD-MCNC: 12.7 G/DL (ref 12–16)
HGB UR QL STRIP: NEGATIVE
IMM GRANULOCYTES # BLD AUTO: 0.01 K/UL (ref 0–0.04)
IMM GRANULOCYTES NFR BLD AUTO: 0.1 % (ref 0–0.5)
KETONES UR QL STRIP.AUTO: NEGATIVE MG/DL
LDLC SERPL CALC-MCNC: 57.8 MG/DL (ref 0–100)
LEUKOCYTE ESTERASE UR QL STRIP.AUTO: ABNORMAL
LIPID PANEL: NORMAL
LYMPHOCYTES # BLD: 2.56 K/UL (ref 0.9–3.6)
LYMPHOCYTES NFR BLD: 35.6 % (ref 21–52)
MCH RBC QN AUTO: 28.9 PG (ref 24–34)
MCHC RBC AUTO-ENTMCNC: 31.2 G/DL (ref 31–37)
MCV RBC AUTO: 92.5 FL (ref 78–100)
MICROALBUMIN UR-MCNC: 1.53 MG/DL (ref 0–3)
MICROALBUMIN/CREAT UR-RTO: 9 MG/G (ref 0–30)
MONOCYTES # BLD: 0.36 K/UL (ref 0.05–1.2)
MONOCYTES NFR BLD: 5 % (ref 3–10)
NEUTS SEG # BLD: 4.06 K/UL (ref 1.8–8)
NEUTS SEG NFR BLD: 56.5 % (ref 40–73)
NITRITE UR QL STRIP.AUTO: NEGATIVE
NRBC # BLD: 0 K/UL (ref 0–0.01)
NRBC BLD-RTO: 0 PER 100 WBC
PH UR STRIP: 6 (ref 5–8)
PLATELET # BLD AUTO: 334 K/UL (ref 135–420)
PMV BLD AUTO: 10.1 FL (ref 9.2–11.8)
POTASSIUM SERPL-SCNC: 4.4 MMOL/L (ref 3.5–5.5)
PROT SERPL-MCNC: 6.4 G/DL (ref 6.4–8.2)
PROT UR STRIP-MCNC: ABNORMAL MG/DL
RBC # BLD AUTO: 4.4 M/UL (ref 4.2–5.3)
RBC #/AREA URNS HPF: ABNORMAL /HPF (ref 0–5)
SODIUM SERPL-SCNC: 140 MMOL/L (ref 136–145)
SP GR UR REFRACTOMETRY: 1.02 (ref 1–1.03)
TRIGL SERPL-MCNC: 96 MG/DL
UROBILINOGEN UR QL STRIP.AUTO: 0.2 EU/DL (ref 0.2–1)
VIT B12 SERPL-MCNC: 1391 PG/ML (ref 211–911)
VLDLC SERPL CALC-MCNC: 19.2 MG/DL
WBC # BLD AUTO: 7.2 K/UL (ref 4.6–13.2)
WBC URNS QL MICRO: ABNORMAL /HPF (ref 0–5)

## 2025-02-03 PROCEDURE — 36415 COLL VENOUS BLD VENIPUNCTURE: CPT

## 2025-02-03 PROCEDURE — 82728 ASSAY OF FERRITIN: CPT

## 2025-02-03 PROCEDURE — 80053 COMPREHEN METABOLIC PANEL: CPT

## 2025-02-03 PROCEDURE — 83036 HEMOGLOBIN GLYCOSYLATED A1C: CPT

## 2025-02-03 PROCEDURE — 80061 LIPID PANEL: CPT

## 2025-02-03 PROCEDURE — 85025 COMPLETE CBC W/AUTO DIFF WBC: CPT

## 2025-02-03 PROCEDURE — 81001 URINALYSIS AUTO W/SCOPE: CPT

## 2025-02-03 PROCEDURE — 82043 UR ALBUMIN QUANTITATIVE: CPT

## 2025-02-03 PROCEDURE — 82306 VITAMIN D 25 HYDROXY: CPT

## 2025-02-03 PROCEDURE — 82607 VITAMIN B-12: CPT

## 2025-02-03 PROCEDURE — 82570 ASSAY OF URINE CREATININE: CPT

## 2025-02-12 SDOH — ECONOMIC STABILITY: INCOME INSECURITY: IN THE LAST 12 MONTHS, WAS THERE A TIME WHEN YOU WERE NOT ABLE TO PAY THE MORTGAGE OR RENT ON TIME?: NO

## 2025-02-12 SDOH — ECONOMIC STABILITY: FOOD INSECURITY: WITHIN THE PAST 12 MONTHS, THE FOOD YOU BOUGHT JUST DIDN'T LAST AND YOU DIDN'T HAVE MONEY TO GET MORE.: NEVER TRUE

## 2025-02-12 SDOH — ECONOMIC STABILITY: FOOD INSECURITY: WITHIN THE PAST 12 MONTHS, YOU WORRIED THAT YOUR FOOD WOULD RUN OUT BEFORE YOU GOT MONEY TO BUY MORE.: NEVER TRUE

## 2025-02-12 SDOH — ECONOMIC STABILITY: TRANSPORTATION INSECURITY
IN THE PAST 12 MONTHS, HAS THE LACK OF TRANSPORTATION KEPT YOU FROM MEDICAL APPOINTMENTS OR FROM GETTING MEDICATIONS?: NO

## 2025-02-13 ENCOUNTER — OFFICE VISIT (OUTPATIENT)
Facility: CLINIC | Age: 70
End: 2025-02-13

## 2025-02-13 VITALS
HEART RATE: 60 BPM | TEMPERATURE: 98.6 F | RESPIRATION RATE: 16 BRPM | SYSTOLIC BLOOD PRESSURE: 122 MMHG | BODY MASS INDEX: 25.6 KG/M2 | WEIGHT: 127 LBS | HEIGHT: 59 IN | OXYGEN SATURATION: 99 % | DIASTOLIC BLOOD PRESSURE: 70 MMHG

## 2025-02-13 DIAGNOSIS — E53.8 B12 DEFICIENCY: ICD-10-CM

## 2025-02-13 DIAGNOSIS — E66.3 OVERWEIGHT (BMI 25.0-29.9): ICD-10-CM

## 2025-02-13 DIAGNOSIS — E11.29 TYPE 2 DIABETES MELLITUS WITH MICROALBUMINURIA (HCC): ICD-10-CM

## 2025-02-13 DIAGNOSIS — N18.31 TYPE 2 DIABETES MELLITUS WITH STAGE 3A CHRONIC KIDNEY DISEASE, WITHOUT LONG-TERM CURRENT USE OF INSULIN (HCC): Primary | ICD-10-CM

## 2025-02-13 DIAGNOSIS — F41.9 ANXIETY: ICD-10-CM

## 2025-02-13 DIAGNOSIS — N18.31 STAGE 3A CHRONIC KIDNEY DISEASE (HCC): ICD-10-CM

## 2025-02-13 DIAGNOSIS — E61.1 IRON DEFICIENCY: ICD-10-CM

## 2025-02-13 DIAGNOSIS — E78.00 PURE HYPERCHOLESTEROLEMIA: ICD-10-CM

## 2025-02-13 DIAGNOSIS — E11.22 TYPE 2 DIABETES MELLITUS WITH STAGE 3A CHRONIC KIDNEY DISEASE, WITHOUT LONG-TERM CURRENT USE OF INSULIN (HCC): Primary | ICD-10-CM

## 2025-02-13 DIAGNOSIS — I10 ESSENTIAL HYPERTENSION: ICD-10-CM

## 2025-02-13 DIAGNOSIS — R80.9 TYPE 2 DIABETES MELLITUS WITH MICROALBUMINURIA (HCC): ICD-10-CM

## 2025-02-13 DIAGNOSIS — F33.9 RECURRENT MAJOR DEPRESSIVE DISORDER, REMISSION STATUS UNSPECIFIED (HCC): ICD-10-CM

## 2025-02-13 ASSESSMENT — PATIENT HEALTH QUESTIONNAIRE - PHQ9
7. TROUBLE CONCENTRATING ON THINGS, SUCH AS READING THE NEWSPAPER OR WATCHING TELEVISION: NOT AT ALL
2. FEELING DOWN, DEPRESSED OR HOPELESS: NOT AT ALL
SUM OF ALL RESPONSES TO PHQ9 QUESTIONS 1 & 2: 0
SUM OF ALL RESPONSES TO PHQ QUESTIONS 1-9: 0
6. FEELING BAD ABOUT YOURSELF - OR THAT YOU ARE A FAILURE OR HAVE LET YOURSELF OR YOUR FAMILY DOWN: NOT AT ALL
SUM OF ALL RESPONSES TO PHQ QUESTIONS 1-9: 0
8. MOVING OR SPEAKING SO SLOWLY THAT OTHER PEOPLE COULD HAVE NOTICED. OR THE OPPOSITE, BEING SO FIGETY OR RESTLESS THAT YOU HAVE BEEN MOVING AROUND A LOT MORE THAN USUAL: NOT AT ALL
9. THOUGHTS THAT YOU WOULD BE BETTER OFF DEAD, OR OF HURTING YOURSELF: NOT AT ALL
SUM OF ALL RESPONSES TO PHQ QUESTIONS 1-9: 0
5. POOR APPETITE OR OVEREATING: NOT AT ALL
4. FEELING TIRED OR HAVING LITTLE ENERGY: NOT AT ALL
10. IF YOU CHECKED OFF ANY PROBLEMS, HOW DIFFICULT HAVE THESE PROBLEMS MADE IT FOR YOU TO DO YOUR WORK, TAKE CARE OF THINGS AT HOME, OR GET ALONG WITH OTHER PEOPLE: NOT DIFFICULT AT ALL
1. LITTLE INTEREST OR PLEASURE IN DOING THINGS: NOT AT ALL
3. TROUBLE FALLING OR STAYING ASLEEP: NOT AT ALL
SUM OF ALL RESPONSES TO PHQ QUESTIONS 1-9: 0

## 2025-02-13 NOTE — PROGRESS NOTES
Megha Peralta presents today for   Chief Complaint   Patient presents with    Follow-up       \"Have you been to the ER, urgent care clinic since your last visit?  Hospitalized since your last visit?\"    NO    “Have you seen or consulted any other health care providers outside of  since your last visit?”    NO            
   Stroke Mother     Heart Surgery Mother         CABG    Cancer Father         cancer of the mouth    Hypertension Father      Social History:   She  reports that she quit smoking about 36 years ago. Her smoking use included cigarettes. She started smoking about 40 years ago. She has a 1 pack-year smoking history. She has never used smokeless tobacco.  She smoked approximately 0.25 ppd for 2 years, stopping in . She is a  and has one daughter. She was employed as a . 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 She was previously working as the  for his business.  Social History     Substance and Sexual Activity   Alcohol Use No    Alcohol/week: 1.7 standard drinks of alcohol     Immunization History:  Immunization History   Administered Date(s) Administered    COVID-19, MODERNA BLUE border, Primary or Immunocompromised, (age 12y+), IM, 100 mcg/0.5mL 2021, 2021    Influenza Virus Vaccine 10/05/2010, 2011    Influenza, FLUAD, (age 65 y+), IM, Quadv, 0.5mL 2022, 2023    Influenza, FLUAD, (age 65 y+), IM, Trivalent PF, 0.5mL 2024    Influenza, FLUARIX, FLULAVAL, FLUZONE (age 6 mo+) and AFLURIA, (age 3 y+), Quadv PF, 0.5mL 2018, 2019, 10/22/2020, 2021    Influenza, Trivalent PF 2014, 10/13/2014    Pneumococcal, PCV20, PREVNAR 20, (age 6w+), IM, 0.5mL 2022    Pneumococcal, PPSV23, PNEUMOVAX 23, (age 2y+), SC/IM, 0.5mL 2017    RSV, ABRYSVO, (Pregnant or age 60y+), PF, IM, 0.5mL 2024    TDaP, ADACEL (age 10y-64y), BOOSTRIX (age 10y+), IM, 0.5mL 11/10/2016    Td vaccine (adult) 2007    Zoster Recombinant (Shingrix) 2018, 2018       Review of Systems:   As above included in HPI.  Otherwise 11 point review of systems negative including constitutional, skin, HENT, eyes, respiratory, cardiovascular, gastrointestinal, genitourinary,

## 2025-02-13 NOTE — PATIENT INSTRUCTIONS
Heart-Healthy Diet: Care Instructions  Your Care Instructions     A heart-healthy diet has lots of vegetables, fruits, nuts, beans, and whole grains, and is low in salt. It limits foods that are high in saturated fat, such as meats, cheeses, and fried foods. It may be hard to change your diet, but even small changes can lower your risk of heart attack and heart disease.  Follow-up care is a key part of your treatment and safety. Be sure to make and go to all appointments, and call your doctor if you are having problems. It's also a good idea to know your test results and keep a list of the medicines you take.  How can you care for yourself at home?  Watch your portions  Learn what a serving is. A \"serving\" and a \"portion\" are not always the same thing. Make sure that you are not eating larger portions than are recommended. For example, a serving of pasta is ½ cup. A serving size of meat is 2 to 3 ounces. A 3-ounce serving is about the size of a deck of cards. Measure serving sizes until you are good at \"eyeballing\" them. Keep in mind that restaurants often serve portions that are 2 or 3 times the size of one serving.  To keep your energy level up and keep you from feeling hungry, eat often but in smaller portions.  Eat only the number of calories you need to stay at a healthy weight. If you need to lose weight, eat fewer calories than your body burns (through exercise and other physical activity).  Eat more fruits and vegetables  Eat a variety of fruit and vegetables every day. Dark green, deep orange, red, or yellow fruits and vegetables are especially good for you. Examples include spinach, carrots, peaches, and berries.  Keep carrots, celery, and other veggies handy for snacks. Buy fruit that is in season and store it where you can see it so that you will be tempted to eat it.  Cook dishes that have a lot of veggies in them, such as stir-fries and soups.  Limit saturated and trans fat  Read food labels, and try 
No

## 2025-02-16 PROBLEM — E61.1 IRON DEFICIENCY: Status: ACTIVE | Noted: 2022-12-11

## 2025-02-27 DIAGNOSIS — R80.9 TYPE 2 DIABETES MELLITUS WITH MICROALBUMINURIA (HCC): ICD-10-CM

## 2025-02-27 DIAGNOSIS — E11.22 TYPE 2 DIABETES MELLITUS WITH STAGE 3A CHRONIC KIDNEY DISEASE, WITHOUT LONG-TERM CURRENT USE OF INSULIN (HCC): ICD-10-CM

## 2025-02-27 DIAGNOSIS — E11.29 TYPE 2 DIABETES MELLITUS WITH MICROALBUMINURIA (HCC): ICD-10-CM

## 2025-02-27 DIAGNOSIS — N18.31 TYPE 2 DIABETES MELLITUS WITH STAGE 3A CHRONIC KIDNEY DISEASE, WITHOUT LONG-TERM CURRENT USE OF INSULIN (HCC): ICD-10-CM

## 2025-02-27 RX ORDER — SUMATRIPTAN 50 MG/1
50 TABLET, FILM COATED ORAL
Qty: 9 TABLET | Refills: 5 | Status: SHIPPED | OUTPATIENT
Start: 2025-02-27 | End: 2025-02-27

## 2025-02-27 NOTE — TELEPHONE ENCOUNTER
PCP: Crystal Johnson MD    LAST OFFICE VISIT: 02/13/2025    LAST REFILL PER CHART:  Medication:semaglutide, 2 MG/DOSE, (OZEMPIC) 8 MG/3ML SOPN sc injection   Ordered On:08/01/2024  Instructions:Inject 2 mg into the skin every 7 days   Dispense:9mL  Refills:3      LAST REFILL PER CHART:  Medication:SUMAtriptan (IMITREX) 50 MG tablet   Ordered On:10/11/2024  Instructions:Take 1 tablet by mouth once as needed for Migraine   Dispense:9 tablets  Refills:5      Future Appointments   Date Time Provider Department Center   6/13/2025  9:45 AM C LAB VISIT [facility] DE[facility]   6/19/2025 12:00 PM Crystal Johnson MD [facility] DEP

## 2025-05-09 ENCOUNTER — TELEPHONE (OUTPATIENT)
Facility: CLINIC | Age: 70
End: 2025-05-09

## 2025-05-09 ENCOUNTER — HOSPITAL ENCOUNTER (OUTPATIENT)
Facility: HOSPITAL | Age: 70
Setting detail: SPECIMEN
Discharge: HOME | End: 2025-05-12
Payer: MEDICARE

## 2025-05-09 DIAGNOSIS — R39.9 UTI SYMPTOMS: ICD-10-CM

## 2025-05-09 DIAGNOSIS — R39.9 UTI SYMPTOMS: Primary | ICD-10-CM

## 2025-05-09 LAB
BILIRUBIN, URINE, POC: NEGATIVE
BLOOD URINE, POC: ABNORMAL
GLUCOSE URINE, POC: NEGATIVE
KETONES, URINE, POC: ABNORMAL
LEUKOCYTE ESTERASE, URINE, POC: ABNORMAL
NITRITE, URINE, POC: NEGATIVE
PH, URINE, POC: 7 (ref 4.6–8)
PROTEIN,URINE, POC: ABNORMAL
SPECIFIC GRAVITY, URINE, POC: 1.02 (ref 1–1.03)
URINALYSIS CLARITY, POC: ABNORMAL
URINALYSIS COLOR, POC: ABNORMAL
UROBILINOGEN, POC: ABNORMAL

## 2025-05-09 PROCEDURE — 87088 URINE BACTERIA CULTURE: CPT

## 2025-05-09 PROCEDURE — 87086 URINE CULTURE/COLONY COUNT: CPT

## 2025-05-09 PROCEDURE — 87186 SC STD MICRODIL/AGAR DIL: CPT

## 2025-05-09 PROCEDURE — 81003 URINALYSIS AUTO W/O SCOPE: CPT | Performed by: INTERNAL MEDICINE

## 2025-05-09 RX ORDER — CEFDINIR 300 MG/1
300 CAPSULE ORAL 2 TIMES DAILY
Qty: 14 CAPSULE | Refills: 0 | Status: SHIPPED | OUTPATIENT
Start: 2025-05-09 | End: 2025-05-16

## 2025-05-09 NOTE — TELEPHONE ENCOUNTER
Orders Only on 05/09/2025   Component Date Value Ref Range Status    Color, Urine, POC 05/09/2025 Svetlana   Final    Clarity, Urine, POC 05/09/2025 Cloudy   Final    Glucose, Urine, POC 05/09/2025 Negative   Final    Bilirubin, Urine, POC 05/09/2025 Negative   Final    Ketones, Urine, POC 05/09/2025 Trace   Final    Specific Gravity, Urine, POC 05/09/2025 1.025  1.001 - 1.035 Final    Blood, Urine, POC 05/09/2025 1+  Negative Final    pH, Urine, POC 05/09/2025 7.0  4.6 - 8.0 Final    Protein, Urine, POC 05/09/2025 3+   Final    Urobilinogen, POC 05/09/2025 0.2 mg/dL   Final    Nitrite, Urine, POC 05/09/2025 Negative   Final    Leukocyte Esterase, Urine, POC 05/09/2025 3+   Final     POC urinalysis consistent with infection with 3+ leukocyte esterase and 1+ blood.  Will treat empirically while awaiting urine culture results.  Last 2 urine cultures showed infection due to Proteus mirabilis resistant to Macrobid.  Patient with allergy to penicillin and sulfa, but tolerates cephalosporins.  Will treat with cefdinir 300 mg twice daily for 7 days.    [facility]  Please let patient know.  Please also let her know that her urine showed that she was dehydrated with trace ketones and please advise that she needs to increase her her fluid intake

## 2025-05-09 NOTE — TELEPHONE ENCOUNTER
Pt thinks that she has a UTI and is requesting to come in the office to get tested. Please advise.

## 2025-05-10 LAB
BACTERIA SPEC CULT: ABNORMAL
CC UR VC: ABNORMAL
SERVICE CMNT-IMP: ABNORMAL

## 2025-05-12 LAB
BACTERIA SPEC CULT: ABNORMAL
CC UR VC: ABNORMAL
SERVICE CMNT-IMP: ABNORMAL

## 2025-05-16 ENCOUNTER — TELEPHONE (OUTPATIENT)
Facility: CLINIC | Age: 70
End: 2025-05-16

## 2025-05-16 RX ORDER — CLOTRIMAZOLE 10 MG/1
10 LOZENGE ORAL
Qty: 50 TABLET | Refills: 0 | Status: SHIPPED | OUTPATIENT
Start: 2025-05-16 | End: 2025-05-26

## 2025-05-16 NOTE — TELEPHONE ENCOUNTER
Patient believes she is having a reaction from her antibiotic. Her gums are red and sore. She is wondering if there is another medication she can take to help with the pain in her mouth.     [facility]

## 2025-05-19 ENCOUNTER — HOSPITAL ENCOUNTER (OUTPATIENT)
Facility: HOSPITAL | Age: 70
Discharge: HOME | End: 2025-05-22
Attending: INTERNAL MEDICINE
Payer: COMMERCIAL

## 2025-05-19 DIAGNOSIS — N18.31 STAGE 3A CHRONIC KIDNEY DISEASE (HCC): ICD-10-CM

## 2025-05-19 DIAGNOSIS — N20.0 NEPHROLITHIASIS: ICD-10-CM

## 2025-05-19 DIAGNOSIS — N39.0 RECURRENT URINARY TRACT INFECTION: ICD-10-CM

## 2025-05-19 PROCEDURE — 76770 US EXAM ABDO BACK WALL COMP: CPT

## 2025-05-23 ENCOUNTER — RESULTS FOLLOW-UP (OUTPATIENT)
Facility: CLINIC | Age: 70
End: 2025-05-23

## 2025-05-23 RX ORDER — ESTRADIOL 0.1 MG/G
CREAM VAGINAL
Qty: 42 G | Refills: 3 | Status: SHIPPED | OUTPATIENT
Start: 2025-05-23

## 2025-05-23 NOTE — TELEPHONE ENCOUNTER
US Result (most recent):  US RETROPERITONEAL COMPLETE 05/19/2025    Narrative  Retroperitoneal Ultrasound Complete    INDICATION: Urinary tract infection, site not specified; Calculus of kidney;  Chronic kidney disease, stage 3a (HCC)    TECHNIQUE: Select images from retroperitoneal ultrasound provided for  interpretation.    COMPARISON: 4/9/2024.    FINDINGS:    Right Kidney:  The right kidney measures 7.7 x 5.5 x 3.4 cm. Right kidney is  mildly atrophic with lobulated contour. Cortex demonstrates normal echogenicity.  No focal cortical lesion identified.    No echogenic renal calculi or  hydronephrosis.  No perinephric fluid collection.    Left Kidney:  The left kidney measures 10.7 x 5.9 x 4.3 cm.  The cortex  demonstrates normal thickness and echogenicity.   No focal cortical lesion  identified.    No echogenic renal calculi or hydronephrosis.  No perinephric  fluid collection.    Bladder:  Bladder measures approximately 3.6 x 8.2 x 4.7 cm. The bladder is  partly distended. Ureteral jets were not visualized.    Impression  1. Mildly atrophic lobulated right kidney.    2. No renal calculi or hydronephrosis.    Electronically signed by Zoya Willis        Called and discussed findings of renal ultrasound with patient.  Advised no renal calculi or hydronephrosis to explain recurrent UTIs.  Discussed that mildly atrophic right kidney likely related to prior severe urine infection with stone on that side.  Discussed preventive measures and patient reports taking Theragran and cranberry tablets.  No longer using estrogen cream and advised that she should restart.  [facility].    Reports oral pain resolved with Mycelex troches and no longer having urine symptoms after antibiotic completion.

## 2025-06-13 ENCOUNTER — HOSPITAL ENCOUNTER (OUTPATIENT)
Facility: HOSPITAL | Age: 70
Setting detail: SPECIMEN
Discharge: HOME | End: 2025-06-16
Payer: MEDICARE

## 2025-06-13 DIAGNOSIS — I10 ESSENTIAL HYPERTENSION: ICD-10-CM

## 2025-06-13 DIAGNOSIS — E61.1 IRON DEFICIENCY: ICD-10-CM

## 2025-06-13 DIAGNOSIS — E11.29 TYPE 2 DIABETES MELLITUS WITH MICROALBUMINURIA (HCC): ICD-10-CM

## 2025-06-13 DIAGNOSIS — N18.31 STAGE 3A CHRONIC KIDNEY DISEASE (HCC): ICD-10-CM

## 2025-06-13 DIAGNOSIS — N18.31 TYPE 2 DIABETES MELLITUS WITH STAGE 3A CHRONIC KIDNEY DISEASE, WITHOUT LONG-TERM CURRENT USE OF INSULIN (HCC): ICD-10-CM

## 2025-06-13 DIAGNOSIS — E78.00 PURE HYPERCHOLESTEROLEMIA: ICD-10-CM

## 2025-06-13 DIAGNOSIS — E11.22 TYPE 2 DIABETES MELLITUS WITH STAGE 3A CHRONIC KIDNEY DISEASE, WITHOUT LONG-TERM CURRENT USE OF INSULIN (HCC): ICD-10-CM

## 2025-06-13 DIAGNOSIS — R80.9 TYPE 2 DIABETES MELLITUS WITH MICROALBUMINURIA (HCC): ICD-10-CM

## 2025-06-13 DIAGNOSIS — E53.8 B12 DEFICIENCY: ICD-10-CM

## 2025-06-13 LAB
25(OH)D3 SERPL-MCNC: 65.9 NG/ML (ref 30–100)
ALBUMIN SERPL-MCNC: 3.8 G/DL (ref 3.4–5)
ALBUMIN/GLOB SERPL: 1.8 (ref 0.8–1.7)
ALP SERPL-CCNC: 84 U/L (ref 45–117)
ALT SERPL-CCNC: 31 U/L (ref 10–35)
ANION GAP SERPL CALC-SCNC: 12 MMOL/L (ref 3–18)
AST SERPL-CCNC: 27 U/L (ref 10–38)
BASOPHILS # BLD: 0.05 K/UL (ref 0–0.1)
BASOPHILS NFR BLD: 0.5 % (ref 0–2)
BILIRUB SERPL-MCNC: 0.5 MG/DL (ref 0.2–1)
BUN SERPL-MCNC: 21 MG/DL (ref 6–23)
BUN/CREAT SERPL: 23 (ref 12–20)
CALCIUM SERPL-MCNC: 9.6 MG/DL (ref 8.5–10.1)
CHLORIDE SERPL-SCNC: 105 MMOL/L (ref 98–107)
CHOLEST SERPL-MCNC: 126 MG/DL
CO2 SERPL-SCNC: 26 MMOL/L (ref 21–32)
CREAT SERPL-MCNC: 0.91 MG/DL (ref 0.6–1.3)
CREAT UR-MCNC: 133 MG/DL (ref 30–125)
DIFFERENTIAL METHOD BLD: ABNORMAL
EOSINOPHIL # BLD: 0.26 K/UL (ref 0–0.4)
EOSINOPHIL NFR BLD: 2.7 % (ref 0–5)
ERYTHROCYTE [DISTWIDTH] IN BLOOD BY AUTOMATED COUNT: 14.1 % (ref 11.6–14.5)
EST. AVERAGE GLUCOSE BLD GHB EST-MCNC: 128 MG/DL
FERRITIN SERPL-MCNC: 20 NG/ML (ref 13–400)
GLOBULIN SER CALC-MCNC: 2.1 G/DL (ref 2–4)
GLUCOSE SERPL-MCNC: 92 MG/DL (ref 74–108)
HBA1C MFR BLD: 6.1 % (ref 4.2–5.6)
HCT VFR BLD AUTO: 36.7 % (ref 35–45)
HDLC SERPL-MCNC: 49 MG/DL (ref 40–60)
HDLC SERPL: 2.6 (ref 0–5)
HGB BLD-MCNC: 12 G/DL (ref 12–16)
IMM GRANULOCYTES # BLD AUTO: 0.02 K/UL (ref 0–0.04)
IMM GRANULOCYTES NFR BLD AUTO: 0.2 % (ref 0–0.5)
LDLC SERPL CALC-MCNC: 61 MG/DL (ref 0–100)
LYMPHOCYTES # BLD: 2.81 K/UL (ref 0.9–3.6)
LYMPHOCYTES NFR BLD: 28.9 % (ref 21–52)
MCH RBC QN AUTO: 29.6 PG (ref 24–34)
MCHC RBC AUTO-ENTMCNC: 32.7 G/DL (ref 31–37)
MCV RBC AUTO: 90.4 FL (ref 78–100)
MICROALBUMIN UR-MCNC: 3.68 MG/DL (ref 0–3)
MICROALBUMIN/CREAT UR-RTO: 28 MG/G (ref 0–30)
MONOCYTES # BLD: 0.52 K/UL (ref 0.05–1.2)
MONOCYTES NFR BLD: 5.3 % (ref 3–10)
NEUTS SEG # BLD: 6.07 K/UL (ref 1.8–8)
NEUTS SEG NFR BLD: 62.4 % (ref 40–73)
NRBC # BLD: 0 K/UL (ref 0–0.01)
NRBC BLD-RTO: 0 PER 100 WBC
PLATELET # BLD AUTO: 292 K/UL (ref 135–420)
PMV BLD AUTO: 11 FL (ref 9.2–11.8)
POTASSIUM SERPL-SCNC: 4 MMOL/L (ref 3.5–5.5)
PROT SERPL-MCNC: 6 G/DL (ref 6.4–8.2)
RBC # BLD AUTO: 4.06 M/UL (ref 4.2–5.3)
SODIUM SERPL-SCNC: 143 MMOL/L (ref 136–145)
TRIGL SERPL-MCNC: 85 MG/DL (ref 0–150)
VIT B12 SERPL-MCNC: 1126 PG/ML (ref 211–911)
VLDLC SERPL CALC-MCNC: 17 MG/DL
WBC # BLD AUTO: 9.7 K/UL (ref 4.6–13.2)

## 2025-06-13 PROCEDURE — 82728 ASSAY OF FERRITIN: CPT

## 2025-06-13 PROCEDURE — 36415 COLL VENOUS BLD VENIPUNCTURE: CPT

## 2025-06-13 PROCEDURE — 82306 VITAMIN D 25 HYDROXY: CPT

## 2025-06-13 PROCEDURE — 80053 COMPREHEN METABOLIC PANEL: CPT

## 2025-06-13 PROCEDURE — 82607 VITAMIN B-12: CPT

## 2025-06-13 PROCEDURE — 80061 LIPID PANEL: CPT

## 2025-06-13 PROCEDURE — 82570 ASSAY OF URINE CREATININE: CPT

## 2025-06-13 PROCEDURE — 85025 COMPLETE CBC W/AUTO DIFF WBC: CPT

## 2025-06-13 PROCEDURE — 82043 UR ALBUMIN QUANTITATIVE: CPT

## 2025-06-13 PROCEDURE — 83036 HEMOGLOBIN GLYCOSYLATED A1C: CPT

## 2025-06-17 ENCOUNTER — HOSPITAL ENCOUNTER (OUTPATIENT)
Facility: HOSPITAL | Age: 70
Setting detail: SPECIMEN
Discharge: HOME | End: 2025-06-20
Payer: MEDICARE

## 2025-06-17 DIAGNOSIS — R39.9 UTI SYMPTOMS: Primary | ICD-10-CM

## 2025-06-17 DIAGNOSIS — R39.9 UTI SYMPTOMS: ICD-10-CM

## 2025-06-17 PROCEDURE — 87086 URINE CULTURE/COLONY COUNT: CPT

## 2025-06-17 RX ORDER — CEFDINIR 300 MG/1
CAPSULE ORAL
Qty: 14 CAPSULE | Refills: 0 | OUTPATIENT
Start: 2025-06-17

## 2025-06-17 NOTE — TELEPHONE ENCOUNTER
Called patient, she did not request refill. However states she is having UTI symptoms. Patient unable to come in to leave a sample today, however she can come tomorrow to leave a urine sample for testing.

## 2025-06-18 ENCOUNTER — RESULTS FOLLOW-UP (OUTPATIENT)
Facility: CLINIC | Age: 70
End: 2025-06-18

## 2025-06-18 DIAGNOSIS — R39.9 UTI SYMPTOMS: ICD-10-CM

## 2025-06-18 LAB
BILIRUBIN, URINE, POC: NEGATIVE
BLOOD URINE, POC: ABNORMAL
GLUCOSE URINE, POC: NEGATIVE
KETONES, URINE, POC: ABNORMAL
LEUKOCYTE ESTERASE, URINE, POC: ABNORMAL
NITRITE, URINE, POC: NEGATIVE
PH, URINE, POC: 6 (ref 4.6–8)
PROTEIN,URINE, POC: ABNORMAL
SPECIFIC GRAVITY, URINE, POC: 1.02 (ref 1–1.03)
URINALYSIS CLARITY, POC: ABNORMAL
URINALYSIS COLOR, POC: ABNORMAL
UROBILINOGEN, POC: ABNORMAL

## 2025-06-18 PROCEDURE — 81003 URINALYSIS AUTO W/O SCOPE: CPT | Performed by: INTERNAL MEDICINE

## 2025-06-19 LAB
BACTERIA SPEC CULT: NORMAL
SERVICE CMNT-IMP: NORMAL

## 2025-06-24 ENCOUNTER — OFFICE VISIT (OUTPATIENT)
Facility: CLINIC | Age: 70
End: 2025-06-24

## 2025-06-24 VITALS
OXYGEN SATURATION: 99 % | WEIGHT: 122 LBS | HEIGHT: 59 IN | BODY MASS INDEX: 24.6 KG/M2 | HEART RATE: 65 BPM | SYSTOLIC BLOOD PRESSURE: 114 MMHG | TEMPERATURE: 98.5 F | RESPIRATION RATE: 16 BRPM | DIASTOLIC BLOOD PRESSURE: 68 MMHG

## 2025-06-24 DIAGNOSIS — N20.0 NEPHROLITHIASIS: ICD-10-CM

## 2025-06-24 DIAGNOSIS — E78.00 PURE HYPERCHOLESTEROLEMIA: ICD-10-CM

## 2025-06-24 DIAGNOSIS — E11.22 TYPE 2 DIABETES MELLITUS WITH STAGE 3A CHRONIC KIDNEY DISEASE, WITHOUT LONG-TERM CURRENT USE OF INSULIN (HCC): Primary | ICD-10-CM

## 2025-06-24 DIAGNOSIS — E53.8 B12 DEFICIENCY: ICD-10-CM

## 2025-06-24 DIAGNOSIS — F33.42 RECURRENT MAJOR DEPRESSIVE DISORDER, IN FULL REMISSION: ICD-10-CM

## 2025-06-24 DIAGNOSIS — E11.29 TYPE 2 DIABETES MELLITUS WITH MICROALBUMINURIA (HCC): ICD-10-CM

## 2025-06-24 DIAGNOSIS — Z12.31 SCREENING MAMMOGRAM FOR BREAST CANCER: ICD-10-CM

## 2025-06-24 DIAGNOSIS — I10 ESSENTIAL HYPERTENSION: ICD-10-CM

## 2025-06-24 DIAGNOSIS — N18.31 TYPE 2 DIABETES MELLITUS WITH STAGE 3A CHRONIC KIDNEY DISEASE, WITHOUT LONG-TERM CURRENT USE OF INSULIN (HCC): Primary | ICD-10-CM

## 2025-06-24 DIAGNOSIS — R80.9 TYPE 2 DIABETES MELLITUS WITH MICROALBUMINURIA (HCC): ICD-10-CM

## 2025-06-24 DIAGNOSIS — N39.0 RECURRENT URINARY TRACT INFECTION: ICD-10-CM

## 2025-06-24 DIAGNOSIS — N18.31 STAGE 3A CHRONIC KIDNEY DISEASE (HCC): ICD-10-CM

## 2025-06-24 DIAGNOSIS — M85.89 OSTEOPENIA OF MULTIPLE SITES: ICD-10-CM

## 2025-06-24 DIAGNOSIS — E61.1 IRON DEFICIENCY: ICD-10-CM

## 2025-06-24 DIAGNOSIS — F41.9 ANXIETY: ICD-10-CM

## 2025-06-24 RX ORDER — EZETIMIBE 10 MG/1
10 TABLET ORAL DAILY
Qty: 90 TABLET | Refills: 3 | Status: SHIPPED | OUTPATIENT
Start: 2025-06-24

## 2025-06-24 RX ORDER — LISINOPRIL 20 MG/1
20 TABLET ORAL 2 TIMES DAILY
Qty: 180 TABLET | Refills: 3 | Status: SHIPPED | OUTPATIENT
Start: 2025-06-24

## 2025-06-24 NOTE — PROGRESS NOTES
Megha Peralta presents today for   Chief Complaint   Patient presents with    Follow-up       \"Have you been to the ER, urgent care clinic since your last visit?  Hospitalized since your last visit?\"    NO    “Have you seen or consulted any other health care providers outside of [facility] since your last visit?”    NO

## 2025-06-24 NOTE — PROGRESS NOTES
HPI:   Megha Peralta is a 69 y.o. year old female who presents today for a routine follow-up visit.  She has a history of hypertension, hyperlipidemia, diabetes mellitus, migraine headaches, depression, anxiety, ADHD, and lumbar radiculopathy. She reports today that she is doing reasonably well.     She reports today that she is continuing to take Ozempic 2 mg weekly and metformin 1000 mg daily. Her weight has decreased approximately 8 pounds since 11/2024.  She denies any difficulty with constipation.  She discusses that she is taking a prenatal vitamin daily.  She reports that she is walking for exercise approximately 4 days/week and denies any change in exercise tolerance.     She continues on Prozac 40 mg daily, Ingrezza 40 mg every 48 hours, and lorazepam 1 mg every 8 hours as needed.  She states that she is taking lorazepam 1 mg at bedtime and may take an additional dose during the day if she is experiencing stress at work.  She has noted improvement in her tremors on her current regimen although states that they do persist. She reports that she is continuing to follow with [facility] and decided not to follow-up with Dr. Goldberg..    On 1/2/2025, she contacted the office and reported experiencing dysuria.  Urinalysis showed moderate leukocyte esterase and large blood, and she was started empirically on cefdinir 300 mg twice daily for 7 days.  Urine culture showed 70,000 colonies/mL of Proteus mirabilis which was resistant only to Macrobid.  On 5/9/2025, she presented again with dysuria and urine culture again showed infection due to Proteus mirabilis resistant only to Macrobid.  She was treated with cefdinir with improvement.  Given that her prior 3 cultures since 12/2024 were positive for Proteus mirabilis, she underwent a renal ultrasound (5/19/2025) which showed a mildly atrophic lobulated right kidney, no renal calculi or hydronephrosis, and mild bladder distention.  She requested repeat urinalysis

## (undated) DEVICE — BASIN ST MAJOR-NO CAUTERY: Brand: MEDLINE INDUSTRIES, INC.

## (undated) DEVICE — 3.0MM PRECISION NEURO (MATCH HEAD)

## (undated) DEVICE — WAX SURG 2.5GM HEMSTAT BNE BEESWAX PARAFFIN ISO PALMITATE

## (undated) DEVICE — Device

## (undated) DEVICE — INTENDED FOR TISSUE SEPARATION, AND OTHER PROCEDURES THAT REQUIRE A SHARP SURGICAL BLADE TO PUNCTURE OR CUT.: Brand: BARD-PARKER SAFETY BLADES SIZE 15, STERILE

## (undated) DEVICE — PACK,CYSTOSCOPY,PK I,AURORA: Brand: MEDLINE

## (undated) DEVICE — KIT POS W/ FOAM ARM CRADL SHEARGUARD CHST PD CVR FOR SPNL

## (undated) DEVICE — STERILE POLYISOPRENE POWDER-FREE SURGICAL GLOVES: Brand: PROTEXIS

## (undated) DEVICE — GARMENT,MEDLINE,DVT,INT,CALF,FOAM,MED: Brand: MEDLINE

## (undated) DEVICE — GAUZE,SPONGE,4"X4",16PLY,STRL,LF,10/TRAY: Brand: MEDLINE

## (undated) DEVICE — SYR 10ML LUER LOK 1/5ML GRAD --

## (undated) DEVICE — 3M™ BAIR PAWS FLEX™ WARMING GOWN, STANDARD, 20 PER CASE 81003: Brand: BAIR PAWS™

## (undated) DEVICE — SOL IRR STRL H20 3000ML BG LF --

## (undated) DEVICE — (D)PREP SKN CHLRAPRP APPL 26ML -- CONVERT TO ITEM 371833

## (undated) DEVICE — TRAY MYEL SFTY +

## (undated) DEVICE — BAG DRAINAGE CUST DISP

## (undated) DEVICE — NEEDLE HYPO 22GA L1.5IN BLK S STL HUB POLYPR SHLD REG BVL

## (undated) DEVICE — MEDIA CONTRAST 10ML 200MG/ML 41%

## (undated) DEVICE — SUTURE MCRYL SZ 3-0 L27IN ABSRB UD L24MM PS-1 3/8 CIR PRIM Y936H

## (undated) DEVICE — WATERPROOF, BACTERIA PROOF DRESSING WITH ABSORBENT SEE THROUGH PAD: Brand: OPSITE POST-OP VISIBLE 10X8CM CTN 20

## (undated) DEVICE — KIT CLN UP BON SECOURS MARYV

## (undated) DEVICE — WRAP COMPR BK

## (undated) DEVICE — AIRLIFE™ ADULT CUSHION NASAL CANNULA 14 FOOT (4.3) CRUSH-RESISTANT OXYGEN TUBING, AND U/CONNECT-IT ADAPTER: Brand: AIRLIFE™

## (undated) DEVICE — SOLUTION IV 1000ML 0.9% SOD CHL

## (undated) DEVICE — NDL SPNE MANAN 22GX6IN --

## (undated) DEVICE — INTENDED FOR TISSUE SEPARATION, AND OTHER PROCEDURES THAT REQUIRE A SHARP SURGICAL BLADE TO PUNCTURE OR CUT.: Brand: BARD-PARKER SAFETY BLADES SIZE 20, STERILE

## (undated) DEVICE — REM POLYHESIVE ADULT PATIENT RETURN ELECTRODE: Brand: VALLEYLAB

## (undated) DEVICE — HIWIRE NITINOL CORE WIRE GUIDE STRAIGHT WITH HYDROPHILIC COATING STANDARD SHAFT AND 3CM FLEXIBLE TIP: Brand: HIWIRE

## (undated) DEVICE — OPEN-END URETERAL CATHETER: Brand: COOK

## (undated) DEVICE — FLEX ADVANTAGE 3000CC: Brand: FLEX ADVANTAGE

## (undated) DEVICE — JELLY,LUBE,STERILE,FLIP TOP,TUBE,4-OZ: Brand: MEDLINE

## (undated) DEVICE — (D)BNDG ADHESIVE FABRIC 3/4X3 -- DISC BY MFR USE ITEM 357960

## (undated) DEVICE — (D)NDL SPNE 22GX15CM -- DISC BY MFR W/NO SUB

## (undated) DEVICE — SUTURE VCRL SZ 1 L18IN ABSRB VLT CT-1 L36MM 1/2 CIR J741D

## (undated) DEVICE — Y-TYPE TUR/BLADDER IRRIGATION SET, REGULATING CLAMP

## (undated) DEVICE — KENDALL SCD EXPRESS SLEEVES, KNEE LENGTH, MEDIUM: Brand: KENDALL SCD

## (undated) DEVICE — STOCKING COMPR M L16-18IN LNG 19MMHG ANK 8-9IN CALF 12-15IN

## (undated) DEVICE — GEL BAG FOR WRAPS --

## (undated) DEVICE — MEDI-VAC NON-CONDUCTIVE SUCTION TUBING: Brand: CARDINAL HEALTH

## (undated) DEVICE — SUTURE VCRL SZ 2-0 L18IN ABSRB VLT CT-1 L36MM 1/2 CIR J739D

## (undated) DEVICE — TRAY PREP DRY W/ PREM GLV 2 APPL 6 SPNG 2 UNDPD 1 OVERWRAP

## (undated) DEVICE — WATERPROOF, BACTERIA PROOF DRESSING WITH ABSORBENT SEE THROUGH PAD: Brand: OPSITE POST-OP VISIBLE 15X10CM CTN 20